# Patient Record
Sex: FEMALE | Race: BLACK OR AFRICAN AMERICAN | NOT HISPANIC OR LATINO | Employment: OTHER | URBAN - METROPOLITAN AREA
[De-identification: names, ages, dates, MRNs, and addresses within clinical notes are randomized per-mention and may not be internally consistent; named-entity substitution may affect disease eponyms.]

---

## 2017-01-17 ENCOUNTER — OFFICE VISIT (OUTPATIENT)
Dept: PSYCHIATRY | Facility: CLINIC | Age: 57
End: 2017-01-17
Payer: COMMERCIAL

## 2017-01-17 VITALS
WEIGHT: 195 LBS | HEIGHT: 69 IN | SYSTOLIC BLOOD PRESSURE: 115 MMHG | BODY MASS INDEX: 28.88 KG/M2 | HEART RATE: 89 BPM | DIASTOLIC BLOOD PRESSURE: 70 MMHG

## 2017-01-17 DIAGNOSIS — F60.89 CLUSTER B PERSONALITY DISORDER: Primary | ICD-10-CM

## 2017-01-17 PROCEDURE — 99213 OFFICE O/P EST LOW 20 MIN: CPT | Mod: S$GLB,,, | Performed by: PSYCHIATRY & NEUROLOGY

## 2017-01-17 PROCEDURE — 99999 PR PBB SHADOW E&M-EST. PATIENT-LVL II: CPT | Mod: PBBFAC,,, | Performed by: PSYCHIATRY & NEUROLOGY

## 2017-01-17 PROCEDURE — 1160F RVW MEDS BY RX/DR IN RCRD: CPT | Mod: S$GLB,,, | Performed by: PSYCHIATRY & NEUROLOGY

## 2017-01-17 RX ORDER — CLONAZEPAM 2 MG/1
2 TABLET ORAL NIGHTLY
Qty: 90 TABLET | Refills: 0 | Status: SHIPPED | OUTPATIENT
Start: 2017-01-17 | End: 2017-04-11 | Stop reason: SDUPTHER

## 2017-01-17 RX ORDER — FLUOXETINE HYDROCHLORIDE 40 MG/1
80 CAPSULE ORAL DAILY
Qty: 180 CAPSULE | Refills: 0 | Status: SHIPPED | OUTPATIENT
Start: 2017-01-17 | End: 2017-04-11 | Stop reason: SDUPTHER
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
## 2017-02-28 NOTE — PROGRESS NOTES
STAFF NOTE:  Patient's case was discussed with Dr. Rojas in supervision.  I agree with her assessment and plan as specified to continue Prozac and Klonopin for management of depression and anxiety Sx.

## 2017-03-01 ENCOUNTER — OFFICE VISIT (OUTPATIENT)
Dept: PSYCHIATRY | Facility: CLINIC | Age: 57
End: 2017-03-01
Payer: COMMERCIAL

## 2017-03-01 DIAGNOSIS — F41.1 GAD (GENERALIZED ANXIETY DISORDER): ICD-10-CM

## 2017-03-01 DIAGNOSIS — F39 EPISODIC MOOD DISORDER: Primary | ICD-10-CM

## 2017-03-01 PROCEDURE — 90791 PSYCH DIAGNOSTIC EVALUATION: CPT | Mod: S$GLB,,, | Performed by: SOCIAL WORKER
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
## 2017-03-31 ENCOUNTER — OFFICE VISIT (OUTPATIENT)
Dept: PSYCHIATRY | Facility: CLINIC | Age: 57
End: 2017-03-31
Payer: COMMERCIAL

## 2017-03-31 DIAGNOSIS — F41.1 GAD (GENERALIZED ANXIETY DISORDER): Primary | ICD-10-CM

## 2017-03-31 PROCEDURE — 90834 PSYTX W PT 45 MINUTES: CPT | Mod: S$GLB,,, | Performed by: SOCIAL WORKER
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
## 2017-04-11 ENCOUNTER — OFFICE VISIT (OUTPATIENT)
Dept: PSYCHIATRY | Facility: CLINIC | Age: 57
End: 2017-04-11
Payer: COMMERCIAL

## 2017-04-11 DIAGNOSIS — F60.89 CLUSTER B PERSONALITY DISORDER: Primary | ICD-10-CM

## 2017-04-11 PROCEDURE — 1160F RVW MEDS BY RX/DR IN RCRD: CPT | Mod: S$GLB,,, | Performed by: PSYCHIATRY & NEUROLOGY

## 2017-04-11 PROCEDURE — 99999 PR PBB SHADOW E&M-EST. PATIENT-LVL III: CPT | Mod: PBBFAC,,, | Performed by: PSYCHIATRY & NEUROLOGY

## 2017-04-11 PROCEDURE — 99213 OFFICE O/P EST LOW 20 MIN: CPT | Mod: S$GLB,,, | Performed by: PSYCHIATRY & NEUROLOGY

## 2017-04-11 RX ORDER — CLONAZEPAM 1 MG/1
1 TABLET ORAL 2 TIMES DAILY
Qty: 180 TABLET | Refills: 0 | Status: SHIPPED | OUTPATIENT
Start: 2017-04-11 | End: 2017-07-18 | Stop reason: SDUPTHER

## 2017-04-11 RX ORDER — FLUOXETINE HYDROCHLORIDE 40 MG/1
80 CAPSULE ORAL DAILY
Qty: 180 CAPSULE | Refills: 0 | Status: SHIPPED | OUTPATIENT
Start: 2017-04-11 | End: 2017-07-18 | Stop reason: SDUPTHER

## 2017-04-11 NOTE — PROGRESS NOTES
"Outpatient Psychiatry Follow-Up Visit (MD/NP)    4/11/2017    Clinical Status of Patient:  Outpatient (Ambulatory)    Chief Complaint:  Jennifer Brady is a 56 y.o. female who presents today for follow-up of depression, mood disorder and anxiety.  Met with patient.      Interval History and Content of Current Session:  Interim Events/Subjective Report/Content of Current Session: Pt states that she has been doing well since last visit and reports no symptoms of depression or anxiety upon interview; states "I'm actually doing great." She denies SI/HI/AVH and endorses good sleep and appetite. She also states that she feels she needs to be on the Klonopin during the day as well so discussed dose change from 2 mg qhs to 1 mg BID.    Psychotherapy:  · Target symptoms: depression, anxiety , mood disorder  · Why chosen therapy is appropriate versus another modality: relevant to diagnosis  · Outcome monitoring methods: self-report  · Therapeutic intervention type: supportive psychotherapy  · Topics discussed/themes: symptom recognition  · The patient's response to the intervention is accepting. The patient's progress toward treatment goals is good.   · Duration of intervention: 5 minutes.    Review of Systems   · PSYCHIATRIC: Pertinant items are noted in the narrative.    Past Medical, Family and Social History: The patient's past medical, family and social history have been reviewed and updated as appropriate within the electronic medical record - see encounter notes.    Compliance: yes    Side effects: None    Risk Parameters:  Patient reports no suicidal ideation  Patient reports no homicidal ideation  Patient reports no self-injurious behavior  Patient reports no violent behavior    Exam (detailed: at least 9 elements; comprehensive: all 15 elements)   Constitutional  Vitals:  Most recent vital signs, dated less than 90 days prior to this appointment, were reviewed.   There were no vitals filed for this visit.   "   General:  unremarkable, age appropriate     Musculoskeletal  Muscle Strength/Tone:  no spasicity, no rigidity   Gait & Station:  non-ataxic     Psychiatric  Speech:  no latency; no press   Mood & Affect:  steady  congruent and appropriate   Thought Process:  normal and logical   Associations:  intact   Thought Content:  normal, no suicidality, no homicidality, delusions, or paranoia   Insight:  intact, has awareness of illness   Judgement: behavior is adequate to circumstances   Orientation:  grossly intact   Memory: intact for content of interview   Language: grossly intact   Attention Span & Concentration:  able to focus   Fund of Knowledge:  intact and appropriate to age and level of education     Assessment and Diagnosis   Status/Progress: Based on the examination today, the patient's problem(s) is/are well controlled.  New problems have not been presented today.   Co-morbidities, Diagnostic uncertainty and Lack of compliance are not complicating management of the primary condition.  There are no active rule-out diagnoses for this patient at this time.     General Impression:   MDD  LAURA  Cluster B personality traits      Intervention/Counseling/Treatment Plan   · Continue Proxac 80 mg qAM and Klonopin 1 mg BID.      Return to Clinic: 3 months

## 2017-05-01 ENCOUNTER — OFFICE VISIT (OUTPATIENT)
Dept: PSYCHIATRY | Facility: CLINIC | Age: 57
End: 2017-05-01
Payer: COMMERCIAL

## 2017-05-01 DIAGNOSIS — F39 MOOD DISORDER: ICD-10-CM

## 2017-05-01 DIAGNOSIS — F41.1 GAD (GENERALIZED ANXIETY DISORDER): Primary | ICD-10-CM

## 2017-05-01 PROCEDURE — 90834 PSYTX W PT 45 MINUTES: CPT | Mod: S$GLB,,, | Performed by: SOCIAL WORKER
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
## 2017-06-01 ENCOUNTER — DOCUMENTATION ONLY (OUTPATIENT)
Dept: PSYCHIATRY | Facility: CLINIC | Age: 57
End: 2017-06-01

## 2017-07-11 PROBLEM — E78.00 PURE HYPERCHOLESTEROLEMIA: Status: ACTIVE | Noted: 2017-07-11

## 2017-07-11 PROBLEM — I10 HTN (HYPERTENSION): Status: ACTIVE | Noted: 2017-07-11

## 2017-07-11 PROBLEM — G47.33 OSA (OBSTRUCTIVE SLEEP APNEA): Status: ACTIVE | Noted: 2017-07-11

## 2017-07-11 PROBLEM — Z86.69 HX OF MIGRAINE HEADACHES: Status: ACTIVE | Noted: 2017-07-11

## 2017-07-11 PROBLEM — F41.9 ANXIETY: Status: ACTIVE | Noted: 2017-07-11

## 2017-07-11 PROBLEM — R07.9 CHEST PAIN: Status: ACTIVE | Noted: 2017-07-11

## 2017-07-18 ENCOUNTER — OFFICE VISIT (OUTPATIENT)
Dept: PSYCHIATRY | Facility: CLINIC | Age: 57
End: 2017-07-18
Payer: COMMERCIAL

## 2017-07-18 VITALS
HEART RATE: 80 BPM | DIASTOLIC BLOOD PRESSURE: 56 MMHG | SYSTOLIC BLOOD PRESSURE: 118 MMHG | HEIGHT: 69 IN | BODY MASS INDEX: 27.85 KG/M2 | WEIGHT: 188 LBS

## 2017-07-18 DIAGNOSIS — F32.A DEPRESSION, UNSPECIFIED DEPRESSION TYPE: Primary | ICD-10-CM

## 2017-07-18 DIAGNOSIS — F41.9 ANXIETY: ICD-10-CM

## 2017-07-18 DIAGNOSIS — F60.89 CLUSTER B PERSONALITY DISORDER: ICD-10-CM

## 2017-07-18 PROCEDURE — 99213 OFFICE O/P EST LOW 20 MIN: CPT | Mod: S$GLB,,, | Performed by: PSYCHIATRY & NEUROLOGY

## 2017-07-18 PROCEDURE — 99999 PR PBB SHADOW E&M-EST. PATIENT-LVL III: CPT | Mod: PBBFAC,,, | Performed by: PSYCHIATRY & NEUROLOGY

## 2017-07-18 PROCEDURE — 3008F BODY MASS INDEX DOCD: CPT | Mod: S$GLB,,, | Performed by: PSYCHIATRY & NEUROLOGY

## 2017-07-18 RX ORDER — FLUOXETINE HYDROCHLORIDE 40 MG/1
80 CAPSULE ORAL DAILY
Qty: 180 CAPSULE | Refills: 0 | Status: SHIPPED | OUTPATIENT
Start: 2017-07-18 | End: 2017-10-10 | Stop reason: SDUPTHER

## 2017-07-18 RX ORDER — CLONAZEPAM 1 MG/1
1 TABLET ORAL 2 TIMES DAILY
Qty: 180 TABLET | Refills: 0 | Status: SHIPPED | OUTPATIENT
Start: 2017-07-18 | End: 2017-10-10 | Stop reason: SDUPTHER
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
## 2017-08-29 ENCOUNTER — OFFICE VISIT (OUTPATIENT)
Dept: PSYCHIATRY | Facility: CLINIC | Age: 57
End: 2017-08-29
Payer: COMMERCIAL

## 2017-08-29 DIAGNOSIS — F41.1 GAD (GENERALIZED ANXIETY DISORDER): ICD-10-CM

## 2017-08-29 DIAGNOSIS — F60.89 CLUSTER B PERSONALITY DISORDER: ICD-10-CM

## 2017-08-29 DIAGNOSIS — F32.A DEPRESSION, UNSPECIFIED DEPRESSION TYPE: Primary | ICD-10-CM

## 2017-08-29 PROCEDURE — 90834 PSYTX W PT 45 MINUTES: CPT | Mod: S$GLB,,, | Performed by: SOCIAL WORKER

## 2017-08-30 NOTE — PROGRESS NOTES
Individual Psychotherapy (PhD/LCSW)    8/29/2017    Site:  [facility]         Therapeutic Intervention: Met with patient.  Outpatient - Supportive psychotherapy 45 min - CPT Code 28758    Chief complaint/reason for encounter: depression and anxiety     Interval history and content of current session: Pt is tearful and dramatic throughout session.  States she is very lonely as it is just her in the house now.  Her 2 grandchildren returned to [place] to her daughter in mid June.  She talks to them daily by Skype.  She helps people she knows, visits her daughter's father in law in a nursing home several times a time but she still feels lonely.  She wants the companionship of a man.  Discussed how this is the first time she has been without a partner and how she has to learn to live with that for now.  She complains a great deal of being in chronic pain.  Her shoulder still hurts a year after rotator cuff surgery and the orthopedist who did it is outside [facility] and she has an appointment with him tomorrow.  She also describes severe back pain.  She has never seen a pain management specialist.  Discussed perhaps referring her to Dr. Logan at [facility] for an evaluation.  Pt is on 80 mg Prozc, feels it isn't helping but she has been on many other meds over the many years she has been a pt here and feels nothing has ever worked in helping her with her depression.    Treatment plan:  · Target symptoms: depression, anxiety   · Why chosen therapy is appropriate versus another modality: relevant to diagnosis  · Outcome monitoring methods: self-report, observation  · Therapeutic intervention type: supportive psychotherapy    Risk parameters:  Patient reports no suicidal ideation  Patient reports no homicidal ideation  Patient reports no self-injurious behavior  Patient reports no violent behavior    Verbal deficits: None    Patient's response to intervention:  The patient's response to intervention is  accepting.    Progress toward goals and other mental status changes:  The patient's progress toward goals is good.    Diagnosis:     ICD-10-CM ICD-9-CM   1. Depression, unspecified depression type F32.9 311   2. LAURA (generalized anxiety disorder) F41.1 300.02   3. Cluster B personality disorder F60.9 301.9       Plan:  individual psychotherapy and medication management by physician    Return to clinic: 1 month    Length of Service (minutes): 45

## 2017-09-05 NOTE — PROGRESS NOTES
STAFF NOTE:  Patient's case was discussed with Dr. Ham in supervision.  I agree with his assessment and plan as specified to continue Prozac and Klonopin for depression and anxiety Sx management.  Pt also can benefit from psychotherapy.

## 2017-10-10 ENCOUNTER — OFFICE VISIT (OUTPATIENT)
Dept: PSYCHIATRY | Facility: CLINIC | Age: 57
End: 2017-10-10
Payer: COMMERCIAL

## 2017-10-10 VITALS
WEIGHT: 191.81 LBS | SYSTOLIC BLOOD PRESSURE: 121 MMHG | HEART RATE: 70 BPM | DIASTOLIC BLOOD PRESSURE: 60 MMHG | BODY MASS INDEX: 28.41 KG/M2 | HEIGHT: 69 IN

## 2017-10-10 DIAGNOSIS — F41.9 ANXIETY: ICD-10-CM

## 2017-10-10 DIAGNOSIS — F32.A DEPRESSION, UNSPECIFIED DEPRESSION TYPE: ICD-10-CM

## 2017-10-10 DIAGNOSIS — F60.89 CLUSTER B PERSONALITY DISORDER: Primary | ICD-10-CM

## 2017-10-10 PROCEDURE — 99213 OFFICE O/P EST LOW 20 MIN: CPT | Mod: S$GLB,,, | Performed by: PSYCHIATRY & NEUROLOGY

## 2017-10-10 PROCEDURE — 99999 PR PBB SHADOW E&M-EST. PATIENT-LVL III: CPT | Mod: PBBFAC,,, | Performed by: PSYCHIATRY & NEUROLOGY

## 2017-10-10 RX ORDER — FLUOXETINE HYDROCHLORIDE 40 MG/1
80 CAPSULE ORAL DAILY
Qty: 180 CAPSULE | Refills: 0 | Status: SHIPPED | OUTPATIENT
Start: 2017-10-10 | End: 2017-11-07 | Stop reason: SDUPTHER

## 2017-10-10 RX ORDER — CLONAZEPAM 1 MG/1
1 TABLET ORAL 2 TIMES DAILY
Qty: 180 TABLET | Refills: 0 | Status: SHIPPED | OUTPATIENT
Start: 2017-10-10 | End: 2017-11-07 | Stop reason: SDUPTHER

## 2017-10-10 RX ORDER — DOXEPIN HYDROCHLORIDE 10 MG/1
10 CAPSULE ORAL NIGHTLY
Qty: 30 CAPSULE | Refills: 2 | Status: SHIPPED | OUTPATIENT
Start: 2017-10-10 | End: 2017-11-07 | Stop reason: SDUPTHER

## 2017-10-12 ENCOUNTER — OFFICE VISIT (OUTPATIENT)
Dept: PSYCHIATRY | Facility: CLINIC | Age: 57
End: 2017-10-12
Payer: COMMERCIAL

## 2017-10-12 DIAGNOSIS — F32.A DEPRESSION, UNSPECIFIED DEPRESSION TYPE: Primary | ICD-10-CM

## 2017-10-12 DIAGNOSIS — F60.89 CLUSTER B PERSONALITY DISORDER: ICD-10-CM

## 2017-10-12 PROCEDURE — 90834 PSYTX W PT 45 MINUTES: CPT | Mod: S$GLB,,, | Performed by: SOCIAL WORKER

## 2017-10-12 NOTE — PROGRESS NOTES
Individual Psychotherapy (PhD/LCSW)    10/12/2017    Site:  [facility]         Therapeutic Intervention: Met with patient.  Outpatient - Supportive psychotherapy 45 min - CPT Code 60773    Chief complaint/reason for encounter: depression and anxiety     Interval history and content of current session: Pt states she is doing much better.  She has stopped doing so much for others and is spending more time on herself.  She has stopped missing her ex- and does not think about him very much.  She plans to visit her grandchildren in November and bring them back here for [place].  She has been taking care of health issues and will be getting an injection for her back pain.  She still has chronic headaches.  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    Treatment plan:  · Target symptoms: depression, anxiety   · Why chosen therapy is appropriate versus another modality: relevant to diagnosis  · Outcome monitoring methods: self-report, observation  · Therapeutic intervention type: supportive psychotherapy    Risk parameters:  Patient reports no suicidal ideation  Patient reports no homicidal ideation  Patient reports no self-injurious behavior  Patient reports no violent behavior    Verbal deficits: None    Patient's response to intervention:  The patient's response to intervention is accepting.    Progress toward goals and other mental status changes:  The patient's progress toward goals is good.    Diagnosis:     ICD-10-CM ICD-9-CM   1. Depression, unspecified depression type F32.9 311   2. Cluster B personality disorder F60.9 301.9       Plan:  individual psychotherapy and medication management by physician    Return to clinic: 1 month    Length of Service (minutes): 45

## 2017-11-07 ENCOUNTER — OFFICE VISIT (OUTPATIENT)
Dept: PSYCHIATRY | Facility: CLINIC | Age: 57
End: 2017-11-07
Payer: COMMERCIAL

## 2017-11-07 VITALS
SYSTOLIC BLOOD PRESSURE: 130 MMHG | WEIGHT: 195.38 LBS | HEART RATE: 83 BPM | HEIGHT: 69 IN | DIASTOLIC BLOOD PRESSURE: 60 MMHG | BODY MASS INDEX: 28.94 KG/M2

## 2017-11-07 DIAGNOSIS — F41.9 ANXIETY: ICD-10-CM

## 2017-11-07 DIAGNOSIS — F32.A DEPRESSION, UNSPECIFIED DEPRESSION TYPE: ICD-10-CM

## 2017-11-07 DIAGNOSIS — F60.89 CLUSTER B PERSONALITY DISORDER: Primary | ICD-10-CM

## 2017-11-07 PROCEDURE — 3008F BODY MASS INDEX DOCD: CPT | Mod: S$GLB,,, | Performed by: PSYCHIATRY & NEUROLOGY

## 2017-11-07 PROCEDURE — 99214 OFFICE O/P EST MOD 30 MIN: CPT | Mod: S$GLB,,, | Performed by: PSYCHIATRY & NEUROLOGY

## 2017-11-07 PROCEDURE — 99999 PR PBB SHADOW E&M-EST. PATIENT-LVL III: CPT | Mod: PBBFAC,,, | Performed by: PSYCHIATRY & NEUROLOGY

## 2017-11-07 RX ORDER — FLUOXETINE HYDROCHLORIDE 40 MG/1
80 CAPSULE ORAL DAILY
Qty: 180 CAPSULE | Refills: 1 | Status: SHIPPED | OUTPATIENT
Start: 2017-11-07 | End: 2018-01-15 | Stop reason: SDUPTHER

## 2017-11-07 RX ORDER — DOXEPIN HYDROCHLORIDE 50 MG/1
50 CAPSULE ORAL NIGHTLY
Qty: 30 CAPSULE | Refills: 3 | Status: SHIPPED | OUTPATIENT
Start: 2017-11-07 | End: 2018-01-15

## 2017-11-07 RX ORDER — CLONAZEPAM 1 MG/1
1 TABLET ORAL 2 TIMES DAILY
Qty: 180 TABLET | Refills: 0 | Status: SHIPPED | OUTPATIENT
Start: 2017-12-05 | End: 2018-01-15 | Stop reason: SDUPTHER

## 2017-11-07 NOTE — PROGRESS NOTES
"Outpatient Psychiatry Follow-Up Visit (MD/NP)    11/7/2017    Clinical Status of Patient:  Outpatient (Ambulatory)    Chief Complaint:  Jennifer Brady is a 57 y.o. female who presents today for follow-up of depression, mood disorder and anxiety.  Met with patient.      Interval History and Content of Current Session:  Interim Events/Subjective Report/Content of Current Session: Patient reports that she has been "okay" since her last visit.  She is still having trouble with sleep and realized once she got home after the last appointment, that she had already been on Doxepin 25mg at night with sub-optimal results.  However, she is willing to try 50mg of doxepin before switching to another medication.  Still with racing thoughts before bed and having trouble clearing her mind.  Discussed meditation for sleep.  She has also been suffering with shoulder pain and was was found to have an infection from a screw that was placed previously.  She is scheduled to have surgery later this month which has her feeling anxious. She is looking forward to traveling to [place] in December and will go to [place] for New Year's.  She will then be going on a few cruises with friends and family.  Patient is brighter, more future oriented than previous encounters.  She has been working on accepting people for who they are rather than expecting a lot from others.  Mood has been stable, no SI/HI/AVH.      Psychotherapy:  · Target symptoms: depression, anxiety , mood disorder  · Why chosen therapy is appropriate versus another modality: relevant to diagnosis  · Outcome monitoring methods: self-report  · Therapeutic intervention type: supportive psychotherapy  · Topics discussed/themes: symptom recognition  · The patient's response to the intervention is accepting. The patient's progress toward treatment goals is limited.   · Duration of intervention: 8 minutes.    Review of Systems   · PSYCHIATRIC: Pertinant items are noted in the " "narrative.  · NEUROLOGIC: Positive for headache.  · CARDIOVASCULAR: No tachycardia or chest pain.  · GASTROINTESTINAL: No nausea, vomiting, pain, constipation or diarrhea.    Past Medical, Family and Social History: The patient's past medical, family and social history have been reviewed and updated as appropriate within the electronic medical record - see encounter notes.    Compliance: yes    Side effects: None    Risk Parameters:  Patient reports no suicidal ideation  Patient reports no homicidal ideation  Patient reports no self-injurious behavior  Patient reports no violent behavior    Exam (detailed: at least 9 elements; comprehensive: all 15 elements)   Constitutional  Vitals:  Most recent vital signs, dated less than 90 days prior to this appointment, were reviewed.   Vitals:    11/07/17 1009   BP: 130/60   Pulse: 83   Weight: 88.6 kg (195 lb 6.4 oz)   Height: 5' 9" (1.753 m)        General:  unremarkable, age appropriate     Musculoskeletal  Muscle Strength/Tone:  no spasicity, no rigidity   Gait & Station:  non-ataxic     Psychiatric  Speech:  no latency; no press   Mood & Affect:  steady, euthymic  congruent and appropriate, tearful at times   Thought Process:  normal and logical   Associations:  intact   Thought Content:  normal, no suicidality, no homicidality, delusions, or paranoia   Insight:  intact, has awareness of illness   Judgement: behavior is adequate to circumstances   Orientation:  grossly intact   Memory: intact for content of interview   Language: grossly intact   Attention Span & Concentration:  able to focus   Fund of Knowledge:  intact and appropriate to age and level of education     Assessment and Diagnosis   Status/Progress: Based on the examination today, the patient's problem(s) is/are adequately but not ideally controlled.  New problems have not been presented today.   Co-morbidities, Diagnostic uncertainty and Lack of compliance are not complicating management of the primary " condition.  There are no active rule-out diagnoses for this patient at this time.     General Impression:   MDD  LAURA  Cluster B personality traits      Intervention/Counseling/Treatment Plan   · Continue Proxac 80 mg qAM and Klonopin 1 mg BID.  Increase Doxepin 50mg qHS for insomnia.  · Continue psychotherapy    Return to Clinic: 3 months

## 2017-11-08 ENCOUNTER — OFFICE VISIT (OUTPATIENT)
Dept: PSYCHIATRY | Facility: CLINIC | Age: 57
End: 2017-11-08
Payer: COMMERCIAL

## 2017-11-08 DIAGNOSIS — F60.89 CLUSTER B PERSONALITY DISORDER: ICD-10-CM

## 2017-11-08 DIAGNOSIS — F32.A DEPRESSION, UNSPECIFIED DEPRESSION TYPE: Primary | ICD-10-CM

## 2017-11-08 PROCEDURE — 90834 PSYTX W PT 45 MINUTES: CPT | Mod: S$GLB,,, | Performed by: SOCIAL WORKER

## 2017-11-08 NOTE — PROGRESS NOTES
"Individual Psychotherapy (PhD/LCSW)    11/8/2017    Site:  [facility]         Therapeutic Intervention: Met with patient.  Outpatient - Supportive psychotherapy 45 min - CPT Code 89709    Chief complaint/reason for encounter: depression and anxiety     Interval history and content of current session: Pt again reports she is doing well.  She has been going to a "class" at [employer] that has helped her look at her behavior more and she is taking responsibility for causing some of the interpersonal problems she has had.  She is realizing that she has been expecting people to do things like she would do them and then getting mad and disappointed in them when they do things their way. She is trying to stop doing this and has told her daughters about this insight.  She is trying to have a better relationship with them without giving in to all of their demands and without worrying excessively about them.  She is having surgery on her shoulder the last week of November and is going on 2 cruises early next year as well as to [facility] in December.    Treatment plan:  · Target symptoms: depression, anxiety   · Why chosen therapy is appropriate versus another modality: relevant to diagnosis  · Outcome monitoring methods: self-report, observation  · Therapeutic intervention type: supportive psychotherapy    Risk parameters:  Patient reports no suicidal ideation  Patient reports no homicidal ideation  Patient reports no self-injurious behavior  Patient reports no violent behavior    Verbal deficits: None    Patient's response to intervention:  The patient's response to intervention is accepting.    Progress toward goals and other mental status changes:  The patient's progress toward goals is good.    Diagnosis:     ICD-10-CM ICD-9-CM   1. Depression, unspecified depression type F32.9 311   2. Cluster B personality disorder F60.9 301.9       Plan:  individual psychotherapy and medication management by physician    Return to clinic: " 1 month    Length of Service (minutes): 45

## 2017-12-14 ENCOUNTER — DOCUMENTATION ONLY (OUTPATIENT)
Dept: PSYCHIATRY | Facility: CLINIC | Age: 57
End: 2017-12-14

## 2018-01-15 ENCOUNTER — OFFICE VISIT (OUTPATIENT)
Dept: PSYCHIATRY | Facility: CLINIC | Age: 58
End: 2018-01-15
Payer: COMMERCIAL

## 2018-01-15 VITALS
HEART RATE: 92 BPM | HEIGHT: 69 IN | SYSTOLIC BLOOD PRESSURE: 149 MMHG | DIASTOLIC BLOOD PRESSURE: 69 MMHG | WEIGHT: 205.19 LBS | BODY MASS INDEX: 30.39 KG/M2

## 2018-01-15 DIAGNOSIS — F39 MOOD DISORDER: Primary | ICD-10-CM

## 2018-01-15 DIAGNOSIS — F41.3 OTHER MIXED ANXIETY DISORDERS: ICD-10-CM

## 2018-01-15 DIAGNOSIS — G47.00 INSOMNIA DISORDER, WITH NON-SLEEP DISORDER MENTAL COMORBIDITY: ICD-10-CM

## 2018-01-15 DIAGNOSIS — F60.89 CLUSTER B PERSONALITY DISORDER: ICD-10-CM

## 2018-01-15 DIAGNOSIS — Z86.69 HX OF MIGRAINE HEADACHES: ICD-10-CM

## 2018-01-15 PROCEDURE — 99999 PR PBB SHADOW E&M-EST. PATIENT-LVL III: CPT | Mod: PBBFAC,,, | Performed by: NURSE PRACTITIONER

## 2018-01-15 PROCEDURE — 99215 OFFICE O/P EST HI 40 MIN: CPT | Mod: S$GLB,,, | Performed by: NURSE PRACTITIONER

## 2018-01-15 RX ORDER — CLONAZEPAM 1 MG/1
1 TABLET ORAL 2 TIMES DAILY
Qty: 60 TABLET | Refills: 3 | Status: SHIPPED | OUTPATIENT
Start: 2018-01-15 | End: 2018-02-27 | Stop reason: SDUPTHER

## 2018-01-15 RX ORDER — TOPIRAMATE 25 MG/1
25 TABLET ORAL 2 TIMES DAILY
Qty: 60 TABLET | Refills: 11 | Status: SHIPPED | OUTPATIENT
Start: 2018-01-15 | End: 2018-02-26 | Stop reason: SDUPTHER

## 2018-01-15 RX ORDER — FLUOXETINE HYDROCHLORIDE 20 MG/1
60 CAPSULE ORAL DAILY
Qty: 90 CAPSULE | Refills: 3 | Status: SHIPPED | OUTPATIENT
Start: 2018-01-15 | End: 2018-02-27 | Stop reason: SDUPTHER

## 2018-01-15 RX ORDER — ZOLPIDEM TARTRATE 12.5 MG/1
12.5 TABLET, FILM COATED, EXTENDED RELEASE ORAL NIGHTLY PRN
Qty: 30 TABLET | Refills: 3 | Status: SHIPPED | OUTPATIENT
Start: 2018-01-15 | End: 2018-02-22
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
## 2018-01-23 NOTE — PROGRESS NOTES
STAFF NOTE:  Patient's case was discussed with Dr. Ham in supervision.  I agree with his assessment and plan as specified to continue Prozac, Klonopin and doxepin for management of depression and anxiety Sx.

## 2018-02-15 ENCOUNTER — OFFICE VISIT (OUTPATIENT)
Dept: PSYCHIATRY | Facility: CLINIC | Age: 58
End: 2018-02-15
Payer: COMMERCIAL

## 2018-02-15 DIAGNOSIS — F39 MOOD DISORDER: Primary | ICD-10-CM

## 2018-02-15 PROCEDURE — 90834 PSYTX W PT 45 MINUTES: CPT | Mod: S$GLB,,, | Performed by: SOCIAL WORKER
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
## 2018-02-18 NOTE — PROGRESS NOTES
STAFF NOTE:  Patient's case was discussed with Dr. Ham in supervision.  I agree with his assessment and plan as specified to continue Prozac and PRN Klonopin for depression and anxiety, as well as try doxepin for sleep.

## 2018-02-22 ENCOUNTER — OFFICE VISIT (OUTPATIENT)
Dept: SLEEP MEDICINE | Facility: CLINIC | Age: 58
End: 2018-02-22
Payer: COMMERCIAL

## 2018-02-22 VITALS
HEART RATE: 104 BPM | WEIGHT: 205.69 LBS | HEIGHT: 69 IN | SYSTOLIC BLOOD PRESSURE: 130 MMHG | BODY MASS INDEX: 30.47 KG/M2 | DIASTOLIC BLOOD PRESSURE: 94 MMHG | OXYGEN SATURATION: 98 %

## 2018-02-22 DIAGNOSIS — E66.9 OBESITY (BMI 30.0-34.9): ICD-10-CM

## 2018-02-22 DIAGNOSIS — G47.33 OSA (OBSTRUCTIVE SLEEP APNEA): Primary | ICD-10-CM

## 2018-02-22 DIAGNOSIS — Z71.89 CPAP USE COUNSELING: ICD-10-CM

## 2018-02-22 PROCEDURE — 99203 OFFICE O/P NEW LOW 30 MIN: CPT | Mod: S$GLB,,, | Performed by: NURSE PRACTITIONER

## 2018-02-22 PROCEDURE — 3008F BODY MASS INDEX DOCD: CPT | Mod: S$GLB,,, | Performed by: NURSE PRACTITIONER

## 2018-02-22 PROCEDURE — 99999 PR PBB SHADOW E&M-EST. PATIENT-LVL IV: CPT | Mod: PBBFAC,,, | Performed by: NURSE PRACTITIONER

## 2018-02-22 RX ORDER — DOXEPIN HYDROCHLORIDE 50 MG/1
CAPSULE ORAL
COMMUNITY
Start: 2018-01-30 | End: 2018-02-27 | Stop reason: SDUPTHER

## 2018-02-22 RX ORDER — ATORVASTATIN CALCIUM 40 MG/1
TABLET, FILM COATED ORAL
Refills: 3 | COMMUNITY
Start: 2017-12-02 | End: 2018-04-18 | Stop reason: SDUPTHER

## 2018-02-22 NOTE — PROGRESS NOTES
Jennifer Brady  was seen as a new patient, self-referred, for the management of obstructive sleep apnea.    CHIEF COMPLAINT:    Chief Complaint   Patient presents with    Snoring     gasp for air    Sleep Apnea    Fatigue       02/22/2018 LO Gill NP: Initial HISTORY OF PRESENT ILLNESS: Jennifer Brady is a 57 y.o. female is here for sleep evaluation.       Patient complaints include: snoring, excessive daytime fatigue, and air gasping without CPAP machine    Diagnosed with REN ~10 years ago and has been on CPAP since  Prior sleep/neuro MD retired  Establish care today and get new machine/supplies  Used to get supplies from sleep MD office    Has not been able to use current PAP machine since nasal mask is broken. Mask last replaced > 6 months ago  Without PAP use has gained 25 lb and worsening anxiety. Regular morning headaches without CPAP.     CPAP Interrogation: Resmed APAP 5 - 15 cm, Days Used: 2/30, Days > 4 hours: 0/30, Average Usage: 2.3 hours 90%tile pressure: 8.4 cm    Denies symptoms of restless legs or kicking during sleep.    Occupation: retired     Alvord Sleepiness Scale score during initial sleep evaluation was 9.    SLEEP ROUTINE:    Bed partner:  no  Time to bed: 9:30 - 10 pm  Sleep onset latency: 30 minutes      Disruptions or awakenings:   2 - 3    Wakeup time:     4:30 0 5 am  Perceived sleep quality:  2/5       Previous Sleep Study: CAROLINA signed to obtain external sleep records    PAST MEDICAL HISTORY:    Active Ambulatory Problems     Diagnosis Date Noted    Adjustment disorder with mixed anxiety and depressed mood 08/02/2012    Depression 11/30/2012    Episodic mood disorder 05/17/2013    Cluster B personality disorder 05/17/2013    HTN (hypertension) 07/11/2017    Pure hypercholesterolemia 07/11/2017    Anxiety 07/11/2017    REN (obstructive sleep apnea) 07/11/2017    Hx of migraine headaches 07/11/2017    Chest pain 07/11/2017     Resolved Ambulatory Problems  "    Diagnosis Date Noted    No Resolved Ambulatory Problems     Past Medical History:   Diagnosis Date    Anxiety     Behavioral problem     Borderline personality disorder     Depression     Fatigue     Hx of psychiatric care     Hyperlipidemia     Hypertension     Psychiatric problem     S/P hysterectomy with oophorectomy     Seizures     Seizures     Therapy                 PAST SURGICAL HISTORY:    History reviewed. No pertinent surgical history.      FAMILY HISTORY:                Family History   Problem Relation Age of Onset    Seizures Mother        SOCIAL HISTORY:          Tobacco:   History   Smoking Status    Current Every Day Smoker    Types: Cigarettes   Smokeless Tobacco    Never Used     Comment: quit actual cigarettes a few mo ago, switched to e cigarette       Alcohol use:    History   Alcohol Use No     Comment: very rarely; "my  drinks"                 ALLERGIES:  Review of patient's allergies indicates:  No Known Allergies    CURRENT MEDICATIONS:    Current Outpatient Prescriptions   Medication Sig Dispense Refill    atorvastatin (LIPITOR) 40 MG tablet TK 1 T PO QHS  3    clonazePAM (KLONOPIN) 1 MG tablet Take 1 tablet (1 mg total) by mouth 2 (two) times daily. (morning and afternoon) 60 tablet 3    doxepin (SINEQUAN) 50 MG capsule       FLUoxetine (PROZAC) 20 MG capsule Take 3 capsules (60 mg total) by mouth once daily. 90 capsule 3    hydrocodone-acetaminophen 10-325mg (NORCO)  mg Tab   0    lisinopril-hydrochlorothiazide (PRINZIDE,ZESTORETIC) 10-12.5 mg per tablet Take 1 tablet by mouth once daily.      oxcarbazepine (OXTELLAR XR) 150 mg Tb24 Take by mouth.      pantoprazole (PROTONIX) 20 MG tablet   3    topiramate (TOPAMAX) 25 MG tablet Take 1 tablet (25 mg total) by mouth 2 (two) times daily. 60 tablet 11     No current facility-administered medications for this visit.                   REVIEW OF SYSTEMS:     Sleep related symptoms as per " "HPI.  CONST:Reports weight gain  - 25 lb wihtin 6 months   HEENT: Reports sinus congestion  PULM: Reports dyspnea  CARD:  Reports palpitations   GI:  Reportsacid reflux  : Denies polyuria  NEURO: Reports morning headaches  PSYCH: Reports mood disturbance  HEME: Denies anemia    Otherwise, a balance of systems reviewed is negative.          PHYSICAL EXAM:  Vitals:    02/22/18 0836   BP: (!) 130/94   Pulse: 104   SpO2: 98%   Weight: 93.3 kg (205 lb 11 oz)   Height: 5' 9" (1.753 m)   PainSc:   7   PainLoc: Head     Body mass index is 30.38 kg/m².     GENERAL: Overweight development, well groomed  NECK: Supple. No thyromegaly. No palpable nodes.    SKIN: On face and neck: No abrasions, no rashes, no lesions.  No subcutaneous nodules are palpable.  RESPIRATORY:  Normal chest expansion and non-labored breathing at rest.  CARDIOVASCULAR: Normal rate  EXTREMITIES: No edema. No clubbing. No cyanosis. Station normal. Gait normal.        NEURO/PSYCH: Oriented to time, place and person. Normal attention span and concentration. Affect is normal. Mood is normal.                                              ASSESSMENT:    REN, previously diagnosed, severity unknown. Establish care and get new machine/supplies.  The patient symptomatically has snoring, excessive daytime fatigue, and air gasping with findings of elevated body mas index. Medical co-morbidities: migraine headaches, anxiety, depression, systemic HTN, and obesity. This warrants treatment.  CPAP machine has reached reasonable useful lifetime and needs to be replaced.     Obesity, BMI > 30, discussed relationship between weight and REN.     PLAN:    Treatment:  -CAROLINA signed to obtain external sleep records  -When records available order new APAP 6 - 20 @ [facility]  -Today, if possible, rx for supplies to get replacement mask. Use current PAP machine in the interim. Immediately go to [facility].   -RTC 31 - 90 days after PAP     Education: During our discussion today, " we talked about the etiology of obstructive sleep apnea as well as the potential ramifications of untreated sleep apnea, which could include daytime sleepiness, hypertension, heart disease and/or stroke. We discussed potential treatment options, which could include weight loss, body positioning, continuous positive airway pressure (CPAP), OA, EPAP, or referral for surgical consideration.     Counseling regarding benefits of healthy eating and physical activity (150 minutes of moderate-intensity aerobic activity per week) for weight reduction which can improve overall health.     Precautions: The patient was advised to abstain from driving should they feel sleepy  or drowsy.     Thank you for allowing me the opportunity to participate in the care of your patient.    ADDENDUM: Received CPAP titration study via fax 03/15/2018 at 1:23 pm  04/15/2016 retitration study CPAP 8 cm  Requested to follow-up regarding obtaining baseline PSG in addition to titration study.

## 2018-02-22 NOTE — PATIENT INSTRUCTIONS
For [facility]: CPAP and CPAP supplies    From Sleep Clinic: Take elevator to 2nd floor, take walkway to [facility], take [place] elevator to 7th floor; You will see sign for [facility]

## 2018-02-26 ENCOUNTER — OFFICE VISIT (OUTPATIENT)
Dept: NEUROLOGY | Facility: CLINIC | Age: 58
End: 2018-02-26
Payer: COMMERCIAL

## 2018-02-26 VITALS
SYSTOLIC BLOOD PRESSURE: 121 MMHG | DIASTOLIC BLOOD PRESSURE: 71 MMHG | BODY MASS INDEX: 30.36 KG/M2 | HEART RATE: 100 BPM | WEIGHT: 205 LBS | HEIGHT: 69 IN

## 2018-02-26 DIAGNOSIS — G40.804 OTHER INTRACTABLE EPILEPSY WITHOUT STATUS EPILEPTICUS: Primary | ICD-10-CM

## 2018-02-26 DIAGNOSIS — Z86.69 HX OF MIGRAINE HEADACHES: ICD-10-CM

## 2018-02-26 DIAGNOSIS — F32.A DEPRESSION, UNSPECIFIED DEPRESSION TYPE: ICD-10-CM

## 2018-02-26 PROBLEM — G40.919 INTRACTABLE EPILEPSY WITHOUT STATUS EPILEPTICUS: Status: ACTIVE | Noted: 2018-02-26

## 2018-02-26 PROCEDURE — 99999 PR PBB SHADOW E&M-EST. PATIENT-LVL III: CPT | Mod: PBBFAC,,, | Performed by: PSYCHIATRY & NEUROLOGY

## 2018-02-26 PROCEDURE — 99205 OFFICE O/P NEW HI 60 MIN: CPT | Mod: S$GLB,,, | Performed by: PSYCHIATRY & NEUROLOGY

## 2018-02-26 RX ORDER — TOPIRAMATE 100 MG/1
100 TABLET, FILM COATED ORAL 2 TIMES DAILY
Qty: 60 TABLET | Refills: 1 | Status: SHIPPED | OUTPATIENT
Start: 2018-02-26 | End: 2018-04-23 | Stop reason: SDUPTHER

## 2018-02-26 NOTE — ASSESSMENT & PLAN NOTE
56yo F with hx of seizures x age 8 years   - recognized as seizures at ~ age 28 years; currently q 3-4 months      Plan:  EMU evaluation: seizure characterization and quantification    - continue OXCBZ 150mg q day; Topiramate 100mg bid - at same doses for now  - will change AED at the time of EMU evaluation    Labs: cbc, cmp    MRI - pt will bring disc of study done last year - if not available will repeat    Seizure precautions/restrictions  Plan of care was discussed in detail with patient and fmly.
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
## 2018-02-27 ENCOUNTER — OFFICE VISIT (OUTPATIENT)
Dept: PSYCHIATRY | Facility: CLINIC | Age: 58
End: 2018-02-27
Payer: COMMERCIAL

## 2018-02-27 VITALS
HEART RATE: 100 BPM | DIASTOLIC BLOOD PRESSURE: 60 MMHG | HEIGHT: 69 IN | SYSTOLIC BLOOD PRESSURE: 122 MMHG | BODY MASS INDEX: 30.97 KG/M2 | WEIGHT: 209.13 LBS

## 2018-02-27 DIAGNOSIS — G47.00 INSOMNIA DISORDER, WITH NON-SLEEP DISORDER MENTAL COMORBIDITY: ICD-10-CM

## 2018-02-27 DIAGNOSIS — F60.89 CLUSTER B PERSONALITY DISORDER: ICD-10-CM

## 2018-02-27 DIAGNOSIS — F41.3 OTHER MIXED ANXIETY DISORDERS: Primary | ICD-10-CM

## 2018-02-27 DIAGNOSIS — F39 MOOD DISORDER: ICD-10-CM

## 2018-02-27 PROCEDURE — 99214 OFFICE O/P EST MOD 30 MIN: CPT | Mod: S$GLB,,, | Performed by: NURSE PRACTITIONER

## 2018-02-27 PROCEDURE — 99999 PR PBB SHADOW E&M-EST. PATIENT-LVL II: CPT | Mod: PBBFAC,,, | Performed by: NURSE PRACTITIONER

## 2018-02-27 PROCEDURE — 90833 PSYTX W PT W E/M 30 MIN: CPT | Mod: S$GLB,,, | Performed by: NURSE PRACTITIONER

## 2018-02-27 RX ORDER — FLUOXETINE HYDROCHLORIDE 40 MG/1
80 CAPSULE ORAL DAILY
Qty: 60 CAPSULE | Refills: 5 | Status: SHIPPED | OUTPATIENT
Start: 2018-02-27 | End: 2018-04-23 | Stop reason: SDUPTHER

## 2018-02-27 RX ORDER — CLONAZEPAM 1 MG/1
1 TABLET ORAL 2 TIMES DAILY
Qty: 60 TABLET | Refills: 3 | Status: SHIPPED | OUTPATIENT
Start: 2018-02-27 | End: 2018-04-23 | Stop reason: SDUPTHER

## 2018-02-27 RX ORDER — DOXEPIN HYDROCHLORIDE 50 MG/1
50 CAPSULE ORAL NIGHTLY
Qty: 30 CAPSULE | Refills: 5 | Status: SHIPPED | OUTPATIENT
Start: 2018-02-27 | End: 2018-04-18
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
## 2018-03-01 ENCOUNTER — TELEPHONE (OUTPATIENT)
Dept: SLEEP MEDICINE | Facility: CLINIC | Age: 58
End: 2018-03-01

## 2018-03-08 ENCOUNTER — TELEPHONE (OUTPATIENT)
Dept: SLEEP MEDICINE | Facility: CLINIC | Age: 58
End: 2018-03-08

## 2018-03-08 NOTE — TELEPHONE ENCOUNTER
Called Sunita Serra at  and rep said that she had faxed documents.  Request that she re fax documents, but she said, due to other responsibilities it will have to wait till tomorrow.      I still re faxed CAROLINA

## 2018-03-08 NOTE — TELEPHONE ENCOUNTER
CAROLINA signed 02/22/2018     Have we received external sleep records? This is necessary in order to order new CPAP machine.

## 2018-03-08 NOTE — TELEPHONE ENCOUNTER
----- Message from Devante Quintana sent at 3/8/2018  3:43 PM CST -----  Contact: An ([facility])  X_ 1st Request  _ 2nd Request  _ 3rd Request    Who: An ([facility])    Why: Would like to speak with staff in regards to medical records requested.    What Number to Call Back: [phone]    When to Expect a call back: (Before the end of the day)  -- if call after 3:00 call back will be tomorrow.

## 2018-03-08 NOTE — TELEPHONE ENCOUNTER
Spoke with An with [facility]. They will not release medical records to us because the patient owe money still for her sleep study she had with them.

## 2018-03-09 NOTE — TELEPHONE ENCOUNTER
Please notify pt that we are unable to receive sleep records due to account balance sleep records won't be released to Sleep Clinic until it is paid.     Without sleep records [facility] will not be able to issue new CPAP machine.     Her prior DME may have copy of old sleep records and can also provide her new machine or she will require new sleep study

## 2018-03-09 NOTE — TELEPHONE ENCOUNTER
"Called pt and spoke with her daughter "notify pt that we are unable to receive sleep records due to account balance sleep records won't be released to Sleep Clinic until it is paid.      Without sleep records [facility] will not be able to issue new CPAP machine.      Her prior DME may have copy of old sleep records and can also provide her new machine or she will require new sleep study "  "

## 2018-03-15 ENCOUNTER — TELEPHONE (OUTPATIENT)
Dept: SLEEP MEDICINE | Facility: CLINIC | Age: 58
End: 2018-03-15

## 2018-03-15 NOTE — TELEPHONE ENCOUNTER
[facility]ed [facility] for baseline study but was transferred to voice message.  Left vm requesting call back or results fax to here.

## 2018-03-15 NOTE — TELEPHONE ENCOUNTER
I saw your previous note that sleep records won't be released to us due to account balance. Does the facility know that we are trying to obtain this from a Sleep Clinic?

## 2018-03-15 NOTE — TELEPHONE ENCOUNTER
Reviewed recently uploaded external sleep study.     Please locate patient's baseline sleep study. The document received today is only the titration study.

## 2018-03-19 ENCOUNTER — OFFICE VISIT (OUTPATIENT)
Dept: PSYCHIATRY | Facility: CLINIC | Age: 58
End: 2018-03-19
Payer: COMMERCIAL

## 2018-03-19 DIAGNOSIS — F39 MOOD DISORDER: Primary | ICD-10-CM

## 2018-03-19 DIAGNOSIS — F41.3 OTHER MIXED ANXIETY DISORDERS: ICD-10-CM

## 2018-03-19 PROCEDURE — 90834 PSYTX W PT 45 MINUTES: CPT | Mod: S$GLB,,, | Performed by: SOCIAL WORKER
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
## 2018-03-21 DIAGNOSIS — R56.9 SEIZURES: Primary | ICD-10-CM

## 2018-04-02 ENCOUNTER — TELEPHONE (OUTPATIENT)
Dept: NEUROLOGY | Facility: CLINIC | Age: 58
End: 2018-04-02

## 2018-04-02 NOTE — TELEPHONE ENCOUNTER
I returned pt's call and discussed the emu admission. I also emailed another letter about the process. She will call again for further questions or concerns

## 2018-04-04 ENCOUNTER — SURGERY (OUTPATIENT)
Age: 58
End: 2018-04-04

## 2018-04-04 ENCOUNTER — HOSPITAL ENCOUNTER (OUTPATIENT)
Facility: OTHER | Age: 58
Discharge: HOME | End: 2018-04-05
Attending: EMERGENCY MEDICINE | Admitting: EMERGENCY MEDICINE
Payer: COMMERCIAL

## 2018-04-04 DIAGNOSIS — I21.4 NSTEMI (NON-ST ELEVATED MYOCARDIAL INFARCTION): ICD-10-CM

## 2018-04-04 DIAGNOSIS — R07.9 CHEST PAIN: ICD-10-CM

## 2018-04-04 DIAGNOSIS — F43.23 ADJUSTMENT DISORDER WITH MIXED ANXIETY AND DEPRESSED MOOD: ICD-10-CM

## 2018-04-04 DIAGNOSIS — I25.110 CORONARY ARTERY DISEASE INVOLVING NATIVE CORONARY ARTERY OF NATIVE HEART WITH UNSTABLE ANGINA PECTORIS: ICD-10-CM

## 2018-04-04 DIAGNOSIS — R79.89 ELEVATED TROPONIN: Primary | ICD-10-CM

## 2018-04-04 LAB
ALBUMIN SERPL BCP-MCNC: 3.3 G/DL
ALP SERPL-CCNC: 146 U/L
ALT SERPL W/O P-5'-P-CCNC: 24 U/L
ANION GAP SERPL CALC-SCNC: 10 MMOL/L
ANION GAP SERPL CALC-SCNC: 8 MMOL/L
AST SERPL-CCNC: 18 U/L
BASOPHILS # BLD AUTO: 0.03 K/UL
BASOPHILS # BLD AUTO: 0.03 K/UL
BASOPHILS NFR BLD: 0.3 %
BASOPHILS NFR BLD: 0.3 %
BILIRUB SERPL-MCNC: 0.2 MG/DL
BNP SERPL-MCNC: 24 PG/ML
BUN SERPL-MCNC: 12 MG/DL
BUN SERPL-MCNC: 12 MG/DL
CALCIUM SERPL-MCNC: 8.5 MG/DL
CALCIUM SERPL-MCNC: 9.6 MG/DL
CHLORIDE SERPL-SCNC: 111 MMOL/L
CHLORIDE SERPL-SCNC: 113 MMOL/L
CHOLEST SERPL-MCNC: 158 MG/DL
CHOLEST/HDLC SERPL: 2.9 {RATIO}
CK MB SERPL-MCNC: 122.3 NG/ML
CK MB SERPL-MCNC: 282.5 NG/ML
CK MB SERPL-RTO: 12.4 %
CK MB SERPL-RTO: 13.1 %
CK SERPL-CCNC: 2283 U/L
CK SERPL-CCNC: 2283 U/L
CK SERPL-CCNC: 932 U/L
CK SERPL-CCNC: 932 U/L
CO2 SERPL-SCNC: 21 MMOL/L
CO2 SERPL-SCNC: 23 MMOL/L
CORONARY STENOSIS: ABNORMAL
CORONARY STENT: YES
CREAT SERPL-MCNC: 0.9 MG/DL
CREAT SERPL-MCNC: 0.9 MG/DL
DIFFERENTIAL METHOD: ABNORMAL
DIFFERENTIAL METHOD: ABNORMAL
EOSINOPHIL # BLD AUTO: 0.1 K/UL
EOSINOPHIL # BLD AUTO: 0.2 K/UL
EOSINOPHIL NFR BLD: 0.6 %
EOSINOPHIL NFR BLD: 1.8 %
ERYTHROCYTE [DISTWIDTH] IN BLOOD BY AUTOMATED COUNT: 12.8 %
ERYTHROCYTE [DISTWIDTH] IN BLOOD BY AUTOMATED COUNT: 12.8 %
EST. GFR  (AFRICAN AMERICAN): >60 ML/MIN/1.73 M^2
EST. GFR  (AFRICAN AMERICAN): >60 ML/MIN/1.73 M^2
EST. GFR  (NON AFRICAN AMERICAN): >60 ML/MIN/1.73 M^2
EST. GFR  (NON AFRICAN AMERICAN): >60 ML/MIN/1.73 M^2
ESTIMATED AVG GLUCOSE: 94 MG/DL
GLUCOSE SERPL-MCNC: 106 MG/DL
GLUCOSE SERPL-MCNC: 109 MG/DL
HBA1C MFR BLD HPLC: 4.9 %
HCT VFR BLD AUTO: 35.2 %
HCT VFR BLD AUTO: 36.4 %
HDLC SERPL-MCNC: 55 MG/DL
HDLC SERPL: 34.8 %
HGB BLD-MCNC: 11.3 G/DL
HGB BLD-MCNC: 11.7 G/DL
LDLC SERPL CALC-MCNC: 90 MG/DL
LYMPHOCYTES # BLD AUTO: 3.8 K/UL
LYMPHOCYTES # BLD AUTO: 3.9 K/UL
LYMPHOCYTES NFR BLD: 33.5 %
LYMPHOCYTES NFR BLD: 40.4 %
MAGNESIUM SERPL-MCNC: 2.1 MG/DL
MCH RBC QN AUTO: 30.5 PG
MCH RBC QN AUTO: 30.9 PG
MCHC RBC AUTO-ENTMCNC: 32.1 G/DL
MCHC RBC AUTO-ENTMCNC: 32.1 G/DL
MCV RBC AUTO: 95 FL
MCV RBC AUTO: 96 FL
MONOCYTES # BLD AUTO: 0.3 K/UL
MONOCYTES # BLD AUTO: 0.4 K/UL
MONOCYTES NFR BLD: 2.9 %
MONOCYTES NFR BLD: 3.8 %
NEUTROPHILS # BLD AUTO: 5.2 K/UL
NEUTROPHILS # BLD AUTO: 7 K/UL
NEUTROPHILS NFR BLD: 53.5 %
NEUTROPHILS NFR BLD: 62.6 %
NONHDLC SERPL-MCNC: 103 MG/DL
PHOSPHATE SERPL-MCNC: 4.3 MG/DL
PLATELET # BLD AUTO: 378 K/UL
PLATELET # BLD AUTO: 383 K/UL
PMV BLD AUTO: 8.5 FL
PMV BLD AUTO: 8.6 FL
POTASSIUM SERPL-SCNC: 3.3 MMOL/L
POTASSIUM SERPL-SCNC: 3.8 MMOL/L
PROT SERPL-MCNC: 6.7 G/DL
RBC # BLD AUTO: 3.71 M/UL
RBC # BLD AUTO: 3.79 M/UL
SODIUM SERPL-SCNC: 142 MMOL/L
SODIUM SERPL-SCNC: 144 MMOL/L
TRIGL SERPL-MCNC: 65 MG/DL
TROPONIN I SERPL DL<=0.01 NG/ML-MCNC: 0.1 NG/ML
TROPONIN I SERPL DL<=0.01 NG/ML-MCNC: 2.38 NG/ML
TROPONIN I SERPL DL<=0.01 NG/ML-MCNC: 2.38 NG/ML
TROPONIN I SERPL DL<=0.01 NG/ML-MCNC: 30.01 NG/ML
TROPONIN I SERPL DL<=0.01 NG/ML-MCNC: 30.01 NG/ML
TROPONIN I SERPL DL<=0.01 NG/ML-MCNC: >50 NG/ML
WBC # BLD AUTO: 11.2 K/UL
WBC # BLD AUTO: 9.67 K/UL

## 2018-04-04 PROCEDURE — 25000003 PHARM REV CODE 250: Performed by: PHYSICIAN ASSISTANT

## 2018-04-04 PROCEDURE — 84484 ASSAY OF TROPONIN QUANT: CPT | Mod: 91

## 2018-04-04 PROCEDURE — 83735 ASSAY OF MAGNESIUM: CPT

## 2018-04-04 PROCEDURE — 93010 ELECTROCARDIOGRAM REPORT: CPT | Mod: ,,, | Performed by: INTERNAL MEDICINE

## 2018-04-04 PROCEDURE — 99900035 HC TECH TIME PER 15 MIN (STAT)

## 2018-04-04 PROCEDURE — 93458 L HRT ARTERY/VENTRICLE ANGIO: CPT | Mod: 59

## 2018-04-04 PROCEDURE — 36415 COLL VENOUS BLD VENIPUNCTURE: CPT

## 2018-04-04 PROCEDURE — 93010 ELECTROCARDIOGRAM REPORT: CPT | Mod: 76,,, | Performed by: INTERNAL MEDICINE

## 2018-04-04 PROCEDURE — 96374 THER/PROPH/DIAG INJ IV PUSH: CPT

## 2018-04-04 PROCEDURE — 84100 ASSAY OF PHOSPHORUS: CPT

## 2018-04-04 PROCEDURE — 63600175 PHARM REV CODE 636 W HCPCS: Performed by: PHYSICIAN ASSISTANT

## 2018-04-04 PROCEDURE — G0378 HOSPITAL OBSERVATION PER HR: HCPCS

## 2018-04-04 PROCEDURE — 80061 LIPID PANEL: CPT

## 2018-04-04 PROCEDURE — 83880 ASSAY OF NATRIURETIC PEPTIDE: CPT

## 2018-04-04 PROCEDURE — 93010 ELECTROCARDIOGRAM REPORT: CPT | Mod: 77,,, | Performed by: INTERNAL MEDICINE

## 2018-04-04 PROCEDURE — C1769 GUIDE WIRE: HCPCS

## 2018-04-04 PROCEDURE — 25000003 PHARM REV CODE 250

## 2018-04-04 PROCEDURE — 85025 COMPLETE CBC W/AUTO DIFF WBC: CPT

## 2018-04-04 PROCEDURE — 82550 ASSAY OF CK (CPK): CPT | Mod: 91

## 2018-04-04 PROCEDURE — 82553 CREATINE MB FRACTION: CPT

## 2018-04-04 PROCEDURE — 63600175 PHARM REV CODE 636 W HCPCS

## 2018-04-04 PROCEDURE — 83036 HEMOGLOBIN GLYCOSYLATED A1C: CPT

## 2018-04-04 PROCEDURE — 25000003 PHARM REV CODE 250: Performed by: EMERGENCY MEDICINE

## 2018-04-04 PROCEDURE — S4991 NICOTINE PATCH NONLEGEND: HCPCS | Performed by: PHYSICIAN ASSISTANT

## 2018-04-04 PROCEDURE — 80048 BASIC METABOLIC PNL TOTAL CA: CPT

## 2018-04-04 PROCEDURE — 27000190 HC CPAP FULL FACE MASK W/VALVE

## 2018-04-04 PROCEDURE — 80053 COMPREHEN METABOLIC PANEL: CPT

## 2018-04-04 PROCEDURE — 25000003 PHARM REV CODE 250: Performed by: INTERNAL MEDICINE

## 2018-04-04 PROCEDURE — 99284 EMERGENCY DEPT VISIT MOD MDM: CPT | Mod: 25

## 2018-04-04 PROCEDURE — 63600175 PHARM REV CODE 636 W HCPCS: Performed by: EMERGENCY MEDICINE

## 2018-04-04 PROCEDURE — 99220 PR INITIAL OBSERVATION CARE,LEVL III: CPT | Mod: ,,, | Performed by: INTERNAL MEDICINE

## 2018-04-04 PROCEDURE — 94761 N-INVAS EAR/PLS OXIMETRY MLT: CPT

## 2018-04-04 PROCEDURE — 93005 ELECTROCARDIOGRAM TRACING: CPT

## 2018-04-04 PROCEDURE — 25500020 PHARM REV CODE 255

## 2018-04-04 PROCEDURE — 94660 CPAP INITIATION&MGMT: CPT

## 2018-04-04 RX ORDER — ADHESIVE BANDAGE
30 BANDAGE TOPICAL DAILY PRN
Status: DISCONTINUED | OUTPATIENT
Start: 2018-04-04 | End: 2018-04-05 | Stop reason: HOSPADM

## 2018-04-04 RX ORDER — TOPIRAMATE 100 MG/1
100 TABLET, FILM COATED ORAL 2 TIMES DAILY
Status: DISCONTINUED | OUTPATIENT
Start: 2018-04-04 | End: 2018-04-05 | Stop reason: HOSPADM

## 2018-04-04 RX ORDER — ASPIRIN 81 MG/1
81 TABLET ORAL DAILY
Status: DISCONTINUED | OUTPATIENT
Start: 2018-04-05 | End: 2018-04-04

## 2018-04-04 RX ORDER — FLUOXETINE HYDROCHLORIDE 20 MG/1
80 CAPSULE ORAL DAILY
Status: DISCONTINUED | OUTPATIENT
Start: 2018-04-04 | End: 2018-04-05 | Stop reason: HOSPADM

## 2018-04-04 RX ORDER — ASPIRIN 325 MG
325 TABLET ORAL ONCE
Status: COMPLETED | OUTPATIENT
Start: 2018-04-04 | End: 2018-04-04

## 2018-04-04 RX ORDER — CLOPIDOGREL 300 MG/1
600 TABLET, FILM COATED ORAL ONCE
Status: DISCONTINUED | OUTPATIENT
Start: 2018-04-04 | End: 2018-04-05 | Stop reason: HOSPADM

## 2018-04-04 RX ORDER — LISINOPRIL 10 MG/1
10 TABLET ORAL DAILY
Status: DISCONTINUED | OUTPATIENT
Start: 2018-04-04 | End: 2018-04-05 | Stop reason: HOSPADM

## 2018-04-04 RX ORDER — CYCLOBENZAPRINE HCL 10 MG
10 TABLET ORAL
Status: COMPLETED | OUTPATIENT
Start: 2018-04-04 | End: 2018-04-04

## 2018-04-04 RX ORDER — ASPIRIN 81 MG/1
81 TABLET ORAL DAILY
Status: DISCONTINUED | OUTPATIENT
Start: 2018-04-05 | End: 2018-04-05 | Stop reason: HOSPADM

## 2018-04-04 RX ORDER — ZOLPIDEM TARTRATE 5 MG/1
5 TABLET ORAL NIGHTLY PRN
Status: DISCONTINUED | OUTPATIENT
Start: 2018-04-04 | End: 2018-04-05 | Stop reason: HOSPADM

## 2018-04-04 RX ORDER — HYDROCODONE BITARTRATE AND ACETAMINOPHEN 10; 325 MG/1; MG/1
1 TABLET ORAL EVERY 4 HOURS PRN
Status: DISCONTINUED | OUTPATIENT
Start: 2018-04-04 | End: 2018-04-05 | Stop reason: HOSPADM

## 2018-04-04 RX ORDER — HYDROCODONE BITARTRATE AND ACETAMINOPHEN 5; 325 MG/1; MG/1
1 TABLET ORAL EVERY 4 HOURS PRN
Status: DISCONTINUED | OUTPATIENT
Start: 2018-04-04 | End: 2018-04-05 | Stop reason: HOSPADM

## 2018-04-04 RX ORDER — ATORVASTATIN CALCIUM 20 MG/1
40 TABLET, FILM COATED ORAL DAILY
Status: DISCONTINUED | OUTPATIENT
Start: 2018-04-04 | End: 2018-04-05 | Stop reason: HOSPADM

## 2018-04-04 RX ORDER — HYDROCHLOROTHIAZIDE 12.5 MG/1
12.5 TABLET ORAL DAILY
Status: DISCONTINUED | OUTPATIENT
Start: 2018-04-04 | End: 2018-04-04

## 2018-04-04 RX ORDER — ASPIRIN 325 MG
325 TABLET ORAL DAILY
Status: DISCONTINUED | OUTPATIENT
Start: 2018-04-04 | End: 2018-04-04

## 2018-04-04 RX ORDER — ACETAMINOPHEN 325 MG/1
650 TABLET ORAL EVERY 4 HOURS PRN
Status: DISCONTINUED | OUTPATIENT
Start: 2018-04-04 | End: 2018-04-05 | Stop reason: HOSPADM

## 2018-04-04 RX ORDER — ONDANSETRON 8 MG/1
8 TABLET, ORALLY DISINTEGRATING ORAL EVERY 8 HOURS PRN
Status: DISCONTINUED | OUTPATIENT
Start: 2018-04-04 | End: 2018-04-05 | Stop reason: HOSPADM

## 2018-04-04 RX ORDER — ENOXAPARIN SODIUM 100 MG/ML
40 INJECTION SUBCUTANEOUS EVERY 24 HOURS
Status: DISCONTINUED | OUTPATIENT
Start: 2018-04-04 | End: 2018-04-04

## 2018-04-04 RX ORDER — CLOPIDOGREL BISULFATE 75 MG/1
75 TABLET ORAL DAILY
Status: DISCONTINUED | OUTPATIENT
Start: 2018-04-05 | End: 2018-04-05 | Stop reason: HOSPADM

## 2018-04-04 RX ORDER — SODIUM CHLORIDE 0.9 % (FLUSH) 0.9 %
5 SYRINGE (ML) INJECTION
Status: DISCONTINUED | OUTPATIENT
Start: 2018-04-04 | End: 2018-04-05 | Stop reason: HOSPADM

## 2018-04-04 RX ORDER — ACETAMINOPHEN 500 MG
1000 TABLET ORAL
Status: COMPLETED | OUTPATIENT
Start: 2018-04-04 | End: 2018-04-04

## 2018-04-04 RX ORDER — ALPRAZOLAM 0.25 MG/1
0.25 TABLET ORAL EVERY 8 HOURS PRN
Status: DISCONTINUED | OUTPATIENT
Start: 2018-04-04 | End: 2018-04-05 | Stop reason: HOSPADM

## 2018-04-04 RX ORDER — METOPROLOL TARTRATE 25 MG/1
25 TABLET, FILM COATED ORAL 2 TIMES DAILY
Status: DISCONTINUED | OUTPATIENT
Start: 2018-04-04 | End: 2018-04-05 | Stop reason: HOSPADM

## 2018-04-04 RX ORDER — ONDANSETRON 2 MG/ML
4 INJECTION INTRAMUSCULAR; INTRAVENOUS EVERY 12 HOURS PRN
Status: DISCONTINUED | OUTPATIENT
Start: 2018-04-04 | End: 2018-04-05 | Stop reason: HOSPADM

## 2018-04-04 RX ORDER — DIPHENHYDRAMINE HYDROCHLORIDE 50 MG/ML
25 INJECTION INTRAMUSCULAR; INTRAVENOUS EVERY 6 HOURS PRN
Status: DISCONTINUED | OUTPATIENT
Start: 2018-04-04 | End: 2018-04-05 | Stop reason: HOSPADM

## 2018-04-04 RX ORDER — CLONAZEPAM 0.5 MG/1
1 TABLET ORAL 2 TIMES DAILY
Status: DISCONTINUED | OUTPATIENT
Start: 2018-04-04 | End: 2018-04-05 | Stop reason: HOSPADM

## 2018-04-04 RX ORDER — NITROGLYCERIN 0.4 MG/1
0.4 TABLET SUBLINGUAL EVERY 5 MIN PRN
Status: DISCONTINUED | OUTPATIENT
Start: 2018-04-04 | End: 2018-04-05 | Stop reason: HOSPADM

## 2018-04-04 RX ORDER — MAG HYDROX/ALUMINUM HYD/SIMETH 200-200-20
15 SUSPENSION, ORAL (FINAL DOSE FORM) ORAL EVERY 6 HOURS PRN
Status: DISCONTINUED | OUTPATIENT
Start: 2018-04-04 | End: 2018-04-05 | Stop reason: HOSPADM

## 2018-04-04 RX ORDER — MORPHINE SULFATE 4 MG/ML
8 INJECTION, SOLUTION INTRAMUSCULAR; INTRAVENOUS
Status: COMPLETED | OUTPATIENT
Start: 2018-04-04 | End: 2018-04-04

## 2018-04-04 RX ORDER — IBUPROFEN 200 MG
1 TABLET ORAL DAILY
Status: DISCONTINUED | OUTPATIENT
Start: 2018-04-04 | End: 2018-04-05 | Stop reason: HOSPADM

## 2018-04-04 RX ORDER — LIDOCAINE HYDROCHLORIDE 10 MG/ML
10 INJECTION, SOLUTION EPIDURAL; INFILTRATION; INTRACAUDAL; PERINEURAL ONCE AS NEEDED
Status: DISPENSED | OUTPATIENT
Start: 2018-04-04 | End: 2018-04-04

## 2018-04-04 RX ORDER — DOXEPIN HYDROCHLORIDE 25 MG/1
50 CAPSULE ORAL NIGHTLY
Status: DISCONTINUED | OUTPATIENT
Start: 2018-04-04 | End: 2018-04-05 | Stop reason: HOSPADM

## 2018-04-04 RX ORDER — ATROPINE SULFATE 0.1 MG/ML
0.5 INJECTION INTRAVENOUS
Status: DISCONTINUED | OUTPATIENT
Start: 2018-04-04 | End: 2018-04-05 | Stop reason: HOSPADM

## 2018-04-04 RX ORDER — SODIUM CHLORIDE 9 MG/ML
INJECTION, SOLUTION INTRAVENOUS CONTINUOUS
Status: ACTIVE | OUTPATIENT
Start: 2018-04-04 | End: 2018-04-05

## 2018-04-04 RX ADMIN — TOPIRAMATE 100 MG: 100 TABLET, FILM COATED ORAL at 08:04

## 2018-04-04 RX ADMIN — NITROGLYCERIN 1 INCH: 20 OINTMENT TOPICAL at 01:04

## 2018-04-04 RX ADMIN — ENOXAPARIN SODIUM 40 MG: 100 INJECTION SUBCUTANEOUS at 08:04

## 2018-04-04 RX ADMIN — HYDROCHLOROTHIAZIDE 12.5 MG: 12.5 TABLET ORAL at 08:04

## 2018-04-04 RX ADMIN — FLUOXETINE 80 MG: 20 CAPSULE ORAL at 08:04

## 2018-04-04 RX ADMIN — CLONAZEPAM 1 MG: 0.5 TABLET ORAL at 09:04

## 2018-04-04 RX ADMIN — ASPIRIN 325 MG ORAL TABLET 325 MG: 325 PILL ORAL at 12:04

## 2018-04-04 RX ADMIN — ACETAMINOPHEN 650 MG: 325 TABLET ORAL at 08:04

## 2018-04-04 RX ADMIN — MORPHINE SULFATE 8 MG: 4 INJECTION INTRAVENOUS at 02:04

## 2018-04-04 RX ADMIN — TOPIRAMATE 100 MG: 100 TABLET, FILM COATED ORAL at 09:04

## 2018-04-04 RX ADMIN — LISINOPRIL 10 MG: 10 TABLET ORAL at 08:04

## 2018-04-04 RX ADMIN — CLONAZEPAM 1 MG: 0.5 TABLET ORAL at 08:04

## 2018-04-04 RX ADMIN — ACETAMINOPHEN 1000 MG: 500 TABLET ORAL at 12:04

## 2018-04-04 RX ADMIN — DOXEPIN HYDROCHLORIDE 50 MG: 25 CAPSULE ORAL at 09:04

## 2018-04-04 RX ADMIN — CYCLOBENZAPRINE HYDROCHLORIDE 10 MG: 10 TABLET, FILM COATED ORAL at 12:04

## 2018-04-04 RX ADMIN — NICOTINE 1 PATCH: 14 PATCH, EXTENDED RELEASE TRANSDERMAL at 08:04

## 2018-04-04 RX ADMIN — ASPIRIN 325 MG ORAL TABLET 325 MG: 325 PILL ORAL at 08:04

## 2018-04-04 RX ADMIN — METOPROLOL TARTRATE 25 MG: 25 TABLET, FILM COATED ORAL at 09:04

## 2018-04-04 RX ADMIN — ATORVASTATIN CALCIUM 40 MG: 20 TABLET, FILM COATED ORAL at 08:04

## 2018-04-04 NOTE — NURSING
Pt received from cath lab on bed with HOB flat. Pt placed on cardiac monitoring device, BP cuff, and pulse oximeter placed. Right groin incision site asessed; clean dry, soft. Pt instructed on post-cath procedure. Stent cards placed in pt binder for discharge.

## 2018-04-04 NOTE — H&P
"[facility] Hospital Medicine  History & Physical    Patient Name: Jennifer Brady  MRN: [id number]  Admission Date: 4/4/2018  Attending Physician: Ward Tan MD   Primary Care Provider: Arvind Juarez MD         Patient information was obtained from patient, past medical records and ER records.     Subjective:     Principal Problem:Chest pain    Chief Complaint:   Chief Complaint   Patient presents with    Chest Pain     Pt came to the ED tonight c.o. chest pain and shortness of breath x couple hours. Pt was given 324 ASA and 1 SL NTG in route with EMS with no relief.        HPI: Ms. Jennifer Brady is a 57 y.o. female, with PMH of HTN. HLD,  and seizures, who presnted to [facility] ED on 4/3/18 2/2 "a few days " of central chest pain which radiates down her left arm and into her back. She describes the pain as a "pulling" sensation. The pain in her arm is described as a "heaviness." She states he has had this pain before, and was evaluated at [facility], and diagnosed with anxiety. She states the pain is worse with lying down, and improves when sitting up or when pushing on the chest. She also notes anxiety regarding an upcoming medical appointment. She states her mother had multiple heart attacks, the first of which was in her 40's. She denies any personal h/o MI, or any personal or FHx of CVA. She is a smoker of 5 cigs/day x 15 years. She takes a statin for HLD. She does not currently take ASA daily. She denies fever, chills, palpitations, leg swelling, abdominal pain, nausea, vomiting, numbness, or weakness, headache, dizziness, vision changes, jaw/neck pain.    The patient was evaluated in the ED, and found to have elevated troponin. She was admitted for further workup and evaluation of her chest pain.     Past Medical History:   Diagnosis Date    Anxiety     Behavioral problem     Borderline personality disorder     Depression     Fatigue     Hx of " "psychiatric care     Hyperlipidemia     Hypertension     Psychiatric problem     S/P hysterectomy with oophorectomy     Seizures     Seizures     Therapy        History reviewed. No pertinent surgical history.    Review of patient's allergies indicates:  No Known Allergies    No current facility-administered medications on file prior to encounter.      Current Outpatient Prescriptions on File Prior to Encounter   Medication Sig    atorvastatin (LIPITOR) 40 MG tablet TK 1 T PO QHS    clonazePAM (KLONOPIN) 1 MG tablet Take 1 tablet (1 mg total) by mouth 2 (two) times daily. (morning and afternoon)    doxepin (SINEQUAN) 50 MG capsule Take 1 capsule (50 mg total) by mouth nightly.    FLUoxetine (PROZAC) 40 MG capsule Take 2 capsules (80 mg total) by mouth once daily.    lisinopril-hydrochlorothiazide (PRINZIDE,ZESTORETIC) 10-12.5 mg per tablet Take 1 tablet by mouth once daily.    topiramate (TOPAMAX) 100 MG tablet Take 1 tablet (100 mg total) by mouth 2 (two) times daily.    [DISCONTINUED] hydrocodone-acetaminophen 10-325mg (NORCO)  mg Tab     [DISCONTINUED] pantoprazole (PROTONIX) 20 MG tablet      Family History     Problem Relation (Age of Onset)    Seizures Mother        Social History Main Topics    Smoking status: Current Every Day Smoker     Types: Cigarettes    Smokeless tobacco: Never Used      Comment: quit actual cigarettes a few mo ago, switched to e cigarette    Alcohol use No      Comment: very rarely; "my  drinks"    Drug use: No    Sexual activity: Yes     Partners: Male     Review of Systems   Constitutional: Negative for chills, diaphoresis and fever.   Eyes: Negative for visual disturbance.   Respiratory: Negative for cough, chest tightness, shortness of breath and wheezing.    Cardiovascular: Positive for chest pain. Negative for palpitations and leg swelling.   Gastrointestinal: Negative for abdominal pain, constipation, diarrhea, nausea and vomiting. "   Musculoskeletal: Positive for myalgias (left arm). Negative for back pain, neck pain and neck stiffness.   Skin: Negative for color change, pallor, rash and wound.   Neurological: Positive for seizures. Negative for dizziness, syncope, weakness, light-headedness, numbness and headaches.   Psychiatric/Behavioral: Negative for confusion and decreased concentration.     Objective:     Vital Signs (Most Recent):  Temp: 97.6 °F (36.4 °C) (04/04/18 0523)  Pulse: 80 (04/04/18 0700)  Resp: 18 (04/04/18 0523)  BP: 132/72 (04/04/18 0523)  SpO2: 100 % (04/04/18 0523) Vital Signs (24h Range):  Temp:  [97.6 °F (36.4 °C)-98.3 °F (36.8 °C)] 97.6 °F (36.4 °C)  Pulse:  [] 80  Resp:  [17-27] 18  SpO2:  [99 %-100 %] 100 %  BP: (132-166)/() 132/72     Weight: 97.6 kg (215 lb 2.7 oz)  Body mass index is 31.77 kg/m².    Physical Exam   Constitutional: She is oriented to person, place, and time. She appears well-developed and well-nourished. No distress.   HENT:   Head: Normocephalic and atraumatic.   Eyes: Conjunctivae and EOM are normal. Pupils are equal, round, and reactive to light. No scleral icterus.   Neck: Normal range of motion. Neck supple. No JVD present. No tracheal deviation present.   Cardiovascular: Normal rate, regular rhythm, normal heart sounds and intact distal pulses.  Exam reveals no gallop and no friction rub.    No murmur heard.  2+ distal radial/DT/PT pulses. No carotid bruits.     Pulmonary/Chest: Effort normal and breath sounds normal. No stridor. No respiratory distress. She has no wheezes. She has no rales.   Abdominal: Soft. Bowel sounds are normal. She exhibits no distension. There is no tenderness. There is no guarding.   Neurological: She is alert and oriented to person, place, and time. No cranial nerve deficit. She exhibits normal muscle tone.   Skin: Skin is warm and dry. She is not diaphoretic.   Psychiatric: She has a normal mood and affect. Her behavior is normal. Judgment and thought  content normal.   Nursing note and vitals reviewed.        CRANIAL NERVES     CN III, IV, VI   Pupils are equal, round, and reactive to light.  Extraocular motions are normal.        Significant Labs:   BMP:   Recent Labs  Lab 04/04/18 0521         K 3.8   *   CO2 23   BUN 12   CREATININE 0.9   CALCIUM 9.6   MG 2.1     CBC:   Recent Labs  Lab 04/04/18 0037 04/04/18 0521   WBC 9.67 11.20   HGB 11.7* 11.3*   HCT 36.4* 35.2*   * 383*     CMP:   Recent Labs  Lab 04/04/18 0037 04/04/18 0521    142   K 3.3* 3.8   * 111*   CO2 21* 23    106   BUN 12 12   CREATININE 0.9 0.9   CALCIUM 8.5* 9.6   PROT 6.7  --    ALBUMIN 3.3*  --    BILITOT 0.2  --    ALKPHOS 146*  --    AST 18  --    ALT 24  --    ANIONGAP 10 8   EGFRNONAA >60 >60     Cardiac Markers:   Recent Labs  Lab 04/04/18 0037   BNP 24     Lipid Panel: No results for input(s): CHOL, HDL, LDLCALC, TRIG, CHOLHDL in the last 48 hours.  Troponin:   Recent Labs  Lab 04/04/18 0037 04/04/18 0521   TROPONINI 0.100* 2.375*  2.375*     All pertinent labs within the past 24 hours have been reviewed.    Significant Imaging: I have reviewed all pertinent imaging results/findings within the past 24 hours.   Imaging Results    None          Assessment/Plan:     * Chest pain    - initial troponin elevated with ST changes in inferior leads   - trend troponin  - assess lipid panel & A1C   - cardiac monitoring   - start ASA, continue statin          Elevated troponin    - see H&P for chest pain        HTN (hypertension)    - BP elevated upon admission  - continue home meds   - monitor           Pure hypercholesterolemia    - continue statin  - lipid panel pending for AM         REN (obstructive sleep apnea)    - CPAP ordered         Depression    - continue antidepressants         Anxiety    - seems to be worse than baseline given upcoming medical procedures/appoitnment   - no SI/HI at this time   - monitor         Intractable  epilepsy without status epilepticus    - continue home meds          VTE Risk Mitigation         Ordered     enoxaparin injection 40 mg  Daily     Route:  Subcutaneous        04/04/18 0222     Place sequential compression device  Until discontinued      04/04/18 0222     Place FLOR hose  Until discontinued      04/04/18 0222     IP VTE HIGH RISK PATIENT  Once      04/04/18 0222     Place sequential compression device  Until discontinued      04/04/18 0222     Place FLOR hose  Until discontinued      04/04/18 0222             Jyoti Petit PA-C  Department of Hospital Medicine   [facility]

## 2018-04-04 NOTE — ED NOTES
Patient presents to the Ed via EMS for a complaint of center chest wall pain. Patient in room crying due to pain, states that is a 10/10 and feels like a sharp pressure, that radiates to her left arm causing her left arm to feel heavy. Patient states that she has been having this pain for the past week or so when she learned that she will have to stay in the hospital for several days to study her seizures. Patient does admit that this has been giving her some anxiety, patient does have a strong Hx of anxiety. Patient denies any heart palpitations, denies SOB, does endorse vomiting prior to arrival in the Ed, pt does endorse a slight headache. Positive sensations to all extremities, pulses palpable in all extremities, patient has full range of motion to all extremities. Cap refill normal, pupils equal and responsive to light. Patient did take sublingual nitro and had an Asprin prior to arrival in the Ed. Patient awake alert and oriented, placed on the cardiac monitor. Will await lab results.

## 2018-04-04 NOTE — SUBJECTIVE & OBJECTIVE
"Past Medical History:   Diagnosis Date    Anxiety     Behavioral problem     Borderline personality disorder     Depression     Fatigue      of psychiatric care     Hyperlipidemia     Hypertension     Psychiatric problem     S/P hysterectomy with oophorectomy     Seizures     Seizures     Therapy        History reviewed. No pertinent surgical history.    Review of patient's allergies indicates:  No Known Allergies    No current facility-administered medications on file prior to encounter.      Current Outpatient Prescriptions on File Prior to Encounter   Medication Sig    atorvastatin (LIPITOR) 40 MG tablet TK 1 T PO QHS    clonazePAM (KLONOPIN) 1 MG tablet Take 1 tablet (1 mg total) by mouth 2 (two) times daily. (morning and afternoon)    doxepin (SINEQUAN) 50 MG capsule Take 1 capsule (50 mg total) by mouth nightly.    FLUoxetine (PROZAC) 40 MG capsule Take 2 capsules (80 mg total) by mouth once daily.    lisinopril-hydrochlorothiazide (PRINZIDE,ZESTORETIC) 10-12.5 mg per tablet Take 1 tablet by mouth once daily.    topiramate (TOPAMAX) 100 MG tablet Take 1 tablet (100 mg total) by mouth 2 (two) times daily.    [DISCONTINUED] hydrocodone-acetaminophen 10-325mg (NORCO)  mg Tab     [DISCONTINUED] pantoprazole (PROTONIX) 20 MG tablet      Family History     Problem Relation (Age of Onset)    Seizures Mother        Social History Main Topics    Smoking status: Current Every Day Smoker     Types: Cigarettes    Smokeless tobacco: Never Used      Comment: quit actual cigarettes a few mo ago, switched to e cigarette    Alcohol use No      Comment: very rarely; "my  drinks"    Drug use: No    Sexual activity: Yes     Partners: Male     Review of Systems   Constitutional: Negative for chills, diaphoresis and fever.   Eyes: Negative for visual disturbance.   Respiratory: Negative for cough, chest tightness, shortness of breath and wheezing.    Cardiovascular: Positive for chest pain. " Negative for palpitations and leg swelling.   Gastrointestinal: Negative for abdominal pain, constipation, diarrhea, nausea and vomiting.   Musculoskeletal: Positive for myalgias (left arm). Negative for back pain, neck pain and neck stiffness.   Skin: Negative for color change, pallor, rash and wound.   Neurological: Positive for seizures. Negative for dizziness, syncope, weakness, light-headedness, numbness and headaches.   Psychiatric/Behavioral: Negative for confusion and decreased concentration.     Objective:     Vital Signs (Most Recent):  Temp: 97.6 °F (36.4 °C) (04/04/18 0523)  Pulse: 80 (04/04/18 0700)  Resp: 18 (04/04/18 0523)  BP: 132/72 (04/04/18 0523)  SpO2: 100 % (04/04/18 0523) Vital Signs (24h Range):  Temp:  [97.6 °F (36.4 °C)-98.3 °F (36.8 °C)] 97.6 °F (36.4 °C)  Pulse:  [] 80  Resp:  [17-27] 18  SpO2:  [99 %-100 %] 100 %  BP: (132-166)/() 132/72     Weight: 97.6 kg (215 lb 2.7 oz)  Body mass index is 31.77 kg/m².    Physical Exam   Constitutional: She is oriented to person, place, and time. She appears well-developed and well-nourished. No distress.   HENT:   Head: Normocephalic and atraumatic.   Eyes: Conjunctivae and EOM are normal. Pupils are equal, round, and reactive to light. No scleral icterus.   Neck: Normal range of motion. Neck supple. No JVD present. No tracheal deviation present.   Cardiovascular: Normal rate, regular rhythm, normal heart sounds and intact distal pulses.  Exam reveals no gallop and no friction rub.    No murmur heard.  2+ distal radial/DT/PT pulses. No carotid bruits.     Pulmonary/Chest: Effort normal and breath sounds normal. No stridor. No respiratory distress. She has no wheezes. She has no rales.   Abdominal: Soft. Bowel sounds are normal. She exhibits no distension. There is no tenderness. There is no guarding.   Neurological: She is alert and oriented to person, place, and time. No cranial nerve deficit. She exhibits normal muscle tone.   Skin: Skin  is warm and dry. She is not diaphoretic.   Psychiatric: She has a normal mood and affect. Her behavior is normal. Judgment and thought content normal.   Nursing note and vitals reviewed.        CRANIAL NERVES     CN III, IV, VI   Pupils are equal, round, and reactive to light.  Extraocular motions are normal.        Significant Labs:   BMP:   Recent Labs  Lab 04/04/18 0521         K 3.8   *   CO2 23   BUN 12   CREATININE 0.9   CALCIUM 9.6   MG 2.1     CBC:   Recent Labs  Lab 04/04/18 0037 04/04/18 0521   WBC 9.67 11.20   HGB 11.7* 11.3*   HCT 36.4* 35.2*   * 383*     CMP:   Recent Labs  Lab 04/04/18 0037 04/04/18 0521    142   K 3.3* 3.8   * 111*   CO2 21* 23    106   BUN 12 12   CREATININE 0.9 0.9   CALCIUM 8.5* 9.6   PROT 6.7  --    ALBUMIN 3.3*  --    BILITOT 0.2  --    ALKPHOS 146*  --    AST 18  --    ALT 24  --    ANIONGAP 10 8   EGFRNONAA >60 >60     Cardiac Markers:   Recent Labs  Lab 04/04/18 0037   BNP 24     Lipid Panel: No results for input(s): CHOL, HDL, LDLCALC, TRIG, CHOLHDL in the last 48 hours.  Troponin:   Recent Labs  Lab 04/04/18 0037 04/04/18 0521   TROPONINI 0.100* 2.375*  2.375*     All pertinent labs within the past 24 hours have been reviewed.    Significant Imaging: I have reviewed all pertinent imaging results/findings within the past 24 hours.   Imaging Results    None

## 2018-04-04 NOTE — PROGRESS NOTES
Angios: Proximal RCA tubular 95% stenosis               LCx Proximal discrete 95% stenosis               LAD mild luminal irregularity.               Good LV.  PTCA + ZELALEM Proximal RCA  PTCA + ZELALEM x 2 in LCx  Good results. Mynx closure: adequate hemostasis.  Plan: ASA, PLavix, Statin  Home in am tomorrow.

## 2018-04-04 NOTE — ASSESSMENT & PLAN NOTE
- initial troponin elevated with ST changes in inferior leads   - trend troponin  - assess lipid panel & A1C   - cardiac monitoring   - start ASA, continue statin

## 2018-04-04 NOTE — PLAN OF CARE
04/04/18 1731   Discharge Assessment   Assessment Type Discharge Planning Assessment   Confirmed/corrected address and phone number on facesheet? No   Assessment information obtained from? Caregiver;Medical Record   Communicated expected length of stay with patient/caregiver no   Patient/Family In Agreement With Plan unable to assess  (in procedure today, will assess tomorrow)

## 2018-04-04 NOTE — ASSESSMENT & PLAN NOTE
- seems to be worse than baseline given upcoming medical procedures/appoitnment   - no SI/HI at this time   - monitor

## 2018-04-04 NOTE — ED PROVIDER NOTES
"Encounter Date: 4/4/2018    SCRIBE #1 NOTE: I, India Agustin, am scribing for, and in the presence of, Dr. Pedroza.       History     Chief Complaint   Patient presents with    Chest Pain     Pt came to the ED tonight c.o. chest pain and shortness of breath x couple hours. Pt was given 324 ASA and 1 SL NTG in route with EMS with no relief.     Time seen by provider: 12:35 AM    This is a 57 y.o. female, with history of HTN and seizures, who presents with complaint of chest pain that began a few days ago. The sharp pain has been constant all day and radiates to the back and left arm. She was seen at [facility] one month ago for same complaint, and was told she had anxiety. She followed up with Dr. Nicholas. She reports SOB that worsens when lying down and is alleviated when sitting up, chest tightness when lying down, and feeling anxious due to being in a hospital. She denies fever, chills, palpitations, abdominal pain, nausea, vomiting, numbness, or weakness. She reports history of blood clots. She notes she is in an epileptic study that begins next week.      The history is provided by the patient.     Review of patient's allergies indicates:  No Known Allergies  Past Medical History:   Diagnosis Date    Anxiety     Behavioral problem     Borderline personality disorder     Depression     Fatigue     Hx of psychiatric care     Hyperlipidemia     Hypertension     Psychiatric problem     S/P hysterectomy with oophorectomy     Seizures     Seizures     Therapy      History reviewed. No pertinent surgical history.  Family History   Problem Relation Age of Onset    Seizures Mother      Social History   Substance Use Topics    Smoking status: Current Every Day Smoker     Types: Cigarettes    Smokeless tobacco: Never Used      Comment: quit actual cigarettes a few mo ago, switched to e cigarette    Alcohol use No      Comment: very rarely; "my  drinks"     Review of Systems   Constitutional: Negative for " chills and fever.   HENT: Negative for congestion and sore throat.    Respiratory: Positive for chest tightness and shortness of breath.    Cardiovascular: Positive for chest pain. Negative for palpitations.   Gastrointestinal: Negative for abdominal pain, nausea and vomiting.   Genitourinary: Negative for dysuria.   Musculoskeletal: Positive for back pain. Negative for neck pain.        Positive for arm pain.   Skin: Negative for rash.   Neurological: Negative for weakness, numbness and headaches.   Hematological: Does not bruise/bleed easily.   Psychiatric/Behavioral: The patient is nervous/anxious.        Physical Exam     Initial Vitals [04/04/18 0015]   BP Pulse Resp Temp SpO2   (!) 147/118 98 (!) 22 98.3 °F (36.8 °C) 100 %      MAP       127.67         Physical Exam    Nursing note and vitals reviewed.  Constitutional: She appears well-developed and well-nourished. She is not diaphoretic. No distress.   Tearful.   HENT:   Head: Normocephalic and atraumatic.   Eyes: Conjunctivae and EOM are normal. No scleral icterus.   Neck: Normal range of motion. Neck supple.   Cardiovascular: Normal rate, regular rhythm and normal heart sounds. Exam reveals no gallop and no friction rub.    No murmur heard.  Pulmonary/Chest: Breath sounds normal. No respiratory distress. She has no wheezes. She has no rhonchi. She has no rales.   Abdominal: Soft. Bowel sounds are normal. She exhibits no distension. There is no tenderness. There is no rebound and no guarding.   Musculoskeletal: Normal range of motion. She exhibits no edema or tenderness.   Lymphadenopathy:     She has no cervical adenopathy.   Neurological: She is alert and oriented to person, place, and time.   Skin: Skin is warm and dry. No rash noted. No pallor.         ED Course   Critical Care  Date/Time: 4/4/2018 1:25 AM  Performed by: ANGIE BRODERICK.  Authorized by: ANGIE BRODERICK   Direct patient critical care time: 8 minutes  Additional history critical care time:  7 minutes  Ordering / reviewing critical care time: 7 minutes  Documentation critical care time: 6 minutes  Consulting other physicians critical care time: 6 minutes  Consult with family critical care time: 6 minutes  Total critical care time (exclusive of procedural time) : 40 minutes  Critical care time was exclusive of teaching time and separately billable procedures and treating other patients.  Critical care was necessary to treat or prevent imminent or life-threatening deterioration of the following conditions: cardiac failure.  Critical care was time spent personally by me on the following activities: evaluation of patient's response to treatment, examination of patient, obtaining history from patient or surrogate, ordering and performing treatments and interventions, ordering and review of laboratory studies, ordering and review of radiographic studies, re-evaluation of patient's condition and review of old charts.        Labs Reviewed   CBC W/ AUTO DIFFERENTIAL - Abnormal; Notable for the following:        Result Value    RBC 3.79 (*)     Hemoglobin 11.7 (*)     Hematocrit 36.4 (*)     Platelets 378 (*)     MPV 8.6 (*)     Mono% 3.8 (*)     All other components within normal limits   COMPREHENSIVE METABOLIC PANEL   TROPONIN I   B-TYPE NATRIURETIC PEPTIDE     EKG Readings: (Independently Interpreted)   Initial Reading: No STEMI.   Normal sinus rhythm at a rate of 94 bpm. Significant artifact. ST segment depressions in II, AVF, V4, and V6.   Other EKG Interpretations: 0058: Normal sinus rhythm. ST depressions in I, II, III, AVF, V5, and V6. No STEMI. ST depressions are new from EKG in 1996.          Medical Decision Making:   Clinical Tests:   Lab Tests: Reviewed and Ordered  Medical Tests: Ordered and Reviewed  ED Management:  Patient presents complaining of chest pain on and off for several days and constant today.  She is tearful and has a history of anxiety disorder.  EKG demonstrates ST depressions in  the inferior lateral leads, I do not know if old or new as her most recent EKG in our system was 1996.  It is obviously changed since then.  Troponin is however positive at 0.1.  Placed on nitroglycerin paste pain also treated with morphine.  Admitted to hospital service.    ROCHELLE Pedroza M.D.  04/04/2018  2:40 AM      1:23 AM - Discussed case with Jyoti Petit PA-C, with hospital medicine, and will admit patient to Dr. Tan.            Scribe Attestation:   Scribe #1: I performed the above scribed service and the documentation accurately describes the services I performed. I attest to the accuracy of the note.    Attending Attestation:           Physician Attestation for Scribe:  Physician Attestation Statement for Scribe #1: I, Dr. Pedroza, reviewed documentation, as scribed by India Agustin in my presence, and it is both accurate and complete.                 ED Course as of Apr 04 0122   Wed Apr 04, 2018   0114 Troponin I: (!) 0.100 [BL]      ED Course User Index  [BL] Woody Pedroza MD     Clinical Impression:     1. Elevated troponin    2. Chest pain    3. Elevated troponin                               Woody Pedroza MD  04/04/18 0241

## 2018-04-04 NOTE — HPI
"Ms. Jennifer Brady is a 57 y.o. female, with PMH of HTN. HLD,  and seizures, who presnted to [facility] ED on 4/3/18 2/2 "a few days " of central chest pain which radiates down her left arm and into her back. She describes the pain as a "pulling" sensation. The pain in her arm is described as a "heaviness." She states he has had this pain before, and was evaluated at [facility], and diagnosed with anxiety. She states the pain is worse with lying down, and improves when sitting up or when pushing on the chest. She also notes anxiety regarding an upcoming medical appointment. She states her mother had multiple heart attacks, the first of which was in her 40's. She denies any personal h/o MI, or any personal or FHx of CVA. She is a smoker of 5 cigs/day x 15 years. She takes a statin for HLD. She does not currently take ASA daily. She denies fever, chills, palpitations, leg swelling, abdominal pain, nausea, vomiting, numbness, or weakness, headache, dizziness, vision changes, jaw/neck pain.    The patient was evaluated in the ED, and found to have elevated troponin. She was admitted for further workup and evaluation of her chest pain.   "

## 2018-04-04 NOTE — INTERVAL H&P NOTE
The patient has been examined and the H&P has been reviewed:    I concur with the findings and no changes have occurred since H&P was written.    Anesthesia/Surgery risks, benefits and alternative options discussed and understood by patient/family.          Active Hospital Problems    Diagnosis  POA    *Chest pain [R07.9]  Yes    Elevated troponin [R74.8]  Yes    NSTEMI (non-ST elevated myocardial infarction) [I21.4]  Unknown    Intractable epilepsy without status epilepticus [G40.919]  Yes    HTN (hypertension) [I10]  Yes    Pure hypercholesterolemia [E78.00]  Yes    Anxiety [F41.9]  Yes    REN (obstructive sleep apnea) [G47.33]  Yes    Depression [F32.9]  Yes      Resolved Hospital Problems    Diagnosis Date Resolved POA   No resolved problems to display.

## 2018-04-04 NOTE — PLAN OF CARE
Discharge Planning:  Patient admitted on 4-4-18  LOS-day 0  Chart reviewed  Care plan discussed  Discussed care plan with treatment team  Discussed care plan with the attending Dr Tan  Current dispo - cath lab today, will eval  tomorrow  Case management  to follow  Consults following are: cardiology, case mgt.

## 2018-04-04 NOTE — PLAN OF CARE
Problem: Patient Care Overview  Goal: Plan of Care Review  Outcome: Ongoing (interventions implemented as appropriate)  Pt received on Ra, EKG done.

## 2018-04-04 NOTE — PLAN OF CARE
Problem: Patient Care Overview  Goal: Plan of Care Review  Outcome: Ongoing (interventions implemented as appropriate)  Plan of care reviewed with patient. VSS on RA. No pain reported during shift. Teds placed and maintained. Purposeful rounding performed. Bed lowered and locked, call light and personal items within reach. No needs at this time.

## 2018-04-05 VITALS
BODY MASS INDEX: 31.87 KG/M2 | DIASTOLIC BLOOD PRESSURE: 56 MMHG | HEART RATE: 82 BPM | OXYGEN SATURATION: 99 % | HEIGHT: 69 IN | RESPIRATION RATE: 18 BRPM | TEMPERATURE: 100 F | WEIGHT: 215.19 LBS | SYSTOLIC BLOOD PRESSURE: 103 MMHG

## 2018-04-05 PROBLEM — I25.110 CORONARY ARTERY DISEASE INVOLVING NATIVE CORONARY ARTERY OF NATIVE HEART WITH UNSTABLE ANGINA PECTORIS: Status: ACTIVE | Noted: 2018-04-05

## 2018-04-05 LAB
ANION GAP SERPL CALC-SCNC: 5 MMOL/L
ANION GAP SERPL CALC-SCNC: 5 MMOL/L
BUN SERPL-MCNC: 13 MG/DL
BUN SERPL-MCNC: 13 MG/DL
CALCIUM SERPL-MCNC: 9.4 MG/DL
CALCIUM SERPL-MCNC: 9.4 MG/DL
CHLORIDE SERPL-SCNC: 108 MMOL/L
CHLORIDE SERPL-SCNC: 108 MMOL/L
CK MB SERPL-MCNC: 142.8 NG/ML
CK MB SERPL-RTO: 7.4 %
CK SERPL-CCNC: 1932 U/L
CO2 SERPL-SCNC: 25 MMOL/L
CO2 SERPL-SCNC: 25 MMOL/L
CREAT SERPL-MCNC: 0.9 MG/DL
CREAT SERPL-MCNC: 0.9 MG/DL
EST. GFR  (AFRICAN AMERICAN): >60 ML/MIN/1.73 M^2
EST. GFR  (AFRICAN AMERICAN): >60 ML/MIN/1.73 M^2
EST. GFR  (NON AFRICAN AMERICAN): >60 ML/MIN/1.73 M^2
EST. GFR  (NON AFRICAN AMERICAN): >60 ML/MIN/1.73 M^2
GLUCOSE SERPL-MCNC: 106 MG/DL
GLUCOSE SERPL-MCNC: 106 MG/DL
HCT VFR BLD AUTO: 34.2 %
HGB BLD-MCNC: 10.9 G/DL
MAGNESIUM SERPL-MCNC: 2 MG/DL
POTASSIUM SERPL-SCNC: 3.8 MMOL/L
POTASSIUM SERPL-SCNC: 3.8 MMOL/L
SODIUM SERPL-SCNC: 138 MMOL/L
SODIUM SERPL-SCNC: 138 MMOL/L
TROPONIN I SERPL DL<=0.01 NG/ML-MCNC: >50 NG/ML

## 2018-04-05 PROCEDURE — 93005 ELECTROCARDIOGRAM TRACING: CPT

## 2018-04-05 PROCEDURE — 85018 HEMOGLOBIN: CPT

## 2018-04-05 PROCEDURE — 85014 HEMATOCRIT: CPT

## 2018-04-05 PROCEDURE — 82550 ASSAY OF CK (CPK): CPT

## 2018-04-05 PROCEDURE — 25000003 PHARM REV CODE 250: Performed by: PHYSICIAN ASSISTANT

## 2018-04-05 PROCEDURE — 80048 BASIC METABOLIC PNL TOTAL CA: CPT

## 2018-04-05 PROCEDURE — 93010 ELECTROCARDIOGRAM REPORT: CPT | Mod: ,,, | Performed by: INTERNAL MEDICINE

## 2018-04-05 PROCEDURE — 83735 ASSAY OF MAGNESIUM: CPT

## 2018-04-05 PROCEDURE — S4991 NICOTINE PATCH NONLEGEND: HCPCS | Performed by: PHYSICIAN ASSISTANT

## 2018-04-05 PROCEDURE — 94761 N-INVAS EAR/PLS OXIMETRY MLT: CPT

## 2018-04-05 PROCEDURE — 99226 PR SUBSEQUENT OBSERVATION CARE,LEVEL III: CPT | Mod: ,,, | Performed by: INTERNAL MEDICINE

## 2018-04-05 PROCEDURE — 82553 CREATINE MB FRACTION: CPT

## 2018-04-05 PROCEDURE — 36415 COLL VENOUS BLD VENIPUNCTURE: CPT

## 2018-04-05 PROCEDURE — 25000003 PHARM REV CODE 250: Performed by: INTERNAL MEDICINE

## 2018-04-05 PROCEDURE — 84484 ASSAY OF TROPONIN QUANT: CPT

## 2018-04-05 RX ORDER — IBUPROFEN 200 MG
1 TABLET ORAL DAILY
Qty: 30 PATCH | Refills: 3 | Status: SHIPPED | OUTPATIENT
Start: 2018-04-06 | End: 2018-06-12 | Stop reason: SDUPTHER

## 2018-04-05 RX ORDER — CLOPIDOGREL BISULFATE 75 MG/1
75 TABLET ORAL DAILY
Qty: 30 TABLET | Refills: 11 | Status: SHIPPED | OUTPATIENT
Start: 2018-04-06 | End: 2019-02-10 | Stop reason: SDUPTHER

## 2018-04-05 RX ORDER — ATORVASTATIN CALCIUM 40 MG/1
40 TABLET, FILM COATED ORAL DAILY
Qty: 90 TABLET | Refills: 3 | Status: SHIPPED | OUTPATIENT
Start: 2018-04-06 | End: 2021-08-25 | Stop reason: SDUPTHER

## 2018-04-05 RX ORDER — ASPIRIN 81 MG/1
81 TABLET ORAL DAILY
Qty: 100 TABLET | Refills: 6 | COMMUNITY
Start: 2018-04-06 | End: 2024-03-12

## 2018-04-05 RX ADMIN — CLONAZEPAM 1 MG: 0.5 TABLET ORAL at 08:04

## 2018-04-05 RX ADMIN — FLUOXETINE 80 MG: 20 CAPSULE ORAL at 08:04

## 2018-04-05 RX ADMIN — ATORVASTATIN CALCIUM 40 MG: 20 TABLET, FILM COATED ORAL at 08:04

## 2018-04-05 RX ADMIN — CLOPIDOGREL 75 MG: 75 TABLET, FILM COATED ORAL at 08:04

## 2018-04-05 RX ADMIN — NICOTINE 1 PATCH: 14 PATCH, EXTENDED RELEASE TRANSDERMAL at 09:04

## 2018-04-05 RX ADMIN — HYDROCODONE BITARTRATE AND ACETAMINOPHEN 1 TABLET: 5; 325 TABLET ORAL at 08:04

## 2018-04-05 RX ADMIN — TOPIRAMATE 100 MG: 100 TABLET, FILM COATED ORAL at 08:04

## 2018-04-05 RX ADMIN — ASPIRIN 81 MG: 81 TABLET, COATED ORAL at 08:04

## 2018-04-05 NOTE — CONSULTS
HISTORY OF PRESENT ILLNESS:  Ms. Brady is a 57-year-old lady who presented to   the Emergency Room with chest tightness, beginning at 8:00 in the evening.  She   says that the pains were more severe than usual and radiated down to the left   arm and lasted for about 3-4 hours before she sought further medical attention   in the Emergency Room here.  She said she finally got relieved when she has had   morphine here.  She says she has been evaluated for these chest pains by Dr. Mann Rob, recent hospital visit to [facility], and Dr. Luis Nicholas.  She   has had noninvasive tests, which have been negative for ischemia.  She was told   by Dr. Rob that she had two blockages; however, she had not consented to   coronary angiography and has never had a diagnostic angiographic procedure   performed.  In the past she has had seizure disorder, she says she is due to be   hospitalized sometimes in a couple of weeks for investigations for her seizures.    She has longstanding hypertension, has had borderline personality disorder and   depression.  She has smoked since she was about 28.    HOME MEDICATIONS:  Her home medications include atorvastatin, clonazepam,   Sinequan, Prozac and Topamax.  She is also on lisinopril and   hydrochlorothiazide.    FAMILY HISTORY:  Her father  at the age of 33 of a stroke, mother  at   the age of 63 of heart failure.  She has 10 siblings.  She does not know details   of the cause of death of the one that had passed.  She does not know if there   is any heart disease among her siblings.    PHYSICAL EXAMINATION:  GENERAL:  Reveals an alert, pleasant, overweight lady, in no apparent acute   distress.  VITAL SIGNS:  Blood pressure of 142/72 and a pulse of 70 beats per minute.  HEENT:  The sclerae are nonicteric.  The conjunctivae are pink.  The ENT exam is   unremarkable.  NECK:  Supple.  There is no jugular venous distention.  The carotid upstroke is   brisk.  There are no  carotid bruits.  CHEST:  Clear.  HEART:  Size is grossly normal.  S1 and S2 are normal.  There is no audible   murmur or gallop.  ABDOMEN:  Soft and nontender.  The bowel sounds are normal.  EXTREMITIES:  There is no clubbing, cyanosis or edema of feet.  NEUROLOGIC:  Grossly, the neurological exam is intact.    DIAGNOSTIC STUDIES:  Review of the electrocardiograms, the first   electrocardiogram done at 12:31 a.m. on 04/04/2018 shows diffuse ST segment   depressions in leads II, III, aVF and V4 through V6.  The electrocardiogram done   at 528 on 04/04/2018 shows regression of the ST segment depression in to leads   II, III, aVF; however, persistent ST depression in leads V5 and V6.  The   troponin that was reported at 5:21 a.m. this morning was 2.375.    IMPRESSION ON ADMISSION:  1.  It sounds like preinfarction angina pectoris or a non-STEMI.  2.  Hypertension.  3.  Cigarette smoker.  4.  Family history of vascular disease.    PLAN:  I have discussed coronary angiography, she would like me to proceed.  She   understands the risks of the procedure and would like me to proceed later this   afternoon.    Thank you for the opportunity of seeing Ms. Brday in consultation.      ESTEBAN/WANDA  dd: 04/04/2018 10:22:09 (CDT)  td: 04/04/2018 19:40:31 (CDT)  Doc ID   #[id number]  Job ID #[id number]    CC:

## 2018-04-05 NOTE — DISCHARGE SUMMARY
HISTORY OF PRESENT ILLNESS:  Ms. Brady is a 57-year-old lady who presented to   the Emergency Room with complaints of chest tightness that began at 8:00 in the   evening.  When the pains persisted for 3-4 hours, and she called 911, and was   brought to the Emergency Room here.  She finally got relief of the chest pain   after being given IV morphine.  She says she has had chest pains on and off in   the past, in the remote past she was evaluated by Dr. Mann oRb and told   that she had two blockages in heart; however, coronary angiography was never   performed.  I believe this was from a nuclear stress test.  Thereafter, Dr. Luis Nicholas and also of the cardiologist at [facility] investigated her and she   has had negative Cardiolite stress test.  She says that she has not consented to   coronary angiography in the past.  In the past, she has had a seizure disorder,   she says she is due to be hospitalized sometime in the next couple of weeks for   investigations for her seizure disorder.  She has had longstanding   hypertension, has been treated for borderline personality disorder and   depression.  She has smoked since she was about 28, she says she smokes less   than half a pack of cigarettes a day.    HOME MEDICATIONS:  Include atorvastatin, clonazepam, Sinequan, Prozac, Topamax,   lisinopril and hydrochlorothiazide.    Her father  of a stroke at the age of 33, mother  at the age of 63 of   congestive heart failure.  She has 10 siblings.  She does not know details of   their medical history.  She had an unremarkable physical examination.  The   Initial electrocardiogram in the Emergency Room showed diffuse ST segment   depression in leads II, III, AVF and leads V3 through V6.  A followup   electrocardiogram few hours later showed regression of all these ST segment   changes, merely a downsloping of ST segments in lead V6.  The initial troponin   level was 0.1, the next one done at 5:00 in the  morning was 2.375, the followup   troponin level was 30.01, the followup one after that was over 50.  She was   placed on aspirin, the atorvastatin was continued, she was taken to the cardiac   catheterization laboratory where angiography showed a 95+ percent stenosis of   the proximal left circumflex coronary artery.  There was a 95+ percent stenosis in a   tubular fashion of the proximal right coronary artery.  IV Angiomax was   instituted, first the right coronary artery was tackled with a 2.5 mm balloon   and followed by a 2.75 x 26 mm long Resolute stent and excellent radiographic   results were obtained.  Next, attention was directed to the left circumflex   coronary artery and direct stenting of the left circumflex coronary artery was   performed using a 2.75 x 12 mm balloon, a second 2.75 x 14 mm long Resolute   stent was then placed partially telescoped into the first stent, and excellent   results were obtained.  The procedure was well tolerated.  A Mynx closure of the   right groin arteriotomy site was achieved with adequate hemostasis.  She had an   uneventful course in the Intensive Care Unit thereafter.  The morning after the   procedure, she complained of soreness in the right groin and was given   hydrocodone tablet with relief.  She was ambulated, and discharged for   outpatient followup.  She was given a new prescription for clopidogrel 75 mg   daily, atorvastatin 40 mg daily, aspirin 81 mg daily.  She will continue her   metoprolol, lisinopril.  She has been counseled regarding cigarette smoking   cessation.  She has been given a prescription for topical nicotine 14 mg.  She   will see me for followup in the office in 2 weeks.  She has been given   instructions on her medications on groin care, on activities and on followup   visit.  She exhibits adequate understanding.  She is discharged in a stable and   satisfactory condition.      ESTEBAN/WANDA  dd: 04/05/2018 09:18:10 (CDT)  td: 04/05/2018 16:00:38  (CDT)  Doc ID   #[id number]  Job ID #[id number]    CC:

## 2018-04-05 NOTE — SUBJECTIVE & OBJECTIVE
Interval History:   No more cp  [facility] showed   Angios: Proximal RCA tubular 95% stenosis               LCx Proximal discrete 95% stenosis               LAD mild luminal irregularity.               Good LV.  PTCA + ZELALEM Proximal RCA  PTCA + ZELALEM x 2 in LCx    Review of Systems   Respiratory: Negative for shortness of breath.    Cardiovascular: Negative for chest pain.     Objective:     Vital Signs (Most Recent):  Temp: 99.5 °F (37.5 °C) (04/05/18 0700)  Pulse: 82 (04/05/18 0900)  Resp: 18 (04/05/18 0900)  BP: (!) 103/56 (04/05/18 0900)  SpO2: 99 % (04/05/18 0900) Vital Signs (24h Range):  Temp:  [97.8 °F (36.6 °C)-99.5 °F (37.5 °C)] 99.5 °F (37.5 °C)  Pulse:  [66-85] 82  Resp:  [13-30] 18  SpO2:  [91 %-100 %] 99 %  BP: ()/(50-76) 103/56     Weight: 97.6 kg (215 lb 2.7 oz)  Body mass index is 31.77 kg/m².  No intake or output data in the 24 hours ending 04/05/18 1051   Physical Exam   Constitutional: She is oriented to person, place, and time.   Cardiovascular: Normal rate and regular rhythm.    Pulmonary/Chest: Effort normal and breath sounds normal.   Abdominal: Soft. Bowel sounds are normal.   Musculoskeletal: She exhibits no edema.   Neurological: She is alert and oriented to person, place, and time.   Skin: Skin is warm and dry.       Significant Labs: All pertinent labs within the past 24 hours have been reviewed.    Significant Imaging: I have reviewed all pertinent imaging results/findings within the past 24 hours.

## 2018-04-05 NOTE — ASSESSMENT & PLAN NOTE
[facility] 4/4/18               Proximal RCA tubular 95% stenosis               LCx Proximal discrete 95% stenosis               LAD mild luminal irregularity.               Good LV.  PTCA + ZELALEM Proximal RCA  PTCA + ZELALEM x 2 in LCx     ASA, Plavix, Statin

## 2018-04-05 NOTE — PROGRESS NOTES
"[facility] Hospital Medicine  Progress Note    Patient Name: Jennifer Brady  MRN: [id number]  Patient Class: OP- Outpatient Recovery   Admission Date: 4/4/2018  Length of Stay: 0 days  Attending Physician: Ward Tan MD  Primary Care Provider: Arvind Juarez MD        Subjective:     Principal Problem:NSTEMI (non-ST elevated myocardial infarction)    HPI:  Ms. Jennifer Brady is a 57 y.o. female, with PMH of HTN. HLD,  and seizures, who presnted to [facility] ED on 4/3/18 2/2 "a few days " of central chest pain which radiates down her left arm and into her back. She describes the pain as a "pulling" sensation. The pain in her arm is described as a "heaviness." She states he has had this pain before, and was evaluated at [facility], and diagnosed with anxiety. She states the pain is worse with lying down, and improves when sitting up or when pushing on the chest. She also notes anxiety regarding an upcoming medical appointment. She states her mother had multiple heart attacks, the first of which was in her 40's. She denies any personal h/o MI, or any personal or FHx of CVA. She is a smoker of 5 cigs/day x 15 years. She takes a statin for HLD. She does not currently take ASA daily. She denies fever, chills, palpitations, leg swelling, abdominal pain, nausea, vomiting, numbness, or weakness, headache, dizziness, vision changes, jaw/neck pain.    The patient was evaluated in the ED, and found to have elevated troponin. She was admitted for further workup and evaluation of her chest pain.     Hospital Course:  No notes on file    Interval History:   No more cp  [facility] showed   Angios: Proximal RCA tubular 95% stenosis               LCx Proximal discrete 95% stenosis               LAD mild luminal irregularity.               Good LV.  PTCA + ZELALEM Proximal RCA  PTCA + ZELALEM x 2 in LCx    Review of Systems   Respiratory: Negative for shortness of breath.    Cardiovascular: Negative " for chest pain.     Objective:     Vital Signs (Most Recent):  Temp: 99.5 °F (37.5 °C) (04/05/18 0700)  Pulse: 82 (04/05/18 0900)  Resp: 18 (04/05/18 0900)  BP: (!) 103/56 (04/05/18 0900)  SpO2: 99 % (04/05/18 0900) Vital Signs (24h Range):  Temp:  [97.8 °F (36.6 °C)-99.5 °F (37.5 °C)] 99.5 °F (37.5 °C)  Pulse:  [66-85] 82  Resp:  [13-30] 18  SpO2:  [91 %-100 %] 99 %  BP: ()/(50-76) 103/56     Weight: 97.6 kg (215 lb 2.7 oz)  Body mass index is 31.77 kg/m².  No intake or output data in the 24 hours ending 04/05/18 1051   Physical Exam   Constitutional: She is oriented to person, place, and time.   Cardiovascular: Normal rate and regular rhythm.    Pulmonary/Chest: Effort normal and breath sounds normal.   Abdominal: Soft. Bowel sounds are normal.   Musculoskeletal: She exhibits no edema.   Neurological: She is alert and oriented to person, place, and time.   Skin: Skin is warm and dry.       Significant Labs: All pertinent labs within the past 24 hours have been reviewed.    Significant Imaging: I have reviewed all pertinent imaging results/findings within the past 24 hours.    Assessment/Plan:      * NSTEMI (non-ST elevated myocardial infarction)    [facility] 4/4/18               Proximal RCA tubular 95% stenosis               LCx Proximal discrete 95% stenosis               LAD mild luminal irregularity.               Good LV.  PTCA + ZELALEM Proximal RCA  PTCA + ZELALEM x 2 in LCx     ASA, Plavix, Statin              Intractable epilepsy without status epilepticus    - continue home meds        REN (obstructive sleep apnea)    - CPAP ordered         Anxiety    - seems to be worse than baseline given upcoming medical procedures/appoitnment   - no SI/HI at this time   - monitor         Pure hypercholesterolemia    - continue statin  - lipid panel pending for AM         HTN (hypertension)    - BP elevated upon admission  - continue home meds   - monitor           Depression    - continue antidepressants           VTE Risk  Mitigation         Ordered     Place FLOR hose  Until discontinued      04/04/18 0222     IP VTE HIGH RISK PATIENT  Once      04/04/18 0222          Home today    Ward Tan MD  Department of Hospital Medicine   [facility]

## 2018-04-06 ENCOUNTER — TELEPHONE (OUTPATIENT)
Dept: NEUROLOGY | Facility: CLINIC | Age: 58
End: 2018-04-06

## 2018-04-06 NOTE — TELEPHONE ENCOUNTER
Per Dr. Boston trejo to be held day of emu admit. Pt is aware of this and will take all other meds like normal

## 2018-04-11 ENCOUNTER — TELEPHONE (OUTPATIENT)
Dept: NEUROLOGY | Facility: CLINIC | Age: 58
End: 2018-04-11

## 2018-04-11 NOTE — TELEPHONE ENCOUNTER
I called into the [facility] pharmacy Oxcarb 150mg daily and topiramate 150mg BID, both w/5 refills per Dr. Mead. She will follow up and return for emu admit in 2 months when Dr. Mead is on the unit, or earlier if needed

## 2018-04-17 ENCOUNTER — OFFICE VISIT (OUTPATIENT)
Dept: PSYCHIATRY | Facility: CLINIC | Age: 58
End: 2018-04-17
Payer: COMMERCIAL

## 2018-04-17 DIAGNOSIS — F39 MOOD DISORDER: Primary | ICD-10-CM

## 2018-04-17 PROCEDURE — 90834 PSYTX W PT 45 MINUTES: CPT | Mod: S$GLB,,, | Performed by: SOCIAL WORKER

## 2018-04-17 NOTE — PROGRESS NOTES
Individual Psychotherapy (PhD/LCSW)    4/17/2018    Site:  [facility]         Therapeutic Intervention: Met with patient.  Outpatient - Supportive psychotherapy 45 min - CPT Code 08572    Chief complaint/reason for encounter: depression and anxiety     Interval history and content of current session: Pt states she had chest pains again that were severe and went to the [facility] ED and was diagnoses with a heart attack.  She had a cardiac catheterization and had stents placed.  She is now doing fine, has a trip planned in a few weeks to [place].  She spent session describing her hospital stay and medical follow-up she needs.  Her adult children have been attentive to her and are helping her out.  She is a little depressed she reports but looking forward to the future.    Treatment plan:  · Target symptoms: depression, anxiety   · Why chosen therapy is appropriate versus another modality: relevant to diagnosis  · Outcome monitoring methods: self-report, observation  · Therapeutic intervention type: supportive psychotherapy    Risk parameters:  Patient reports no suicidal ideation  Patient reports no homicidal ideation  Patient reports no self-injurious behavior  Patient reports no violent behavior    Verbal deficits: None    Patient's response to intervention:  The patient's response to intervention is accepting.    Progress toward goals and other mental status changes:  The patient's progress toward goals is good.    Diagnosis:     ICD-10-CM ICD-9-CM   1. Mood disorder F39 296.90       Plan:  individual psychotherapy and medication management by physician    Return to clinic: 1 month    Length of Service (minutes): 45

## 2018-04-18 ENCOUNTER — TELEPHONE (OUTPATIENT)
Dept: SLEEP MEDICINE | Facility: CLINIC | Age: 58
End: 2018-04-18

## 2018-04-18 ENCOUNTER — TELEPHONE (OUTPATIENT)
Dept: ADMINISTRATIVE | Facility: HOSPITAL | Age: 58
End: 2018-04-18

## 2018-04-18 ENCOUNTER — OFFICE VISIT (OUTPATIENT)
Dept: SLEEP MEDICINE | Facility: CLINIC | Age: 58
End: 2018-04-18
Payer: COMMERCIAL

## 2018-04-18 VITALS
DIASTOLIC BLOOD PRESSURE: 73 MMHG | WEIGHT: 208.56 LBS | SYSTOLIC BLOOD PRESSURE: 111 MMHG | HEART RATE: 93 BPM | BODY MASS INDEX: 30.89 KG/M2 | HEIGHT: 69 IN

## 2018-04-18 DIAGNOSIS — Z12.11 SCREENING FOR COLORECTAL CANCER: Primary | ICD-10-CM

## 2018-04-18 DIAGNOSIS — Z12.12 SCREENING FOR COLORECTAL CANCER: Primary | ICD-10-CM

## 2018-04-18 DIAGNOSIS — G47.33 OSA (OBSTRUCTIVE SLEEP APNEA): Primary | ICD-10-CM

## 2018-04-18 DIAGNOSIS — Z12.39 SCREENING FOR BREAST CANCER: ICD-10-CM

## 2018-04-18 PROCEDURE — 99213 OFFICE O/P EST LOW 20 MIN: CPT | Mod: S$GLB,,, | Performed by: NURSE PRACTITIONER

## 2018-04-18 PROCEDURE — 99999 PR PBB SHADOW E&M-EST. PATIENT-LVL III: CPT | Mod: PBBFAC,,, | Performed by: NURSE PRACTITIONER

## 2018-04-18 NOTE — TELEPHONE ENCOUNTER
Since we only received pt's Titration study, I called [facility] for pt's baseline sleep study report as Marisela requested.  I spoke with Jennifer there she said since she cannot find sleep study under their new system, she going to have to search the old system for pt's baseline study.    Re-faxed pt's [place] to [facility] to

## 2018-04-18 NOTE — PROGRESS NOTES
CPAP set up Date :more than 5 years ago  Overall CPAP use: nightly  Is CPAP helping?  Does not know  Mask Style, Comfort, fit, chin strap:  Nasal mask  Pressure Tolerance:  ok  Humidification: Setting is at  67degrees F    Increased to 70    CPAP Device interrogation last 30 days:   Machine type : resmed  Machine condition:  Old, marysol filter, she will wash this  Pressure setting and range:  Auto 5-15  Ramp 45/5          Total usage: 6144.2 hours    Avg daily usage 30 days:  6.3 hours     Days > 4 Hours:  days  Manometer readin.7 cm H20    Wants a new machine  Needs filters.  No one is servicing this machine, obtained from previous dr who no longer takes care of her.

## 2018-04-18 NOTE — PROGRESS NOTES
"Jennifer Brady  was seen as in follow-up for REN management.    CHIEF COMPLAINT:    Chief Complaint   Patient presents with    Sleep Apnea       INTERVAL HISTORY:    04/18/2018 LO Gill NP: Requested baseline PSG not yet received, only titration study completed 04/15/2016. Pt has resumed regular CPAP use since last clinic visit after getting new nasal mask. Reports oral drying but does not wear chinstrap. Denies pressure intolerance. Reports that snoring and air gasping resolved with CPAP use. Denies pressure intolerance.  Continues to sleep a lot during the day due to depression "I am so depressed I sleep all day 5 hours or more then I have a hard time sleeping at night". Continues to go to therapy with antidepressant meds. Recently had NSTEMI early April 2018.     CPAP Interrogation: Resmed APAP 5 - 15 cm, Days Used: 23/30, Days > 4 hours: 19/30, Average Usage: 6.3  Hours, 90%tile pressure: 11.6 cm, Leak: 25L  / min, Predicted AHI: 1.8       02/22/2018 LO Gill NP: Initial HISTORY OF PRESENT ILLNESS: Jennifer Brady is a 57 y.o. female is here for sleep evaluation.       Patient complaints include: snoring, excessive daytime fatigue, and air gasping without CPAP machine    Diagnosed with REN ~10 years ago and has been on CPAP since  Prior sleep/neuro MD retired  Establish care today and get new machine/supplies  Used to get supplies from sleep MD office    Has not been able to use current PAP machine since nasal mask is broken. Mask last replaced > 6 months ago  Without PAP use has gained 25 lb and worsening anxiety. Regular morning headaches without CPAP.     CPAP Interrogation: Resmed APAP 5 - 15 cm, Days Used: 2/30, Days > 4 hours: 0/30, Average Usage: 2.3 hours 90%tile pressure: 8.4 cm    Denies symptoms of restless legs or kicking during sleep.    Occupation: retired     [place] Sleepiness Scale score during initial sleep evaluation was 9.    SLEEP ROUTINE:    Bed partner:  no  Time to " "bed: 9:30 - 10 pm  Sleep onset latency: 30 minutes      Disruptions or awakenings:   2 - 3    Wakeup time:     4:30 0 5 am  Perceived sleep quality:  2/5       Previous Sleep Study:     04/15/2016 retitration study CPAP 8 cm  Requested to follow-up regarding obtaining baseline PSG in addition to titration study.     PAST MEDICAL HISTORY:    Active Ambulatory Problems     Diagnosis Date Noted    Adjustment disorder with mixed anxiety and depressed mood 08/02/2012    Depression 11/30/2012    Episodic mood disorder 05/17/2013    Cluster B personality disorder 05/17/2013    HTN (hypertension) 07/11/2017    Pure hypercholesterolemia 07/11/2017    Anxiety 07/11/2017    REN (obstructive sleep apnea) 07/11/2017    Hx of migraine headaches 07/11/2017    Intractable epilepsy without status epilepticus 02/26/2018    NSTEMI (non-ST elevated myocardial infarction)     Coronary artery disease involving native coronary artery of native heart with unstable angina pectoris 04/05/2018     Resolved Ambulatory Problems     Diagnosis Date Noted    No Resolved Ambulatory Problems     Past Medical History:   Diagnosis Date    Anxiety     Behavioral problem     Borderline personality disorder     Depression     Fatigue     Hx of psychiatric care     Hyperlipidemia     Hypertension     Psychiatric problem     S/P hysterectomy with oophorectomy     Seizures     Seizures     Therapy                 PAST SURGICAL HISTORY:    No past surgical history on file.      FAMILY HISTORY:                Family History   Problem Relation Age of Onset    Seizures Mother        SOCIAL HISTORY:          Tobacco:   History   Smoking Status    Current Every Day Smoker    Types: Cigarettes   Smokeless Tobacco    Never Used     Comment: quit actual cigarettes a few mo ago, switched to e cigarette       Alcohol use:    History   Alcohol Use No     Comment: very rarely; "my  drinks"                 ALLERGIES:  Review of " "patient's allergies indicates:  No Known Allergies    CURRENT MEDICATIONS:    Current Outpatient Prescriptions   Medication Sig Dispense Refill    aspirin (ECOTRIN) 81 MG EC tablet Take 1 tablet (81 mg total) by mouth once daily. 100 tablet 6    atorvastatin (LIPITOR) 40 MG tablet TK 1 T PO QHS  3    atorvastatin (LIPITOR) 40 MG tablet Take 1 tablet (40 mg total) by mouth once daily. 90 tablet 3    clonazePAM (KLONOPIN) 1 MG tablet Take 1 tablet (1 mg total) by mouth 2 (two) times daily. (morning and afternoon) 60 tablet 3    clopidogrel (PLAVIX) 75 mg tablet Take 1 tablet (75 mg total) by mouth once daily. 30 tablet 11    doxepin (SINEQUAN) 50 MG capsule Take 1 capsule (50 mg total) by mouth nightly. 30 capsule 5    FLUoxetine (PROZAC) 40 MG capsule Take 2 capsules (80 mg total) by mouth once daily. 60 capsule 5    lisinopril-hydrochlorothiazide (PRINZIDE,ZESTORETIC) 10-12.5 mg per tablet Take 1 tablet by mouth once daily.      nicotine (NICODERM CQ) 14 mg/24 hr Place 1 patch onto the skin once daily. 30 patch 3    topiramate (TOPAMAX) 100 MG tablet Take 1 tablet (100 mg total) by mouth 2 (two) times daily. 60 tablet 1     No current facility-administered medications for this visit.                   REVIEW OF SYSTEMS:     Sleep related symptoms as per HPI.  CONST:Reports weight gain  - 25 lb wihtin 6 months   HEENT: Reports sinus congestion  PULM: Reports dyspnea  CARD:  Reports palpitations   GI:  Reportsacid reflux  : Denies polyuria  NEURO: Reports morning headaches  PSYCH: Reports mood disturbance  HEME: Denies anemia    Otherwise, a balance of systems reviewed is negative.          PHYSICAL EXAM:  Vitals:    04/18/18 0939   BP: 111/73   Pulse: 93   Weight: 94.6 kg (208 lb 8.9 oz)   Height: 5' 9" (1.753 m)   PainSc:   5   PainLoc: Head     Body mass index is 30.8 kg/m².     GENERAL: Overweight development, well groomed  NECK: Supple. No thyromegaly. No palpable nodes.    SKIN: On face and neck: No " abrasions, no rashes, no lesions.  No subcutaneous nodules are palpable.  RESPIRATORY:  Normal chest expansion and non-labored breathing at rest.  CARDIOVASCULAR: Normal rate  EXTREMITIES: No edema. No clubbing. No cyanosis. Station normal. Gait normal.        NEURO/PSYCH: Oriented to time, place and person. Normal attention span and concentration. Affect is normal. Mood is normal.                                              ASSESSMENT:    REN, previously diagnosed, severity unknown. The patient symptomatically has snoring, excessive daytime fatigue, and air gasping resolved with CPAP use. The patient has improved adherence on CPAP and experiencing symptomatic benefit. Medical co-morbidities: migraine headaches, anxiety, depression, systemic HTN, and obesity. This warrants treatment.  CPAP machine has reached reasonable useful lifetime and needs to be replaced.     Obesity, BMI > 30, discussed relationship between weight and REN.     PLAN:    Treatment:  -Following up again to obtain external sleep records  -Meanwhile, continue APAP 5 - 15 cm. Increase mask-on time, may wear CPAP during daytime naps  -Immediately go to [facility] today to get chinstrap.   -Discussed that should baseline PSG not be available, it would be ideal to have REN re-evaluated via PSG  -Pt initially wanted new machine, but satisfied to continue with current since it still works and she has gotten new supplies.   -RTC 4 months     Education: During our discussion today, we talked about the etiology of obstructive sleep apnea as well as the potential ramifications of untreated sleep apnea, which could include daytime sleepiness, hypertension, heart disease and/or stroke. We discussed potential treatment options, which could include weight loss, body positioning, continuous positive airway pressure (CPAP), OA, EPAP, or referral for surgical consideration.     Counseling regarding benefits of healthy eating and physical activity (150 minutes of  moderate-intensity aerobic activity per week) for weight reduction which can improve overall health.     Precautions: The patient was advised to abstain from driving should they feel sleepy  or drowsy.

## 2018-04-20 ENCOUNTER — TELEPHONE (OUTPATIENT)
Dept: SLEEP MEDICINE | Facility: CLINIC | Age: 58
End: 2018-04-20

## 2018-04-20 NOTE — TELEPHONE ENCOUNTER
Jennifer just called me and said that Ms Brady didn't complete baseline study at their facility, just the titration study.

## 2018-04-23 ENCOUNTER — OFFICE VISIT (OUTPATIENT)
Dept: PSYCHIATRY | Facility: CLINIC | Age: 58
End: 2018-04-23
Payer: COMMERCIAL

## 2018-04-23 VITALS
HEART RATE: 83 BPM | SYSTOLIC BLOOD PRESSURE: 127 MMHG | HEIGHT: 69 IN | BODY MASS INDEX: 30.57 KG/M2 | WEIGHT: 206.38 LBS | DIASTOLIC BLOOD PRESSURE: 60 MMHG

## 2018-04-23 DIAGNOSIS — F39 MOOD DISORDER: ICD-10-CM

## 2018-04-23 DIAGNOSIS — F41.3 OTHER MIXED ANXIETY DISORDERS: ICD-10-CM

## 2018-04-23 DIAGNOSIS — Z86.69 HX OF MIGRAINE HEADACHES: ICD-10-CM

## 2018-04-23 PROCEDURE — 90833 PSYTX W PT W E/M 30 MIN: CPT | Mod: S$GLB,,, | Performed by: NURSE PRACTITIONER

## 2018-04-23 PROCEDURE — 99213 OFFICE O/P EST LOW 20 MIN: CPT | Mod: S$GLB,,, | Performed by: NURSE PRACTITIONER

## 2018-04-23 PROCEDURE — 99999 PR PBB SHADOW E&M-EST. PATIENT-LVL II: CPT | Mod: PBBFAC,,, | Performed by: NURSE PRACTITIONER

## 2018-04-23 RX ORDER — CLONAZEPAM 1 MG/1
1 TABLET ORAL 2 TIMES DAILY
Qty: 60 TABLET | Refills: 3 | Status: SHIPPED | OUTPATIENT
Start: 2018-04-23 | End: 2018-06-20 | Stop reason: SDUPTHER

## 2018-04-23 RX ORDER — FLUOXETINE HYDROCHLORIDE 40 MG/1
80 CAPSULE ORAL DAILY
Qty: 60 CAPSULE | Refills: 5 | Status: SHIPPED | OUTPATIENT
Start: 2018-04-23 | End: 2018-08-08 | Stop reason: SDUPTHER

## 2018-04-23 RX ORDER — TOPIRAMATE 100 MG/1
100 TABLET, FILM COATED ORAL 2 TIMES DAILY
Qty: 60 TABLET | Refills: 5 | Status: SHIPPED | OUTPATIENT
Start: 2018-04-23 | End: 2018-10-11 | Stop reason: SDUPTHER

## 2018-04-23 NOTE — PROGRESS NOTES
Outpatient Psychiatry Follow-Up Visit (MD/NP)    4/23/2018    Clinical Status of Patient:  Outpatient (Ambulatory)    Chief Complaint:  Jennifer Brady is a 57 y.o. female who presents today for follow-up of depression and anxiety.  Met with patient.      Last Visit: was 2/27/18. Chart reviewed.     Interval History and Content of Current Session:  Current Psychiatric Medications as of last visit.  · Discontinue Ambien CR  · Restart Doxepin 50 mg po q hs  · Increase back to Prozac 80 mg po daily.   · Continue Klonopin 1 mg po BID (take morning and afternoon).  · Continue Topamax per neurology    Pt presents bright affect and euthymic mood.  She reports had stent placement from heart attack; see medical chart records from 4/04/18 at [facility].  She reports improved energy, sleep, and mood.  Pt did not like Doxepin because she believes it caused her weight gain.  Denies SI/HI/AVH.  Compliant with Prozac and Klonopin and denies side-effects.  Pt discussed plans to take a trip to [place] with friends in a few weeks. Pt also deactivated her Facebook to avoid family drama from social media.  Continue individual psychotherapy with Dr. Lester.  Denies panic attacks.     BMI: (30.47) 3 lbs less than last visit.  Encourage pt to monitor diet and increase physical activity as tolerated.       Psychotherapy:  · Target symptoms: depression, anxiety   · Why chosen therapy is appropriate versus another modality: relevant to diagnosis  · Outcome monitoring methods: self-report, observation  · Therapeutic intervention type: supportive psychotherapy  · Topics discussed/themes: relationships difficulties, difficulty managing affect in interpersonal relationships, building skills sets for symptom management, symptom recognition  · The patient's response to the intervention is accepting. The patient's progress toward treatment goals is not progressing.   · Duration of intervention: 20 minutes.    Review of Systems  "  · PSYCHIATRIC: Pertinant items are noted in the narrative.  · CONSTITUTIONAL: No weight gain or loss.   · MUSCULOSKELETAL: No pain or stiffness of the joints.  · NEUROLOGIC: No weakness, sensory changes, seizures, confusion, memory loss, tremor or other abnormal movements.  · ENDOCRINE: No polydipsia or polyuria.  · INTEGUMENTARY: No rashes or lacerations.  · EYES: No exophthalmos, jaundice or blindness.  · ENT: No dizziness, tinnitus or hearing loss.  · RESPIRATORY: No shortness of breath.  · CARDIOVASCULAR: No tachycardia or chest pain.  · GASTROINTESTINAL: No nausea, vomiting, pain, constipation or diarrhea.  · GENITOURINARY: No frequency, dysuria or sexual dysfunction.  · HEMATOLOGIC/LYMPHATIC: No excessive bleeding, prolonged or excessive bleeding after dental extraction/injury.  · ALLERGIC/IMMUNOLOGIC: No allergic response to materials, foods or animals at this time.    Past Medical, Family and Social History: The patient's past medical, family and social history have been reviewed and updated as appropriate within the electronic medical record - see encounter notes.    Compliance: yes    Side effects: None    Risk Parameters:  Patient reports no suicidal ideation  Patient reports no homicidal ideation  Patient reports no self-injurious behavior  Patient reports no violent behavior    Exam (detailed: at least 9 elements; comprehensive: all 15 elements)   Constitutional  Vitals:  Most recent vital signs, dated greater than 90 days prior to this appointment, were reviewed.   Vitals:    04/23/18 0851   BP: 127/60   Pulse: 83   Weight: 93.6 kg (206 lb 5.6 oz)   Height: 5' 9" (1.753 m)        General:  unremarkable, age appropriate     Musculoskeletal  Muscle Strength/Tone:  no tremor, no tic   Gait & Station:  non-ataxic     Psychiatric  Speech:  no latency; no press   Mood & Affect:  anxious, depressed  congruent and appropriate   Thought Process:  normal and logical   Associations:  intact   Thought Content:  " normal, no suicidality, no homicidality, delusions, or paranoia   Insight:  intact   Judgement: behavior is adequate to circumstances   Orientation:  grossly intact   Memory: intact for content of interview   Language: grossly intact   Attention Span & Concentration:  able to focus   Fund of Knowledge:  intact and appropriate to age and level of education     Assessment and Diagnosis   Status/Progress: Based on the examination today, the patient's problem(s) is/are worsening and failing to respond as expected to treatment.  New problems have not been presented today.   Co-morbidities and Lack of compliance are not complicating management of the primary condition.  There are no active rule-out diagnoses for this patient at this time.     General Impression:       ICD-10-CM ICD-9-CM   1. Mood disorder F39 296.90   2. Other mixed anxiety disorders F41.3 300.09   3. Hx of migraine headaches Z86.69 V12.49       Intervention/Counseling/Treatment Plan   · Medication Management: The risks and benefits of medication were discussed with the patient.  · Care Coordination: During the visit, care coordination was conducted with  social work.   · BMI: (30.47) 3 lbs less than last visit.  Encourage pt to monitor diet and increase physical activity as tolerated.   · DC Doxepin  · Continue Prozac 80 mg po daily.   · Continue Klonopin 1 mg po BID (take morning and afternoon).  · Continue Topamax 100 mg po BID (for migraines) per Neurology  · Continue individual psychotherapy with social work    Return to Clinic: 6 months

## 2018-05-02 ENCOUNTER — OFFICE VISIT (OUTPATIENT)
Dept: CARDIOLOGY | Facility: CLINIC | Age: 58
End: 2018-05-02
Attending: INTERNAL MEDICINE
Payer: COMMERCIAL

## 2018-05-02 VITALS
DIASTOLIC BLOOD PRESSURE: 65 MMHG | WEIGHT: 205 LBS | HEART RATE: 70 BPM | HEIGHT: 69 IN | BODY MASS INDEX: 30.36 KG/M2 | SYSTOLIC BLOOD PRESSURE: 102 MMHG

## 2018-05-02 DIAGNOSIS — E78.00 PURE HYPERCHOLESTEROLEMIA: ICD-10-CM

## 2018-05-02 DIAGNOSIS — I25.110 CORONARY ARTERY DISEASE INVOLVING NATIVE CORONARY ARTERY OF NATIVE HEART WITH UNSTABLE ANGINA PECTORIS: Primary | ICD-10-CM

## 2018-05-02 DIAGNOSIS — I21.4 NSTEMI (NON-ST ELEVATED MYOCARDIAL INFARCTION): ICD-10-CM

## 2018-05-02 DIAGNOSIS — Z87.891 EX-SMOKER: ICD-10-CM

## 2018-05-02 DIAGNOSIS — I10 ESSENTIAL HYPERTENSION: ICD-10-CM

## 2018-05-02 PROCEDURE — 99214 OFFICE O/P EST MOD 30 MIN: CPT | Mod: S$GLB,,, | Performed by: INTERNAL MEDICINE

## 2018-05-02 RX ORDER — CITALOPRAM 10 MG/1
10 TABLET ORAL DAILY
COMMUNITY
End: 2018-07-30

## 2018-05-02 NOTE — PROGRESS NOTES
Subjective:    Patient ID:  Jennifer Brady is a 57 y.o. female     HPI  Here for F/U after NSTEMI, ZELALEM RCA and ZELALEM x 2 in LCx on 4/4/2018, HBP, smoker.    I feel good. I am very grateful for what you did to save my life. I am, however depressed, and I don't see my psychiatrist for a while. Klonipin does not help.  I'm not smoking!    Current Outpatient Prescriptions   Medication Sig    citalopram (CELEXA) 10 MG tablet Take 10 mg by mouth once daily.    aspirin (ECOTRIN) 81 MG EC tablet Take 1 tablet (81 mg total) by mouth once daily.    atorvastatin (LIPITOR) 40 MG tablet Take 1 tablet (40 mg total) by mouth once daily.    clonazePAM (KLONOPIN) 1 MG tablet Take 1 tablet (1 mg total) by mouth 2 (two) times daily. (morning and afternoon)    clopidogrel (PLAVIX) 75 mg tablet Take 1 tablet (75 mg total) by mouth once daily.    FLUoxetine (PROZAC) 40 MG capsule Take 2 capsules (80 mg total) by mouth once daily.    lisinopril-hydrochlorothiazide (PRINZIDE,ZESTORETIC) 10-12.5 mg per tablet Take 1 tablet by mouth once daily.    nicotine (NICODERM CQ) 14 mg/24 hr Place 1 patch onto the skin once daily.    topiramate (TOPAMAX) 100 MG tablet Take 1 tablet (100 mg total) by mouth 2 (two) times daily.     No current facility-administered medications for this visit.          Review of Systems   Constitution: Negative for chills, decreased appetite, fever, weight gain and weight loss.   HENT: Negative for congestion, hearing loss and sore throat.    Eyes: Negative for blurred vision, double vision and visual disturbance.   Cardiovascular: Negative for chest pain, claudication, dyspnea on exertion, leg swelling, palpitations and syncope.   Respiratory: Negative for cough, hemoptysis, shortness of breath, sputum production and wheezing.    Endocrine: Negative for cold intolerance and heat intolerance.   Hematologic/Lymphatic: Negative for bleeding problem. Does not bruise/bleed easily.   Skin: Negative for color  "change, dry skin, flushing and itching.   Musculoskeletal: Negative for back pain, joint pain and myalgias.   Gastrointestinal: Negative for abdominal pain, anorexia, constipation, diarrhea, dysphagia, nausea and vomiting.        No bleeding per rectum   Genitourinary: Negative for dysuria, flank pain, frequency, hematuria and nocturia.   Neurological: Negative for dizziness, headaches, light-headedness, loss of balance, seizures and tremors.   Psychiatric/Behavioral: Positive for depression. Negative for altered mental status.     Vitals:    05/02/18 1538   BP: 102/65   Pulse: 70   Weight: 93 kg (205 lb)   Height: 5' 9" (1.753 m)        Objective:    Physical Exam   Constitutional: She is oriented to person, place, and time. She appears well-developed and well-nourished.   HENT:   Head: Normocephalic and atraumatic.   Nose: Nose normal.   Mouth/Throat: Oropharynx is clear and moist.   Eyes: Conjunctivae and EOM are normal. Pupils are equal, round, and reactive to light.   Neck: Neck supple. No tracheal deviation present. No thyromegaly present.   Cardiovascular: Normal rate, regular rhythm and intact distal pulses.  Exam reveals no gallop and no friction rub.    No murmur heard.  Pulmonary/Chest: No respiratory distress. She has no wheezes. She has no rales. She exhibits no tenderness.   Abdominal: Soft. Bowel sounds are normal. She exhibits no distension and no mass. There is no tenderness. There is no rebound and no guarding.   Musculoskeletal: Normal range of motion.   Lymphadenopathy:     She has no cervical adenopathy.   Neurological: She is alert and oriented to person, place, and time.   Skin: Skin is warm and dry.   Psychiatric: Her behavior is normal.         Assessment:       1. Coronary artery disease involving native coronary artery of native heart with unstable angina pectoris    2. NSTEMI (non-ST elevated myocardial infarction)    3. Essential hypertension    4. Pure hypercholesterolemia    5. " Ex-smoker    6.      Depression.           Plan:       Rx Celexa 10 mg po qd  Try to connect with her psychiatrist  Continue present meds.

## 2018-05-02 NOTE — LETTER
May 2, 2018      Arvind Juarez MD  [address]  [facility] [address]           [facility]  [address], [address]  [facility] [address]  Phone: [phone]  Fax: [phone]          Patient: Jennifer Brady   MR Number: [id number]   YOB: 1960   Date of Visit: 5/2/2018       Dear Dr. Arvind Juarez:    Thank you for referring Jennifer Brady to me for evaluation. Attached you will find relevant portions of my assessment and plan of care.    If you have questions, please do not hesitate to call me. I look forward to following Jennifer Brady along with you.    Sincerely,    Edwin Reza MD    Enclosure  CC:  No Recipients    If you would like to receive this communication electronically, please contact [email or web address] or [phone] to request more information on [employer] Link access.    For providers and/or their staff who would like to refer a patient to [facility], please contact us through our one-stop-shop provider referral line, [facility], at [phone].    If you feel you have received this communication in error or would no longer like to receive these types of communications, please e-mail [email or web address]

## 2018-05-28 ENCOUNTER — DOCUMENTATION ONLY (OUTPATIENT)
Dept: PSYCHIATRY | Facility: CLINIC | Age: 58
End: 2018-05-28

## 2018-06-12 ENCOUNTER — OFFICE VISIT (OUTPATIENT)
Dept: CARDIOLOGY | Facility: CLINIC | Age: 58
End: 2018-06-12
Attending: INTERNAL MEDICINE
Payer: COMMERCIAL

## 2018-06-12 VITALS
HEIGHT: 69 IN | WEIGHT: 200 LBS | BODY MASS INDEX: 29.62 KG/M2 | HEART RATE: 78 BPM | SYSTOLIC BLOOD PRESSURE: 97 MMHG | DIASTOLIC BLOOD PRESSURE: 66 MMHG

## 2018-06-12 DIAGNOSIS — F17.200 SMOKER: ICD-10-CM

## 2018-06-12 DIAGNOSIS — I10 ESSENTIAL HYPERTENSION: ICD-10-CM

## 2018-06-12 DIAGNOSIS — G47.33 OSA (OBSTRUCTIVE SLEEP APNEA): ICD-10-CM

## 2018-06-12 DIAGNOSIS — E78.00 PURE HYPERCHOLESTEROLEMIA: ICD-10-CM

## 2018-06-12 DIAGNOSIS — I21.4 NSTEMI (NON-ST ELEVATED MYOCARDIAL INFARCTION): ICD-10-CM

## 2018-06-12 DIAGNOSIS — I25.110 CORONARY ARTERY DISEASE INVOLVING NATIVE CORONARY ARTERY OF NATIVE HEART WITH UNSTABLE ANGINA PECTORIS: Primary | ICD-10-CM

## 2018-06-12 DIAGNOSIS — F17.200 SMOKER: Primary | ICD-10-CM

## 2018-06-12 PROCEDURE — 99214 OFFICE O/P EST MOD 30 MIN: CPT | Mod: S$GLB,,, | Performed by: INTERNAL MEDICINE

## 2018-06-12 RX ORDER — IBUPROFEN 200 MG
1 TABLET ORAL DAILY
Qty: 90 PATCH | Refills: 3 | Status: SHIPPED | OUTPATIENT
Start: 2018-06-12 | End: 2018-06-12 | Stop reason: SDUPTHER

## 2018-06-12 RX ORDER — IBUPROFEN 200 MG
1 TABLET ORAL DAILY
Qty: 90 PATCH | Refills: 3 | Status: ON HOLD | OUTPATIENT
Start: 2018-06-12 | End: 2019-11-20 | Stop reason: CLARIF

## 2018-06-12 NOTE — PROGRESS NOTES
Subjective:    Patient ID:  Jennifer Brady is a 57 y.o. female     HPI here for follow-up of coronary artery disease, recent non STEMI, status post drug-eluting stent placement to the right coronary artery, status post drug-eluting stents x2 placed in the left circumflex coronary artery on 04/04/2018, essential hypertension, hyperlipidemia, obstructive sleep apnea, on cigarette smoker.    I am still smoking about a cigarette of a sore day.  I get some vague discomfort my chest, but no exertional chest pain.    Current Outpatient Prescriptions   Medication Sig    aspirin (ECOTRIN) 81 MG EC tablet Take 1 tablet (81 mg total) by mouth once daily.    atorvastatin (LIPITOR) 40 MG tablet Take 1 tablet (40 mg total) by mouth once daily.    citalopram (CELEXA) 10 MG tablet Take 10 mg by mouth once daily.    clonazePAM (KLONOPIN) 1 MG tablet Take 1 tablet (1 mg total) by mouth 2 (two) times daily. (morning and afternoon)    clopidogrel (PLAVIX) 75 mg tablet Take 1 tablet (75 mg total) by mouth once daily.    FLUoxetine (PROZAC) 40 MG capsule Take 2 capsules (80 mg total) by mouth once daily.    lisinopril-hydrochlorothiazide (PRINZIDE,ZESTORETIC) 10-12.5 mg per tablet Take 1 tablet by mouth once daily.    nicotine (NICODERM CQ) 14 mg/24 hr Place 1 patch onto the skin once daily.    topiramate (TOPAMAX) 100 MG tablet Take 1 tablet (100 mg total) by mouth 2 (two) times daily.     No current facility-administered medications for this visit.            Review of Systems   Constitution: Negative for chills, decreased appetite, fever, weight gain and weight loss.   HENT: Negative for congestion, hearing loss and sore throat.    Eyes: Negative for blurred vision, double vision and visual disturbance.   Cardiovascular: Positive for chest pain. Negative for claudication, dyspnea on exertion, leg swelling, palpitations and syncope.   Respiratory: Negative for cough, hemoptysis, shortness of breath, sputum production  "and wheezing.    Endocrine: Negative for cold intolerance and heat intolerance.   Hematologic/Lymphatic: Negative for bleeding problem. Does not bruise/bleed easily.   Skin: Negative for color change, dry skin, flushing and itching.   Musculoskeletal: Negative for back pain, joint pain and myalgias.   Gastrointestinal: Negative for abdominal pain, anorexia, constipation, diarrhea, dysphagia, nausea and vomiting.        No bleeding per rectum   Genitourinary: Negative for dysuria, flank pain, frequency, hematuria and nocturia.   Neurological: Negative for dizziness, headaches, light-headedness, loss of balance, seizures and tremors.   Psychiatric/Behavioral: Negative for altered mental status and depression.         Vitals:    06/12/18 1253   BP: 97/66   Pulse: 78   Weight: 90.7 kg (200 lb)   Height: 5' 9" (1.753 m)       Objective:    Physical Exam   Constitutional: She is oriented to person, place, and time. She appears well-developed and well-nourished.   HENT:   Head: Normocephalic and atraumatic.   Nose: Nose normal.   Mouth/Throat: Oropharynx is clear and moist.   Eyes: Conjunctivae and EOM are normal. Pupils are equal, round, and reactive to light.   Neck: Neck supple. No tracheal deviation present. No thyromegaly present.   Cardiovascular: Normal rate, regular rhythm and intact distal pulses.  Exam reveals no gallop and no friction rub.    No murmur heard.  Pulmonary/Chest: No respiratory distress. She has no wheezes. She has no rales. She exhibits no tenderness.   Abdominal: Soft. Bowel sounds are normal. She exhibits no distension and no mass. There is no tenderness. There is no rebound and no guarding.   Musculoskeletal: Normal range of motion.   Lymphadenopathy:     She has no cervical adenopathy.   Neurological: She is alert and oriented to person, place, and time.   Skin: Skin is warm and dry.   Psychiatric: Her behavior is normal.         Assessment:       1. Coronary artery disease involving native " coronary artery of native heart with unstable angina pectoris    2. NSTEMI (non-ST elevated myocardial infarction)    3. Essential hypertension    4. Pure hypercholesterolemia    5. REN (obstructive sleep apnea)    6. Smoker         Plan:       Again urged discontinuation of cigarette smoking.  Continue the same medications.

## 2018-06-15 ENCOUNTER — TELEPHONE (OUTPATIENT)
Dept: NEUROLOGY | Facility: CLINIC | Age: 58
End: 2018-06-15

## 2018-06-20 DIAGNOSIS — F41.3 OTHER MIXED ANXIETY DISORDERS: ICD-10-CM

## 2018-06-20 RX ORDER — CLONAZEPAM 1 MG/1
TABLET ORAL
Qty: 60 TABLET | Refills: 1 | Status: SHIPPED | OUTPATIENT
Start: 2018-06-20 | End: 2018-07-30 | Stop reason: SDUPTHER

## 2018-07-27 ENCOUNTER — TELEPHONE (OUTPATIENT)
Dept: NEUROLOGY | Facility: CLINIC | Age: 58
End: 2018-07-27

## 2018-07-27 NOTE — TELEPHONE ENCOUNTER
Returned patients message about cancelling EMU visit on Tues.  ----- Message from Candelaria Ceron MA sent at 7/27/2018 12:48 PM CDT -----  Contact: Pt can be reached at [phone]      ----- Message -----  From: Lulú Jalloh  Sent: 7/27/2018  12:02 PM  To: [facility] Staff    Pt is called to cancel procedure. Please contact pt.        Thank you!

## 2018-07-30 ENCOUNTER — OFFICE VISIT (OUTPATIENT)
Dept: PSYCHIATRY | Facility: CLINIC | Age: 58
End: 2018-07-30
Payer: COMMERCIAL

## 2018-07-30 VITALS
SYSTOLIC BLOOD PRESSURE: 124 MMHG | DIASTOLIC BLOOD PRESSURE: 60 MMHG | WEIGHT: 197 LBS | HEIGHT: 69 IN | HEART RATE: 79 BPM | BODY MASS INDEX: 29.18 KG/M2

## 2018-07-30 DIAGNOSIS — F39 MOOD DISORDER: Primary | ICD-10-CM

## 2018-07-30 DIAGNOSIS — F41.3 OTHER MIXED ANXIETY DISORDERS: ICD-10-CM

## 2018-07-30 DIAGNOSIS — F32.A DEPRESSION, UNSPECIFIED DEPRESSION TYPE: ICD-10-CM

## 2018-07-30 PROCEDURE — 99213 OFFICE O/P EST LOW 20 MIN: CPT | Mod: S$GLB,,, | Performed by: NURSE PRACTITIONER

## 2018-07-30 PROCEDURE — 90833 PSYTX W PT W E/M 30 MIN: CPT | Mod: S$GLB,,, | Performed by: NURSE PRACTITIONER

## 2018-07-30 PROCEDURE — 99999 PR PBB SHADOW E&M-EST. PATIENT-LVL III: CPT | Mod: PBBFAC,,, | Performed by: NURSE PRACTITIONER

## 2018-07-30 RX ORDER — LURASIDONE HYDROCHLORIDE 20 MG/1
20 TABLET, FILM COATED ORAL DAILY
Qty: 30 TABLET | Refills: 3 | Status: SHIPPED | OUTPATIENT
Start: 2018-07-30 | End: 2018-10-11

## 2018-07-30 RX ORDER — CLONAZEPAM 1 MG/1
TABLET ORAL
Qty: 60 TABLET | Refills: 2 | Status: SHIPPED | OUTPATIENT
Start: 2018-07-30 | End: 2018-10-11 | Stop reason: SDUPTHER

## 2018-07-30 NOTE — PROGRESS NOTES
"Outpatient Psychiatry Follow-Up Visit (MD/NP)    2018    Clinical Status of Patient:  Outpatient (Ambulatory)    Chief Complaint:  Jennifer Brady is a 58 y.o. female who presents today for follow-up of depression and anxiety.  Met with patient.      Last Visit: was 18. Chart reviewed.     Interval History and Content of Current Session:  Current Psychiatric Medications as of last visit.  · DC Doxepin  · Continue Prozac 80 mg po daily.   · Continue Klonopin 1 mg po BID (take morning and afternoon).  · Continue Topamax 100 mg po BID (for migraines) per Neurology    Pt presents blunted affect; sad.  States, "Two of my friends  this week".  Pt processed her grief of losing her friend.  Pt crying every day and feels unverwhelmed with emotions. Compliant with medications and denies side effects.  Denies SI/HI/AVH. Lost ten pounds since last visit in April. Encouraged pt to continue individual psychotherapy with Dr. Lester. Will add Latuda.      Psychotherapy:  · Target symptoms: depression, anxiety   · Why chosen therapy is appropriate versus another modality: relevant to diagnosis  · Outcome monitoring methods: self-report, observation  · Therapeutic intervention type: supportive psychotherapy  · Topics discussed/themes: relationships difficulties, difficulty managing affect in interpersonal relationships, building skills sets for symptom management, symptom recognition  · The patient's response to the intervention is accepting. The patient's progress toward treatment goals is fair.   · Duration of intervention: 20 minutes.    Review of Systems   · PSYCHIATRIC: Pertinant items are noted in the narrative.  · CONSTITUTIONAL: No weight gain or loss.   · MUSCULOSKELETAL: No pain or stiffness of the joints.  · NEUROLOGIC: No weakness, sensory changes, seizures, confusion, memory loss, tremor or other abnormal movements.  · ENDOCRINE: No polydipsia or polyuria.  · INTEGUMENTARY: No rashes or " "lacerations.  · EYES: No exophthalmos, jaundice or blindness.  · ENT: No dizziness, tinnitus or hearing loss.  · RESPIRATORY: No shortness of breath.  · CARDIOVASCULAR: No tachycardia or chest pain.  · GASTROINTESTINAL: No nausea, vomiting, pain, constipation or diarrhea.  · GENITOURINARY: No frequency, dysuria or sexual dysfunction.  · HEMATOLOGIC/LYMPHATIC: No excessive bleeding, prolonged or excessive bleeding after dental extraction/injury.  · ALLERGIC/IMMUNOLOGIC: No allergic response to materials, foods or animals at this time.    Past Medical, Family and Social History: The patient's past medical, family and social history have been reviewed and updated as appropriate within the electronic medical record - see encounter notes.    Compliance: yes    Side effects: None    Risk Parameters:  Patient reports no suicidal ideation  Patient reports no homicidal ideation  Patient reports no self-injurious behavior  Patient reports no violent behavior    Exam (detailed: at least 9 elements; comprehensive: all 15 elements)   Constitutional  Vitals:  Most recent vital signs, dated greater than 90 days prior to this appointment, were reviewed.   Vitals:    07/30/18 0841   BP: 124/60   Pulse: 79   Weight: 89.4 kg (196 lb 15.7 oz)   Height: 5' 9" (1.753 m)        General:  unremarkable, age appropriate     Musculoskeletal  Muscle Strength/Tone:  no tremor, no tic   Gait & Station:  non-ataxic     Psychiatric  Speech:  no latency; no press   Mood & Affect:  anxious, depressed  congruent and appropriate   Thought Process:  normal and logical   Associations:  intact   Thought Content:  normal, no suicidality, no homicidality, delusions, or paranoia   Insight:  intact   Judgement: behavior is adequate to circumstances   Orientation:  grossly intact   Memory: intact for content of interview   Language: grossly intact   Attention Span & Concentration:  able to focus   Fund of Knowledge:  intact and appropriate to age and level of " education     Assessment and Diagnosis   Status/Progress: Based on the examination today, the patient's problem(s) is/are worsening and failing to respond as expected to treatment.  New problems have not been presented today.   Co-morbidities and Lack of compliance are not complicating management of the primary condition.  There are no active rule-out diagnoses for this patient at this time.     General Impression:   Diagnoses and all orders for this visit:    Mood disorder    Other mixed anxiety disorders  -     clonazePAM (KLONOPIN) 1 MG tablet; Take 1 tablet by mouth twice daily in the morning and afternoon as needed for severe anxiety    Depression, unspecified depression type    Other orders  -     lurasidone (LATUDA) 20 mg Tab tablet; Take 1 tablet (20 mg total) by mouth once daily.      Intervention/Counseling/Treatment Plan   · Medication Management: The risks and benefits of medication were discussed with the patient.  · Care Coordination: During the visit, care coordination was conducted with  social work.   · Continue Prozac 80 mg po daily.   · Continue Klonopin 1 mg po BID (take morning and afternoon).  · Continue Topamax 100 mg po BID (for migraines) per Neurology  · Start Latuda 20 mg po daily  · Continue individual psychotherapy with social work    Return to Clinic: 2 months

## 2018-08-08 DIAGNOSIS — F41.3 OTHER MIXED ANXIETY DISORDERS: ICD-10-CM

## 2018-08-08 DIAGNOSIS — F39 MOOD DISORDER: ICD-10-CM

## 2018-08-08 RX ORDER — FLUOXETINE HYDROCHLORIDE 40 MG/1
CAPSULE ORAL
Qty: 60 CAPSULE | Refills: 3 | Status: SHIPPED | OUTPATIENT
Start: 2018-08-08 | End: 2018-10-11 | Stop reason: SDUPTHER

## 2018-08-14 ENCOUNTER — OFFICE VISIT (OUTPATIENT)
Dept: CARDIOLOGY | Facility: CLINIC | Age: 58
End: 2018-08-14
Attending: INTERNAL MEDICINE
Payer: COMMERCIAL

## 2018-08-14 VITALS
WEIGHT: 195 LBS | HEART RATE: 77 BPM | DIASTOLIC BLOOD PRESSURE: 67 MMHG | HEIGHT: 69 IN | BODY MASS INDEX: 28.88 KG/M2 | SYSTOLIC BLOOD PRESSURE: 121 MMHG

## 2018-08-14 DIAGNOSIS — I25.110 CORONARY ARTERY DISEASE INVOLVING NATIVE CORONARY ARTERY OF NATIVE HEART WITH UNSTABLE ANGINA PECTORIS: Primary | ICD-10-CM

## 2018-08-14 DIAGNOSIS — I21.4 NSTEMI (NON-ST ELEVATED MYOCARDIAL INFARCTION): ICD-10-CM

## 2018-08-14 DIAGNOSIS — G47.33 OSA (OBSTRUCTIVE SLEEP APNEA): ICD-10-CM

## 2018-08-14 DIAGNOSIS — Z87.891 EX-SMOKER: ICD-10-CM

## 2018-08-14 DIAGNOSIS — E78.00 PURE HYPERCHOLESTEROLEMIA: ICD-10-CM

## 2018-08-14 DIAGNOSIS — I10 ESSENTIAL HYPERTENSION: ICD-10-CM

## 2018-08-14 DIAGNOSIS — E66.9 OBESITY, UNSPECIFIED CLASSIFICATION, UNSPECIFIED OBESITY TYPE, UNSPECIFIED WHETHER SERIOUS COMORBIDITY PRESENT: ICD-10-CM

## 2018-08-14 PROCEDURE — 99213 OFFICE O/P EST LOW 20 MIN: CPT | Mod: S$GLB,,, | Performed by: INTERNAL MEDICINE

## 2018-08-14 NOTE — PROGRESS NOTES
Subjective:    Patient ID:  Jennifer Brady is a 58 y.o. female     HPI   Here for follow-up of coronary artery disease, NSTEMI, history of ZELALEM in proximal right coronary artery, a ZELALEM x2 in proximal left circumflex coronary artery on 04/04/2018, essential hypertension, hyperlipidemia, obstructive sleep apnea, obesity, ex cigarette smoker.    I am doing really well.  I have lost more than 20 lb in weight, have been on a good diet, low in carbohydrates.    Current Outpatient Medications   Medication Sig    aspirin (ECOTRIN) 81 MG EC tablet Take 1 tablet (81 mg total) by mouth once daily.    atorvastatin (LIPITOR) 40 MG tablet Take 1 tablet (40 mg total) by mouth once daily.    clonazePAM (KLONOPIN) 1 MG tablet Take 1 tablet by mouth twice daily in the morning and afternoon as needed for severe anxiety    clopidogrel (PLAVIX) 75 mg tablet Take 1 tablet (75 mg total) by mouth once daily.    FLUoxetine (PROZAC) 40 MG capsule Take 2 capsules by mouth daily.    lisinopril-hydrochlorothiazide (PRINZIDE,ZESTORETIC) 10-12.5 mg per tablet Take 1 tablet by mouth once daily.    lurasidone (LATUDA) 20 mg Tab tablet Take 1 tablet (20 mg total) by mouth once daily.    nicotine (NICODERM CQ) 14 mg/24 hr Place 1 patch onto the skin once daily.    topiramate (TOPAMAX) 100 MG tablet Take 1 tablet (100 mg total) by mouth 2 (two) times daily.     No current facility-administered medications for this visit.          Review of Systems   Constitution: Positive for weight loss. Negative for chills, decreased appetite, fever and weight gain.   HENT: Negative for congestion, hearing loss and sore throat.    Eyes: Negative for blurred vision, double vision and visual disturbance.   Cardiovascular: Negative for chest pain, claudication, dyspnea on exertion, leg swelling, palpitations and syncope.   Respiratory: Negative for cough, hemoptysis, shortness of breath, sputum production and wheezing.    Endocrine: Negative for cold  "intolerance and heat intolerance.   Hematologic/Lymphatic: Negative for bleeding problem. Does not bruise/bleed easily.   Skin: Negative for color change, dry skin, flushing and itching.   Musculoskeletal: Negative for back pain, joint pain and myalgias.   Gastrointestinal: Negative for abdominal pain, anorexia, constipation, diarrhea, dysphagia, nausea and vomiting.        No bleeding per rectum   Genitourinary: Negative for dysuria, flank pain, frequency, hematuria and nocturia.   Neurological: Negative for dizziness, headaches, light-headedness, loss of balance, seizures and tremors.   Psychiatric/Behavioral: Negative for altered mental status and depression.         Vitals:    08/14/18 1407   BP: 121/67   Pulse: 77   Weight: 88.5 kg (195 lb)   Height: 5' 9" (1.753 m)     Objective:    Physical Exam   Constitutional: She is oriented to person, place, and time. She appears well-developed and well-nourished.   HENT:   Head: Normocephalic and atraumatic.   Nose: Nose normal.   Mouth/Throat: Oropharynx is clear and moist.   Eyes: Conjunctivae and EOM are normal. Pupils are equal, round, and reactive to light.   Neck: Neck supple. No tracheal deviation present. No thyromegaly present.   Cardiovascular: Normal rate, regular rhythm and intact distal pulses. Exam reveals no gallop and no friction rub.   No murmur heard.  Pulmonary/Chest: No respiratory distress. She has no wheezes. She has no rales. She exhibits no tenderness.   Abdominal: Soft. Bowel sounds are normal. She exhibits no distension and no mass. There is no tenderness. There is no rebound and no guarding.   Musculoskeletal: Normal range of motion.   Lymphadenopathy:     She has no cervical adenopathy.   Neurological: She is alert and oriented to person, place, and time.   Skin: Skin is warm and dry.   Psychiatric: Her behavior is normal.         Assessment:       1. Coronary artery disease involving native coronary artery of native heart with unstable " angina pectoris    2. NSTEMI (non-ST elevated myocardial infarction)    3. Essential hypertension    4. Pure hypercholesterolemia    5. REN (obstructive sleep apnea)    6. Ex-smoker    7. Obesity, unspecified classification, unspecified obesity type, unspecified whether serious comorbidity present         Plan:       Stable from a cardiovascular standpoint.  Continue the same pharmacological regimen.

## 2018-10-03 ENCOUNTER — TELEPHONE (OUTPATIENT)
Dept: NEUROLOGY | Facility: CLINIC | Age: 58
End: 2018-10-03

## 2018-10-07 DIAGNOSIS — Z86.69 HX OF MIGRAINE HEADACHES: ICD-10-CM

## 2018-10-07 RX ORDER — TOPIRAMATE 100 MG/1
TABLET, FILM COATED ORAL
Refills: 0 | Status: CANCELLED | OUTPATIENT
Start: 2018-10-07

## 2018-10-11 ENCOUNTER — OFFICE VISIT (OUTPATIENT)
Dept: PSYCHIATRY | Facility: CLINIC | Age: 58
End: 2018-10-11
Payer: COMMERCIAL

## 2018-10-11 VITALS
BODY MASS INDEX: 29.05 KG/M2 | DIASTOLIC BLOOD PRESSURE: 56 MMHG | HEIGHT: 69 IN | WEIGHT: 196.13 LBS | HEART RATE: 77 BPM | SYSTOLIC BLOOD PRESSURE: 117 MMHG

## 2018-10-11 DIAGNOSIS — Z86.69 HX OF MIGRAINE HEADACHES: ICD-10-CM

## 2018-10-11 DIAGNOSIS — F32.A DEPRESSION, UNSPECIFIED DEPRESSION TYPE: ICD-10-CM

## 2018-10-11 DIAGNOSIS — F41.3 OTHER MIXED ANXIETY DISORDERS: ICD-10-CM

## 2018-10-11 DIAGNOSIS — F41.1 GAD (GENERALIZED ANXIETY DISORDER): Primary | ICD-10-CM

## 2018-10-11 DIAGNOSIS — F39 MOOD DISORDER: ICD-10-CM

## 2018-10-11 DIAGNOSIS — F60.89 CLUSTER B PERSONALITY DISORDER: ICD-10-CM

## 2018-10-11 PROCEDURE — 99214 OFFICE O/P EST MOD 30 MIN: CPT | Mod: S$GLB,,, | Performed by: NURSE PRACTITIONER

## 2018-10-11 PROCEDURE — 99999 PR PBB SHADOW E&M-EST. PATIENT-LVL III: CPT | Mod: PBBFAC,,, | Performed by: NURSE PRACTITIONER

## 2018-10-11 PROCEDURE — 90833 PSYTX W PT W E/M 30 MIN: CPT | Mod: S$GLB,,, | Performed by: NURSE PRACTITIONER

## 2018-10-11 RX ORDER — CLONAZEPAM 1 MG/1
TABLET ORAL
Qty: 60 TABLET | Refills: 3 | Status: SHIPPED | OUTPATIENT
Start: 2018-10-11 | End: 2019-04-01 | Stop reason: SDUPTHER

## 2018-10-11 RX ORDER — LURASIDONE HYDROCHLORIDE 40 MG/1
40 TABLET, FILM COATED ORAL DAILY
Qty: 30 TABLET | Refills: 5 | Status: SHIPPED | OUTPATIENT
Start: 2018-10-11 | End: 2019-04-01

## 2018-10-11 RX ORDER — FLUOXETINE HYDROCHLORIDE 40 MG/1
80 CAPSULE ORAL DAILY
Qty: 60 CAPSULE | Refills: 5 | Status: SHIPPED | OUTPATIENT
Start: 2018-10-11 | End: 2019-01-08 | Stop reason: SDUPTHER

## 2018-10-11 RX ORDER — TOPIRAMATE 100 MG/1
100 TABLET, FILM COATED ORAL 2 TIMES DAILY
Qty: 60 TABLET | Refills: 5 | Status: SHIPPED | OUTPATIENT
Start: 2018-10-11 | End: 2019-04-01 | Stop reason: SDUPTHER

## 2018-10-11 NOTE — PROGRESS NOTES
"Outpatient Psychiatry Follow-Up Visit (MD/NP)    10/11/2018    Clinical Status of Patient:  Outpatient (Ambulatory)    Chief Complaint:  Jennifer Brady is a 58 y.o. female who presents today for follow-up of depression and anxiety.  Met with patient.      Last Visit: was 18. Chart reviewed.     Interval History and Content of Current Session:  Current Psychiatric Medications as of last visit.  · Continue Prozac 80 mg po daily.   · Continue Klonopin 1 mg po BID (take morning and afternoon).  · Continue Topamax 100 mg po BID (for migraines) per Neurology  · Start Latuda 20 mg po daily    Pt presents blunted affect.  Mood described as , "okay".  Pt talked about trying to sell her house felt like she was being cheated by her realtor so she decided to keep it.  Discussed family dynamic issues.  Still feels emotional . Compliant with medications and denies side effects.  Tolerated Latuda without complaint. Will titrate dose.     Still reports going to multiple funerals and feels overwhelmed.  Still dealing with grief.  "My friend  and she was so close to my age".  Pt states she will be having wrist surgery soon.  Plans to restart psychotherapy with Dr. Lester after her Medicare benefits are activated in the beginning of next year.  Denies SI/HI/AVH.     Psychotherapy:  · Target symptoms: depression, anxiety   · Why chosen therapy is appropriate versus another modality: relevant to diagnosis  · Outcome monitoring methods: self-report, observation  · Therapeutic intervention type: supportive psychotherapy  · Topics discussed/themes: relationships difficulties, difficulty managing affect in interpersonal relationships, building skills sets for symptom management, symptom recognition  · The patient's response to the intervention is accepting. The patient's progress toward treatment goals is fair.   · Duration of intervention: 19 minutes.    Review of Systems   · PSYCHIATRIC: Pertinant items are noted in the " "narrative.  · CONSTITUTIONAL: No weight gain or loss.   · MUSCULOSKELETAL: No pain or stiffness of the joints.  · NEUROLOGIC: No weakness, sensory changes, seizures, confusion, memory loss, tremor or other abnormal movements.  · ENDOCRINE: No polydipsia or polyuria.  · INTEGUMENTARY: No rashes or lacerations.  · EYES: No exophthalmos, jaundice or blindness.  · ENT: No dizziness, tinnitus or hearing loss.  · RESPIRATORY: No shortness of breath.  · CARDIOVASCULAR: No tachycardia or chest pain.  · GASTROINTESTINAL: No nausea, vomiting, pain, constipation or diarrhea.  · GENITOURINARY: No frequency, dysuria or sexual dysfunction.  · HEMATOLOGIC/LYMPHATIC: No excessive bleeding, prolonged or excessive bleeding after dental extraction/injury.  · ALLERGIC/IMMUNOLOGIC: No allergic response to materials, foods or animals at this time.    Past Medical, Family and Social History: The patient's past medical, family and social history have been reviewed and updated as appropriate within the electronic medical record - see encounter notes.    Compliance: yes    Side effects: None    Risk Parameters:  Patient reports no suicidal ideation  Patient reports no homicidal ideation  Patient reports no self-injurious behavior  Patient reports no violent behavior    Exam (detailed: at least 9 elements; comprehensive: all 15 elements)   Constitutional  Vitals:  Most recent vital signs, dated greater than 90 days prior to this appointment, were reviewed.   Vitals:    10/11/18 0853   BP: (!) 117/56   Pulse: 77   Weight: 89 kg (196 lb 1.6 oz)   Height: 5' 9" (1.753 m)        General:  unremarkable, age appropriate     Musculoskeletal  Muscle Strength/Tone:  no tremor, no tic   Gait & Station:  non-ataxic     Psychiatric  Speech:  no latency; no press   Mood & Affect:  anxious, depressed  congruent and appropriate   Thought Process:  normal and logical   Associations:  intact   Thought Content:  normal, no suicidality, no homicidality, " delusions, or paranoia   Insight:  intact   Judgement: behavior is adequate to circumstances   Orientation:  grossly intact   Memory: intact for content of interview   Language: grossly intact   Attention Span & Concentration:  able to focus   Fund of Knowledge:  intact and appropriate to age and level of education     Assessment and Diagnosis   Status/Progress: Based on the examination today, the patient's problem(s) is/are worsening and failing to respond as expected to treatment.  New problems have not been presented today.   Co-morbidities and Lack of compliance are not complicating management of the primary condition.  There are no active rule-out diagnoses for this patient at this time.     General Impression:   Diagnoses and all orders for this visit:    LAURA (generalized anxiety disorder)    Mood disorder  -     FLUoxetine (PROZAC) 40 MG capsule; Take 2 capsules (80 mg total) by mouth once daily.    Other mixed anxiety disorders  -     FLUoxetine (PROZAC) 40 MG capsule; Take 2 capsules (80 mg total) by mouth once daily.  -     clonazePAM (KLONOPIN) 1 MG tablet; Take 1 tablet by mouth twice daily in the morning and afternoon as needed for severe anxiety    Hx of migraine headaches  -     topiramate (TOPAMAX) 100 MG tablet; Take 1 tablet (100 mg total) by mouth 2 (two) times daily.    Depression, unspecified depression type    Cluster B personality disorder    Other orders  -     lurasidone (LATUDA) 40 mg Tab tablet; Take 1 tablet (40 mg total) by mouth once daily.      Intervention/Counseling/Treatment Plan   · Medication Management: The risks and benefits of medication were discussed with the patient.  · Care Coordination: During the visit, care coordination was conducted with  social work.   · Continue Prozac 80 mg po daily.   · Continue Klonopin 1 mg po BID (take morning and afternoon).  · Continue Topamax 100 mg po BID (for migraines) per Neurology  · Increase to Latuda 40 mg po daily  · Continue  individual psychotherapy with social work    Return to Clinic: 6 months

## 2018-11-02 DIAGNOSIS — R56.9 SEIZURES: Primary | ICD-10-CM

## 2018-11-06 RX ORDER — OXCARBAZEPINE 150 MG/1
TABLET ORAL
Refills: 0 | OUTPATIENT
Start: 2018-11-06

## 2018-11-13 ENCOUNTER — OFFICE VISIT (OUTPATIENT)
Dept: CARDIOLOGY | Facility: CLINIC | Age: 58
End: 2018-11-13
Attending: INTERNAL MEDICINE

## 2018-11-13 VITALS
DIASTOLIC BLOOD PRESSURE: 72 MMHG | HEIGHT: 69 IN | SYSTOLIC BLOOD PRESSURE: 125 MMHG | WEIGHT: 194 LBS | HEART RATE: 80 BPM | BODY MASS INDEX: 28.73 KG/M2

## 2018-11-13 DIAGNOSIS — E78.00 PURE HYPERCHOLESTEROLEMIA: ICD-10-CM

## 2018-11-13 DIAGNOSIS — G47.33 OSA (OBSTRUCTIVE SLEEP APNEA): ICD-10-CM

## 2018-11-13 DIAGNOSIS — I10 ESSENTIAL HYPERTENSION: ICD-10-CM

## 2018-11-13 DIAGNOSIS — I25.110 CORONARY ARTERY DISEASE INVOLVING NATIVE CORONARY ARTERY OF NATIVE HEART WITH UNSTABLE ANGINA PECTORIS: Primary | ICD-10-CM

## 2018-11-13 DIAGNOSIS — F17.200 SMOKER: ICD-10-CM

## 2018-11-13 DIAGNOSIS — I21.4 NSTEMI (NON-ST ELEVATED MYOCARDIAL INFARCTION): ICD-10-CM

## 2018-11-13 PROCEDURE — 99214 OFFICE O/P EST MOD 30 MIN: CPT | Mod: S$GLB,,, | Performed by: INTERNAL MEDICINE

## 2018-11-13 NOTE — PROGRESS NOTES
Subjective:    Patient ID:  Jennifer Brady is a 58 y.o. female     HPI   Here for follow-up of coronary artery disease, NSTEMI on 04/04/2018, status post drug-eluting stent in the right coronary artery, drug-eluting stents x2 in the left circumflex coronary artery, essential hypertension, hyperlipidemia, obstructive sleep apnea, cigarette smoker.    I have not been able to quit smoking.  I feel well.  I need to have surgery for my left wrist and they said I do not need to stop Plavix.  Current Outpatient Medications   Medication Sig    aspirin (ECOTRIN) 81 MG EC tablet Take 1 tablet (81 mg total) by mouth once daily.    atorvastatin (LIPITOR) 40 MG tablet Take 1 tablet (40 mg total) by mouth once daily.    clonazePAM (KLONOPIN) 1 MG tablet Take 1 tablet by mouth twice daily in the morning and afternoon as needed for severe anxiety    clopidogrel (PLAVIX) 75 mg tablet Take 1 tablet (75 mg total) by mouth once daily.    FLUoxetine (PROZAC) 40 MG capsule Take 2 capsules (80 mg total) by mouth once daily.    lisinopril-hydrochlorothiazide (PRINZIDE,ZESTORETIC) 10-12.5 mg per tablet Take 1 tablet by mouth once daily.    lurasidone (LATUDA) 40 mg Tab tablet Take 1 tablet (40 mg total) by mouth once daily.    nicotine (NICODERM CQ) 14 mg/24 hr Place 1 patch onto the skin once daily.    topiramate (TOPAMAX) 100 MG tablet Take 1 tablet (100 mg total) by mouth 2 (two) times daily.     No current facility-administered medications for this visit.          Review of Systems   Constitution: Negative for chills, decreased appetite, fever, weight gain and weight loss.   HENT: Negative for congestion, hearing loss and sore throat.    Eyes: Negative for blurred vision, double vision and visual disturbance.   Cardiovascular: Negative for chest pain, claudication, dyspnea on exertion, leg swelling, palpitations and syncope.   Respiratory: Negative for cough, hemoptysis, shortness of breath, sputum production and  "wheezing.    Endocrine: Negative for cold intolerance and heat intolerance.   Hematologic/Lymphatic: Negative for bleeding problem. Does not bruise/bleed easily.   Skin: Negative for color change, dry skin, flushing and itching.   Musculoskeletal: Negative for back pain, joint pain and myalgias.   Gastrointestinal: Negative for abdominal pain, anorexia, constipation, diarrhea, dysphagia, nausea and vomiting.        No bleeding per rectum   Genitourinary: Negative for dysuria, flank pain, frequency, hematuria and nocturia.   Neurological: Negative for dizziness, headaches, light-headedness, loss of balance, seizures and tremors.   Psychiatric/Behavioral: Negative for altered mental status and depression.         Vitals:    11/13/18 1407   BP: 125/72   Pulse: 80   Weight: 88 kg (194 lb)   Height: 5' 9" (1.753 m)     Objective:    Physical Exam   Constitutional: She is oriented to person, place, and time. She appears well-developed and well-nourished.   HENT:   Head: Normocephalic and atraumatic.   Nose: Nose normal.   Mouth/Throat: Oropharynx is clear and moist.   Eyes: Conjunctivae and EOM are normal. Pupils are equal, round, and reactive to light.   Neck: Neck supple. No tracheal deviation present. No thyromegaly present.   Cardiovascular: Normal rate, regular rhythm and intact distal pulses. Exam reveals no gallop and no friction rub.   No murmur heard.  Pulmonary/Chest: No respiratory distress. She has no wheezes. She has no rales. She exhibits no tenderness.   Abdominal: Soft. Bowel sounds are normal. She exhibits no distension and no mass. There is no tenderness. There is no rebound and no guarding.   Musculoskeletal: Normal range of motion.   Lymphadenopathy:     She has no cervical adenopathy.   Neurological: She is alert and oriented to person, place, and time.   Skin: Skin is warm and dry.   Psychiatric: Her behavior is normal.         Assessment:       1. Coronary artery disease involving native coronary " artery of native heart with unstable angina pectoris    2. NSTEMI (non-ST elevated myocardial infarction)    3. Essential hypertension    4. Pure hypercholesterolemia    5. REN (obstructive sleep apnea)    6. Smoker         Plan:       Urged not to stop DAPT till April 2019  Counseled regarding smoking cessation  Continue the same pharmacological regimen.

## 2018-11-26 ENCOUNTER — TELEPHONE (OUTPATIENT)
Dept: NEUROLOGY | Facility: CLINIC | Age: 58
End: 2018-11-26

## 2019-01-03 ENCOUNTER — TELEPHONE (OUTPATIENT)
Dept: NEUROLOGY | Facility: CLINIC | Age: 59
End: 2019-01-03

## 2019-01-03 NOTE — TELEPHONE ENCOUNTER
Spoke with patient to confirm emu admit for 1/14. Patient stated she will be coming. Instructions reviewed. Patient verbalized understanding.

## 2019-01-07 ENCOUNTER — TELEPHONE (OUTPATIENT)
Dept: NEUROLOGY | Facility: CLINIC | Age: 59
End: 2019-01-07

## 2019-01-08 DIAGNOSIS — F41.3 OTHER MIXED ANXIETY DISORDERS: ICD-10-CM

## 2019-01-08 DIAGNOSIS — F39 MOOD DISORDER: ICD-10-CM

## 2019-01-08 RX ORDER — FLUOXETINE HYDROCHLORIDE 40 MG/1
CAPSULE ORAL
Qty: 60 CAPSULE | Refills: 2 | OUTPATIENT
Start: 2019-01-08

## 2019-01-08 RX ORDER — FLUOXETINE HYDROCHLORIDE 40 MG/1
80 CAPSULE ORAL DAILY
Qty: 60 CAPSULE | Refills: 5 | Status: SHIPPED | OUTPATIENT
Start: 2019-01-08 | End: 2019-04-01 | Stop reason: SDUPTHER

## 2019-01-14 ENCOUNTER — HOSPITAL ENCOUNTER (INPATIENT)
Facility: HOSPITAL | Age: 59
LOS: 4 days | Discharge: HOME | DRG: 101 | End: 2019-01-18
Attending: PSYCHIATRY & NEUROLOGY | Admitting: PSYCHIATRY & NEUROLOGY
Payer: COMMERCIAL

## 2019-01-14 DIAGNOSIS — G40.909 EPILEPSY: ICD-10-CM

## 2019-01-14 DIAGNOSIS — R56.9 SEIZURES: ICD-10-CM

## 2019-01-14 LAB
ALBUMIN SERPL BCP-MCNC: 3.5 G/DL
ALP SERPL-CCNC: 167 U/L
ALT SERPL W/O P-5'-P-CCNC: 16 U/L
AMORPH CRY UR QL COMP ASSIST: ABNORMAL
AMPHET+METHAMPHET UR QL: NEGATIVE
ANION GAP SERPL CALC-SCNC: 8 MMOL/L
AST SERPL-CCNC: 17 U/L
BACTERIA #/AREA URNS AUTO: ABNORMAL /HPF
BARBITURATES UR QL SCN>200 NG/ML: NEGATIVE
BASOPHILS # BLD AUTO: 0.06 K/UL
BASOPHILS NFR BLD: 0.7 %
BENZODIAZ UR QL SCN>200 NG/ML: NEGATIVE
BILIRUB SERPL-MCNC: 0.3 MG/DL
BILIRUB UR QL STRIP: NEGATIVE
BUN SERPL-MCNC: 10 MG/DL
BZE UR QL SCN: NEGATIVE
CALCIUM SERPL-MCNC: 9.5 MG/DL
CANNABINOIDS UR QL SCN: NEGATIVE
CHLORIDE SERPL-SCNC: 109 MMOL/L
CLARITY UR REFRACT.AUTO: ABNORMAL
CO2 SERPL-SCNC: 26 MMOL/L
COLOR UR AUTO: YELLOW
CREAT SERPL-MCNC: 1 MG/DL
CREAT UR-MCNC: 179 MG/DL
DIFFERENTIAL METHOD: ABNORMAL
EOSINOPHIL # BLD AUTO: 0.3 K/UL
EOSINOPHIL NFR BLD: 3.3 %
ERYTHROCYTE [DISTWIDTH] IN BLOOD BY AUTOMATED COUNT: 12.3 %
EST. GFR  (AFRICAN AMERICAN): >60 ML/MIN/1.73 M^2
EST. GFR  (NON AFRICAN AMERICAN): >60 ML/MIN/1.73 M^2
GLUCOSE SERPL-MCNC: 101 MG/DL
GLUCOSE UR QL STRIP: NEGATIVE
HCT VFR BLD AUTO: 35.6 %
HGB BLD-MCNC: 11.2 G/DL
HGB UR QL STRIP: NEGATIVE
HYALINE CASTS UR QL AUTO: 0 /LPF
IMM GRANULOCYTES # BLD AUTO: 0.02 K/UL
IMM GRANULOCYTES NFR BLD AUTO: 0.2 %
KETONES UR QL STRIP: NEGATIVE
LEUKOCYTE ESTERASE UR QL STRIP: ABNORMAL
LYMPHOCYTES # BLD AUTO: 3.9 K/UL
LYMPHOCYTES NFR BLD: 43.5 %
MAGNESIUM SERPL-MCNC: 2.1 MG/DL
MCH RBC QN AUTO: 31.1 PG
MCHC RBC AUTO-ENTMCNC: 31.5 G/DL
MCV RBC AUTO: 99 FL
METHADONE UR QL SCN>300 NG/ML: NEGATIVE
MICROSCOPIC COMMENT: ABNORMAL
MONOCYTES # BLD AUTO: 0.3 K/UL
MONOCYTES NFR BLD: 3.8 %
NEUTROPHILS # BLD AUTO: 4.3 K/UL
NEUTROPHILS NFR BLD: 48.5 %
NITRITE UR QL STRIP: NEGATIVE
NRBC BLD-RTO: 0 /100 WBC
OPIATES UR QL SCN: NORMAL
PCP UR QL SCN>25 NG/ML: NEGATIVE
PH UR STRIP: 7 [PH] (ref 5–8)
PHOSPHATE SERPL-MCNC: 3.5 MG/DL
PLATELET # BLD AUTO: 393 K/UL
PMV BLD AUTO: 9.2 FL
POTASSIUM SERPL-SCNC: 3.2 MMOL/L
PROT SERPL-MCNC: 7.2 G/DL
PROT UR QL STRIP: NEGATIVE
RBC # BLD AUTO: 3.6 M/UL
RBC #/AREA URNS AUTO: 3 /HPF (ref 0–4)
SODIUM SERPL-SCNC: 143 MMOL/L
SP GR UR STRIP: 1.02 (ref 1–1.03)
SQUAMOUS #/AREA URNS AUTO: 2 /HPF
TOXICOLOGY INFORMATION: NORMAL
TSH SERPL DL<=0.005 MIU/L-ACNC: 0.5 UIU/ML
URN SPEC COLLECT METH UR: ABNORMAL
WBC # BLD AUTO: 8.86 K/UL
WBC #/AREA URNS AUTO: 28 /HPF (ref 0–5)
YEAST UR QL AUTO: ABNORMAL

## 2019-01-14 PROCEDURE — 81001 URINALYSIS AUTO W/SCOPE: CPT

## 2019-01-14 PROCEDURE — 80183 DRUG SCRN QUANT OXCARBAZEPIN: CPT

## 2019-01-14 PROCEDURE — 99223 1ST HOSP IP/OBS HIGH 75: CPT | Mod: ,,, | Performed by: PSYCHIATRY & NEUROLOGY

## 2019-01-14 PROCEDURE — 84100 ASSAY OF PHOSPHORUS: CPT

## 2019-01-14 PROCEDURE — 80053 COMPREHEN METABOLIC PANEL: CPT

## 2019-01-14 PROCEDURE — 93005 ELECTROCARDIOGRAM TRACING: CPT

## 2019-01-14 PROCEDURE — 25000003 PHARM REV CODE 250: Performed by: STUDENT IN AN ORGANIZED HEALTH CARE EDUCATION/TRAINING PROGRAM

## 2019-01-14 PROCEDURE — 20600001 HC STEP DOWN PRIVATE ROOM

## 2019-01-14 PROCEDURE — 95951 PR EEG MONITORING/VIDEORECORD: ICD-10-PCS | Mod: 26,,, | Performed by: PSYCHIATRY & NEUROLOGY

## 2019-01-14 PROCEDURE — 85025 COMPLETE CBC W/AUTO DIFF WBC: CPT

## 2019-01-14 PROCEDURE — 87086 URINE CULTURE/COLONY COUNT: CPT

## 2019-01-14 PROCEDURE — 95951 HC EEG MONITORING/VIDEO RECORD: CPT

## 2019-01-14 PROCEDURE — 93010 EKG 12-LEAD: ICD-10-PCS | Mod: ,,, | Performed by: INTERNAL MEDICINE

## 2019-01-14 PROCEDURE — 80307 DRUG TEST PRSMV CHEM ANLYZR: CPT

## 2019-01-14 PROCEDURE — 84443 ASSAY THYROID STIM HORMONE: CPT

## 2019-01-14 PROCEDURE — 99223 PR INITIAL HOSPITAL CARE,LEVL III: ICD-10-PCS | Mod: ,,, | Performed by: PSYCHIATRY & NEUROLOGY

## 2019-01-14 PROCEDURE — 93010 ELECTROCARDIOGRAM REPORT: CPT | Mod: ,,, | Performed by: INTERNAL MEDICINE

## 2019-01-14 PROCEDURE — 80201 ASSAY OF TOPIRAMATE: CPT

## 2019-01-14 PROCEDURE — 36415 COLL VENOUS BLD VENIPUNCTURE: CPT

## 2019-01-14 PROCEDURE — 83735 ASSAY OF MAGNESIUM: CPT

## 2019-01-14 PROCEDURE — 95951 PR EEG MONITORING/VIDEORECORD: CPT | Mod: 26,,, | Performed by: PSYCHIATRY & NEUROLOGY

## 2019-01-14 RX ORDER — TOPIRAMATE 25 MG/1
75 TABLET ORAL 2 TIMES DAILY
Status: DISCONTINUED | OUTPATIENT
Start: 2019-01-14 | End: 2019-01-15

## 2019-01-14 RX ORDER — ACETAMINOPHEN 325 MG/1
650 TABLET ORAL EVERY 4 HOURS PRN
Status: DISCONTINUED | OUTPATIENT
Start: 2019-01-14 | End: 2019-01-18 | Stop reason: HOSPADM

## 2019-01-14 RX ORDER — OXCARBAZEPINE 150 MG/1
150 TABLET, FILM COATED ORAL NIGHTLY
Status: ON HOLD | COMMUNITY
End: 2019-01-18 | Stop reason: HOSPADM

## 2019-01-14 RX ORDER — DOCUSATE SODIUM 100 MG/1
100 CAPSULE, LIQUID FILLED ORAL DAILY PRN
Status: DISCONTINUED | OUTPATIENT
Start: 2019-01-14 | End: 2019-01-18 | Stop reason: HOSPADM

## 2019-01-14 RX ORDER — SULFAMETHOXAZOLE AND TRIMETHOPRIM 800; 160 MG/1; MG/1
1 TABLET ORAL 2 TIMES DAILY PRN
Status: ON HOLD | COMMUNITY
End: 2019-01-18 | Stop reason: HOSPADM

## 2019-01-14 RX ORDER — CLOPIDOGREL BISULFATE 75 MG/1
75 TABLET ORAL DAILY
Status: DISCONTINUED | OUTPATIENT
Start: 2019-01-15 | End: 2019-01-18 | Stop reason: HOSPADM

## 2019-01-14 RX ORDER — TOPIRAMATE 50 MG/1
50 TABLET, FILM COATED ORAL DAILY
Status: ON HOLD | COMMUNITY
End: 2019-01-18 | Stop reason: HOSPADM

## 2019-01-14 RX ORDER — LURASIDONE HYDROCHLORIDE 40 MG/1
40 TABLET, FILM COATED ORAL DAILY
Status: DISCONTINUED | OUTPATIENT
Start: 2019-01-14 | End: 2019-01-18 | Stop reason: HOSPADM

## 2019-01-14 RX ORDER — LISINOPRIL AND HYDROCHLOROTHIAZIDE 10; 12.5 MG/1; MG/1
1 TABLET ORAL DAILY
Status: DISCONTINUED | OUTPATIENT
Start: 2019-01-15 | End: 2019-01-18 | Stop reason: HOSPADM

## 2019-01-14 RX ORDER — ASPIRIN 81 MG/1
81 TABLET ORAL DAILY
Status: DISCONTINUED | OUTPATIENT
Start: 2019-01-15 | End: 2019-01-18 | Stop reason: HOSPADM

## 2019-01-14 RX ORDER — ATORVASTATIN CALCIUM 20 MG/1
40 TABLET, FILM COATED ORAL DAILY
Status: DISCONTINUED | OUTPATIENT
Start: 2019-01-14 | End: 2019-01-18 | Stop reason: HOSPADM

## 2019-01-14 RX ORDER — CLONAZEPAM 0.5 MG/1
0.5 TABLET ORAL 2 TIMES DAILY
Status: DISCONTINUED | OUTPATIENT
Start: 2019-01-14 | End: 2019-01-16

## 2019-01-14 RX ORDER — ONDANSETRON 8 MG/1
8 TABLET, ORALLY DISINTEGRATING ORAL EVERY 8 HOURS PRN
Status: DISCONTINUED | OUTPATIENT
Start: 2019-01-14 | End: 2019-01-18 | Stop reason: HOSPADM

## 2019-01-14 RX ORDER — SODIUM CHLORIDE 0.9 % (FLUSH) 0.9 %
3 SYRINGE (ML) INJECTION
Status: DISCONTINUED | OUTPATIENT
Start: 2019-01-14 | End: 2019-01-18 | Stop reason: HOSPADM

## 2019-01-14 RX ORDER — FLUOXETINE HYDROCHLORIDE 20 MG/1
80 CAPSULE ORAL DAILY
Status: DISCONTINUED | OUTPATIENT
Start: 2019-01-15 | End: 2019-01-18 | Stop reason: HOSPADM

## 2019-01-14 RX ADMIN — CLONAZEPAM 0.5 MG: 0.5 TABLET ORAL at 09:01

## 2019-01-14 RX ADMIN — ACETAMINOPHEN 650 MG: 325 TABLET ORAL at 09:01

## 2019-01-14 RX ADMIN — ATORVASTATIN CALCIUM 40 MG: 20 TABLET, FILM COATED ORAL at 05:01

## 2019-01-14 RX ADMIN — TOPIRAMATE 75 MG: 25 TABLET, FILM COATED ORAL at 09:01

## 2019-01-14 RX ADMIN — LURASIDONE HYDROCHLORIDE 40 MG: 40 TABLET, FILM COATED ORAL at 05:01

## 2019-01-14 NOTE — ASSESSMENT & PLAN NOTE
Will continue on DAPT and statin   Most recent A1C and LDL in 4/2018 WNL  Counseling on smoking cessation

## 2019-01-14 NOTE — NURSING
Patient is anxious but willing to cooperate with nursing staff. Patient is very nervous about tests and seizure precautions. RN helped patient understand why these precautions are necessary.
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
## 2019-01-14 NOTE — CONSULTS
Facts (Spiritual Perception of Illness): Patient wants to figure out what is going on healthwise    Feelings (Emotions/Experiences/Coping):    Pt is nervous/anxious. Admittedly tearful, says she cries easily, but is doing okay overall    Family/Friends:   Pt reports having good support in her friends (prayer warriors) from [employer]    Humaira (Belief/Meaning/Philosophy): pt's humaira in God is very important to her. Brings comfort.    Future (Spiritual Care Plan of Action): Pt hopes that finding answers to her health mystery will ease her anxiety. Continued follow-up from Spiritual Care would be good.
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
## 2019-01-14 NOTE — HOSPITAL COURSE
1/14/19: admitted to EMU and started on continuous vEEG monitoring  1/15/19: NAEON. EEG unremarkable  1/16/19: NAEON. Patient c/o occipital headache that responded to tylenol and Norco, no clinical events captured so far  1/17/19: sleep deprived overnight followed by chemical activation this morning. No events captured  1/18/19: patient has been walking with RN and EEG tech, no events were provoked. Patient is feeling less foggy and dysphoric. EEG unremarkable.

## 2019-01-14 NOTE — NURSING
Patient verbalizes understanding that no home meds are allowed to be taken from patient's pill packs. She states she will take all medications from RN and will not use her own. Patient does not have any family to send medications home with.

## 2019-01-14 NOTE — ASSESSMENT & PLAN NOTE
Long term Hx of recurrent seizure events since age of 8, formally diagnosed ~2008  Hx of seizure in family + multiple traumatic psychiatric events in the past. High likelihood of PNEE rather than epileptic events, cardiac syncope must be considered as well.  currently on OXCBZ 150 qd + Topiramate 150 mg q12  Requires EMU stay for diagnostic classification of her events and adjustment of the treatment.    - will admit to EMU  - monitor on continuous video EEG  - orthostatic vital signs  - Continuous cardiac monitoring  - ECG 12-lead   - CBC, CMP, electrolytes, TSH, POCT glucose  - U/A, and UDS  - will obtain Topiramate and OXCBZ levels  - will Hold OXCBZ and decrease Topiramate and clonazepam in half while in EMU  - seizure and fall precautions

## 2019-01-14 NOTE — ASSESSMENT & PLAN NOTE
Symptomatology not typical for migraines, more likely secondary to REN vs tension headache  On Topamax 150 mg q12   Will decrease in half while being monitored in EMU

## 2019-01-14 NOTE — ASSESSMENT & PLAN NOTE
[place] [facility] [place]  On Fluoxetine 80 mg qd + Clonazepam 1 mg q12 + Lurasidone 40 mg qd  Will continue at home dose except Clonazepam, will decrease to 0.5 q12 while in EMU

## 2019-01-14 NOTE — SUBJECTIVE & OBJECTIVE
"Past Medical History:   Diagnosis Date    Anxiety     Behavioral problem     Borderline personality disorder     Depression     Fatigue     Hx of psychiatric care     Hyperlipidemia     Hypertension     Psychiatric problem     S/P hysterectomy with oophorectomy     Seizures     Seizures     Therapy        Past Surgical History:   Procedure Laterality Date    HEART CATH-LEFT - LV JULIO POSS Left 4/4/2018    Performed by Edwin Reza MD at [facility] CATH LAB       Review of patient's allergies indicates:  No Known Allergies    No current facility-administered medications on file prior to encounter.      Current Outpatient Medications on File Prior to Encounter   Medication Sig    aspirin (ECOTRIN) 81 MG EC tablet Take 1 tablet (81 mg total) by mouth once daily.    atorvastatin (LIPITOR) 40 MG tablet Take 1 tablet (40 mg total) by mouth once daily.    clonazePAM (KLONOPIN) 1 MG tablet Take 1 tablet by mouth twice daily in the morning and afternoon as needed for severe anxiety    clopidogrel (PLAVIX) 75 mg tablet Take 1 tablet (75 mg total) by mouth once daily.    lisinopril-hydrochlorothiazide (PRINZIDE,ZESTORETIC) 10-12.5 mg per tablet Take 1 tablet by mouth once daily.    lurasidone (LATUDA) 40 mg Tab tablet Take 1 tablet (40 mg total) by mouth once daily.    nicotine (NICODERM CQ) 14 mg/24 hr Place 1 patch onto the skin once daily.    topiramate (TOPAMAX) 100 MG tablet Take 1 tablet (100 mg total) by mouth 2 (two) times daily.    [DISCONTINUED] citalopram (CELEXA) 10 MG tablet Take 10 mg by mouth once daily.     Continuous Infusions:    Family History     Problem Relation (Age of Onset)    Seizures Mother        Tobacco Use    Smoking status: Current Every Day Smoker     Types: Cigarettes    Smokeless tobacco: Never Used    Tobacco comment: quit actual cigarettes a few mo ago, switched to e cigarette   Substance and Sexual Activity    Alcohol use: No     Comment: very rarely; "my  " "drinks"    Drug use: No    Sexual activity: Yes     Partners: Male     Review of Systems   Constitutional: Negative for chills, fatigue and fever.   HENT: Negative for drooling, hearing loss, trouble swallowing and voice change.    Eyes: Positive for visual disturbance (intermittent). Negative for photophobia.   Respiratory: Negative for choking and shortness of breath.    Cardiovascular: Negative for chest pain and palpitations.   Gastrointestinal: Negative for abdominal pain, nausea and vomiting.   Genitourinary: Negative for decreased urine volume and frequency.   Musculoskeletal: Negative for back pain, gait problem and neck pain.   Skin: Negative for color change and rash.   Allergic/Immunologic: Negative for immunocompromised state.   Neurological: Positive for syncope and light-headedness. Negative for dizziness, speech difficulty, weakness, numbness and headaches.   Psychiatric/Behavioral: Positive for dysphoric mood. Negative for agitation, confusion, decreased concentration, hallucinations and sleep disturbance. The patient is nervous/anxious.      Objective:     Vital Signs (Most Recent):    Vital Signs (24h Range):  BP: ()/()   Arterial Line BP: ()/()         There is no height or weight on file to calculate BMI.    Physical Exam   Constitutional: She is oriented to person, place, and time. She appears well-developed and well-nourished. She is cooperative. She does not appear ill. No distress.   Obese AAF, slightly anxious/dysphoric, in no apparent distress   HENT:   Head: Normocephalic and atraumatic.   Mouth/Throat: Mucous membranes are normal.   Eyes: Conjunctivae and EOM are normal. Pupils are equal, round, and reactive to light.   Neck: Normal range of motion.   Cardiovascular: Normal rate, regular rhythm and normal heart sounds.   No murmur heard.  Pulses:       Radial pulses are 2+ on the right side, and 2+ on the left side.   Pulmonary/Chest: Effort normal. No accessory muscle usage. No " tachypnea. No respiratory distress. She has no wheezes. She has no rales.   Abdominal: Soft. Bowel sounds are normal. She exhibits no distension. There is no tenderness.   Musculoskeletal: She exhibits no edema.        Right shoulder: She exhibits decreased range of motion.        Left wrist: She exhibits decreased range of motion (recent surgery).   Neurological: She is alert and oriented to person, place, and time. She has normal strength. She has a normal Finger-Nose-Finger Test. She displays no seizure activity. Gait normal.   Reflex Scores:       Bicep reflexes are 2+ on the right side and 2+ on the left side.       Brachioradialis reflexes are 2+ on the right side and 2+ on the left side.       Patellar reflexes are 2+ on the right side and 2+ on the left side.       Achilles reflexes are 1+ on the right side and 1+ on the left side.  Skin: Skin is warm. She is not diaphoretic. No cyanosis or erythema.   Psychiatric: Her speech is normal and behavior is normal. Thought content normal. Cognition and memory are normal.   She states that she is generally scared  Patient started crying when talking about previous life traumas   Nursing note and vitals reviewed.      NEUROLOGICAL EXAMINATION:     MENTAL STATUS   Oriented to person, place, and time.   Follows 3 step commands.   Attention: normal. Concentration: normal.   Speech: speech is normal   Level of consciousness: alert  Knowledge: consistent with education.   Able to name object. Praxis: normal     CRANIAL NERVES     CN III, IV, VI   Pupils are equal, round, and reactive to light.  Extraocular motions are normal.   Nystagmus: none   Diplopia: none  Ophthalmoparesis: none    CN V   Facial sensation intact.     CN VII   Facial expression full, symmetric.     CN VIII   Hearing: intact    CN IX, X   Palate: symmetric    CN XI   CN XI normal.     CN XII   CN XII normal.     MOTOR EXAM   Muscle bulk: normal  Overall muscle tone: normal    Strength   Strength 5/5  throughout.        Examination of UE limited due to rotator cuff tear on R and recent wrist surgery on L     REFLEXES     Reflexes   Right brachioradialis: 2+  Left brachioradialis: 2+  Right biceps: 2+  Left biceps: 2+  Right patellar: 2+  Left patellar: 2+  Right achilles: 1+  Left achilles: 1+  Right plantar: normal  Left plantar: normal    SENSORY EXAM   Light touch normal.   Right leg vibration: decreased from toes  Left leg vibration: decreased from toes       Sensation to temperature is preserved     GAIT AND COORDINATION     Gait  Gait: normal     Coordination   Finger to nose coordination: normal    Tremor   Resting tremor: absent  Intention tremor: absent  Action tremor: absent      Significant Labs:     CBC:   Recent Labs   Lab 01/14/19  1346   WBC 8.86   HGB 11.2*   HCT 35.6*   *     CMP:   Recent Labs   Lab 01/14/19  1346         K 3.2*      CO2 26   BUN 10   CREATININE 1.0   CALCIUM 9.5   MG 2.1   PROT 7.2   ALBUMIN 3.5   BILITOT 0.3   ALKPHOS 167*   AST 17   ALT 16   ANIONGAP 8   EGFRNONAA >60.0     POCT Glucose: No results for input(s): POCTGLUCOSE in the last 24 hours.  Urine Studies: No results for input(s): COLORU, APPEARANCEUA, PHUR, SPECGRAV, PROTEINUA, GLUCUA, KETONESU, BILIRUBINUA, OCCULTUA, NITRITE, UROBILINOGEN, LEUKOCYTESUR, RBCUA, WBCUA, BACTERIA, SQUAMEPITHEL, HYALINECASTS in the last 48 hours.    Invalid input(s): WRIGHTSUR  All pertinent lab results from the past 24 hours have been reviewed.    Significant Studies: I have reviewed and interpreted all pertinent imaging results/findings within the past 24 hours.

## 2019-01-15 LAB — BACTERIA UR CULT: NORMAL

## 2019-01-15 PROCEDURE — 95951 PR EEG MONITORING/VIDEORECORD: ICD-10-PCS | Mod: 26,,, | Performed by: PSYCHIATRY & NEUROLOGY

## 2019-01-15 PROCEDURE — 99233 PR SUBSEQUENT HOSPITAL CARE,LEVL III: ICD-10-PCS | Mod: ,,, | Performed by: PSYCHIATRY & NEUROLOGY

## 2019-01-15 PROCEDURE — 95951 HC EEG MONITORING/VIDEO RECORD: CPT

## 2019-01-15 PROCEDURE — 20600001 HC STEP DOWN PRIVATE ROOM

## 2019-01-15 PROCEDURE — 25000003 PHARM REV CODE 250: Performed by: STUDENT IN AN ORGANIZED HEALTH CARE EDUCATION/TRAINING PROGRAM

## 2019-01-15 PROCEDURE — 99233 SBSQ HOSP IP/OBS HIGH 50: CPT | Mod: ,,, | Performed by: PSYCHIATRY & NEUROLOGY

## 2019-01-15 PROCEDURE — 95951 PR EEG MONITORING/VIDEORECORD: CPT | Mod: 26,,, | Performed by: PSYCHIATRY & NEUROLOGY

## 2019-01-15 RX ORDER — HYDROCODONE BITARTRATE AND ACETAMINOPHEN 5; 325 MG/1; MG/1
1 TABLET ORAL EVERY 8 HOURS PRN
Status: DISCONTINUED | OUTPATIENT
Start: 2019-01-15 | End: 2019-01-17

## 2019-01-15 RX ADMIN — FLUOXETINE 80 MG: 20 CAPSULE ORAL at 09:01

## 2019-01-15 RX ADMIN — LURASIDONE HYDROCHLORIDE 40 MG: 40 TABLET, FILM COATED ORAL at 09:01

## 2019-01-15 RX ADMIN — ATORVASTATIN CALCIUM 40 MG: 20 TABLET, FILM COATED ORAL at 09:01

## 2019-01-15 RX ADMIN — CLONAZEPAM 0.5 MG: 0.5 TABLET ORAL at 08:01

## 2019-01-15 RX ADMIN — CLONAZEPAM 0.5 MG: 0.5 TABLET ORAL at 09:01

## 2019-01-15 RX ADMIN — LISINOPRIL AND HYDROCHLOROTHIAZIDE 1 TABLET: 12.5; 1 TABLET ORAL at 09:01

## 2019-01-15 RX ADMIN — HYDROCODONE BITARTRATE AND ACETAMINOPHEN 1 TABLET: 5; 325 TABLET ORAL at 11:01

## 2019-01-15 RX ADMIN — CLOPIDOGREL 75 MG: 75 TABLET, FILM COATED ORAL at 09:01

## 2019-01-15 RX ADMIN — HYDROCODONE BITARTRATE AND ACETAMINOPHEN 1 TABLET: 5; 325 TABLET ORAL at 08:01

## 2019-01-15 RX ADMIN — TOPIRAMATE 75 MG: 25 TABLET, FILM COATED ORAL at 09:01

## 2019-01-15 RX ADMIN — ASPIRIN 81 MG: 81 TABLET, COATED ORAL at 09:01

## 2019-01-15 NOTE — ASSESSMENT & PLAN NOTE
Symptomatology not typical for migraines, more likely secondary to REN vs tension headache  On Topamax 150 mg q12   Decreased and discontinued Topamax while in EMU \  Levels pending

## 2019-01-15 NOTE — ASSESSMENT & PLAN NOTE
Long term Hx of recurrent seizure events since age of 8, formally diagnosed ~2008  Hx of seizure in family + multiple traumatic psychiatric events in the past. 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   currently on OXCBZ 150 qd + Topiramate 150 mg q12. Admitted to EMU for diagnostic classification of her events and adjustment of medication.    - Orthostatic VS was negative, however she states that events ALWAYS happen when she is standing/walking -> PT evaluation  - Continuous cardiac monitoring  - unable to fit in the schedule for tilt-table test, will require outpatient TTT  - no abnormalities on EEG so far -> will continue to monitor on continuous video EEG  - Topiramate and OXCBZ levels pending  - continue to Hold OXCBZ, will discontinue Topiramate   - seizure and fall precautions

## 2019-01-15 NOTE — ASSESSMENT & PLAN NOTE
[place] [facility] [place]  On Fluoxetine 80 mg qd + Clonazepam 1 mg q12 + Lurasidone 40 mg qd  Will continue at home dose except Clonazepam, will decrease to 0.5 q12 while in EMU

## 2019-01-15 NOTE — PLAN OF CARE
PCP: Arvind Juarez MD  Pharmacy: [facility]       01/15/19 1045   Discharge Assessment   Assessment Type Discharge Planning Assessment   Confirmed/corrected address and phone number on facesheet? Yes   Assessment information obtained from? Patient   Expected Length of Stay (days) (TBD)   Communicated expected length of stay with patient/caregiver yes   Prior to hospitilization cognitive status: Alert/Oriented;No Deficits   Prior to hospitalization functional status: Independent   Current cognitive status: Alert/Oriented;No Deficits   Current Functional Status: Independent   Lives With alone   Able to Return to Prior Arrangements yes   Is patient able to care for self after discharge? Yes   Who are your caregiver(s) and their phone number(s)? Chrystal Tabor Sister [phone]    Patient's perception of discharge disposition home or selfcare   Readmission Within the Last 30 Days no previous admission in last 30 days   Patient currently being followed by outpatient case management? No   Patient currently receives any other outside agency services? No   Equipment Currently Used at Home CPAP   Do you have any problems affording any of your prescribed medications? No   Is the patient taking medications as prescribed? yes   Does the patient have transportation home? Yes   Transportation Anticipated family or friend will provide   Does the patient receive services at the [facility]? No   Discharge Plan A Home   Discharge Plan B Home with family   DME Needed Upon Discharge  none   Patient/Family in Agreement with Plan yes

## 2019-01-15 NOTE — SUBJECTIVE & OBJECTIVE
Past Medical History:   Diagnosis Date    Anxiety     Behavioral problem     Borderline personality disorder     Coronary angioplasty status 04/2018    Coronary artery disease     Depression     Depression     Fatigue     Hx of psychiatric care     Hyperlipidemia     Hypertension     Psychiatric problem     S/P hysterectomy with oophorectomy     Seizures     Seizures     Therapy        Past Surgical History:   Procedure Laterality Date    HEART CATH-LEFT - LV JULIO POSS Left 4/4/2018    Performed by Edwin Reza MD at [facility] CATH LAB       Review of patient's allergies indicates:  No Known Allergies    No current facility-administered medications on file prior to encounter.      Current Outpatient Medications on File Prior to Encounter   Medication Sig    aspirin (ECOTRIN) 81 MG EC tablet Take 1 tablet (81 mg total) by mouth once daily.    atorvastatin (LIPITOR) 40 MG tablet Take 1 tablet (40 mg total) by mouth once daily.    clonazePAM (KLONOPIN) 1 MG tablet Take 1 tablet by mouth twice daily in the morning and afternoon as needed for severe anxiety    clopidogrel (PLAVIX) 75 mg tablet Take 1 tablet (75 mg total) by mouth once daily.    lisinopril-hydrochlorothiazide (PRINZIDE,ZESTORETIC) 10-12.5 mg per tablet Take 1 tablet by mouth once daily.    lurasidone (LATUDA) 40 mg Tab tablet Take 1 tablet (40 mg total) by mouth once daily.    OXcarbazepine (TRILEPTAL) 150 MG Tab Take 150 mg by mouth nightly.    sulfamethoxazole-trimethoprim 800-160mg (BACTRIM DS) 800-160 mg Tab Take 1 tablet by mouth 2 (two) times daily as needed (Wrist surgery pain).    topiramate (TOPAMAX) 100 MG tablet Take 1 tablet (100 mg total) by mouth 2 (two) times daily.    topiramate (TOPAMAX) 50 MG tablet Take 50 mg by mouth once daily.    nicotine (NICODERM CQ) 14 mg/24 hr Place 1 patch onto the skin once daily.    [DISCONTINUED] citalopram (CELEXA) 10 MG tablet Take 10 mg by mouth once daily.     Continuous  Infusions:    Family History     Problem Relation (Age of Onset)    Seizures Mother        Tobacco Use    Smoking status: Current Every Day Smoker     Packs/day: 0.25     Types: Cigarettes    Smokeless tobacco: Never Used    Tobacco comment: quit actual cigarettes a few mo ago, switched to e cigarette   Substance and Sexual Activity    Alcohol use: Yes     Comment: Socially    Drug use: No    Sexual activity: Yes     Partners: Male     Review of Systems   Constitutional: Negative for chills, fatigue and fever.   HENT: Negative for drooling, hearing loss, trouble swallowing and voice change.    Eyes: Negative for photophobia and visual disturbance.   Respiratory: Negative for choking and shortness of breath.    Cardiovascular: Negative for chest pain and palpitations.   Gastrointestinal: Negative for abdominal pain, nausea and vomiting.   Genitourinary: Negative for decreased urine volume and frequency.   Musculoskeletal: Negative for back pain, gait problem and neck pain.   Skin: Negative for color change and rash.   Allergic/Immunologic: Negative for immunocompromised state.   Neurological: Negative for dizziness, syncope, speech difficulty, weakness, numbness and headaches.   Psychiatric/Behavioral: Negative for agitation, confusion, decreased concentration, hallucinations and sleep disturbance. The patient is nervous/anxious.      Objective:     Vital Signs (Most Recent):  Temp: 98.4 °F (36.9 °C) (01/15/19 1624)  Pulse: 76 (01/15/19 1624)  Resp: 18 (01/15/19 1624)  BP: 129/65 (01/15/19 1624)  SpO2: 96 % (01/15/19 1624) Vital Signs (24h Range):  Temp:  [97.1 °F (36.2 °C)-98.7 °F (37.1 °C)] 98.4 °F (36.9 °C)  Pulse:  [72-86] 76  Resp:  [16-18] 18  SpO2:  [95 %-99 %] 96 %  BP: (107-129)/(53-66) 129/65     Weight: 88 kg (194 lb)  Body mass index is 28.24 kg/m².    Physical Exam   Constitutional: She is oriented to person, place, and time. She appears well-developed and well-nourished. She is cooperative. She  does not appear ill. No distress.   Obese AAF, slightly anxious/dysphoric, in no apparent distress   HENT:   Head: Normocephalic and atraumatic.   Mouth/Throat: Mucous membranes are normal.   Eyes: Conjunctivae and EOM are normal. Pupils are equal, round, and reactive to light.   Neck: Normal range of motion.   Cardiovascular: Normal rate, regular rhythm and normal heart sounds.   No murmur heard.  Pulses:       Radial pulses are 2+ on the right side, and 2+ on the left side.   Pulmonary/Chest: Effort normal. No accessory muscle usage. No tachypnea. No respiratory distress. She has no wheezes. She has no rales.   Abdominal: Soft. Bowel sounds are normal. She exhibits no distension. There is no tenderness.   Musculoskeletal: She exhibits no edema.        Right shoulder: She exhibits decreased range of motion.        Left wrist: She exhibits decreased range of motion (recent surgery).   Neurological: She is alert and oriented to person, place, and time. She has normal strength. She has a normal Finger-Nose-Finger Test. She displays no seizure activity. Gait normal.   Reflex Scores:       Bicep reflexes are 2+ on the right side and 2+ on the left side.       Brachioradialis reflexes are 2+ on the right side and 2+ on the left side.       Patellar reflexes are 2+ on the right side and 2+ on the left side.       Achilles reflexes are 1+ on the right side and 1+ on the left side.  Skin: Skin is warm. She is not diaphoretic. No cyanosis or erythema.   Psychiatric: Her speech is normal and behavior is normal. Thought content normal. Cognition and memory are normal.   She states that she is generally scared     Nursing note and vitals reviewed.      NEUROLOGICAL EXAMINATION:     MENTAL STATUS   Oriented to person, place, and time.   Follows 3 step commands.   Attention: normal. Concentration: normal.   Speech: speech is normal   Level of consciousness: alert  Knowledge: consistent with education.   Able to name object.  Praxis: normal     CRANIAL NERVES     CN III, IV, VI   Pupils are equal, round, and reactive to light.  Extraocular motions are normal.   Nystagmus: none   Diplopia: none  Ophthalmoparesis: none    CN V   Facial sensation intact.     CN VII   Facial expression full, symmetric.     CN VIII   Hearing: intact    CN IX, X   Palate: symmetric    CN XI   CN XI normal.     CN XII   CN XII normal.     MOTOR EXAM   Muscle bulk: normal  Overall muscle tone: normal    Strength   Strength 5/5 throughout.        Examination of UE limited due to rotator cuff tear on R and recent wrist surgery on L     REFLEXES     Reflexes   Right brachioradialis: 2+  Left brachioradialis: 2+  Right biceps: 2+  Left biceps: 2+  Right patellar: 2+  Left patellar: 2+  Right achilles: 1+  Left achilles: 1+  Right plantar: normal  Left plantar: normal    SENSORY EXAM   Light touch normal.   Right leg vibration: decreased from toes  Left leg vibration: decreased from toes       Sensation to temperature is preserved     GAIT AND COORDINATION     Gait  Gait: normal     Coordination   Finger to nose coordination: normal    Tremor   Resting tremor: absent  Intention tremor: absent  Action tremor: absent      Significant Labs:     CBC:   Recent Labs   Lab 01/14/19  1346   WBC 8.86   HGB 11.2*   HCT 35.6*   *     CMP:   Recent Labs   Lab 01/14/19  1346         K 3.2*      CO2 26   BUN 10   CREATININE 1.0   CALCIUM 9.5   MG 2.1   PROT 7.2   ALBUMIN 3.5   BILITOT 0.3   ALKPHOS 167*   AST 17   ALT 16   ANIONGAP 8   EGFRNONAA >60.0     POCT Glucose: No results for input(s): POCTGLUCOSE in the last 24 hours.  Urine Studies:   Recent Labs   Lab 01/14/19  1535   COLORU Yellow   APPEARANCEUA Cloudy*   PHUR 7.0   SPECGRAV 1.020   PROTEINUA Negative   GLUCUA Negative   KETONESU Negative   BILIRUBINUA Negative   OCCULTUA Negative   NITRITE Negative   LEUKOCYTESUR 3+*   RBCUA 3   WBCUA 28*   BACTERIA Many*   SQUAMEPITHEL 2   HYALINECASTS 0      All pertinent lab results from the past 24 hours have been reviewed.    Significant Studies: I have reviewed and interpreted all pertinent imaging results/findings within the past 24 hours.     Continuous video EEG (1/14/19-1/15/19):  No epileptiform discharges, periodic discharges, lateralized rhythmic delta activity or electrographic seizures were seen.

## 2019-01-15 NOTE — PROGRESS NOTES
[facility]  Neurology-Epilepsy  Progress Note    Patient Name: Jennifer Brady  MRN: [id number]  Admission Date: 1/14/2019  Hospital Length of Stay: 1 days  Code Status: Full Code   Attending Provider: Andrea Martini MD  Primary Care Physician: Arvind Juarez MD   Principal Problem:Epilepsy    Subjective:     Hospital Course:   1/14/19: admitted to EMU and started on continuous vEEG monitoring  1/15/19: NAEON. EEG unremarkable    Past Medical History:   Diagnosis Date    Anxiety     Behavioral problem     Borderline personality disorder     Coronary angioplasty status 04/2018    Coronary artery disease     Depression     Depression     Fatigue     Hx of psychiatric care     Hyperlipidemia     Hypertension     Psychiatric problem     S/P hysterectomy with oophorectomy     Seizures     Seizures     Therapy        Past Surgical History:   Procedure Laterality Date    HEART CATH-LEFT - LV JULIO POSS Left 4/4/2018    Performed by Edwin Reza MD at [facility] CATH LAB       Review of patient's allergies indicates:  No Known Allergies    No current facility-administered medications on file prior to encounter.      Current Outpatient Medications on File Prior to Encounter   Medication Sig    aspirin (ECOTRIN) 81 MG EC tablet Take 1 tablet (81 mg total) by mouth once daily.    atorvastatin (LIPITOR) 40 MG tablet Take 1 tablet (40 mg total) by mouth once daily.    clonazePAM (KLONOPIN) 1 MG tablet Take 1 tablet by mouth twice daily in the morning and afternoon as needed for severe anxiety    clopidogrel (PLAVIX) 75 mg tablet Take 1 tablet (75 mg total) by mouth once daily.    lisinopril-hydrochlorothiazide (PRINZIDE,ZESTORETIC) 10-12.5 mg per tablet Take 1 tablet by mouth once daily.    lurasidone (LATUDA) 40 mg Tab tablet Take 1 tablet (40 mg total) by mouth once daily.    OXcarbazepine (TRILEPTAL) 150 MG Tab Take 150 mg by mouth nightly.     sulfamethoxazole-trimethoprim 800-160mg (BACTRIM DS) 800-160 mg Tab Take 1 tablet by mouth 2 (two) times daily as needed (Wrist surgery pain).    topiramate (TOPAMAX) 100 MG tablet Take 1 tablet (100 mg total) by mouth 2 (two) times daily.    topiramate (TOPAMAX) 50 MG tablet Take 50 mg by mouth once daily.    nicotine (NICODERM CQ) 14 mg/24 hr Place 1 patch onto the skin once daily.    [DISCONTINUED] citalopram (CELEXA) 10 MG tablet Take 10 mg by mouth once daily.     Continuous Infusions:    Family History     Problem Relation (Age of Onset)    Seizures Mother        Tobacco Use    Smoking status: Current Every Day Smoker     Packs/day: 0.25     Types: Cigarettes    Smokeless tobacco: Never Used    Tobacco comment: quit actual cigarettes a few mo ago, switched to e cigarette   Substance and Sexual Activity    Alcohol use: Yes     Comment: Socially    Drug use: No    Sexual activity: Yes     Partners: Male     Review of Systems   Constitutional: Negative for chills, fatigue and fever.   HENT: Negative for drooling, hearing loss, trouble swallowing and voice change.    Eyes: Negative for photophobia and visual disturbance.   Respiratory: Negative for choking and shortness of breath.    Cardiovascular: Negative for chest pain and palpitations.   Gastrointestinal: Negative for abdominal pain, nausea and vomiting.   Genitourinary: Negative for decreased urine volume and frequency.   Musculoskeletal: Negative for back pain, gait problem and neck pain.   Skin: Negative for color change and rash.   Allergic/Immunologic: Negative for immunocompromised state.   Neurological: Negative for dizziness, syncope, speech difficulty, weakness, numbness and headaches.   Psychiatric/Behavioral: Negative for agitation, confusion, decreased concentration, hallucinations and sleep disturbance. The patient is nervous/anxious.      Objective:     Vital Signs (Most Recent):  Temp: 98.4 °F (36.9 °C) (01/15/19 1624)  Pulse: 76  (01/15/19 1624)  Resp: 18 (01/15/19 1624)  BP: 129/65 (01/15/19 1624)  SpO2: 96 % (01/15/19 1624) Vital Signs (24h Range):  Temp:  [97.1 °F (36.2 °C)-98.7 °F (37.1 °C)] 98.4 °F (36.9 °C)  Pulse:  [72-86] 76  Resp:  [16-18] 18  SpO2:  [95 %-99 %] 96 %  BP: (107-129)/(53-66) 129/65     Weight: 88 kg (194 lb)  Body mass index is 28.24 kg/m².    Physical Exam   Constitutional: She is oriented to person, place, and time. She appears well-developed and well-nourished. She is cooperative. She does not appear ill. No distress.   Obese AAF, slightly anxious/dysphoric, in no apparent distress   HENT:   Head: Normocephalic and atraumatic.   Mouth/Throat: Mucous membranes are normal.   Eyes: Conjunctivae and EOM are normal. Pupils are equal, round, and reactive to light.   Neck: Normal range of motion.   Cardiovascular: Normal rate, regular rhythm and normal heart sounds.   No murmur heard.  Pulses:       Radial pulses are 2+ on the right side, and 2+ on the left side.   Pulmonary/Chest: Effort normal. No accessory muscle usage. No tachypnea. No respiratory distress. She has no wheezes. She has no rales.   Abdominal: Soft. Bowel sounds are normal. She exhibits no distension. There is no tenderness.   Musculoskeletal: She exhibits no edema.        Right shoulder: She exhibits decreased range of motion.        Left wrist: She exhibits decreased range of motion (recent surgery).   Neurological: She is alert and oriented to person, place, and time. She has normal strength. She has a normal Finger-Nose-Finger Test. She displays no seizure activity. Gait normal.   Reflex Scores:       Bicep reflexes are 2+ on the right side and 2+ on the left side.       Brachioradialis reflexes are 2+ on the right side and 2+ on the left side.       Patellar reflexes are 2+ on the right side and 2+ on the left side.       Achilles reflexes are 1+ on the right side and 1+ on the left side.  Skin: Skin is warm. She is not diaphoretic. No cyanosis or  erythema.   Psychiatric: Her speech is normal and behavior is normal. Thought content normal. Cognition and memory are normal.   She states that she is generally scared     Nursing note and vitals reviewed.      NEUROLOGICAL EXAMINATION:     MENTAL STATUS   Oriented to person, place, and time.   Follows 3 step commands.   Attention: normal. Concentration: normal.   Speech: speech is normal   Level of consciousness: alert  Knowledge: consistent with education.   Able to name object. Praxis: normal     CRANIAL NERVES     CN III, IV, VI   Pupils are equal, round, and reactive to light.  Extraocular motions are normal.   Nystagmus: none   Diplopia: none  Ophthalmoparesis: none    CN V   Facial sensation intact.     CN VII   Facial expression full, symmetric.     CN VIII   Hearing: intact    CN IX, X   Palate: symmetric    CN XI   CN XI normal.     CN XII   CN XII normal.     MOTOR EXAM   Muscle bulk: normal  Overall muscle tone: normal    Strength   Strength 5/5 throughout.        Examination of UE limited due to rotator cuff tear on R and recent wrist surgery on L     REFLEXES     Reflexes   Right brachioradialis: 2+  Left brachioradialis: 2+  Right biceps: 2+  Left biceps: 2+  Right patellar: 2+  Left patellar: 2+  Right achilles: 1+  Left achilles: 1+  Right plantar: normal  Left plantar: normal    SENSORY EXAM   Light touch normal.   Right leg vibration: decreased from toes  Left leg vibration: decreased from toes       Sensation to temperature is preserved     GAIT AND COORDINATION     Gait  Gait: normal     Coordination   Finger to nose coordination: normal    Tremor   Resting tremor: absent  Intention tremor: absent  Action tremor: absent      Significant Labs:     CBC:   Recent Labs   Lab 01/14/19  1346   WBC 8.86   HGB 11.2*   HCT 35.6*   *     CMP:   Recent Labs   Lab 01/14/19  1346         K 3.2*      CO2 26   BUN 10   CREATININE 1.0   CALCIUM 9.5   MG 2.1   PROT 7.2   ALBUMIN 3.5    BILITOT 0.3   ALKPHOS 167*   AST 17   ALT 16   ANIONGAP 8   EGFRNONAA >60.0     POCT Glucose: No results for input(s): POCTGLUCOSE in the last 24 hours.  Urine Studies:   Recent Labs   Lab 01/14/19  1535   COLORU Yellow   APPEARANCEUA Cloudy*   PHUR 7.0   SPECGRAV 1.020   PROTEINUA Negative   GLUCUA Negative   KETONESU Negative   BILIRUBINUA Negative   OCCULTUA Negative   NITRITE Negative   LEUKOCYTESUR 3+*   RBCUA 3   WBCUA 28*   BACTERIA Many*   SQUAMEPITHEL 2   HYALINECASTS 0     All pertinent lab results from the past 24 hours have been reviewed.    Significant Studies: I have reviewed and interpreted all pertinent imaging results/findings within the past 24 hours.     Continuous video EEG (1/14/19-1/15/19):  No epileptiform discharges, periodic discharges, lateralized rhythmic delta activity or electrographic seizures were seen.      Assessment and Plan:     * Epilepsy    Long term Hx of recurrent seizure events since age of 8, formally diagnosed ~2008  Hx of seizure in family + multiple traumatic psychiatric events in the past. 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   currently on OXCBZ 150 qd + Topiramate 150 mg q12. Admitted to EMU for diagnostic classification of her events and adjustment of medication.    - Orthostatic VS was negative, however she states that events ALWAYS happen when she is standing/walking -> PT evaluation  - Continuous cardiac monitoring  - unable to fit in the schedule for tilt-table test, will require outpatient TTT  - no abnormalities on EEG so far -> will continue to monitor on continuous video EEG  - Topiramate and OXCBZ levels pending  - continue to Hold OXCBZ, will discontinue Topiramate   - seizure and fall precautions       Adjustment disorder with mixed anxiety and depressed mood    Follows up with psychiatry at [facility] for medical management + psychotherapy  On Fluoxetine 80 mg qd + Clonazepam 1 mg q12 + Lurasidone 40 mg qd  Will continue at home dose except  Clonazepam, will decrease to 0.5 q12 while in EMU     Hx of migraine headaches    Symptomatology not typical for migraines, more likely secondary to REN vs tension headache  On Topamax 150 mg q12   Decreased and discontinued Topamax while in EMU \  Levels pending     Obesity    BMI 28.6 -> overweight  Life style modification     Coronary artery disease involving native coronary artery of native heart with unstable angina pectoris    Will continue on DAPT and statin   Most recent A1C and LDL in 4/2018 WNL  Counseling on smoking cessation     REN (obstructive sleep apnea)    Contributing to the headaches  On CPAP at home intermittently     HTN (hypertension)    On lisinopril-HCTZ 10-12.5 mg qd, will continue  Monitor BP q4 hr     Depression    On Fluoxetine 80 mh qd, will continue         VTE Risk Mitigation (From admission, onward)        Ordered     IP VTE LOW RISK PATIENT  Once      01/14/19 1345     Place sequential compression device  Until discontinued      01/14/19 1346          Carlita Horvath MD  Neurology-Epilepsy  [facility]

## 2019-01-16 LAB
OXCARBAZEPINE METABOLITE: 2 MCG/ML
TOPIRAMATE SERPL-MCNC: 10.2 MCG/ML

## 2019-01-16 PROCEDURE — 95951 HC EEG MONITORING/VIDEO RECORD: CPT

## 2019-01-16 PROCEDURE — 95951 PR EEG MONITORING/VIDEORECORD: ICD-10-PCS | Mod: 26,,, | Performed by: PSYCHIATRY & NEUROLOGY

## 2019-01-16 PROCEDURE — 20600001 HC STEP DOWN PRIVATE ROOM

## 2019-01-16 PROCEDURE — 99233 SBSQ HOSP IP/OBS HIGH 50: CPT | Mod: ,,, | Performed by: PSYCHIATRY & NEUROLOGY

## 2019-01-16 PROCEDURE — 99233 PR SUBSEQUENT HOSPITAL CARE,LEVL III: ICD-10-PCS | Mod: ,,, | Performed by: PSYCHIATRY & NEUROLOGY

## 2019-01-16 PROCEDURE — 25000003 PHARM REV CODE 250: Performed by: STUDENT IN AN ORGANIZED HEALTH CARE EDUCATION/TRAINING PROGRAM

## 2019-01-16 PROCEDURE — 95951 PR EEG MONITORING/VIDEORECORD: CPT | Mod: 26,,, | Performed by: PSYCHIATRY & NEUROLOGY

## 2019-01-16 RX ORDER — CLONAZEPAM 0.5 MG/1
0.5 TABLET ORAL 2 TIMES DAILY
Status: DISCONTINUED | OUTPATIENT
Start: 2019-01-17 | End: 2019-01-18 | Stop reason: HOSPADM

## 2019-01-16 RX ORDER — DIPHENHYDRAMINE HCL 50 MG
50 CAPSULE ORAL ONCE
Status: COMPLETED | OUTPATIENT
Start: 2019-01-17 | End: 2019-01-17

## 2019-01-16 RX ORDER — TRAMADOL HYDROCHLORIDE 50 MG/1
100 TABLET ORAL ONCE
Status: COMPLETED | OUTPATIENT
Start: 2019-01-17 | End: 2019-01-17

## 2019-01-16 RX ADMIN — CLOPIDOGREL 75 MG: 75 TABLET, FILM COATED ORAL at 09:01

## 2019-01-16 RX ADMIN — FLUOXETINE 80 MG: 20 CAPSULE ORAL at 09:01

## 2019-01-16 RX ADMIN — LISINOPRIL AND HYDROCHLOROTHIAZIDE 1 TABLET: 12.5; 1 TABLET ORAL at 09:01

## 2019-01-16 RX ADMIN — ATORVASTATIN CALCIUM 40 MG: 20 TABLET, FILM COATED ORAL at 09:01

## 2019-01-16 RX ADMIN — ACETAMINOPHEN 650 MG: 325 TABLET ORAL at 09:01

## 2019-01-16 RX ADMIN — HYDROCODONE BITARTRATE AND ACETAMINOPHEN 1 TABLET: 5; 325 TABLET ORAL at 11:01

## 2019-01-16 RX ADMIN — ASPIRIN 81 MG: 81 TABLET, COATED ORAL at 09:01

## 2019-01-16 RX ADMIN — CLONAZEPAM 0.5 MG: 0.5 TABLET ORAL at 09:01

## 2019-01-16 RX ADMIN — LURASIDONE HYDROCHLORIDE 40 MG: 40 TABLET, FILM COATED ORAL at 09:01

## 2019-01-16 RX ADMIN — HYDROCODONE BITARTRATE AND ACETAMINOPHEN 1 TABLET: 5; 325 TABLET ORAL at 04:01

## 2019-01-16 NOTE — ASSESSMENT & PLAN NOTE
[place]s up [place] [facility]ry [place] [facility] [place] + psychotherapy  On Fluoxetine 80 mg qd + Clonazepam 1 mg q12 + Lurasidone 40 mg qd  Will continue at home dose except Clonazepam -> decreased to 0.5 q12 while in EMU

## 2019-01-16 NOTE — SUBJECTIVE & OBJECTIVE
Interval History:  No clinical events overnight and this morning, c/o occipital HA that responded to tylenol and Norco. neurological exam unchanged. EEG remains WNL.    Current Facility-Administered Medications   Medication Dose Route Frequency Provider Last Rate Last Dose    acetaminophen tablet 650 mg  650 mg Oral Q4H PRN Carlita Horvath MD   650 mg at 01/16/19 0956    aspirin EC tablet 81 mg  81 mg Oral Daily Carlita Horvath MD   81 mg at 01/16/19 0952    atorvastatin tablet 40 mg  40 mg Oral Daily Carlita Horvath MD   40 mg at 01/16/19 0952    clonazePAM tablet 0.5 mg  0.5 mg Oral BID Carlita Horvath MD   0.5 mg at 01/16/19 0951    clopidogrel tablet 75 mg  75 mg Oral Daily Carlita Horvath MD   75 mg at 01/16/19 0953    [START ON 1/17/2019] diphenhydrAMINE capsule 50 mg  50 mg Oral Once Carlita Horvath MD        docusate sodium capsule 100 mg  100 mg Oral Daily PRN Carlita Horvath MD        FLUoxetine capsule 80 mg  80 mg Oral Daily Carlita Horvath MD   80 mg at 01/16/19 0953    HYDROcodone-acetaminophen 5-325 mg per tablet 1 tablet  1 tablet Oral Q8H PRN Carlita Horvath MD   1 tablet at 01/16/19 0432    influenza (FLUZONE QUADRIVALENT) vaccine 0.5 mL  0.5 mL Intramuscular vaccine x 1 dose Trish Mead MD        lisinopril-hydrochlorothiazide 10-12.5 mg per tablet 1 tablet  1 tablet Oral Daily Carlita Horvath MD   1 tablet at 01/16/19 0953    lurasidone tablet 40 mg  40 mg Oral Daily Carlita Horvath MD   40 mg at 01/16/19 0951    ondansetron disintegrating tablet 8 mg  8 mg Oral Q8H PRN Carlita Horvath MD        sodium chloride 0.9% flush 3 mL  3 mL Intravenous PRN Carlita Horvath MD        [START ON 1/17/2019] traMADol tablet 100 mg  100 mg Oral Once Carlita Horvath MD         Review of Systems   Constitutional: Negative for chills, fatigue and fever.   HENT: Negative for drooling, hearing loss, trouble swallowing and voice change.    Eyes: Negative for photophobia and visual  disturbance.   Respiratory: Negative for choking and shortness of breath.    Cardiovascular: Negative for chest pain and palpitations.   Gastrointestinal: Negative for abdominal pain, nausea and vomiting.   Genitourinary: Negative for decreased urine volume and frequency.   Musculoskeletal: Negative for back pain, gait problem and neck pain.   Skin: Negative for color change and rash.   Allergic/Immunologic: Negative for immunocompromised state.   Neurological: Positive for headaches. Negative for dizziness, syncope, speech difficulty, weakness and numbness.   Psychiatric/Behavioral: Negative for agitation, confusion, decreased concentration, hallucinations and sleep disturbance.     Objective:     Vital Signs (Most Recent):  Temp: 97.7 °F (36.5 °C) (01/16/19 1102)  Pulse: 80 (01/16/19 1126)  Resp: 14 (01/16/19 1102)  BP: 138/72 (01/16/19 1102)  SpO2: (!) 93 % (01/16/19 1102) Vital Signs (24h Range):  Temp:  [96.4 °F (35.8 °C)-98.4 °F (36.9 °C)] 97.7 °F (36.5 °C)  Pulse:  [69-83] 80  Resp:  [14-18] 14  SpO2:  [93 %-100 %] 93 %  BP: (123-142)/(61-78) 138/72     Weight: 88 kg (194 lb)  Body mass index is 28.24 kg/m².    Physical Exam   Constitutional: She is oriented to person, place, and time. She appears well-developed and well-nourished. She is cooperative. She does not appear ill. No distress.   Obese AAF, in no apparent distress, mood slightly improved   HENT:   Head: Normocephalic and atraumatic.   Mouth/Throat: Mucous membranes are normal.   Eyes: Conjunctivae and EOM are normal. Pupils are equal, round, and reactive to light.   Neck: Normal range of motion.   Cardiovascular: Normal rate, regular rhythm and normal heart sounds.   No murmur heard.  Pulses:       Radial pulses are 2+ on the right side, and 2+ on the left side.   Pulmonary/Chest: Effort normal. No accessory muscle usage. No tachypnea. No respiratory distress. She has no wheezes. She has no rales.   Abdominal: Soft. Bowel sounds are normal. She  exhibits no distension. There is no tenderness.   Musculoskeletal: She exhibits no edema.        Right shoulder: She exhibits decreased range of motion.        Left wrist: She exhibits decreased range of motion (recent surgery).   Neurological: She is alert and oriented to person, place, and time. She has normal strength. She has a normal Finger-Nose-Finger Test. She displays no seizure activity. Gait normal.   Reflex Scores:       Bicep reflexes are 2+ on the right side and 2+ on the left side.       Brachioradialis reflexes are 2+ on the right side and 2+ on the left side.       Patellar reflexes are 2+ on the right side and 2+ on the left side.       Achilles reflexes are 1+ on the right side and 1+ on the left side.  Skin: Skin is warm. She is not diaphoretic. No cyanosis or erythema.   Psychiatric: She has a normal mood and affect. Her speech is normal and behavior is normal. Thought content normal. Cognition and memory are normal.        Nursing note and vitals reviewed.      NEUROLOGICAL EXAMINATION:     MENTAL STATUS   Oriented to person, place, and time.   Follows 3 step commands.   Attention: normal. Concentration: normal.   Speech: speech is normal   Level of consciousness: alert  Knowledge: consistent with education.   Able to name object. Praxis: normal     CRANIAL NERVES     CN III, IV, VI   Pupils are equal, round, and reactive to light.  Extraocular motions are normal.   Nystagmus: none   Diplopia: none  Ophthalmoparesis: none    CN V   Facial sensation intact.     CN VII   Facial expression full, symmetric.     CN VIII   Hearing: intact    CN IX, X   Palate: symmetric    CN XI   CN XI normal.     CN XII   CN XII normal.     MOTOR EXAM   Muscle bulk: normal  Overall muscle tone: normal    Strength   Strength 5/5 throughout.        Examination of UE limited due to rotator cuff tear on R and recent wrist surgery on L     REFLEXES     Reflexes   Right brachioradialis: 2+  Left brachioradialis: 2+  Right  biceps: 2+  Left biceps: 2+  Right patellar: 2+  Left patellar: 2+  Right achilles: 1+  Left achilles: 1+  Right plantar: normal  Left plantar: normal    SENSORY EXAM   Light touch normal.   Right leg vibration: decreased from toes  Left leg vibration: decreased from toes       Sensation to temperature is preserved     GAIT AND COORDINATION     Gait  Gait: normal     Coordination   Finger to nose coordination: normal    Tremor   Resting tremor: absent  Intention tremor: absent  Action tremor: absent      Significant Labs:     CBC:   Recent Labs   Lab 01/14/19  1346   WBC 8.86   HGB 11.2*   HCT 35.6*   *     CMP:   Recent Labs   Lab 01/14/19  1346         K 3.2*      CO2 26   BUN 10   CREATININE 1.0   CALCIUM 9.5   MG 2.1   PROT 7.2   ALBUMIN 3.5   BILITOT 0.3   ALKPHOS 167*   AST 17   ALT 16   ANIONGAP 8   EGFRNONAA >60.0     POCT Glucose: No results for input(s): POCTGLUCOSE in the last 24 hours.  Urine Studies:   Recent Labs   Lab 01/14/19  1535   COLORU Yellow   APPEARANCEUA Cloudy*   PHUR 7.0   SPECGRAV 1.020   PROTEINUA Negative   GLUCUA Negative   KETONESU Negative   BILIRUBINUA Negative   OCCULTUA Negative   NITRITE Negative   LEUKOCYTESUR 3+*   RBCUA 3   WBCUA 28*   BACTERIA Many*   SQUAMEPITHEL 2   HYALINECASTS 0     Drug screen panel, emergency    Ref Range & Units 2d ago   Benzodiazepines  Negative    Methadone metabolites  Negative    Cocaine (Metab.)  Negative    Opiate Scrn, Ur  Presumptive Positive    Barbiturate Screen, Ur  Negative    Amphetamine Screen, Ur  Negative    THC  Negative    Phencyclidine  Negative    Creatinine, Random Ur 15.0 - 325.0 mg/dL 179.0               Ref Range & Units 2d ago   TSH 0.400 - 4.000 uIU/mL 0.503       Ref Range & Units 2d ago   Oxcarbazepine 3 - 35 mcg/mL 2 Abnormally low        Ref Range & Units 2d ago   Topiramate Lvl mcg/mL 10.2    Comment: -------------------REFERENCE VALUE--------------------------   Reference values depend on  clinical use:   Anticonvulsant: 5.0-20.0 mcg/mL   Psychiatric: 2.0-8.0 mcg/mL        All pertinent lab results from the past 24 hours have been reviewed.    Significant Studies: I have reviewed and interpreted all pertinent imaging results/findings within the past 24 hours.     Continuous video EEG (1/14/19-1/16/19):  No epileptiform discharges, periodic discharges, lateralized rhythmic delta activity or electrographic seizures were seen.

## 2019-01-16 NOTE — ASSESSMENT & PLAN NOTE
Symptomatology not typical for migraines, more likely secondary to REN vs tension headache  On Topamax 150 mg q12   Holding Topamax while in EMU  Management of headache with tylenol and Norco PRN

## 2019-01-16 NOTE — ASSESSMENT & PLAN NOTE
Long term Hx of recurrent seizure events since age of 8, formally diagnosed ~2008  Hx of seizure in family + multiple traumatic psychiatric events in the past. 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   on OXCBZ 150 qd + Topiramate 150 mg q12. Admitted to EMU on 1/14 for diagnostic classification of her events and adjustment of medication.    - no abnormalities on EEG so far -> will continue to monitor on continuous video EEG off of AEDs -> HV and PS today and sleep deprivation for tonight followed by chemical activation in the morning (will hold tonight's dose of klonopin)  - Topiramate level normal, OXCBZ subtherapeutic  - seizure and fall precautions  - Orthostatic VS was negative, however she states that events ALWAYS happen when she is standing/walking -> no events while walking to the bathroom, will try to walk her for longer period of time, preferably with PT  - Continuous cardiac monitoring  - unable to fit in the schedule for tilt-table test, will require outpatient TTT

## 2019-01-16 NOTE — PROGRESS NOTES
[facility]  Neurology-Epilepsy  Progress Note    Patient Name: Jennifer Brady  MRN: [id number]  Admission Date: 1/14/2019  Hospital Length of Stay: 2 days  Code Status: Full Code   Attending Provider: Andrea Martini MD  Primary Care Physician: Arvind Juarez MD   Principal Problem:Epilepsy    Subjective:     Hospital Course:   1/14/19: admitted to EMU and started on continuous vEEG monitoring  1/15/19: NAEON. EEG unremarkable  1/16/19: NAEON. Patient c/o occipital headache that responded to tylenol and Norco, no clinical events captured so far    Interval History:  No clinical events overnight and this morning, c/o occipital HA that responded to tylenol and Norco. neurological exam unchanged. EEG remains WNL.    Current Facility-Administered Medications   Medication Dose Route Frequency Provider Last Rate Last Dose    acetaminophen tablet 650 mg  650 mg Oral Q4H PRN Carlita Horvath MD   650 mg at 01/16/19 0956    aspirin EC tablet 81 mg  81 mg Oral Daily Carlita Horvath MD   81 mg at 01/16/19 0952    atorvastatin tablet 40 mg  40 mg Oral Daily Carlita Horvath MD   40 mg at 01/16/19 0952    clonazePAM tablet 0.5 mg  0.5 mg Oral BID Carlita Horvath MD   0.5 mg at 01/16/19 0951    clopidogrel tablet 75 mg  75 mg Oral Daily Carlita Horvath MD   75 mg at 01/16/19 0953    [START ON 1/17/2019] diphenhydrAMINE capsule 50 mg  50 mg Oral Once Carlita Horvath MD        docusate sodium capsule 100 mg  100 mg Oral Daily PRN Carlita Horvath MD        FLUoxetine capsule 80 mg  80 mg Oral Daily Carlita Horvath MD   80 mg at 01/16/19 0953    HYDROcodone-acetaminophen 5-325 mg per tablet 1 tablet  1 tablet Oral Q8H PRN Carlita Horvath MD   1 tablet at 01/16/19 0432    influenza (FLUZONE QUADRIVALENT) vaccine 0.5 mL  0.5 mL Intramuscular vaccine x 1 dose Trish Mead MD        lisinopril-hydrochlorothiazide 10-12.5 mg per tablet 1 tablet  1 tablet Oral Daily Carlita Horvath MD   1  tablet at 01/16/19 0953    lurasidone tablet 40 mg  40 mg Oral Daily Carlita Horvath MD   40 mg at 01/16/19 0951    ondansetron disintegrating tablet 8 mg  8 mg Oral Q8H PRN Carlita Horvath MD        sodium chloride 0.9% flush 3 mL  3 mL Intravenous PRN Carlita Horvath MD        [START ON 1/17/2019] traMADol tablet 100 mg  100 mg Oral Once Carlita Horvath MD         Review of Systems   Constitutional: Negative for chills, fatigue and fever.   HENT: Negative for drooling, hearing loss, trouble swallowing and voice change.    Eyes: Negative for photophobia and visual disturbance.   Respiratory: Negative for choking and shortness of breath.    Cardiovascular: Negative for chest pain and palpitations.   Gastrointestinal: Negative for abdominal pain, nausea and vomiting.   Genitourinary: Negative for decreased urine volume and frequency.   Musculoskeletal: Negative for back pain, gait problem and neck pain.   Skin: Negative for color change and rash.   Allergic/Immunologic: Negative for immunocompromised state.   Neurological: Positive for headaches. Negative for dizziness, syncope, speech difficulty, weakness and numbness.   Psychiatric/Behavioral: Negative for agitation, confusion, decreased concentration, hallucinations and sleep disturbance.     Objective:     Vital Signs (Most Recent):  Temp: 97.7 °F (36.5 °C) (01/16/19 1102)  Pulse: 80 (01/16/19 1126)  Resp: 14 (01/16/19 1102)  BP: 138/72 (01/16/19 1102)  SpO2: (!) 93 % (01/16/19 1102) Vital Signs (24h Range):  Temp:  [96.4 °F (35.8 °C)-98.4 °F (36.9 °C)] 97.7 °F (36.5 °C)  Pulse:  [69-83] 80  Resp:  [14-18] 14  SpO2:  [93 %-100 %] 93 %  BP: (123-142)/(61-78) 138/72     Weight: 88 kg (194 lb)  Body mass index is 28.24 kg/m².    Physical Exam   Constitutional: She is oriented to person, place, and time. She appears well-developed and well-nourished. She is cooperative. She does not appear ill. No distress.   Obese AAF, in no apparent distress, mood slightly  improved   HENT:   Head: Normocephalic and atraumatic.   Mouth/Throat: Mucous membranes are normal.   Eyes: Conjunctivae and EOM are normal. Pupils are equal, round, and reactive to light.   Neck: Normal range of motion.   Cardiovascular: Normal rate, regular rhythm and normal heart sounds.   No murmur heard.  Pulses:       Radial pulses are 2+ on the right side, and 2+ on the left side.   Pulmonary/Chest: Effort normal. No accessory muscle usage. No tachypnea. No respiratory distress. She has no wheezes. She has no rales.   Abdominal: Soft. Bowel sounds are normal. She exhibits no distension. There is no tenderness.   Musculoskeletal: She exhibits no edema.        Right shoulder: She exhibits decreased range of motion.        Left wrist: She exhibits decreased range of motion (recent surgery).   Neurological: She is alert and oriented to person, place, and time. She has normal strength. She has a normal Finger-Nose-Finger Test. She displays no seizure activity. Gait normal.   Reflex Scores:       Bicep reflexes are 2+ on the right side and 2+ on the left side.       Brachioradialis reflexes are 2+ on the right side and 2+ on the left side.       Patellar reflexes are 2+ on the right side and 2+ on the left side.       Achilles reflexes are 1+ on the right side and 1+ on the left side.  Skin: Skin is warm. She is not diaphoretic. No cyanosis or erythema.   Psychiatric: She has a normal mood and affect. Her speech is normal and behavior is normal. Thought content normal. Cognition and memory are normal.        Nursing note and vitals reviewed.      NEUROLOGICAL EXAMINATION:     MENTAL STATUS   Oriented to person, place, and time.   Follows 3 step commands.   Attention: normal. Concentration: normal.   Speech: speech is normal   Level of consciousness: alert  Knowledge: consistent with education.   Able to name object. Praxis: normal     CRANIAL NERVES     CN III, IV, VI   Pupils are equal, round, and reactive to  light.  Extraocular motions are normal.   Nystagmus: none   Diplopia: none  Ophthalmoparesis: none    CN V   Facial sensation intact.     CN VII   Facial expression full, symmetric.     CN VIII   Hearing: intact    CN IX, X   Palate: symmetric    CN XI   CN XI normal.     CN XII   CN XII normal.     MOTOR EXAM   Muscle bulk: normal  Overall muscle tone: normal    Strength   Strength 5/5 throughout.        Examination of UE limited due to rotator cuff tear on R and recent wrist surgery on L     REFLEXES     Reflexes   Right brachioradialis: 2+  Left brachioradialis: 2+  Right biceps: 2+  Left biceps: 2+  Right patellar: 2+  Left patellar: 2+  Right achilles: 1+  Left achilles: 1+  Right plantar: normal  Left plantar: normal    SENSORY EXAM   Light touch normal.   Right leg vibration: decreased from toes  Left leg vibration: decreased from toes       Sensation to temperature is preserved     GAIT AND COORDINATION     Gait  Gait: normal     Coordination   Finger to nose coordination: normal    Tremor   Resting tremor: absent  Intention tremor: absent  Action tremor: absent      Significant Labs:     CBC:   Recent Labs   Lab 01/14/19  1346   WBC 8.86   HGB 11.2*   HCT 35.6*   *     CMP:   Recent Labs   Lab 01/14/19  1346         K 3.2*      CO2 26   BUN 10   CREATININE 1.0   CALCIUM 9.5   MG 2.1   PROT 7.2   ALBUMIN 3.5   BILITOT 0.3   ALKPHOS 167*   AST 17   ALT 16   ANIONGAP 8   EGFRNONAA >60.0     POCT Glucose: No results for input(s): POCTGLUCOSE in the last 24 hours.  Urine Studies:   Recent Labs   Lab 01/14/19  1535   COLORU Yellow   APPEARANCEUA Cloudy*   PHUR 7.0   SPECGRAV 1.020   PROTEINUA Negative   GLUCUA Negative   KETONESU Negative   BILIRUBINUA Negative   OCCULTUA Negative   NITRITE Negative   LEUKOCYTESUR 3+*   RBCUA 3   WBCUA 28*   BACTERIA Many*   SQUAMEPITHEL 2   HYALINECASTS 0     Drug screen panel, emergency    Ref Range & Units 2d ago   Benzodiazepines  Negative     Methadone metabolites  Negative    Cocaine (Metab.)  Negative    Opiate Scrn, Ur  Presumptive Positive    Barbiturate Screen, Ur  Negative    Amphetamine Screen, Ur  Negative    THC  Negative    Phencyclidine  Negative    Creatinine, Random Ur 15.0 - 325.0 mg/dL 179.0               Ref Range & Units 2d ago   TSH 0.400 - 4.000 uIU/mL 0.503       Ref Range & Units 2d ago   Oxcarbazepine 3 - 35 mcg/mL 2 Abnormally low        Ref Range & Units 2d ago   Topiramate Lvl mcg/mL 10.2    Comment: -------------------REFERENCE VALUE--------------------------   Reference values depend on clinical use:   Anticonvulsant: 5.0-20.0 mcg/mL   Psychiatric: 2.0-8.0 mcg/mL        All pertinent lab results from the past 24 hours have been reviewed.    Significant Studies: I have reviewed and interpreted all pertinent imaging results/findings within the past 24 hours.     Continuous video EEG (1/14/19-1/16/19):  No epileptiform discharges, periodic discharges, lateralized rhythmic delta activity or electrographic seizures were seen.      Assessment and Plan:     * Epilepsy    Long term Hx of recurrent seizure events since age of 8, formally diagnosed ~2008  Hx of seizure in family + multiple traumatic psychiatric events in the past. 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   on OXCBZ 150 qd + Topiramate 150 mg q12. Admitted to EMU on 1/14 for diagnostic classification of her events and adjustment of medication.    - no abnormalities on EEG so far -> will continue to monitor on continuous video EEG off of AEDs -> HV and PS today and sleep deprivation for tonight followed by chemical activation in the morning (will hold tonight's dose of klonopin)  - Topiramate level normal, OXCBZ subtherapeutic  - seizure and fall precautions  - Orthostatic VS was negative, however she states that events ALWAYS happen when she is standing/walking -> no events while walking to the bathroom, will try to walk her for longer period of time,  preferably with PT  - Continuous cardiac monitoring  - unable to fit in the schedule for tilt-table test, will require outpatient TTT     Adjustment disorder with mixed anxiety and depressed mood    [place] [facility] [place]  On Fluoxetine 80 mg qd + Clonazepam 1 mg q12 + Lurasidone 40 mg qd  Will continue at home dose except Clonazepam -> decreased to 0.5 q12 while in EMU     Hx of migraine headaches    Symptomatology not typical for migraines, more likely secondary to REN vs tension headache  On Topamax 150 mg q12   Holding Topamax while in EMU  Management of headache with tylenol and Norco PRN     Obesity    BMI 28.6 -> overweight  Life style modification     Coronary artery disease involving native coronary artery of native heart with unstable angina pectoris    Will continue on DAPT and statin   Most recent A1C and LDL in 4/2018 WNL  Counseling on smoking cessation     REN (obstructive sleep apnea)    Contributing to the headaches  On CPAP at home intermittently     HTN (hypertension)    On lisinopril-HCTZ 10-12.5 mg qd, will continue  Monitor BP q4 hr     Depression    On Fluoxetine 80 mh qd, will continue         VTE Risk Mitigation (From admission, onward)        Ordered     IP VTE LOW RISK PATIENT  Once      01/14/19 1345     Place sequential compression device  Until discontinued      01/14/19 1346          Carlita Horvath MD  Neurology-Epilepsy  [facility]

## 2019-01-16 NOTE — PT/OT/SLP PROGRESS
Physical Therapy  Discharge Orders      Patient Name:  Jennifer Brady   MRN:  [id number]    PT consult received and acknowledged.  The Rn and PCT report the patient is ambulating to and from the bathroom without difficulty.  No change in mobility since admission.  Skilled PT services do not appear necessary at this time.  PT spoke with EMU regarding PT referral and no identified therapy needs.  Will discharge physical therapy orders.   Recommend patient ambulate with nursing assistance during this admission.  If she experiences a significant change in mobility please re-consult therapy.     Candelaria Neumann, PT   1/16/2019

## 2019-01-16 NOTE — PLAN OF CARE
Problem: Adult Inpatient Plan of Care  Goal: Plan of Care Review  Outcome: Ongoing (interventions implemented as appropriate)  Patient is alert and oriented times four. Denies needs. No acute distress noted. Refer to flow sheets for full assessment.  10:55 EEG tech reached nursing stating pt was having an episode. Lasted three to five minutes. Not witnessed by nursing. No post-epliptic activity noted. Comfort measures given. Vital signs taken. Alert and oriented times four. Provider notified of episode.

## 2019-01-17 PROBLEM — R56.9 SEIZURES: Status: ACTIVE | Noted: 2018-02-26

## 2019-01-17 PROBLEM — R40.20 LOC (LOSS OF CONSCIOUSNESS): Status: ACTIVE | Noted: 2018-02-26

## 2019-01-17 LAB
ANION GAP SERPL CALC-SCNC: 8 MMOL/L
BUN SERPL-MCNC: 11 MG/DL
CALCIUM SERPL-MCNC: 10.3 MG/DL
CHLORIDE SERPL-SCNC: 107 MMOL/L
CO2 SERPL-SCNC: 24 MMOL/L
CREAT SERPL-MCNC: 1 MG/DL
EST. GFR  (AFRICAN AMERICAN): >60 ML/MIN/1.73 M^2
EST. GFR  (NON AFRICAN AMERICAN): >60 ML/MIN/1.73 M^2
GLUCOSE SERPL-MCNC: 93 MG/DL
POTASSIUM SERPL-SCNC: 3.6 MMOL/L
SODIUM SERPL-SCNC: 139 MMOL/L

## 2019-01-17 PROCEDURE — 99233 PR SUBSEQUENT HOSPITAL CARE,LEVL III: ICD-10-PCS | Mod: ,,, | Performed by: PSYCHIATRY & NEUROLOGY

## 2019-01-17 PROCEDURE — 20600001 HC STEP DOWN PRIVATE ROOM

## 2019-01-17 PROCEDURE — 95951 PR EEG MONITORING/VIDEORECORD: CPT | Mod: 26,,, | Performed by: PSYCHIATRY & NEUROLOGY

## 2019-01-17 PROCEDURE — 25000003 PHARM REV CODE 250: Performed by: STUDENT IN AN ORGANIZED HEALTH CARE EDUCATION/TRAINING PROGRAM

## 2019-01-17 PROCEDURE — 99233 SBSQ HOSP IP/OBS HIGH 50: CPT | Mod: ,,, | Performed by: PSYCHIATRY & NEUROLOGY

## 2019-01-17 PROCEDURE — 80048 BASIC METABOLIC PNL TOTAL CA: CPT

## 2019-01-17 PROCEDURE — 95951 HC EEG MONITORING/VIDEO RECORD: CPT

## 2019-01-17 PROCEDURE — 36415 COLL VENOUS BLD VENIPUNCTURE: CPT

## 2019-01-17 PROCEDURE — 95951 PR EEG MONITORING/VIDEORECORD: ICD-10-PCS | Mod: 26,,, | Performed by: PSYCHIATRY & NEUROLOGY

## 2019-01-17 RX ORDER — HYDROCODONE BITARTRATE AND ACETAMINOPHEN 5; 325 MG/1; MG/1
1 TABLET ORAL EVERY 6 HOURS PRN
Status: DISCONTINUED | OUTPATIENT
Start: 2019-01-17 | End: 2019-01-18 | Stop reason: HOSPADM

## 2019-01-17 RX ADMIN — ATORVASTATIN CALCIUM 40 MG: 20 TABLET, FILM COATED ORAL at 08:01

## 2019-01-17 RX ADMIN — FLUOXETINE 80 MG: 20 CAPSULE ORAL at 08:01

## 2019-01-17 RX ADMIN — TRAMADOL HYDROCHLORIDE 100 MG: 50 TABLET, COATED ORAL at 03:01

## 2019-01-17 RX ADMIN — LISINOPRIL AND HYDROCHLOROTHIAZIDE 1 TABLET: 12.5; 1 TABLET ORAL at 08:01

## 2019-01-17 RX ADMIN — CLOPIDOGREL 75 MG: 75 TABLET, FILM COATED ORAL at 08:01

## 2019-01-17 RX ADMIN — CLONAZEPAM 0.5 MG: 0.5 TABLET ORAL at 08:01

## 2019-01-17 RX ADMIN — ASPIRIN 81 MG: 81 TABLET, COATED ORAL at 08:01

## 2019-01-17 RX ADMIN — HYDROCODONE BITARTRATE AND ACETAMINOPHEN 1 TABLET: 5; 325 TABLET ORAL at 06:01

## 2019-01-17 RX ADMIN — LURASIDONE HYDROCHLORIDE 40 MG: 40 TABLET, FILM COATED ORAL at 08:01

## 2019-01-17 RX ADMIN — DIPHENHYDRAMINE HYDROCHLORIDE 50 MG: 50 CAPSULE ORAL at 03:01

## 2019-01-17 RX ADMIN — HYDROCODONE BITARTRATE AND ACETAMINOPHEN 1 TABLET: 5; 325 TABLET ORAL at 11:01

## 2019-01-17 NOTE — ASSESSMENT & PLAN NOTE
Symptomatology not typical for migraines, more likely secondary to REN vs tension headache  On Topamax 150 mg q12   Holding Topamax while in EMU, will resume on discharge  Management of headache with tylenol and Norco PRN

## 2019-01-17 NOTE — SUBJECTIVE & OBJECTIVE
Interval History:  One episode of shaking in the upper extremities after HV yesterday noon, no EEG correlation. Sleep deprived overnight, received Benadryl/Tramadol this morning. No clinical or electrographic events captured so far. Neuro exam remains unchanged.  Patient walking with the RN and EEG technician in the afternoon, typical events were not provoked.    Current Facility-Administered Medications   Medication Dose Route Frequency Provider Last Rate Last Dose    acetaminophen tablet 650 mg  650 mg Oral Q4H PRN Carlita Horvath MD   650 mg at 01/16/19 0956    aspirin EC tablet 81 mg  81 mg Oral Daily Carlita Horvath MD   81 mg at 01/17/19 0802    atorvastatin tablet 40 mg  40 mg Oral Daily Carlita Horvath MD   40 mg at 01/17/19 0802    clonazePAM tablet 0.5 mg  0.5 mg Oral BID Carlita Horvath MD   0.5 mg at 01/17/19 0802    clopidogrel tablet 75 mg  75 mg Oral Daily Carlita Horvath MD   75 mg at 01/17/19 0803    docusate sodium capsule 100 mg  100 mg Oral Daily PRN Carlita Horvath MD        FLUoxetine capsule 80 mg  80 mg Oral Daily Carlita Horvath MD   80 mg at 01/17/19 0802    HYDROcodone-acetaminophen 5-325 mg per tablet 1 tablet  1 tablet Oral Q8H PRN Carlita Horvath MD   1 tablet at 01/17/19 0619    influenza (FLUZONE QUADRIVALENT) vaccine 0.5 mL  0.5 mL Intramuscular vaccine x 1 dose Trish Mead MD        lisinopril-hydrochlorothiazide 10-12.5 mg per tablet 1 tablet  1 tablet Oral Daily Carlita Horvath MD   1 tablet at 01/17/19 0802    lurasidone tablet 40 mg  40 mg Oral Daily Carlita Horvath MD   40 mg at 01/17/19 0803    ondansetron disintegrating tablet 8 mg  8 mg Oral Q8H PRN Carlita Horvath MD        sodium chloride 0.9% flush 3 mL  3 mL Intravenous PRN Carlita Horvath MD           Review of Systems   Constitutional: Negative for chills, fatigue and fever.   HENT: Negative for drooling, hearing loss, trouble swallowing and voice change.    Eyes: Negative for photophobia and  visual disturbance.   Respiratory: Negative for choking and shortness of breath.    Cardiovascular: Negative for chest pain and palpitations.   Gastrointestinal: Negative for abdominal pain, nausea and vomiting.   Genitourinary: Negative for decreased urine volume, dysuria, frequency, hematuria and urgency.   Musculoskeletal: Negative for back pain, gait problem and neck pain.   Skin: Negative for color change and rash.   Allergic/Immunologic: Negative for immunocompromised state.   Neurological: Positive for headaches. Negative for dizziness, syncope, speech difficulty, weakness and numbness.   Psychiatric/Behavioral: Negative for agitation, confusion, decreased concentration, hallucinations and sleep disturbance.     Objective:     Vital Signs (Most Recent):  Temp: 97.5 °F (36.4 °C) (01/17/19 0758)  Pulse: 76 (01/17/19 0758)  Resp: 20 (01/17/19 0758)  BP: (!) 161/73 (01/17/19 0758)  SpO2: 99 % (01/17/19 0758) Vital Signs (24h Range):  Temp:  [97.5 °F (36.4 °C)-99.5 °F (37.5 °C)] 97.5 °F (36.4 °C)  Pulse:  [67-82] 76  Resp:  [14-20] 20  SpO2:  [93 %-99 %] 99 %  BP: (138-161)/(64-88) 161/73     Weight: 88 kg (194 lb)  Body mass index is 28.24 kg/m².    Physical Exam   Constitutional: She is oriented to person, place, and time. She appears well-developed and well-nourished. She is cooperative. She does not appear ill. No distress.   Obese AAF, in no apparent distress   HENT:   Head: Normocephalic and atraumatic.   Mouth/Throat: Mucous membranes are normal.   Eyes: Conjunctivae and EOM are normal. Pupils are equal, round, and reactive to light.   Neck: Normal range of motion.   Cardiovascular: Normal rate, regular rhythm and normal heart sounds.   No murmur heard.  Pulses:       Radial pulses are 2+ on the right side, and 2+ on the left side.   Pulmonary/Chest: Effort normal. No accessory muscle usage. No tachypnea. No respiratory distress. She has no wheezes. She has no rales.   Abdominal: Soft. Bowel sounds are  normal. She exhibits no distension. There is no tenderness.   Musculoskeletal: She exhibits no edema.        Right shoulder: She exhibits decreased range of motion.        Left wrist: She exhibits decreased range of motion (recent surgery).   Neurological: She is alert and oriented to person, place, and time. She has normal strength. She has a normal Finger-Nose-Finger Test. She displays no seizure activity. Gait normal.   Reflex Scores:       Bicep reflexes are 2+ on the right side and 2+ on the left side.       Brachioradialis reflexes are 2+ on the right side and 2+ on the left side.       Patellar reflexes are 2+ on the right side and 2+ on the left side.       Achilles reflexes are 1+ on the right side and 1+ on the left side.  Skin: Skin is warm. She is not diaphoretic. No cyanosis or erythema.   Psychiatric: She has a normal mood and affect. Her speech is normal and behavior is normal. Thought content normal. Cognition and memory are normal.        Nursing note and vitals reviewed.      NEUROLOGICAL EXAMINATION:     MENTAL STATUS   Oriented to person, place, and time.   Follows 3 step commands.   Attention: normal. Concentration: normal.   Speech: speech is normal   Level of consciousness: alert  Knowledge: consistent with education.   Able to name object. Praxis: normal     CRANIAL NERVES     CN III, IV, VI   Pupils are equal, round, and reactive to light.  Extraocular motions are normal.   Nystagmus: none   Diplopia: none  Ophthalmoparesis: none    CN V   Facial sensation intact.     CN VII   Facial expression full, symmetric.     CN VIII   Hearing: intact    CN IX, X   Palate: symmetric    CN XI   CN XI normal.     CN XII   CN XII normal.     MOTOR EXAM   Muscle bulk: normal  Overall muscle tone: normal    Strength   Strength 5/5 throughout.        Examination of UE limited due to rotator cuff tear on R and recent wrist surgery on L     REFLEXES     Reflexes   Right brachioradialis: 2+  Left  brachioradialis: 2+  Right biceps: 2+  Left biceps: 2+  Right patellar: 2+  Left patellar: 2+  Right achilles: 1+  Left achilles: 1+  Right plantar: normal  Left plantar: normal    SENSORY EXAM   Light touch normal.   Right leg vibration: decreased from toes  Left leg vibration: decreased from toes       Sensation to temperature is preserved     GAIT AND COORDINATION     Gait  Gait: normal     Coordination   Finger to nose coordination: normal    Tremor   Resting tremor: absent  Intention tremor: absent  Action tremor: absent      Significant Labs:   CBC:       Recent Labs   Lab 01/14/19  1346   WBC 8.86   HGB 11.2*   HCT 35.6*   *      CMP:       Recent Labs   Lab 01/14/19  1346         K 3.2*      CO2 26   BUN 10   CREATININE 1.0   CALCIUM 9.5   MG 2.1   PROT 7.2   ALBUMIN 3.5   BILITOT 0.3   ALKPHOS 167*   AST 17   ALT 16   ANIONGAP 8   EGFRNONAA >60.0        Urine Studies:       Recent Labs   Lab 01/14/19  1535   COLORU Yellow   APPEARANCEUA Cloudy*   PHUR 7.0   SPECGRAV 1.020   PROTEINUA Negative   GLUCUA Negative   KETONESU Negative   BILIRUBINUA Negative   OCCULTUA Negative   NITRITE Negative   LEUKOCYTESUR 3+*   RBCUA 3   WBCUA 28*   BACTERIA Many*   SQUAMEPITHEL 2   HYALINECASTS        Drug screen panel, emergency    Ref Range & Units 2d ago   Benzodiazepines  Negative    Methadone metabolites  Negative    Cocaine (Metab.)  Negative    Opiate Scrn, Ur  Presumptive Positive    Barbiturate Screen, Ur  Negative    Amphetamine Screen, Ur  Negative    THC  Negative    Phencyclidine  Negative    Creatinine, Random Ur 15.0 - 325.0 mg/dL 179.0               Ref Range & Units 2d ago   TSH 0.400 - 4.000 uIU/mL 0.503       Ref Range & Units 2d ago   Oxcarbazepine 3 - 35 mcg/mL 2 Abnormally low        Ref Range & Units 2d ago   Topiramate Lvl mcg/mL 10.2    Comment: -------------------REFERENCE VALUE--------------------------   Reference values depend on clinical use:   Anticonvulsant:  5.0-20.0 mcg/mL   Psychiatric: 2.0-8.0 mcg/mL        All pertinent lab results from the past 24 hours have been reviewed.    Significant Studies: I have reviewed and interpreted all pertinent imaging results/findings within the past 24 hours.     Continuous video EEG (1/14/19-1/17/19):  No epileptiform discharges, periodic discharges, lateralized rhythmic delta activity or electrographic seizures were seen.    ECG (1/14/19):  Normal sinus rhythm and rate  Normal axis  No TWI or MARY  QTc 475

## 2019-01-17 NOTE — PLAN OF CARE
Problem: Adult Inpatient Plan of Care  Goal: Plan of Care Review  Outcome: Ongoing (interventions implemented as appropriate)  POC reviewed with patient. All questions and concerns reviewed. VSS throughout shift. Fall/safety/seizure precautions implemented and maintained. Patient sleep deprived this shift. No events noted. In good spirits. Bed locked in lowest position. Call bell within reach. Will continue to monitor.

## 2019-01-17 NOTE — PLAN OF CARE
Problem: Adult Inpatient Plan of Care  Goal: Plan of Care Review  Outcome: Ongoing (interventions implemented as appropriate)  Plan of care reviewed with pt. Pt. Remains free from falls/trauma/injury. Pain control PRN. No events noted thus far this shift. Pt. Ambulated in hallway with stand by assist from RN and EEG tech. Pt. Tolerating plan of care well. Will continue to monitor.

## 2019-01-17 NOTE — ASSESSMENT & PLAN NOTE
[place] [facility] [place]  On Fluoxetine 80 mg qd + Clonazepam 1 mg q12 + Lurasidone 40 mg qd  Will continue at home dose except Clonazepam -> decreased to 0.5 q12 while in EMU

## 2019-01-17 NOTE — PROGRESS NOTES
[facility]  Neurology-Epilepsy  Progress Note    Patient Name: Jennifer Brady  MRN: [id number]  Admission Date: 1/14/2019  Hospital Length of Stay: 3 days  Code Status: Full Code   Attending Provider: Andrea Martini MD  Primary Care Physician: Arvind Juarez MD   Principal Problem:Seizures    Subjective:     Hospital Course:   1/14/19: admitted to EMU and started on continuous vEEG monitoring  1/15/19: NAEON. EEG unremarkable  1/16/19: NAEON. Patient c/o occipital headache that responded to tylenol and Norco, no clinical events captured so far  1/17/19: sleep deprived overnight followed by chemical activation this morning. No events captured    Interval History:  One episode of shaking in the upper extremities after HV yesterday noon, no EEG correlation. Sleep deprived overnight, received Benadryl/Tramadol this morning. No clinical or electrographic events captured so far. Neuro exam remains unchanged.  Patient walking with the RN and EEG technician in the afternoon, typical events were not provoked.    Current Facility-Administered Medications   Medication Dose Route Frequency Provider Last Rate Last Dose    acetaminophen tablet 650 mg  650 mg Oral Q4H PRN Carlita Horvath MD   650 mg at 01/16/19 0956    aspirin EC tablet 81 mg  81 mg Oral Daily Carlita Horvath MD   81 mg at 01/17/19 0802    atorvastatin tablet 40 mg  40 mg Oral Daily Carlita Horvath MD   40 mg at 01/17/19 0802    clonazePAM tablet 0.5 mg  0.5 mg Oral BID Carlita Horvath MD   0.5 mg at 01/17/19 0802    clopidogrel tablet 75 mg  75 mg Oral Daily Carlita Horvath MD   75 mg at 01/17/19 0803    docusate sodium capsule 100 mg  100 mg Oral Daily PRN Carlita Horvath MD        FLUoxetine capsule 80 mg  80 mg Oral Daily Carlita Horvath MD   80 mg at 01/17/19 0802    HYDROcodone-acetaminophen 5-325 mg per tablet 1 tablet  1 tablet Oral Q8H PRN Carlita Horvath MD   1 tablet at 01/17/19 0619    influenza (FLUZONE  QUADRIVALENT) vaccine 0.5 mL  0.5 mL Intramuscular vaccine x 1 dose Trish Mead MD        lisinopril-hydrochlorothiazide 10-12.5 mg per tablet 1 tablet  1 tablet Oral Daily Carlita Horvath MD   1 tablet at 01/17/19 0802    lurasidone tablet 40 mg  40 mg Oral Daily Carlita Horvath MD   40 mg at 01/17/19 0803    ondansetron disintegrating tablet 8 mg  8 mg Oral Q8H PRN Carlita Horvath MD        sodium chloride 0.9% flush 3 mL  3 mL Intravenous PRN Carlita Horvath MD           Review of Systems   Constitutional: Negative for chills, fatigue and fever.   HENT: Negative for drooling, hearing loss, trouble swallowing and voice change.    Eyes: Negative for photophobia and visual disturbance.   Respiratory: Negative for choking and shortness of breath.    Cardiovascular: Negative for chest pain and palpitations.   Gastrointestinal: Negative for abdominal pain, nausea and vomiting.   Genitourinary: Negative for decreased urine volume, dysuria, frequency, hematuria and urgency.   Musculoskeletal: Negative for back pain, gait problem and neck pain.   Skin: Negative for color change and rash.   Allergic/Immunologic: Negative for immunocompromised state.   Neurological: Positive for headaches. Negative for dizziness, syncope, speech difficulty, weakness and numbness.   Psychiatric/Behavioral: Negative for agitation, confusion, decreased concentration, hallucinations and sleep disturbance.     Objective:     Vital Signs (Most Recent):  Temp: 97.5 °F (36.4 °C) (01/17/19 0758)  Pulse: 76 (01/17/19 0758)  Resp: 20 (01/17/19 0758)  BP: (!) 161/73 (01/17/19 0758)  SpO2: 99 % (01/17/19 0758) Vital Signs (24h Range):  Temp:  [97.5 °F (36.4 °C)-99.5 °F (37.5 °C)] 97.5 °F (36.4 °C)  Pulse:  [67-82] 76  Resp:  [14-20] 20  SpO2:  [93 %-99 %] 99 %  BP: (138-161)/(64-88) 161/73     Weight: 88 kg (194 lb)  Body mass index is 28.24 kg/m².    Physical Exam   Constitutional: She is oriented to person, place, and time. She appears  well-developed and well-nourished. She is cooperative. She does not appear ill. No distress.   Obese AAF, in no apparent distress   HENT:   Head: Normocephalic and atraumatic.   Mouth/Throat: Mucous membranes are normal.   Eyes: Conjunctivae and EOM are normal. Pupils are equal, round, and reactive to light.   Neck: Normal range of motion.   Cardiovascular: Normal rate, regular rhythm and normal heart sounds.   No murmur heard.  Pulses:       Radial pulses are 2+ on the right side, and 2+ on the left side.   Pulmonary/Chest: Effort normal. No accessory muscle usage. No tachypnea. No respiratory distress. She has no wheezes. She has no rales.   Abdominal: Soft. Bowel sounds are normal. She exhibits no distension. There is no tenderness.   Musculoskeletal: She exhibits no edema.        Right shoulder: She exhibits decreased range of motion.        Left wrist: She exhibits decreased range of motion (recent surgery).   Neurological: She is alert and oriented to person, place, and time. She has normal strength. She has a normal Finger-Nose-Finger Test. She displays no seizure activity. Gait normal.   Reflex Scores:       Bicep reflexes are 2+ on the right side and 2+ on the left side.       Brachioradialis reflexes are 2+ on the right side and 2+ on the left side.       Patellar reflexes are 2+ on the right side and 2+ on the left side.       Achilles reflexes are 1+ on the right side and 1+ on the left side.  Skin: Skin is warm. She is not diaphoretic. No cyanosis or erythema.   Psychiatric: She has a normal mood and affect. Her speech is normal and behavior is normal. Thought content normal. Cognition and memory are normal.        Nursing note and vitals reviewed.      NEUROLOGICAL EXAMINATION:     MENTAL STATUS   Oriented to person, place, and time.   Follows 3 step commands.   Attention: normal. Concentration: normal.   Speech: speech is normal   Level of consciousness: alert  Knowledge: consistent with education.    Able to name object. Praxis: normal     CRANIAL NERVES     CN III, IV, VI   Pupils are equal, round, and reactive to light.  Extraocular motions are normal.   Nystagmus: none   Diplopia: none  Ophthalmoparesis: none    CN V   Facial sensation intact.     CN VII   Facial expression full, symmetric.     CN VIII   Hearing: intact    CN IX, X   Palate: symmetric    CN XI   CN XI normal.     CN XII   CN XII normal.     MOTOR EXAM   Muscle bulk: normal  Overall muscle tone: normal    Strength   Strength 5/5 throughout.        Examination of UE limited due to rotator cuff tear on R and recent wrist surgery on L     REFLEXES     Reflexes   Right brachioradialis: 2+  Left brachioradialis: 2+  Right biceps: 2+  Left biceps: 2+  Right patellar: 2+  Left patellar: 2+  Right achilles: 1+  Left achilles: 1+  Right plantar: normal  Left plantar: normal    SENSORY EXAM   Light touch normal.   Right leg vibration: decreased from toes  Left leg vibration: decreased from toes       Sensation to temperature is preserved     GAIT AND COORDINATION     Gait  Gait: normal     Coordination   Finger to nose coordination: normal    Tremor   Resting tremor: absent  Intention tremor: absent  Action tremor: absent      Significant Labs:   CBC:       Recent Labs   Lab 01/14/19  1346   WBC 8.86   HGB 11.2*   HCT 35.6*   *      CMP:       Recent Labs   Lab 01/14/19  1346         K 3.2*      CO2 26   BUN 10   CREATININE 1.0   CALCIUM 9.5   MG 2.1   PROT 7.2   ALBUMIN 3.5   BILITOT 0.3   ALKPHOS 167*   AST 17   ALT 16   ANIONGAP 8   EGFRNONAA >60.0        Urine Studies:       Recent Labs   Lab 01/14/19  1535   COLORU Yellow   APPEARANCEUA Cloudy*   PHUR 7.0   SPECGRAV 1.020   PROTEINUA Negative   GLUCUA Negative   KETONESU Negative   BILIRUBINUA Negative   OCCULTUA Negative   NITRITE Negative   LEUKOCYTESUR 3+*   RBCUA 3   WBCUA 28*   BACTERIA Many*   SQUAMEPITHEL 2   HYALINECASTS        Drug screen panel, emergency    Ref  Range & Units 2d ago   Benzodiazepines  Negative    Methadone metabolites  Negative    Cocaine (Metab.)  Negative    Opiate Scrn, Ur  Presumptive Positive    Barbiturate Screen, Ur  Negative    Amphetamine Screen, Ur  Negative    THC  Negative    Phencyclidine  Negative    Creatinine, Random Ur 15.0 - 325.0 mg/dL 179.0               Ref Range & Units 2d ago   TSH 0.400 - 4.000 uIU/mL 0.503       Ref Range & Units 2d ago   Oxcarbazepine 3 - 35 mcg/mL 2 Abnormally low        Ref Range & Units 2d ago   Topiramate Lvl mcg/mL 10.2    Comment: -------------------REFERENCE VALUE--------------------------   Reference values depend on clinical use:   Anticonvulsant: 5.0-20.0 mcg/mL   Psychiatric: 2.0-8.0 mcg/mL        All pertinent lab results from the past 24 hours have been reviewed.    Significant Studies: I have reviewed and interpreted all pertinent imaging results/findings within the past 24 hours.     Continuous video EEG (1/14/19-1/17/19):  No epileptiform discharges, periodic discharges, lateralized rhythmic delta activity or electrographic seizures were seen.    ECG (1/14/19):  Normal sinus rhythm and rate  Normal axis  No TWI or MARY  QTc 475      Assessment and Plan:     * Seizures    Long term Hx of recurrent seizure events since age of 8, formally diagnosed ~2008  Hx of seizure in family + multiple traumatic psychiatric events in the past. 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 on OXCBZ 150 qd + Topiramate 150 mg q12. Admitted to EMU on 1/14 for diagnostic classification of her events and adjustment of medication. Cardiac work up including ECG, orthostatic VS and Telemetry unremarkable so far.    - continue to monitor on continuous video EEG off of AEDs -> no abnormalities on EEG despite HV, PS, sleep deprivation (1/16-1/17), chemical activation and walking  - Topiramate level normal, OXCBZ subtherapeutic  - seizure and fall precautions  - Continuous cardiac monitoring  - will schedule for  outpatient TTT  - possible discharge tomorrow if no major events overnight         Adjustment disorder with mixed anxiety and depressed mood    [place] [facility] [place]  On Fluoxetine 80 mg qd + Clonazepam 1 mg q12 + Lurasidone 40 mg qd  Will continue at home dose except Clonazepam -> decreased to 0.5 q12 while in EMU     Hx of migraine headaches    Symptomatology not typical for migraines, more likely secondary to REN vs tension headache  On Topamax 150 mg q12   Holding Topamax while in EMU, will resume on discharge  Management of headache with tylenol and Norco PRN       Obesity    BMI 28.6 -> overweight  Life style modification     Coronary artery disease involving native coronary artery of native heart with unstable angina pectoris    Will continue on DAPT and statin   Most recent A1C and LDL in 4/2018 WNL  Counseling on smoking cessation     REN (obstructive sleep apnea)    Contributing to the headaches  On CPAP at home intermittently     HTN (hypertension)    On lisinopril-HCTZ 10-12.5 mg qd, will continue  Monitor BP q4 hr     Depression    On Fluoxetine 80 mh qd, will continue         VTE Risk Mitigation (From admission, onward)        Ordered     IP VTE LOW RISK PATIENT  Once      01/14/19 1345     Place sequential compression device  Until discontinued      01/14/19 1346          Carlita Horvath MD  Neurology-Epilepsy  [facility]

## 2019-01-17 NOTE — ASSESSMENT & PLAN NOTE
Long term Hx of recurrent seizure events since age of 8, formally diagnosed ~2008  Hx of seizure in family + multiple traumatic psychiatric events in the past. 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 on OXCBZ 150 qd + Topiramate 150 mg q12. Admitted to EMU on 1/14 for diagnostic classification of her events and adjustment of medication. Cardiac work up including ECG, orthostatic VS and Telemetry unremarkable so far.    - continue to monitor on continuous video EEG off of AEDs -> no abnormalities on EEG despite HV, PS, sleep deprivation (1/16-1/17), chemical activation and walking  - Topiramate level normal, OXCBZ subtherapeutic  - seizure and fall precautions  - Continuous cardiac monitoring  - will schedule for outpatient TTT  - possible discharge tomorrow if no major events overnight

## 2019-01-18 VITALS
TEMPERATURE: 98 F | OXYGEN SATURATION: 98 % | RESPIRATION RATE: 17 BRPM | HEIGHT: 70 IN | DIASTOLIC BLOOD PRESSURE: 62 MMHG | WEIGHT: 194 LBS | SYSTOLIC BLOOD PRESSURE: 112 MMHG | HEART RATE: 80 BPM | BODY MASS INDEX: 27.77 KG/M2

## 2019-01-18 PROCEDURE — G0008 ADMIN INFLUENZA VIRUS VAC: HCPCS | Performed by: PSYCHIATRY & NEUROLOGY

## 2019-01-18 PROCEDURE — 90471 IMMUNIZATION ADMIN: CPT | Performed by: PSYCHIATRY & NEUROLOGY

## 2019-01-18 PROCEDURE — 25000003 PHARM REV CODE 250: Performed by: STUDENT IN AN ORGANIZED HEALTH CARE EDUCATION/TRAINING PROGRAM

## 2019-01-18 PROCEDURE — 90686 IIV4 VACC NO PRSV 0.5 ML IM: CPT | Performed by: PSYCHIATRY & NEUROLOGY

## 2019-01-18 PROCEDURE — 99233 PR SUBSEQUENT HOSPITAL CARE,LEVL III: ICD-10-PCS | Mod: ,,, | Performed by: PSYCHIATRY & NEUROLOGY

## 2019-01-18 PROCEDURE — 99233 SBSQ HOSP IP/OBS HIGH 50: CPT | Mod: ,,, | Performed by: PSYCHIATRY & NEUROLOGY

## 2019-01-18 PROCEDURE — 63600175 PHARM REV CODE 636 W HCPCS: Performed by: PSYCHIATRY & NEUROLOGY

## 2019-01-18 RX ADMIN — ATORVASTATIN CALCIUM 40 MG: 20 TABLET, FILM COATED ORAL at 09:01

## 2019-01-18 RX ADMIN — CLONAZEPAM 0.5 MG: 0.5 TABLET ORAL at 09:01

## 2019-01-18 RX ADMIN — LURASIDONE HYDROCHLORIDE 40 MG: 40 TABLET, FILM COATED ORAL at 09:01

## 2019-01-18 RX ADMIN — HYDROCODONE BITARTRATE AND ACETAMINOPHEN 1 TABLET: 5; 325 TABLET ORAL at 09:01

## 2019-01-18 RX ADMIN — LISINOPRIL AND HYDROCHLOROTHIAZIDE 1 TABLET: 12.5; 1 TABLET ORAL at 09:01

## 2019-01-18 RX ADMIN — FLUOXETINE 80 MG: 20 CAPSULE ORAL at 09:01

## 2019-01-18 RX ADMIN — CLOPIDOGREL 75 MG: 75 TABLET, FILM COATED ORAL at 09:01

## 2019-01-18 RX ADMIN — ASPIRIN 81 MG: 81 TABLET, COATED ORAL at 09:01

## 2019-01-18 RX ADMIN — INFLUENZA A VIRUS A/MICHIGAN/45/2015 X-275 (H1N1) ANTIGEN (FORMALDEHYDE INACTIVATED), INFLUENZA A VIRUS A/SINGAPORE/INFIMH-16-0019/2016 IVR-186 (H3N2) ANTIGEN (FORMALDEHYDE INACTIVATED), INFLUENZA B VIRUS B/PHUKET/3073/2013 ANTIGEN (FORMALDEHYDE INACTIVATED), AND INFLUENZA B VIRUS B/MARYLAND/15/2016 BX-69A ANTIGEN (FORMALDEHYDE INACTIVATED) 0.5 ML: 15; 15; 15; 15 INJECTION, SUSPENSION INTRAMUSCULAR at 12:01

## 2019-01-18 NOTE — ASSESSMENT & PLAN NOTE
[place] [facility] [place]  On Fluoxetine 80 mg qd + Clonazepam 1 mg q12 + Lurasidone 40 mg qd  continue at home dose upon discharge

## 2019-01-18 NOTE — ASSESSMENT & PLAN NOTE
Will continue on DAPT and statin   Most recent A1C and LDL in 4/2018 WNL  Counseled on smoking cessation, on nicotine patch

## 2019-01-18 NOTE — ASSESSMENT & PLAN NOTE
Symptomatology not typical for migraines, more likely secondary to REN vs tension headache  On Topamax 150 mg q12, held Topamax while in EMU  will resume at 100 mg BID (since she has been feeling better off of medications)

## 2019-01-18 NOTE — DISCHARGE INSTRUCTIONS
* Please stop taking Oxtellar (Oxcarbazepine), and decreased Topamax from 150 to 100 twice daily  * tilt-table test is scheduled for March 1st, you will be contacted for further details.  * you will have a follow up with neurology with the results of tilt-table test, also for establishment of care with headache specialist  * Patient must refrain from driving for 6 months after having a seizure, and must be cleared by a neurologist to legally resume driving.    * we advise to avoid bathing or swimming alone, climbing ladders or standing near precipice where one could fall, operating heavy machinery and to not supervise children or engage in other activities where losing consciousness or motor control would risk harm to self or others.

## 2019-01-18 NOTE — PLAN OF CARE
Problem: Adult Inpatient Plan of Care  Goal: Plan of Care Review  Outcome: Ongoing (interventions implemented as appropriate)  POC reviewed with patient. All questions and concerns reviewed. VSS throughout shift. Fall/safety/seizure precautions implemented and maintained. No events noted this shift. Bed locked in lowest position. Call bell within reach. Will continue to monitor.

## 2019-01-18 NOTE — PLAN OF CARE
Problem: Adult Inpatient Plan of Care  Goal: Plan of Care Review  Plan of care reviewed with pt. Pt voiced understanding. Pt AAOx4. Pt c/o right shoulder pain during the shift, norco given.  No apparent distress noted. Fall precautions maintained. Bed in lowest position, and locked. Call light within reach and advised to call for assistance. Side rails x 2 and slip resistant socks on at this time. Pt to be discharged home, IV, emu monitoring to be D/C prior to discharged home.
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
## 2019-01-18 NOTE — SUBJECTIVE & OBJECTIVE
Interval History:  One episode of shaking in the upper extremities after HV yesterday noon, no EEG correlation. Sleep deprived overnight, received Benadryl/Tramadol this morning. No clinical or electrographic events captured so far. Neuro exam remains unchanged.  Patient walking with the RN and EEG technician in the afternoon, typical events were not provoked.    Current Facility-Administered Medications   Medication Dose Route Frequency Provider Last Rate Last Dose    acetaminophen tablet 650 mg  650 mg Oral Q4H PRN Carlita Horvath MD   650 mg at 01/16/19 0956    aspirin EC tablet 81 mg  81 mg Oral Daily Carlita Horvath MD   81 mg at 01/17/19 0802    atorvastatin tablet 40 mg  40 mg Oral Daily Carlita Horvath MD   40 mg at 01/17/19 0802    clonazePAM tablet 0.5 mg  0.5 mg Oral BID Carlita Horvath MD   0.5 mg at 01/17/19 0802    clopidogrel tablet 75 mg  75 mg Oral Daily Carlita Horvath MD   75 mg at 01/17/19 0803    docusate sodium capsule 100 mg  100 mg Oral Daily PRN Carlita Horvath MD        FLUoxetine capsule 80 mg  80 mg Oral Daily Carlita Horvath MD   80 mg at 01/17/19 0802    HYDROcodone-acetaminophen 5-325 mg per tablet 1 tablet  1 tablet Oral Q8H PRN Carlita Horvath MD   1 tablet at 01/17/19 0619    influenza (FLUZONE QUADRIVALENT) vaccine 0.5 mL  0.5 mL Intramuscular vaccine x 1 dose Trish Mead MD        lisinopril-hydrochlorothiazide 10-12.5 mg per tablet 1 tablet  1 tablet Oral Daily Carlita Horvath MD   1 tablet at 01/17/19 0802    lurasidone tablet 40 mg  40 mg Oral Daily Carlita Horvath MD   40 mg at 01/17/19 0803    ondansetron disintegrating tablet 8 mg  8 mg Oral Q8H PRN Carlita Horvath MD        sodium chloride 0.9% flush 3 mL  3 mL Intravenous PRN Carlita Horvath MD           Review of Systems   Constitutional: Negative for chills, fatigue and fever.   HENT: Negative for drooling, hearing loss, trouble swallowing and voice change.    Eyes: Negative for photophobia and  visual disturbance.   Respiratory: Negative for choking and shortness of breath.    Cardiovascular: Negative for chest pain and palpitations.   Gastrointestinal: Negative for abdominal pain, nausea and vomiting.   Genitourinary: Negative for decreased urine volume, dysuria, frequency, hematuria and urgency.   Musculoskeletal: Negative for back pain, gait problem and neck pain.   Skin: Negative for color change and rash.   Allergic/Immunologic: Negative for immunocompromised state.   Neurological: Negative for dizziness, syncope, speech difficulty, weakness, numbness and headaches.   Psychiatric/Behavioral: Negative for agitation, confusion, decreased concentration, hallucinations and sleep disturbance.     Objective:     Vital Signs (Most Recent):  Temp: 97.5 °F (36.4 °C) (01/18/19 0830)  Pulse: 80 (01/18/19 1129)  Resp: 17 (01/18/19 0830)  BP: 112/62 (01/18/19 0830)  SpO2: 98 % (01/18/19 0830) Vital Signs (24h Range):  Temp:  [96.2 °F (35.7 °C)-98.2 °F (36.8 °C)] 97.5 °F (36.4 °C)  Pulse:  [65-89] 80  Resp:  [17-18] 17  SpO2:  [93 %-99 %] 98 %  BP: (112-154)/(59-72) 112/62     Weight: 88 kg (194 lb)  Body mass index is 28.24 kg/m².    Physical Exam   Constitutional: She is oriented to person, place, and time. She appears well-developed and well-nourished. She is cooperative. She does not appear ill. No distress.   HENT:   Head: Normocephalic and atraumatic.   Mouth/Throat: Mucous membranes are normal.   Eyes: Conjunctivae and EOM are normal. Pupils are equal, round, and reactive to light.   Neck: Normal range of motion.   Cardiovascular: Normal rate, regular rhythm and normal heart sounds.   No murmur heard.  Pulses:       Radial pulses are 2+ on the right side, and 2+ on the left side.   Pulmonary/Chest: Effort normal. No accessory muscle usage. No tachypnea. No respiratory distress. She has no wheezes. She has no rales.   Abdominal: Soft. Bowel sounds are normal. She exhibits no distension. There is no  tenderness.   Musculoskeletal: She exhibits no edema.        Right shoulder: She exhibits decreased range of motion.        Left wrist: She exhibits decreased range of motion (recent surgery).   Neurological: She is alert and oriented to person, place, and time. She has normal strength. She has a normal Finger-Nose-Finger Test. She displays no seizure activity. Gait normal.   Reflex Scores:       Bicep reflexes are 2+ on the right side and 2+ on the left side.       Brachioradialis reflexes are 2+ on the right side and 2+ on the left side.       Patellar reflexes are 2+ on the right side and 2+ on the left side.       Achilles reflexes are 1+ on the right side and 1+ on the left side.  Skin: Skin is warm. She is not diaphoretic. No cyanosis or erythema.   Psychiatric: She has a normal mood and affect. Her speech is normal and behavior is normal. Thought content normal. Cognition and memory are normal.        Nursing note and vitals reviewed.      NEUROLOGICAL EXAMINATION:     MENTAL STATUS   Oriented to person, place, and time.   Follows 3 step commands.   Attention: normal. Concentration: normal.   Speech: speech is normal   Level of consciousness: alert  Knowledge: consistent with education.   Able to name object. Praxis: normal     CRANIAL NERVES     CN III, IV, VI   Pupils are equal, round, and reactive to light.  Extraocular motions are normal.   Nystagmus: none   Diplopia: none  Ophthalmoparesis: none    CN V   Facial sensation intact.     CN VII   Facial expression full, symmetric.     CN VIII   Hearing: intact    CN IX, X   Palate: symmetric    CN XI   CN XI normal.     CN XII   CN XII normal.     MOTOR EXAM   Muscle bulk: normal  Overall muscle tone: normal    Strength   Strength 5/5 throughout.        Examination of UE limited due to rotator cuff tear on R and recent wrist surgery on L     REFLEXES     Reflexes   Right brachioradialis: 2+  Left brachioradialis: 2+  Right biceps: 2+  Left biceps: 2+  Right  patellar: 2+  Left patellar: 2+  Right achilles: 1+  Left achilles: 1+  Right plantar: normal  Left plantar: normal    SENSORY EXAM   Light touch normal.   Right leg vibration: decreased from toes  Left leg vibration: decreased from toes       Sensation to temperature is preserved     GAIT AND COORDINATION     Gait  Gait: normal     Coordination   Finger to nose coordination: normal    Tremor   Resting tremor: absent  Intention tremor: absent  Action tremor: absent      Significant Labs:   CBC:       Recent Labs   Lab 01/14/19  1346   WBC 8.86   HGB 11.2*   HCT 35.6*   *      CMP:       Recent Labs   Lab 01/14/19  1346         K 3.2*      CO2 26   BUN 10   CREATININE 1.0   CALCIUM 9.5   MG 2.1   PROT 7.2   ALBUMIN 3.5   BILITOT 0.3   ALKPHOS 167*   AST 17   ALT 16   ANIONGAP 8   EGFRNONAA >60.0        Urine Studies:       Recent Labs   Lab 01/14/19  1535   COLORU Yellow   APPEARANCEUA Cloudy*   PHUR 7.0   SPECGRAV 1.020   PROTEINUA Negative   GLUCUA Negative   KETONESU Negative   BILIRUBINUA Negative   OCCULTUA Negative   NITRITE Negative   LEUKOCYTESUR 3+*   RBCUA 3   WBCUA 28*   BACTERIA Many*   SQUAMEPITHEL 2   HYALINECASTS        Drug screen panel, emergency    Ref Range & Units 2d ago   Benzodiazepines  Negative    Methadone metabolites  Negative    Cocaine (Metab.)  Negative    Opiate Scrn, Ur  Presumptive Positive    Barbiturate Screen, Ur  Negative    Amphetamine Screen, Ur  Negative    THC  Negative    Phencyclidine  Negative    Creatinine, Random Ur 15.0 - 325.0 mg/dL 179.0               Ref Range & Units 2d ago   TSH 0.400 - 4.000 uIU/mL 0.503       Ref Range & Units 2d ago   Oxcarbazepine 3 - 35 mcg/mL 2 Abnormally low        Ref Range & Units 2d ago   Topiramate Lvl mcg/mL 10.2    Comment: -------------------REFERENCE VALUE--------------------------   Reference values depend on clinical use:   Anticonvulsant: 5.0-20.0 mcg/mL   Psychiatric: 2.0-8.0 mcg/mL        All pertinent  lab results from the past 24 hours have been reviewed.    Significant Studies: I have reviewed and interpreted all pertinent imaging results/findings within the past 24 hours.     Continuous video EEG (1/14/19-1/18/19):  No epileptiform discharges, periodic discharges, lateralized rhythmic delta activity or electrographic seizures were seen.    ECG (1/14/19):  Normal sinus rhythm and rate  Normal axis  No TWI or MARY  QTc 475

## 2019-01-18 NOTE — ASSESSMENT & PLAN NOTE
Admitted to EMU on 1/14 for diagnostic classification of her events and adjustment of medication. Cardiac work up including ECG, orthostatic VS and Telemetry unremarkable. no abnormalities on EEG despite HV, PS, sleep deprivation (1/16-1/17), chemical activation and walking/activity.    - will discharge home today, no discharge needs  - will resume Topamax at lower dose 100 mg BID (to avoid fogginess) and Klonopin at home dose 1 mg BID - Discontinued OXCBZ  - Tilt-table test scheduled for March 1st  - will have her follow up in headache clinic with one of our epileptologists to address both, patient prefers to f/u at [facility]

## 2019-01-18 NOTE — PLAN OF CARE
01/18/19 1347   Final Note   Assessment Type Final Discharge Note   Anticipated Discharge Disposition Home   Right Care Referral Info   Post Acute Recommendation No Care

## 2019-02-10 DIAGNOSIS — I25.110 CORONARY ARTERY DISEASE INVOLVING NATIVE CORONARY ARTERY OF NATIVE HEART WITH UNSTABLE ANGINA PECTORIS: Primary | ICD-10-CM

## 2019-02-11 RX ORDER — CLOPIDOGREL BISULFATE 75 MG/1
TABLET ORAL
Qty: 30 TABLET | Refills: 0 | Status: SHIPPED | OUTPATIENT
Start: 2019-02-11 | End: 2019-03-12 | Stop reason: SDUPTHER

## 2019-02-14 ENCOUNTER — DOCUMENTATION ONLY (OUTPATIENT)
Dept: ELECTROPHYSIOLOGY | Facility: CLINIC | Age: 59
End: 2019-02-14

## 2019-02-14 NOTE — PROGRESS NOTES
TILT TABLE TEST EDUCATION CHECKLIST    3/8/19 @ 7:30 am  REPORT TO CARDIOLOGY WAITING ROOM ON 3RD FLOOR OF HOSPITAL  (DO NOT REPORT TO CLINIC)  If you park in the Parking Garage:  Take elevators to the 2nd floor  Walk up ramp and turn right by Gold Elevators  Take elevator to the 3rd floor  Upon exiting the elevator, turn away from the clinic areas  Walk long denny around to front of hospital to area with windows overlooking [facility]  Check in at Reception Desk  OR  If family is dropping you off:  Have them drop you off at the front of the Hospital  (Near the ER, where all the flags are hung).  Take the E elevators to the 3rd floor.  Check in at the Reception Desk in the waiting room.      DO NOT EAT OR DRINK ANYTHING AFTER MIDNIGHT ON THE NIGHT BEFORE YOUR TEST    YOU SHOULD TAKE YOUR USUAL MORNING MEDICATIONS WITH A SIP OF WATER    YOU WILL NEED SOMEONE TO DRIVE YOU HOME AFTER YOUR TEST    THE ABOVE INSTRUCTIONS WERE GIVEN TO THE PATIENT VERBALLY AND THEY VERBALIZED UNDERSTANDING. THEY DO NOT REQUIRE ANY SPECIAL NEEDS AND DO NOT HAVE ANY LEARNING BARRIERS.     Any need to reschedule or cancel procedures, or any questions regarding your procedures should be addressed directly with the Arrhythmia Department Nurses at the following phone number: [phone]

## 2019-02-21 NOTE — PROCEDURES
DATE OF SERVICE:  01/14/2019 through 01/18/2019    EEG #:  EMU -1, EMU -2, EMU -3, EMU -4 and EMU -5.    EPILEPSY MONITORING UNIT  EEG/VIDEO TELEMETRY REPORT    METHODOLOGY:  Electroencephalographic (EEG) is recorded with electrodes placed   according to the International 10-20 placement system.  Thirty Two (32) channels   of digital signal, including T1 and T2 electrodes, are simultaneously recorded   from the scalp and may also include EKG, EMG and/or eye movement monitors.    Recording band pass was 0.1 to 512 Hz.  Digital video recording of the patient   is simultaneously recorded with the EEG.  The patient is instructed to report   clinical symptoms which may occur during the recording session.  EEG and video   recording are stored and archived in digital format. Activation procedures,   which include photic stimulation, hyperventilation and instructing patients to   perform simple tasks, are done in selected patients.     The EEG is displayed on a monitor screen and can be reformatted into different   montages for evaluation.  The entire recoding is submitted for computer-assisted   analysis to detect spike and electrographic seizure activity.  The entire   recording is visually reviewed, and the times identified by computer analysis as   being spikes or seizures are reviewed again.  Compressed spectral analysis   (CSA) is also performed on the activity recorded from each individual channel.    This is displayed as a power display of frequencies from 0 to 30 Hz over time.     The CSA analysis is done and displayed continuously.  This is reviewed for   asymmetries in power between homologous areas of the scalp and for presence of   changes in power which can be seen when seizures occur.  Sections of suspected   abnormalities on the CSA are then compared with the original EEG recording.       [employer] 28 software was also utilized in the review of this study.  This software   suite  analyzes the EEG recording in multiple domains.  Coherence and rhythmicity   are computed to identify EEG sections which may contain organized seizures.    Each channel undergoes analysis to detect presence of spike and sharp waves   which have special and morphological characteristics of epileptic activity.  The   routine EEG recording is converted from special into frequency domain.  This is   then displayed comparing homologous areas to identify areas of significant   asymmetry.  Algorithm to identify non-cortically generated artifact is used to   separate artifact from the EEG.      RECORDING TIMES:  Start on 01/14/2019 at 16:20:47.  Stop on 01/15/2019 at 06:59:59.  Start on 01/15/2019 at 07:00:23.  Stop on 01/16/2019 at 06:59:58.  Start on 01/16/2019 at 07:00:22.  Stop on 01/17/2019 at 06:59:58.  Start on 01/17/2019 at 07:00:33.  Stop on 01/18/2019 at 07:00:01.  Start on 01/18/2019 at 07:00:28.  Stop on 01/18/2019 at 11:46:52.    A total of 94 hours 14 minutes and 28 seconds of video/EEG telemetry was   recorded.    EEG FINDINGS:  The patient's background consists of a posteriorly dominant alpha   rhythm with frequency of approximately 10.5 Hz, which is well formed, well   modulated and abolishes with eye opening.  Anteriorly, the patient's background   consists mainly of beta range frequencies.  There is no focal slowing.  There   are no focal, lateralized or epileptiform transients.  No electrographic   seizures are seen.  At various times during the study, features of stage N2   sleep architecture are observed.  Provocative maneuvers including   hyperventilation and photic stimulation do not induce pathologic changes in the   patient's EEG.  The patient's EKG demonstrates sinus rhythm.    INTERPRETATION:  This is a normal long-term video EEG monitoring study in an   awake, drowsy and asleep adult.  This study provides no evidence of epilepsy;   however, none of the patient's typical clinical events of  interest were   captured.      GALILEA/WANDA  dd: 02/20/2019 13:36:53 (CST)  td: 02/20/2019 20:33:59 (CST)  Doc ID   #[id number]  Job ID #[id number]    CC:

## 2019-03-12 DIAGNOSIS — I25.110 CORONARY ARTERY DISEASE INVOLVING NATIVE CORONARY ARTERY OF NATIVE HEART WITH UNSTABLE ANGINA PECTORIS: ICD-10-CM

## 2019-03-12 RX ORDER — CLOPIDOGREL BISULFATE 75 MG/1
TABLET ORAL
Qty: 30 TABLET | Refills: 0 | Status: ON HOLD | OUTPATIENT
Start: 2019-03-12 | End: 2019-11-20 | Stop reason: CLARIF

## 2019-03-14 ENCOUNTER — OFFICE VISIT (OUTPATIENT)
Dept: NEUROLOGY | Facility: CLINIC | Age: 59
End: 2019-03-14
Payer: COMMERCIAL

## 2019-03-14 VITALS
HEIGHT: 70 IN | BODY MASS INDEX: 27.39 KG/M2 | WEIGHT: 191.31 LBS | HEART RATE: 84 BPM | SYSTOLIC BLOOD PRESSURE: 115 MMHG | DIASTOLIC BLOOD PRESSURE: 75 MMHG

## 2019-03-14 DIAGNOSIS — R51.9 INTRACTABLE HEADACHE, UNSPECIFIED CHRONICITY PATTERN, UNSPECIFIED HEADACHE TYPE: ICD-10-CM

## 2019-03-14 DIAGNOSIS — E53.8 B12 DEFICIENCY: ICD-10-CM

## 2019-03-14 DIAGNOSIS — R55 SYNCOPE, UNSPECIFIED SYNCOPE TYPE: Primary | ICD-10-CM

## 2019-03-14 DIAGNOSIS — E55.9 VITAMIN D DEFICIENCY: ICD-10-CM

## 2019-03-14 PROCEDURE — 99999 PR PBB SHADOW E&M-EST. PATIENT-LVL III: CPT | Mod: PBBFAC,,, | Performed by: PSYCHIATRY & NEUROLOGY

## 2019-03-14 PROCEDURE — 99999 PR PBB SHADOW E&M-EST. PATIENT-LVL III: ICD-10-PCS | Mod: PBBFAC,,, | Performed by: PSYCHIATRY & NEUROLOGY

## 2019-03-14 PROCEDURE — 99214 OFFICE O/P EST MOD 30 MIN: CPT | Mod: S$GLB,,, | Performed by: PSYCHIATRY & NEUROLOGY

## 2019-03-14 PROCEDURE — 99214 PR OFFICE/OUTPT VISIT, EST, LEVL IV, 30-39 MIN: ICD-10-PCS | Mod: S$GLB,,, | Performed by: PSYCHIATRY & NEUROLOGY

## 2019-03-14 RX ORDER — LANOLIN ALCOHOL/MO/W.PET/CERES
400 CREAM (GRAM) TOPICAL DAILY
Qty: 90 TABLET | Refills: 3 | Status: SHIPPED | OUTPATIENT
Start: 2019-03-14 | End: 2019-03-14 | Stop reason: SDUPTHER

## 2019-03-14 RX ORDER — CYCLOBENZAPRINE HCL 5 MG
5 TABLET ORAL 3 TIMES DAILY PRN
Qty: 30 TABLET | Refills: 2 | Status: SHIPPED | OUTPATIENT
Start: 2019-03-14 | End: 2019-03-14 | Stop reason: SDUPTHER

## 2019-03-14 RX ORDER — METOCLOPRAMIDE 5 MG/1
5 TABLET ORAL 3 TIMES DAILY PRN
Qty: 10 TABLET | Refills: 5 | Status: ON HOLD | OUTPATIENT
Start: 2019-03-14 | End: 2019-11-20 | Stop reason: CLARIF

## 2019-03-14 RX ORDER — METOCLOPRAMIDE 5 MG/1
5 TABLET ORAL 3 TIMES DAILY PRN
Qty: 10 TABLET | Refills: 5 | Status: SHIPPED | OUTPATIENT
Start: 2019-03-14 | End: 2019-03-14 | Stop reason: SDUPTHER

## 2019-03-14 RX ORDER — CYCLOBENZAPRINE HCL 5 MG
5 TABLET ORAL 3 TIMES DAILY PRN
Qty: 30 TABLET | Refills: 2 | Status: SHIPPED | OUTPATIENT
Start: 2019-03-14 | End: 2019-03-24

## 2019-03-14 RX ORDER — LANOLIN ALCOHOL/MO/W.PET/CERES
400 CREAM (GRAM) TOPICAL DAILY
Qty: 90 TABLET | Refills: 3 | Status: ON HOLD | OUTPATIENT
Start: 2019-03-14 | End: 2019-11-20 | Stop reason: CLARIF

## 2019-03-14 NOTE — PATIENT INSTRUCTIONS
CONTINUE TOPAMAX AND START MAGNESIUM 400MG DAILY FOR HEADACHES    TAKE FLEXERIL AND REGLAN AS NEEDED FOR SEVERE HEADACHE    USE TOPICAL PAIN CREAM ON THE BACK OF YOUR NECK TO HELP WITH HEADACHES

## 2019-03-14 NOTE — PROGRESS NOTES
[facility] - NEUROLOGY  [facility] [place]    Date: March 14, 2019   Patient Name: Jennifer Brady   MRN: [id number]   PCP: Arvind Juarez  Referring Provider: Maximiliano Kuhn MD    Assessment:      This is Jennifer Brady, 58 y.o. female with CAD s/p PCI 4/2018, extensive history of psychiatric trauma, episodes of syncope with non-diagnostic EMU admit 2/2018, and headaches with some migrainous features although atypical due to extreme length.  I would prefer to see if headache improves with PT for her shoulder before starting botox.     Plan:      -  Tilt table pending  -  Continue TPM 100mg bid, Mg supplement  -  Reglan and flexeril prn    Follow up 3 months       I discussed side effects of the medications. I asked the patient to stop the medication if she notices serious adverse effects as we discussed and to seek immediate medical attention at an ER.     Maximiliano Kuhn MD  [facility]   Department of Neurology    Subjective:      HPI:   Ms. Jennifer Brady is a 58 y.o. female who presents with a chief complaint of headaches and syncope    Per chart review, patient developed black out episodes while in crowds at age 8 and began having syncopal episodes which always occurred in the bathroom at age 28.  She reports she may go as much as a month but never as much as six months without syncopal episodes.  She was seen by neurologist Dr. Dorsey in 2008 and started on TPM and OXC without change in syncopal events.  OXC was stopped following non-diagnostic EMU admit 2/2019.    She also has frequent migraines which began at age [age], she describes these as lasing 3-4 weeks at a time with photophobia but no prominent nausea, previously prescribed narcotics by outside physician for this issue.  She was tried on elavil by psychiatry in 2014 but does not recall this medication.    She became tearful at the end of her visit endorsing severe ongoing  depression and sleep disturbance.  Notes that she frequently bumps into things and has easy bruising due to anti-platelets.    PAST MEDICAL HISTORY:  Past Medical History:   Diagnosis Date    Anxiety     Behavioral problem     Borderline personality disorder     Coronary angioplasty status 04/2018    Coronary artery disease     Depression     Depression     Fatigue     Hx of psychiatric care     Hyperlipidemia     Hypertension     Psychiatric problem     S/P hysterectomy with oophorectomy     Seizures     Seizures     Therapy        PAST SURGICAL HISTORY:  Past Surgical History:   Procedure Laterality Date    HEART CATH-LEFT - LV JULIO POSS Left 4/4/2018    Performed by Edwin Reza MD at [facility] CATH LAB       CURRENT MEDS:  Current Outpatient Medications   Medication Sig Dispense Refill    aspirin (ECOTRIN) 81 MG EC tablet Take 1 tablet (81 mg total) by mouth once daily. 100 tablet 6    atorvastatin (LIPITOR) 40 MG tablet Take 1 tablet (40 mg total) by mouth once daily. 90 tablet 3    clonazePAM (KLONOPIN) 1 MG tablet Take 1 tablet by mouth twice daily in the morning and afternoon as needed for severe anxiety 60 tablet 3    clopidogrel (PLAVIX) 75 mg tablet TAKE 1 TABLET BY MOUTH EVERY DAY 30 tablet 0    FLUoxetine 40 MG capsule Take 2 capsules (80 mg total) by mouth once daily. 60 capsule 5    lisinopril-hydrochlorothiazide (PRINZIDE,ZESTORETIC) 10-12.5 mg per tablet Take 1 tablet by mouth once daily.      lurasidone (LATUDA) 40 mg Tab tablet Take 1 tablet (40 mg total) by mouth once daily. 30 tablet 5    nicotine (NICODERM CQ) 14 mg/24 hr Place 1 patch onto the skin once daily. 90 patch 3    topiramate (TOPAMAX) 100 MG tablet Take 1 tablet (100 mg total) by mouth 2 (two) times daily. 60 tablet 5    cyclobenzaprine (FLEXERIL) 5 MG tablet Take 1 tablet (5 mg total) by mouth 3 (three) times daily as needed (Headache). 30 tablet 2    magnesium oxide (MAG-OX) 400 mg (241.3 mg magnesium)  "tablet Take 1 tablet (400 mg total) by mouth once daily. 90 tablet 3    metoclopramide HCl (REGLAN) 5 MG tablet Take 1 tablet (5 mg total) by mouth 3 (three) times daily as needed (Severe headache). 10 tablet 5     No current facility-administered medications for this visit.        ALLERGIES:  Review of patient's allergies indicates:  No Known Allergies    FAMILY HISTORY:  Family History   Problem Relation Age of Onset    Seizures Mother        SOCIAL HISTORY:  Social History     Tobacco Use    Smoking status: Current Every Day Smoker     Packs/day: 0.25     Types: Cigarettes    Smokeless tobacco: Never Used    Tobacco comment: quit actual cigarettes a few mo ago, switched to e cigarette   Substance Use Topics    Alcohol use: Yes     Comment: Socially    Drug use: No       Review of Systems:  12 review of systems is negative except for the symptoms mentioned in HPI.        Objective:     Vitals:    03/14/19 1304   BP: 115/75   Pulse: 84   Weight: 86.8 kg (191 lb 4.8 oz)   Height: 5' 9.5" (1.765 m)       General: NAD, well nourished   Eyes: no tearing, discharge, no erythema   ENT: moist mucous membranes of the oral cavity, nares patent    Neck: tension noted in trapezius and cervical paraspinous  Cardiovascular: Warm and well perfused, pulses equal and symmetrical  Lungs: Normal work of breathing, normal chest wall excursions  Skin: No rash, lesions, or breakdown on exposed skin  Psychiatry: tearful   Abdomen: soft, non tender, non distended  Extremeties: No cyanosis, clubbing or edema.    Neurological   MENTAL STATUS: Alert and oriented to person, place, and time. Speech without dysarthria, able to name and repeat without difficulty.   Bilateral eyelid myokymia and mild tremulousness noted throughout visit   CRANIAL NERVES: Visual fields intact. PERRL. EOMI. Facial sensation intact. Face symmetrical. Hearing grossly intact. Full shoulder shrug bilaterally. Tongue protrudes midline   SENSORY: Sensation is " intact to light touch throughout.  Negative Romberg.   MOTOR: Normal bulk and tone. No pronator drift.  5/5 deltoid, biceps, triceps, interosseous, hand  bilaterally. 5/5 iliopsoas, knee extension/flexion, foot dorsi/plantarflexion bilaterally.    REFLEXES: Symmetric and 2+ throughout. Toes down going bilaterally.   CEREBELLAR/COORDINATION/GAIT: Gait steady with normal arm swing and stride length.  Heel to shin intact. Finger to nose intact. Normal rapid alternating movements.

## 2019-03-16 DIAGNOSIS — I25.110 CORONARY ARTERY DISEASE INVOLVING NATIVE CORONARY ARTERY OF NATIVE HEART WITH UNSTABLE ANGINA PECTORIS: ICD-10-CM

## 2019-03-17 DIAGNOSIS — Z86.69 HX OF MIGRAINE HEADACHES: Primary | ICD-10-CM

## 2019-03-17 DIAGNOSIS — R56.9 SEIZURES: ICD-10-CM

## 2019-03-18 RX ORDER — CLOPIDOGREL BISULFATE 75 MG/1
TABLET ORAL
Qty: 30 TABLET | Refills: 0 | Status: SHIPPED | OUTPATIENT
Start: 2019-03-18 | End: 2019-04-22 | Stop reason: SDUPTHER

## 2019-03-18 NOTE — PROGRESS NOTES
TILT TABLE TEST EDUCATION CHECKLIST    3/22/19 @ 12 NOON  REPORT TO CARDIOLOGY WAITING ROOM ON 3RD FLOOR OF HOSPITAL  (DO NOT REPORT TO CLINIC)  If you park in the Parking Garage:  Take elevators to the 2nd floor  Walk up ramp and turn right by Gold Elevators  Take elevator to the 3rd floor  Upon exiting the elevator, turn away from the clinic areas  Walk long denny around to front of hospital to area with windows overlooking [facility]  Check in at Reception Desk  OR  If family is dropping you off:  Have them drop you off at the front of the Hospital  (Near the ER, where all the flags are hung).  Take the E elevators to the 3rd floor.  Check in at the Reception Desk in the waiting room.    DO NOT EAT OR DRINK ANYTHING AFTER MIDNIGHT ON THE NIGHT BEFORE YOUR TEST    YOU SHOULD TAKE YOUR USUAL MORNING MEDICATIONS WITH A SIP OF WATER    YOU WILL NEED SOMEONE TO DRIVE YOU HOME AFTER YOUR TEST    THE ABOVE INSTRUCTIONS WERE GIVEN TO THE PATIENT VERBALLY AND THEY VERBALIZED UNDERSTANDING. THEY DO NOT REQUIRE ANY SPECIAL NEEDS AND DO NOT HAVE ANY LEARNING BARRIERS.     Any need to reschedule or cancel procedures, or any questions regarding your procedures should be addressed directly with the Arrhythmia Department Nurses at the following phone number: [phone]

## 2019-03-21 ENCOUNTER — TELEPHONE (OUTPATIENT)
Dept: CARDIOLOGY | Facility: CLINIC | Age: 59
End: 2019-03-21

## 2019-03-21 NOTE — TELEPHONE ENCOUNTER
CONFIRMED procedure arrival time of patient.   Instructed to arrive at 1000 am to sscu.  Reiterated instructions including:  -Directions to check in desk  -NPO after midnight night prior to procedure  -Instructed to have a designated  to drive patient home after the procedure.      Pt verbalizes understanding of above and appreciates call.

## 2019-03-22 ENCOUNTER — HOSPITAL ENCOUNTER (OUTPATIENT)
Facility: HOSPITAL | Age: 59
Discharge: HOME | End: 2019-03-22
Attending: INTERNAL MEDICINE | Admitting: INTERNAL MEDICINE
Payer: COMMERCIAL

## 2019-03-22 ENCOUNTER — HOSPITAL ENCOUNTER (OUTPATIENT)
Dept: NEUROLOGY | Facility: CLINIC | Age: 59
Discharge: HOME | End: 2019-03-22
Payer: COMMERCIAL

## 2019-03-22 DIAGNOSIS — R56.9 SEIZURE: ICD-10-CM

## 2019-03-22 DIAGNOSIS — Z86.69 HX OF MIGRAINE HEADACHES: ICD-10-CM

## 2019-03-22 DIAGNOSIS — R56.9 SEIZURES: ICD-10-CM

## 2019-03-22 PROCEDURE — 95816 PR EEG,W/AWAKE & DROWSY RECORD: ICD-10-PCS | Mod: S$GLB,,, | Performed by: PSYCHIATRY & NEUROLOGY

## 2019-03-22 PROCEDURE — 95816 EEG AWAKE AND DROWSY: CPT | Mod: S$GLB,,, | Performed by: PSYCHIATRY & NEUROLOGY

## 2019-03-22 PROCEDURE — 93660 TILT TABLE EVALUATION: CPT | Mod: 26,,, | Performed by: INTERNAL MEDICINE

## 2019-03-22 PROCEDURE — 93660 PR TILT TABLE EVALUATION: ICD-10-PCS | Mod: 26,,, | Performed by: INTERNAL MEDICINE

## 2019-03-22 PROCEDURE — 93660 TILT TABLE EVALUATION: CPT | Performed by: INTERNAL MEDICINE

## 2019-03-22 RX ORDER — SODIUM CHLORIDE 9 MG/ML
INJECTION, SOLUTION INTRAVENOUS CONTINUOUS
Status: DISCONTINUED | OUTPATIENT
Start: 2019-03-22 | End: 2019-03-22

## 2019-03-22 NOTE — PROCEDURES
DATE OF STUDY:  03/22/2019    EEG NUMBER:  OC-.    REQUESTING PHYSICIAN:  Maximiliano Kuhn M.D.    ELECTROENCEPHALOGRAM REPORT    METHODOLOGY:  Electroencephalographic (EEG) recording is recorded with   electrodes placed according to the International 10-20 placement system.  Thirty   two (32) channels of digital signal (sampling rate of 512/sec), including T1   and T2, were simultaneously recorded from the scalp and may include EKG, EMG,   and/or eye monitors.  Recording band pass was 0.1 to 512 Hz.  Digital video   recording of the patient is simultaneously recorded with the EEG.  The patient   is instructed to report clinical symptoms which may occur during the recording   session.  EEG and video recording are stored and archived in digital format.    Activation procedures, which include photic stimulation, hyperventilation and   instructing patients to perform simple tasks, are done in selected patients.    The EEG is displayed on a monitor screen and can be reviewed using different   montages.  Computer assisted-analysis is employed to detect spike and   electrographic seizure activity.  The entire record is submitted for computer   analysis.  The entire recording is visually reviewed, and the times identified   by computer analysis as being spikes or seizures are reviewed again.    Compressed spectral analysis (CSA) is also performed on the activity recorded   from each individual channel.  This is displayed as a power display of   frequencies from 0 to 30 Hz over time.  The CSA is reviewed looking for   asymmetries in power between homologous areas of the scalp, then compared with   the original EEG recording.    [employer] software was also utilized in the review of this study.  This software   suite analyzes the EEG recording in multiple domains.  Coherence and rhythmicity   are computed to identify EEG sections which may contain organized seizures.    Each channel undergoes analysis to detect the  presence of spike and sharp waves   which have special and morphological characteristics of epileptic activity.  The   routine EEG recording is converted from special into frequency domain.  This is   then displayed comparing homologous areas to identify areas of significant   asymmetry.  Algorithm to identify non-cortically generated artifact is used to   separate artifact from the EEG.    EEG FINDINGS:  Recording was obtained in the Head Up Tilt Procedure Room while   the patient was undergoing a procedure.  Background initially showed a very   rhythmic 11 Hz to 12 Hz posterior dominant rhythm seen in the occipital,   parietal and posterior temporal regions with a mixture of mid and upper range   beta noted diffusely.  The patient underwent head up tilt in 2 steps.  At 30 and   then at full head-up tilt position, no change in electrocortical activity was   noted.  The patient did not report experiencing any clinical symptoms.  Sleep   was not recorded.  Throughout the recording, the background was symmetrical over   the 2 hemispheres and there was no evidence for spike or sharp wave activity.    Activation procedures other than the head-up tilt were not performed.    IMPRESSION:  Normal EEG during head-up tilt procedure.      RR/IN  dd: 03/22/2019 12:59:37 (CDT)  td: 03/22/2019 13:09:57 (CDT)  Doc ID   #[id number]  Job ID #[id number]    CC:

## 2019-04-01 ENCOUNTER — OFFICE VISIT (OUTPATIENT)
Dept: PSYCHIATRY | Facility: CLINIC | Age: 59
End: 2019-04-01
Payer: MEDICARE

## 2019-04-01 VITALS
DIASTOLIC BLOOD PRESSURE: 60 MMHG | SYSTOLIC BLOOD PRESSURE: 127 MMHG | WEIGHT: 193.88 LBS | HEART RATE: 89 BPM | HEIGHT: 69 IN | BODY MASS INDEX: 28.72 KG/M2

## 2019-04-01 DIAGNOSIS — F41.3 OTHER MIXED ANXIETY DISORDERS: ICD-10-CM

## 2019-04-01 DIAGNOSIS — F39 MOOD DISORDER: Primary | ICD-10-CM

## 2019-04-01 DIAGNOSIS — Z86.69 HX OF MIGRAINE HEADACHES: ICD-10-CM

## 2019-04-01 DIAGNOSIS — G47.00 INSOMNIA DISORDER, WITH NON-SLEEP DISORDER MENTAL COMORBIDITY: ICD-10-CM

## 2019-04-01 PROCEDURE — 3008F PR BODY MASS INDEX (BMI) DOCUMENTED: ICD-10-PCS | Mod: CPTII,S$GLB,, | Performed by: NURSE PRACTITIONER

## 2019-04-01 PROCEDURE — 3074F SYST BP LT 130 MM HG: CPT | Mod: CPTII,S$GLB,, | Performed by: NURSE PRACTITIONER

## 2019-04-01 PROCEDURE — 99213 PR OFFICE/OUTPT VISIT, EST, LEVL III, 20-29 MIN: ICD-10-PCS | Mod: S$GLB,,, | Performed by: NURSE PRACTITIONER

## 2019-04-01 PROCEDURE — 90833 PR PSYCHOTHERAPY W/PATIENT W/E&M, 30 MIN (ADD ON): ICD-10-PCS | Mod: S$GLB,,, | Performed by: NURSE PRACTITIONER

## 2019-04-01 PROCEDURE — 99999 PR PBB SHADOW E&M-EST. PATIENT-LVL III: ICD-10-PCS | Mod: PBBFAC,,, | Performed by: NURSE PRACTITIONER

## 2019-04-01 PROCEDURE — 3078F PR MOST RECENT DIASTOLIC BLOOD PRESSURE < 80 MM HG: ICD-10-PCS | Mod: CPTII,S$GLB,, | Performed by: NURSE PRACTITIONER

## 2019-04-01 PROCEDURE — 3008F BODY MASS INDEX DOCD: CPT | Mod: CPTII,S$GLB,, | Performed by: NURSE PRACTITIONER

## 2019-04-01 PROCEDURE — 90833 PSYTX W PT W E/M 30 MIN: CPT | Mod: S$GLB,,, | Performed by: NURSE PRACTITIONER

## 2019-04-01 PROCEDURE — 99999 PR PBB SHADOW E&M-EST. PATIENT-LVL III: CPT | Mod: PBBFAC,,, | Performed by: NURSE PRACTITIONER

## 2019-04-01 PROCEDURE — 99213 OFFICE O/P EST LOW 20 MIN: CPT | Mod: S$GLB,,, | Performed by: NURSE PRACTITIONER

## 2019-04-01 PROCEDURE — 3078F DIAST BP <80 MM HG: CPT | Mod: CPTII,S$GLB,, | Performed by: NURSE PRACTITIONER

## 2019-04-01 PROCEDURE — 3074F PR MOST RECENT SYSTOLIC BLOOD PRESSURE < 130 MM HG: ICD-10-PCS | Mod: CPTII,S$GLB,, | Performed by: NURSE PRACTITIONER

## 2019-04-01 RX ORDER — TRAZODONE HYDROCHLORIDE 100 MG/1
TABLET ORAL
Qty: 45 TABLET | Refills: 3 | Status: SHIPPED | OUTPATIENT
Start: 2019-04-01 | End: 2019-07-18 | Stop reason: SDUPTHER

## 2019-04-01 RX ORDER — FLUOXETINE HYDROCHLORIDE 40 MG/1
80 CAPSULE ORAL DAILY
Qty: 60 CAPSULE | Refills: 5 | Status: SHIPPED | OUTPATIENT
Start: 2019-04-01 | End: 2019-05-28 | Stop reason: DRUGHIGH

## 2019-04-01 RX ORDER — CLONAZEPAM 1 MG/1
TABLET ORAL
Qty: 60 TABLET | Refills: 3 | Status: SHIPPED | OUTPATIENT
Start: 2019-04-01 | End: 2019-05-28 | Stop reason: SDUPTHER

## 2019-04-01 RX ORDER — TOPIRAMATE 100 MG/1
100 TABLET, FILM COATED ORAL 2 TIMES DAILY
Qty: 60 TABLET | Refills: 5 | Status: SHIPPED | OUTPATIENT
Start: 2019-04-01 | End: 2019-07-18 | Stop reason: SDUPTHER

## 2019-04-01 NOTE — PROGRESS NOTES
"Outpatient Psychiatry Follow-Up Visit (MD/NP)    4/1/2019    Clinical Status of Patient:  Outpatient (Ambulatory)    Chief Complaint:  Jennifer Brady is a 58 y.o. female who presents today for follow-up of depression and anxiety.  Met with patient.      Last Visit: was 10/11/18. Chart reviewed.     Interval History and Content of Current Session:  Current Psychiatric Medications as of last visit.  · Continue Prozac 80 mg po daily.   · Continue Klonopin 1 mg po BID (take morning and afternoon).  · Continue Topamax 100 mg po BID (for migraines) per Neurology  · Increase to Latuda 40 mg po daily    Discussed situational stressors.  Recently her nephew was murdered. "I was able to go 's office with my brother.  I discovered that I'm stronger than I thought I was".  Stopped taking Latuda because "it was too expensive".  Stopped in January.  Not sleeping well at night.  Will try Trazodone.  Denies SI/HI/AVH.      Psychotherapy:  · Target symptoms: depression, anxiety   · Why chosen therapy is appropriate versus another modality: relevant to diagnosis  · Outcome monitoring methods: self-report, observation  · Therapeutic intervention type: supportive psychotherapy  · Topics discussed/themes: relationships difficulties, difficulty managing affect in interpersonal relationships, building skills sets for symptom management, symptom recognition  · The patient's response to the intervention is accepting. The patient's progress toward treatment goals is fair.   · Duration of intervention: 19 minutes.    Review of Systems   · PSYCHIATRIC: Pertinant items are noted in the narrative.  · CONSTITUTIONAL: No weight gain or loss.   · MUSCULOSKELETAL: No pain or stiffness of the joints.  · NEUROLOGIC: No weakness, sensory changes, seizures, confusion, memory loss, tremor or other abnormal movements.  · ENDOCRINE: No polydipsia or polyuria.  · INTEGUMENTARY: No rashes or lacerations.  · EYES: No exophthalmos, jaundice or " "blindness.  · ENT: No dizziness, tinnitus or hearing loss.  · RESPIRATORY: No shortness of breath.  · CARDIOVASCULAR: No tachycardia or chest pain.  · GASTROINTESTINAL: No nausea, vomiting, pain, constipation or diarrhea.  · GENITOURINARY: No frequency, dysuria or sexual dysfunction.  · HEMATOLOGIC/LYMPHATIC: No excessive bleeding, prolonged or excessive bleeding after dental extraction/injury.  · ALLERGIC/IMMUNOLOGIC: No allergic response to materials, foods or animals at this time.    Past Medical, Family and Social History: The patient's past medical, family and social history have been reviewed and updated as appropriate within the electronic medical record - see encounter notes.    Compliance: yes    Side effects: None    Risk Parameters:  Patient reports no suicidal ideation  Patient reports no homicidal ideation  Patient reports no self-injurious behavior  Patient reports no violent behavior    Exam (detailed: at least 9 elements; comprehensive: all 15 elements)   Constitutional  Vitals:  Most recent vital signs, dated greater than 90 days prior to this appointment, were reviewed.   Vitals:    04/01/19 1448   BP: 127/60   Pulse: 89   Weight: 87.9 kg (193 lb 14.3 oz)   Height: 5' 9" (1.753 m)        General:  unremarkable, age appropriate     Musculoskeletal  Muscle Strength/Tone:  no tremor, no tic   Gait & Station:  non-ataxic     Psychiatric  Speech:  no latency; no press   Mood & Affect:  anxious, depressed  congruent and appropriate   Thought Process:  normal and logical   Associations:  intact   Thought Content:  normal, no suicidality, no homicidality, delusions, or paranoia   Insight:  intact   Judgement: behavior is adequate to circumstances   Orientation:  grossly intact   Memory: intact for content of interview   Language: grossly intact   Attention Span & Concentration:  able to focus   Fund of Knowledge:  intact and appropriate to age and level of education     Assessment and Diagnosis "   Status/Progress: Based on the examination today, the patient's problem(s) is/are improved and adequately but not ideally controlled.  New problems have not been presented today.   Co-morbidities and Lack of compliance are not complicating management of the primary condition.  There are no active rule-out diagnoses for this patient at this time.     General Impression:   Diagnoses and all orders for this visit:    Mood disorder  -     FLUoxetine 40 MG capsule; Take 2 capsules (80 mg total) by mouth once daily.    Other mixed anxiety disorders  -     clonazePAM (KLONOPIN) 1 MG tablet; Take 1 tablet by mouth twice daily in the morning and afternoon as needed for severe anxiety  -     FLUoxetine 40 MG capsule; Take 2 capsules (80 mg total) by mouth once daily.    Hx of migraine headaches  -     topiramate (TOPAMAX) 100 MG tablet; Take 1 tablet (100 mg total) by mouth 2 (two) times daily.    Insomnia disorder, with non-sleep disorder mental comorbidity  -     traZODone (DESYREL) 100 MG tablet; Take 1 or 1.5 tablet by mouth before bed as needed for sleep      Intervention/Counseling/Treatment Plan   · Medication Management: The risks and benefits of medication were discussed with the patient.  · Care Coordination: During the visit, care coordination was conducted with  social work.   · Continue Prozac 80 mg po daily.   · Continue Klonopin 1 mg po BID (take morning and afternoon).  · Continue Topamax 100 mg po BID (for migraines) per Neurology  · DC Latuda 40 mg po daily  · Start Trazodone 100 mg po qhs PRN  · Continue individual psychotherapy with social work    Return to Clinic: 6 months

## 2019-04-22 DIAGNOSIS — I25.110 CORONARY ARTERY DISEASE INVOLVING NATIVE CORONARY ARTERY OF NATIVE HEART WITH UNSTABLE ANGINA PECTORIS: ICD-10-CM

## 2019-04-22 RX ORDER — CLOPIDOGREL BISULFATE 75 MG/1
TABLET ORAL
Qty: 30 TABLET | Refills: 0 | Status: SHIPPED | OUTPATIENT
Start: 2019-04-22 | End: 2019-05-09

## 2019-04-23 DIAGNOSIS — I25.110 CORONARY ARTERY DISEASE INVOLVING NATIVE CORONARY ARTERY OF NATIVE HEART WITH UNSTABLE ANGINA PECTORIS: ICD-10-CM

## 2019-04-24 RX ORDER — CLOPIDOGREL BISULFATE 75 MG/1
TABLET ORAL
Qty: 30 TABLET | Refills: 0 | Status: SHIPPED | OUTPATIENT
Start: 2019-04-24 | End: 2019-05-09

## 2019-04-25 DIAGNOSIS — I25.110 CORONARY ARTERY DISEASE INVOLVING NATIVE CORONARY ARTERY OF NATIVE HEART WITH UNSTABLE ANGINA PECTORIS: ICD-10-CM

## 2019-04-25 RX ORDER — CLOPIDOGREL BISULFATE 75 MG/1
TABLET ORAL
Qty: 30 TABLET | Refills: 0 | Status: SHIPPED | OUTPATIENT
Start: 2019-04-25 | End: 2019-05-09

## 2019-05-09 ENCOUNTER — OFFICE VISIT (OUTPATIENT)
Dept: SLEEP MEDICINE | Facility: CLINIC | Age: 59
End: 2019-05-09
Payer: COMMERCIAL

## 2019-05-09 ENCOUNTER — TELEPHONE (OUTPATIENT)
Dept: SLEEP MEDICINE | Facility: CLINIC | Age: 59
End: 2019-05-09

## 2019-05-09 VITALS
DIASTOLIC BLOOD PRESSURE: 67 MMHG | WEIGHT: 196.44 LBS | HEART RATE: 74 BPM | SYSTOLIC BLOOD PRESSURE: 110 MMHG | HEIGHT: 69 IN | BODY MASS INDEX: 29.09 KG/M2

## 2019-05-09 DIAGNOSIS — G47.33 OSA (OBSTRUCTIVE SLEEP APNEA): Primary | ICD-10-CM

## 2019-05-09 PROCEDURE — 99999 PR PBB SHADOW E&M-EST. PATIENT-LVL III: ICD-10-PCS | Mod: PBBFAC,,, | Performed by: NURSE PRACTITIONER

## 2019-05-09 PROCEDURE — 99214 OFFICE O/P EST MOD 30 MIN: CPT | Mod: S$GLB,,, | Performed by: NURSE PRACTITIONER

## 2019-05-09 PROCEDURE — 99999 PR PBB SHADOW E&M-EST. PATIENT-LVL III: CPT | Mod: PBBFAC,,, | Performed by: NURSE PRACTITIONER

## 2019-05-09 PROCEDURE — 99214 PR OFFICE/OUTPT VISIT, EST, LEVL IV, 30-39 MIN: ICD-10-PCS | Mod: S$GLB,,, | Performed by: NURSE PRACTITIONER

## 2019-05-09 NOTE — PROGRESS NOTES
"Jennifer Brady  was seen as in follow-up for REN management.    CHIEF COMPLAINT:    Chief Complaint   Patient presents with    Sleep Apnea       INTERVAL HISTORY:    05/09/2019 LO Gill NP: Pt has not been able to use machine due to old supplies that "smell like mold". Afraid to use supplies that smell like mold concerned about infection. Machine is now making noises during use and needs to be replaced. Without PAP machine she has snoring, interrupted sleep, air gasping, and daytime fatigue. Pt understands that requal sleep study necessary for new PAP machine since baseline PSG n/a.     Resmed 5 - 15 cm, 1/30, > 4 hours: 1/30, Used hours: 9.7 hours    04/18/2018 LO Gill NP: Requested baseline PSG not yet received, only titration study completed 04/15/2016. Pt has resumed regular CPAP use since last clinic visit after getting new nasal mask. Reports oral drying but does not wear chinstrap. Denies pressure intolerance. Reports that snoring and air gasping resolved with CPAP use. Denies pressure intolerance.  Continues to sleep a lot during the day due to depression "I am so depressed I sleep all day 5 hours or more then I have a hard time sleeping at night". Continues to go to therapy with antidepressant meds. Recently had NSTEMI early April 2018.     CPAP Interrogation: Resmed APAP 5 - 15 cm, Days Used: 23/30, Days > 4 hours: 19/30, Average Usage: 6.3  Hours, 90%tile pressure: 11.6 cm, Leak: 25L  / min, Predicted AHI: 1.8       02/22/2018 LO Gill NP: Initial HISTORY OF PRESENT ILLNESS: Jennifer Brady is a 58 y.o. female is here for sleep evaluation.       Patient complaints include: snoring, excessive daytime fatigue, and air gasping without CPAP machine    Diagnosed with REN ~10 years ago and has been on CPAP since  Prior sleep/neuro MD retired  Establish care today and get new machine/supplies  Used to get supplies from sleep MD office    Has not been able to use current PAP machine since " nasal mask is broken. Mask last replaced > 6 months ago  Without PAP use has gained 25 lb and worsening anxiety. Regular morning headaches without CPAP.     CPAP Interrogation: Resmed APAP 5 - 15 cm, Days Used: 2/30, Days > 4 hours: 0/30, Average Usage: 2.3 hours 90%tile pressure: 8.4 cm    Denies symptoms of restless legs or kicking during sleep.    Occupation: retired     T[place]ill Sleepiness Scale score during initial sleep evaluation was 9.    SLEEP ROUTINE:    Bed partner:  no  Time to bed: 9:30 - 10 pm  Sleep onset latency: 30 minutes      Disruptions or awakenings:   2 - 3    Wakeup time:     4:30 0 5 am  Perceived sleep quality:  2/5       Previous Sleep Study:     04/15/2016 retitration study CPAP 8 cm    Baseline PSG not available.     PAST MEDICAL HISTORY:    Active Ambulatory Problems     Diagnosis Date Noted    Adjustment disorder with mixed anxiety and depressed mood 08/02/2012    Depression 11/30/2012    Episodic mood disorder 05/17/2013    Cluster B personality disorder 05/17/2013    HTN (hypertension) 07/11/2017    Pure hypercholesterolemia 07/11/2017    Anxiety 07/11/2017    REN (obstructive sleep apnea) 07/11/2017    Hx of migraine headaches 07/11/2017    Seizures 02/26/2018    NSTEMI (non-ST elevated myocardial infarction)     Coronary artery disease involving native coronary artery of native heart with unstable angina pectoris 04/05/2018    Ex-smoker 05/02/2018    Smoker 06/12/2018    Obesity 08/14/2018    Seizure 03/22/2019     Resolved Ambulatory Problems     Diagnosis Date Noted    No Resolved Ambulatory Problems     Past Medical History:   Diagnosis Date    Anxiety     Behavioral problem     Borderline personality disorder     Coronary angioplasty status 04/2018    Coronary artery disease     Depression     Depression     Fatigue     Hx of psychiatric care     Hyperlipidemia     Hypertension     Psychiatric problem     S/P hysterectomy with oophorectomy      Seizures     Seizures     Therapy                 PAST SURGICAL HISTORY:    Past Surgical History:   Procedure Laterality Date    HEART CATH-LEFT - LV JULIO POSS Left 4/4/2018    [facility] at [facility]    TILT TABLE TEST N/A 3/22/2019    Performed by [facility] at [facility] EP LAB         FAMILY HISTORY:                Family History   Problem Relation Age of Onset    Seizures Mother        SOCIAL HISTORY:          Tobacco:   Social History     Tobacco Use   Smoking Status Current Every Day Smoker    Packs/day: 0.25    Types: Cigarettes   Smokeless Tobacco Never Used   Tobacco Comment    quit actual cigarettes a few mo ago, switched to e cigarette       Alcohol use:    Social History     Substance and Sexual Activity   Alcohol Use Yes    Comment: Socially                 ALLERGIES:  Review of patient's allergies indicates:  No Known Allergies    CURRENT MEDICATIONS:    Current Outpatient Medications   Medication Sig Dispense Refill    clonazePAM (KLONOPIN) 1 MG tablet Take 1 tablet by mouth twice daily in the morning and afternoon as needed for severe anxiety 60 tablet 3    FLUoxetine 40 MG capsule Take 2 capsules (80 mg total) by mouth once daily. 60 capsule 5    lisinopril-hydrochlorothiazide (PRINZIDE,ZESTORETIC) 10-12.5 mg per tablet Take 1 tablet by mouth once daily.      magnesium oxide (MAG-OX) 400 mg (241.3 mg magnesium) tablet Take 1 tablet (400 mg total) by mouth once daily. 90 tablet 3    metoclopramide HCl (REGLAN) 5 MG tablet Take 1 tablet (5 mg total) by mouth 3 (three) times daily as needed (Severe headache). 10 tablet 5    topiramate (TOPAMAX) 100 MG tablet Take 1 tablet (100 mg total) by mouth 2 (two) times daily. 60 tablet 5    traZODone (DESYREL) 100 MG tablet Take 1 or 1.5 tablet by mouth before bed as needed for sleep 45 tablet 3    aspirin (ECOTRIN) 81 MG EC tablet Take 1 tablet (81 mg total) by mouth once daily. 100 tablet 6    atorvastatin  "(LIPITOR) 40 MG tablet Take 1 tablet (40 mg total) by mouth once daily. 90 tablet 3    clopidogrel (PLAVIX) 75 mg tablet TAKE 1 TABLET BY MOUTH EVERY DAY 30 tablet 0    nicotine (NICODERM CQ) 14 mg/24 hr Place 1 patch onto the skin once daily. 90 patch 3     No current facility-administered medications for this visit.                   REVIEW OF SYSTEMS:     Sleep related symptoms as per HPI.  CONST:Reports weight gain  - 25 lb wihtin 6 months   HEENT: Reports sinus congestion  PULM: Reports dyspnea  CARD:  Reports palpitations   GI:  Reportsacid reflux  : Denies polyuria  NEURO: Reports morning headaches  PSYCH: Reports mood disturbance  HEME: Denies anemia    Otherwise, a balance of systems reviewed is negative.          PHYSICAL EXAM:  Vitals:    05/09/19 1056   BP: 110/67   Pulse: 74   Weight: 89.1 kg (196 lb 6.9 oz)   Height: 5' 9" (1.753 m)   PainSc: 0-No pain     Body mass index is 29.01 kg/m².     GENERAL: Overweight development, well groomed    ASSESSMENT:    REN, previously diagnosed, severity unknown. The patient symptomatically has snoring, excessive daytime fatigue, and air gasping resolves with CPAP use. The patient is experiencing symptomatic benefit. Medical co-morbidities: migraine headaches, anxiety, depression, systemic HTN, and obesity. This warrants treatment.  CPAP machine has reached reasonable useful lifetime and needs to be replaced.     Obesity, BMI > 30, discussed relationship between weight and REN.     PLAN:    Treatment:  -Requal via HST. When resulted order new APAP at [facility], call pt to notify. RTC 31 - 90 days after PAP   -Meanwhile, updated Rx supplies for pt to get new mask and tubing (will wait to get filters and water chamber with new unit in near future); per [facility], [employer] auth may take up to 5 business days and pt will be contacted via phone regarding mask and tubing pending insurance auth; also provided pt [employer] contact info     Education: During our " discussion today, we talked about the etiology of obstructive sleep apnea as well as the potential ramifications of untreated sleep apnea, which could include daytime sleepiness, hypertension, heart disease and/or stroke. We discussed potential treatment options, which could include weight loss, body positioning, continuous positive airway pressure (CPAP), OA, EPAP, or referral for surgical consideration.     Counseling regarding benefits of healthy eating and physical activity (150 minutes of moderate-intensity aerobic activity per week) for weight reduction which can improve overall health.     Precautions: The patient was advised to abstain from driving should they feel sleepy  or drowsy.

## 2019-05-26 ENCOUNTER — TELEPHONE (OUTPATIENT)
Dept: SLEEP MEDICINE | Facility: CLINIC | Age: 59
End: 2019-05-26

## 2019-05-26 NOTE — TELEPHONE ENCOUNTER
Please notify pt that prescription for supplies was sent to [facility] on 05/09/2019. Please advise her to contact [facility] to inquire on status at [phone].

## 2019-05-26 NOTE — TELEPHONE ENCOUNTER
----- Message from Haydee Rasmussen sent at 5/24/2019  2:20 PM CDT -----  Contact: DANIELLA SOARES   Name of Who is Calling: DANIELLA SOARES       What is the request in detail: Patient is requesting a call back she start a prescription was suppose to sent over for the mask ans hose because her had mold in it and per the durable medical equipment they have not received a prescription       Can the clinic reply by [facility]: no      What Number to Call Back if not in [facility]: [phone]

## 2019-05-27 NOTE — PROGRESS NOTES
Outpatient Psychiatry Follow-Up Visit (MD/NP)    5/28/2019    Clinical Status of Patient:  Outpatient (Ambulatory)    Chief Complaint:  Jennifer Brady is a 58 y.o. female who presents today for follow-up of depression and anxiety.  Met with patient.      Last Visit: was 4/01/19. Chart reviewed.     Interval History and Content of Current Session:  Current Psychiatric Medications as of last visit.  · Continue Prozac 80 mg po daily.   · Continue Klonopin 1 mg po BID (take morning and afternoon).  · Continue Topamax 100 mg po BID (for migraines) per Neurology  · DC Latuda 40 mg po daily  · Start Trazodone 150 mg po qhs PRN    Affect appears bright and congruent with mood.  Reports some mild hand tremors which worsens when anxious. Trazodone ineffective for sleep.  Failed on Ambien. Sleep improved by taking Klonopin PRN. Denies symptoms of depression.  Reports anxiety is manageable.  Thought processes are clear and organized.  Denies SI/HI/AVH.      Psychotherapy:  · Target symptoms: depression, anxiety   · Why chosen therapy is appropriate versus another modality: relevant to diagnosis  · Outcome monitoring methods: self-report, observation  · Therapeutic intervention type: supportive psychotherapy  · Topics discussed/themes: relationships difficulties, difficulty managing affect in interpersonal relationships, building skills sets for symptom management, symptom recognition  · The patient's response to the intervention is accepting. The patient's progress toward treatment goals is fair.   · Duration of intervention: 10 minutes.    Review of Systems   · PSYCHIATRIC: Pertinant items are noted in the narrative.  · CONSTITUTIONAL: No weight gain or loss.   · MUSCULOSKELETAL: No pain or stiffness of the joints.  · NEUROLOGIC: No weakness, sensory changes, seizures, confusion, memory loss, tremor or other abnormal movements.  · ENDOCRINE: No polydipsia or polyuria.  · INTEGUMENTARY: No rashes or  lacerations.  · EYES: No exophthalmos, jaundice or blindness.  · ENT: No dizziness, tinnitus or hearing loss.  · RESPIRATORY: No shortness of breath.  · CARDIOVASCULAR: No tachycardia or chest pain.  · GASTROINTESTINAL: No nausea, vomiting, pain, constipation or diarrhea.  · GENITOURINARY: No frequency, dysuria or sexual dysfunction.  · HEMATOLOGIC/LYMPHATIC: No excessive bleeding, prolonged or excessive bleeding after dental extraction/injury.  · ALLERGIC/IMMUNOLOGIC: No allergic response to materials, foods or animals at this time.    Past Medical, Family and Social History: The patient's past medical, family and social history have been reviewed and updated as appropriate within the electronic medical record - see encounter notes.    Compliance: yes    Side effects: None    Risk Parameters:  Patient reports no suicidal ideation  Patient reports no homicidal ideation  Patient reports no self-injurious behavior  Patient reports no violent behavior    Exam (detailed: at least 9 elements; comprehensive: all 15 elements)   Constitutional  Vitals:  Most recent vital signs, dated greater than 90 days prior to this appointment, were reviewed.   Vitals:    05/28/19 0808   BP: (!) 111/59   Pulse: 75   Weight: 90.9 kg (200 lb 4.6 oz)        General:  unremarkable, age appropriate     Musculoskeletal  Muscle Strength/Tone:  no tremor, no tic   Gait & Station:  non-ataxic     Psychiatric  Speech:  no latency; no press   Mood & Affect:  anxious, depressed  congruent and appropriate   Thought Process:  normal and logical   Associations:  intact   Thought Content:  normal, no suicidality, no homicidality, delusions, or paranoia   Insight:  intact   Judgement: behavior is adequate to circumstances   Orientation:  grossly intact   Memory: intact for content of interview   Language: grossly intact   Attention Span & Concentration:  able to focus   Fund of Knowledge:  intact and appropriate to age and level of education      Assessment and Diagnosis   Status/Progress: Based on the examination today, the patient's problem(s) is/are improved and adequately but not ideally controlled.  New problems have not been presented today.   Co-morbidities and Lack of compliance are not complicating management of the primary condition.  There are no active rule-out diagnoses for this patient at this time.     General Impression:   Diagnoses and all orders for this visit:    Other mixed anxiety disorders  -     clonazePAM (KLONOPIN) 1 MG tablet; Take 1 tablet by mouth twice daily in the morning and afternoon as needed for severe anxiety    Mood disorder    Insomnia disorder, with non-sleep disorder mental comorbidity    Other orders  -     FLUoxetine 20 MG capsule; Take 3 capsules (60 mg total) by mouth once daily.      Intervention/Counseling/Treatment Plan   · Medication Management: The risks and benefits of medication were discussed with the patient.  · Care Coordination: During the visit, care coordination was conducted with  social work.   · Decrease to Prozac 60 mg po daily.   · Continue Klonopin 1 mg po BID (take morning and afternoon).  · Continue Topamax 100 mg po BID (for migraines) per Neurology  · Continue Trazodone 150 mg po qhs PRN insomnia  · Continue individual psychotherapy with social work    Return to Clinic: 6 months     Risks, benefits, side effects and alternative treatments discussed with patient. Patient agrees with the current plan as documented.  Encouraged Patient to keep future appointments.  Take medications as prescribed and abstain from substance abuse.  Pt to present to ED for thoughts to harm herself or others

## 2019-05-28 ENCOUNTER — OFFICE VISIT (OUTPATIENT)
Dept: PSYCHIATRY | Facility: CLINIC | Age: 59
End: 2019-05-28
Payer: COMMERCIAL

## 2019-05-28 VITALS
HEART RATE: 75 BPM | DIASTOLIC BLOOD PRESSURE: 59 MMHG | BODY MASS INDEX: 29.58 KG/M2 | WEIGHT: 200.31 LBS | SYSTOLIC BLOOD PRESSURE: 111 MMHG

## 2019-05-28 DIAGNOSIS — F39 MOOD DISORDER: ICD-10-CM

## 2019-05-28 DIAGNOSIS — F41.3 OTHER MIXED ANXIETY DISORDERS: Primary | ICD-10-CM

## 2019-05-28 DIAGNOSIS — G47.00 INSOMNIA DISORDER, WITH NON-SLEEP DISORDER MENTAL COMORBIDITY: ICD-10-CM

## 2019-05-28 PROCEDURE — 3008F BODY MASS INDEX DOCD: CPT | Mod: CPTII,S$GLB,, | Performed by: NURSE PRACTITIONER

## 2019-05-28 PROCEDURE — 3008F PR BODY MASS INDEX (BMI) DOCUMENTED: ICD-10-PCS | Mod: CPTII,S$GLB,, | Performed by: NURSE PRACTITIONER

## 2019-05-28 PROCEDURE — 99213 PR OFFICE/OUTPT VISIT, EST, LEVL III, 20-29 MIN: ICD-10-PCS | Mod: S$GLB,,, | Performed by: NURSE PRACTITIONER

## 2019-05-28 PROCEDURE — 3074F PR MOST RECENT SYSTOLIC BLOOD PRESSURE < 130 MM HG: ICD-10-PCS | Mod: CPTII,S$GLB,, | Performed by: NURSE PRACTITIONER

## 2019-05-28 PROCEDURE — 99999 PR PBB SHADOW E&M-EST. PATIENT-LVL III: ICD-10-PCS | Mod: PBBFAC,,, | Performed by: NURSE PRACTITIONER

## 2019-05-28 PROCEDURE — 99999 PR PBB SHADOW E&M-EST. PATIENT-LVL III: CPT | Mod: PBBFAC,,, | Performed by: NURSE PRACTITIONER

## 2019-05-28 PROCEDURE — 3078F DIAST BP <80 MM HG: CPT | Mod: CPTII,S$GLB,, | Performed by: NURSE PRACTITIONER

## 2019-05-28 PROCEDURE — 3078F PR MOST RECENT DIASTOLIC BLOOD PRESSURE < 80 MM HG: ICD-10-PCS | Mod: CPTII,S$GLB,, | Performed by: NURSE PRACTITIONER

## 2019-05-28 PROCEDURE — 99213 OFFICE O/P EST LOW 20 MIN: CPT | Mod: S$GLB,,, | Performed by: NURSE PRACTITIONER

## 2019-05-28 PROCEDURE — 3074F SYST BP LT 130 MM HG: CPT | Mod: CPTII,S$GLB,, | Performed by: NURSE PRACTITIONER

## 2019-05-28 RX ORDER — CLONAZEPAM 1 MG/1
TABLET ORAL
Qty: 60 TABLET | Refills: 5 | Status: SHIPPED | OUTPATIENT
Start: 2019-05-28 | End: 2019-07-18

## 2019-05-28 RX ORDER — FLUOXETINE HYDROCHLORIDE 20 MG/1
60 CAPSULE ORAL DAILY
Qty: 90 CAPSULE | Refills: 5 | Status: SHIPPED | OUTPATIENT
Start: 2019-05-28 | End: 2019-07-18

## 2019-06-14 ENCOUNTER — TELEPHONE (OUTPATIENT)
Dept: SLEEP MEDICINE | Facility: OTHER | Age: 59
End: 2019-06-14

## 2019-06-25 ENCOUNTER — OFFICE VISIT (OUTPATIENT)
Dept: CARDIOLOGY | Facility: CLINIC | Age: 59
End: 2019-06-25
Attending: INTERNAL MEDICINE
Payer: COMMERCIAL

## 2019-06-25 VITALS
HEART RATE: 76 BPM | SYSTOLIC BLOOD PRESSURE: 117 MMHG | HEIGHT: 69 IN | DIASTOLIC BLOOD PRESSURE: 65 MMHG | BODY MASS INDEX: 28.88 KG/M2 | WEIGHT: 195 LBS

## 2019-06-25 DIAGNOSIS — G47.33 OSA (OBSTRUCTIVE SLEEP APNEA): ICD-10-CM

## 2019-06-25 DIAGNOSIS — I25.10 CORONARY ARTERY DISEASE INVOLVING NATIVE CORONARY ARTERY OF NATIVE HEART WITHOUT ANGINA PECTORIS: Primary | ICD-10-CM

## 2019-06-25 DIAGNOSIS — I21.4 NSTEMI (NON-ST ELEVATED MYOCARDIAL INFARCTION): ICD-10-CM

## 2019-06-25 DIAGNOSIS — Z87.891 EX-SMOKER: ICD-10-CM

## 2019-06-25 DIAGNOSIS — E66.9 OBESITY, UNSPECIFIED CLASSIFICATION, UNSPECIFIED OBESITY TYPE, UNSPECIFIED WHETHER SERIOUS COMORBIDITY PRESENT: ICD-10-CM

## 2019-06-25 DIAGNOSIS — I10 ESSENTIAL HYPERTENSION: ICD-10-CM

## 2019-06-25 PROCEDURE — 99214 PR OFFICE/OUTPT VISIT, EST, LEVL IV, 30-39 MIN: ICD-10-PCS | Mod: S$GLB,,, | Performed by: INTERNAL MEDICINE

## 2019-06-25 PROCEDURE — 99214 OFFICE O/P EST MOD 30 MIN: CPT | Mod: S$GLB,,, | Performed by: INTERNAL MEDICINE

## 2019-06-25 NOTE — PROGRESS NOTES
Subjective:    Patient ID:  Jennifer Brady is a 58 y.o. female     HPI   Here for follow-up of coronary artery disease, NSTEMI on 04/04/2018, status post drug-eluting stent in the right coronary artery, drug-eluting stents x2 in the left circumflex coronary artery, essential hypertension, hyperlipidemia, obstructive sleep apnea, cigarette smoker.    I feel good, I have no complaints.  I have not smoked in the last 4 months, I have lost a few lb in weight.  I stay active.    Current Outpatient Medications   Medication Sig    aspirin (ECOTRIN) 81 MG EC tablet Take 1 tablet (81 mg total) by mouth once daily.    atorvastatin (LIPITOR) 40 MG tablet Take 1 tablet (40 mg total) by mouth once daily.    clonazePAM (KLONOPIN) 1 MG tablet Take 1 tablet by mouth twice daily in the morning and afternoon as needed for severe anxiety    clopidogrel (PLAVIX) 75 mg tablet TAKE 1 TABLET BY MOUTH EVERY DAY    FLUoxetine 20 MG capsule Take 3 capsules (60 mg total) by mouth once daily.    lisinopril-hydrochlorothiazide (PRINZIDE,ZESTORETIC) 10-12.5 mg per tablet Take 1 tablet by mouth once daily.    magnesium oxide (MAG-OX) 400 mg (241.3 mg magnesium) tablet Take 1 tablet (400 mg total) by mouth once daily.    metoclopramide HCl (REGLAN) 5 MG tablet Take 1 tablet (5 mg total) by mouth 3 (three) times daily as needed (Severe headache).    nicotine (NICODERM CQ) 14 mg/24 hr Place 1 patch onto the skin once daily.    topiramate (TOPAMAX) 100 MG tablet Take 1 tablet (100 mg total) by mouth 2 (two) times daily.    traZODone (DESYREL) 100 MG tablet Take 1 or 1.5 tablet by mouth before bed as needed for sleep     No current facility-administered medications for this visit.             Review of Systems   Constitution: Negative for chills, decreased appetite, fever, weight gain and weight loss.   HENT: Negative for congestion, hearing loss and sore throat.    Eyes: Negative for blurred vision, double vision and visual  "disturbance.   Cardiovascular: Negative for chest pain, claudication, dyspnea on exertion, leg swelling, palpitations and syncope.   Respiratory: Negative for cough, hemoptysis, shortness of breath, sputum production and wheezing.    Endocrine: Negative for cold intolerance and heat intolerance.   Hematologic/Lymphatic: Negative for bleeding problem. Does not bruise/bleed easily.   Skin: Negative for color change, dry skin, flushing and itching.   Musculoskeletal: Negative for back pain, joint pain and myalgias.   Gastrointestinal: Negative for abdominal pain, anorexia, constipation, diarrhea, dysphagia, nausea and vomiting.        No bleeding per rectum   Genitourinary: Negative for dysuria, flank pain, frequency, hematuria and nocturia.   Neurological: Negative for dizziness, headaches, light-headedness, loss of balance, seizures and tremors.   Psychiatric/Behavioral: Negative for altered mental status and depression.         Vitals:    06/25/19 1415   BP: 117/65   Pulse: 76   Weight: 88.5 kg (195 lb)   Height: 5' 9" (1.753 m)     Objective:    Physical Exam   Constitutional: She is oriented to person, place, and time. She appears well-developed and well-nourished.   HENT:   Head: Normocephalic and atraumatic.   Nose: Nose normal.   Mouth/Throat: Oropharynx is clear and moist.   Eyes: Pupils are equal, round, and reactive to light. Conjunctivae and EOM are normal.   Neck: Neck supple. No tracheal deviation present. No thyromegaly present.   Cardiovascular: Normal rate, regular rhythm and intact distal pulses. Exam reveals no gallop and no friction rub.   No murmur heard.  Pulmonary/Chest: No respiratory distress. She has no wheezes. She has no rales. She exhibits no tenderness.   Abdominal: Soft. Bowel sounds are normal. She exhibits no distension and no mass. There is no tenderness. There is no rebound and no guarding.   Musculoskeletal: Normal range of motion.   Lymphadenopathy:     She has no cervical " adenopathy.   Neurological: She is alert and oriented to person, place, and time.   Skin: Skin is warm and dry.   Psychiatric: Her behavior is normal.         Assessment:       1. Coronary artery disease involving native coronary artery of native heart without angina pectoris    2. NSTEMI (non-ST elevated myocardial infarction)    3. Obesity, unspecified classification, unspecified obesity type, unspecified whether serious comorbidity present    4. Ex-smoker    5. REN (obstructive sleep apnea)    6. Essential hypertension         Plan:       Stable.  Continue the present pharmacological regimen.

## 2019-07-02 DIAGNOSIS — I25.110 CORONARY ARTERY DISEASE INVOLVING NATIVE CORONARY ARTERY OF NATIVE HEART WITH UNSTABLE ANGINA PECTORIS: ICD-10-CM

## 2019-07-02 RX ORDER — CLOPIDOGREL BISULFATE 75 MG/1
TABLET ORAL
Qty: 30 TABLET | Refills: 0 | Status: SHIPPED | OUTPATIENT
Start: 2019-07-02 | End: 2019-08-31 | Stop reason: SDUPTHER

## 2019-07-09 ENCOUNTER — TELEPHONE (OUTPATIENT)
Dept: SLEEP MEDICINE | Facility: OTHER | Age: 59
End: 2019-07-09

## 2019-07-10 ENCOUNTER — TELEPHONE (OUTPATIENT)
Dept: SLEEP MEDICINE | Facility: OTHER | Age: 59
End: 2019-07-10

## 2019-07-18 ENCOUNTER — OFFICE VISIT (OUTPATIENT)
Dept: PSYCHIATRY | Facility: CLINIC | Age: 59
End: 2019-07-18
Payer: COMMERCIAL

## 2019-07-18 VITALS
HEART RATE: 81 BPM | SYSTOLIC BLOOD PRESSURE: 145 MMHG | BODY MASS INDEX: 29.08 KG/M2 | WEIGHT: 203.13 LBS | DIASTOLIC BLOOD PRESSURE: 60 MMHG | HEIGHT: 70 IN

## 2019-07-18 DIAGNOSIS — G47.00 INSOMNIA DISORDER, WITH NON-SLEEP DISORDER MENTAL COMORBIDITY: ICD-10-CM

## 2019-07-18 DIAGNOSIS — F41.1 GAD (GENERALIZED ANXIETY DISORDER): Primary | ICD-10-CM

## 2019-07-18 DIAGNOSIS — Z86.69 HX OF MIGRAINE HEADACHES: ICD-10-CM

## 2019-07-18 PROCEDURE — 99213 PR OFFICE/OUTPT VISIT, EST, LEVL III, 20-29 MIN: ICD-10-PCS | Mod: S$GLB,,, | Performed by: NURSE PRACTITIONER

## 2019-07-18 PROCEDURE — 99999 PR PBB SHADOW E&M-EST. PATIENT-LVL III: CPT | Mod: PBBFAC,,, | Performed by: NURSE PRACTITIONER

## 2019-07-18 PROCEDURE — 3008F PR BODY MASS INDEX (BMI) DOCUMENTED: ICD-10-PCS | Mod: CPTII,S$GLB,, | Performed by: NURSE PRACTITIONER

## 2019-07-18 PROCEDURE — 3077F PR MOST RECENT SYSTOLIC BLOOD PRESSURE >= 140 MM HG: ICD-10-PCS | Mod: CPTII,S$GLB,, | Performed by: NURSE PRACTITIONER

## 2019-07-18 PROCEDURE — 3077F SYST BP >= 140 MM HG: CPT | Mod: CPTII,S$GLB,, | Performed by: NURSE PRACTITIONER

## 2019-07-18 PROCEDURE — 99999 PR PBB SHADOW E&M-EST. PATIENT-LVL III: ICD-10-PCS | Mod: PBBFAC,,, | Performed by: NURSE PRACTITIONER

## 2019-07-18 PROCEDURE — 3078F PR MOST RECENT DIASTOLIC BLOOD PRESSURE < 80 MM HG: ICD-10-PCS | Mod: CPTII,S$GLB,, | Performed by: NURSE PRACTITIONER

## 2019-07-18 PROCEDURE — 3008F BODY MASS INDEX DOCD: CPT | Mod: CPTII,S$GLB,, | Performed by: NURSE PRACTITIONER

## 2019-07-18 PROCEDURE — 99213 OFFICE O/P EST LOW 20 MIN: CPT | Mod: S$GLB,,, | Performed by: NURSE PRACTITIONER

## 2019-07-18 PROCEDURE — 3078F DIAST BP <80 MM HG: CPT | Mod: CPTII,S$GLB,, | Performed by: NURSE PRACTITIONER

## 2019-07-18 RX ORDER — TOPIRAMATE 100 MG/1
100 TABLET, FILM COATED ORAL 2 TIMES DAILY
Qty: 60 TABLET | Refills: 5 | Status: SHIPPED | OUTPATIENT
Start: 2019-07-18 | End: 2020-03-17 | Stop reason: SDUPTHER

## 2019-07-18 RX ORDER — TRAZODONE HYDROCHLORIDE 100 MG/1
TABLET ORAL
Qty: 45 TABLET | Refills: 3 | Status: SHIPPED | OUTPATIENT
Start: 2019-07-18 | End: 2019-08-26 | Stop reason: SDUPTHER

## 2019-07-18 NOTE — PROGRESS NOTES
Outpatient Psychiatry Follow-Up Visit (MD/NP)    7/18/2019    Clinical Status of Patient:  Outpatient (Ambulatory)    Chief Complaint:  Jennifer Brady is a 58 y.o. female who presents today for follow-up of depression and anxiety.  Met with patient.      Last Visit: was 5/28/19. Chart reviewed.     Interval History and Content of Current Session:  Current Psychiatric Medications as of last visit.  · Decrease to Prozac 60 mg po daily.   · Continue Klonopin 1 mg po BID (take morning and afternoon).  · Continue Topamax 100 mg po BID (for migraines) per Neurology  · Continue Trazodone 150 mg po qhs PRN insomnia    Pt states that she's been going through a lot of stress.  Went to court on Tuesday and saw nichelle who killed my nephew.  Stress of trying to pay bills.  Also having conflict with neighbors who's kids have been trhowing rocks at her car.  Pt not wanting medication.  Refusing to start therapy.  Pt had taken herself off medication because she felt like she was on too much medicine.  Reports that pharmacy is not filling Klonopin because of her taking pain medication.  Will continue trazodine and topamax. Denies SI/HI/AVH.     Psychotherapy:  · Target symptoms: depression, anxiety   · Why chosen therapy is appropriate versus another modality: relevant to diagnosis  · Outcome monitoring methods: self-report, observation  · Therapeutic intervention type: supportive psychotherapy  · Topics discussed/themes: relationships difficulties, difficulty managing affect in interpersonal relationships, building skills sets for symptom management, symptom recognition  · The patient's response to the intervention is accepting. The patient's progress toward treatment goals is fair.   · Duration of intervention: 10 minutes.    Review of Systems   · PSYCHIATRIC: Pertinant items are noted in the narrative.  · CONSTITUTIONAL: No weight gain or loss.   · MUSCULOSKELETAL: No pain or stiffness of the joints.  · NEUROLOGIC: No  "weakness, sensory changes, seizures, confusion, memory loss, tremor or other abnormal movements.  · ENDOCRINE: No polydipsia or polyuria.  · INTEGUMENTARY: No rashes or lacerations.  · EYES: No exophthalmos, jaundice or blindness.  · ENT: No dizziness, tinnitus or hearing loss.  · RESPIRATORY: No shortness of breath.  · CARDIOVASCULAR: No tachycardia or chest pain.  · GASTROINTESTINAL: No nausea, vomiting, pain, constipation or diarrhea.  · GENITOURINARY: No frequency, dysuria or sexual dysfunction.  · HEMATOLOGIC/LYMPHATIC: No excessive bleeding, prolonged or excessive bleeding after dental extraction/injury.  · ALLERGIC/IMMUNOLOGIC: No allergic response to materials, foods or animals at this time.    Past Medical, Family and Social History: The patient's past medical, family and social history have been reviewed and updated as appropriate within the electronic medical record - see encounter notes.    Compliance: yes    Side effects: None    Risk Parameters:  Patient reports no suicidal ideation  Patient reports no homicidal ideation  Patient reports no self-injurious behavior  Patient reports no violent behavior    Exam (detailed: at least 9 elements; comprehensive: all 15 elements)   Constitutional  Vitals:  Most recent vital signs, dated greater than 90 days prior to this appointment, were reviewed.   Vitals:    07/18/19 1028   BP: (!) 145/60   Pulse: 81   Weight: 92.1 kg (203 lb 2.5 oz)   Height: 5' 9.5" (1.765 m)        General:  unremarkable, age appropriate     Musculoskeletal  Muscle Strength/Tone:  no tremor, no tic   Gait & Station:  non-ataxic     Psychiatric  Speech:  no latency; no press   Mood & Affect:  anxious, depressed  congruent and appropriate   Thought Process:  normal and logical   Associations:  intact   Thought Content:  normal, no suicidality, no homicidality, delusions, or paranoia   Insight:  intact   Judgement: behavior is adequate to circumstances   Orientation:  grossly intact "   Memory: intact for content of interview   Language: grossly intact   Attention Span & Concentration:  able to focus   Fund of Knowledge:  intact and appropriate to age and level of education     Assessment and Diagnosis   Status/Progress: Based on the examination today, the patient's problem(s) is/are improved and adequately but not ideally controlled.  New problems have not been presented today.   Co-morbidities and Lack of compliance are not complicating management of the primary condition.  There are no active rule-out diagnoses for this patient at this time.     General Impression:   Diagnoses and all orders for this visit:    LAURA (generalized anxiety disorder)    Hx of migraine headaches  -     topiramate (TOPAMAX) 100 MG tablet; Take 1 tablet (100 mg total) by mouth 2 (two) times daily.    Insomnia disorder, with non-sleep disorder mental comorbidity  -     traZODone (DESYREL) 100 MG tablet; Take 1 or 1.5 tablet by mouth before bed as needed for sleep      Intervention/Counseling/Treatment Plan   · Medication Management: The risks and benefits of medication were discussed with the patient.  · Care Coordination: During the visit, care coordination was conducted with  social work.   · DC Prozac 60 mg po daily.   · DC Klonopin 1 mg po BID (take morning and afternoon).  · Continue Topamax 100 mg po BID (for migraines) per Neurology  · Continue Trazodone 150 mg po qhs PRN insomnia  · Continue individual psychotherapy with social work    Return to Clinic: 1 year     Risks, benefits, side effects and alternative treatments discussed with patient. Patient agrees with the current plan as documented.  Encouraged Patient to keep future appointments.  Take medications as prescribed and abstain from substance abuse.  Pt to present to ED for thoughts to harm herself or others

## 2019-07-23 ENCOUNTER — DOCUMENTATION ONLY (OUTPATIENT)
Dept: PSYCHIATRY | Facility: CLINIC | Age: 59
End: 2019-07-23

## 2019-07-31 ENCOUNTER — TELEPHONE (OUTPATIENT)
Dept: SLEEP MEDICINE | Facility: OTHER | Age: 59
End: 2019-07-31

## 2019-08-02 ENCOUNTER — TELEPHONE (OUTPATIENT)
Dept: SLEEP MEDICINE | Facility: OTHER | Age: 59
End: 2019-08-02

## 2019-08-02 ENCOUNTER — TELEPHONE (OUTPATIENT)
Dept: SLEEP MEDICINE | Facility: CLINIC | Age: 59
End: 2019-08-02

## 2019-08-02 DIAGNOSIS — G47.30 SLEEP APNEA, UNSPECIFIED TYPE: Primary | ICD-10-CM

## 2019-08-02 NOTE — TELEPHONE ENCOUNTER
----- Message from Venu Weaver sent at 8/2/2019 10:27 AM CDT -----  Regarding: home sleep study   Marisela,            Can you put in a new home sleep study order.  The device did not work for her.  I have to send it out to get fixed.      Thank You,  Venu

## 2019-08-07 ENCOUNTER — TELEPHONE (OUTPATIENT)
Dept: SLEEP MEDICINE | Facility: OTHER | Age: 59
End: 2019-08-07

## 2019-08-11 ENCOUNTER — HOSPITAL ENCOUNTER (EMERGENCY)
Facility: OTHER | Age: 59
Discharge: HOME | End: 2019-08-11
Attending: EMERGENCY MEDICINE
Payer: MEDICARE

## 2019-08-11 VITALS
DIASTOLIC BLOOD PRESSURE: 81 MMHG | HEART RATE: 64 BPM | SYSTOLIC BLOOD PRESSURE: 130 MMHG | RESPIRATION RATE: 19 BRPM | BODY MASS INDEX: 29.06 KG/M2 | OXYGEN SATURATION: 100 % | WEIGHT: 203 LBS | TEMPERATURE: 98 F | HEIGHT: 70 IN

## 2019-08-11 DIAGNOSIS — R07.9 CHEST PAIN: ICD-10-CM

## 2019-08-11 LAB
ALBUMIN SERPL BCP-MCNC: 3.9 G/DL (ref 3.5–5.2)
ALP SERPL-CCNC: 136 U/L (ref 55–135)
ALT SERPL W/O P-5'-P-CCNC: 15 U/L (ref 10–44)
ANION GAP SERPL CALC-SCNC: 10 MMOL/L (ref 8–16)
AST SERPL-CCNC: 22 U/L (ref 10–40)
BASOPHILS # BLD AUTO: 0.05 K/UL (ref 0–0.2)
BASOPHILS NFR BLD: 0.6 % (ref 0–1.9)
BILIRUB SERPL-MCNC: 0.4 MG/DL (ref 0.1–1)
BUN SERPL-MCNC: 15 MG/DL (ref 8–23)
CALCIUM SERPL-MCNC: 10 MG/DL (ref 8.7–10.5)
CHLORIDE SERPL-SCNC: 108 MMOL/L (ref 95–110)
CO2 SERPL-SCNC: 23 MMOL/L (ref 23–29)
CREAT SERPL-MCNC: 1.1 MG/DL (ref 0.5–1.4)
DIFFERENTIAL METHOD: ABNORMAL
EOSINOPHIL # BLD AUTO: 0.2 K/UL (ref 0–0.5)
EOSINOPHIL NFR BLD: 2.7 % (ref 0–8)
ERYTHROCYTE [DISTWIDTH] IN BLOOD BY AUTOMATED COUNT: 11.9 % (ref 11.5–14.5)
EST. GFR  (AFRICAN AMERICAN): >60 ML/MIN/1.73 M^2
EST. GFR  (NON AFRICAN AMERICAN): 54 ML/MIN/1.73 M^2
GLUCOSE SERPL-MCNC: 92 MG/DL (ref 70–110)
HCT VFR BLD AUTO: 39 % (ref 37–48.5)
HGB BLD-MCNC: 12.4 G/DL (ref 12–16)
IMM GRANULOCYTES # BLD AUTO: 0.02 K/UL (ref 0–0.04)
IMM GRANULOCYTES NFR BLD AUTO: 0.3 % (ref 0–0.5)
LYMPHOCYTES # BLD AUTO: 3.8 K/UL (ref 1–4.8)
LYMPHOCYTES NFR BLD: 48.1 % (ref 18–48)
MCH RBC QN AUTO: 30.2 PG (ref 27–31)
MCHC RBC AUTO-ENTMCNC: 31.8 G/DL (ref 32–36)
MCV RBC AUTO: 95 FL (ref 82–98)
MONOCYTES # BLD AUTO: 0.5 K/UL (ref 0.3–1)
MONOCYTES NFR BLD: 5.7 % (ref 4–15)
NEUTROPHILS # BLD AUTO: 3.3 K/UL (ref 1.8–7.7)
NEUTROPHILS NFR BLD: 42.6 % (ref 38–73)
NRBC BLD-RTO: 0 /100 WBC
PLATELET # BLD AUTO: 404 K/UL (ref 150–350)
PMV BLD AUTO: 8.6 FL (ref 9.2–12.9)
POTASSIUM SERPL-SCNC: 4 MMOL/L (ref 3.5–5.1)
PROT SERPL-MCNC: 8 G/DL (ref 6–8.4)
RBC # BLD AUTO: 4.11 M/UL (ref 4–5.4)
SODIUM SERPL-SCNC: 141 MMOL/L (ref 136–145)
TROPONIN I SERPL DL<=0.01 NG/ML-MCNC: 0.01 NG/ML (ref 0–0.03)
TROPONIN I SERPL DL<=0.01 NG/ML-MCNC: 0.03 NG/ML (ref 0–0.03)
WBC # BLD AUTO: 7.83 K/UL (ref 3.9–12.7)

## 2019-08-11 PROCEDURE — 93010 ELECTROCARDIOGRAM REPORT: CPT | Mod: 76,,, | Performed by: INTERNAL MEDICINE

## 2019-08-11 PROCEDURE — 96374 THER/PROPH/DIAG INJ IV PUSH: CPT

## 2019-08-11 PROCEDURE — 93010 EKG 12-LEAD: ICD-10-PCS | Mod: 76,,, | Performed by: INTERNAL MEDICINE

## 2019-08-11 PROCEDURE — 85025 COMPLETE CBC W/AUTO DIFF WBC: CPT

## 2019-08-11 PROCEDURE — 80053 COMPREHEN METABOLIC PANEL: CPT

## 2019-08-11 PROCEDURE — 99285 EMERGENCY DEPT VISIT HI MDM: CPT | Mod: 25

## 2019-08-11 PROCEDURE — 25000003 PHARM REV CODE 250: Performed by: EMERGENCY MEDICINE

## 2019-08-11 PROCEDURE — 63600175 PHARM REV CODE 636 W HCPCS: Performed by: EMERGENCY MEDICINE

## 2019-08-11 PROCEDURE — 84484 ASSAY OF TROPONIN QUANT: CPT

## 2019-08-11 PROCEDURE — 93005 ELECTROCARDIOGRAM TRACING: CPT

## 2019-08-11 RX ORDER — KETOROLAC TROMETHAMINE 30 MG/ML
15 INJECTION, SOLUTION INTRAMUSCULAR; INTRAVENOUS
Status: COMPLETED | OUTPATIENT
Start: 2019-08-11 | End: 2019-08-11

## 2019-08-11 RX ORDER — ALBUTEROL SULFATE 90 UG/1
1-2 AEROSOL, METERED RESPIRATORY (INHALATION) EVERY 6 HOURS PRN
Qty: 1 INHALER | Refills: 0 | Status: SHIPPED | OUTPATIENT
Start: 2019-08-11 | End: 2020-12-21

## 2019-08-11 RX ORDER — NITROGLYCERIN 0.4 MG/1
0.4 TABLET SUBLINGUAL EVERY 5 MIN PRN
Qty: 20 TABLET | Refills: 0 | Status: SHIPPED | OUTPATIENT
Start: 2019-08-11 | End: 2021-12-16

## 2019-08-11 RX ORDER — ASPIRIN 325 MG
325 TABLET ORAL
Status: COMPLETED | OUTPATIENT
Start: 2019-08-11 | End: 2019-08-11

## 2019-08-11 RX ORDER — METHOCARBAMOL 750 MG/1
1500 TABLET, FILM COATED ORAL
Status: COMPLETED | OUTPATIENT
Start: 2019-08-11 | End: 2019-08-11

## 2019-08-11 RX ORDER — METHOCARBAMOL 750 MG/1
1500 TABLET, FILM COATED ORAL 3 TIMES DAILY PRN
Qty: 30 TABLET | Refills: 0 | Status: SHIPPED | OUTPATIENT
Start: 2019-08-11 | End: 2019-08-16

## 2019-08-11 RX ORDER — FLUOXETINE HYDROCHLORIDE 20 MG/1
60 CAPSULE ORAL DAILY
COMMUNITY
End: 2019-08-26 | Stop reason: SDUPTHER

## 2019-08-11 RX ADMIN — LIDOCAINE HYDROCHLORIDE 50 ML: 20 SOLUTION ORAL; TOPICAL at 01:08

## 2019-08-11 RX ADMIN — ASPIRIN 325 MG ORAL TABLET 325 MG: 325 PILL ORAL at 10:08

## 2019-08-11 RX ADMIN — KETOROLAC TROMETHAMINE 15 MG: 30 INJECTION, SOLUTION INTRAMUSCULAR at 11:08

## 2019-08-11 RX ADMIN — METHOCARBAMOL 1500 MG: 750 TABLET, FILM COATED ORAL at 02:08

## 2019-08-11 NOTE — ED NOTES
Rounding on pt performed. Pt updated on plan of care. Bed in low, locked position with side rails up x2. Call light within reach. Denies any worsening CP or SOB. Reports generalized body aches. Monitoring continues.

## 2019-08-11 NOTE — ED NOTES
Rounding on pt performed. Pt updated on plan of care. Bed in low, locked position with side rails up x2. Call light within reach. Denies any worsening CP or SOB. Reports improvement in generalized body aches. Monitoring continues.

## 2019-08-11 NOTE — ED PROVIDER NOTES
"Encounter Date: 8/11/2019    SCRIBE #1 NOTE: I, India Agustin, am scribing for, and in the presence of, Dr. Allen.       History     Chief Complaint   Patient presents with    Chest Pain     Pt c/o intermittent mid sternal & left sided CP radiating to her back which started during the night. Pt states "This feels like when I had my heart attack last year." Pt also c/o generalized aches X 1 months.      Time seen by provider: 10:13 AM    This is a 59 y.o. female with h/o CAD and HTN who presents with complaint of intermittent mid sternal chest pain that began yesterday. She has had generalized body aches for one month and cough for two weeks, but no wheezing. The pressure-like pain worsens when lying flat and with inspiration. Pain is unchanged with exertion. She reports no alleviating factors. She is unable to identify what is causing the pain, as episode feels similar to previous episodes of GERD, asthma, and also MI which she had in 4/2018 with subsequent stents placed. She reports some improvement with NTG last night, which she has not had to take since MI. She has an appointment with Dr. Reza (cardiology) this month. She has not used an asthma pump in a while, but states that she thinks she has mold in her house causing cough and recently started smoking again. No fever.    The history is provided by the patient and medical records.     Review of patient's allergies indicates:  No Known Allergies  Past Medical History:   Diagnosis Date    Anxiety     Behavioral problem     Borderline personality disorder     Coronary angioplasty status 04/2018    Coronary artery disease     Depression     Depression     Fatigue     Hx of psychiatric care     Hyperlipidemia     Hypertension     Psychiatric problem     S/P hysterectomy with oophorectomy     Seizures     Seizures     Therapy      Past Surgical History:   Procedure Laterality Date    HEART CATH-LEFT - LV JULIO POSS Left 4/4/2018    " Performed by Edwin [facility]za [facility] at [facility] CATH LAB    TILT TABLE TEST N/A 3/22/2019    Performed by Roman Lala MD at [facility] EP LAB     Family History   Problem Relation Age of Onset    Seizures Mother      Social History     Tobacco Use    Smoking status: Current Every Day Smoker     Packs/day: 0.25     Types: Cigarettes    Smokeless tobacco: Never Used    Tobacco comment: quit actual cigarettes a few mo ago, switched to e cigarette   Substance Use Topics    Alcohol use: Yes     Comment: Socially    Drug use: No     Review of Systems   Constitutional: Negative for fever.   HENT: Negative for sore throat.    Respiratory: Positive for cough and shortness of breath.    Cardiovascular: Positive for chest pain.   Gastrointestinal: Negative for nausea.   Genitourinary: Negative for dysuria.   Musculoskeletal: Positive for myalgias. Negative for back pain.   Skin: Negative for rash.   Neurological: Negative for weakness.   Hematological: Does not bruise/bleed easily.       Physical Exam     Initial Vitals [08/11/19 0932]   BP Pulse Resp Temp SpO2   (!) 143/75 78 18 97.5 °F (36.4 °C) 98 %      MAP       --         Physical Exam    Nursing note and vitals reviewed.  Constitutional: She appears well-developed and well-nourished. She is not diaphoretic. No distress.   Tearful.   HENT:   Head: Normocephalic and atraumatic.   Eyes: Conjunctivae and EOM are normal. Pupils are equal, round, and reactive to light. No scleral icterus.   Neck: Normal range of motion. Neck supple.   Cardiovascular: Normal rate, regular rhythm and normal heart sounds. Exam reveals no gallop and no friction rub.    No murmur heard.  Pulmonary/Chest: Breath sounds normal. No respiratory distress. She has no wheezes. She has no rhonchi. She has no rales. She exhibits tenderness (sternal chest wall).   Abdominal: Soft. Bowel sounds are normal. She exhibits no distension. There is no tenderness. There is no rebound and no guarding.    Musculoskeletal: Normal range of motion. She exhibits no edema or tenderness.   Neurological: She is alert and oriented to person, place, and time.   Skin: Skin is warm and dry.   Psychiatric: She has a normal mood and affect. Her behavior is normal. Judgment and thought content normal.         ED Course   Procedures  Labs Reviewed   CBC W/ AUTO DIFFERENTIAL - Abnormal; Notable for the following components:       Result Value    Mean Corpuscular Hemoglobin Conc 31.8 (*)     Platelets 404 (*)     MPV 8.6 (*)     Lymph% 48.1 (*)     All other components within normal limits   COMPREHENSIVE METABOLIC PANEL - Abnormal; Notable for the following components:    Alkaline Phosphatase 136 (*)     eGFR if non  54 (*)     All other components within normal limits   TROPONIN I - Abnormal; Notable for the following components:    Troponin I 0.028 (*)     All other components within normal limits   TROPONIN I     EKG Readings: (Independently Interpreted)   Normal sinus rhythm at rate of 74. No STEMI. No significant change from previous.     ECG Results          EKG 12-lead (In process)  Result time 08/12/19 08:36:55    In process by Interface, Lab In [facility] (08/12/19 08:36:55)                 Narrative:    Test Reason : R07.9,    Vent. Rate : 074 BPM     Atrial Rate : 074 BPM     P-R Int : 176 ms          QRS Dur : 096 ms      QT Int : 404 ms       P-R-T Axes : 061 032 -01 degrees     QTc Int : 448 ms    Normal sinus rhythm  Normal ECG      Referred By: [email or web address]   SELF           Confirmed By:                              EKG 12-LEAD (Final result)  Result time 08/12/19 14:59:05    Final result by Unknown User (08/12/19 14:59:05)                                 EKG 12-LEAD (Final result)  Result time 08/12/19 14:59:05    Final result by Unknown User (08/12/19 14:59:05)                                Imaging Results          X-Ray Chest PA And Lateral (Final result)  Result time 08/11/19 11:03:41    Final result  by Deniz Mejias MD (08/11/19 11:03:41)                 Impression:      No acute cardiopulmonary process.      Electronically signed by: Deniz Meijas MD  Date:    08/11/2019  Time:    11:03             Narrative:    EXAMINATION:  XR CHEST PA AND LATERAL    CLINICAL HISTORY:  Chest Pain;    TECHNIQUE:  PA and lateral views of the chest were performed.    COMPARISON:  None    FINDINGS:  Cardiac silhouette and mediastinal contours are normal.  Lungs are clear.  Osseous structures are intact.                                 Medical Decision Making:   Initial Assessment:       59-year-old female history of HTN/CAD presents with intermittent chest pain for the past 2 days.  Pain was made worse by lying flat and taking deep breaths, but no worsening with exertion or activity, and no associated diaphoresis or SOB.  She took a nitro last night which is somewhat improved pain but has been recurrent since; she has not had to take a nitro since last MI.  She states pain reminds her of symptoms when she had MI last year, but also is similar to her GERD or asthma as well. She admits to starting smoking again and the past few weeks with chronic cough since, and is concerned she may have mold in her house causing asthma flare, but she no longer has albuterol.   Initial EKG with no significant change from previous.  Exam with reproducible chest wall tenderness, but no other concerning findings or active wheezing.   Similarity of this episode to previous MI is concerning, but reproducible tenderness suggests musculoskeletal etiology, and could be also related to GERD or reactive airway disease due to mold exposure or smoking.      Initial ACS workup with troponin 0.028, minimally elevated but much less than when she had NSTEMI last year and stent placed.  No other concerning lab findings, and chest x-ray with no acute process.  Patient had chest pain episodes while in ED, but seem to be unrelated to exertion.  She was  tearful intermittently during ED stay, and and also complained generalized body pain for the past month.  She takes Norco for chronic low back pain but states this has been ineffective for this pain. She was given ASA initially and then Toradol with no improvement, but then had some improvement with GI cocktail.  She also describes diffuse body spasms for which Flexeril is not working, so was given Robaxin in ED.  She was observed for 3 hr with no EKG changes on repeat, and 2nd troponin normal. ACS considered given borderline troponin on 1st set now improved, but patient clinical picture not consistent with this, CP has been intermittent and exertional throughout the past 2 days.  Alternate diagnoses such as musculoskeletal etiology or GERD or reactive airway disease much more likely.  I discussed this with Dr. Swift, who is covering her cardiologist Dr. Reza, who agrees that ACS unlikely.  I discussed admission for observation with patient, versus discharge with close follow-up, and she prefers discharge. She is advised to follow up with pain management given worsening body pains unrelieved by Norco, and will Rx Robaxin to try instead of Flexeril for back spasms.  Will also Rx albuterol and refill her nitro, she was also educated on smoking cessation.  Patient feels comfortable with discharge plan and will return to the ED for any worsening chest pain or other concerns.      Independently Interpreted Test(s):   I have ordered and independently interpreted EKG Reading(s) - see prior notes  Clinical Tests:   Lab Tests: Ordered and Reviewed  Radiological Study: Ordered and Reviewed  Medical Tests: Ordered and Reviewed            Scribe Attestation:   Scribe #1: I performed the above scribed service and the documentation accurately describes the services I performed. I attest to the accuracy of the note.    Attending Attestation:           Physician Attestation for Scribe:  Physician Attestation Statement for  Scribe #1: I, Dr. Lynn, reviewed documentation, as scribed by India Agustin in my presence, and it is both accurate and complete.                    Clinical Impression:     1. Chest pain                                 Tesfaye Lynn MD  08/12/19 [id number]

## 2019-08-11 NOTE — ED NOTES
Rounding on pt performed. Pt updated on plan of care. Bed in low, locked position with side rails up x2. Call light within reach. Denies any worsening CP or SOB. Reports continued generalized body aches. Monitoring continues.

## 2019-08-22 ENCOUNTER — DOCUMENTATION ONLY (OUTPATIENT)
Dept: PSYCHIATRY | Facility: CLINIC | Age: 59
End: 2019-08-22

## 2019-08-22 ENCOUNTER — TELEPHONE (OUTPATIENT)
Dept: PSYCHIATRY | Facility: CLINIC | Age: 59
End: 2019-08-22

## 2019-08-22 NOTE — TELEPHONE ENCOUNTER
----- Message from Parisa Chaparro MA sent at 8/22/2019 10:08 AM CDT -----  Contact: pt  Pt called to cx and r/s 1pm appt today time conflict with another scheduled appt

## 2019-08-26 ENCOUNTER — OFFICE VISIT (OUTPATIENT)
Dept: PSYCHIATRY | Facility: CLINIC | Age: 59
End: 2019-08-26
Payer: MEDICARE

## 2019-08-26 VITALS
WEIGHT: 197.88 LBS | DIASTOLIC BLOOD PRESSURE: 74 MMHG | HEART RATE: 98 BPM | BODY MASS INDEX: 29.31 KG/M2 | HEIGHT: 69 IN | SYSTOLIC BLOOD PRESSURE: 132 MMHG

## 2019-08-26 DIAGNOSIS — G47.00 INSOMNIA DISORDER, WITH NON-SLEEP DISORDER MENTAL COMORBIDITY: ICD-10-CM

## 2019-08-26 DIAGNOSIS — F41.1 GAD (GENERALIZED ANXIETY DISORDER): Primary | ICD-10-CM

## 2019-08-26 PROCEDURE — 3078F PR MOST RECENT DIASTOLIC BLOOD PRESSURE < 80 MM HG: ICD-10-PCS | Mod: CPTII,S$GLB,, | Performed by: NURSE PRACTITIONER

## 2019-08-26 PROCEDURE — 3008F BODY MASS INDEX DOCD: CPT | Mod: CPTII,S$GLB,, | Performed by: NURSE PRACTITIONER

## 2019-08-26 PROCEDURE — 3008F PR BODY MASS INDEX (BMI) DOCUMENTED: ICD-10-PCS | Mod: CPTII,S$GLB,, | Performed by: NURSE PRACTITIONER

## 2019-08-26 PROCEDURE — 3075F PR MOST RECENT SYSTOLIC BLOOD PRESS GE 130-139MM HG: ICD-10-PCS | Mod: CPTII,S$GLB,, | Performed by: NURSE PRACTITIONER

## 2019-08-26 PROCEDURE — 99213 OFFICE O/P EST LOW 20 MIN: CPT | Mod: S$GLB,,, | Performed by: NURSE PRACTITIONER

## 2019-08-26 PROCEDURE — 99999 PR PBB SHADOW E&M-EST. PATIENT-LVL III: ICD-10-PCS | Mod: PBBFAC,,, | Performed by: NURSE PRACTITIONER

## 2019-08-26 PROCEDURE — 99999 PR PBB SHADOW E&M-EST. PATIENT-LVL III: CPT | Mod: PBBFAC,,, | Performed by: NURSE PRACTITIONER

## 2019-08-26 PROCEDURE — 3075F SYST BP GE 130 - 139MM HG: CPT | Mod: CPTII,S$GLB,, | Performed by: NURSE PRACTITIONER

## 2019-08-26 PROCEDURE — 99213 PR OFFICE/OUTPT VISIT, EST, LEVL III, 20-29 MIN: ICD-10-PCS | Mod: S$GLB,,, | Performed by: NURSE PRACTITIONER

## 2019-08-26 PROCEDURE — 3078F DIAST BP <80 MM HG: CPT | Mod: CPTII,S$GLB,, | Performed by: NURSE PRACTITIONER

## 2019-08-26 RX ORDER — DIAZEPAM 5 MG/1
5 TABLET ORAL DAILY PRN
Qty: 30 TABLET | Refills: 2 | Status: ON HOLD | OUTPATIENT
Start: 2019-08-26 | End: 2019-11-20 | Stop reason: CLARIF

## 2019-08-26 RX ORDER — FLUOXETINE HYDROCHLORIDE 20 MG/1
20 CAPSULE ORAL DAILY
Qty: 90 CAPSULE | Refills: 1 | Status: SHIPPED | OUTPATIENT
Start: 2019-08-26 | End: 2019-12-17 | Stop reason: SDUPTHER

## 2019-08-26 RX ORDER — TRAZODONE HYDROCHLORIDE 100 MG/1
TABLET ORAL
Qty: 45 TABLET | Refills: 3 | Status: SHIPPED | OUTPATIENT
Start: 2019-08-26 | End: 2019-11-21 | Stop reason: SDUPTHER

## 2019-08-26 NOTE — PROGRESS NOTES
Outpatient Psychiatry Follow-Up Visit (MD/NP)    8/26/2019    Clinical Status of Patient:  Outpatient (Ambulatory)    Chief Complaint:  Jennifer Brady is a 59 y.o. female who presents today for follow-up of depression and anxiety.  Met with patient.      Last Visit: was 7/18/19. Chart and PMPreviewed.     Interval History and Content of Current Session:  Current Psychiatric Medications as of last visit.  · DC Prozac 60 mg po daily.   · DC Klonopin 1 mg po BID (take morning and afternoon).  · Continue Topamax 100 mg po BID (for migraines) per Neurology  · Continue Trazodone 150 mg po qhs PRN insomnia    Pt reports that she is taking one 20 mg of Prozac daily.  Wants to try Valium for anxiety.  Informed to not take with pain medication.  Doing better with situational stressors. Has appt with  for therapy. States that she is dating a nichelle now and happier. Denies SI/HI/AVH.      Psychotherapy:  · Target symptoms: depression, anxiety   · Why chosen therapy is appropriate versus another modality: relevant to diagnosis  · Outcome monitoring methods: self-report, observation  · Therapeutic intervention type: supportive psychotherapy  · Topics discussed/themes: relationships difficulties, difficulty managing affect in interpersonal relationships, building skills sets for symptom management, symptom recognition  · The patient's response to the intervention is accepting. The patient's progress toward treatment goals is fair.   · Duration of intervention: 10 minutes.    Review of Systems   · PSYCHIATRIC: Pertinant items are noted in the narrative.  · CONSTITUTIONAL: No weight gain or loss.   · MUSCULOSKELETAL: No pain or stiffness of the joints.  · NEUROLOGIC: No weakness, sensory changes, seizures, confusion, memory loss, tremor or other abnormal movements.  · ENDOCRINE: No polydipsia or polyuria.  · INTEGUMENTARY: No rashes or lacerations.  · EYES: No exophthalmos, jaundice or blindness.  · ENT: No  "dizziness, tinnitus or hearing loss.  · RESPIRATORY: No shortness of breath.  · CARDIOVASCULAR: No tachycardia or chest pain.  · GASTROINTESTINAL: No nausea, vomiting, pain, constipation or diarrhea.  · GENITOURINARY: No frequency, dysuria or sexual dysfunction.  · HEMATOLOGIC/LYMPHATIC: No excessive bleeding, prolonged or excessive bleeding after dental extraction/injury.  · ALLERGIC/IMMUNOLOGIC: No allergic response to materials, foods or animals at this time.    Past Medical, Family and Social History: The patient's past medical, family and social history have been reviewed and updated as appropriate within the electronic medical record - see encounter notes.    Compliance: yes    Side effects: None    Risk Parameters:  Patient reports no suicidal ideation  Patient reports no homicidal ideation  Patient reports no self-injurious behavior  Patient reports no violent behavior    Exam (detailed: at least 9 elements; comprehensive: all 15 elements)   Constitutional  Vitals:  Most recent vital signs, dated greater than 90 days prior to this appointment, were reviewed.   Vitals:    08/26/19 1523   BP: 132/74   Pulse: 98   Weight: 89.8 kg (197 lb 13.8 oz)   Height: 5' 9" (1.753 m)        General:  unremarkable, age appropriate     Musculoskeletal  Muscle Strength/Tone:  no tremor, no tic   Gait & Station:  non-ataxic     Psychiatric  Speech:  no latency; no press   Mood & Affect:  anxious, depressed  congruent and appropriate   Thought Process:  normal and logical   Associations:  intact   Thought Content:  normal, no suicidality, no homicidality, delusions, or paranoia   Insight:  intact   Judgement: behavior is adequate to circumstances   Orientation:  grossly intact   Memory: intact for content of interview   Language: grossly intact   Attention Span & Concentration:  able to focus   Fund of Knowledge:  intact and appropriate to age and level of education     Assessment and Diagnosis   Status/Progress: Based on the " examination today, the patient's problem(s) is/are improved and adequately but not ideally controlled.  New problems have not been presented today.   Co-morbidities and Lack of compliance are not complicating management of the primary condition.  There are no active rule-out diagnoses for this patient at this time.     General Impression:   Diagnoses and all orders for this visit:    LAURA (generalized anxiety disorder)  -     diazePAM (VALIUM) 5 MG tablet; Take 1 tablet (5 mg total) by mouth daily as needed for Anxiety.    Insomnia disorder, with non-sleep disorder mental comorbidity  -     traZODone (DESYREL) 100 MG tablet; Take 1 or 1.5 tablet by mouth before bed as needed for sleep    Other orders  -     FLUoxetine 20 MG capsule; Take 1 capsule (20 mg total) by mouth once daily.      Intervention/Counseling/Treatment Plan   · Medication Management: The risks and benefits of medication were discussed with the patient.  · Care Coordination: During the visit, care coordination was conducted with  social work.   · Decrease to Prozac 20 mg po daily.   · Trial Valium 5 mg po daily PRN anxiety. Do not take with pain medication. Educated pt on proper use and risks.   · Continue Topamax 100 mg po BID (for migraines) per Neurology  · Decrease to Trazodone 100 mg po qhs PRN insomnia  · Continue individual psychotherapy with social work    Return to Clinic: 6 months     Risks, benefits, side effects and alternative treatments discussed with patient. Patient agrees with the current plan as documented.  Encouraged Patient to keep future appointments.  Take medications as prescribed and abstain from substance abuse.  Pt to present to ED for thoughts to harm herself or others

## 2019-08-31 DIAGNOSIS — I25.110 CORONARY ARTERY DISEASE INVOLVING NATIVE CORONARY ARTERY OF NATIVE HEART WITH UNSTABLE ANGINA PECTORIS: ICD-10-CM

## 2019-09-03 RX ORDER — CLOPIDOGREL BISULFATE 75 MG/1
TABLET ORAL
Qty: 30 TABLET | Refills: 0 | Status: SHIPPED | OUTPATIENT
Start: 2019-09-03 | End: 2019-09-27 | Stop reason: SDUPTHER

## 2019-09-04 DIAGNOSIS — I25.110 CORONARY ARTERY DISEASE INVOLVING NATIVE CORONARY ARTERY OF NATIVE HEART WITH UNSTABLE ANGINA PECTORIS: ICD-10-CM

## 2019-09-04 RX ORDER — CLOPIDOGREL BISULFATE 75 MG/1
TABLET ORAL
Qty: 30 TABLET | Refills: 0 | Status: ON HOLD | OUTPATIENT
Start: 2019-09-04 | End: 2019-11-20 | Stop reason: CLARIF

## 2019-09-05 ENCOUNTER — TELEPHONE (OUTPATIENT)
Dept: SLEEP MEDICINE | Facility: OTHER | Age: 59
End: 2019-09-05

## 2019-09-16 ENCOUNTER — TELEPHONE (OUTPATIENT)
Dept: SLEEP MEDICINE | Facility: OTHER | Age: 59
End: 2019-09-16

## 2019-09-20 ENCOUNTER — TELEPHONE (OUTPATIENT)
Dept: SLEEP MEDICINE | Facility: OTHER | Age: 59
End: 2019-09-20

## 2019-09-20 NOTE — TELEPHONE ENCOUNTER
We had to reschedule her home sleep study due to device being sent out to get fixed.  Rescheduled on Oct 9th.

## 2019-09-27 DIAGNOSIS — I25.110 CORONARY ARTERY DISEASE INVOLVING NATIVE CORONARY ARTERY OF NATIVE HEART WITH UNSTABLE ANGINA PECTORIS: ICD-10-CM

## 2019-09-27 RX ORDER — CLOPIDOGREL BISULFATE 75 MG/1
TABLET ORAL
Qty: 30 TABLET | Refills: 0 | Status: SHIPPED | OUTPATIENT
Start: 2019-09-27 | End: 2019-10-21 | Stop reason: SDUPTHER

## 2019-10-01 ENCOUNTER — OFFICE VISIT (OUTPATIENT)
Dept: PSYCHIATRY | Facility: CLINIC | Age: 59
End: 2019-10-01
Payer: MEDICARE

## 2019-10-01 DIAGNOSIS — F41.1 GAD (GENERALIZED ANXIETY DISORDER): Primary | ICD-10-CM

## 2019-10-01 PROCEDURE — 90834 PSYTX W PT 45 MINUTES: CPT | Mod: S$GLB,,, | Performed by: SOCIAL WORKER

## 2019-10-01 PROCEDURE — 90834 PR PSYCHOTHERAPY W/PATIENT, 45 MIN: ICD-10-PCS | Mod: S$GLB,,, | Performed by: SOCIAL WORKER

## 2019-10-08 ENCOUNTER — TELEPHONE (OUTPATIENT)
Dept: SLEEP MEDICINE | Facility: OTHER | Age: 59
End: 2019-10-08

## 2019-10-09 ENCOUNTER — HOSPITAL ENCOUNTER (OUTPATIENT)
Dept: SLEEP MEDICINE | Facility: OTHER | Age: 59
Discharge: HOME | End: 2019-10-09
Attending: NURSE PRACTITIONER
Payer: COMMERCIAL

## 2019-10-09 DIAGNOSIS — G47.30 SLEEP APNEA, UNSPECIFIED TYPE: ICD-10-CM

## 2019-10-09 DIAGNOSIS — G47.33 OSA (OBSTRUCTIVE SLEEP APNEA): ICD-10-CM

## 2019-10-09 PROCEDURE — 95800 SLP STDY UNATTENDED: CPT

## 2019-10-11 PROCEDURE — 95800 SLP STDY UNATTENDED: CPT | Mod: 26,,, | Performed by: PSYCHIATRY & NEUROLOGY

## 2019-10-11 PROCEDURE — 95800 PR SLEEP STUDY, UNATTENDED, RECORD HEART RATE/O2 SAT/RESP ANAL/SLEEP TIME: ICD-10-PCS | Mod: 26,,, | Performed by: PSYCHIATRY & NEUROLOGY

## 2019-10-11 NOTE — PROCEDURES
"Dear Referring Provider,    The sleep study that you ordered is complete. You have ordered sleep LAB services to perform the sleep study for Jennifer Brady.     Please find Sleep Study result in "Chart Review" under the "Media tab."     As the ordering provider, you are responsible for reviewing the results and implementing a treatment plan with your patient. If you need a Sleep Medicine provider to explain the sleep study findings and arrange treatment for the patient, please refer patient for consultation to our Sleep Clinic via [facility] with Ambulatory Consult Sleep.    To do that please place an order for an "Ambulatory Consult Sleep" - it will go to our clinic work queue for our Medical Assistant to contact the patient for an appointment.     For any questions, please contact our clinic staff at [phone] to talk to clinical staff.      "

## 2019-10-13 ENCOUNTER — TELEPHONE (OUTPATIENT)
Dept: SLEEP MEDICINE | Facility: CLINIC | Age: 59
End: 2019-10-13

## 2019-10-13 DIAGNOSIS — G47.33 OSA (OBSTRUCTIVE SLEEP APNEA): Primary | ICD-10-CM

## 2019-10-13 NOTE — TELEPHONE ENCOUNTER
10/09/2019  lb. The overall AHI was 10  and overall RDI was 16. The oxygen mark was 83.2 % and % time < 90% SpO2 was 1.8 %.    Please notify pt that new CPAP machine has been ordered. [employer] will contact pt directly pending insurance approval. Pt to make f/u appt between 31 - 90 days after PAP  - advise to schedule this only after actually obtaining machine.

## 2019-10-21 ENCOUNTER — OFFICE VISIT (OUTPATIENT)
Dept: CARDIOLOGY | Facility: CLINIC | Age: 59
End: 2019-10-21
Attending: INTERNAL MEDICINE
Payer: MEDICARE

## 2019-10-21 VITALS
WEIGHT: 193 LBS | SYSTOLIC BLOOD PRESSURE: 132 MMHG | BODY MASS INDEX: 28.58 KG/M2 | HEIGHT: 69 IN | HEART RATE: 72 BPM | DIASTOLIC BLOOD PRESSURE: 70 MMHG

## 2019-10-21 DIAGNOSIS — I25.10 CORONARY ARTERY DISEASE INVOLVING NATIVE CORONARY ARTERY OF NATIVE HEART WITHOUT ANGINA PECTORIS: Primary | ICD-10-CM

## 2019-10-21 DIAGNOSIS — E78.00 PURE HYPERCHOLESTEROLEMIA: ICD-10-CM

## 2019-10-21 DIAGNOSIS — I21.4 NSTEMI (NON-ST ELEVATED MYOCARDIAL INFARCTION): ICD-10-CM

## 2019-10-21 DIAGNOSIS — F17.200 SMOKER: ICD-10-CM

## 2019-10-21 DIAGNOSIS — I10 ESSENTIAL HYPERTENSION: ICD-10-CM

## 2019-10-21 DIAGNOSIS — G47.33 OSA (OBSTRUCTIVE SLEEP APNEA): ICD-10-CM

## 2019-10-21 DIAGNOSIS — I25.110 CORONARY ARTERY DISEASE INVOLVING NATIVE CORONARY ARTERY OF NATIVE HEART WITH UNSTABLE ANGINA PECTORIS: ICD-10-CM

## 2019-10-21 DIAGNOSIS — E66.9 OBESITY, UNSPECIFIED CLASSIFICATION, UNSPECIFIED OBESITY TYPE, UNSPECIFIED WHETHER SERIOUS COMORBIDITY PRESENT: ICD-10-CM

## 2019-10-21 PROCEDURE — 93000 PR ELECTROCARDIOGRAM, COMPLETE: ICD-10-PCS | Mod: S$GLB,,, | Performed by: INTERNAL MEDICINE

## 2019-10-21 PROCEDURE — 99214 PR OFFICE/OUTPT VISIT, EST, LEVL IV, 30-39 MIN: ICD-10-PCS | Mod: 25,S$GLB,, | Performed by: INTERNAL MEDICINE

## 2019-10-21 PROCEDURE — 93000 ELECTROCARDIOGRAM COMPLETE: CPT | Mod: S$GLB,,, | Performed by: INTERNAL MEDICINE

## 2019-10-21 PROCEDURE — 99214 OFFICE O/P EST MOD 30 MIN: CPT | Mod: 25,S$GLB,, | Performed by: INTERNAL MEDICINE

## 2019-10-21 RX ORDER — IBUPROFEN 200 MG
1 TABLET ORAL DAILY
Qty: 30 PATCH | Refills: 5 | Status: ON HOLD | COMMUNITY
Start: 2019-10-21 | End: 2019-11-20 | Stop reason: CLARIF

## 2019-10-21 RX ORDER — CLOPIDOGREL BISULFATE 75 MG/1
75 TABLET ORAL DAILY
Qty: 30 TABLET | Refills: 6 | Status: ON HOLD | OUTPATIENT
Start: 2019-10-21 | End: 2019-11-20 | Stop reason: CLARIF

## 2019-10-21 NOTE — PROGRESS NOTES
Subjective:    Patient ID:  Jennifer Brady is a 59 y.o. female     HPI  Here for follow-up of coronary artery disease, NSTEMI on 04/04/2018, status post drug-eluting stent in the right coronary artery, drug-eluting stents x2 in the left circumflex coronary artery, essential hypertension, hyperlipidemia, obstructive sleep apnea, cigarette smoker.     I am due to get a CPAP machine any day now.  I have been smoking again, and I would like some nicotine patches.    Current Outpatient Medications   Medication Sig    albuterol (PROVENTIL/VENTOLIN HFA) 90 mcg/actuation inhaler Inhale 1-2 puffs into the lungs every 6 (six) hours as needed. Rescue    aspirin (ECOTRIN) 81 MG EC tablet Take 1 tablet (81 mg total) by mouth once daily.    atorvastatin (LIPITOR) 40 MG tablet Take 1 tablet (40 mg total) by mouth once daily.    clopidogrel (PLAVIX) 75 mg tablet TAKE 1 TABLET BY MOUTH EVERY DAY    clopidogrel (PLAVIX) 75 mg tablet TAKE 1 TABLET BY MOUTH EVERY DAY    clopidogrel (PLAVIX) 75 mg tablet Take 1 tablet (75 mg total) by mouth once daily.    diazePAM (VALIUM) 5 MG tablet Take 1 tablet (5 mg total) by mouth daily as needed for Anxiety.    FLUoxetine 20 MG capsule Take 1 capsule (20 mg total) by mouth once daily.    lisinopril-hydrochlorothiazide (PRINZIDE,ZESTORETIC) 10-12.5 mg per tablet Take 1 tablet by mouth once daily.    magnesium oxide (MAG-OX) 400 mg (241.3 mg magnesium) tablet Take 1 tablet (400 mg total) by mouth once daily.    metoclopramide HCl (REGLAN) 5 MG tablet Take 1 tablet (5 mg total) by mouth 3 (three) times daily as needed (Severe headache).    nicotine (NICODERM CQ) 14 mg/24 hr Place 1 patch onto the skin once daily.    nicotine (NICODERM CQ) 21 mg/24 hr Place 1 patch onto the skin once daily.    nitroGLYCERIN (NITROSTAT) 0.4 MG SL tablet Place 1 tablet (0.4 mg total) under the tongue every 5 (five) minutes as needed for Chest pain.    topiramate (TOPAMAX) 100 MG tablet Take 1  "tablet (100 mg total) by mouth 2 (two) times daily.    traZODone (DESYREL) 100 MG tablet Take 1 or 1.5 tablet by mouth before bed as needed for sleep     No current facility-administered medications for this visit.        Review of Systems   Constitution: Negative for chills, decreased appetite, fever, weight gain and weight loss.   HENT: Negative for congestion, hearing loss and sore throat.    Eyes: Negative for blurred vision, double vision and visual disturbance.   Cardiovascular: Negative for chest pain, claudication, dyspnea on exertion, leg swelling, palpitations and syncope.   Respiratory: Negative for cough, hemoptysis, shortness of breath, sputum production and wheezing.    Endocrine: Negative for cold intolerance and heat intolerance.   Hematologic/Lymphatic: Negative for bleeding problem. Does not bruise/bleed easily.   Skin: Negative for color change, dry skin, flushing and itching.   Musculoskeletal: Negative for back pain, joint pain and myalgias.   Gastrointestinal: Negative for abdominal pain, anorexia, constipation, diarrhea, dysphagia, nausea and vomiting.        No bleeding per rectum   Genitourinary: Negative for dysuria, flank pain, frequency, hematuria and nocturia.   Neurological: Negative for dizziness, headaches, light-headedness, loss of balance, seizures and tremors.   Psychiatric/Behavioral: Negative for altered mental status and depression.     Vitals:    10/21/19 1152   BP: 132/70   Pulse: 72   Weight: 87.5 kg (193 lb)   Height: 5' 9" (1.753 m)        Objective:    Physical Exam   Constitutional: She is oriented to person, place, and time. She appears well-developed and well-nourished.   HENT:   Head: Normocephalic and atraumatic.   Nose: Nose normal.   Mouth/Throat: Oropharynx is clear and moist.   Eyes: Pupils are equal, round, and reactive to light. Conjunctivae and EOM are normal.   Neck: Neck supple. No tracheal deviation present. No thyromegaly present.   Cardiovascular: Normal " rate and regular rhythm. Exam reveals no gallop and no friction rub.   No murmur heard.  Pulmonary/Chest: No respiratory distress. She has no wheezes. She has no rales. She exhibits no tenderness.   Abdominal: Soft. Bowel sounds are normal. She exhibits no distension and no mass. There is no tenderness. There is no rebound and no guarding.   Musculoskeletal: Normal range of motion.   Lymphadenopathy:     She has no cervical adenopathy.   Neurological: She is alert and oriented to person, place, and time.   Skin: Skin is warm and dry.   Psychiatric: Her behavior is normal.         Assessment:       1. Coronary artery disease involving native coronary artery of native heart without angina pectoris    2. NSTEMI (non-ST elevated myocardial infarction)    3. Essential hypertension    4. Pure hypercholesterolemia    5. Obesity, unspecified classification, unspecified obesity type, unspecified whether serious comorbidity present    6. REN (obstructive sleep apnea)    7. Smoker         Plan:       Counseled regarding smoking cessation  Prescribed nicotine patches 21 mg  Continue present pharmacological regimen.

## 2019-10-31 ENCOUNTER — LAB VISIT (OUTPATIENT)
Dept: LAB | Facility: OTHER | Age: 59
End: 2019-10-31
Attending: INTERNAL MEDICINE
Payer: COMMERCIAL

## 2019-10-31 DIAGNOSIS — R10.32 ABDOMINAL PAIN, LEFT LOWER QUADRANT: ICD-10-CM

## 2019-10-31 DIAGNOSIS — K59.00 CN (CONSTIPATION): ICD-10-CM

## 2019-10-31 DIAGNOSIS — R10.13 ABDOMINAL PAIN, EPIGASTRIC: Primary | ICD-10-CM

## 2019-10-31 DIAGNOSIS — R94.5 ABNORMAL RESULTS OF LIVER FUNCTION STUDIES: ICD-10-CM

## 2019-10-31 DIAGNOSIS — R10.31 ABDOMINAL PAIN, RIGHT LOWER QUADRANT: ICD-10-CM

## 2019-10-31 LAB — GGT SERPL-CCNC: 40 U/L (ref 8–55)

## 2019-10-31 PROCEDURE — 86704 HEP B CORE ANTIBODY TOTAL: CPT

## 2019-10-31 PROCEDURE — 82977 ASSAY OF GGT: CPT

## 2019-10-31 PROCEDURE — 87340 HEPATITIS B SURFACE AG IA: CPT

## 2019-10-31 PROCEDURE — 86803 HEPATITIS C AB TEST: CPT

## 2019-10-31 PROCEDURE — 86706 HEP B SURFACE ANTIBODY: CPT

## 2019-11-01 LAB
HBV CORE AB SERPL QL IA: POSITIVE
HBV SURFACE AG SERPL QL IA: NEGATIVE
HCV AB SERPL QL IA: NEGATIVE

## 2019-11-02 LAB
HBV SURFACE AB SER QL IA: POSITIVE
HBV SURFACE AB SERPL IA-ACNC: 615 MIU/ML

## 2019-11-14 DIAGNOSIS — M47.816 SPONDYLOSIS WITHOUT MYELOPATHY OR RADICULOPATHY, LUMBAR REGION: Primary | ICD-10-CM

## 2019-11-20 ENCOUNTER — HOSPITAL ENCOUNTER (OUTPATIENT)
Facility: OTHER | Age: 59
Discharge: HOME | End: 2019-11-20
Attending: ANESTHESIOLOGY | Admitting: ANESTHESIOLOGY
Payer: MEDICARE

## 2019-11-20 VITALS
TEMPERATURE: 98 F | HEIGHT: 69 IN | WEIGHT: 185 LBS | SYSTOLIC BLOOD PRESSURE: 126 MMHG | BODY MASS INDEX: 27.4 KG/M2 | DIASTOLIC BLOOD PRESSURE: 67 MMHG | OXYGEN SATURATION: 96 % | HEART RATE: 74 BPM | RESPIRATION RATE: 16 BRPM

## 2019-11-20 DIAGNOSIS — M47.816 LUMBAR FACET ARTHROPATHY: Primary | ICD-10-CM

## 2019-11-20 DIAGNOSIS — M47.816 LUMBAR SPONDYLOSIS: ICD-10-CM

## 2019-11-20 PROCEDURE — 64494 INJ PARAVERT F JNT L/S 2 LEV: CPT | Performed by: ANESTHESIOLOGY

## 2019-11-20 PROCEDURE — 64493 INJ PARAVERT F JNT L/S 1 LEV: CPT | Performed by: ANESTHESIOLOGY

## 2019-11-20 PROCEDURE — 25000003 PHARM REV CODE 250: Performed by: ANESTHESIOLOGY

## 2019-11-20 PROCEDURE — S0020 INJECTION, BUPIVICAINE HYDRO: HCPCS | Performed by: ANESTHESIOLOGY

## 2019-11-20 PROCEDURE — 64495 INJ PARAVERT F JNT L/S 3 LEV: CPT | Performed by: ANESTHESIOLOGY

## 2019-11-20 PROCEDURE — 63600175 PHARM REV CODE 636 W HCPCS: Performed by: ANESTHESIOLOGY

## 2019-11-20 RX ORDER — BUPIVACAINE HYDROCHLORIDE 5 MG/ML
10 INJECTION, SOLUTION PERINEURAL ONCE
Status: DISCONTINUED | OUTPATIENT
Start: 2019-11-20 | End: 2019-11-20 | Stop reason: HOSPADM

## 2019-11-20 RX ORDER — BUPIVACAINE HYDROCHLORIDE 2.5 MG/ML
INJECTION, SOLUTION EPIDURAL; INFILTRATION; INTRACAUDAL
Status: DISCONTINUED | OUTPATIENT
Start: 2019-11-20 | End: 2019-11-20 | Stop reason: HOSPADM

## 2019-11-20 RX ORDER — TRIAMCINOLONE ACETONIDE 40 MG/ML
INJECTION, SUSPENSION INTRA-ARTICULAR; INTRAMUSCULAR
Status: DISCONTINUED | OUTPATIENT
Start: 2019-11-20 | End: 2019-11-20 | Stop reason: HOSPADM

## 2019-11-20 RX ORDER — ALPRAZOLAM 0.5 MG/1
1 TABLET ORAL
Status: COMPLETED | OUTPATIENT
Start: 2019-11-20 | End: 2019-11-20

## 2019-11-20 RX ORDER — TRIAMCINOLONE ACETONIDE 40 MG/ML
40 INJECTION, SUSPENSION INTRA-ARTICULAR; INTRAMUSCULAR ONCE
Status: CANCELLED | OUTPATIENT
Start: 2019-11-20 | End: 2019-11-20

## 2019-11-20 RX ORDER — LIDOCAINE HYDROCHLORIDE 10 MG/ML
10 INJECTION INFILTRATION; PERINEURAL ONCE
Status: COMPLETED | OUTPATIENT
Start: 2019-11-20 | End: 2019-11-20

## 2019-11-20 RX ORDER — BUPIVACAINE HYDROCHLORIDE 5 MG/ML
INJECTION, SOLUTION EPIDURAL; INTRACAUDAL
Status: DISCONTINUED | OUTPATIENT
Start: 2019-11-20 | End: 2019-11-20 | Stop reason: HOSPADM

## 2019-11-20 RX ADMIN — ALPRAZOLAM 1 MG: 0.5 TABLET ORAL at 08:11

## 2019-11-20 NOTE — DISCHARGE SUMMARY
Discharge Note  Short Stay      SUMMARY     Admit Date: 11/20/2019    Attending Physician: Petros Reich      Discharge Physician: Petros Reich      Discharge Date: 11/20/2019 9:28 AM    Final Diagnosis:   1. Lumbar facet arthropathy    2. Lumbar spondylosis        Disposition: Home or self care    Patient Instructions:   Current Discharge Medication List      CONTINUE these medications which have NOT CHANGED    Details   albuterol (PROVENTIL/VENTOLIN HFA) 90 mcg/actuation inhaler Inhale 1-2 puffs into the lungs every 6 (six) hours as needed. Rescue  Qty: 1 Inhaler, Refills: 0      aspirin (ECOTRIN) 81 MG EC tablet Take 1 tablet (81 mg total) by mouth once daily.  Qty: 100 tablet, Refills: 6      atorvastatin (LIPITOR) 40 MG tablet Take 1 tablet (40 mg total) by mouth once daily.  Qty: 90 tablet, Refills: 3      FLUoxetine 20 MG capsule Take 1 capsule (20 mg total) by mouth once daily.  Qty: 90 capsule, Refills: 1      lisinopril-hydrochlorothiazide (PRINZIDE,ZESTORETIC) 10-12.5 mg per tablet Take 1 tablet by mouth once daily.      nitroGLYCERIN (NITROSTAT) 0.4 MG SL tablet Place 1 tablet (0.4 mg total) under the tongue every 5 (five) minutes as needed for Chest pain.  Qty: 20 tablet, Refills: 0      topiramate (TOPAMAX) 100 MG tablet Take 1 tablet (100 mg total) by mouth 2 (two) times daily.  Qty: 60 tablet, Refills: 5    Associated Diagnoses: Hx of migraine headaches      traZODone (DESYREL) 100 MG tablet Take 1 or 1.5 tablet by mouth before bed as needed for sleep  Qty: 45 tablet, Refills: 3    Associated Diagnoses: Insomnia disorder, with non-sleep disorder mental comorbidity                 Discharge Diagnosis: Same as Pre and Post Procedure  Condition on Discharge: Stable.  Diet on Discharge: Same as before.  Activity: as per instruction sheet.  Discharge to: Home with a responsible adult.  Follow up: as per Discharge instructions.

## 2019-11-20 NOTE — OP NOTE
"Patient Name: Jennifer Brady  MRN: [id number]    INFORMED CONSENT: The procedure, risks, benefits and options were discussed with patient. There are no contraindications to the procedure. The patient expressed understanding and agreed to proceed. The personnel performing the procedure was discussed. I verify that I personally obtained Jennifer Sutherland's consent prior to the start of the procedure and the signed consent can be found on the patient's chart.    Procedure Date: 11/20/2019    Anesthesia: Topical    Pre Procedure diagnosis:   1. Lumbar facet arthropathy    2. Lumbar spondylosis      Post-Procedure diagnosis: same          PROCEDURE:  RT.  LUMBAR FACET MEDIAL BRANCH NERVE BLOCK      DESCRIPTION OF PROCEDURE:The patient was brought to the procedure room. After performing time out. IV access was obtained prior to the procedure. The patient was positioned prone on the fluoroscopy table. Continuous hemodynamic monitoring was initiated including blood pressure, EKG, and pulse oximetry. The area of the lumbar spine was prepped povidone-iodine three times and draped into a sterile field. Fluoroscopy was used to identify the location of the right side L2, L3, L4, and L5 medial branch nerves at the junctions of the superior articular process and the transverse processes of L3, L4, L5, and the sacral ala respectively. Skin anesthesia was achieved using 5 cc of Lidocaine 1% over the injection sites. A 22 gauge, 3 1/2" spinal needle was slowly inserted at each level using AP, lateral and oblique fluoroscopic imaging. Negative aspiration for blood or CSF was confirmed. A combination of 5ml bupivacaine 0.25% and 40 mg of Kenalog was injected at all sites. The needles were removed and bleeding was nil. A sterile dressing was applied. No specimens collected. Jennifer Sutherland was taken back to the recovery room for further observation.     Blood Loss: Nill  Specimen: None    "

## 2019-11-20 NOTE — DISCHARGE INSTRUCTIONS

## 2019-11-21 DIAGNOSIS — G47.00 INSOMNIA DISORDER, WITH NON-SLEEP DISORDER MENTAL COMORBIDITY: ICD-10-CM

## 2019-11-21 RX ORDER — TRAZODONE HYDROCHLORIDE 100 MG/1
TABLET ORAL
Qty: 135 TABLET | Refills: 1 | Status: SHIPPED | OUTPATIENT
Start: 2019-11-21 | End: 2019-12-17 | Stop reason: SDUPTHER

## 2019-12-17 ENCOUNTER — OFFICE VISIT (OUTPATIENT)
Dept: PSYCHIATRY | Facility: CLINIC | Age: 59
End: 2019-12-17
Payer: COMMERCIAL

## 2019-12-17 VITALS
DIASTOLIC BLOOD PRESSURE: 67 MMHG | WEIGHT: 187.63 LBS | BODY MASS INDEX: 27.71 KG/M2 | SYSTOLIC BLOOD PRESSURE: 149 MMHG | HEART RATE: 65 BPM

## 2019-12-17 DIAGNOSIS — G47.00 INSOMNIA DISORDER, WITH NON-SLEEP DISORDER MENTAL COMORBIDITY: ICD-10-CM

## 2019-12-17 DIAGNOSIS — F32.A DEPRESSION, UNSPECIFIED DEPRESSION TYPE: Primary | ICD-10-CM

## 2019-12-17 DIAGNOSIS — F43.21 GRIEF: ICD-10-CM

## 2019-12-17 DIAGNOSIS — F41.1 GAD (GENERALIZED ANXIETY DISORDER): ICD-10-CM

## 2019-12-17 PROCEDURE — 99999 PR PBB SHADOW E&M-EST. PATIENT-LVL III: CPT | Mod: PBBFAC,,, | Performed by: NURSE PRACTITIONER

## 2019-12-17 PROCEDURE — 3077F SYST BP >= 140 MM HG: CPT | Mod: CPTII,S$GLB,, | Performed by: NURSE PRACTITIONER

## 2019-12-17 PROCEDURE — 99999 PR PBB SHADOW E&M-EST. PATIENT-LVL III: ICD-10-PCS | Mod: PBBFAC,,, | Performed by: NURSE PRACTITIONER

## 2019-12-17 PROCEDURE — 3008F BODY MASS INDEX DOCD: CPT | Mod: CPTII,S$GLB,, | Performed by: NURSE PRACTITIONER

## 2019-12-17 PROCEDURE — 3078F PR MOST RECENT DIASTOLIC BLOOD PRESSURE < 80 MM HG: ICD-10-PCS | Mod: CPTII,S$GLB,, | Performed by: NURSE PRACTITIONER

## 2019-12-17 PROCEDURE — 90833 PSYTX W PT W E/M 30 MIN: CPT | Mod: S$GLB,,, | Performed by: NURSE PRACTITIONER

## 2019-12-17 PROCEDURE — 99213 PR OFFICE/OUTPT VISIT, EST, LEVL III, 20-29 MIN: ICD-10-PCS | Mod: S$GLB,,, | Performed by: NURSE PRACTITIONER

## 2019-12-17 PROCEDURE — 3008F PR BODY MASS INDEX (BMI) DOCUMENTED: ICD-10-PCS | Mod: CPTII,S$GLB,, | Performed by: NURSE PRACTITIONER

## 2019-12-17 PROCEDURE — 3078F DIAST BP <80 MM HG: CPT | Mod: CPTII,S$GLB,, | Performed by: NURSE PRACTITIONER

## 2019-12-17 PROCEDURE — 99213 OFFICE O/P EST LOW 20 MIN: CPT | Mod: S$GLB,,, | Performed by: NURSE PRACTITIONER

## 2019-12-17 PROCEDURE — 3077F PR MOST RECENT SYSTOLIC BLOOD PRESSURE >= 140 MM HG: ICD-10-PCS | Mod: CPTII,S$GLB,, | Performed by: NURSE PRACTITIONER

## 2019-12-17 PROCEDURE — 90833 PR PSYCHOTHERAPY W/PATIENT W/E&M, 30 MIN (ADD ON): ICD-10-PCS | Mod: S$GLB,,, | Performed by: NURSE PRACTITIONER

## 2019-12-17 RX ORDER — TRAZODONE HYDROCHLORIDE 100 MG/1
TABLET ORAL
Qty: 135 TABLET | Refills: 1 | Status: SHIPPED | OUTPATIENT
Start: 2019-12-17 | End: 2020-03-17

## 2019-12-17 RX ORDER — FLUOXETINE HYDROCHLORIDE 20 MG/1
20 CAPSULE ORAL DAILY
Qty: 90 CAPSULE | Refills: 3 | Status: SHIPPED | OUTPATIENT
Start: 2019-12-17 | End: 2020-03-17 | Stop reason: SDUPTHER
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
## 2019-12-30 RX ORDER — IBUPROFEN 200 MG
1 TABLET ORAL
COMMUNITY
End: 2019-12-30 | Stop reason: SDUPTHER

## 2019-12-30 RX ORDER — IBUPROFEN 200 MG
1 TABLET ORAL DAILY
Qty: 30 PATCH | Refills: 6 | Status: SHIPPED | OUTPATIENT
Start: 2019-12-30 | End: 2020-07-07

## 2020-01-20 ENCOUNTER — OFFICE VISIT (OUTPATIENT)
Dept: CARDIOLOGY | Facility: CLINIC | Age: 60
End: 2020-01-20
Attending: INTERNAL MEDICINE
Payer: MEDICARE

## 2020-01-20 VITALS
SYSTOLIC BLOOD PRESSURE: 126 MMHG | WEIGHT: 188 LBS | HEIGHT: 69 IN | DIASTOLIC BLOOD PRESSURE: 67 MMHG | BODY MASS INDEX: 27.85 KG/M2 | HEART RATE: 85 BPM

## 2020-01-20 DIAGNOSIS — G47.33 OSA (OBSTRUCTIVE SLEEP APNEA): ICD-10-CM

## 2020-01-20 DIAGNOSIS — I73.9 RIGHT LEG CLAUDICATION: Primary | ICD-10-CM

## 2020-01-20 DIAGNOSIS — F17.200 SMOKER: ICD-10-CM

## 2020-01-20 DIAGNOSIS — M47.816 LUMBAR SPONDYLOSIS: ICD-10-CM

## 2020-01-20 DIAGNOSIS — I25.10 CORONARY ARTERY DISEASE INVOLVING NATIVE CORONARY ARTERY OF NATIVE HEART WITHOUT ANGINA PECTORIS: ICD-10-CM

## 2020-01-20 DIAGNOSIS — I10 ESSENTIAL HYPERTENSION: ICD-10-CM

## 2020-01-20 DIAGNOSIS — I21.4 NSTEMI (NON-ST ELEVATED MYOCARDIAL INFARCTION): ICD-10-CM

## 2020-01-20 PROCEDURE — 99214 OFFICE O/P EST MOD 30 MIN: CPT | Mod: S$GLB,,, | Performed by: INTERNAL MEDICINE

## 2020-01-20 PROCEDURE — 99214 PR OFFICE/OUTPT VISIT, EST, LEVL IV, 30-39 MIN: ICD-10-PCS | Mod: S$GLB,,, | Performed by: INTERNAL MEDICINE

## 2020-01-20 NOTE — PROGRESS NOTES
Subjective:    Patient ID:  Jennifer Brady is a 59 y.o. female     HPI  Here for follow-up of coronary artery disease, NSTEMI on 04/04/2018, status post drug-eluting stent in the right coronary artery, drug-eluting stents x2 in the left circumflex coronary artery, essential hypertension, hyperlipidemia, obstructive sleep apnea, cigarette smoker.     I am still smoking 5 a 6 cigarettes daily.  I am having pain in my back, pain in my right leg.  I also hurt in the right calf when I walk.    Current Outpatient Medications   Medication Sig    albuterol (PROVENTIL/VENTOLIN HFA) 90 mcg/actuation inhaler Inhale 1-2 puffs into the lungs every 6 (six) hours as needed. Rescue    aspirin (ECOTRIN) 81 MG EC tablet Take 1 tablet (81 mg total) by mouth once daily.    atorvastatin (LIPITOR) 40 MG tablet Take 1 tablet (40 mg total) by mouth once daily.    FLUoxetine 20 MG capsule Take 1 capsule (20 mg total) by mouth once daily.    lisinopril-hydrochlorothiazide (PRINZIDE,ZESTORETIC) 10-12.5 mg per tablet Take 1 tablet by mouth once daily.    nicotine (NICODERM CQ) 21 mg/24 hr Place 1 patch onto the skin once daily.    nitroGLYCERIN (NITROSTAT) 0.4 MG SL tablet Place 1 tablet (0.4 mg total) under the tongue every 5 (five) minutes as needed for Chest pain.    topiramate (TOPAMAX) 100 MG tablet Take 1 tablet (100 mg total) by mouth 2 (two) times daily.    traZODone (DESYREL) 100 MG tablet TAKE 1 OR 1.5 TABLETS BY MOUTH BEFORE BED AS NEEDED FOR SLEEP     No current facility-administered medications for this visit.        Review of Systems   Constitution: Negative for chills, decreased appetite, fever, weight gain and weight loss.   HENT: Negative for congestion, hearing loss and sore throat.    Eyes: Negative for blurred vision, double vision and visual disturbance.   Cardiovascular: Positive for claudication. Negative for chest pain, dyspnea on exertion, leg swelling, palpitations and syncope.   Respiratory:  "Negative for cough, hemoptysis, shortness of breath, sputum production and wheezing.    Endocrine: Negative for cold intolerance and heat intolerance.   Hematologic/Lymphatic: Negative for bleeding problem. Does not bruise/bleed easily.   Skin: Negative for color change, dry skin, flushing and itching.   Musculoskeletal: Positive for back pain. Negative for joint pain and myalgias.   Gastrointestinal: Negative for abdominal pain, anorexia, constipation, diarrhea, dysphagia, nausea and vomiting.        No bleeding per rectum   Genitourinary: Negative for dysuria, flank pain, frequency, hematuria and nocturia.   Neurological: Negative for dizziness, headaches, light-headedness, loss of balance, seizures and tremors.   Psychiatric/Behavioral: Negative for altered mental status and depression.         Vitals:    01/20/20 1153   BP: 126/67   Pulse: 85   Weight: 85.3 kg (188 lb)   Height: 5' 9" (1.753 m)     Objective:    Physical Exam   Constitutional: She is oriented to person, place, and time. She appears well-developed and well-nourished.   HENT:   Head: Normocephalic and atraumatic.   Nose: Nose normal.   Mouth/Throat: Oropharynx is clear and moist.   Eyes: Pupils are equal, round, and reactive to light. Conjunctivae and EOM are normal.   Neck: Neck supple. No tracheal deviation present. No thyromegaly present.   Cardiovascular: Normal rate and regular rhythm. Exam reveals no gallop and no friction rub.   No murmur heard.  Pulmonary/Chest: No respiratory distress. She has no wheezes. She has no rales. She exhibits no tenderness.   Abdominal: Soft. Bowel sounds are normal. She exhibits no distension and no mass. There is no tenderness. There is no rebound and no guarding.   Musculoskeletal: Normal range of motion.   Lymphadenopathy:     She has no cervical adenopathy.   Neurological: She is alert and oriented to person, place, and time.   Skin: Skin is warm and dry.   Psychiatric: Her behavior is normal.       "   Assessment:       1. Right leg claudication    2. Coronary artery disease involving native coronary artery of native heart without angina pectoris    3. NSTEMI (non-ST elevated myocardial infarction)    4. Essential hypertension    5. REN (obstructive sleep apnea)    6. Smoker    7. Lumbar spondylosis         Plan:       Smoking cessation Clinic  Arterial Doppler studies of the legs  Continue present pharmacological regimen.

## 2020-02-17 ENCOUNTER — OFFICE VISIT (OUTPATIENT)
Dept: PSYCHIATRY | Facility: CLINIC | Age: 60
End: 2020-02-17
Payer: MEDICARE

## 2020-02-17 DIAGNOSIS — F32.A DEPRESSION, UNSPECIFIED DEPRESSION TYPE: Primary | ICD-10-CM

## 2020-02-17 DIAGNOSIS — F41.1 GAD (GENERALIZED ANXIETY DISORDER): ICD-10-CM

## 2020-02-17 DIAGNOSIS — F43.21 GRIEF: ICD-10-CM

## 2020-02-17 PROCEDURE — 90834 PSYTX W PT 45 MINUTES: CPT | Mod: S$GLB,,, | Performed by: SOCIAL WORKER

## 2020-02-17 PROCEDURE — 90834 PR PSYCHOTHERAPY W/PATIENT, 45 MIN: ICD-10-PCS | Mod: S$GLB,,, | Performed by: SOCIAL WORKER
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
## 2020-03-11 ENCOUNTER — PATIENT OUTREACH (OUTPATIENT)
Dept: ADMINISTRATIVE | Facility: HOSPITAL | Age: 60
End: 2020-03-11

## 2020-03-17 ENCOUNTER — OFFICE VISIT (OUTPATIENT)
Dept: PSYCHIATRY | Facility: CLINIC | Age: 60
End: 2020-03-17
Payer: COMMERCIAL

## 2020-03-17 VITALS
HEART RATE: 108 BPM | SYSTOLIC BLOOD PRESSURE: 144 MMHG | DIASTOLIC BLOOD PRESSURE: 70 MMHG | BODY MASS INDEX: 28.35 KG/M2 | HEIGHT: 69 IN | WEIGHT: 191.38 LBS

## 2020-03-17 DIAGNOSIS — G47.00 INSOMNIA DISORDER, WITH NON-SLEEP DISORDER MENTAL COMORBIDITY: ICD-10-CM

## 2020-03-17 DIAGNOSIS — F32.A DEPRESSION, UNSPECIFIED DEPRESSION TYPE: Primary | ICD-10-CM

## 2020-03-17 DIAGNOSIS — Z86.69 HX OF MIGRAINE HEADACHES: ICD-10-CM

## 2020-03-17 DIAGNOSIS — F41.1 GAD (GENERALIZED ANXIETY DISORDER): ICD-10-CM

## 2020-03-17 PROCEDURE — 3078F PR MOST RECENT DIASTOLIC BLOOD PRESSURE < 80 MM HG: ICD-10-PCS | Mod: CPTII,S$GLB,, | Performed by: NURSE PRACTITIONER

## 2020-03-17 PROCEDURE — 99999 PR PBB SHADOW E&M-EST. PATIENT-LVL III: ICD-10-PCS | Mod: PBBFAC,,, | Performed by: NURSE PRACTITIONER

## 2020-03-17 PROCEDURE — 3008F PR BODY MASS INDEX (BMI) DOCUMENTED: ICD-10-PCS | Mod: CPTII,S$GLB,, | Performed by: NURSE PRACTITIONER

## 2020-03-17 PROCEDURE — 3077F SYST BP >= 140 MM HG: CPT | Mod: CPTII,S$GLB,, | Performed by: NURSE PRACTITIONER

## 2020-03-17 PROCEDURE — 99213 PR OFFICE/OUTPT VISIT, EST, LEVL III, 20-29 MIN: ICD-10-PCS | Mod: S$GLB,,, | Performed by: NURSE PRACTITIONER

## 2020-03-17 PROCEDURE — 99999 PR PBB SHADOW E&M-EST. PATIENT-LVL III: CPT | Mod: PBBFAC,,, | Performed by: NURSE PRACTITIONER

## 2020-03-17 PROCEDURE — 3008F BODY MASS INDEX DOCD: CPT | Mod: CPTII,S$GLB,, | Performed by: NURSE PRACTITIONER

## 2020-03-17 PROCEDURE — 3077F PR MOST RECENT SYSTOLIC BLOOD PRESSURE >= 140 MM HG: ICD-10-PCS | Mod: CPTII,S$GLB,, | Performed by: NURSE PRACTITIONER

## 2020-03-17 PROCEDURE — 99213 OFFICE O/P EST LOW 20 MIN: CPT | Mod: S$GLB,,, | Performed by: NURSE PRACTITIONER

## 2020-03-17 PROCEDURE — 3078F DIAST BP <80 MM HG: CPT | Mod: CPTII,S$GLB,, | Performed by: NURSE PRACTITIONER

## 2020-03-17 RX ORDER — TRAZODONE HYDROCHLORIDE 150 MG/1
TABLET ORAL
Qty: 60 TABLET | Refills: 5 | Status: SHIPPED | OUTPATIENT
Start: 2020-03-17 | End: 2020-06-30 | Stop reason: SDUPTHER

## 2020-03-17 RX ORDER — TOPIRAMATE 100 MG/1
100 TABLET, FILM COATED ORAL 2 TIMES DAILY
Qty: 60 TABLET | Refills: 5 | Status: SHIPPED | OUTPATIENT
Start: 2020-03-17 | End: 2020-06-30 | Stop reason: SDUPTHER

## 2020-03-17 RX ORDER — HYDROXYZINE PAMOATE 25 MG/1
25 CAPSULE ORAL 3 TIMES DAILY PRN
Qty: 90 CAPSULE | Refills: 5 | Status: SHIPPED | OUTPATIENT
Start: 2020-03-17 | End: 2020-06-30 | Stop reason: SDUPTHER

## 2020-03-17 RX ORDER — FLUOXETINE HYDROCHLORIDE 40 MG/1
40 CAPSULE ORAL DAILY
Qty: 90 CAPSULE | Refills: 3 | Status: SHIPPED | OUTPATIENT
Start: 2020-03-17 | End: 2020-06-30 | Stop reason: SDUPTHER
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
## 2020-03-19 ENCOUNTER — NURSE TRIAGE (OUTPATIENT)
Dept: ADMINISTRATIVE | Facility: CLINIC | Age: 60
End: 2020-03-19

## 2020-03-19 NOTE — TELEPHONE ENCOUNTER
"59 y.o female w/ + exposure to Covid 19 reports HA, w/ cough and some SOB. She was advised per protocol to see a  Physician w/in 24 hours. [facility]. I gave her 911/ED precautions when she mentioned pain in her chest. She verbalized understanding but reported she believed the pain was due to coughing.       Reason for Disposition   [1] Cough occurs AND [2] within 14 days of COVID-19 EXPOSURE    Additional Information   Negative: Difficult to awaken or acting confused (e.g., disoriented, slurred speech)   Negative: [1] Weakness of the face, arm or leg on one side of the body AND [2] new onset   Negative: [1] Numbness of the face, arm or leg on one side of the body AND [2] new onset   Negative: [1] Loss of speech or garbled speech AND [2] new onset   Negative: Passed out (i.e., lost consciousness, collapsed and was not responding)   Negative: Sounds like a life-threatening emergency to the triager   Negative: Unable to walk, or can only walk with assistance (e.g., requires support)   Negative: Stiff neck (can't touch chin to chest)   Negative: Severe pain in one eye   Negative: [1] Other family members (or roommates) with headaches AND [2] possibility of carbon monoxide exposure   Negative: [1] SEVERE headache (e.g., excruciating) AND [2] "worst headache" of life   Negative: [1] SEVERE headache AND [2] sudden-onset (i.e., reaching maximum intensity within seconds)   Negative: [1] SEVERE headache AND [2] fever   Negative: Loss of vision or double vision (Exception: same as prior migraines)   Negative: [1] Fever > 100.0 F (37.8 C) AND [2] diabetes mellitus or weak immune system (e.g., HIV positive, cancer chemo, splenectomy, chronic steroids)   Negative: Patient sounds very sick or weak to the triager   Negative: [1] SEVERE headache (e.g., excruciating) AND [2] not improved after 2 hours of pain medicine   Negative: [1] Vomiting AND [2] 2 or more times " (Exception: similar to previous migraines)   Negative: Fever > 104 F (40 C)   Negative: [1] Difficulty breathing occurs AND [2] within 14 days of COVID-19 EXPOSURE (Close Contact)   Negative: Patient sounds very sick or weak to the triager   Negative: Severe difficulty breathing (e.g., struggling for each breath, speak in single words, bluish lips)   Negative: Sounds like a life-threatening emergency to the triager    Protocols used: CORONAVIRUS (COVID-19) EXPOSURE-A-AH, HEADACHE-A-AH

## 2020-03-23 ENCOUNTER — TELEPHONE (OUTPATIENT)
Dept: INTERNAL MEDICINE | Facility: CLINIC | Age: 60
End: 2020-03-23

## 2020-04-22 ENCOUNTER — OFFICE VISIT (OUTPATIENT)
Dept: CARDIOLOGY | Facility: CLINIC | Age: 60
End: 2020-04-22
Attending: INTERNAL MEDICINE
Payer: MEDICARE

## 2020-04-22 VITALS
BODY MASS INDEX: 28.58 KG/M2 | HEIGHT: 69 IN | HEART RATE: 77 BPM | WEIGHT: 193 LBS | DIASTOLIC BLOOD PRESSURE: 78 MMHG | SYSTOLIC BLOOD PRESSURE: 140 MMHG

## 2020-04-22 DIAGNOSIS — F17.200 SMOKER: ICD-10-CM

## 2020-04-22 DIAGNOSIS — I73.9 RIGHT LEG CLAUDICATION: ICD-10-CM

## 2020-04-22 DIAGNOSIS — I21.4 NSTEMI (NON-ST ELEVATED MYOCARDIAL INFARCTION): ICD-10-CM

## 2020-04-22 DIAGNOSIS — I10 ESSENTIAL HYPERTENSION: ICD-10-CM

## 2020-04-22 DIAGNOSIS — I73.9 PAD (PERIPHERAL ARTERY DISEASE): ICD-10-CM

## 2020-04-22 DIAGNOSIS — I25.10 CORONARY ARTERY DISEASE INVOLVING NATIVE CORONARY ARTERY OF NATIVE HEART WITHOUT ANGINA PECTORIS: Primary | ICD-10-CM

## 2020-04-22 DIAGNOSIS — E66.9 OBESITY, UNSPECIFIED CLASSIFICATION, UNSPECIFIED OBESITY TYPE, UNSPECIFIED WHETHER SERIOUS COMORBIDITY PRESENT: ICD-10-CM

## 2020-04-22 DIAGNOSIS — G47.33 OSA (OBSTRUCTIVE SLEEP APNEA): ICD-10-CM

## 2020-04-22 PROCEDURE — 3077F PR MOST RECENT SYSTOLIC BLOOD PRESSURE >= 140 MM HG: ICD-10-PCS | Mod: CPTII,S$GLB,, | Performed by: INTERNAL MEDICINE

## 2020-04-22 PROCEDURE — 3078F DIAST BP <80 MM HG: CPT | Mod: CPTII,S$GLB,, | Performed by: INTERNAL MEDICINE

## 2020-04-22 PROCEDURE — 3008F BODY MASS INDEX DOCD: CPT | Mod: CPTII,S$GLB,, | Performed by: INTERNAL MEDICINE

## 2020-04-22 PROCEDURE — 93925 LOWER EXTREMITY STUDY: CPT | Mod: S$GLB,,, | Performed by: INTERNAL MEDICINE

## 2020-04-22 PROCEDURE — 93925 CV US DOPPLER ARTERIAL LEGS BILATERAL (CUPID ONLY): ICD-10-PCS | Mod: S$GLB,,, | Performed by: INTERNAL MEDICINE

## 2020-04-22 PROCEDURE — 3077F SYST BP >= 140 MM HG: CPT | Mod: CPTII,S$GLB,, | Performed by: INTERNAL MEDICINE

## 2020-04-22 PROCEDURE — 3078F PR MOST RECENT DIASTOLIC BLOOD PRESSURE < 80 MM HG: ICD-10-PCS | Mod: CPTII,S$GLB,, | Performed by: INTERNAL MEDICINE

## 2020-04-22 PROCEDURE — 3008F PR BODY MASS INDEX (BMI) DOCUMENTED: ICD-10-PCS | Mod: CPTII,S$GLB,, | Performed by: INTERNAL MEDICINE

## 2020-04-22 PROCEDURE — 99214 PR OFFICE/OUTPT VISIT, EST, LEVL IV, 30-39 MIN: ICD-10-PCS | Mod: S$GLB,,, | Performed by: INTERNAL MEDICINE

## 2020-04-22 PROCEDURE — 99214 OFFICE O/P EST MOD 30 MIN: CPT | Mod: S$GLB,,, | Performed by: INTERNAL MEDICINE

## 2020-04-22 NOTE — PROGRESS NOTES
Subjective:    Patient ID:  Jennifer Brady is a 59 y.o. female     HPI    Here for follow-up of coronary artery disease, NSTEMI on 04/04/2018, status post drug-eluting stent in the right coronary artery, drug-eluting stents x2 in the left circumflex coronary artery, essential hypertension, hyperlipidemia, obstructive sleep apnea, cigarette smoker.     I am very depressed with this Coronavirus thing.  My best friend is dying.  I am not getting out at all.  I am smoking 5 or 6 cigarettes a day.  I still get pain in my back, and pain in my right calf when I walk.    Current Outpatient Medications   Medication Sig    albuterol (PROVENTIL/VENTOLIN HFA) 90 mcg/actuation inhaler Inhale 1-2 puffs into the lungs every 6 (six) hours as needed. Rescue    aspirin (ECOTRIN) 81 MG EC tablet Take 1 tablet (81 mg total) by mouth once daily.    atorvastatin (LIPITOR) 40 MG tablet Take 1 tablet (40 mg total) by mouth once daily.    FLUoxetine 40 MG capsule Take 1 capsule (40 mg total) by mouth once daily.    hydrOXYzine pamoate (VISTARIL) 25 MG Cap Take 1 capsule (25 mg total) by mouth 3 (three) times daily as needed.    lisinopril-hydrochlorothiazide (PRINZIDE,ZESTORETIC) 10-12.5 mg per tablet Take 1 tablet by mouth once daily.    nicotine (NICODERM CQ) 21 mg/24 hr Place 1 patch onto the skin once daily.    nitroGLYCERIN (NITROSTAT) 0.4 MG SL tablet Place 1 tablet (0.4 mg total) under the tongue every 5 (five) minutes as needed for Chest pain.    topiramate (TOPAMAX) 100 MG tablet Take 1 tablet (100 mg total) by mouth 2 (two) times daily.    traZODone (DESYREL) 150 MG tablet TAKE 1 OR 2 TABLETS BY MOUTH BEFORE BED AS NEEDED FOR SLEEP     No current facility-administered medications for this visit.        Review of Systems   Constitution: Negative for chills, decreased appetite, fever, weight gain and weight loss.   HENT: Negative for congestion, hearing loss and sore throat.    Eyes: Negative for blurred vision,  "double vision and visual disturbance.   Cardiovascular: Positive for claudication. Negative for chest pain, dyspnea on exertion, leg swelling, palpitations and syncope.   Respiratory: Negative for cough, hemoptysis, shortness of breath, sputum production and wheezing.    Endocrine: Negative for cold intolerance and heat intolerance.   Hematologic/Lymphatic: Negative for bleeding problem. Does not bruise/bleed easily.   Skin: Negative for color change, dry skin, flushing and itching.   Musculoskeletal: Positive for back pain. Negative for joint pain and myalgias.   Gastrointestinal: Negative for abdominal pain, anorexia, constipation, diarrhea, dysphagia, nausea and vomiting.        No bleeding per rectum   Genitourinary: Negative for dysuria, flank pain, frequency, hematuria and nocturia.   Neurological: Negative for dizziness, headaches, light-headedness, loss of balance, seizures and tremors.   Psychiatric/Behavioral: Negative for altered mental status and depression.         Vitals:    04/22/20 1113   BP: (!) 140/78   Pulse: 77   Weight: 87.5 kg (193 lb)   Height: 5' 9" (1.753 m)     Objective:    Physical Exam   Constitutional: She is oriented to person, place, and time. She appears well-developed and well-nourished.   HENT:   Head: Normocephalic and atraumatic.   Nose: Nose normal.   Mouth/Throat: Oropharynx is clear and moist.   Eyes: Pupils are equal, round, and reactive to light. Conjunctivae and EOM are normal.   Neck: Neck supple. No tracheal deviation present. No thyromegaly present.   Cardiovascular: Normal rate and regular rhythm. Exam reveals no gallop and no friction rub.   No murmur heard.  Pulmonary/Chest: No respiratory distress. She has no wheezes. She has no rales. She exhibits no tenderness.   Abdominal: Soft. Bowel sounds are normal. She exhibits no distension and no mass. There is no tenderness. There is no rebound and no guarding.   Musculoskeletal: Normal range of motion.   Lymphadenopathy: "     She has no cervical adenopathy.   Neurological: She is alert and oriented to person, place, and time.   Skin: Skin is warm and dry.   Psychiatric: Her behavior is normal.       arterial Doppler studies of the legs reviewed, and discussed with the patient.    Assessment:       1. Coronary artery disease involving native coronary artery of native heart without angina pectoris    2. NSTEMI (non-ST elevated myocardial infarction)    3. Essential hypertension    4. PAD (peripheral artery disease)    5. Obesity, unspecified classification, unspecified obesity type, unspecified whether serious comorbidity present    6. REN (obstructive sleep apnea)    7. Smoker         Plan:       Habitrol 14 mg topically daily.  Again counseled regarding smoking cessation.  Will consider aortofemoral angiography at next visit if she tells me she has been able to quit smoking.

## 2020-04-28 LAB
LEFT CFA PSV: 147 CM/S
LEFT PERONEAL SYS PSV: 46 CM/S
LEFT POPLITEAL PSV: 58 CM/S
LEFT POST TIBIAL SYS PSV: 37 CM/S
LEFT PROFUNDA SYS PSV: 108 CM/S
LEFT SUPER FEMORAL DIST SYS PSV: 98 CM/S
LEFT SUPER FEMORAL MID SYS PSV: 98 CM/S
LEFT SUPER FEMORAL PROX SYS PSV: 123 CM/S
LEFT TIB/PER TRUNK SYS PSV: 31 CM/S
OHS CV LEFT LOWER EXTREMITY ABI (NO CALC): 1.21
OHS CV RIGHT ABI LOWER EXTREMITY (NO CALC): 0.99
RIGHT CFA PSV: 111 CM/S
RIGHT PERONEAL SYS PSV: 44 CM/S
RIGHT POPLITEAL PSV: 83 CM/S
RIGHT POST TIBIAL SYS PSV: 54 CM/S
RIGHT PROFUNDA SYS PSV: 93 CM/S
RIGHT SUPER FEMORAL DIST SYS PSV: 79 CM/S
RIGHT SUPER FEMORAL MID SYS PSV: 93 CM/S
RIGHT SUPER FEMORAL PROX SYS PSV: 150 CM/S
RIGHT TIB/PER TRUNK SYS PSV: 43 CM/S

## 2020-05-14 ENCOUNTER — OFFICE VISIT (OUTPATIENT)
Dept: CARDIOLOGY | Facility: CLINIC | Age: 60
End: 2020-05-14
Attending: INTERNAL MEDICINE
Payer: MEDICARE

## 2020-05-14 VITALS
BODY MASS INDEX: 28.73 KG/M2 | SYSTOLIC BLOOD PRESSURE: 112 MMHG | DIASTOLIC BLOOD PRESSURE: 62 MMHG | HEART RATE: 77 BPM | WEIGHT: 194 LBS | HEIGHT: 69 IN

## 2020-05-14 DIAGNOSIS — I73.9 PAD (PERIPHERAL ARTERY DISEASE): ICD-10-CM

## 2020-05-14 DIAGNOSIS — I10 ESSENTIAL HYPERTENSION: ICD-10-CM

## 2020-05-14 DIAGNOSIS — Z87.891 EX-SMOKER: ICD-10-CM

## 2020-05-14 DIAGNOSIS — I21.4 NSTEMI (NON-ST ELEVATED MYOCARDIAL INFARCTION): ICD-10-CM

## 2020-05-14 DIAGNOSIS — G47.33 OSA (OBSTRUCTIVE SLEEP APNEA): ICD-10-CM

## 2020-05-14 DIAGNOSIS — E78.00 PURE HYPERCHOLESTEROLEMIA: ICD-10-CM

## 2020-05-14 DIAGNOSIS — I25.10 CORONARY ARTERY DISEASE INVOLVING NATIVE CORONARY ARTERY OF NATIVE HEART WITHOUT ANGINA PECTORIS: Primary | ICD-10-CM

## 2020-05-14 PROCEDURE — 3078F PR MOST RECENT DIASTOLIC BLOOD PRESSURE < 80 MM HG: ICD-10-PCS | Mod: CPTII,S$GLB,, | Performed by: INTERNAL MEDICINE

## 2020-05-14 PROCEDURE — 99214 PR OFFICE/OUTPT VISIT, EST, LEVL IV, 30-39 MIN: ICD-10-PCS | Mod: S$GLB,,, | Performed by: INTERNAL MEDICINE

## 2020-05-14 PROCEDURE — 3078F DIAST BP <80 MM HG: CPT | Mod: CPTII,S$GLB,, | Performed by: INTERNAL MEDICINE

## 2020-05-14 PROCEDURE — 3074F SYST BP LT 130 MM HG: CPT | Mod: CPTII,S$GLB,, | Performed by: INTERNAL MEDICINE

## 2020-05-14 PROCEDURE — 99214 OFFICE O/P EST MOD 30 MIN: CPT | Mod: S$GLB,,, | Performed by: INTERNAL MEDICINE

## 2020-05-14 PROCEDURE — 3008F BODY MASS INDEX DOCD: CPT | Mod: CPTII,S$GLB,, | Performed by: INTERNAL MEDICINE

## 2020-05-14 PROCEDURE — 3008F PR BODY MASS INDEX (BMI) DOCUMENTED: ICD-10-PCS | Mod: CPTII,S$GLB,, | Performed by: INTERNAL MEDICINE

## 2020-05-14 PROCEDURE — 3074F PR MOST RECENT SYSTOLIC BLOOD PRESSURE < 130 MM HG: ICD-10-PCS | Mod: CPTII,S$GLB,, | Performed by: INTERNAL MEDICINE

## 2020-05-14 NOTE — PROGRESS NOTES
Subjective:    Patient ID:  Jennifer Brady is a 59 y.o. female     HPI Here for follow-up of coronary artery disease, NSTEMI on 04/04/2018, status post drug-eluting stent in the right coronary artery, drug-eluting stents x2 in the left circumflex coronary artery, essential hypertension, hyperlipidemia, obstructive sleep apnea, cigarette smoker.     I have not smoked in the last 3 weeks.  I am walking a mi a day without any trouble.  I still get some pain in my right thigh a my right knee and my right calf.  This is not always upon exertion.    Current Outpatient Medications   Medication Sig    albuterol (PROVENTIL/VENTOLIN HFA) 90 mcg/actuation inhaler Inhale 1-2 puffs into the lungs every 6 (six) hours as needed. Rescue    aspirin (ECOTRIN) 81 MG EC tablet Take 1 tablet (81 mg total) by mouth once daily.    atorvastatin (LIPITOR) 40 MG tablet Take 1 tablet (40 mg total) by mouth once daily.    FLUoxetine 40 MG capsule Take 1 capsule (40 mg total) by mouth once daily.    hydrOXYzine pamoate (VISTARIL) 25 MG Cap Take 1 capsule (25 mg total) by mouth 3 (three) times daily as needed.    lisinopril-hydrochlorothiazide (PRINZIDE,ZESTORETIC) 10-12.5 mg per tablet Take 1 tablet by mouth once daily.    nicotine (NICODERM CQ) 21 mg/24 hr Place 1 patch onto the skin once daily.    nitroGLYCERIN (NITROSTAT) 0.4 MG SL tablet Place 1 tablet (0.4 mg total) under the tongue every 5 (five) minutes as needed for Chest pain.    topiramate (TOPAMAX) 100 MG tablet Take 1 tablet (100 mg total) by mouth 2 (two) times daily.    traZODone (DESYREL) 150 MG tablet TAKE 1 OR 2 TABLETS BY MOUTH BEFORE BED AS NEEDED FOR SLEEP     No current facility-administered medications for this visit.          Review of Systems   Constitution: Negative for chills, decreased appetite, fever, weight gain and weight loss.   HENT: Negative for congestion, hearing loss and sore throat.    Eyes: Negative for blurred vision, double vision and  "visual disturbance.   Cardiovascular: Negative for chest pain, claudication, dyspnea on exertion, leg swelling, palpitations and syncope.   Respiratory: Negative for cough, hemoptysis, shortness of breath, sputum production and wheezing.    Endocrine: Negative for cold intolerance and heat intolerance.   Hematologic/Lymphatic: Negative for bleeding problem. Does not bruise/bleed easily.   Skin: Negative for color change, dry skin, flushing and itching.   Musculoskeletal: Negative for back pain, joint pain and myalgias.   Gastrointestinal: Negative for abdominal pain, anorexia, constipation, diarrhea, dysphagia, nausea and vomiting.        No bleeding per rectum   Genitourinary: Negative for dysuria, flank pain, frequency, hematuria and nocturia.   Neurological: Negative for dizziness, headaches, light-headedness, loss of balance, seizures and tremors.   Psychiatric/Behavioral: Negative for altered mental status and depression.         Vitals:    05/14/20 1022   BP: 112/62   Pulse: 77   Weight: 88 kg (194 lb)   Height: 5' 9" (1.753 m)     Objective:    Physical Exam   Constitutional: She is oriented to person, place, and time. She appears well-developed and well-nourished.   HENT:   Head: Normocephalic and atraumatic.   Nose: Nose normal.   Mouth/Throat: Oropharynx is clear and moist.   Eyes: Pupils are equal, round, and reactive to light. Conjunctivae and EOM are normal.   Neck: Neck supple. No tracheal deviation present. No thyromegaly present.   Cardiovascular: Normal rate and regular rhythm. Exam reveals no gallop and no friction rub.   No murmur heard.  Pulmonary/Chest: No respiratory distress. She has no wheezes. She has no rales. She exhibits no tenderness.   Abdominal: Soft. Bowel sounds are normal. She exhibits no distension and no mass. There is no tenderness. There is no rebound and no guarding.   Musculoskeletal: Normal range of motion.   Lymphadenopathy:     She has no cervical adenopathy. "   Neurological: She is alert and oriented to person, place, and time.   Skin: Skin is warm and dry.   Psychiatric: Her behavior is normal.       discuss the arterial Doppler studies with the patient.    Assessment:       1. Coronary artery disease involving native coronary artery of native heart without angina pectoris    2. NSTEMI (non-ST elevated myocardial infarction)    3. Essential hypertension    4. PAD (peripheral artery disease)    5. Pure hypercholesterolemia    6. REN (obstructive sleep apnea)    7. Ex-smoker         Plan:       Stable.  Continue present pharmacological regimen.  Will defer angiography of the legs for the time being.  She has just quit smoking, and can walk a mile without claudication.

## 2020-05-19 ENCOUNTER — LAB VISIT (OUTPATIENT)
Dept: PRIMARY CARE CLINIC | Facility: CLINIC | Age: 60
End: 2020-05-19
Payer: MEDICARE

## 2020-05-19 DIAGNOSIS — R05.9 COUGH: Primary | ICD-10-CM

## 2020-05-19 PROCEDURE — U0003 INFECTIOUS AGENT DETECTION BY NUCLEIC ACID (DNA OR RNA); SEVERE ACUTE RESPIRATORY SYNDROME CORONAVIRUS 2 (SARS-COV-2) (CORONAVIRUS DISEASE [COVID-19]), AMPLIFIED PROBE TECHNIQUE, MAKING USE OF HIGH THROUGHPUT TECHNOLOGIES AS DESCRIBED BY CMS-2020-01-R: HCPCS

## 2020-05-20 LAB — SARS-COV-2 RNA RESP QL NAA+PROBE: NOT DETECTED

## 2020-06-03 ENCOUNTER — TELEPHONE (OUTPATIENT)
Dept: PAIN MEDICINE | Facility: CLINIC | Age: 60
End: 2020-06-03

## 2020-06-03 DIAGNOSIS — Z01.818 PREOP TESTING: Primary | ICD-10-CM

## 2020-06-03 DIAGNOSIS — M47.817 SPONDYLOSIS WITHOUT MYELOPATHY OR RADICULOPATHY, LUMBOSACRAL REGION: Primary | ICD-10-CM

## 2020-06-03 NOTE — TELEPHONE ENCOUNTER
Contacted patient to confirm scheduling of procedure. Scheduled Preop COVID Testing as well. Patient verbalized understanding.

## 2020-06-08 ENCOUNTER — HOSPITAL ENCOUNTER (OUTPATIENT)
Dept: PREADMISSION TESTING | Facility: OTHER | Age: 60
Discharge: HOME | End: 2020-06-08
Attending: ANESTHESIOLOGY
Payer: MEDICARE

## 2020-06-08 DIAGNOSIS — Z01.818 PREOP TESTING: ICD-10-CM

## 2020-06-08 PROCEDURE — U0003 INFECTIOUS AGENT DETECTION BY NUCLEIC ACID (DNA OR RNA); SEVERE ACUTE RESPIRATORY SYNDROME CORONAVIRUS 2 (SARS-COV-2) (CORONAVIRUS DISEASE [COVID-19]), AMPLIFIED PROBE TECHNIQUE, MAKING USE OF HIGH THROUGHPUT TECHNOLOGIES AS DESCRIBED BY CMS-2020-01-R: HCPCS

## 2020-06-09 LAB — SARS-COV-2 RNA RESP QL NAA+PROBE: NOT DETECTED

## 2020-06-10 ENCOUNTER — HOSPITAL ENCOUNTER (OUTPATIENT)
Facility: OTHER | Age: 60
Discharge: HOME | End: 2020-06-10
Attending: ANESTHESIOLOGY | Admitting: ANESTHESIOLOGY
Payer: MEDICARE

## 2020-06-10 VITALS
HEIGHT: 69 IN | OXYGEN SATURATION: 99 % | BODY MASS INDEX: 29.62 KG/M2 | TEMPERATURE: 99 F | DIASTOLIC BLOOD PRESSURE: 52 MMHG | HEART RATE: 75 BPM | RESPIRATION RATE: 16 BRPM | WEIGHT: 200 LBS | SYSTOLIC BLOOD PRESSURE: 107 MMHG

## 2020-06-10 DIAGNOSIS — M47.817 SPONDYLOSIS WITHOUT MYELOPATHY OR RADICULOPATHY, LUMBOSACRAL REGION: Primary | ICD-10-CM

## 2020-06-10 PROCEDURE — 25000003 PHARM REV CODE 250: Performed by: ANESTHESIOLOGY

## 2020-06-10 RX ORDER — ALPRAZOLAM 0.5 MG/1
1 TABLET ORAL
Status: DISCONTINUED | OUTPATIENT
Start: 2020-06-10 | End: 2020-06-10 | Stop reason: HOSPADM

## 2020-06-10 RX ADMIN — ALPRAZOLAM 1 MG: 0.5 TABLET ORAL at 07:06
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
## 2020-06-12 DIAGNOSIS — Z01.818 PRE-OP TESTING: Primary | ICD-10-CM

## 2020-06-15 ENCOUNTER — HOSPITAL ENCOUNTER (OUTPATIENT)
Dept: PREADMISSION TESTING | Facility: OTHER | Age: 60
Discharge: HOME | End: 2020-06-15
Attending: ANESTHESIOLOGY
Payer: MEDICARE

## 2020-06-15 DIAGNOSIS — Z01.818 PRE-OP TESTING: ICD-10-CM

## 2020-06-15 PROCEDURE — U0003 INFECTIOUS AGENT DETECTION BY NUCLEIC ACID (DNA OR RNA); SEVERE ACUTE RESPIRATORY SYNDROME CORONAVIRUS 2 (SARS-COV-2) (CORONAVIRUS DISEASE [COVID-19]), AMPLIFIED PROBE TECHNIQUE, MAKING USE OF HIGH THROUGHPUT TECHNOLOGIES AS DESCRIBED BY CMS-2020-01-R: HCPCS

## 2020-06-16 LAB — SARS-COV-2 RNA RESP QL NAA+PROBE: NOT DETECTED

## 2020-06-17 ENCOUNTER — HOSPITAL ENCOUNTER (OUTPATIENT)
Facility: OTHER | Age: 60
Discharge: HOME | End: 2020-06-17
Attending: ANESTHESIOLOGY | Admitting: ANESTHESIOLOGY
Payer: MEDICARE

## 2020-06-17 VITALS
HEART RATE: 68 BPM | RESPIRATION RATE: 16 BRPM | OXYGEN SATURATION: 100 % | DIASTOLIC BLOOD PRESSURE: 83 MMHG | WEIGHT: 200 LBS | TEMPERATURE: 99 F | SYSTOLIC BLOOD PRESSURE: 123 MMHG | HEIGHT: 69 IN | BODY MASS INDEX: 29.62 KG/M2

## 2020-06-17 DIAGNOSIS — M47.816 LUMBAR SPONDYLOSIS: Primary | ICD-10-CM

## 2020-06-17 PROCEDURE — 64493 INJ PARAVERT F JNT L/S 1 LEV: CPT

## 2020-06-17 PROCEDURE — 63600175 PHARM REV CODE 636 W HCPCS: Performed by: ANESTHESIOLOGY

## 2020-06-17 PROCEDURE — S0020 INJECTION, BUPIVICAINE HYDRO: HCPCS | Performed by: ANESTHESIOLOGY

## 2020-06-17 PROCEDURE — 64494 INJ PARAVERT F JNT L/S 2 LEV: CPT

## 2020-06-17 PROCEDURE — 25000003 PHARM REV CODE 250: Performed by: ANESTHESIOLOGY

## 2020-06-17 RX ORDER — TRIAMCINOLONE ACETONIDE 40 MG/ML
INJECTION, SUSPENSION INTRA-ARTICULAR; INTRAMUSCULAR
Status: DISCONTINUED | OUTPATIENT
Start: 2020-06-17 | End: 2020-06-17 | Stop reason: HOSPADM

## 2020-06-17 RX ORDER — BETAMETHASONE SODIUM PHOSPHATE AND BETAMETHASONE ACETATE 3; 3 MG/ML; MG/ML
6 INJECTION, SUSPENSION INTRA-ARTICULAR; INTRALESIONAL; INTRAMUSCULAR; SOFT TISSUE
Status: DISCONTINUED | OUTPATIENT
Start: 2020-06-17 | End: 2020-06-17 | Stop reason: HOSPADM

## 2020-06-17 RX ORDER — ALPRAZOLAM 0.5 MG/1
1 TABLET, ORALLY DISINTEGRATING ORAL ONCE AS NEEDED
Status: DISCONTINUED | OUTPATIENT
Start: 2020-06-17 | End: 2020-06-17

## 2020-06-17 RX ORDER — BUPIVACAINE HYDROCHLORIDE 5 MG/ML
10 INJECTION, SOLUTION EPIDURAL; INTRACAUDAL ONCE
Status: COMPLETED | OUTPATIENT
Start: 2020-06-17 | End: 2020-06-17

## 2020-06-17 RX ORDER — ALPRAZOLAM 0.5 MG/1
1 TABLET ORAL ONCE
Status: COMPLETED | OUTPATIENT
Start: 2020-06-17 | End: 2020-06-17

## 2020-06-17 RX ORDER — LIDOCAINE HYDROCHLORIDE 10 MG/ML
10 INJECTION INFILTRATION; PERINEURAL ONCE
Status: COMPLETED | OUTPATIENT
Start: 2020-06-17 | End: 2020-06-17

## 2020-06-17 RX ADMIN — ALPRAZOLAM 1 MG: 0.5 TABLET ORAL at 07:06

## 2020-06-17 NOTE — OP NOTE
"Patient Name: Jennifer Brady  MRN: [id number]    INFORMED CONSENT: The procedure, risks, benefits and options were discussed with patient. There are no contraindications to the procedure. The patient expressed understanding and agreed to proceed. The personnel performing the procedure was discussed. I verify that I personally obtained Jennifer Sutherland's consent prior to the start of the procedure and the signed consent can be found on the patient's chart.    Procedure Date: 06/17/2020    Anesthesia: Topical    Pre Procedure diagnosis:   1. Lumbar spondylosis      Post-Procedure diagnosis: same      Sedation:none    PROCEDURE: Right L 3,4,5 LUMBAR FACET MEDIAL BRANCH NERVE BLOCK      DESCRIPTION OF PROCEDURE:The patient was brought to the procedure room. After performing time out. IV access was obtained prior to the procedure. The patient was positioned prone on the fluoroscopy table. Continuous hemodynamic monitoring was initiated including blood pressure, EKG, and pulse oximetry. The area of the lumbar spine was prepped povidone-iodine three times and draped into a sterile field. Fluoroscopy was used to identify the location of the right side L2, L3, L4, and L5 medial branch nerves at the junctions of the superior articular process and the transverse processes of L3, L4, L5, and the sacral ala respectively. Skin anesthesia was achieved using 5 cc of Lidocaine 1% over the injection sites. A 22 gauge, 3 1/2" spinal needle was slowly inserted at each level using AP, lateral and oblique fluoroscopic imaging. Negative aspiration for blood or CSF was confirmed. A combination of 5ml bupivacaine 0.25% and 40 mg of Kenalog was injected at all sites. The needles were removed and bleeding was nil. A sterile dressing was applied. No specimens collected. Jennifer Sutherland was taken back to the recovery room for further observation.     Blood Loss: Nill  Specimen: None      "

## 2020-06-17 NOTE — BRIEF OP NOTE
Discharge Note  Short Stay      SUMMARY     Admit Date: 6/17/2020    Attending Physician: Petros Reich      Discharge Physician: Petros Reich      Discharge Date: 6/17/2020 8:15 AM    Final Diagnosis:   1. Lumbar spondylosis        Disposition: Home or self care    Patient Instructions:   Current Discharge Medication List      CONTINUE these medications which have NOT CHANGED    Details   albuterol (PROVENTIL/VENTOLIN HFA) 90 mcg/actuation inhaler Inhale 1-2 puffs into the lungs every 6 (six) hours as needed. Rescue  Qty: 1 Inhaler, Refills: 0      aspirin (ECOTRIN) 81 MG EC tablet Take 1 tablet (81 mg total) by mouth once daily.  Qty: 100 tablet, Refills: 6      atorvastatin (LIPITOR) 40 MG tablet Take 1 tablet (40 mg total) by mouth once daily.  Qty: 90 tablet, Refills: 3      FLUoxetine 40 MG capsule Take 1 capsule (40 mg total) by mouth once daily.  Qty: 90 capsule, Refills: 3    Associated Diagnoses: Depression, unspecified depression type; LAURA (generalized anxiety disorder)      hydrOXYzine pamoate (VISTARIL) 25 MG Cap Take 1 capsule (25 mg total) by mouth 3 (three) times daily as needed.  Qty: 90 capsule, Refills: 5    Associated Diagnoses: LAURA (generalized anxiety disorder)      lisinopril-hydrochlorothiazide (PRINZIDE,ZESTORETIC) 10-12.5 mg per tablet Take 1 tablet by mouth once daily.      nicotine (NICODERM CQ) 21 mg/24 hr Place 1 patch onto the skin once daily.  Qty: 30 patch, Refills: 6      nitroGLYCERIN (NITROSTAT) 0.4 MG SL tablet Place 1 tablet (0.4 mg total) under the tongue every 5 (five) minutes as needed for Chest pain.  Qty: 20 tablet, Refills: 0      topiramate (TOPAMAX) 100 MG tablet Take 1 tablet (100 mg total) by mouth 2 (two) times daily.  Qty: 60 tablet, Refills: 5    Associated Diagnoses: Hx of migraine headaches      traZODone (DESYREL) 150 MG tablet TAKE 1 OR 2 TABLETS BY MOUTH BEFORE BED AS NEEDED FOR SLEEP  Qty: 60 tablet, Refills: 5    Associated Diagnoses: Insomnia  disorder, with non-sleep disorder mental comorbidity                 Discharge Diagnosis: Same as Pre and Post Procedure  Condition on Discharge: Stable.  Diet on Discharge: Same as before.  Activity: as per instruction sheet.  Discharge to: Home with a responsible adult.  Follow up: as per Discharge instructions.

## 2020-06-17 NOTE — DISCHARGE SUMMARY
Discharge Note  Short Stay      SUMMARY     Admit Date: 6/17/2020    Attending Physician: Petros Reich      Discharge Physician: Petros Reich      Discharge Date: 6/17/2020 8:20 AM    Final Diagnosis:   1. Lumbar spondylosis        Disposition: Home or self care    Patient Instructions:   Current Discharge Medication List      CONTINUE these medications which have NOT CHANGED    Details   albuterol (PROVENTIL/VENTOLIN HFA) 90 mcg/actuation inhaler Inhale 1-2 puffs into the lungs every 6 (six) hours as needed. Rescue  Qty: 1 Inhaler, Refills: 0      aspirin (ECOTRIN) 81 MG EC tablet Take 1 tablet (81 mg total) by mouth once daily.  Qty: 100 tablet, Refills: 6      atorvastatin (LIPITOR) 40 MG tablet Take 1 tablet (40 mg total) by mouth once daily.  Qty: 90 tablet, Refills: 3      FLUoxetine 40 MG capsule Take 1 capsule (40 mg total) by mouth once daily.  Qty: 90 capsule, Refills: 3    Associated Diagnoses: Depression, unspecified depression type; LAURA (generalized anxiety disorder)      hydrOXYzine pamoate (VISTARIL) 25 MG Cap Take 1 capsule (25 mg total) by mouth 3 (three) times daily as needed.  Qty: 90 capsule, Refills: 5    Associated Diagnoses: LAURA (generalized anxiety disorder)      lisinopril-hydrochlorothiazide (PRINZIDE,ZESTORETIC) 10-12.5 mg per tablet Take 1 tablet by mouth once daily.      nicotine (NICODERM CQ) 21 mg/24 hr Place 1 patch onto the skin once daily.  Qty: 30 patch, Refills: 6      nitroGLYCERIN (NITROSTAT) 0.4 MG SL tablet Place 1 tablet (0.4 mg total) under the tongue every 5 (five) minutes as needed for Chest pain.  Qty: 20 tablet, Refills: 0      topiramate (TOPAMAX) 100 MG tablet Take 1 tablet (100 mg total) by mouth 2 (two) times daily.  Qty: 60 tablet, Refills: 5    Associated Diagnoses: Hx of migraine headaches      traZODone (DESYREL) 150 MG tablet TAKE 1 OR 2 TABLETS BY MOUTH BEFORE BED AS NEEDED FOR SLEEP  Qty: 60 tablet, Refills: 5    Associated Diagnoses: Insomnia  disorder, with non-sleep disorder mental comorbidity                 Discharge Diagnosis: Same as Pre and Post Procedure  Condition on Discharge: Stable.  Diet on Discharge: Same as before.  Activity: as per instruction sheet.  Discharge to: Home with a responsible adult.  Follow up: as per Discharge instructions.

## 2020-06-17 NOTE — DISCHARGE INSTRUCTIONS

## 2020-06-22 ENCOUNTER — NURSE TRIAGE (OUTPATIENT)
Dept: ADMINISTRATIVE | Facility: CLINIC | Age: 60
End: 2020-06-22

## 2020-06-22 NOTE — TELEPHONE ENCOUNTER
Pt contacted in regard to text response to [facility] COVID-19 Home Symptom Monitoring Program-Post Procedural Monitoring.  Pt reports chest discomfort when trying to relax, states she now feels it may be related to a prescribed anti-psychotic that she has recently run out of and is on the way to the pharmacy to  now.  Discussed with pt the possibility of side effects from suddenly stopping anti-psychotic type drugs and possibility of anxiety causing chest pain as well as the possibility of chest pain being more serious.  Instructed pt to take medication as soon as possible and as directed and to monitor for any recurrence/changes or worsening of chest pain.  Directed patient to call back if chest pain symptoms change or worsen and to all 911 immediately if chest pain is severe/constant, accompanied by SOB or worsens with activity.  Pt verbalized understanding.    Reason for Disposition   Intermittent mild chest pain lasting a few seconds each time   Health Information question, no triage required and triager able to answer question    Additional Information   Negative: Severe difficulty breathing (e.g., struggling for each breath, speaks in single words)   Negative: Passed out (i.e., fainted, collapsed and was not responding)   Negative: Chest pain lasting longer than 5 minutes and ANY of the following:* Over 50 years old* Over 30 years old and at least one cardiac risk factor (i.e., high blood pressure, diabetes, high cholesterol, obesity, smoker or strong family history of heart disease)* Pain is crushing, pressure-like, or heavy * Took nitroglycerin and chest pain was not relieved* History of heart disease (i.e., angina, heart attack, bypass surgery, angioplasty, CHF)   Negative: Visible sweat on face or sweat dripping down face   Negative: Sounds like a life-threatening emergency to the triager   Negative: Followed an injury to chest   Negative: SEVERE chest pain   Negative: Pain also present in  shoulder(s) or arm(s) or jaw   Negative: Difficulty breathing   Negative: Cocaine use within last 3 days   Negative: History of prior 'blood clot' in leg or lungs (i.e., deep vein thrombosis, pulmonary embolism)   Negative: Recent illness requiring prolonged bed rest (i.e., immobilization)   Negative: Hip or leg fracture in past 2 months (e.g, or had cast on leg or ankle)   Negative: Major surgery in the past month   Negative: Recent long-distance travel with prolonged time in car, bus, plane, or train (i.e., within past 2 weeks; 6 or more hours duration)   Negative: Heart beating irregularly or very rapidly   Negative: Chest pain lasting longer than 5 minutes   Negative: Intermittent chest pain and pain has been increasing in severity or frequency   Negative: Dizziness or lightheadedness   Negative: Coughing up blood   Negative: Patient sounds very sick or weak to the triager   Negative: Fever > 100.5 F (38.1 C)   Negative: Intermittent chest pains persist > 3 days   Negative: All other patients with chest pain   Negative: Patient wants to be seen   Negative: [1] Caller is not with the adult (patient) AND [2] reporting urgent symptoms   Negative: Lab result questions   Negative: Medication questions   Negative: Caller can't be reached by phone   Negative: Caller has already spoken to PCP or another triager   Negative: RN needs further essential information from caller in order to complete triage   Negative: Requesting regular office appointment   Negative: [1] Caller requesting NON-URGENT health information AND [2] PCP's office is the best resource    Protocols used: CHEST PAIN-A-OH, INFORMATION ONLY CALL-A-AH

## 2020-06-30 ENCOUNTER — NURSE TRIAGE (OUTPATIENT)
Dept: ADMINISTRATIVE | Facility: CLINIC | Age: 60
End: 2020-06-30

## 2020-06-30 ENCOUNTER — OFFICE VISIT (OUTPATIENT)
Dept: PSYCHIATRY | Facility: CLINIC | Age: 60
End: 2020-06-30
Payer: COMMERCIAL

## 2020-06-30 VITALS
DIASTOLIC BLOOD PRESSURE: 45 MMHG | SYSTOLIC BLOOD PRESSURE: 125 MMHG | HEART RATE: 75 BPM | BODY MASS INDEX: 29.4 KG/M2 | WEIGHT: 199.06 LBS

## 2020-06-30 DIAGNOSIS — Z86.69 HX OF MIGRAINE HEADACHES: ICD-10-CM

## 2020-06-30 DIAGNOSIS — F41.1 GAD (GENERALIZED ANXIETY DISORDER): ICD-10-CM

## 2020-06-30 DIAGNOSIS — F32.A DEPRESSION, UNSPECIFIED DEPRESSION TYPE: ICD-10-CM

## 2020-06-30 DIAGNOSIS — G47.00 INSOMNIA DISORDER, WITH NON-SLEEP DISORDER MENTAL COMORBIDITY: ICD-10-CM

## 2020-06-30 PROCEDURE — 3008F BODY MASS INDEX DOCD: CPT | Mod: CPTII,S$GLB,, | Performed by: NURSE PRACTITIONER

## 2020-06-30 PROCEDURE — 3078F DIAST BP <80 MM HG: CPT | Mod: CPTII,S$GLB,, | Performed by: NURSE PRACTITIONER

## 2020-06-30 PROCEDURE — 99213 PR OFFICE/OUTPT VISIT, EST, LEVL III, 20-29 MIN: ICD-10-PCS | Mod: S$GLB,,, | Performed by: NURSE PRACTITIONER

## 2020-06-30 PROCEDURE — 3074F SYST BP LT 130 MM HG: CPT | Mod: CPTII,S$GLB,, | Performed by: NURSE PRACTITIONER

## 2020-06-30 PROCEDURE — 99999 PR PBB SHADOW E&M-EST. PATIENT-LVL III: ICD-10-PCS | Mod: PBBFAC,,, | Performed by: NURSE PRACTITIONER

## 2020-06-30 PROCEDURE — 3008F PR BODY MASS INDEX (BMI) DOCUMENTED: ICD-10-PCS | Mod: CPTII,S$GLB,, | Performed by: NURSE PRACTITIONER

## 2020-06-30 PROCEDURE — 3074F PR MOST RECENT SYSTOLIC BLOOD PRESSURE < 130 MM HG: ICD-10-PCS | Mod: CPTII,S$GLB,, | Performed by: NURSE PRACTITIONER

## 2020-06-30 PROCEDURE — 99213 OFFICE O/P EST LOW 20 MIN: CPT | Mod: S$GLB,,, | Performed by: NURSE PRACTITIONER

## 2020-06-30 PROCEDURE — 3078F PR MOST RECENT DIASTOLIC BLOOD PRESSURE < 80 MM HG: ICD-10-PCS | Mod: CPTII,S$GLB,, | Performed by: NURSE PRACTITIONER

## 2020-06-30 PROCEDURE — 99999 PR PBB SHADOW E&M-EST. PATIENT-LVL III: CPT | Mod: PBBFAC,,, | Performed by: NURSE PRACTITIONER

## 2020-06-30 RX ORDER — TRAZODONE HYDROCHLORIDE 150 MG/1
TABLET ORAL
Qty: 60 TABLET | Refills: 5 | Status: SHIPPED | OUTPATIENT
Start: 2020-06-30 | End: 2020-11-30 | Stop reason: SDUPTHER

## 2020-06-30 RX ORDER — HYDROXYZINE PAMOATE 25 MG/1
25 CAPSULE ORAL 3 TIMES DAILY PRN
Qty: 90 CAPSULE | Refills: 5 | Status: SHIPPED | OUTPATIENT
Start: 2020-06-30 | End: 2020-07-07

## 2020-06-30 RX ORDER — FLUOXETINE HYDROCHLORIDE 40 MG/1
40 CAPSULE ORAL DAILY
Qty: 30 CAPSULE | Refills: 11 | Status: SHIPPED | OUTPATIENT
Start: 2020-06-30 | End: 2020-07-07 | Stop reason: ALTCHOICE

## 2020-06-30 RX ORDER — TOPIRAMATE 100 MG/1
100 TABLET, FILM COATED ORAL 2 TIMES DAILY
Qty: 60 TABLET | Refills: 5 | Status: SHIPPED | OUTPATIENT
Start: 2020-06-30 | End: 2020-08-19 | Stop reason: SDUPTHER

## 2020-06-30 NOTE — PROGRESS NOTES
"Outpatient Psychiatry Follow-Up Visit (MD/NP)    6/30/2020    Clinical Status of Patient:  Outpatient (Ambulatory)    Chief Complaint:  Jennifer Brady is a 59 y.o. female who presents today for follow-up of depression and anxiety.  Met with patient.      Last Visit: was  3/27/2020. Chart and PMPreviewed.     Interval History and Content of Current Session:  Current Psychiatric Medications as of last visit.  · Increase to Prozac 40 mg po daily.    · Continue Topamax 100 mg po BID (for migraines) per Neurology  · Increase to Trazodone 200 - 300 mg po qhs PRN insomnia  · Start Vistaril 25 mg po TID PRN anxiety    Pt reports that she has been stressed out about coronavirus. "one of my friend's had the virus and was hospitalized and doing better".  "I miss going to [employer] and being around people". Pt has been trying to sell her house.  Compliant with medications and denies side effects. Reports that pharmacy would only fill Prozac 20 mg and states that 40 mg was not covered by insurance. Will reorder. Denies SI/HI/AVH.    Psychotherapy:  · Target symptoms: depression, anxiety   · Why chosen therapy is appropriate versus another modality: relevant to diagnosis  · Outcome monitoring methods: self-report, observation  · Therapeutic intervention type: supportive psychotherapy  · Topics discussed/themes: relationships difficulties, difficulty managing affect in interpersonal relationships, building skills sets for symptom management, symptom recognition  · The patient's response to the intervention is accepting. The patient's progress toward treatment goals is fair.   · Duration of intervention: 13 minutes.    Review of Systems   · PSYCHIATRIC: Pertinant items are noted in the narrative.  · CONSTITUTIONAL: No weight gain or loss.   · MUSCULOSKELETAL: No pain or stiffness of the joints.  · NEUROLOGIC: No weakness, sensory changes, seizures, confusion, memory loss, tremor or other abnormal movements.  · ENDOCRINE: No " polydipsia or polyuria.  · INTEGUMENTARY: No rashes or lacerations.  · EYES: No exophthalmos, jaundice or blindness.  · ENT: No dizziness, tinnitus or hearing loss.  · RESPIRATORY: No shortness of breath.  · CARDIOVASCULAR: No tachycardia or chest pain.  · GASTROINTESTINAL: No nausea, vomiting, pain, constipation or diarrhea.  · GENITOURINARY: No frequency, dysuria or sexual dysfunction.  · HEMATOLOGIC/LYMPHATIC: No excessive bleeding, prolonged or excessive bleeding after dental extraction/injury.  · ALLERGIC/IMMUNOLOGIC: No allergic response to materials, foods or animals at this time.    Past Medical, Family and Social History: The patient's past medical, family and social history have been reviewed and updated as appropriate within the electronic medical record - see encounter notes.    Compliance: yes    Side effects: None    Risk Parameters:  Patient reports no suicidal ideation  Patient reports no homicidal ideation  Patient reports no self-injurious behavior  Patient reports no violent behavior    Exam (detailed: at least 9 elements; comprehensive: all 15 elements)   Constitutional  Vitals:  Most recent vital signs, dated greater than 90 days prior to this appointment, were reviewed.   Vitals:    06/30/20 1446   BP: (!) 125/45   Pulse: 75   Weight: 90.3 kg (199 lb 1.2 oz)        General:  unremarkable, age appropriate     Musculoskeletal  Muscle Strength/Tone:  no tremor, no tic   Gait & Station:  non-ataxic     Psychiatric  Speech:  no latency; no press   Mood & Affect:  anxious, depressed  congruent and appropriate   Thought Process:  normal and logical   Associations:  intact   Thought Content:  normal, no suicidality, no homicidality, delusions, or paranoia   Insight:  intact   Judgement: behavior is adequate to circumstances   Orientation:  grossly intact   Memory: intact for content of interview   Language: grossly intact   Attention Span & Concentration:  able to focus   Fund of Knowledge:  intact and  appropriate to age and level of education     Assessment and Diagnosis   Status/Progress: Based on the examination today, the patient's problem(s) is/are worsening.  New problems have not been presented today.   Co-morbidities and Lack of compliance are not complicating management of the primary condition.  There are no active rule-out diagnoses for this patient at this time.     General Impression:   Diagnoses and all orders for this visit:    Depression, unspecified depression type  -     FLUoxetine 40 MG capsule; Take 1 capsule (40 mg total) by mouth once daily.    LAURA (generalized anxiety disorder)  -     FLUoxetine 40 MG capsule; Take 1 capsule (40 mg total) by mouth once daily.  -     hydrOXYzine pamoate (VISTARIL) 25 MG Cap; Take 1 capsule (25 mg total) by mouth 3 (three) times daily as needed.    Insomnia disorder, with non-sleep disorder mental comorbidity  -     traZODone (DESYREL) 150 MG tablet; TAKE 1 OR 2 TABLETS BY MOUTH BEFORE BED AS NEEDED FOR SLEEP    Hx of migraine headaches  -     topiramate (TOPAMAX) 100 MG tablet; Take 1 tablet (100 mg total) by mouth 2 (two) times daily.      Intervention/Counseling/Treatment Plan   · Medication Management: The risks and benefits of medication were discussed with the patient.  · Care Coordination: During the visit, care coordination was conducted with  social work.   · Increase to Prozac 40 mg po daily.    · Continue Topamax 100 mg po BID (for migraines) per Neurology  · Continue Trazodone 200 - 300 mg po qhs PRN insomnia  · Continue Vistaril 25 mg po TID PRN anxiety  · Continue individual psychotherapy with social work    Return to Clinic: 6 months     Risks, benefits, side effects and alternative treatments discussed with patient. Patient agrees with the current plan as documented.  Encouraged Patient to keep future appointments.  Take medications as prescribed and abstain from substance abuse.  Pt to present to ED for thoughts to harm herself or others

## 2020-07-01 ENCOUNTER — HOSPITAL ENCOUNTER (OUTPATIENT)
Dept: RADIOLOGY | Facility: OTHER | Age: 60
Discharge: HOME | End: 2020-07-01
Attending: INTERNAL MEDICINE
Payer: MEDICARE

## 2020-07-01 PROCEDURE — 93925 LOWER EXTREMITY STUDY: CPT | Mod: 26,,, | Performed by: RADIOLOGY

## 2020-07-01 PROCEDURE — 93925 LOWER EXTREMITY STUDY: CPT | Mod: TC

## 2020-07-01 PROCEDURE — 93925 US LOWER EXTREMITY ARTERIES BILATERAL: ICD-10-PCS | Mod: 26,,, | Performed by: RADIOLOGY

## 2020-07-07 ENCOUNTER — OFFICE VISIT (OUTPATIENT)
Dept: OBSTETRICS AND GYNECOLOGY | Facility: CLINIC | Age: 60
End: 2020-07-07
Attending: OBSTETRICS & GYNECOLOGY
Payer: MEDICARE

## 2020-07-07 VITALS
BODY MASS INDEX: 29.19 KG/M2 | DIASTOLIC BLOOD PRESSURE: 64 MMHG | HEIGHT: 69 IN | SYSTOLIC BLOOD PRESSURE: 108 MMHG | WEIGHT: 197.06 LBS

## 2020-07-07 DIAGNOSIS — Z01.419 ENCOUNTER FOR GYNECOLOGICAL EXAMINATION WITHOUT ABNORMAL FINDING: Primary | ICD-10-CM

## 2020-07-07 DIAGNOSIS — N95.1 SYMPTOMATIC MENOPAUSAL OR FEMALE CLIMACTERIC STATES: ICD-10-CM

## 2020-07-07 DIAGNOSIS — Z12.31 SCREENING MAMMOGRAM, ENCOUNTER FOR: ICD-10-CM

## 2020-07-07 DIAGNOSIS — N89.8 VAGINAL IRRITATION: ICD-10-CM

## 2020-07-07 DIAGNOSIS — N95.2 POSTMENOPAUSAL ATROPHIC VAGINITIS: ICD-10-CM

## 2020-07-07 PROCEDURE — 87801 DETECT AGNT MULT DNA AMPLI: CPT

## 2020-07-07 PROCEDURE — G0101 CA SCREEN;PELVIC/BREAST EXAM: HCPCS | Mod: S$GLB,,, | Performed by: OBSTETRICS & GYNECOLOGY

## 2020-07-07 PROCEDURE — 99999 PR PBB SHADOW E&M-EST. PATIENT-LVL III: ICD-10-PCS | Mod: PBBFAC,,, | Performed by: OBSTETRICS & GYNECOLOGY

## 2020-07-07 PROCEDURE — 87661 TRICHOMONAS VAGINALIS AMPLIF: CPT | Mod: 59

## 2020-07-07 PROCEDURE — 99999 PR PBB SHADOW E&M-EST. PATIENT-LVL III: CPT | Mod: PBBFAC,,, | Performed by: OBSTETRICS & GYNECOLOGY

## 2020-07-07 PROCEDURE — G0101 PR CA SCREEN;PELVIC/BREAST EXAM: ICD-10-PCS | Mod: S$GLB,,, | Performed by: OBSTETRICS & GYNECOLOGY

## 2020-07-07 PROCEDURE — 87481 CANDIDA DNA AMP PROBE: CPT | Mod: 59

## 2020-07-07 RX ORDER — HYDROCODONE BITARTRATE AND ACETAMINOPHEN 10; 325 MG/1; MG/1
TABLET ORAL
COMMUNITY
End: 2021-01-27

## 2020-07-07 RX ORDER — PANTOPRAZOLE SODIUM 40 MG/1
TABLET, DELAYED RELEASE ORAL
COMMUNITY

## 2020-07-07 RX ORDER — ESTRADIOL 0.1 MG/G
0.5 CREAM VAGINAL
Qty: 42 G | Refills: 4 | Status: SHIPPED | OUTPATIENT
Start: 2020-07-09 | End: 2021-07-22 | Stop reason: SDUPTHER

## 2020-07-07 RX ORDER — VENLAFAXINE HYDROCHLORIDE 37.5 MG/1
37.5 CAPSULE, EXTENDED RELEASE ORAL DAILY
Qty: 30 CAPSULE | Refills: 5 | Status: SHIPPED | OUTPATIENT
Start: 2020-07-07 | End: 2020-08-19

## 2020-07-07 RX ORDER — MELOXICAM 15 MG/1
15 TABLET ORAL
COMMUNITY
Start: 2020-06-26 | End: 2020-10-12

## 2020-07-07 RX ORDER — CETIRIZINE HYDROCHLORIDE 10 MG/1
10 TABLET ORAL
COMMUNITY
Start: 2020-06-26 | End: 2022-02-23

## 2020-07-07 NOTE — PROGRESS NOTES
Subjective:       Patient ID: Jennifer Brady is a 59 y.o. female.    Chief Complaint:  Well Woman and Vaginal Itching      History of Present Illness  HPI  Jennifer Brady is a 59 y.o. female  NEW TO ME here for her annual GYN exam.   She also reports vaginal irritation. She states that she was diagnosed with TRichomonas about a year ago, and required treatment twice , in spite of having stopped being sexually active with her partner at that time. She is not currently sexually active.   reports vaginal itching or irritation.  Reports vaginal discharge.  She is not currently sexually active.    History of abnormal pap: No  Last Pap: was normal  Last MMG: normal--routine follow-up in 12 months  Last Colonoscopy:  NA  denies domestic violence. She does feel safe at home.     Past Medical History:   Diagnosis Date    Anxiety     Asthma     Behavioral problem     Borderline personality disorder     Coronary angioplasty status 2018    Coronary artery disease     Depression     Depression     Fatigue     Hx of psychiatric care     Hyperlipidemia     Hypertension     Myocardial infarction 2018    Psychiatric problem     S/P hysterectomy with oophorectomy     Therapy      Past Surgical History:   Procedure Laterality Date    HYSTERECTOMY      INJECTION OF ANESTHETIC AGENT AROUND NERVE Right 2019    Procedure: MEDIAL BRANCH BLOCK RIGHT L3, L4, L5;  Surgeon: Petros Reich MD;  Location: [facility] PAIN MGT;  Service: Pain Management;  Laterality: Right;  NEEDS CONSENT, PT NO LONGER TAKES PLAVIX    INJECTION OF ANESTHETIC AGENT AROUND NERVE Right 2020    Procedure: BLOCK, NERVE RIGHT L3, 4, 5 MEDIAL BRANCH;  Surgeon: Petros Reihc MD;  Location: [facility] PAIN MGT;  Service: Pain Management;  Laterality: Right;  NEEDS CONSENT?    TILT TABLE TEST N/A 3/22/2019    Procedure: TILT TABLE TEST;  Surgeon: Roman Lala MD;  Location: [facility] EP LAB;  Service:  Cardiology;  Laterality: N/A;  seizure/migraine, HUT referred by Dr Perea Neuro, EEG confirmed     Social History     Socioeconomic History    Marital status:      Spouse name: Not on file    Number of children: Not on file    Years of education: Not on file    Highest education level: Not on file   Occupational History     Employer: [employer]     Comment: [employer]   Social Needs    Financial resource strain: Not on file    Food insecurity     Worry: Not on file     Inability: Not on file    Transportation needs     Medical: Not on file     Non-medical: Not on file   Tobacco Use    Smoking status: Former Smoker     Packs/day: 0.25     Types: Cigarettes     Quit date: 2020     Years since quittin.1    Smokeless tobacco: Never Used    Tobacco comment: quit actual cigarettes a few mo ago, switched to e cigarette   Substance and Sexual Activity    Alcohol use: Yes     Comment: Socially    Drug use: No    Sexual activity: Not Currently     Partners: Male   Lifestyle    Physical activity     Days per week: Not on file     Minutes per session: Not on file    Stress: Not on file   Relationships    Social connections     Talks on phone: Not on file     Gets together: Not on file     Attends Gnosticism service: Not on file     Active member of club or organization: Not on file     Attends meetings of clubs or organizations: Not on file     Relationship status: Not on file   Other Topics Concern    Caffeine Use Not Asked    Financial Status: Disabled Not Asked    Legal: Involved in criminal litigation Not Asked    Caffeine Use: Frequent Not Asked    Financial Status: Employed Yes    Legal: Other Not Asked    Caffeine Use: Moderate Yes    Financial Status: Unemployed Not Asked    Leisure: Exercise Not Asked    Caffeine Use: Substantial Not Asked    Financial Status: Other Not Asked    Leisure: Fishing Not Asked    Childhood History: Adopted Not  Asked    Firearms: Does patient have access to a firearm? Not Asked    Leisure: Hunting Not Asked    Childhood History: Early trauma Not Asked    Home situation: homeless Not Asked    Leisure: Movie Watching Not Asked    Childhood History: Raised by parents Not Asked    Home situation: lives alone Not Asked    Leisure: Shopping Not Asked    Childhood History: Uneventful Not Asked    Home situation: lives in group home Not Asked    Leisure: Sports Not Asked    Childhood History: Other Not Asked    Home situation: lives in nursing home Not Asked    Leisure: Time with family Not Asked    Education: Unfinished High School Not Asked    Home situation: lives in shelter Not Asked     Service Not Asked    Education: High School Graduate Not Asked    Home situation: lives with family Not Asked    Spirituality: Active Participation Not Asked    Education: Unfinished college Not Asked    Home situation: lives with friends Not Asked    Spirituality: Organized Hinduism Yes    Education: Trade School Not Asked    Home situation: lives with significant other Not Asked    Spirituality: Private Participation Not Asked    Education: Associate's Degree Not Asked    Home situation: lives with spouse Not Asked    Patient feels they ought to cut down on drinking/drug use No    Education: Bachelor's Degree Not Asked    Legal consequences of chemical use Not Asked    Patient annoyed by others criticizing their drinking/drug use No    Education: More than one Bachelor's or Professional Not Asked    Legal: Arrest history Not Asked    Patient has felt bad or guilty about drinking/drug use No    Education: Master's, PhD Not Asked    Legal: Involved in civil litigation Not Asked    Patient has had a drink/used drugs as an eye opener in the AM No   Social History Narrative    Patient discusses recent decisions she has made that she is pleased about; has clarified for self and others her values and  "standards.  Though she is aware of difficulty of  struggle, feels new confidence about  results.  She expresses sense of wholeness and independence at this point. Her previously explored painful symptoms are improved (i.e, .diminished - 2or3/10), thought processes are logical and outlook is optimistic, as well as realistic.    Discusses trip she's planned.  intent to reschedule after return in mid-August. She expresses feelings of appreciation     Family History   Problem Relation Age of Onset    Seizures Mother     Heart attack Mother     Hypertension Mother     Stroke Mother     Hypertension Paternal Grandfather     Hypertension Paternal Grandmother     Heart attacks under age 50 Maternal Grandmother     Hypertension Maternal Grandmother     Hypertension Maternal Grandfather     Heart attacks under age 50 Father 33    Hypertension Father     Breast cancer Neg Hx     Colon cancer Neg Hx     Diabetes Neg Hx     Ovarian cancer Neg Hx      OB History        4    Para   3    Term   3            AB   1    Living   3       SAB   1    TAB        Ectopic        Multiple        Live Births   3                 /64   Ht 5' 9" (1.753 m)   Wt 89.4 kg (197 lb 1.5 oz)   BMI 29.11 kg/m²         GYN & OB History    Date of Last Pap: No result found    OB History    Para Term  AB Living   4 3 3   1 3   SAB TAB Ectopic Multiple Live Births   1       3      # Outcome Date GA Lbr Lon/2nd Weight Sex Delivery Anes PTL Lv   4 SAB            3 Term      Vag-Spont   BOO   2 Term      Vag-Spont   BOO   1 Term      Vag-Spont   BOO       Review of Systems  Review of Systems   Constitutional: Negative for activity change, appetite change, fatigue and unexpected weight change.   HENT: Negative.    Eyes: Negative for visual disturbance.   Respiratory: Negative for shortness of breath and wheezing.    Cardiovascular: Negative for chest pain, palpitations and leg swelling.   Gastrointestinal: " Negative for abdominal pain, bloating and blood in stool.   Endocrine: Positive for hot flashes. Negative for diabetes and hair loss.   Genitourinary: Positive for hot flashes, vaginal discharge and vaginal dryness. Negative for decreased libido, dyspareunia, dysuria and frequency.   Musculoskeletal: Negative for back pain and joint swelling.   Integumentary:  Negative for acne, hair changes and nipple discharge.   Neurological: Negative for headaches.   Hematological: Does not bruise/bleed easily.   Psychiatric/Behavioral: Positive for depression and sleep disturbance. The patient is not nervous/anxious.    Breast: Negative for mastodynia and nipple discharge          Objective:      Physical Exam:   Constitutional: She is oriented to person, place, and time. She appears well-developed and well-nourished.    HENT:   Head: Normocephalic and atraumatic.    Eyes: Pupils are equal, round, and reactive to light. EOM are normal.    Neck: Normal range of motion. Neck supple.    Cardiovascular: Normal rate and regular rhythm.     Pulmonary/Chest: Effort normal and breath sounds normal.   BREASTS:  no mass, no tenderness, no deformity and no retraction. Right breast exhibits no inverted nipple, no mass, no nipple discharge, no skin change, no tenderness, no bleeding and no swelling. Left breast exhibits no inverted nipple, no mass, no nipple discharge, no skin change, no tenderness, no bleeding and no swelling. Breasts are symmetrical.              Abdominal: Soft. Bowel sounds are normal.     Genitourinary:    Pelvic exam was performed with patient supine.      Genitourinary Comments: PELVIC: Normal external female genitalia without lesions. Normal hair distribution. Adequate perineal body, normal urethral meatus. Vagina atrophic without lesions + Creamy discharge. No significant cystocele or rectocele. Bimanual exam shows uterus and cervix to be surgically absent. Adnexa without masses or tenderness.  RECTAL: Deferred                Musculoskeletal: Normal range of motion and moves all extremeties.       Neurological: She is alert and oriented to person, place, and time.    Skin: Skin is warm and dry.    Psychiatric: She has a normal mood and affect.              Assessment:        1. Encounter for gynecological examination without abnormal finding    2. Vaginal irritation    3. Screening mammogram, encounter for    4. Symptomatic menopausal or female climacteric states    5. Postmenopausal atrophic vaginitis                Plan:      1. Encounter for gynecological examination without abnormal finding  COUNSELING:  The patient was counseled today on regular weight bearing exercise. Patient was counseled today on the new ACS guidelines for cervical cytology screening as well as the current recommendations for breast cancer screening. Counseling session lasted approximately 10 minutes, and all her questions were answered. She was advised to see her primary care physician for all other health maintenance.   FOLLOW-UP with me for next routine visit.         2. Vaginal irritation      - Vaginosis Screen by DNA Probe    3. Screening mammogram, encounter for      - Mammo Digital Screening Bilat w/ [facility]; Future    4. Symptomatic menopausal or female climacteric states    Will stop Prozac ( has actually been taking only 20 mg , states her insurance did not cover the 40 mg dose) and change to   - venlafaxine (EFFEXOR XR) 37.5 MG 24 hr capsule; Take 1 capsule (37.5 mg total) by mouth once daily.  Dispense: 30 capsule; Refill: 5    5. Postmenopausal atrophic vaginitis      - estradioL (ESTRACE) 0.01 % (0.1 mg/gram) vaginal cream; Place 0.5 g vaginally twice a week. Insert 0.5grams intravaginally twice weekly  Dispense: 42 g; Refill: 4       Follow up in about 1 year (around 7/7/2021), or if symptoms worsen or fail to improve.

## 2020-07-20 ENCOUNTER — PATIENT MESSAGE (OUTPATIENT)
Dept: OBSTETRICS AND GYNECOLOGY | Facility: CLINIC | Age: 60
End: 2020-07-20

## 2020-07-20 DIAGNOSIS — A59.9 TRICHOMONAS INFECTION: Primary | ICD-10-CM

## 2020-07-20 LAB
BACTERIAL VAGINOSIS DNA: NEGATIVE
CANDIDA GLABRATA DNA: NEGATIVE
CANDIDA KRUSEI DNA: NEGATIVE
CANDIDA RRNA VAG QL PROBE: NEGATIVE
T VAGINALIS RRNA GENITAL QL PROBE: POSITIVE

## 2020-07-20 RX ORDER — METRONIDAZOLE 500 MG/1
500 TABLET ORAL 2 TIMES DAILY WITH MEALS
Qty: 14 TABLET | Refills: 0 | Status: SHIPPED | OUTPATIENT
Start: 2020-07-20 | End: 2020-07-27

## 2020-07-27 ENCOUNTER — OFFICE VISIT (OUTPATIENT)
Dept: PSYCHIATRY | Facility: CLINIC | Age: 60
End: 2020-07-27
Payer: MEDICARE

## 2020-07-27 DIAGNOSIS — F41.1 GAD (GENERALIZED ANXIETY DISORDER): ICD-10-CM

## 2020-07-27 DIAGNOSIS — F32.A DEPRESSION, UNSPECIFIED DEPRESSION TYPE: Primary | ICD-10-CM

## 2020-07-27 PROCEDURE — 90834 PR PSYCHOTHERAPY W/PATIENT, 45 MIN: ICD-10-PCS | Mod: S$GLB,,, | Performed by: SOCIAL WORKER

## 2020-07-27 PROCEDURE — 99999 PR PBB SHADOW E&M-EST. PATIENT-LVL I: ICD-10-PCS | Mod: PBBFAC,,, | Performed by: SOCIAL WORKER

## 2020-07-27 PROCEDURE — 99999 PR PBB SHADOW E&M-EST. PATIENT-LVL I: CPT | Mod: PBBFAC,,, | Performed by: SOCIAL WORKER

## 2020-07-27 PROCEDURE — 90834 PSYTX W PT 45 MINUTES: CPT | Mod: S$GLB,,, | Performed by: SOCIAL WORKER

## 2020-07-27 NOTE — PROGRESS NOTES
Individual Psychotherapy (PhD/LCSW)    7/27/2020    Site:  [facility]         Therapeutic Intervention: Met with patient.  Outpatient - Supportive psychotherapy 45 min - CPT Code 38593    Chief complaint/reason for encounter: depression and anxiety     Interval history and content of current session: Pt was last seen in February.  She states that she has been depressed and anxious.  She is not interested in doing anything much and stays in bed most of the day.  She is having a lot of back pain despite getting a shot in her back in June.  Her gyn changed her medication from Prozac to Effexor due to so many hot flashes and she does feel better on the Effexor.  She talks to her daughters and grandchildren by phone but is mostly alone.  She has put off selling her home due to covid and not wanting people to come through her house.  She is questioning if she really wants to move to [place] where she does not have a support system like here.    Treatment plan:  · Target symptoms: depression, anxiety   · Why chosen therapy is appropriate versus another modality: relevant to diagnosis  · Outcome monitoring methods: self-report, observation  · Therapeutic intervention type: supportive psychotherapy    Risk parameters:  Patient reports no suicidal ideation  Patient reports no homicidal ideation  Patient reports no self-injurious behavior  Patient reports no violent behavior    Verbal deficits: None    Patient's response to intervention:  The patient's response to intervention is accepting.    Progress toward goals and other mental status changes:  The patient's progress toward goals is good.    Diagnosis:     ICD-10-CM ICD-9-CM   1. Depression, unspecified depression type  F32.9 311   2. LAURA (generalized anxiety disorder)  F41.1 300.02       Plan:  individual psychotherapy and medication management by physician    Return to clinic: 1 month    Length of Service (minutes): 45

## 2020-08-19 ENCOUNTER — OFFICE VISIT (OUTPATIENT)
Dept: NEUROLOGY | Facility: CLINIC | Age: 60
End: 2020-08-19
Payer: MEDICARE

## 2020-08-19 VITALS — WEIGHT: 204.13 LBS | HEIGHT: 69 IN | BODY MASS INDEX: 30.23 KG/M2

## 2020-08-19 DIAGNOSIS — Z86.69 HX OF MIGRAINE HEADACHES: ICD-10-CM

## 2020-08-19 DIAGNOSIS — E55.9 VITAMIN D DEFICIENCY: ICD-10-CM

## 2020-08-19 DIAGNOSIS — R20.2 PARESTHESIAS: Primary | ICD-10-CM

## 2020-08-19 DIAGNOSIS — E11.40 TYPE 2 DIABETES MELLITUS WITH DIABETIC NEUROPATHY, WITHOUT LONG-TERM CURRENT USE OF INSULIN: ICD-10-CM

## 2020-08-19 PROBLEM — R56.9 SEIZURE: Status: RESOLVED | Noted: 2019-03-22 | Resolved: 2020-08-19

## 2020-08-19 PROBLEM — R56.9 SEIZURES: Status: RESOLVED | Noted: 2018-02-26 | Resolved: 2020-08-19

## 2020-08-19 PROCEDURE — 3044F HG A1C LEVEL LT 7.0%: CPT | Mod: CPTII,S$GLB,, | Performed by: PSYCHIATRY & NEUROLOGY

## 2020-08-19 PROCEDURE — 3044F PR MOST RECENT HEMOGLOBIN A1C LEVEL <7.0%: ICD-10-PCS | Mod: CPTII,S$GLB,, | Performed by: PSYCHIATRY & NEUROLOGY

## 2020-08-19 PROCEDURE — 99214 OFFICE O/P EST MOD 30 MIN: CPT | Mod: S$GLB,,, | Performed by: PSYCHIATRY & NEUROLOGY

## 2020-08-19 PROCEDURE — 3008F BODY MASS INDEX DOCD: CPT | Mod: CPTII,S$GLB,, | Performed by: PSYCHIATRY & NEUROLOGY

## 2020-08-19 PROCEDURE — 99999 PR PBB SHADOW E&M-EST. PATIENT-LVL IV: ICD-10-PCS | Mod: PBBFAC,,, | Performed by: PSYCHIATRY & NEUROLOGY

## 2020-08-19 PROCEDURE — 99214 PR OFFICE/OUTPT VISIT, EST, LEVL IV, 30-39 MIN: ICD-10-PCS | Mod: S$GLB,,, | Performed by: PSYCHIATRY & NEUROLOGY

## 2020-08-19 PROCEDURE — 99999 PR PBB SHADOW E&M-EST. PATIENT-LVL IV: CPT | Mod: PBBFAC,,, | Performed by: PSYCHIATRY & NEUROLOGY

## 2020-08-19 PROCEDURE — 3008F PR BODY MASS INDEX (BMI) DOCUMENTED: ICD-10-PCS | Mod: CPTII,S$GLB,, | Performed by: PSYCHIATRY & NEUROLOGY

## 2020-08-19 RX ORDER — TOPIRAMATE 100 MG/1
100 TABLET, FILM COATED ORAL NIGHTLY
Qty: 90 TABLET | Refills: 3 | Status: SHIPPED | OUTPATIENT
Start: 2020-08-19 | End: 2021-06-01 | Stop reason: SDUPTHER

## 2020-08-19 RX ORDER — VENLAFAXINE HYDROCHLORIDE 75 MG/1
75 CAPSULE, EXTENDED RELEASE ORAL DAILY
Qty: 90 CAPSULE | Refills: 3 | Status: SHIPPED | OUTPATIENT
Start: 2020-08-19 | End: 2020-11-30 | Stop reason: SDUPTHER

## 2020-08-19 NOTE — PROGRESS NOTES
[facility] - NEUROLOGY  [facility] [place]    Date: August 19, 2020   Patient Name: Jennifer Brady   MRN: [id number]   PCP: Arvind Juarez  Referring Provider: No ref. provider found    Assessment:      This is Jennifer Brady, 60 y.o. female with CAD s/p PCI 4/2018, extensive history of psychiatric trauma, episodes of syncope with non-diagnostic EMU admit 2/2018 and normal tilt 3/2019, and headaches with some migrainous features although atypical due to extreme length.       Plan:      -  Neuropathy labs  -  Increase Effexor to 75mg daily  -  Continue TPM 100mg daily - she has been on this medication since 2008, do not suspect as etiology of paresthesias  -  Mg supplement  -  Reglan and flexeril prn    Follow up 3 months       I discussed side effects of the medications. I asked the patient to stop the medication if she notices serious adverse effects as we discussed and to seek immediate medical attention at an ER.     Maixmiliano Kuhn MD  [facility]   Department of Neurology    Subjective:      -  Presents back due to development of paresthesias in hands and feet which developed early 2020 which she variably describes as ascending the arm and leg, follows with pain for lumbar and cervical JANICE  -  Continues to have 1-2 HA per month lasting a week, taking  rather than 200mg daily for unclear reasons, recent change from fluoxetine to effexor for hot flashes recently without any +- change in any of her symptoms  -  Reports single episode of LOC in 2019 since last visit      HPI 3/2019:   Ms. Jennifer Brady is a 60 y.o. female who presents with a chief complaint of headaches and syncope    Per chart review, patient developed black out episodes while in crowds at age 8 and began having syncopal episodes which always occurred in the bathroom at age 28.  She reports she may go as much as a month but never as much as six months without syncopal  episodes.  She was seen by neurologist Dr. Dorsey in 2008 and started on TPM and OXC without change in syncopal events.  OXC was stopped following non-diagnostic EMU admit 2/2019.    She also has frequent migraines which began at age [age], she describes these as lasing 3-4 weeks at a time with photophobia but no prominent nausea, previously prescribed narcotics by outside physician for this issue.  She was tried on elavil by psychiatry in 2014 but does not recall this medication.    She became tearful at the end of her visit endorsing severe ongoing depression and sleep disturbance.  Notes that she frequently bumps into things and has easy bruising due to anti-platelets.    PAST MEDICAL HISTORY:  Past Medical History:   Diagnosis Date    Anxiety     Asthma     Behavioral problem     Borderline personality disorder     Coronary angioplasty status 04/2018    Coronary artery disease     Depression     Depression     Fatigue     Hx of psychiatric care     Hyperlipidemia     Hypertension     Myocardial infarction 05/2018    Psychiatric problem     S/P hysterectomy with oophorectomy     Therapy        PAST SURGICAL HISTORY:  Past Surgical History:   Procedure Laterality Date    HYSTERECTOMY  1999    INJECTION OF ANESTHETIC AGENT AROUND NERVE Right 11/20/2019    Procedure: MEDIAL BRANCH BLOCK RIGHT L3, L4, L5;  Surgeon: Petros Reich MD;  Location: [facility] PAIN MGT;  Service: Pain Management;  Laterality: Right;  NEEDS CONSENT, PT NO LONGER TAKES PLAVIX    INJECTION OF ANESTHETIC AGENT AROUND NERVE Right 6/17/2020    Procedure: BLOCK, NERVE RIGHT L3, 4, 5 MEDIAL BRANCH;  Surgeon: Petros Reich MD;  Location: [facility] PAIN MGT;  Service: Pain Management;  Laterality: Right;  NEEDS CONSENT?    TILT TABLE TEST N/A 3/22/2019    Procedure: TILT TABLE TEST;  Surgeon: Roman Lala MD;  Location: [facility] EP LAB;  Service: Cardiology;  Laterality: N/A;  seizure/migraine, HUT referred by Dr Brit Merlos,  EEG confirmed       CURRENT MEDS:  Current Outpatient Medications   Medication Sig Dispense Refill    cetirizine (ZYRTEC) 10 MG tablet Take 10 mg by mouth.      estradioL (ESTRACE) 0.01 % (0.1 mg/gram) vaginal cream Place 0.5 g vaginally twice a week. Insert 0.5grams intravaginally twice weekly 42 g 4    HYDROcodone-acetaminophen (NORCO)  mg per tablet hydrocodone 10 mg-acetaminophen 325 mg tablet   Take 1 tablet every 4 hours by oral route.      lisinopril-hydrochlorothiazide (PRINZIDE,ZESTORETIC) 10-12.5 mg per tablet Take 1 tablet by mouth once daily.      meloxicam (MOBIC) 15 MG tablet Take 15 mg by mouth.      pantoprazole (PROTONIX) 40 MG tablet pantoprazole 40 mg tablet,delayed release      topiramate (TOPAMAX) 100 MG tablet Take 1 tablet (100 mg total) by mouth 2 (two) times daily. 60 tablet 5    traZODone (DESYREL) 150 MG tablet TAKE 1 OR 2 TABLETS BY MOUTH BEFORE BED AS NEEDED FOR SLEEP 60 tablet 5    venlafaxine (EFFEXOR XR) 37.5 MG 24 hr capsule Take 1 capsule (37.5 mg total) by mouth once daily. 30 capsule 5    albuterol (PROVENTIL/VENTOLIN HFA) 90 mcg/actuation inhaler Inhale 1-2 puffs into the lungs every 6 (six) hours as needed. Rescue 1 Inhaler 0    aspirin (ECOTRIN) 81 MG EC tablet Take 1 tablet (81 mg total) by mouth once daily. 100 tablet 6    atorvastatin (LIPITOR) 40 MG tablet Take 1 tablet (40 mg total) by mouth once daily. 90 tablet 3    nitroGLYCERIN (NITROSTAT) 0.4 MG SL tablet Place 1 tablet (0.4 mg total) under the tongue every 5 (five) minutes as needed for Chest pain. 20 tablet 0     No current facility-administered medications for this visit.        ALLERGIES:  Review of patient's allergies indicates:  No Known Allergies    FAMILY HISTORY:  Family History   Problem Relation Age of Onset    Seizures Mother     Heart attack Mother     Hypertension Mother     Stroke Mother     Hypertension Paternal Grandfather     Hypertension Paternal Grandmother     Heart  "attacks under age 50 Maternal Grandmother     Hypertension Maternal Grandmother     Hypertension Maternal Grandfather     Heart attacks under age 50 Father 33    Hypertension Father     Breast cancer Neg Hx     Colon cancer Neg Hx     Diabetes Neg Hx     Ovarian cancer Neg Hx        SOCIAL HISTORY:  Social History     Tobacco Use    Smoking status: Former Smoker     Packs/day: 0.25     Types: Cigarettes     Quit date: 2020     Years since quittin.2    Smokeless tobacco: Never Used    Tobacco comment: quit actual cigarettes a few mo ago, switched to e cigarette   Substance Use Topics    Alcohol use: Yes     Comment: Socially    Drug use: No       Review of Systems:  12 review of systems is negative except for the symptoms mentioned in HPI.        Objective:     Vitals:    20 0957   Weight: 92.6 kg (204 lb 2.3 oz)   Height: 5' 9" (1.753 m)       General: NAD, well nourished   Eyes: no tearing, discharge, no erythema   ENT: moist mucous membranes of the oral cavity, nares patent    Neck: tension noted in trapezius and cervical paraspinous  Cardiovascular: Warm and well perfused, pulses equal and symmetrical  Lungs: Normal work of breathing, normal chest wall excursions  Skin: No rash, lesions, or breakdown on exposed skin  Psychiatry: normal affect  Abdomen: soft, non tender, non distended  Extremeties: No cyanosis, clubbing or edema.    Neurological   MENTAL STATUS: Alert and oriented to person, place, and time. Speech without dysarthria, able to name and repeat without difficulty.   Bilateral eyelid myokymia and mild tremulousness noted throughout visit   CRANIAL NERVES: Visual fields intact. PERRL. EOMI. Facial sensation intact. Face symmetrical. Hearing grossly intact. Full shoulder shrug bilaterally. Tongue protrudes midline   SENSORY: Sensation is intact to light touch throughout.  Negative Romberg.   MOTOR: Normal bulk and tone. No pronator drift.  5/5 deltoid, biceps, triceps, " interosseous, hand  bilaterally. 5/5 iliopsoas, knee extension/flexion, foot dorsi/plantarflexion bilaterally.    REFLEXES: Symmetric and 2+ throughout.   CEREBELLAR/COORDINATION/GAIT: Gait steady with normal arm swing and stride length.  Finger to nose intact. Normal rapid alternating movements.

## 2020-08-20 ENCOUNTER — HOSPITAL ENCOUNTER (OUTPATIENT)
Dept: RADIOLOGY | Facility: OTHER | Age: 60
Discharge: HOME | End: 2020-08-20
Attending: OBSTETRICS & GYNECOLOGY
Payer: MEDICARE

## 2020-08-20 DIAGNOSIS — Z12.31 SCREENING MAMMOGRAM, ENCOUNTER FOR: ICD-10-CM

## 2020-08-20 PROCEDURE — 77067 SCR MAMMO BI INCL CAD: CPT | Mod: 26,,, | Performed by: RADIOLOGY

## 2020-08-20 PROCEDURE — 77067 SCR MAMMO BI INCL CAD: CPT | Mod: TC

## 2020-08-20 PROCEDURE — 77063 MAMMO DIGITAL SCREENING BILAT WITH TOMOSYNTHESIS_CAD: ICD-10-PCS | Mod: 26,,, | Performed by: RADIOLOGY

## 2020-08-20 PROCEDURE — 77067 MAMMO DIGITAL SCREENING BILAT WITH TOMOSYNTHESIS_CAD: ICD-10-PCS | Mod: 26,,, | Performed by: RADIOLOGY

## 2020-08-20 PROCEDURE — 77063 BREAST TOMOSYNTHESIS BI: CPT | Mod: 26,,, | Performed by: RADIOLOGY

## 2020-09-01 ENCOUNTER — TELEPHONE (OUTPATIENT)
Dept: ORTHOPEDICS | Facility: CLINIC | Age: 60
End: 2020-09-01

## 2020-09-01 DIAGNOSIS — M25.521 RIGHT ELBOW PAIN: Primary | ICD-10-CM

## 2020-09-01 DIAGNOSIS — M25.511 RIGHT SHOULDER PAIN, UNSPECIFIED CHRONICITY: ICD-10-CM

## 2020-09-01 NOTE — TELEPHONE ENCOUNTER
Spoke with pt. Pt aware that she will need xrays prior to appt to see Dr Yuen. Pt verbalized understanding.

## 2020-09-14 ENCOUNTER — OFFICE VISIT (OUTPATIENT)
Dept: CARDIOLOGY | Facility: CLINIC | Age: 60
End: 2020-09-14
Payer: MEDICARE

## 2020-09-14 VITALS
HEIGHT: 69 IN | SYSTOLIC BLOOD PRESSURE: 116 MMHG | WEIGHT: 206.69 LBS | DIASTOLIC BLOOD PRESSURE: 79 MMHG | HEART RATE: 87 BPM | OXYGEN SATURATION: 96 % | BODY MASS INDEX: 30.61 KG/M2

## 2020-09-14 DIAGNOSIS — I25.2 OLD MI (MYOCARDIAL INFARCTION): ICD-10-CM

## 2020-09-14 DIAGNOSIS — I10 ESSENTIAL HYPERTENSION: ICD-10-CM

## 2020-09-14 DIAGNOSIS — I25.10 CORONARY ARTERY DISEASE INVOLVING NATIVE CORONARY ARTERY OF NATIVE HEART WITHOUT ANGINA PECTORIS: Primary | ICD-10-CM

## 2020-09-14 DIAGNOSIS — Z87.891 EX-SMOKER: ICD-10-CM

## 2020-09-14 DIAGNOSIS — E78.00 PURE HYPERCHOLESTEROLEMIA: ICD-10-CM

## 2020-09-14 DIAGNOSIS — I73.9 PAD (PERIPHERAL ARTERY DISEASE): ICD-10-CM

## 2020-09-14 DIAGNOSIS — Z98.61 S/P PTCA (PERCUTANEOUS TRANSLUMINAL CORONARY ANGIOPLASTY): ICD-10-CM

## 2020-09-14 DIAGNOSIS — J45.20 MILD INTERMITTENT ASTHMA WITHOUT COMPLICATION: ICD-10-CM

## 2020-09-14 DIAGNOSIS — I25.10 CAD IN NATIVE ARTERY: ICD-10-CM

## 2020-09-14 PROCEDURE — 3078F DIAST BP <80 MM HG: CPT | Mod: CPTII,S$GLB,, | Performed by: INTERNAL MEDICINE

## 2020-09-14 PROCEDURE — 93000 ELECTROCARDIOGRAM COMPLETE: CPT | Mod: S$GLB,,, | Performed by: INTERNAL MEDICINE

## 2020-09-14 PROCEDURE — 99205 PR OFFICE/OUTPT VISIT, NEW, LEVL V, 60-74 MIN: ICD-10-PCS | Mod: 25,S$GLB,, | Performed by: INTERNAL MEDICINE

## 2020-09-14 PROCEDURE — 99999 PR PBB SHADOW E&M-EST. PATIENT-LVL III: CPT | Mod: PBBFAC,,, | Performed by: INTERNAL MEDICINE

## 2020-09-14 PROCEDURE — 3008F PR BODY MASS INDEX (BMI) DOCUMENTED: ICD-10-PCS | Mod: CPTII,S$GLB,, | Performed by: INTERNAL MEDICINE

## 2020-09-14 PROCEDURE — 99205 OFFICE O/P NEW HI 60 MIN: CPT | Mod: 25,S$GLB,, | Performed by: INTERNAL MEDICINE

## 2020-09-14 PROCEDURE — 93000 EKG 12-LEAD: ICD-10-PCS | Mod: S$GLB,,, | Performed by: INTERNAL MEDICINE

## 2020-09-14 PROCEDURE — 3078F PR MOST RECENT DIASTOLIC BLOOD PRESSURE < 80 MM HG: ICD-10-PCS | Mod: CPTII,S$GLB,, | Performed by: INTERNAL MEDICINE

## 2020-09-14 PROCEDURE — 3074F PR MOST RECENT SYSTOLIC BLOOD PRESSURE < 130 MM HG: ICD-10-PCS | Mod: CPTII,S$GLB,, | Performed by: INTERNAL MEDICINE

## 2020-09-14 PROCEDURE — 3074F SYST BP LT 130 MM HG: CPT | Mod: CPTII,S$GLB,, | Performed by: INTERNAL MEDICINE

## 2020-09-14 PROCEDURE — 3008F BODY MASS INDEX DOCD: CPT | Mod: CPTII,S$GLB,, | Performed by: INTERNAL MEDICINE

## 2020-09-14 PROCEDURE — 99999 PR PBB SHADOW E&M-EST. PATIENT-LVL III: ICD-10-PCS | Mod: PBBFAC,,, | Performed by: INTERNAL MEDICINE

## 2020-09-14 NOTE — PROGRESS NOTES
Subjective:      Patient ID: Jennifer Brady is a 60 y.o. female.    Chief Complaint: Coronary Artery Disease (Use to see Dr Reza)    HPI:  Pt used to see Dr Reza but pt lives in [address] and wants to see someone closer.    Pt had an MI in 2018 and underwent emergency PTCA.    Not very active.    Pt rarely goes to the gym during Covid19    Pt used to go to the gym 3 days a week.    Pt went to Urgent Care yesterday for low back pain radiating down right leg.    Pt also c/o right shoulder blade pain.    Pt has had two rotator cuff operations by Dr Grimaldo at [facility]    Pt is now seeing [facility].    Review of Systems   Cardiovascular: Positive for dyspnea on exertion, irregular heartbeat, palpitations and syncope. Negative for chest pain, claudication, leg swelling, near-syncope and orthopnea.      At time of MI pt had right arm and low back pain.    Pt has had several episodes of fainting, last episode 7/19.    Pt states she was told she has blocked arteries in her legs.      Past Medical History:   Diagnosis Date    Acute coronary syndrome     Anxiety     Asthma     Behavioral problem     Borderline personality disorder     Coronary angioplasty status 04/2018    Coronary artery disease     Depression     Depression     Fatigue     Hx of psychiatric care     Hyperlipidemia     Hypertension     Myocardial infarction 05/2018    Psychiatric problem     S/P hysterectomy with oophorectomy     Sleep apnea     pt uses CPAP    Syncope and collapse     Therapy     Thyroid disease         Past Surgical History:   Procedure Laterality Date    CORONARY ANGIOPLASTY      HYSTERECTOMY  1999    INJECTION OF ANESTHETIC AGENT AROUND NERVE Right 11/20/2019    Procedure: MEDIAL BRANCH BLOCK RIGHT L3, L4, L5;  Surgeon: Petros Reich MD;  Location: [facility];  Service: Pain Management;  Laterality: Right;  NEEDS CONSENT, PT NO LONGER TAKES PLAVIX    INJECTION OF ANESTHETIC  AGENT AROUND NERVE Right 2020    Procedure: BLOCK, NERVE RIGHT L3, 4, 5 MEDIAL BRANCH;  Surgeon: Petros Reich MD;  Location: [facility]IN [facility]GT;  Service: Pain Management;  Laterality: Right;  NEEDS CONSENT?    TILT TABLE TEST N/A 3/22/2019    Procedure: TILT TABLE TEST;  Surgeon: Roman Lala MD;  Location: [facility] EP LAB;  Service: Cardiology;  Laterality: N/A;  seizure/migraine, HUT referred by Dr Perea Neuro, EEG confirmed       Family History   Problem Relation Age of Onset    Seizures Mother     Heart attack Mother     Hypertension Mother     Stroke Mother     Hypertension Paternal Grandfather     Hypertension Paternal Grandmother     Heart attacks under age 50 Maternal Grandmother     Hypertension Maternal Grandmother     Hypertension Maternal Grandfather     Heart attacks under age 50 Father 33    Hypertension Father     Hypertension Sister     Stroke Sister     Seizures Sister     Breast cancer Neg Hx     Colon cancer Neg Hx     Diabetes Neg Hx     Ovarian cancer Neg Hx        Social History     Socioeconomic History    Marital status:      Spouse name: Not on file    Number of children: Not on file    Years of education: Not on file    Highest education level: Not on file   Occupational History     Employer: [employer]     Comment: [employer]   Social Needs    Financial resource strain: Not on file    Food insecurity     Worry: Not on file     Inability: Not on file    Transportation needs     Medical: Not on file     Non-medical: Not on file   Tobacco Use    Smoking status: Former Smoker     Packs/day: 0.25     Types: Cigarettes     Start date: 1986     Quit date: 3/7/2020     Years since quittin.5    Smokeless tobacco: Never Used    Tobacco comment: quit actual cigarettes a few mo ago, switched to e cigarette   Substance and Sexual Activity    Alcohol use: Yes     Comment: Socially    Drug use: No    Sexual  activity: Not Currently     Partners: Male   Lifestyle    Physical activity     Days per week: Not on file     Minutes per session: Not on file    Stress: Not on file   Relationships    Social connections     Talks on phone: Not on file     Gets together: Not on file     Attends Pentecostal service: Not on file     Active member of club or organization: Not on file     Attends meetings of clubs or organizations: Not on file     Relationship status: Not on file   Other Topics Concern    Caffeine Use Not Asked    Financial Status: Disabled Not Asked    Legal: Involved in criminal litigation Not Asked    Caffeine Use: Frequent Not Asked    Financial Status: Employed Yes    Legal: Other Not Asked    Caffeine Use: Moderate Yes    Financial Status: Unemployed Not Asked    Leisure: Exercise Not Asked    Caffeine Use: Substantial Not Asked    Financial Status: Other Not Asked    Leisure: Fishing Not Asked    Childhood History: Adopted Not Asked    Firearms: Does patient have access to a firearm? Not Asked    Leisure: Hunting Not Asked    Childhood History: Early trauma Not Asked    Home situation: homeless Not Asked    Leisure: Movie Watching Not Asked    Childhood History: Raised by parents Not Asked    Home situation: lives alone Not Asked    Leisure: Shopping Not Asked    Childhood History: Uneventful Not Asked    Home situation: lives in group home Not Asked    Leisure: Sports Not Asked    Childhood History: Other Not Asked    Home situation: lives in nursing home Not Asked    Leisure: Time with family Not Asked    Education: Unfinished High School Not Asked    Home situation: lives in shelter Not Asked     Service Not Asked    Education: High School Graduate Not Asked    Home situation: lives with family Not Asked    Spirituality: Active Participation Not Asked    Education: Unfinished college Not Asked    Home situation: lives with friends Not Asked    Spirituality:  Organized Congregation Yes    Education: Trade School Not Asked    Home situation: lives with significant other Not Asked    Spirituality: Private Participation Not Asked    Education: Associate's Degree Not Asked    Home situation: lives with spouse Not Asked    Patient feels they ought to cut down on drinking/drug use No    Education: Bachelor's Degree Not Asked    Legal consequences of chemical use Not Asked    Patient annoyed by others criticizing their drinking/drug use No    Education: More than one Bachelor's or Professional Not Asked    Legal: Arrest history Not Asked    Patient has felt bad or guilty about drinking/drug use No    Education: Master's, PhD Not Asked    Legal: Involved in civil litigation Not Asked    Patient has had a drink/used drugs as an eye opener in the AM No   Social History Narrative    Patient discusses recent decisions she has made that she is pleased about; has clarified for self and others her values and standards.  Though she is aware of difficulty of  struggle, feels new confidence about  results.  She expresses sense of wholeness and independence at this point. Her previously explored painful symptoms are improved (i.e, .diminished - 2or3/10), thought processes are logical and outlook is optimistic, as well as realistic.    Discusses trip she's planned.  intent to reschedule after return in mid-August. She expresses feelings of appreciation       Current Outpatient Medications on File Prior to Visit   Medication Sig Dispense Refill    albuterol (PROVENTIL/VENTOLIN HFA) 90 mcg/actuation inhaler Inhale 1-2 puffs into the lungs every 6 (six) hours as needed. Rescue 1 Inhaler 0    aspirin (ECOTRIN) 81 MG EC tablet Take 1 tablet (81 mg total) by mouth once daily. 100 tablet 6    atorvastatin (LIPITOR) 40 MG tablet Take 1 tablet (40 mg total) by mouth once daily. 90 tablet 3    cetirizine (ZYRTEC) 10 MG tablet Take 10 mg by mouth.      estradioL (ESTRACE) 0.01 % (0.1  "mg/gram) vaginal cream Place 0.5 g vaginally twice a week. Insert 0.5grams intravaginally twice weekly 42 g 4    HYDROcodone-acetaminophen (NORCO)  mg per tablet hydrocodone 10 mg-acetaminophen 325 mg tablet   Take 1 tablet every 4 hours by oral route.      lisinopril-hydrochlorothiazide (PRINZIDE,ZESTORETIC) 10-12.5 mg per tablet Take 1 tablet by mouth once daily.      meloxicam (MOBIC) 15 MG tablet Take 15 mg by mouth.      nitroGLYCERIN (NITROSTAT) 0.4 MG SL tablet Place 1 tablet (0.4 mg total) under the tongue every 5 (five) minutes as needed for Chest pain. 20 tablet 0    pantoprazole (PROTONIX) 40 MG tablet pantoprazole 40 mg tablet,delayed release      topiramate (TOPAMAX) 100 MG tablet Take 1 tablet (100 mg total) by mouth every evening. 90 tablet 3    traZODone (DESYREL) 150 MG tablet TAKE 1 OR 2 TABLETS BY MOUTH BEFORE BED AS NEEDED FOR SLEEP 60 tablet 5    venlafaxine (EFFEXOR-XR) 75 MG 24 hr capsule Take 1 capsule (75 mg total) by mouth once daily. 90 capsule 3    [DISCONTINUED] citalopram (CELEXA) 10 MG tablet Take 10 mg by mouth once daily.      [DISCONTINUED] lurasidone (LATUDA) 40 mg Tab tablet Take 1 tablet (40 mg total) by mouth once daily. 30 tablet 5     No current facility-administered medications on file prior to visit.        Review of patient's allergies indicates:  No Known Allergies  Objective:     Vitals:    09/14/20 0907   BP: 116/79   BP Location: Right arm   Patient Position: Sitting   BP Method: Large (Automatic)   Pulse: 87   SpO2: 96%   Weight: 93.8 kg (206 lb 10.9 oz)   Height: 5' 9" (1.753 m)        Physical Exam   Constitutional: She is oriented to person, place, and time. She appears well-developed and well-nourished.   Eyes: No scleral icterus.   Neck: No JVD present. Carotid bruit is not present.   Cardiovascular: Normal rate and regular rhythm. Exam reveals no gallop.   No murmur heard.  Pulses:       Dorsalis pedis pulses are 2+ on the right side and 2+ on the " left side.   Pulmonary/Chest: Breath sounds normal.   Abdominal: Soft. She exhibits no pulsatile midline mass and no mass. There is no hepatosplenomegaly. There is no abdominal tenderness.   Musculoskeletal:         General: No edema.   Neurological: She is alert and oriented to person, place, and time.   Skin: Skin is warm and dry.   Psychiatric: She has a normal mood and affect. Her behavior is normal. Judgment and thought content normal.   Vitals reviewed.   Skin of feet intact      Old chart reviewed:  Pt had PTCA and stent of RCA and circ in April of 2018    ECG:  NSR, low T waves    Lab Visit on 08/19/2020   Component Date Value Ref Range Status    TSH 08/19/2020 0.725  0.400 - 4.000 uIU/mL Final    Vitamin B-12 08/19/2020 309  210 - 950 pg/mL Final    Thiamine 08/19/2020 78  38 - 122 ug/L Final    Vitamin B6 08/19/2020 10  5 - 50 ug/L Final    Vit D, 25-Hydroxy 08/19/2020 24* 30 - 96 ng/mL Final    Hemoglobin A1C 08/19/2020 5.0  4.0 - 5.6 % Final    Estimated Avg Glucose 08/19/2020 97  68 - 131 mg/dL Final    Sodium 08/19/2020 142  136 - 145 mmol/L Final    Potassium 08/19/2020 3.7  3.5 - 5.1 mmol/L Final    Chloride 08/19/2020 108  95 - 110 mmol/L Final    CO2 08/19/2020 29  23 - 29 mmol/L Final    Glucose 08/19/2020 83  70 - 110 mg/dL Final    BUN, Bld 08/19/2020 13  8 - 23 mg/dL Final    Creatinine 08/19/2020 1.0  0.5 - 1.4 mg/dL Final    Calcium 08/19/2020 10.1  8.7 - 10.5 mg/dL Final    Total Protein 08/19/2020 7.4  6.0 - 8.4 g/dL Final    Albumin 08/19/2020 4.0  3.5 - 5.2 g/dL Final    Total Bilirubin 08/19/2020 0.5  0.1 - 1.0 mg/dL Final    Alkaline Phosphatase 08/19/2020 133  55 - 135 U/L Final    AST 08/19/2020 19  10 - 40 U/L Final    ALT 08/19/2020 21  10 - 44 U/L Final    Anion Gap 08/19/2020 5* 8 - 16 mmol/L Final    eGFR if African American 08/19/2020 >60.0  >60 mL/min/1.73 m^2 Final    eGFR if non African American 08/19/2020 >60.0  >60 mL/min/1.73 m^2 Final    WBC  08/19/2020 6.56  3.90 - 12.70 K/uL Final    RBC 08/19/2020 4.08  4.00 - 5.40 M/uL Final    Hemoglobin 08/19/2020 12.3  12.0 - 16.0 g/dL Final    Hematocrit 08/19/2020 39.6  37.0 - 48.5 % Final    Mean Corpuscular Volume 08/19/2020 97  82 - 98 fL Final    Mean Corpuscular Hemoglobin 08/19/2020 30.1  27.0 - 31.0 pg Final    Mean Corpuscular Hemoglobin Conc 08/19/2020 31.1* 32.0 - 36.0 g/dL Final    RDW 08/19/2020 11.9  11.5 - 14.5 % Final    Platelets 08/19/2020 345  150 - 350 K/uL Final    MPV 08/19/2020 8.6* 9.2 - 12.9 fL Final    Immature Granulocytes 08/19/2020 0.5  0.0 - 0.5 % Final    Gran # (ANC) 08/19/2020 2.9  1.8 - 7.7 K/uL Final    Immature Grans (Abs) 08/19/2020 0.03  0.00 - 0.04 K/uL Final    Lymph # 08/19/2020 3.0  1.0 - 4.8 K/uL Final    Mono # 08/19/2020 0.4  0.3 - 1.0 K/uL Final    Eos # 08/19/2020 0.2  0.0 - 0.5 K/uL Final    Baso # 08/19/2020 0.04  0.00 - 0.20 K/uL Final    nRBC 08/19/2020 0  0 /100 WBC Final    Gran% 08/19/2020 44.6  38.0 - 73.0 % Final    Lymph% 08/19/2020 46.2  18.0 - 48.0 % Final    Mono% 08/19/2020 5.5  4.0 - 15.0 % Final    Eosinophil% 08/19/2020 2.6  0.0 - 8.0 % Final    Basophil% 08/19/2020 0.6  0.0 - 1.9 % Final    Differential Method 08/19/2020 Automated   Final   Office Visit on 07/07/2020   Component Date Value Ref Range Status    Trichomonas vaginalis 07/07/2020 Positive* Negative Final    Candida sp 07/07/2020 Negative  Negative Final    Candida glabrata DNA 07/07/2020 Negative  Negative Final    Marilu krusei DNA 07/07/2020 Negative  Negative Final    Bacterial vaginosis DNA 07/07/2020 Negative  Negative Final   Hospital Outpatient Visit on 06/15/2020   Component Date Value Ref Range Status    SARS-CoV2 (COVID-19) Qualitative P* 06/15/2020 Not Detected  Not Detected Final   Hospital Outpatient Visit on 06/08/2020   Component Date Value Ref Range Status    SARS-CoV2 (COVID-19) Qualitative P* 06/08/2020 Not Detected  Not Detected Final    Lab Visit on 05/19/2020   Component Date Value Ref Range Status    SARS-CoV2 (COVID-19) Qualitative P* 05/19/2020 Not Detected  Not Detected Final   Clinical Support on 04/22/2020   Component Date Value Ref Range Status    Left profunda sys PSV 04/22/2020 108  cm/s Final    Left super femoral prox sys PSV 04/22/2020 123  cm/s Final    Left super femoral mid sys PSV 04/22/2020 98  cm/s Final    Left super femoral dist sys PSV 04/22/2020 98  cm/s Final    Left tib/per trunk sys PSV 04/22/2020 31  cm/s Final    Left post tibial sys PSV 04/22/2020 37  cm/s Final    Left peroneal sys PSV 04/22/2020 46  cm/s Final    Left popliteal PSV 04/22/2020 58  cm/s Final    Right popliteal PSV 04/22/2020 83  cm/s Final    Left CFA PSV 04/22/2020 147  cm/s Final    Right CFA PSV 04/22/2020 111  cm/s Final    Right profunda sys PSV 04/22/2020 93  cm/s Final    Right super femoral prox sys PSV 04/22/2020 150  cm/s Final    Right super femoral mid sys PSV 04/22/2020 93  cm/s Final    Right super femoral dist sys PSV 04/22/2020 79  cm/s Final    Right tib/per trunk sys PSV 04/22/2020 43  cm/s Final    Right post tibial sys PSV 04/22/2020 54  cm/s Final    Right peroneal sys PSV 04/22/2020 44  cm/s Final    Left WOODROW 04/22/2020 1.21   Final    Right WOODROW 04/22/2020 0.99   Final   (    Lipid panel 4/18:    Chol 158  HDL 55  LDL 90  TG 65      Wt up 19 lbs since 1/20      Assessment:     1. Coronary artery disease involving native coronary artery of native heart without angina pectoris    2. Old MI (myocardial infarction)    3. S/P PTCA (percutaneous transluminal coronary angioplasty)    4. PAD (peripheral artery disease)    5. Pure hypercholesterolemia    6. Essential hypertension    7. Ex-smoker    8. Mild intermittent asthma without complication    9. CAD in native artery      Plan:   Jennifer Sutherland was seen today for coronary artery disease.    Diagnoses and all orders for this visit:    Coronary artery disease  involving native coronary artery of native heart without angina pectoris  -     IN OFFICE EKG 12-LEAD (to Muse)  -     Stress Echo Which stress agent will be used? Treadmill Exercise; Color Flow Doppler? No; Future    Old MI (myocardial infarction)  -     Stress Echo Which stress agent will be used? Treadmill Exercise; Color Flow Doppler? No; Future    S/P PTCA (percutaneous transluminal coronary angioplasty)  -     IN OFFICE EKG 12-LEAD (to Muse)  -     Stress Echo Which stress agent will be used? Treadmill Exercise; Color Flow Doppler? No; Future    PAD (peripheral artery disease)    Pure hypercholesterolemia  -     Lipid Panel; Future  -     TSH; Future    Essential hypertension    Ex-smoker    Mild intermittent asthma without complication    CAD in native artery  -     IN OFFICE EKG 12-LEAD (to Muse)       Treadmill stress echo    Low carb diet discussed in detail    Same meds    Walk daily    Follow up in about 6 months (around 3/14/2021).

## 2020-09-21 ENCOUNTER — TELEPHONE (OUTPATIENT)
Dept: CARDIOLOGY | Facility: CLINIC | Age: 60
End: 2020-09-21

## 2020-10-12 ENCOUNTER — OFFICE VISIT (OUTPATIENT)
Dept: ORTHOPEDICS | Facility: CLINIC | Age: 60
End: 2020-10-12
Payer: MEDICARE

## 2020-10-12 VITALS
SYSTOLIC BLOOD PRESSURE: 102 MMHG | HEIGHT: 69 IN | DIASTOLIC BLOOD PRESSURE: 63 MMHG | WEIGHT: 209.88 LBS | BODY MASS INDEX: 31.09 KG/M2 | HEART RATE: 66 BPM

## 2020-10-12 DIAGNOSIS — M25.511 CHRONIC RIGHT SHOULDER PAIN: Primary | ICD-10-CM

## 2020-10-12 DIAGNOSIS — G89.29 CHRONIC RIGHT SHOULDER PAIN: Primary | ICD-10-CM

## 2020-10-12 PROCEDURE — 99203 OFFICE O/P NEW LOW 30 MIN: CPT | Mod: S$GLB,,, | Performed by: ORTHOPAEDIC SURGERY

## 2020-10-12 PROCEDURE — 3074F PR MOST RECENT SYSTOLIC BLOOD PRESSURE < 130 MM HG: ICD-10-PCS | Mod: CPTII,S$GLB,, | Performed by: ORTHOPAEDIC SURGERY

## 2020-10-12 PROCEDURE — 99999 PR PBB SHADOW E&M-EST. PATIENT-LVL III: CPT | Mod: PBBFAC,,, | Performed by: ORTHOPAEDIC SURGERY

## 2020-10-12 PROCEDURE — 3074F SYST BP LT 130 MM HG: CPT | Mod: CPTII,S$GLB,, | Performed by: ORTHOPAEDIC SURGERY

## 2020-10-12 PROCEDURE — 3078F PR MOST RECENT DIASTOLIC BLOOD PRESSURE < 80 MM HG: ICD-10-PCS | Mod: CPTII,S$GLB,, | Performed by: ORTHOPAEDIC SURGERY

## 2020-10-12 PROCEDURE — 3008F BODY MASS INDEX DOCD: CPT | Mod: CPTII,S$GLB,, | Performed by: ORTHOPAEDIC SURGERY

## 2020-10-12 PROCEDURE — 99203 PR OFFICE/OUTPT VISIT, NEW, LEVL III, 30-44 MIN: ICD-10-PCS | Mod: S$GLB,,, | Performed by: ORTHOPAEDIC SURGERY

## 2020-10-12 PROCEDURE — 99999 PR PBB SHADOW E&M-EST. PATIENT-LVL III: ICD-10-PCS | Mod: PBBFAC,,, | Performed by: ORTHOPAEDIC SURGERY

## 2020-10-12 PROCEDURE — 3078F DIAST BP <80 MM HG: CPT | Mod: CPTII,S$GLB,, | Performed by: ORTHOPAEDIC SURGERY

## 2020-10-12 PROCEDURE — 3008F PR BODY MASS INDEX (BMI) DOCUMENTED: ICD-10-PCS | Mod: CPTII,S$GLB,, | Performed by: ORTHOPAEDIC SURGERY

## 2020-10-12 RX ORDER — DICLOFENAC SODIUM 75 MG/1
75 TABLET, DELAYED RELEASE ORAL 2 TIMES DAILY
Qty: 60 TABLET | Refills: 1 | Status: SHIPPED | OUTPATIENT
Start: 2020-10-12 | End: 2020-12-11

## 2020-10-13 DIAGNOSIS — M47.816 LUMBAR SPONDYLOSIS: Primary | ICD-10-CM

## 2020-10-14 ENCOUNTER — HOSPITAL ENCOUNTER (OUTPATIENT)
Facility: OTHER | Age: 60
Discharge: HOME | End: 2020-10-14
Attending: ANESTHESIOLOGY | Admitting: ANESTHESIOLOGY
Payer: MEDICARE

## 2020-10-14 VITALS
WEIGHT: 206 LBS | BODY MASS INDEX: 29.49 KG/M2 | RESPIRATION RATE: 18 BRPM | OXYGEN SATURATION: 98 % | SYSTOLIC BLOOD PRESSURE: 105 MMHG | HEIGHT: 70 IN | DIASTOLIC BLOOD PRESSURE: 59 MMHG | HEART RATE: 64 BPM

## 2020-10-14 DIAGNOSIS — M47.816 LUMBAR SPONDYLOSIS: ICD-10-CM

## 2020-10-14 DIAGNOSIS — M51.36 LUMBAR DEGENERATIVE DISC DISEASE: Primary | ICD-10-CM

## 2020-10-14 PROBLEM — M51.369 LUMBAR DEGENERATIVE DISC DISEASE: Status: ACTIVE | Noted: 2020-10-14

## 2020-10-14 PROCEDURE — 63600175 PHARM REV CODE 636 W HCPCS: Performed by: ANESTHESIOLOGY

## 2020-10-14 PROCEDURE — 64484 NJX AA&/STRD TFRM EPI L/S EA: CPT | Mod: RT | Performed by: ANESTHESIOLOGY

## 2020-10-14 PROCEDURE — 25500020 PHARM REV CODE 255: Performed by: ANESTHESIOLOGY

## 2020-10-14 PROCEDURE — 64483 NJX AA&/STRD TFRM EPI L/S 1: CPT | Mod: RT | Performed by: ANESTHESIOLOGY

## 2020-10-14 PROCEDURE — 25000003 PHARM REV CODE 250: Performed by: ANESTHESIOLOGY

## 2020-10-14 RX ORDER — LIDOCAINE HYDROCHLORIDE 10 MG/ML
10 INJECTION INFILTRATION; PERINEURAL ONCE
Status: COMPLETED | OUTPATIENT
Start: 2020-10-14 | End: 2020-10-14

## 2020-10-14 RX ORDER — ALPRAZOLAM 0.5 MG/1
1 TABLET ORAL ONCE
Status: COMPLETED | OUTPATIENT
Start: 2020-10-14 | End: 2020-10-14

## 2020-10-14 RX ORDER — BUPIVACAINE HYDROCHLORIDE 2.5 MG/ML
INJECTION, SOLUTION EPIDURAL; INFILTRATION; INTRACAUDAL
Status: DISCONTINUED | OUTPATIENT
Start: 2020-10-14 | End: 2020-10-14 | Stop reason: HOSPADM

## 2020-10-14 RX ORDER — TRIAMCINOLONE ACETONIDE 40 MG/ML
INJECTION, SUSPENSION INTRA-ARTICULAR; INTRAMUSCULAR
Status: DISCONTINUED | OUTPATIENT
Start: 2020-10-14 | End: 2020-10-14 | Stop reason: HOSPADM

## 2020-10-14 RX ORDER — ALPRAZOLAM 0.5 MG/1
1 TABLET ORAL ONCE
Status: DISCONTINUED | OUTPATIENT
Start: 2020-10-14 | End: 2020-10-14 | Stop reason: HOSPADM

## 2020-10-14 RX ORDER — ALPRAZOLAM 0.5 MG/1
1 TABLET, ORALLY DISINTEGRATING ORAL ONCE AS NEEDED
Status: DISCONTINUED | OUTPATIENT
Start: 2020-10-14 | End: 2020-10-14

## 2020-10-14 RX ORDER — BETAMETHASONE SODIUM PHOSPHATE AND BETAMETHASONE ACETATE 3; 3 MG/ML; MG/ML
6 INJECTION, SUSPENSION INTRA-ARTICULAR; INTRALESIONAL; INTRAMUSCULAR; SOFT TISSUE
Status: DISCONTINUED | OUTPATIENT
Start: 2020-10-14 | End: 2020-10-14

## 2020-10-14 RX ADMIN — ALPRAZOLAM 1 MG: 0.5 TABLET ORAL at 07:10

## 2020-10-14 NOTE — OP NOTE
Patient Name: Jennifer Brady  MRN: [id number]    INFORMED CONSENT: The procedure, risks, benefits and options were discussed with patient. There are no contraindications to the procedure. The patient expressed understanding and agreed to proceed. The personnel performing the procedure was discussed. I verify that I personally obtained Jennifer Sutherland's consent prior to the start of the procedure and the signed consent can be found on the patient's chart.    Procedure Date: 10/14/2020    Anesthesia: Topical    Pre Procedure diagnosis:   1. Lumbar degenerative disc disease    2. Lumbar spondylosis        Post-Procedure diagnosis: same          PROCEDURE:}Right L 4 and L 5 TRANSFORAMINAL EPIDURAL STEROID INJECTION        DESCRIPTION OF PROCEDURE: The patient was brought to the procedure room. After performing time out IV access was obtained prior to the procedure. The patient was positioned prone on the fluoroscopy table. Continuous hemodynamic monitoring was initiated including blood pressure, EKG, and pulse oximetry. . The skin was prepped with chlorhexidine three times and draped in a sterile fashion. Skin anesthesia was achieved using 3 mL of lidocaine 1% over the respective injection site.     An oblique fluoroscopic view was obtained, with the superior articular process of the inferior vertebral body aligned with the pedicle. The tip of a 22-gauge 3.5-inch Quincke-type spinal needle was advanced toward the 6 oclock position of the pedicle under intermittent fluoroscopic guidance. Confirmation of proper needle position was made with AP, oblique, and lateral fluoroscopic views. Negative aspiration for blood or CSF was confirmed. 2 mL of Omnipaque 300 was injected. Live fluoroscopic imaging revealed a clear outline of the spinal nerve with proximal spread of agent through the neural foramen into the anterior epidural space. A total combination of 3 mL of Lidocaine 0.5% and 20 celestone was injected at each  level. Contrast spread was noted from L 4  to L 5 level, followed by 5 cc of NS both.  There was no pain on injection. The needle was removed and bleeding was nil.  A sterile dressing was applied. Jennifer Sutherland was taken back to the recovery room for further observation.     Blood Loss: Nill  Specimen: None

## 2020-10-14 NOTE — H&P
"HPI  Complains of low back pain plan rt. L 4 andL5 TF JANICE        Past Medical History:   Diagnosis Date    Acute coronary syndrome     Anxiety     Asthma     Behavioral problem     Borderline personality disorder     Coronary angioplasty status 04/2018    Coronary artery disease     Depression     Depression     Fatigue     Hx of psychiatric care     Hyperlipidemia     Hypertension     Myocardial infarction 05/2018    Psychiatric problem     S/P hysterectomy with oophorectomy     Sleep apnea     pt uses CPAP    Syncope and collapse     Therapy     Thyroid disease      Past Surgical History:   Procedure Laterality Date    CORONARY ANGIOPLASTY      HYSTERECTOMY  1999    INJECTION OF ANESTHETIC AGENT AROUND NERVE Right 11/20/2019    Procedure: MEDIAL BRANCH BLOCK RIGHT L3, L4, L5;  Surgeon: Petros Reich MD;  Location: [facility];  Service: Pain Management;  Laterality: Right;  NEEDS CONSENT, PT NO LONGER TAKES PLAVIX    INJECTION OF ANESTHETIC AGENT AROUND NERVE Right 6/17/2020    Procedure: BLOCK, NERVE RIGHT L3, 4, 5 MEDIAL BRANCH;  Surgeon: Petros Reich MD;  Location: [facility];  Service: Pain Management;  Laterality: Right;  NEEDS CONSENT?    TILT TABLE TEST N/A 3/22/2019    Procedure: TILT TABLE TEST;  Surgeon: Roman Lala MD;  Location: [facility] EP LAB;  Service: Cardiology;  Laterality: N/A;  seizure/migraine, HUT referred by Dr Perea Neuro, EEG confirmed     Review of patient's allergies indicates:  No Known Allergies     PMHx, PSHx, Allergies, Medications reviewed in epic      ROS negative except pain complaints in HPI    OBJECTIVE:    /62 (BP Location: Right arm, Patient Position: Lying)   Pulse 80   Resp 18   Ht 5' 9.5" (1.765 m)   Wt 93.4 kg (206 lb)   SpO2 98%   BMI 29.98 kg/m²     PHYSICAL EXAMINATION:    GENERAL: Well appearing, in no acute distress, alert and oriented x3.  PSYCH:  Mood and affect appropriate.  SKIN: Skin color, texture, turgor " normal, no rashes or lesions.  CV: RRR with palpation of the radial artery.  PULM: No evidence of respiratory difficulty, symmetric chest rise. Clear to auscultation.  NEURO: Cranial nerves grossly intact.    Plan:    Proceed with procedure as planned    Petros Reich  10/14/2020

## 2020-10-14 NOTE — DISCHARGE INSTRUCTIONS

## 2020-10-14 NOTE — BRIEF OP NOTE
Discharge Note  Short Stay      SUMMARY     Admit Date: 10/14/2020    Attending Physician: Petros Reich      Discharge Physician: Petros Reich      Discharge Date: 10/14/2020 8:37 AM    Final Diagnosis:   1. Lumbar degenerative disc disease    2. Lumbar spondylosis        Disposition: Home or self care    Patient Instructions:   Current Discharge Medication List      CONTINUE these medications which have NOT CHANGED    Details   albuterol (PROVENTIL/VENTOLIN HFA) 90 mcg/actuation inhaler Inhale 1-2 puffs into the lungs every 6 (six) hours as needed. Rescue  Qty: 1 Inhaler, Refills: 0      aspirin (ECOTRIN) 81 MG EC tablet Take 1 tablet (81 mg total) by mouth once daily.  Qty: 100 tablet, Refills: 6      atorvastatin (LIPITOR) 40 MG tablet Take 1 tablet (40 mg total) by mouth once daily.  Qty: 90 tablet, Refills: 3      cetirizine (ZYRTEC) 10 MG tablet Take 10 mg by mouth.      diclofenac (VOLTAREN) 75 MG EC tablet Take 1 tablet (75 mg total) by mouth 2 (two) times daily.  Qty: 60 tablet, Refills: 1    Associated Diagnoses: Chronic right shoulder pain      estradioL (ESTRACE) 0.01 % (0.1 mg/gram) vaginal cream Place 0.5 g vaginally twice a week. Insert 0.5grams intravaginally twice weekly  Qty: 42 g, Refills: 4    Associated Diagnoses: Postmenopausal atrophic vaginitis      HYDROcodone-acetaminophen (NORCO)  mg per tablet hydrocodone 10 mg-acetaminophen 325 mg tablet   Take 1 tablet every 4 hours by oral route.    Comments: Quantity prescribed more than 7 day supply? Press F2 and select one:[id number]        lisinopril-hydrochlorothiazide (PRINZIDE,ZESTORETIC) 10-12.5 mg per tablet Take 1 tablet by mouth once daily.      nitroGLYCERIN (NITROSTAT) 0.4 MG SL tablet Place 1 tablet (0.4 mg total) under the tongue every 5 (five) minutes as needed for Chest pain.  Qty: 20 tablet, Refills: 0      pantoprazole (PROTONIX) 40 MG tablet pantoprazole 40 mg tablet,delayed release      topiramate (TOPAMAX) 100 MG tablet  Take 1 tablet (100 mg total) by mouth every evening.  Qty: 90 tablet, Refills: 3    Associated Diagnoses: Hx of migraine headaches      traZODone (DESYREL) 150 MG tablet TAKE 1 OR 2 TABLETS BY MOUTH BEFORE BED AS NEEDED FOR SLEEP  Qty: 60 tablet, Refills: 5    Associated Diagnoses: Insomnia disorder, with non-sleep disorder mental comorbidity      venlafaxine (EFFEXOR-XR) 75 MG 24 hr capsule Take 1 capsule (75 mg total) by mouth once daily.  Qty: 90 capsule, Refills: 3    Associated Diagnoses: Paresthesias                 Discharge Diagnosis: Same as Pre and Post Procedure  Condition on Discharge: Stable.  Diet on Discharge: Same as before.  Activity: as per instruction sheet.  Discharge to: Home with a responsible adult.  Follow up: as per Discharge instructions.

## 2020-10-14 NOTE — PLAN OF CARE
PATIENT TOLERATED PROCEDURE WELL. PT COMPLAINS OF  8/10 PAIN. ASSISTED PATIENT UP FOR FIRST TIME. STEADY ON FEET AND DISCHARGE INSTRUCTIONS GIVEN.

## 2020-11-19 ENCOUNTER — OFFICE VISIT (OUTPATIENT)
Dept: NEUROLOGY | Facility: CLINIC | Age: 60
End: 2020-11-19
Payer: MEDICARE

## 2020-11-19 VITALS — BODY MASS INDEX: 30.76 KG/M2 | HEIGHT: 69 IN | WEIGHT: 207.69 LBS

## 2020-11-19 DIAGNOSIS — Z86.69 HX OF MIGRAINE HEADACHES: Primary | ICD-10-CM

## 2020-11-19 PROCEDURE — 99999 PR PBB SHADOW E&M-EST. PATIENT-LVL II: CPT | Mod: PBBFAC,,, | Performed by: PSYCHIATRY & NEUROLOGY

## 2020-11-19 PROCEDURE — 99214 PR OFFICE/OUTPT VISIT, EST, LEVL IV, 30-39 MIN: ICD-10-PCS | Mod: S$GLB,,, | Performed by: PSYCHIATRY & NEUROLOGY

## 2020-11-19 PROCEDURE — 1125F AMNT PAIN NOTED PAIN PRSNT: CPT | Mod: S$GLB,,, | Performed by: PSYCHIATRY & NEUROLOGY

## 2020-11-19 PROCEDURE — 3008F PR BODY MASS INDEX (BMI) DOCUMENTED: ICD-10-PCS | Mod: CPTII,S$GLB,, | Performed by: PSYCHIATRY & NEUROLOGY

## 2020-11-19 PROCEDURE — 3008F BODY MASS INDEX DOCD: CPT | Mod: CPTII,S$GLB,, | Performed by: PSYCHIATRY & NEUROLOGY

## 2020-11-19 PROCEDURE — 99214 OFFICE O/P EST MOD 30 MIN: CPT | Mod: S$GLB,,, | Performed by: PSYCHIATRY & NEUROLOGY

## 2020-11-19 PROCEDURE — 99999 PR PBB SHADOW E&M-EST. PATIENT-LVL II: ICD-10-PCS | Mod: PBBFAC,,, | Performed by: PSYCHIATRY & NEUROLOGY

## 2020-11-19 PROCEDURE — 1125F PR PAIN SEVERITY QUANTIFIED, PAIN PRESENT: ICD-10-PCS | Mod: S$GLB,,, | Performed by: PSYCHIATRY & NEUROLOGY

## 2020-11-19 NOTE — PROGRESS NOTES
[facility]  [facility] - NEUROLOGY  [facility] [place]    Date: November 19, 2020   Patient Name: Jennifer Brady   MRN: [id number]   PCP: Arvind Juarez  Referring Provider: No ref. provider found    Assessment:      This is Jennifer Brady, 60 y.o. female with CAD s/p PCI 4/2018, extensive history of psychiatric trauma, episodes of syncope with non-diagnostic EMU admit 2/2018 and normal tilt 3/2019, and headaches with some migrainous features although atypical due to extreme length.       Plan:      -  Ubrelvy prn  -  Continue Effexor to 75mg daily  -  Continue TPM 100mg daily - she has been on this medication since 2008, do not suspect as etiology of paresthesias  -  Mg supplement  -  Encouraged follow up with mental health  -  Encouraged consistent use of wrist splints at night    Follow up 6 months       I discussed side effects of the medications. I asked the patient to stop the medication if she notices serious adverse effects as we discussed and to seek immediate medical attention at an ER.     Maximiliano Kuhn MD  [facility]   Department of Neurology    Subjective:      -  Difficulty with memory such as recalling where she placed objects, occasionally forgets to pay a bill or renew insurance, lives alone and generally remains very independent  -  Ongoing intermittent headaches lasting several days, no effect of reglan or flexeril  -  Paresthesias in hands, prior carpal tunnel surgery, has fitted wrist splints but not currently using them  -  Ongoing difficulty with low back pain with concern for weight gain related to JANICE  -  Tearful when discussing ongoing difficulty with depression and anxiety regarding drug activity in her neighborhood and concern for someone breaking into her house, difficulty with initiating sleep refractory to high dose trazodone, does engage in positive coping mechanisms such as 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 Jennifer Brady is a 60 y.o. female who presents with a chief complaint of headaches and syncope    Per chart review, patient developed black out episodes while in crowds at age 8 and began having syncopal episodes which always occurred in the bathroom at age 28.  She reports she may go as much as a month but never as much as six months without syncopal episodes.  She was seen by neurologist Dr. Dorsey in 2008 and started on TPM and OXC without change in syncopal events.  OXC was stopped following non-diagnostic EMU admit 2/2019.    She also has frequent migraines which began at age [age], she describes these as lasing 3-4 weeks at a time with photophobia but no prominent nausea, previously prescribed narcotics by outside physician for this issue.  She was tried on elavil by psychiatry in 2014 but does not recall this medication.    She became tearful at the end of her visit endorsing severe ongoing depression and sleep disturbance.  Notes that she frequently bumps into things and has easy bruising due to anti-platelets.    PAST MEDICAL HISTORY:  Past Medical History:   Diagnosis Date    Acute coronary syndrome     Anxiety     Asthma     Behavioral problem     Borderline personality disorder     Coronary angioplasty status 04/2018    Coronary artery disease     Depression     Depression     Fatigue     Hx of psychiatric care     Hyperlipidemia     Hypertension     Myocardial infarction 05/2018     Psychiatric problem     S/P hysterectomy with oophorectomy     Sleep apnea     pt uses CPAP    Syncope and collapse     Therapy     Thyroid disease        PAST SURGICAL HISTORY:  Past Surgical History:   Procedure Laterality Date    CORONARY ANGIOPLASTY      HYSTERECTOMY  1999    INJECTION OF ANESTHETIC AGENT AROUND NERVE Right 11/20/2019    Procedure: MEDIAL BRANCH BLOCK RIGHT L3, L4, L5;  Surgeon: Petros Reich MD;  Location: [facility] PAIN MGT;  Service: Pain Management;  Laterality: Right;  NEEDS CONSENT, PT NO LONGER TAKES PLAVIX    INJECTION OF ANESTHETIC AGENT AROUND NERVE Right 6/17/2020    Procedure: BLOCK, NERVE RIGHT L3, 4, 5 MEDIAL BRANCH;  Surgeon: Petros Reich MD;  Location: [facility] PAIN MGT;  Service: Pain Management;  Laterality: Right;  NEEDS CONSENT?    TILT TABLE TEST N/A 3/22/2019    Procedure: TILT TABLE TEST;  Surgeon: Roman Lala MD;  Location: [facility] EP LAB;  Service: Cardiology;  Laterality: N/A;  seizure/migraine, HUT referred by Dr Brit Merlos, EEG confirmed    TRANSFORAMINAL EPIDURAL INJECTION OF STEROID Right 10/14/2020    Procedure: LUMBAR TRANSFORAMINAL RIGHT L4/L5;  Surgeon: Petros Reich MD;  Location: [facility] PAIN [facility]GT;  Service: Pain Management;  Laterality: Right;  NEEDS CONSENT       CURRENT MEDS:  Current Outpatient Medications   Medication Sig Dispense Refill    albuterol (PROVENTIL/VENTOLIN HFA) 90 mcg/actuation inhaler Inhale 1-2 puffs into the lungs every 6 (six) hours as needed. Rescue 1 Inhaler 0    aspirin (ECOTRIN) 81 MG EC tablet Take 1 tablet (81 mg total) by mouth once daily. 100 tablet 6    atorvastatin (LIPITOR) 40 MG tablet Take 1 tablet (40 mg total) by mouth once daily. 90 tablet 3    cetirizine (ZYRTEC) 10 MG tablet Take 10 mg by mouth.      diclofenac (VOLTAREN) 75 MG EC tablet Take 1 tablet (75 mg total) by mouth 2 (two) times daily. 60 tablet 1    estradioL (ESTRACE) 0.01 % (0.1 mg/gram) vaginal cream Place 0.5 g vaginally twice  a week. Insert 0.5grams intravaginally twice weekly 42 g 4    HYDROcodone-acetaminophen (NORCO)  mg per tablet hydrocodone 10 mg-acetaminophen 325 mg tablet   Take 1 tablet every 4 hours by oral route.      lisinopril-hydrochlorothiazide (PRINZIDE,ZESTORETIC) 10-12.5 mg per tablet Take 1 tablet by mouth once daily.      nitroGLYCERIN (NITROSTAT) 0.4 MG SL tablet Place 1 tablet (0.4 mg total) under the tongue every 5 (five) minutes as needed for Chest pain. 20 tablet 0    pantoprazole (PROTONIX) 40 MG tablet pantoprazole 40 mg tablet,delayed release      topiramate (TOPAMAX) 100 MG tablet Take 1 tablet (100 mg total) by mouth every evening. 90 tablet 3    traZODone (DESYREL) 150 MG tablet TAKE 1 OR 2 TABLETS BY MOUTH BEFORE BED AS NEEDED FOR SLEEP 60 tablet 5    ubrogepant (UBRELVY)tablet 100 mg Take 1 tablet (100 mg total) by mouth once as needed. 10 tablet 11    venlafaxine (EFFEXOR-XR) 75 MG 24 hr capsule Take 1 capsule (75 mg total) by mouth once daily. 90 capsule 3     No current facility-administered medications for this visit.        ALLERGIES:  Review of patient's allergies indicates:  No Known Allergies    FAMILY HISTORY:  Family History   Problem Relation Age of Onset    Seizures Mother     Heart attack Mother     Hypertension Mother     Stroke Mother     Hypertension Paternal Grandfather     Hypertension Paternal Grandmother     Heart attacks under age 50 Maternal Grandmother     Hypertension Maternal Grandmother     Hypertension Maternal Grandfather     Heart attacks under age 50 Father 33    Hypertension Father     Hypertension Sister     Stroke Sister     Seizures Sister     Breast cancer Neg Hx     Colon cancer Neg Hx     Diabetes Neg Hx     Ovarian cancer Neg Hx        SOCIAL HISTORY:  Social History     Tobacco Use    Smoking status: Former Smoker     Packs/day: 0.25     Types: Cigarettes     Start date: 1986     Quit date: 3/7/2020     Years since quittin.7  "   Smokeless tobacco: Never Used    Tobacco comment: quit actual cigarettes a few mo ago, switched to e cigarette   Substance Use Topics    Alcohol use: Yes     Comment: Socially    Drug use: No       Review of Systems:  12 review of systems is negative except for the symptoms mentioned in HPI.        Objective:     Vitals:    11/19/20 1021   Weight: 94.2 kg (207 lb 10.8 oz)   Height: 5' 9" (1.753 m)       General: NAD, well nourished   Eyes: no tearing, discharge, no erythema   ENT: moist mucous membranes of the oral cavity, nares patent    Neck: tension noted in trapezius and cervical paraspinous  Cardiovascular: Warm and well perfused, pulses equal and symmetrical  Lungs: Normal work of breathing, normal chest wall excursions  Skin: No rash, lesions, or breakdown on exposed skin  Psychiatry: normal affect  Abdomen: soft, non tender, non distended  Extremeties: No cyanosis, clubbing or edema.    Neurological   MENTAL STATUS: Alert and oriented to person, place, and time. Speech without dysarthria, able to name and repeat without difficulty.   Bilateral eyelid myokymia and mild tremulousness noted throughout visit   CRANIAL NERVES: Visual fields intact. PERRL. EOMI. Facial sensation intact. Face symmetrical. Hearing grossly intact. Full shoulder shrug bilaterally. Tongue protrudes midline   SENSORY: Sensation is intact to light touch throughout.  Negative Tinel bilaterally  MOTOR: Normal bulk and tone. No pronator drift.  5/5 deltoid, biceps, triceps, interosseous, hand  bilaterally.    REFLEXES: 2+ UE, LE mute  CEREBELLAR/COORDINATION/GAIT: Gait steady with normal arm swing and stride length.  Finger to nose intact. Normal rapid alternating movements.       "

## 2020-11-30 ENCOUNTER — OFFICE VISIT (OUTPATIENT)
Dept: PSYCHIATRY | Facility: CLINIC | Age: 60
End: 2020-11-30
Payer: MEDICARE

## 2020-11-30 VITALS
BODY MASS INDEX: 31.04 KG/M2 | DIASTOLIC BLOOD PRESSURE: 87 MMHG | HEART RATE: 111 BPM | WEIGHT: 210.19 LBS | SYSTOLIC BLOOD PRESSURE: 159 MMHG

## 2020-11-30 DIAGNOSIS — F39 MOOD DISORDER: ICD-10-CM

## 2020-11-30 DIAGNOSIS — F32.A DEPRESSION, UNSPECIFIED DEPRESSION TYPE: Primary | ICD-10-CM

## 2020-11-30 DIAGNOSIS — G47.00 INSOMNIA DISORDER, WITH NON-SLEEP DISORDER MENTAL COMORBIDITY: ICD-10-CM

## 2020-11-30 DIAGNOSIS — F43.21 GRIEF: ICD-10-CM

## 2020-11-30 DIAGNOSIS — R20.2 PARESTHESIAS: ICD-10-CM

## 2020-11-30 DIAGNOSIS — F41.1 GAD (GENERALIZED ANXIETY DISORDER): ICD-10-CM

## 2020-11-30 PROCEDURE — 90833 PR PSYCHOTHERAPY W/PATIENT W/E&M, 30 MIN (ADD ON): ICD-10-PCS | Mod: S$GLB,,, | Performed by: NURSE PRACTITIONER

## 2020-11-30 PROCEDURE — 90833 PSYTX W PT W E/M 30 MIN: CPT | Mod: S$GLB,,, | Performed by: NURSE PRACTITIONER

## 2020-11-30 PROCEDURE — 3077F PR MOST RECENT SYSTOLIC BLOOD PRESSURE >= 140 MM HG: ICD-10-PCS | Mod: CPTII,S$GLB,, | Performed by: NURSE PRACTITIONER

## 2020-11-30 PROCEDURE — 99214 PR OFFICE/OUTPT VISIT, EST, LEVL IV, 30-39 MIN: ICD-10-PCS | Mod: S$GLB,,, | Performed by: NURSE PRACTITIONER

## 2020-11-30 PROCEDURE — 3008F PR BODY MASS INDEX (BMI) DOCUMENTED: ICD-10-PCS | Mod: CPTII,S$GLB,, | Performed by: NURSE PRACTITIONER

## 2020-11-30 PROCEDURE — 99999 PR PBB SHADOW E&M-EST. PATIENT-LVL II: CPT | Mod: PBBFAC,,, | Performed by: NURSE PRACTITIONER

## 2020-11-30 PROCEDURE — 3079F DIAST BP 80-89 MM HG: CPT | Mod: CPTII,S$GLB,, | Performed by: NURSE PRACTITIONER

## 2020-11-30 PROCEDURE — 99214 OFFICE O/P EST MOD 30 MIN: CPT | Mod: S$GLB,,, | Performed by: NURSE PRACTITIONER

## 2020-11-30 PROCEDURE — 3077F SYST BP >= 140 MM HG: CPT | Mod: CPTII,S$GLB,, | Performed by: NURSE PRACTITIONER

## 2020-11-30 PROCEDURE — 3079F PR MOST RECENT DIASTOLIC BLOOD PRESSURE 80-89 MM HG: ICD-10-PCS | Mod: CPTII,S$GLB,, | Performed by: NURSE PRACTITIONER

## 2020-11-30 PROCEDURE — 3008F BODY MASS INDEX DOCD: CPT | Mod: CPTII,S$GLB,, | Performed by: NURSE PRACTITIONER

## 2020-11-30 PROCEDURE — 99999 PR PBB SHADOW E&M-EST. PATIENT-LVL II: ICD-10-PCS | Mod: PBBFAC,,, | Performed by: NURSE PRACTITIONER

## 2020-11-30 RX ORDER — HYDROXYZINE PAMOATE 25 MG/1
25 CAPSULE ORAL 3 TIMES DAILY PRN
Qty: 90 CAPSULE | Refills: 3 | Status: SHIPPED | OUTPATIENT
Start: 2020-11-30 | End: 2021-06-01 | Stop reason: SDUPTHER

## 2020-11-30 RX ORDER — VENLAFAXINE HYDROCHLORIDE 150 MG/1
150 CAPSULE, EXTENDED RELEASE ORAL DAILY
Qty: 90 CAPSULE | Refills: 3 | Status: SHIPPED | OUTPATIENT
Start: 2020-11-30 | End: 2021-06-01 | Stop reason: SDUPTHER

## 2020-11-30 RX ORDER — TRAZODONE HYDROCHLORIDE 150 MG/1
TABLET ORAL
Qty: 60 TABLET | Refills: 5 | Status: SHIPPED | OUTPATIENT
Start: 2020-11-30 | End: 2021-06-01 | Stop reason: SDUPTHER

## 2020-11-30 NOTE — PROGRESS NOTES
Outpatient Psychiatry Follow-Up Visit (MD/NP)    11/30/2020    Clinical Status of Patient:  Outpatient (Ambulatory)    Chief Complaint:  Jennifer Brady is a 60 y.o. female who presents today for follow-up of depression and anxiety.  Met with patient.      Last Visit: was  6/30/2020. Chart and PMPreviewed.     Interval History and Content of Current Session:  Current Psychiatric Medications as of last visit.  · Increase to Prozac 40 mg po daily.    · Continue Topamax 100 mg po BID (for migraines) per Neurology  · Continue Trazodone 200 - 300 mg po qhs PRN insomnia  · Continue Vistaril 25 mg po TID PRN anxiety    Having anxiety and grief symptoms due to her friend/neighbor death.  Stated that she has been having frequent crying spells.  Her OBGYN changed her Prozac to Effexor due to hot flashes and reports improved response until recent loss of friend.  Denies side effects. Will increase to Effexor 150 mg XR.  Denies SI/HI/AVH.    Psychotherapy:  · Target symptoms: depression, anxiety , grief  · Why chosen therapy is appropriate versus another modality: relevant to diagnosis  · Outcome monitoring methods: self-report, observation  · Therapeutic intervention type: supportive psychotherapy  · Topics discussed/themes: relationships difficulties, difficulty managing affect in interpersonal relationships, building skills sets for symptom management, symptom recognition  · The patient's response to the intervention is accepting. The patient's progress toward treatment goals is fair.   · Duration of intervention: 19 minutes.    Review of Systems   · PSYCHIATRIC: Pertinant items are noted in the narrative.  · CONSTITUTIONAL: No weight gain or loss.   · MUSCULOSKELETAL: No pain or stiffness of the joints.  · NEUROLOGIC: No weakness, sensory changes, seizures, confusion, memory loss, tremor or other abnormal movements.  · ENDOCRINE: No polydipsia or polyuria.  · INTEGUMENTARY: No rashes or lacerations.  · EYES: No  exophthalmos, jaundice or blindness.  · ENT: No dizziness, tinnitus or hearing loss.  · RESPIRATORY: No shortness of breath.  · CARDIOVASCULAR: No tachycardia or chest pain.  · GASTROINTESTINAL: No nausea, vomiting, pain, constipation or diarrhea.  · GENITOURINARY: No frequency, dysuria or sexual dysfunction.  · HEMATOLOGIC/LYMPHATIC: No excessive bleeding, prolonged or excessive bleeding after dental extraction/injury.  · ALLERGIC/IMMUNOLOGIC: No allergic response to materials, foods or animals at this time.    Past Medical, Family and Social History: The patient's past medical, family and social history have been reviewed and updated as appropriate within the electronic medical record - see encounter notes.    Compliance: yes    Side effects: None    Risk Parameters:  Patient reports no suicidal ideation  Patient reports no homicidal ideation  Patient reports no self-injurious behavior  Patient reports no violent behavior    Exam (detailed: at least 9 elements; comprehensive: all 15 elements)   Constitutional  Vitals:  Most recent vital signs, dated greater than 90 days prior to this appointment, were reviewed.   Vitals:    11/30/20 0925   BP: (!) 159/87   Pulse: (!) 111   Weight: 95.4 kg (210 lb 3.3 oz)        General:  unremarkable, age appropriate     Musculoskeletal  Muscle Strength/Tone:  no tremor, no tic   Gait & Station:  non-ataxic     Psychiatric  Speech:  no latency; no press   Mood & Affect:  anxious, depressed  congruent and appropriate   Thought Process:  normal and logical   Associations:  intact   Thought Content:  normal, no suicidality, no homicidality, delusions, or paranoia   Insight:  intact   Judgement: behavior is adequate to circumstances   Orientation:  grossly intact   Memory: intact for content of interview   Language: grossly intact   Attention Span & Concentration:  able to focus   Fund of Knowledge:  intact and appropriate to age and level of education     Assessment and Diagnosis    Status/Progress: Based on the examination today, the patient's problem(s) is/are worsening.  New problems have been presented today (grief0.   Co-morbidities and Lack of compliance are not complicating management of the primary condition.  There are no active rule-out diagnoses for this patient at this time.     General Impression:   Diagnoses and all orders for this visit:    Depression, unspecified depression type    Grief    Insomnia disorder, with non-sleep disorder mental comorbidity  -     traZODone (DESYREL) 150 MG tablet; TAKE 1 OR 2 TABLETS BY MOUTH BEFORE BED AS NEEDED FOR SLEEP    Paresthesias  -     venlafaxine (EFFEXOR-XR) 150 MG Cp24; Take 1 capsule (150 mg total) by mouth once daily.    LAURA (generalized anxiety disorder)  -     hydrOXYzine pamoate (VISTARIL) 25 MG Cap; Take 1 capsule (25 mg total) by mouth 3 (three) times daily as needed.    Mood disorder      Intervention/Counseling/Treatment Plan   · Medication Management: The risks and benefits of medication were discussed with the patient.  · Care Coordination: During the visit, care coordination was conducted with  social work.   · DC Prozac   · Increase to Effexor  mg po daily for depression, anxiety, and mood  · Continue Topamax 100 mg po BID (for migraines) per Neurology  · Continue Trazodone 200 - 300 mg po qhs PRN insomnia  · Continue Vistaril 25 mg po TID PRN anxiety  · Continue individual psychotherapy with social work    Return to Clinic: 3 months     Risks, benefits, side effects and alternative treatments discussed with patient. Patient agrees with the current plan as documented.  Encouraged Patient to keep future appointments.  Take medications as prescribed and abstain from substance abuse.  Pt to present to ED for thoughts to harm herself or others

## 2020-12-21 ENCOUNTER — OFFICE VISIT (OUTPATIENT)
Dept: CARDIOLOGY | Facility: CLINIC | Age: 60
End: 2020-12-21
Payer: MEDICARE

## 2020-12-21 VITALS
OXYGEN SATURATION: 97 % | BODY MASS INDEX: 30.19 KG/M2 | HEIGHT: 70 IN | SYSTOLIC BLOOD PRESSURE: 108 MMHG | DIASTOLIC BLOOD PRESSURE: 62 MMHG | WEIGHT: 210.88 LBS | HEART RATE: 92 BPM

## 2020-12-21 DIAGNOSIS — M47.816 LUMBAR SPONDYLOSIS: ICD-10-CM

## 2020-12-21 DIAGNOSIS — F17.218 CIGARETTE NICOTINE DEPENDENCE WITH OTHER NICOTINE-INDUCED DISORDER: ICD-10-CM

## 2020-12-21 DIAGNOSIS — F17.200 SMOKER: ICD-10-CM

## 2020-12-21 DIAGNOSIS — I10 ESSENTIAL HYPERTENSION: ICD-10-CM

## 2020-12-21 DIAGNOSIS — I25.10 CORONARY ARTERY DISEASE INVOLVING NATIVE CORONARY ARTERY OF NATIVE HEART WITHOUT ANGINA PECTORIS: ICD-10-CM

## 2020-12-21 DIAGNOSIS — R55 SYNCOPE AND COLLAPSE: Primary | ICD-10-CM

## 2020-12-21 DIAGNOSIS — I25.2 OLD MI (MYOCARDIAL INFARCTION): ICD-10-CM

## 2020-12-21 DIAGNOSIS — Z98.61 S/P PTCA (PERCUTANEOUS TRANSLUMINAL CORONARY ANGIOPLASTY): ICD-10-CM

## 2020-12-21 DIAGNOSIS — I73.9 PAD (PERIPHERAL ARTERY DISEASE): ICD-10-CM

## 2020-12-21 DIAGNOSIS — E78.00 PURE HYPERCHOLESTEROLEMIA: ICD-10-CM

## 2020-12-21 PROCEDURE — 99214 PR OFFICE/OUTPT VISIT, EST, LEVL IV, 30-39 MIN: ICD-10-PCS | Mod: S$GLB,,, | Performed by: INTERNAL MEDICINE

## 2020-12-21 PROCEDURE — 3074F PR MOST RECENT SYSTOLIC BLOOD PRESSURE < 130 MM HG: ICD-10-PCS | Mod: CPTII,S$GLB,, | Performed by: INTERNAL MEDICINE

## 2020-12-21 PROCEDURE — 3008F BODY MASS INDEX DOCD: CPT | Mod: CPTII,S$GLB,, | Performed by: INTERNAL MEDICINE

## 2020-12-21 PROCEDURE — 99999 PR PBB SHADOW E&M-EST. PATIENT-LVL IV: ICD-10-PCS | Mod: PBBFAC,,, | Performed by: INTERNAL MEDICINE

## 2020-12-21 PROCEDURE — 1125F PR PAIN SEVERITY QUANTIFIED, PAIN PRESENT: ICD-10-PCS | Mod: S$GLB,,, | Performed by: INTERNAL MEDICINE

## 2020-12-21 PROCEDURE — 99214 OFFICE O/P EST MOD 30 MIN: CPT | Mod: S$GLB,,, | Performed by: INTERNAL MEDICINE

## 2020-12-21 PROCEDURE — 3078F PR MOST RECENT DIASTOLIC BLOOD PRESSURE < 80 MM HG: ICD-10-PCS | Mod: CPTII,S$GLB,, | Performed by: INTERNAL MEDICINE

## 2020-12-21 PROCEDURE — 3078F DIAST BP <80 MM HG: CPT | Mod: CPTII,S$GLB,, | Performed by: INTERNAL MEDICINE

## 2020-12-21 PROCEDURE — 3008F PR BODY MASS INDEX (BMI) DOCUMENTED: ICD-10-PCS | Mod: CPTII,S$GLB,, | Performed by: INTERNAL MEDICINE

## 2020-12-21 PROCEDURE — 3074F SYST BP LT 130 MM HG: CPT | Mod: CPTII,S$GLB,, | Performed by: INTERNAL MEDICINE

## 2020-12-21 PROCEDURE — 1125F AMNT PAIN NOTED PAIN PRSNT: CPT | Mod: S$GLB,,, | Performed by: INTERNAL MEDICINE

## 2020-12-21 PROCEDURE — 99999 PR PBB SHADOW E&M-EST. PATIENT-LVL IV: CPT | Mod: PBBFAC,,, | Performed by: INTERNAL MEDICINE

## 2020-12-21 RX ORDER — MELOXICAM 15 MG/1
TABLET ORAL
COMMUNITY
End: 2021-01-27

## 2020-12-21 RX ORDER — DICLOFENAC SODIUM 10 MG/G
GEL TOPICAL 2 TIMES DAILY
COMMUNITY
Start: 2020-12-16 | End: 2021-12-16

## 2020-12-21 RX ORDER — HYDROCHLOROTHIAZIDE 12.5 MG/1
12.5 CAPSULE ORAL DAILY
COMMUNITY
Start: 2020-12-10 | End: 2020-12-21

## 2020-12-21 RX ORDER — LISINOPRIL 10 MG/1
10 TABLET ORAL DAILY
COMMUNITY
Start: 2020-12-10 | End: 2021-01-27

## 2020-12-21 RX ORDER — METHOCARBAMOL 750 MG/1
TABLET, FILM COATED ORAL
COMMUNITY
Start: 2020-12-16 | End: 2021-01-27

## 2020-12-21 NOTE — PROGRESS NOTES
Subjective:      Patient ID: Jennifer Brady is a 60 y.o. female.    Chief Complaint: Loss of Consciousness    HPI:  Pt blacked out 12/11/20 while in the kitchen cooking while talking to sister.  Pt came to and got up and fainted again.  Pt went to [facility] ER and was released.    Pt was hospitalized at [facility] main campus years ago for the same problem and had a tilt table test but no diagnosis was made.    Pt c/o pain inbetween shoulder blades for 3 weeks.    Pt went to urgent care and was diagnosed with muscle spasm.    The pain is constant for 3 weeks.    The pain had begun a week before she fainted.    The pain is relieved in certain positions.      Review of Systems   Cardiovascular: Positive for syncope. Negative for chest pain, claudication, dyspnea on exertion, irregular heartbeat, leg swelling, near-syncope, orthopnea and palpitations.      Left thigh hurts.  Pt saw an orthopedic surgeon at [facility].      Past Medical History:   Diagnosis Date    Acute coronary syndrome     Anxiety     Asthma     Behavioral problem     Borderline personality disorder     Coronary angioplasty status 04/2018    Coronary artery disease     Depression     Depression     Fatigue     Hx of psychiatric care     Hyperlipidemia     Hypertension     Myocardial infarction 05/2018    Psychiatric problem     S/P hysterectomy with oophorectomy     Sleep apnea     pt uses CPAP    Syncope and collapse     Therapy     Thyroid disease         Past Surgical History:   Procedure Laterality Date    CORONARY ANGIOPLASTY      HYSTERECTOMY  1999    INJECTION OF ANESTHETIC AGENT AROUND NERVE Right 11/20/2019    Procedure: MEDIAL BRANCH BLOCK RIGHT L3, L4, L5;  Surgeon: Petros Reich MD;  Location: [facility] PAIN MGT;  Service: Pain Management;  Laterality: Right;  NEEDS CONSENT, PT NO LONGER TAKES PLAVIX    INJECTION OF ANESTHETIC AGENT AROUND NERVE Right 6/17/2020    Procedure: BLOCK, NERVE RIGHT L3, 4, 5 MEDIAL BRANCH;   Surgeon: Petros Reich MD;  Location: [facility] PAIN MGT;  Service: Pain Management;  Laterality: Right;  NEEDS CONSENT?    TILT TABLE TEST N/A 3/22/2019    Procedure: TILT TABLE TEST;  Surgeon: Roman Lala MD;  Location: [facility] EP LAB;  Service: Cardiology;  Laterality: N/A;  seizure/migraine, HUT referred by Dr Brit Merlos, EEG confirmed    TRANSFORAMINAL EPIDURAL INJECTION OF STEROID Right 10/14/2020    Procedure: LUMBAR TRANSFORAMINAL RIGHT L4/L5;  Surgeon: Petros Reich MD;  Location: [facility] PAIN MGT;  Service: Pain Management;  Laterality: Right;  NEEDS CONSENT       Family History   Problem Relation Age of Onset    Seizures Mother     Heart attack Mother     Hypertension Mother     Stroke Mother     Hypertension Paternal Grandfather     Hypertension Paternal Grandmother     Heart attacks under age 50 Maternal Grandmother     Hypertension Maternal Grandmother     Hypertension Maternal Grandfather     Heart attacks under age 50 Father 33    Hypertension Father     Hypertension Sister     Stroke Sister     Seizures Sister     Breast cancer Neg Hx     Colon cancer Neg Hx     Diabetes Neg Hx     Ovarian cancer Neg Hx        Social History     Socioeconomic History    Marital status:      Spouse name: Not on file    Number of children: Not on file    Years of education: Not on file    Highest education level: Not on file   Occupational History     Employer: [employer]     Comment: [employer]   Social Needs    Financial resource strain: Not on file    Food insecurity     Worry: Not on file     Inability: Not on file    Transportation needs     Medical: Not on file     Non-medical: Not on file   Tobacco Use    Smoking status: Former Smoker     Packs/day: 0.25     Types: Cigarettes     Start date: 1986     Quit date: 3/7/2020     Years since quittin.7    Smokeless tobacco: Never Used    Tobacco comment: quit actual cigarettes a  few mo ago, switched to e cigarette   Substance and Sexual Activity    Alcohol use: Yes     Comment: Socially    Drug use: No    Sexual activity: Not Currently     Partners: Male   Lifestyle    Physical activity     Days per week: Not on file     Minutes per session: Not on file    Stress: Not on file   Relationships    Social connections     Talks on phone: Not on file     Gets together: Not on file     Attends Congregational service: Not on file     Active member of club or organization: Not on file     Attends meetings of clubs or organizations: Not on file     Relationship status: Not on file   Other Topics Concern    Caffeine Use Not Asked    Financial Status: Disabled Not Asked    Legal: Involved in criminal litigation Not Asked    Caffeine Use: Frequent Not Asked    Financial Status: Employed Yes    Legal: Other Not Asked    Caffeine Use: Moderate Yes    Financial Status: Unemployed Not Asked    Leisure: Exercise Not Asked    Caffeine Use: Substantial Not Asked    Financial Status: Other Not Asked    Leisure: Fishing Not Asked    Childhood History: Adopted Not Asked    Firearms: Does patient have access to a firearm? Not Asked    Leisure: Hunting Not Asked    Childhood History: Early trauma Not Asked    Home situation: homeless Not Asked    Leisure: Movie Watching Not Asked    Childhood History: Raised by parents Not Asked    Home situation: lives alone Not Asked    Leisure: Shopping Not Asked    Childhood History: Uneventful Not Asked    Home situation: lives in group home Not Asked    Leisure: Sports Not Asked    Childhood History: Other Not Asked    Home situation: lives in nursing home Not Asked    Leisure: Time with family Not Asked    Education: Unfinished High School Not Asked    Home situation: lives in shelter Not Asked     Service Not Asked    Education: High School Graduate Not Asked    Home situation: lives with family Not Asked    Spirituality: Active  Participation Not Asked    Education: Unfinished college Not Asked    Home situation: lives with friends Not Asked    Spirituality: Organized Voodoo Yes    Education: Trade School Not Asked    Home situation: lives with significant other Not Asked    Spirituality: Private Participation Not Asked    Education: Associate's Degree Not Asked    Home situation: lives with spouse Not Asked    Patient feels they ought to cut down on drinking/drug use No    Education: Bachelor's Degree Not Asked    Legal consequences of chemical use Not Asked    Patient annoyed by others criticizing their drinking/drug use No    Education: More than one Bachelor's or Professional Not Asked    Legal: Arrest history Not Asked    Patient has felt bad or guilty about drinking/drug use No    Education: Master's, PhD Not Asked    Legal: Involved in civil litigation Not Asked    Patient has had a drink/used drugs as an eye opener in the AM No   Social History Narrative    Patient discusses recent decisions she has made that she is pleased about; has clarified for self and others her values and standards.  Though she is aware of difficulty of  struggle, feels new confidence about  results.  She expresses sense of wholeness and independence at this point. Her previously explored painful symptoms are improved (i.e, .diminished - 2or3/10), thought processes are logical and outlook is optimistic, as well as realistic.    Discusses trip she's planned.  intent to reschedule after return in mid-August. She expresses feelings of appreciation       Current Outpatient Medications on File Prior to Visit   Medication Sig Dispense Refill    aspirin (ECOTRIN) 81 MG EC tablet Take 1 tablet (81 mg total) by mouth once daily. 100 tablet 6    atorvastatin (LIPITOR) 40 MG tablet Take 1 tablet (40 mg total) by mouth once daily. 90 tablet 3    cetirizine (ZYRTEC) 10 MG tablet Take 10 mg by mouth.      diclofenac sodium (VOLTAREN) 1 % Gel 2 (two)  times daily.      estradioL (ESTRACE) 0.01 % (0.1 mg/gram) vaginal cream Place 0.5 g vaginally twice a week. Insert 0.5grams intravaginally twice weekly 42 g 4    HYDROcodone-acetaminophen (NORCO)  mg per tablet hydrocodone 10 mg-acetaminophen 325 mg tablet   Take 1 tablet every 4 hours by oral route.      hydrOXYzine pamoate (VISTARIL) 25 MG Cap Take 1 capsule (25 mg total) by mouth 3 (three) times daily as needed. 90 capsule 3    lisinopriL 10 MG tablet Take 10 mg by mouth once daily.      meloxicam (MOBIC) 15 MG tablet meloxicam 15 mg tablet      methocarbamoL (ROBAXIN) 750 MG Tab TAKE 1 TABLET BY MOUTH TWICE DAILY FOR ACUTE OPIOID THERAPY DAYS AS NEEDED FOR MUSCLE SPASM      nitroGLYCERIN (NITROSTAT) 0.4 MG SL tablet Place 1 tablet (0.4 mg total) under the tongue every 5 (five) minutes as needed for Chest pain. 20 tablet 0    pantoprazole (PROTONIX) 40 MG tablet pantoprazole 40 mg tablet,delayed release      topiramate (TOPAMAX) 100 MG tablet Take 1 tablet (100 mg total) by mouth every evening. 90 tablet 3    traZODone (DESYREL) 150 MG tablet TAKE 1 OR 2 TABLETS BY MOUTH BEFORE BED AS NEEDED FOR SLEEP 60 tablet 5    venlafaxine (EFFEXOR-XR) 150 MG Cp24 Take 1 capsule (150 mg total) by mouth once daily. 90 capsule 3    [DISCONTINUED] hydroCHLOROthiazide (MICROZIDE) 12.5 mg capsule Take 12.5 mg by mouth once daily.      [DISCONTINUED] albuterol (PROVENTIL/VENTOLIN HFA) 90 mcg/actuation inhaler Inhale 1-2 puffs into the lungs every 6 (six) hours as needed. Rescue 1 Inhaler 0    [DISCONTINUED] citalopram (CELEXA) 10 MG tablet Take 10 mg by mouth once daily.      [DISCONTINUED] lisinopril-hydrochlorothiazide (PRINZIDE,ZESTORETIC) 10-12.5 mg per tablet Take 1 tablet by mouth once daily.      [DISCONTINUED] lurasidone (LATUDA) 40 mg Tab tablet Take 1 tablet (40 mg total) by mouth once daily. 30 tablet 5     No current facility-administered medications on file prior to visit.        Review of  "patient's allergies indicates:  No Known Allergies  Objective:     Vitals:    12/21/20 1458   BP: 108/62   BP Location: Left arm   Patient Position: Sitting   BP Method: Large (Automatic)   Pulse: 92   SpO2: 97%   Weight: 95.7 kg (210 lb 13.9 oz)   Height: 5' 9.5" (1.765 m)        Physical Exam   Constitutional: She is oriented to person, place, and time. She appears well-developed and well-nourished.   Eyes: No scleral icterus.   Neck: No JVD present. Carotid bruit is not present.   Cardiovascular: Normal rate and regular rhythm. Exam reveals no gallop.   No murmur heard.  Pulmonary/Chest: Breath sounds normal.   Musculoskeletal:         General: No edema.   Neurological: She is alert and oriented to person, place, and time.   Skin: Skin is warm and dry.   Psychiatric: She has a normal mood and affect. Her behavior is normal. Judgment and thought content normal.   Vitals reviewed.         Note stress ECG and stress echo 9/20 WN    Admission on 12/08/2020, Discharged on 12/08/2020   Component Date Value Ref Range Status    WBC 12/08/2020 7.90  3.90 - 12.70 K/uL Final    RBC 12/08/2020 4.01  4.00 - 5.40 M/uL Final    Hemoglobin 12/08/2020 12.3  12.0 - 16.0 g/dL Final    Hematocrit 12/08/2020 37.1  37.0 - 48.5 % Final    MCV 12/08/2020 93  82 - 98 fL Final    MCH 12/08/2020 30.7  27.0 - 31.0 pg Final    MCHC 12/08/2020 33.1  32.0 - 36.0 g/dL Final    RDW 12/08/2020 12.7  11.5 - 14.5 % Final    Platelets 12/08/2020 378* 150 - 350 K/uL Final    MPV 12/08/2020 6.6* 9.2 - 12.9 fL Final    Sodium 12/08/2020 138  136 - 145 mmol/L Final    Potassium 12/08/2020 3.8  3.5 - 5.1 mmol/L Final    Chloride 12/08/2020 102  101 - 111 mmol/L Final    CO2 12/08/2020 26  23 - 29 mmol/L Final    Glucose 12/08/2020 84  74 - 118 mg/dL Final    BUN 12/08/2020 11  8 - 23 mg/dL Final    Creatinine 12/08/2020 1.1  0.5 - 1.4 mg/dL Final    Calcium 12/08/2020 9.6  8.6 - 10.0 mg/dL Final    Anion Gap 12/08/2020 10  8 - 16 " mmol/L Final    eGFR if African American 12/08/2020 >60.0  >60 mL/min/1.73 m^2 Final    eGFR if non African American 12/08/2020 53.9* >60 mL/min/1.73 m^2 Final    SARS-CoV2 (COVID-19) Qualitative P* 12/08/2020 Not Detected  Not Detected Final    Specimen UA 12/08/2020 Urine, Clean Catch   Final    Color, UA 12/08/2020 Yellow  Yellow, Straw, Adrienne Final    Appearance, UA 12/08/2020 Clear  Clear Final    pH, UA 12/08/2020 7.0  5.0 - 8.0 Final    Specific [place], UA 12/08/2020 1.010  1.005 - 1.030 Final    Protein, UA 12/08/2020 Negative  Negative Final    Glucose, UA 12/08/2020 Negative  Negative Final    Ketones, UA 12/08/2020 Negative  Negative Final    Bilirubin (UA) 12/08/2020 Negative  Negative Final    Occult Blood UA 12/08/2020 Trace* Negative Final    Nitrite, UA 12/08/2020 Negative  Negative Final    Urobilinogen, UA 12/08/2020 Negative  Negative EU/dL Final    Leukocytes, UA 12/08/2020 Negative  Negative Final   Hospital Outpatient Visit on 09/21/2020   Component Date Value Ref Range Status    Systolic blood pressure 09/21/2020 131  mmHg Final    Diastolic blood pressure 09/21/2020 74  mmHg Final    HR at rest 09/21/2020 88  bpm Final    RPP 09/21/2020 11,528   Final    Peak HR 09/21/2020 164  bpm Final    Peak Systolic BP 09/21/2020 240  mmHg Final    Peak Diatolic BP 09/21/2020 83  mmHg Final    Peak RPP 09/21/2020 39,360   Final    Max Predicted HR 09/21/2020 151   Final    85% Max Predicted HR 09/21/2020 129   Final    % Max HR Achieved 09/21/2020 108   Final    1 Minute Recovery HR 09/21/2020 141  bpm Final    OHS CV CPX PATIENT IS MALE 09/21/2020 0   Final    OHS CV CPX PATIENT IS FEMALE 09/21/2020 1   Final    Exercise duration (sec) 09/21/2020 0  seconds Final    Exercise duration (min) 09/21/2020 6  minutes Final    Estimated METs 09/21/2020 7   Final   Lab Visit on 09/14/2020   Component Date Value Ref Range Status    Cholesterol 09/14/2020 172  80 - 200 mg/dL  Final    Triglycerides 09/14/2020 102  30 - 150 mg/dL Final    HDL 09/14/2020 67  40 - 75 mg/dL Final    LDL Cholesterol 09/14/2020 85  <100 mg/dL Final    HDL/Cholesterol Ratio 09/14/2020 39.0  20.0 - 50.0 % Final    Total Cholesterol/HDL Ratio 09/14/2020 2.6  2.0 - 5.0 Final    Non-HDL Cholesterol 09/14/2020 105  mg/dL Final    TSH 09/14/2020 0.21* 0.45 - 5.33 uIU/mL Final    Free T4 09/14/2020 0.72  0.61 - 1.12 ng/dL Final   Lab Visit on 08/19/2020   Component Date Value Ref Range Status    TSH 08/19/2020 0.725  0.400 - 4.000 uIU/mL Final    Vitamin B-12 08/19/2020 309  210 - 950 pg/mL Final    Thiamine 08/19/2020 78  38 - 122 ug/L Final    Vitamin B6 08/19/2020 10  5 - 50 ug/L Final    Vit D, 25-Hydroxy 08/19/2020 24* 30 - 96 ng/mL Final    Hemoglobin A1C 08/19/2020 5.0  4.0 - 5.6 % Final    Estimated Avg Glucose 08/19/2020 97  68 - 131 mg/dL Final    Sodium 08/19/2020 142  136 - 145 mmol/L Final    Potassium 08/19/2020 3.7  3.5 - 5.1 mmol/L Final    Chloride 08/19/2020 108  95 - 110 mmol/L Final    CO2 08/19/2020 29  23 - 29 mmol/L Final    Glucose 08/19/2020 83  70 - 110 mg/dL Final    BUN 08/19/2020 13  8 - 23 mg/dL Final    Creatinine 08/19/2020 1.0  0.5 - 1.4 mg/dL Final    Calcium 08/19/2020 10.1  8.7 - 10.5 mg/dL Final    Total Protein 08/19/2020 7.4  6.0 - 8.4 g/dL Final    Albumin 08/19/2020 4.0  3.5 - 5.2 g/dL Final    Total Bilirubin 08/19/2020 0.5  0.1 - 1.0 mg/dL Final    Alkaline Phosphatase 08/19/2020 133  55 - 135 U/L Final    AST 08/19/2020 19  10 - 40 U/L Final    ALT 08/19/2020 21  10 - 44 U/L Final    Anion Gap 08/19/2020 5* 8 - 16 mmol/L Final    eGFR if African American 08/19/2020 >60.0  >60 mL/min/1.73 m^2 Final    eGFR if non African American 08/19/2020 >60.0  >60 mL/min/1.73 m^2 Final    WBC 08/19/2020 6.56  3.90 - 12.70 K/uL Final    RBC 08/19/2020 4.08  4.00 - 5.40 M/uL Final    Hemoglobin 08/19/2020 12.3  12.0 - 16.0 g/dL Final    Hematocrit  08/19/2020 39.6  37.0 - 48.5 % Final    MCV 08/19/2020 97  82 - 98 fL Final    MCH 08/19/2020 30.1  27.0 - 31.0 pg Final    MCHC 08/19/2020 31.1* 32.0 - 36.0 g/dL Final    RDW 08/19/2020 11.9  11.5 - 14.5 % Final    Platelets 08/19/2020 345  150 - 350 K/uL Final    MPV 08/19/2020 8.6* 9.2 - 12.9 fL Final    Immature Granulocytes 08/19/2020 0.5  0.0 - 0.5 % Final    Gran # (ANC) 08/19/2020 2.9  1.8 - 7.7 K/uL Final    Immature Grans (Abs) 08/19/2020 0.03  0.00 - 0.04 K/uL Final    Lymph # 08/19/2020 3.0  1.0 - 4.8 K/uL Final    Mono # 08/19/2020 0.4  0.3 - 1.0 K/uL Final    Eos # 08/19/2020 0.2  0.0 - 0.5 K/uL Final    Baso # 08/19/2020 0.04  0.00 - 0.20 K/uL Final    nRBC 08/19/2020 0  0 /100 WBC Final    Gran % 08/19/2020 44.6  38.0 - 73.0 % Final    Lymph % 08/19/2020 46.2  18.0 - 48.0 % Final    Mono % 08/19/2020 5.5  4.0 - 15.0 % Final    Eosinophil % 08/19/2020 2.6  0.0 - 8.0 % Final    Basophil % 08/19/2020 0.6  0.0 - 1.9 % Final    Differential Method 08/19/2020 Automated   Final   Office Visit on 07/07/2020   Component Date Value Ref Range Status    Trichomonas vaginalis 07/07/2020 Positive* Negative Final    Candida sp 07/07/2020 Negative  Negative Final    Candida glabrata DNA 07/07/2020 Negative  Negative Final    Marilu krusei DNA 07/07/2020 Negative  Negative Final    Bacterial vaginosis DNA 07/07/2020 Negative  Negative Final   (      Assessment:     1. Syncope and collapse    2. Essential hypertension    3. Coronary artery disease involving native coronary artery of native heart without angina pectoris    4. Old MI (myocardial infarction)    5. PAD (peripheral artery disease)    6. Pure hypercholesterolemia    7. S/P PTCA (percutaneous transluminal coronary angioplasty)    8. Smoker    9. Lumbar spondylosis    10. Cigarette nicotine dependence with other nicotine-induced disorder      Plan:   Jennifer Sutherland was seen today for loss of consciousness.    Diagnoses and all orders  for this visit:    Syncope and collapse  -     Holter monitor - 48 hour; Future    Essential hypertension    Coronary artery disease involving native coronary artery of native heart without angina pectoris    Old MI (myocardial infarction)    PAD (peripheral artery disease)    Pure hypercholesterolemia    S/P PTCA (percutaneous transluminal coronary angioplasty)    Smoker    Lumbar spondylosis    Cigarette nicotine dependence with other nicotine-induced disorder  -     Ambulatory referral/consult to Smoking Cessation Program; Future     Syncope was likely orthostatic hypotension.  Therefore will discontinue the HCTZ    Rest of meds the same    48 hour holter    RTC one month    F/u with pain management    Due to recent relapse smoking cessation counseled      Follow up in about 4 weeks (around 1/18/2021).

## 2021-01-04 ENCOUNTER — TELEPHONE (OUTPATIENT)
Dept: CARDIOLOGY | Facility: CLINIC | Age: 61
End: 2021-01-04

## 2021-01-06 ENCOUNTER — OFFICE VISIT (OUTPATIENT)
Dept: PSYCHIATRY | Facility: CLINIC | Age: 61
End: 2021-01-06
Payer: MEDICARE

## 2021-01-06 DIAGNOSIS — F32.A DEPRESSION, UNSPECIFIED DEPRESSION TYPE: Primary | ICD-10-CM

## 2021-01-06 DIAGNOSIS — F43.21 GRIEF: ICD-10-CM

## 2021-01-06 PROCEDURE — 90832 PSYTX W PT 30 MINUTES: CPT | Mod: 95,,, | Performed by: SOCIAL WORKER

## 2021-01-06 PROCEDURE — 90832 PR PSYCHOTHERAPY W/PATIENT, 30 MIN: ICD-10-PCS | Mod: 95,,, | Performed by: SOCIAL WORKER

## 2021-01-07 ENCOUNTER — TELEPHONE (OUTPATIENT)
Dept: SMOKING CESSATION | Facility: CLINIC | Age: 61
End: 2021-01-07

## 2021-01-27 ENCOUNTER — OFFICE VISIT (OUTPATIENT)
Dept: CARDIOLOGY | Facility: CLINIC | Age: 61
End: 2021-01-27
Payer: MEDICARE

## 2021-01-27 VITALS
DIASTOLIC BLOOD PRESSURE: 66 MMHG | BODY MASS INDEX: 30.96 KG/M2 | WEIGHT: 216.25 LBS | SYSTOLIC BLOOD PRESSURE: 133 MMHG | HEART RATE: 66 BPM | HEIGHT: 70 IN | OXYGEN SATURATION: 99 %

## 2021-01-27 DIAGNOSIS — I25.10 CORONARY ARTERY DISEASE INVOLVING NATIVE CORONARY ARTERY OF NATIVE HEART WITHOUT ANGINA PECTORIS: Primary | ICD-10-CM

## 2021-01-27 DIAGNOSIS — E78.00 PURE HYPERCHOLESTEROLEMIA: ICD-10-CM

## 2021-01-27 DIAGNOSIS — I73.9 PAD (PERIPHERAL ARTERY DISEASE): ICD-10-CM

## 2021-01-27 DIAGNOSIS — I10 ESSENTIAL HYPERTENSION: ICD-10-CM

## 2021-01-27 DIAGNOSIS — Z98.61 S/P PTCA (PERCUTANEOUS TRANSLUMINAL CORONARY ANGIOPLASTY): ICD-10-CM

## 2021-01-27 PROCEDURE — 99999 PR PBB SHADOW E&M-EST. PATIENT-LVL IV: CPT | Mod: PBBFAC,,, | Performed by: INTERNAL MEDICINE

## 2021-01-27 PROCEDURE — 3078F PR MOST RECENT DIASTOLIC BLOOD PRESSURE < 80 MM HG: ICD-10-PCS | Mod: CPTII,S$GLB,, | Performed by: INTERNAL MEDICINE

## 2021-01-27 PROCEDURE — 99214 OFFICE O/P EST MOD 30 MIN: CPT | Mod: S$GLB,,, | Performed by: INTERNAL MEDICINE

## 2021-01-27 PROCEDURE — 3078F DIAST BP <80 MM HG: CPT | Mod: CPTII,S$GLB,, | Performed by: INTERNAL MEDICINE

## 2021-01-27 PROCEDURE — 3008F PR BODY MASS INDEX (BMI) DOCUMENTED: ICD-10-PCS | Mod: CPTII,S$GLB,, | Performed by: INTERNAL MEDICINE

## 2021-01-27 PROCEDURE — 3075F SYST BP GE 130 - 139MM HG: CPT | Mod: CPTII,S$GLB,, | Performed by: INTERNAL MEDICINE

## 2021-01-27 PROCEDURE — 3008F BODY MASS INDEX DOCD: CPT | Mod: CPTII,S$GLB,, | Performed by: INTERNAL MEDICINE

## 2021-01-27 PROCEDURE — 1125F AMNT PAIN NOTED PAIN PRSNT: CPT | Mod: S$GLB,,, | Performed by: INTERNAL MEDICINE

## 2021-01-27 PROCEDURE — 99214 PR OFFICE/OUTPT VISIT, EST, LEVL IV, 30-39 MIN: ICD-10-PCS | Mod: S$GLB,,, | Performed by: INTERNAL MEDICINE

## 2021-01-27 PROCEDURE — 3075F PR MOST RECENT SYSTOLIC BLOOD PRESS GE 130-139MM HG: ICD-10-PCS | Mod: CPTII,S$GLB,, | Performed by: INTERNAL MEDICINE

## 2021-01-27 PROCEDURE — 99999 PR PBB SHADOW E&M-EST. PATIENT-LVL IV: ICD-10-PCS | Mod: PBBFAC,,, | Performed by: INTERNAL MEDICINE

## 2021-01-27 PROCEDURE — 1125F PR PAIN SEVERITY QUANTIFIED, PAIN PRESENT: ICD-10-PCS | Mod: S$GLB,,, | Performed by: INTERNAL MEDICINE

## 2021-01-27 RX ORDER — DOCUSATE SODIUM 50 MG/5ML
50 LIQUID ORAL
COMMUNITY
Start: 2021-01-25 | End: 2021-03-01

## 2021-01-27 RX ORDER — LOSARTAN POTASSIUM 25 MG/1
25 TABLET ORAL DAILY
Qty: 90 TABLET | Refills: 3 | Status: SHIPPED | OUTPATIENT
Start: 2021-01-27 | End: 2021-08-25 | Stop reason: SDUPTHER

## 2021-01-27 RX ORDER — CELECOXIB 200 MG/1
200 CAPSULE ORAL DAILY
COMMUNITY
Start: 2020-12-28 | End: 2022-02-23

## 2021-01-27 RX ORDER — IBUPROFEN 200 MG
TABLET ORAL
COMMUNITY
Start: 2020-12-27 | End: 2021-07-22

## 2021-01-27 RX ORDER — HYDROCODONE BITARTRATE AND ACETAMINOPHEN 10; 325 MG/1; MG/1
1 TABLET ORAL EVERY 6 HOURS
COMMUNITY
Start: 2020-12-28 | End: 2021-07-22

## 2021-03-01 ENCOUNTER — OFFICE VISIT (OUTPATIENT)
Dept: CARDIOLOGY | Facility: CLINIC | Age: 61
End: 2021-03-01
Payer: MEDICARE

## 2021-03-01 VITALS
BODY MASS INDEX: 30.81 KG/M2 | HEART RATE: 78 BPM | SYSTOLIC BLOOD PRESSURE: 118 MMHG | DIASTOLIC BLOOD PRESSURE: 58 MMHG | WEIGHT: 215.19 LBS | OXYGEN SATURATION: 97 % | HEIGHT: 70 IN

## 2021-03-01 DIAGNOSIS — E78.00 PURE HYPERCHOLESTEROLEMIA: ICD-10-CM

## 2021-03-01 DIAGNOSIS — I10 ESSENTIAL HYPERTENSION: ICD-10-CM

## 2021-03-01 DIAGNOSIS — I25.2 OLD MI (MYOCARDIAL INFARCTION): ICD-10-CM

## 2021-03-01 DIAGNOSIS — J45.20 MILD INTERMITTENT ASTHMA WITHOUT COMPLICATION: ICD-10-CM

## 2021-03-01 DIAGNOSIS — I25.10 CORONARY ARTERY DISEASE INVOLVING NATIVE CORONARY ARTERY OF NATIVE HEART WITHOUT ANGINA PECTORIS: ICD-10-CM

## 2021-03-01 DIAGNOSIS — Z98.61 S/P PTCA (PERCUTANEOUS TRANSLUMINAL CORONARY ANGIOPLASTY): Primary | ICD-10-CM

## 2021-03-01 DIAGNOSIS — I73.9 PAD (PERIPHERAL ARTERY DISEASE): ICD-10-CM

## 2021-03-01 PROCEDURE — 3074F PR MOST RECENT SYSTOLIC BLOOD PRESSURE < 130 MM HG: ICD-10-PCS | Mod: CPTII,S$GLB,, | Performed by: INTERNAL MEDICINE

## 2021-03-01 PROCEDURE — 99999 PR PBB SHADOW E&M-EST. PATIENT-LVL III: CPT | Mod: PBBFAC,,, | Performed by: INTERNAL MEDICINE

## 2021-03-01 PROCEDURE — 1125F AMNT PAIN NOTED PAIN PRSNT: CPT | Mod: S$GLB,,, | Performed by: INTERNAL MEDICINE

## 2021-03-01 PROCEDURE — 99213 OFFICE O/P EST LOW 20 MIN: CPT | Mod: S$GLB,,, | Performed by: INTERNAL MEDICINE

## 2021-03-01 PROCEDURE — 99213 PR OFFICE/OUTPT VISIT, EST, LEVL III, 20-29 MIN: ICD-10-PCS | Mod: S$GLB,,, | Performed by: INTERNAL MEDICINE

## 2021-03-01 PROCEDURE — 3008F PR BODY MASS INDEX (BMI) DOCUMENTED: ICD-10-PCS | Mod: CPTII,S$GLB,, | Performed by: INTERNAL MEDICINE

## 2021-03-01 PROCEDURE — 1125F PR PAIN SEVERITY QUANTIFIED, PAIN PRESENT: ICD-10-PCS | Mod: S$GLB,,, | Performed by: INTERNAL MEDICINE

## 2021-03-01 PROCEDURE — 99999 PR PBB SHADOW E&M-EST. PATIENT-LVL III: ICD-10-PCS | Mod: PBBFAC,,, | Performed by: INTERNAL MEDICINE

## 2021-03-01 PROCEDURE — 3074F SYST BP LT 130 MM HG: CPT | Mod: CPTII,S$GLB,, | Performed by: INTERNAL MEDICINE

## 2021-03-01 PROCEDURE — 3078F PR MOST RECENT DIASTOLIC BLOOD PRESSURE < 80 MM HG: ICD-10-PCS | Mod: CPTII,S$GLB,, | Performed by: INTERNAL MEDICINE

## 2021-03-01 PROCEDURE — 3008F BODY MASS INDEX DOCD: CPT | Mod: CPTII,S$GLB,, | Performed by: INTERNAL MEDICINE

## 2021-03-01 PROCEDURE — 3078F DIAST BP <80 MM HG: CPT | Mod: CPTII,S$GLB,, | Performed by: INTERNAL MEDICINE

## 2021-03-01 RX ORDER — LACTULOSE 10 G/15ML
15 SOLUTION ORAL; RECTAL 2 TIMES DAILY
COMMUNITY
Start: 2021-02-12 | End: 2024-01-23

## 2021-03-17 ENCOUNTER — OFFICE VISIT (OUTPATIENT)
Dept: NEUROLOGY | Facility: CLINIC | Age: 61
End: 2021-03-17
Payer: MEDICARE

## 2021-03-17 VITALS
SYSTOLIC BLOOD PRESSURE: 128 MMHG | WEIGHT: 212.31 LBS | HEIGHT: 70 IN | HEART RATE: 99 BPM | DIASTOLIC BLOOD PRESSURE: 81 MMHG | BODY MASS INDEX: 30.39 KG/M2

## 2021-03-17 PROCEDURE — 3008F PR BODY MASS INDEX (BMI) DOCUMENTED: ICD-10-PCS | Mod: CPTII,S$GLB,, | Performed by: PSYCHIATRY & NEUROLOGY

## 2021-03-17 PROCEDURE — 3074F SYST BP LT 130 MM HG: CPT | Mod: CPTII,S$GLB,, | Performed by: PSYCHIATRY & NEUROLOGY

## 2021-03-17 PROCEDURE — 99999 PR PBB SHADOW E&M-EST. PATIENT-LVL IV: ICD-10-PCS | Mod: PBBFAC,,, | Performed by: PSYCHIATRY & NEUROLOGY

## 2021-03-17 PROCEDURE — 3079F PR MOST RECENT DIASTOLIC BLOOD PRESSURE 80-89 MM HG: ICD-10-PCS | Mod: CPTII,S$GLB,, | Performed by: PSYCHIATRY & NEUROLOGY

## 2021-03-17 PROCEDURE — 1126F AMNT PAIN NOTED NONE PRSNT: CPT | Mod: S$GLB,,, | Performed by: PSYCHIATRY & NEUROLOGY

## 2021-03-17 PROCEDURE — 3079F DIAST BP 80-89 MM HG: CPT | Mod: CPTII,S$GLB,, | Performed by: PSYCHIATRY & NEUROLOGY

## 2021-03-17 PROCEDURE — 99999 PR PBB SHADOW E&M-EST. PATIENT-LVL IV: CPT | Mod: PBBFAC,,, | Performed by: PSYCHIATRY & NEUROLOGY

## 2021-03-17 PROCEDURE — 1126F PR PAIN SEVERITY QUANTIFIED, NO PAIN PRESENT: ICD-10-PCS | Mod: S$GLB,,, | Performed by: PSYCHIATRY & NEUROLOGY

## 2021-03-17 PROCEDURE — 99214 PR OFFICE/OUTPT VISIT, EST, LEVL IV, 30-39 MIN: ICD-10-PCS | Mod: S$GLB,,, | Performed by: PSYCHIATRY & NEUROLOGY

## 2021-03-17 PROCEDURE — 3008F BODY MASS INDEX DOCD: CPT | Mod: CPTII,S$GLB,, | Performed by: PSYCHIATRY & NEUROLOGY

## 2021-03-17 PROCEDURE — 3074F PR MOST RECENT SYSTOLIC BLOOD PRESSURE < 130 MM HG: ICD-10-PCS | Mod: CPTII,S$GLB,, | Performed by: PSYCHIATRY & NEUROLOGY

## 2021-03-17 PROCEDURE — 99214 OFFICE O/P EST MOD 30 MIN: CPT | Mod: S$GLB,,, | Performed by: PSYCHIATRY & NEUROLOGY

## 2021-03-22 ENCOUNTER — TELEPHONE (OUTPATIENT)
Dept: PHARMACY | Facility: CLINIC | Age: 61
End: 2021-03-22

## 2021-05-07 DIAGNOSIS — M47.816 LUMBAR SPONDYLOSIS: Primary | ICD-10-CM

## 2021-05-07 DIAGNOSIS — M47.817 LUMBOSACRAL SPONDYLOSIS WITHOUT MYELOPATHY: ICD-10-CM

## 2021-05-11 ENCOUNTER — PATIENT MESSAGE (OUTPATIENT)
Dept: PAIN MEDICINE | Facility: OTHER | Age: 61
End: 2021-05-11

## 2021-05-12 ENCOUNTER — HOSPITAL ENCOUNTER (OUTPATIENT)
Facility: OTHER | Age: 61
Discharge: HOME | End: 2021-05-12
Attending: ANESTHESIOLOGY | Admitting: ANESTHESIOLOGY
Payer: MEDICARE

## 2021-05-12 VITALS
RESPIRATION RATE: 14 BRPM | SYSTOLIC BLOOD PRESSURE: 143 MMHG | WEIGHT: 212 LBS | OXYGEN SATURATION: 98 % | HEART RATE: 70 BPM | DIASTOLIC BLOOD PRESSURE: 68 MMHG | BODY MASS INDEX: 30.86 KG/M2 | TEMPERATURE: 99 F

## 2021-05-12 DIAGNOSIS — M47.816 LUMBAR SPONDYLOSIS: Primary | ICD-10-CM

## 2021-05-12 DIAGNOSIS — M51.36 LUMBAR DEGENERATIVE DISC DISEASE: ICD-10-CM

## 2021-05-12 PROCEDURE — 25000003 PHARM REV CODE 250: Performed by: ANESTHESIOLOGY

## 2021-05-12 PROCEDURE — 64635 DESTROY LUMB/SAC FACET JNT: CPT | Mod: RT | Performed by: ANESTHESIOLOGY

## 2021-05-12 PROCEDURE — 63600175 PHARM REV CODE 636 W HCPCS: Performed by: ANESTHESIOLOGY

## 2021-05-12 PROCEDURE — 64636 DESTROY L/S FACET JNT ADDL: CPT | Mod: RT | Performed by: ANESTHESIOLOGY

## 2021-05-12 RX ORDER — BUPIVACAINE HYDROCHLORIDE 5 MG/ML
INJECTION, SOLUTION EPIDURAL; INTRACAUDAL
Status: DISCONTINUED | OUTPATIENT
Start: 2021-05-12 | End: 2021-05-12 | Stop reason: HOSPADM

## 2021-05-12 RX ORDER — MIDAZOLAM HYDROCHLORIDE 1 MG/ML
INJECTION INTRAMUSCULAR; INTRAVENOUS
Status: DISCONTINUED | OUTPATIENT
Start: 2021-05-12 | End: 2021-05-12 | Stop reason: HOSPADM

## 2021-05-12 RX ORDER — ALPRAZOLAM 0.5 MG/1
1 TABLET, ORALLY DISINTEGRATING ORAL ONCE AS NEEDED
Status: DISCONTINUED | OUTPATIENT
Start: 2021-05-12 | End: 2021-05-12 | Stop reason: HOSPADM

## 2021-05-12 RX ORDER — FENTANYL CITRATE 50 UG/ML
INJECTION, SOLUTION INTRAMUSCULAR; INTRAVENOUS
Status: DISCONTINUED | OUTPATIENT
Start: 2021-05-12 | End: 2021-05-12 | Stop reason: HOSPADM

## 2021-05-12 RX ORDER — TRIAMCINOLONE ACETONIDE 40 MG/ML
40 INJECTION, SUSPENSION INTRA-ARTICULAR; INTRAMUSCULAR ONCE
Status: COMPLETED | OUTPATIENT
Start: 2021-05-12 | End: 2021-05-12

## 2021-05-12 RX ORDER — MIDAZOLAM HYDROCHLORIDE 1 MG/ML
2 INJECTION INTRAMUSCULAR; INTRAVENOUS ONCE
Status: COMPLETED | OUTPATIENT
Start: 2021-05-12 | End: 2021-05-12

## 2021-05-12 RX ORDER — BUPIVACAINE HYDROCHLORIDE 5 MG/ML
10 INJECTION, SOLUTION PERINEURAL ONCE
Status: DISCONTINUED | OUTPATIENT
Start: 2021-05-12 | End: 2021-05-12 | Stop reason: HOSPADM

## 2021-05-12 RX ORDER — SODIUM CHLORIDE 9 MG/ML
INJECTION, SOLUTION INTRAVENOUS
Status: COMPLETED | OUTPATIENT
Start: 2021-05-12 | End: 2021-05-12

## 2021-05-12 RX ORDER — LIDOCAINE HYDROCHLORIDE 10 MG/ML
10 INJECTION INFILTRATION; PERINEURAL ONCE
Status: DISCONTINUED | OUTPATIENT
Start: 2021-05-12 | End: 2021-05-12 | Stop reason: HOSPADM

## 2021-05-12 RX ORDER — FENTANYL CITRATE 50 UG/ML
100 INJECTION, SOLUTION INTRAMUSCULAR; INTRAVENOUS ONCE
Status: COMPLETED | OUTPATIENT
Start: 2021-05-12 | End: 2021-05-12

## 2021-05-12 RX ORDER — LIDOCAINE HYDROCHLORIDE 10 MG/ML
INJECTION INFILTRATION; PERINEURAL
Status: DISCONTINUED | OUTPATIENT
Start: 2021-05-12 | End: 2021-05-12 | Stop reason: HOSPADM

## 2021-06-01 ENCOUNTER — OFFICE VISIT (OUTPATIENT)
Dept: PSYCHIATRY | Facility: CLINIC | Age: 61
End: 2021-06-01
Payer: COMMERCIAL

## 2021-06-01 DIAGNOSIS — Z86.69 HX OF MIGRAINE HEADACHES: ICD-10-CM

## 2021-06-01 DIAGNOSIS — R20.2 PARESTHESIAS: ICD-10-CM

## 2021-06-01 DIAGNOSIS — F41.1 GAD (GENERALIZED ANXIETY DISORDER): Primary | ICD-10-CM

## 2021-06-01 DIAGNOSIS — G47.00 INSOMNIA DISORDER, WITH NON-SLEEP DISORDER MENTAL COMORBIDITY: ICD-10-CM

## 2021-06-01 DIAGNOSIS — F32.A DEPRESSION, UNSPECIFIED DEPRESSION TYPE: ICD-10-CM

## 2021-06-01 PROCEDURE — 99213 OFFICE O/P EST LOW 20 MIN: CPT | Mod: 95,,, | Performed by: NURSE PRACTITIONER

## 2021-06-01 PROCEDURE — 90833 PR PSYCHOTHERAPY W/PATIENT W/E&M, 30 MIN (ADD ON): ICD-10-PCS | Mod: 95,,, | Performed by: NURSE PRACTITIONER

## 2021-06-01 PROCEDURE — 99213 PR OFFICE/OUTPT VISIT, EST, LEVL III, 20-29 MIN: ICD-10-PCS | Mod: 95,,, | Performed by: NURSE PRACTITIONER

## 2021-06-01 PROCEDURE — 90833 PSYTX W PT W E/M 30 MIN: CPT | Mod: 95,,, | Performed by: NURSE PRACTITIONER

## 2021-06-01 RX ORDER — TRAZODONE HYDROCHLORIDE 150 MG/1
TABLET ORAL
Qty: 180 TABLET | Refills: 3 | Status: SHIPPED | OUTPATIENT
Start: 2021-06-01 | End: 2022-01-20 | Stop reason: SDUPTHER

## 2021-06-01 RX ORDER — TOPIRAMATE 100 MG/1
100 TABLET, FILM COATED ORAL NIGHTLY
Qty: 90 TABLET | Refills: 3 | Status: SHIPPED | OUTPATIENT
Start: 2021-06-01 | End: 2023-02-14

## 2021-06-01 RX ORDER — HYDROXYZINE PAMOATE 25 MG/1
25 CAPSULE ORAL 3 TIMES DAILY PRN
Qty: 180 CAPSULE | Refills: 3 | Status: SHIPPED | OUTPATIENT
Start: 2021-06-01 | End: 2022-01-20 | Stop reason: SDUPTHER

## 2021-06-01 RX ORDER — VENLAFAXINE HYDROCHLORIDE 150 MG/1
150 CAPSULE, EXTENDED RELEASE ORAL DAILY
Qty: 90 CAPSULE | Refills: 3 | Status: SHIPPED | OUTPATIENT
Start: 2021-06-01 | End: 2022-01-11 | Stop reason: SDUPTHER

## 2021-06-14 ENCOUNTER — OFFICE VISIT (OUTPATIENT)
Dept: NEUROLOGY | Facility: CLINIC | Age: 61
End: 2021-06-14
Payer: MEDICARE

## 2021-06-14 VITALS
HEIGHT: 70 IN | DIASTOLIC BLOOD PRESSURE: 79 MMHG | BODY MASS INDEX: 29.98 KG/M2 | HEART RATE: 76 BPM | WEIGHT: 209.44 LBS | SYSTOLIC BLOOD PRESSURE: 122 MMHG

## 2021-06-14 DIAGNOSIS — Z51.81 MEDICATION MONITORING ENCOUNTER: ICD-10-CM

## 2021-06-14 PROCEDURE — 99999 PR PBB SHADOW E&M-EST. PATIENT-LVL IV: ICD-10-PCS | Mod: PBBFAC,,, | Performed by: PSYCHIATRY & NEUROLOGY

## 2021-06-14 PROCEDURE — 99499 NO LOS: ICD-10-PCS | Mod: S$GLB,,, | Performed by: PSYCHIATRY & NEUROLOGY

## 2021-06-14 PROCEDURE — 1125F AMNT PAIN NOTED PAIN PRSNT: CPT | Mod: S$GLB,,, | Performed by: PSYCHIATRY & NEUROLOGY

## 2021-06-14 PROCEDURE — 99999 PR PBB SHADOW E&M-EST. PATIENT-LVL IV: CPT | Mod: PBBFAC,,, | Performed by: PSYCHIATRY & NEUROLOGY

## 2021-06-14 PROCEDURE — 3008F BODY MASS INDEX DOCD: CPT | Mod: CPTII,S$GLB,, | Performed by: PSYCHIATRY & NEUROLOGY

## 2021-06-14 PROCEDURE — 3008F PR BODY MASS INDEX (BMI) DOCUMENTED: ICD-10-PCS | Mod: CPTII,S$GLB,, | Performed by: PSYCHIATRY & NEUROLOGY

## 2021-06-14 PROCEDURE — 1125F PR PAIN SEVERITY QUANTIFIED, PAIN PRESENT: ICD-10-PCS | Mod: S$GLB,,, | Performed by: PSYCHIATRY & NEUROLOGY

## 2021-06-14 PROCEDURE — 99499 UNLISTED E&M SERVICE: CPT | Mod: S$GLB,,, | Performed by: PSYCHIATRY & NEUROLOGY

## 2021-06-14 PROCEDURE — 64405 NJX AA&/STRD GR OCPL NRV: CPT | Mod: 50,S$GLB,, | Performed by: PSYCHIATRY & NEUROLOGY

## 2021-06-14 PROCEDURE — 64405 PR NERVE BLOCK INJ, ANES/STEROID, OCCIPITAL: ICD-10-PCS | Mod: 50,S$GLB,, | Performed by: PSYCHIATRY & NEUROLOGY

## 2021-06-14 RX ORDER — TIZANIDINE 4 MG/1
4 TABLET ORAL 2 TIMES DAILY PRN
Qty: 30 TABLET | Refills: 0 | Status: SHIPPED | OUTPATIENT
Start: 2021-06-14 | End: 2021-06-24

## 2021-06-18 ENCOUNTER — TELEPHONE (OUTPATIENT)
Dept: NEUROLOGY | Facility: CLINIC | Age: 61
End: 2021-06-18

## 2021-06-22 ENCOUNTER — TELEPHONE (OUTPATIENT)
Dept: NEUROLOGY | Facility: CLINIC | Age: 61
End: 2021-06-22

## 2021-06-23 ENCOUNTER — PROCEDURE VISIT (OUTPATIENT)
Dept: NEUROLOGY | Facility: CLINIC | Age: 61
End: 2021-06-23
Payer: MEDICARE

## 2021-06-23 ENCOUNTER — TELEPHONE (OUTPATIENT)
Dept: NEUROLOGY | Facility: CLINIC | Age: 61
End: 2021-06-23

## 2021-06-23 DIAGNOSIS — Z51.81 MEDICATION MONITORING ENCOUNTER: ICD-10-CM

## 2021-06-23 PROCEDURE — 64615 CHEMODENERV MUSC MIGRAINE: CPT | Mod: S$GLB,,, | Performed by: PSYCHIATRY & NEUROLOGY

## 2021-06-23 PROCEDURE — 99499 UNLISTED E&M SERVICE: CPT | Mod: S$GLB,,, | Performed by: PSYCHIATRY & NEUROLOGY

## 2021-06-23 PROCEDURE — 64615 PR CHEMODENERVATION OF MUSCLE FOR CHRONIC MIGRAINE: ICD-10-PCS | Mod: S$GLB,,, | Performed by: PSYCHIATRY & NEUROLOGY

## 2021-06-23 PROCEDURE — 99499 NO LOS: ICD-10-PCS | Mod: S$GLB,,, | Performed by: PSYCHIATRY & NEUROLOGY

## 2021-07-19 ENCOUNTER — DOCUMENTATION ONLY (OUTPATIENT)
Dept: PSYCHIATRY | Facility: CLINIC | Age: 61
End: 2021-07-19

## 2021-07-22 ENCOUNTER — OFFICE VISIT (OUTPATIENT)
Dept: OBSTETRICS AND GYNECOLOGY | Facility: CLINIC | Age: 61
End: 2021-07-22
Attending: OBSTETRICS & GYNECOLOGY
Payer: MEDICARE

## 2021-07-22 VITALS
DIASTOLIC BLOOD PRESSURE: 72 MMHG | WEIGHT: 210.63 LBS | HEIGHT: 70 IN | SYSTOLIC BLOOD PRESSURE: 128 MMHG | BODY MASS INDEX: 30.15 KG/M2

## 2021-07-22 DIAGNOSIS — Z12.31 SCREENING MAMMOGRAM, ENCOUNTER FOR: ICD-10-CM

## 2021-07-22 DIAGNOSIS — N95.2 POSTMENOPAUSAL ATROPHIC VAGINITIS: ICD-10-CM

## 2021-07-22 DIAGNOSIS — Z01.419 ENCOUNTER FOR GYNECOLOGICAL EXAMINATION WITHOUT ABNORMAL FINDING: Primary | ICD-10-CM

## 2021-07-22 PROCEDURE — 1126F PR PAIN SEVERITY QUANTIFIED, NO PAIN PRESENT: ICD-10-PCS | Mod: CPTII,S$GLB,, | Performed by: OBSTETRICS & GYNECOLOGY

## 2021-07-22 PROCEDURE — 3078F PR MOST RECENT DIASTOLIC BLOOD PRESSURE < 80 MM HG: ICD-10-PCS | Mod: CPTII,S$GLB,, | Performed by: OBSTETRICS & GYNECOLOGY

## 2021-07-22 PROCEDURE — G0101 PR CA SCREEN;PELVIC/BREAST EXAM: ICD-10-PCS | Mod: S$GLB,,, | Performed by: OBSTETRICS & GYNECOLOGY

## 2021-07-22 PROCEDURE — 3074F SYST BP LT 130 MM HG: CPT | Mod: CPTII,S$GLB,, | Performed by: OBSTETRICS & GYNECOLOGY

## 2021-07-22 PROCEDURE — G0101 CA SCREEN;PELVIC/BREAST EXAM: HCPCS | Mod: S$GLB,,, | Performed by: OBSTETRICS & GYNECOLOGY

## 2021-07-22 PROCEDURE — 1126F AMNT PAIN NOTED NONE PRSNT: CPT | Mod: CPTII,S$GLB,, | Performed by: OBSTETRICS & GYNECOLOGY

## 2021-07-22 PROCEDURE — 3008F BODY MASS INDEX DOCD: CPT | Mod: CPTII,S$GLB,, | Performed by: OBSTETRICS & GYNECOLOGY

## 2021-07-22 PROCEDURE — 3074F PR MOST RECENT SYSTOLIC BLOOD PRESSURE < 130 MM HG: ICD-10-PCS | Mod: CPTII,S$GLB,, | Performed by: OBSTETRICS & GYNECOLOGY

## 2021-07-22 PROCEDURE — 3078F DIAST BP <80 MM HG: CPT | Mod: CPTII,S$GLB,, | Performed by: OBSTETRICS & GYNECOLOGY

## 2021-07-22 PROCEDURE — 3008F PR BODY MASS INDEX (BMI) DOCUMENTED: ICD-10-PCS | Mod: CPTII,S$GLB,, | Performed by: OBSTETRICS & GYNECOLOGY

## 2021-07-22 RX ORDER — OXYCODONE AND ACETAMINOPHEN 7.5; 325 MG/1; MG/1
TABLET ORAL
COMMUNITY
End: 2021-12-16

## 2021-07-22 RX ORDER — ESTRADIOL 0.1 MG/G
0.5 CREAM VAGINAL
Qty: 42 G | Refills: 4 | Status: SHIPPED | OUTPATIENT
Start: 2021-07-22 | End: 2023-02-14 | Stop reason: SDUPTHER

## 2021-07-22 RX ORDER — OXYCODONE AND ACETAMINOPHEN 10; 325 MG/1; MG/1
1 TABLET ORAL EVERY 6 HOURS PRN
COMMUNITY
Start: 2021-03-29 | End: 2021-12-16

## 2021-07-22 RX ORDER — TIZANIDINE 4 MG/1
TABLET ORAL
COMMUNITY
End: 2021-12-16

## 2021-07-22 RX ORDER — SUCRALFATE 1 G/10ML
1 SUSPENSION ORAL 2 TIMES DAILY PRN
COMMUNITY
Start: 2021-07-19 | End: 2023-06-19 | Stop reason: CLARIF

## 2021-07-27 ENCOUNTER — OFFICE VISIT (OUTPATIENT)
Dept: SLEEP MEDICINE | Facility: CLINIC | Age: 61
End: 2021-07-27
Payer: MEDICARE

## 2021-07-27 ENCOUNTER — TELEPHONE (OUTPATIENT)
Dept: SLEEP MEDICINE | Facility: CLINIC | Age: 61
End: 2021-07-27

## 2021-07-27 VITALS
HEART RATE: 89 BPM | BODY MASS INDEX: 30.76 KG/M2 | HEIGHT: 69 IN | WEIGHT: 207.69 LBS | SYSTOLIC BLOOD PRESSURE: 117 MMHG | DIASTOLIC BLOOD PRESSURE: 74 MMHG

## 2021-07-27 DIAGNOSIS — G47.33 OBSTRUCTIVE SLEEP APNEA: Primary | ICD-10-CM

## 2021-07-27 PROCEDURE — 3074F SYST BP LT 130 MM HG: CPT | Mod: CPTII,S$GLB,, | Performed by: NURSE PRACTITIONER

## 2021-07-27 PROCEDURE — 1125F AMNT PAIN NOTED PAIN PRSNT: CPT | Mod: CPTII,S$GLB,, | Performed by: NURSE PRACTITIONER

## 2021-07-27 PROCEDURE — 3078F DIAST BP <80 MM HG: CPT | Mod: CPTII,S$GLB,, | Performed by: NURSE PRACTITIONER

## 2021-07-27 PROCEDURE — 1125F PR PAIN SEVERITY QUANTIFIED, PAIN PRESENT: ICD-10-PCS | Mod: CPTII,S$GLB,, | Performed by: NURSE PRACTITIONER

## 2021-07-27 PROCEDURE — 3078F PR MOST RECENT DIASTOLIC BLOOD PRESSURE < 80 MM HG: ICD-10-PCS | Mod: CPTII,S$GLB,, | Performed by: NURSE PRACTITIONER

## 2021-07-27 PROCEDURE — 3008F BODY MASS INDEX DOCD: CPT | Mod: CPTII,S$GLB,, | Performed by: NURSE PRACTITIONER

## 2021-07-27 PROCEDURE — 3074F PR MOST RECENT SYSTOLIC BLOOD PRESSURE < 130 MM HG: ICD-10-PCS | Mod: CPTII,S$GLB,, | Performed by: NURSE PRACTITIONER

## 2021-07-27 PROCEDURE — 1159F PR MEDICATION LIST DOCUMENTED IN MEDICAL RECORD: ICD-10-PCS | Mod: CPTII,S$GLB,, | Performed by: NURSE PRACTITIONER

## 2021-07-27 PROCEDURE — 99999 PR PBB SHADOW E&M-EST. PATIENT-LVL IV: CPT | Mod: PBBFAC,,, | Performed by: NURSE PRACTITIONER

## 2021-07-27 PROCEDURE — 3008F PR BODY MASS INDEX (BMI) DOCUMENTED: ICD-10-PCS | Mod: CPTII,S$GLB,, | Performed by: NURSE PRACTITIONER

## 2021-07-27 PROCEDURE — 99213 OFFICE O/P EST LOW 20 MIN: CPT | Mod: S$GLB,,, | Performed by: NURSE PRACTITIONER

## 2021-07-27 PROCEDURE — 1159F MED LIST DOCD IN RCRD: CPT | Mod: CPTII,S$GLB,, | Performed by: NURSE PRACTITIONER

## 2021-07-27 PROCEDURE — 99213 PR OFFICE/OUTPT VISIT, EST, LEVL III, 20-29 MIN: ICD-10-PCS | Mod: S$GLB,,, | Performed by: NURSE PRACTITIONER

## 2021-07-27 PROCEDURE — 99999 PR PBB SHADOW E&M-EST. PATIENT-LVL IV: ICD-10-PCS | Mod: PBBFAC,,, | Performed by: NURSE PRACTITIONER

## 2021-07-28 ENCOUNTER — TELEPHONE (OUTPATIENT)
Dept: NEUROLOGY | Facility: CLINIC | Age: 61
End: 2021-07-28

## 2021-08-25 ENCOUNTER — OFFICE VISIT (OUTPATIENT)
Dept: CARDIOLOGY | Facility: CLINIC | Age: 61
End: 2021-08-25
Payer: MEDICARE

## 2021-08-25 VITALS
RESPIRATION RATE: 16 BRPM | DIASTOLIC BLOOD PRESSURE: 80 MMHG | BODY MASS INDEX: 30.68 KG/M2 | SYSTOLIC BLOOD PRESSURE: 128 MMHG | HEART RATE: 104 BPM | WEIGHT: 207.81 LBS

## 2021-08-25 DIAGNOSIS — Z98.61 S/P PTCA (PERCUTANEOUS TRANSLUMINAL CORONARY ANGIOPLASTY): ICD-10-CM

## 2021-08-25 DIAGNOSIS — F17.200 SMOKER: ICD-10-CM

## 2021-08-25 DIAGNOSIS — I73.9 PAD (PERIPHERAL ARTERY DISEASE): ICD-10-CM

## 2021-08-25 DIAGNOSIS — E78.00 PURE HYPERCHOLESTEROLEMIA: ICD-10-CM

## 2021-08-25 DIAGNOSIS — M51.36 LUMBAR DEGENERATIVE DISC DISEASE: ICD-10-CM

## 2021-08-25 DIAGNOSIS — I10 ESSENTIAL HYPERTENSION: ICD-10-CM

## 2021-08-25 DIAGNOSIS — I25.10 CORONARY ARTERY DISEASE INVOLVING NATIVE CORONARY ARTERY OF NATIVE HEART WITHOUT ANGINA PECTORIS: ICD-10-CM

## 2021-08-25 DIAGNOSIS — I25.2 OLD MI (MYOCARDIAL INFARCTION): Primary | ICD-10-CM

## 2021-08-25 DIAGNOSIS — G47.33 OSA (OBSTRUCTIVE SLEEP APNEA): ICD-10-CM

## 2021-08-25 PROCEDURE — 1159F MED LIST DOCD IN RCRD: CPT | Mod: CPTII,S$GLB,, | Performed by: INTERNAL MEDICINE

## 2021-08-25 PROCEDURE — 1160F RVW MEDS BY RX/DR IN RCRD: CPT | Mod: CPTII,S$GLB,, | Performed by: INTERNAL MEDICINE

## 2021-08-25 PROCEDURE — 3008F PR BODY MASS INDEX (BMI) DOCUMENTED: ICD-10-PCS | Mod: CPTII,S$GLB,, | Performed by: INTERNAL MEDICINE

## 2021-08-25 PROCEDURE — 99999 PR PBB SHADOW E&M-EST. PATIENT-LVL IV: CPT | Mod: PBBFAC,,, | Performed by: INTERNAL MEDICINE

## 2021-08-25 PROCEDURE — 1159F PR MEDICATION LIST DOCUMENTED IN MEDICAL RECORD: ICD-10-PCS | Mod: CPTII,S$GLB,, | Performed by: INTERNAL MEDICINE

## 2021-08-25 PROCEDURE — 3074F PR MOST RECENT SYSTOLIC BLOOD PRESSURE < 130 MM HG: ICD-10-PCS | Mod: CPTII,S$GLB,, | Performed by: INTERNAL MEDICINE

## 2021-08-25 PROCEDURE — 99214 PR OFFICE/OUTPT VISIT, EST, LEVL IV, 30-39 MIN: ICD-10-PCS | Mod: S$GLB,,, | Performed by: INTERNAL MEDICINE

## 2021-08-25 PROCEDURE — 99999 PR PBB SHADOW E&M-EST. PATIENT-LVL IV: ICD-10-PCS | Mod: PBBFAC,,, | Performed by: INTERNAL MEDICINE

## 2021-08-25 PROCEDURE — 1160F PR REVIEW ALL MEDS BY PRESCRIBER/CLIN PHARMACIST DOCUMENTED: ICD-10-PCS | Mod: CPTII,S$GLB,, | Performed by: INTERNAL MEDICINE

## 2021-08-25 PROCEDURE — 99214 OFFICE O/P EST MOD 30 MIN: CPT | Mod: S$GLB,,, | Performed by: INTERNAL MEDICINE

## 2021-08-25 PROCEDURE — 3079F PR MOST RECENT DIASTOLIC BLOOD PRESSURE 80-89 MM HG: ICD-10-PCS | Mod: CPTII,S$GLB,, | Performed by: INTERNAL MEDICINE

## 2021-08-25 PROCEDURE — 3008F BODY MASS INDEX DOCD: CPT | Mod: CPTII,S$GLB,, | Performed by: INTERNAL MEDICINE

## 2021-08-25 PROCEDURE — 3074F SYST BP LT 130 MM HG: CPT | Mod: CPTII,S$GLB,, | Performed by: INTERNAL MEDICINE

## 2021-08-25 PROCEDURE — 3079F DIAST BP 80-89 MM HG: CPT | Mod: CPTII,S$GLB,, | Performed by: INTERNAL MEDICINE

## 2021-08-25 RX ORDER — LOSARTAN POTASSIUM 25 MG/1
25 TABLET ORAL DAILY
Qty: 90 TABLET | Refills: 3 | Status: SHIPPED | OUTPATIENT
Start: 2021-08-25 | End: 2024-03-12

## 2021-08-25 RX ORDER — ATORVASTATIN CALCIUM 40 MG/1
40 TABLET, FILM COATED ORAL DAILY
Qty: 90 TABLET | Refills: 3 | Status: SHIPPED | OUTPATIENT
Start: 2021-08-25 | End: 2021-08-25 | Stop reason: SDUPTHER

## 2021-08-25 RX ORDER — LOSARTAN POTASSIUM 25 MG/1
25 TABLET ORAL DAILY
Qty: 90 TABLET | Refills: 3 | Status: SHIPPED | OUTPATIENT
Start: 2021-08-25 | End: 2021-08-25 | Stop reason: SDUPTHER

## 2021-08-25 RX ORDER — ATORVASTATIN CALCIUM 40 MG/1
40 TABLET, FILM COATED ORAL DAILY
Qty: 90 TABLET | Refills: 3 | Status: SHIPPED | OUTPATIENT
Start: 2021-08-25 | End: 2022-09-06

## 2021-09-04 ENCOUNTER — PATIENT MESSAGE (OUTPATIENT)
Dept: NEUROLOGY | Facility: CLINIC | Age: 61
End: 2021-09-04

## 2021-09-07 ENCOUNTER — OFFICE VISIT (OUTPATIENT)
Dept: NEUROLOGY | Facility: CLINIC | Age: 61
End: 2021-09-07
Payer: MEDICARE

## 2021-09-07 DIAGNOSIS — M25.559 HIP PAIN: Primary | ICD-10-CM

## 2021-09-07 DIAGNOSIS — M54.31 RIGHT SIDED SCIATICA: ICD-10-CM

## 2021-09-07 DIAGNOSIS — G56.03 BILATERAL CARPAL TUNNEL SYNDROME: ICD-10-CM

## 2021-09-07 PROCEDURE — 99214 OFFICE O/P EST MOD 30 MIN: CPT | Mod: 95,,, | Performed by: PSYCHIATRY & NEUROLOGY

## 2021-09-07 PROCEDURE — 1159F MED LIST DOCD IN RCRD: CPT | Mod: CPTII,95,, | Performed by: PSYCHIATRY & NEUROLOGY

## 2021-09-07 PROCEDURE — 4010F ACE/ARB THERAPY RXD/TAKEN: CPT | Mod: CPTII,95,, | Performed by: PSYCHIATRY & NEUROLOGY

## 2021-09-07 PROCEDURE — 1159F PR MEDICATION LIST DOCUMENTED IN MEDICAL RECORD: ICD-10-PCS | Mod: CPTII,95,, | Performed by: PSYCHIATRY & NEUROLOGY

## 2021-09-07 PROCEDURE — 4010F PR ACE/ARB THEARPY RXD/TAKEN: ICD-10-PCS | Mod: CPTII,95,, | Performed by: PSYCHIATRY & NEUROLOGY

## 2021-09-07 PROCEDURE — 1160F RVW MEDS BY RX/DR IN RCRD: CPT | Mod: CPTII,95,, | Performed by: PSYCHIATRY & NEUROLOGY

## 2021-09-07 PROCEDURE — 1160F PR REVIEW ALL MEDS BY PRESCRIBER/CLIN PHARMACIST DOCUMENTED: ICD-10-PCS | Mod: CPTII,95,, | Performed by: PSYCHIATRY & NEUROLOGY

## 2021-09-07 PROCEDURE — 99214 PR OFFICE/OUTPT VISIT, EST, LEVL IV, 30-39 MIN: ICD-10-PCS | Mod: 95,,, | Performed by: PSYCHIATRY & NEUROLOGY

## 2021-09-07 RX ORDER — GABAPENTIN 300 MG/1
300 CAPSULE ORAL 3 TIMES DAILY
Qty: 270 CAPSULE | Refills: 3 | Status: SHIPPED | OUTPATIENT
Start: 2021-09-07 | End: 2021-09-10 | Stop reason: SDUPTHER

## 2021-09-09 DIAGNOSIS — M25.551 RIGHT HIP PAIN: Primary | ICD-10-CM

## 2021-09-10 ENCOUNTER — HOSPITAL ENCOUNTER (OUTPATIENT)
Dept: RADIOLOGY | Facility: HOSPITAL | Age: 61
Discharge: HOME | End: 2021-09-10
Attending: PHYSICIAN ASSISTANT
Payer: MEDICARE

## 2021-09-10 ENCOUNTER — OFFICE VISIT (OUTPATIENT)
Dept: SPORTS MEDICINE | Facility: CLINIC | Age: 61
End: 2021-09-10
Payer: MEDICARE

## 2021-09-10 VITALS
HEIGHT: 69 IN | WEIGHT: 209 LBS | SYSTOLIC BLOOD PRESSURE: 137 MMHG | HEART RATE: 75 BPM | BODY MASS INDEX: 30.96 KG/M2 | DIASTOLIC BLOOD PRESSURE: 81 MMHG

## 2021-09-10 DIAGNOSIS — G89.29 CHRONIC RIGHT HIP PAIN: ICD-10-CM

## 2021-09-10 DIAGNOSIS — M54.50 LUMBAR BACK PAIN: Primary | ICD-10-CM

## 2021-09-10 DIAGNOSIS — M25.551 RIGHT HIP PAIN: ICD-10-CM

## 2021-09-10 DIAGNOSIS — M25.551 CHRONIC RIGHT HIP PAIN: ICD-10-CM

## 2021-09-10 DIAGNOSIS — M54.31 RIGHT SIDED SCIATICA: ICD-10-CM

## 2021-09-10 DIAGNOSIS — M54.9 DORSALGIA, UNSPECIFIED: ICD-10-CM

## 2021-09-10 PROCEDURE — 99999 PR PBB SHADOW E&M-EST. PATIENT-LVL V: ICD-10-PCS | Mod: PBBFAC,,, | Performed by: PHYSICIAN ASSISTANT

## 2021-09-10 PROCEDURE — 1160F RVW MEDS BY RX/DR IN RCRD: CPT | Mod: CPTII,S$GLB,, | Performed by: PHYSICIAN ASSISTANT

## 2021-09-10 PROCEDURE — 99999 PR PBB SHADOW E&M-EST. PATIENT-LVL V: CPT | Mod: PBBFAC,,, | Performed by: PHYSICIAN ASSISTANT

## 2021-09-10 PROCEDURE — 73521 XR HIPS BILATERAL 2 VIEW INCL AP PELVIS: ICD-10-PCS | Mod: 26,,, | Performed by: RADIOLOGY

## 2021-09-10 PROCEDURE — 73521 X-RAY EXAM HIPS BI 2 VIEWS: CPT | Mod: TC,PN

## 2021-09-10 PROCEDURE — 1159F PR MEDICATION LIST DOCUMENTED IN MEDICAL RECORD: ICD-10-PCS | Mod: CPTII,S$GLB,, | Performed by: PHYSICIAN ASSISTANT

## 2021-09-10 PROCEDURE — 1160F PR REVIEW ALL MEDS BY PRESCRIBER/CLIN PHARMACIST DOCUMENTED: ICD-10-PCS | Mod: CPTII,S$GLB,, | Performed by: PHYSICIAN ASSISTANT

## 2021-09-10 PROCEDURE — 4010F PR ACE/ARB THEARPY RXD/TAKEN: ICD-10-PCS | Mod: CPTII,S$GLB,, | Performed by: PHYSICIAN ASSISTANT

## 2021-09-10 PROCEDURE — 99203 PR OFFICE/OUTPT VISIT, NEW, LEVL III, 30-44 MIN: ICD-10-PCS | Mod: S$GLB,,, | Performed by: PHYSICIAN ASSISTANT

## 2021-09-10 PROCEDURE — 3079F PR MOST RECENT DIASTOLIC BLOOD PRESSURE 80-89 MM HG: ICD-10-PCS | Mod: CPTII,S$GLB,, | Performed by: PHYSICIAN ASSISTANT

## 2021-09-10 PROCEDURE — 3075F PR MOST RECENT SYSTOLIC BLOOD PRESS GE 130-139MM HG: ICD-10-PCS | Mod: CPTII,S$GLB,, | Performed by: PHYSICIAN ASSISTANT

## 2021-09-10 PROCEDURE — 3008F BODY MASS INDEX DOCD: CPT | Mod: CPTII,S$GLB,, | Performed by: PHYSICIAN ASSISTANT

## 2021-09-10 PROCEDURE — 1159F MED LIST DOCD IN RCRD: CPT | Mod: CPTII,S$GLB,, | Performed by: PHYSICIAN ASSISTANT

## 2021-09-10 PROCEDURE — 4010F ACE/ARB THERAPY RXD/TAKEN: CPT | Mod: CPTII,S$GLB,, | Performed by: PHYSICIAN ASSISTANT

## 2021-09-10 PROCEDURE — 99203 OFFICE O/P NEW LOW 30 MIN: CPT | Mod: S$GLB,,, | Performed by: PHYSICIAN ASSISTANT

## 2021-09-10 PROCEDURE — 3075F SYST BP GE 130 - 139MM HG: CPT | Mod: CPTII,S$GLB,, | Performed by: PHYSICIAN ASSISTANT

## 2021-09-10 PROCEDURE — 73521 X-RAY EXAM HIPS BI 2 VIEWS: CPT | Mod: 26,,, | Performed by: RADIOLOGY

## 2021-09-10 PROCEDURE — 3079F DIAST BP 80-89 MM HG: CPT | Mod: CPTII,S$GLB,, | Performed by: PHYSICIAN ASSISTANT

## 2021-09-10 PROCEDURE — 3008F PR BODY MASS INDEX (BMI) DOCUMENTED: ICD-10-PCS | Mod: CPTII,S$GLB,, | Performed by: PHYSICIAN ASSISTANT

## 2021-09-10 RX ORDER — METHYLPREDNISOLONE 4 MG/1
TABLET ORAL
Qty: 21 TABLET | Refills: 0 | Status: SHIPPED | OUTPATIENT
Start: 2021-09-10 | End: 2021-12-16

## 2021-09-10 RX ORDER — GABAPENTIN 300 MG/1
300 CAPSULE ORAL 3 TIMES DAILY
Qty: 90 CAPSULE | Refills: 0 | Status: SHIPPED | OUTPATIENT
Start: 2021-09-10 | End: 2021-12-16

## 2021-09-13 ENCOUNTER — TELEPHONE (OUTPATIENT)
Dept: ORTHOPEDICS | Facility: CLINIC | Age: 61
End: 2021-09-13

## 2021-09-14 ENCOUNTER — HOSPITAL ENCOUNTER (OUTPATIENT)
Dept: RADIOLOGY | Facility: OTHER | Age: 61
Discharge: HOME | End: 2021-09-14
Attending: OBSTETRICS & GYNECOLOGY
Payer: MEDICARE

## 2021-09-14 DIAGNOSIS — Z12.31 SCREENING MAMMOGRAM, ENCOUNTER FOR: ICD-10-CM

## 2021-09-14 PROCEDURE — 77067 SCR MAMMO BI INCL CAD: CPT | Mod: 26,,, | Performed by: RADIOLOGY

## 2021-09-14 PROCEDURE — 77067 SCR MAMMO BI INCL CAD: CPT | Mod: TC

## 2021-09-14 PROCEDURE — 77067 MAMMO DIGITAL SCREENING BILAT WITH TOMO: ICD-10-PCS | Mod: 26,,, | Performed by: RADIOLOGY

## 2021-09-14 PROCEDURE — 77063 MAMMO DIGITAL SCREENING BILAT WITH TOMO: ICD-10-PCS | Mod: 26,,, | Performed by: RADIOLOGY

## 2021-09-14 PROCEDURE — 77063 BREAST TOMOSYNTHESIS BI: CPT | Mod: 26,,, | Performed by: RADIOLOGY

## 2021-09-16 ENCOUNTER — HOSPITAL ENCOUNTER (OUTPATIENT)
Dept: RADIOLOGY | Facility: OTHER | Age: 61
Discharge: HOME | End: 2021-09-16
Attending: PHYSICIAN ASSISTANT
Payer: MEDICARE

## 2021-09-16 DIAGNOSIS — M54.9 DORSALGIA, UNSPECIFIED: ICD-10-CM

## 2021-09-16 DIAGNOSIS — M54.31 RIGHT SIDED SCIATICA: ICD-10-CM

## 2021-09-16 PROCEDURE — 72148 MRI LUMBAR SPINE WITHOUT CONTRAST: ICD-10-PCS | Mod: 26,,, | Performed by: RADIOLOGY

## 2021-09-16 PROCEDURE — 72148 MRI LUMBAR SPINE W/O DYE: CPT | Mod: 26,,, | Performed by: RADIOLOGY

## 2021-09-16 PROCEDURE — 72148 MRI LUMBAR SPINE W/O DYE: CPT | Mod: TC

## 2021-09-19 ENCOUNTER — TELEPHONE (OUTPATIENT)
Dept: SPORTS MEDICINE | Facility: CLINIC | Age: 61
End: 2021-09-19

## 2021-09-20 ENCOUNTER — TELEPHONE (OUTPATIENT)
Dept: SPORTS MEDICINE | Facility: CLINIC | Age: 61
End: 2021-09-20

## 2021-09-21 ENCOUNTER — TELEPHONE (OUTPATIENT)
Dept: SPORTS MEDICINE | Facility: CLINIC | Age: 61
End: 2021-09-21

## 2021-09-21 ENCOUNTER — OFFICE VISIT (OUTPATIENT)
Dept: PSYCHIATRY | Facility: CLINIC | Age: 61
End: 2021-09-21
Payer: MEDICARE

## 2021-09-21 DIAGNOSIS — F32.A DEPRESSION, UNSPECIFIED DEPRESSION TYPE: Primary | ICD-10-CM

## 2021-09-21 PROCEDURE — 4010F PR ACE/ARB THEARPY RXD/TAKEN: ICD-10-PCS | Mod: CPTII,S$GLB,, | Performed by: SOCIAL WORKER

## 2021-09-21 PROCEDURE — 90834 PR PSYCHOTHERAPY W/PATIENT, 45 MIN: ICD-10-PCS | Mod: S$GLB,,, | Performed by: SOCIAL WORKER

## 2021-09-21 PROCEDURE — 4010F ACE/ARB THERAPY RXD/TAKEN: CPT | Mod: CPTII,S$GLB,, | Performed by: SOCIAL WORKER

## 2021-09-21 PROCEDURE — 90834 PSYTX W PT 45 MINUTES: CPT | Mod: S$GLB,,, | Performed by: SOCIAL WORKER

## 2021-09-23 ENCOUNTER — TELEPHONE (OUTPATIENT)
Dept: PAIN MEDICINE | Facility: CLINIC | Age: 61
End: 2021-09-23

## 2021-09-23 ENCOUNTER — OFFICE VISIT (OUTPATIENT)
Dept: SPORTS MEDICINE | Facility: CLINIC | Age: 61
End: 2021-09-23
Payer: MEDICARE

## 2021-09-23 VITALS
DIASTOLIC BLOOD PRESSURE: 90 MMHG | WEIGHT: 208 LBS | SYSTOLIC BLOOD PRESSURE: 130 MMHG | HEIGHT: 69 IN | BODY MASS INDEX: 30.81 KG/M2

## 2021-09-23 DIAGNOSIS — G89.29 CHRONIC RIGHT HIP PAIN: Primary | ICD-10-CM

## 2021-09-23 DIAGNOSIS — M25.551 CHRONIC RIGHT HIP PAIN: Primary | ICD-10-CM

## 2021-09-23 PROCEDURE — 20611 PR DRAIN/ASP/INJECT MAJOR JOINT/BURSA W/US GUIDANCE: ICD-10-PCS | Mod: RT,S$GLB,, | Performed by: ORTHOPAEDIC SURGERY

## 2021-09-23 PROCEDURE — 1159F MED LIST DOCD IN RCRD: CPT | Mod: CPTII,S$GLB,, | Performed by: ORTHOPAEDIC SURGERY

## 2021-09-23 PROCEDURE — 1159F PR MEDICATION LIST DOCUMENTED IN MEDICAL RECORD: ICD-10-PCS | Mod: CPTII,S$GLB,, | Performed by: ORTHOPAEDIC SURGERY

## 2021-09-23 PROCEDURE — 99999 PR PBB SHADOW E&M-EST. PATIENT-LVL III: CPT | Mod: PBBFAC,,, | Performed by: ORTHOPAEDIC SURGERY

## 2021-09-23 PROCEDURE — 3075F PR MOST RECENT SYSTOLIC BLOOD PRESS GE 130-139MM HG: ICD-10-PCS | Mod: CPTII,S$GLB,, | Performed by: ORTHOPAEDIC SURGERY

## 2021-09-23 PROCEDURE — 99499 UNLISTED E&M SERVICE: CPT | Mod: S$GLB,,, | Performed by: ORTHOPAEDIC SURGERY

## 2021-09-23 PROCEDURE — 3008F PR BODY MASS INDEX (BMI) DOCUMENTED: ICD-10-PCS | Mod: CPTII,S$GLB,, | Performed by: ORTHOPAEDIC SURGERY

## 2021-09-23 PROCEDURE — 4010F PR ACE/ARB THEARPY RXD/TAKEN: ICD-10-PCS | Mod: CPTII,S$GLB,, | Performed by: ORTHOPAEDIC SURGERY

## 2021-09-23 PROCEDURE — 1160F PR REVIEW ALL MEDS BY PRESCRIBER/CLIN PHARMACIST DOCUMENTED: ICD-10-PCS | Mod: CPTII,S$GLB,, | Performed by: ORTHOPAEDIC SURGERY

## 2021-09-23 PROCEDURE — 1160F RVW MEDS BY RX/DR IN RCRD: CPT | Mod: CPTII,S$GLB,, | Performed by: ORTHOPAEDIC SURGERY

## 2021-09-23 PROCEDURE — 4010F ACE/ARB THERAPY RXD/TAKEN: CPT | Mod: CPTII,S$GLB,, | Performed by: ORTHOPAEDIC SURGERY

## 2021-09-23 PROCEDURE — 3080F PR MOST RECENT DIASTOLIC BLOOD PRESSURE >= 90 MM HG: ICD-10-PCS | Mod: CPTII,S$GLB,, | Performed by: ORTHOPAEDIC SURGERY

## 2021-09-23 PROCEDURE — 3080F DIAST BP >= 90 MM HG: CPT | Mod: CPTII,S$GLB,, | Performed by: ORTHOPAEDIC SURGERY

## 2021-09-23 PROCEDURE — 99999 PR PBB SHADOW E&M-EST. PATIENT-LVL III: ICD-10-PCS | Mod: PBBFAC,,, | Performed by: ORTHOPAEDIC SURGERY

## 2021-09-23 PROCEDURE — 99499 NO LOS: ICD-10-PCS | Mod: S$GLB,,, | Performed by: ORTHOPAEDIC SURGERY

## 2021-09-23 PROCEDURE — 3008F BODY MASS INDEX DOCD: CPT | Mod: CPTII,S$GLB,, | Performed by: ORTHOPAEDIC SURGERY

## 2021-09-23 PROCEDURE — 20611 DRAIN/INJ JOINT/BURSA W/US: CPT | Mod: RT,S$GLB,, | Performed by: ORTHOPAEDIC SURGERY

## 2021-09-23 PROCEDURE — 3075F SYST BP GE 130 - 139MM HG: CPT | Mod: CPTII,S$GLB,, | Performed by: ORTHOPAEDIC SURGERY

## 2021-09-23 RX ORDER — TRIAMCINOLONE ACETONIDE 40 MG/ML
40 INJECTION, SUSPENSION INTRA-ARTICULAR; INTRAMUSCULAR
Status: SHIPPED | OUTPATIENT
Start: 2021-09-23

## 2021-10-01 ENCOUNTER — OFFICE VISIT (OUTPATIENT)
Dept: SPORTS MEDICINE | Facility: CLINIC | Age: 61
End: 2021-10-01
Payer: MEDICARE

## 2021-10-01 VITALS
SYSTOLIC BLOOD PRESSURE: 123 MMHG | HEIGHT: 69 IN | HEART RATE: 84 BPM | TEMPERATURE: 98 F | DIASTOLIC BLOOD PRESSURE: 76 MMHG | WEIGHT: 208.69 LBS | BODY MASS INDEX: 30.91 KG/M2

## 2021-10-01 DIAGNOSIS — M54.50 LUMBAR BACK PAIN: Primary | ICD-10-CM

## 2021-10-01 DIAGNOSIS — M25.551 CHRONIC RIGHT HIP PAIN: ICD-10-CM

## 2021-10-01 DIAGNOSIS — G89.29 CHRONIC RIGHT HIP PAIN: ICD-10-CM

## 2021-10-01 PROCEDURE — 3078F DIAST BP <80 MM HG: CPT | Mod: CPTII,S$GLB,, | Performed by: PHYSICIAN ASSISTANT

## 2021-10-01 PROCEDURE — 3078F PR MOST RECENT DIASTOLIC BLOOD PRESSURE < 80 MM HG: ICD-10-PCS | Mod: CPTII,S$GLB,, | Performed by: PHYSICIAN ASSISTANT

## 2021-10-01 PROCEDURE — 3008F PR BODY MASS INDEX (BMI) DOCUMENTED: ICD-10-PCS | Mod: CPTII,S$GLB,, | Performed by: PHYSICIAN ASSISTANT

## 2021-10-01 PROCEDURE — 4010F ACE/ARB THERAPY RXD/TAKEN: CPT | Mod: CPTII,S$GLB,, | Performed by: PHYSICIAN ASSISTANT

## 2021-10-01 PROCEDURE — 99999 PR PBB SHADOW E&M-EST. PATIENT-LVL V: CPT | Mod: PBBFAC,,, | Performed by: PHYSICIAN ASSISTANT

## 2021-10-01 PROCEDURE — 1159F PR MEDICATION LIST DOCUMENTED IN MEDICAL RECORD: ICD-10-PCS | Mod: CPTII,S$GLB,, | Performed by: PHYSICIAN ASSISTANT

## 2021-10-01 PROCEDURE — 3074F PR MOST RECENT SYSTOLIC BLOOD PRESSURE < 130 MM HG: ICD-10-PCS | Mod: CPTII,S$GLB,, | Performed by: PHYSICIAN ASSISTANT

## 2021-10-01 PROCEDURE — 99999 PR PBB SHADOW E&M-EST. PATIENT-LVL V: ICD-10-PCS | Mod: PBBFAC,,, | Performed by: PHYSICIAN ASSISTANT

## 2021-10-01 PROCEDURE — 3008F BODY MASS INDEX DOCD: CPT | Mod: CPTII,S$GLB,, | Performed by: PHYSICIAN ASSISTANT

## 2021-10-01 PROCEDURE — 99214 PR OFFICE/OUTPT VISIT, EST, LEVL IV, 30-39 MIN: ICD-10-PCS | Mod: S$GLB,,, | Performed by: PHYSICIAN ASSISTANT

## 2021-10-01 PROCEDURE — 1160F PR REVIEW ALL MEDS BY PRESCRIBER/CLIN PHARMACIST DOCUMENTED: ICD-10-PCS | Mod: CPTII,S$GLB,, | Performed by: PHYSICIAN ASSISTANT

## 2021-10-01 PROCEDURE — 1159F MED LIST DOCD IN RCRD: CPT | Mod: CPTII,S$GLB,, | Performed by: PHYSICIAN ASSISTANT

## 2021-10-01 PROCEDURE — 99214 OFFICE O/P EST MOD 30 MIN: CPT | Mod: S$GLB,,, | Performed by: PHYSICIAN ASSISTANT

## 2021-10-01 PROCEDURE — 3074F SYST BP LT 130 MM HG: CPT | Mod: CPTII,S$GLB,, | Performed by: PHYSICIAN ASSISTANT

## 2021-10-01 PROCEDURE — 1160F RVW MEDS BY RX/DR IN RCRD: CPT | Mod: CPTII,S$GLB,, | Performed by: PHYSICIAN ASSISTANT

## 2021-10-01 PROCEDURE — 4010F PR ACE/ARB THEARPY RXD/TAKEN: ICD-10-PCS | Mod: CPTII,S$GLB,, | Performed by: PHYSICIAN ASSISTANT

## 2021-10-04 ENCOUNTER — PROCEDURE VISIT (OUTPATIENT)
Dept: NEUROLOGY | Facility: CLINIC | Age: 61
End: 2021-10-04
Payer: MEDICARE

## 2021-10-04 DIAGNOSIS — G56.03 BILATERAL CARPAL TUNNEL SYNDROME: ICD-10-CM

## 2021-10-04 PROCEDURE — 95913 NRV CNDJ TEST 13/> STUDIES: CPT | Mod: S$GLB,,, | Performed by: PSYCHIATRY & NEUROLOGY

## 2021-10-04 PROCEDURE — 95913 PR NERVE CONDUCTION STUDY; 13 OR MORE STUDIES: ICD-10-PCS | Mod: S$GLB,,, | Performed by: PSYCHIATRY & NEUROLOGY

## 2021-11-18 ENCOUNTER — HOSPITAL ENCOUNTER (OUTPATIENT)
Dept: RADIOLOGY | Facility: HOSPITAL | Age: 61
Discharge: HOME | End: 2021-11-18
Attending: ORTHOPAEDIC SURGERY
Payer: MEDICARE

## 2021-11-18 ENCOUNTER — OFFICE VISIT (OUTPATIENT)
Dept: ORTHOPEDICS | Facility: CLINIC | Age: 61
End: 2021-11-18
Payer: MEDICARE

## 2021-11-18 VITALS — HEIGHT: 69 IN | BODY MASS INDEX: 31.36 KG/M2 | WEIGHT: 211.75 LBS

## 2021-11-18 DIAGNOSIS — M51.36 DDD (DEGENERATIVE DISC DISEASE), LUMBAR: ICD-10-CM

## 2021-11-18 DIAGNOSIS — M54.50 LUMBAR BACK PAIN: ICD-10-CM

## 2021-11-18 PROCEDURE — 72110 X-RAY EXAM L-2 SPINE 4/>VWS: CPT | Mod: TC

## 2021-11-18 PROCEDURE — 99214 OFFICE O/P EST MOD 30 MIN: CPT | Mod: S$GLB,,, | Performed by: ORTHOPAEDIC SURGERY

## 2021-11-18 PROCEDURE — 4010F PR ACE/ARB THEARPY RXD/TAKEN: ICD-10-PCS | Mod: CPTII,S$GLB,, | Performed by: ORTHOPAEDIC SURGERY

## 2021-11-18 PROCEDURE — 1160F RVW MEDS BY RX/DR IN RCRD: CPT | Mod: CPTII,S$GLB,, | Performed by: ORTHOPAEDIC SURGERY

## 2021-11-18 PROCEDURE — 1159F PR MEDICATION LIST DOCUMENTED IN MEDICAL RECORD: ICD-10-PCS | Mod: CPTII,S$GLB,, | Performed by: ORTHOPAEDIC SURGERY

## 2021-11-18 PROCEDURE — 3008F BODY MASS INDEX DOCD: CPT | Mod: CPTII,S$GLB,, | Performed by: ORTHOPAEDIC SURGERY

## 2021-11-18 PROCEDURE — 72110 XR LUMBAR SPINE AP AND LAT WITH FLEX/EXT: ICD-10-PCS | Mod: 26,,, | Performed by: RADIOLOGY

## 2021-11-18 PROCEDURE — 99999 PR PBB SHADOW E&M-EST. PATIENT-LVL IV: CPT | Mod: PBBFAC,,, | Performed by: ORTHOPAEDIC SURGERY

## 2021-11-18 PROCEDURE — 4010F ACE/ARB THERAPY RXD/TAKEN: CPT | Mod: CPTII,S$GLB,, | Performed by: ORTHOPAEDIC SURGERY

## 2021-11-18 PROCEDURE — 1159F MED LIST DOCD IN RCRD: CPT | Mod: CPTII,S$GLB,, | Performed by: ORTHOPAEDIC SURGERY

## 2021-11-18 PROCEDURE — 72110 X-RAY EXAM L-2 SPINE 4/>VWS: CPT | Mod: 26,,, | Performed by: RADIOLOGY

## 2021-11-18 PROCEDURE — 1160F PR REVIEW ALL MEDS BY PRESCRIBER/CLIN PHARMACIST DOCUMENTED: ICD-10-PCS | Mod: CPTII,S$GLB,, | Performed by: ORTHOPAEDIC SURGERY

## 2021-11-18 PROCEDURE — 99999 PR PBB SHADOW E&M-EST. PATIENT-LVL IV: ICD-10-PCS | Mod: PBBFAC,,, | Performed by: ORTHOPAEDIC SURGERY

## 2021-11-18 PROCEDURE — 3008F PR BODY MASS INDEX (BMI) DOCUMENTED: ICD-10-PCS | Mod: CPTII,S$GLB,, | Performed by: ORTHOPAEDIC SURGERY

## 2021-11-18 PROCEDURE — 99214 PR OFFICE/OUTPT VISIT, EST, LEVL IV, 30-39 MIN: ICD-10-PCS | Mod: S$GLB,,, | Performed by: ORTHOPAEDIC SURGERY

## 2021-12-07 ENCOUNTER — TELEPHONE (OUTPATIENT)
Dept: CARDIOLOGY | Facility: CLINIC | Age: 61
End: 2021-12-07
Payer: MEDICARE

## 2021-12-13 ENCOUNTER — OFFICE VISIT (OUTPATIENT)
Dept: SPORTS MEDICINE | Facility: CLINIC | Age: 61
End: 2021-12-13
Payer: MEDICARE

## 2021-12-13 VITALS — BODY MASS INDEX: 31.25 KG/M2 | WEIGHT: 211 LBS | HEIGHT: 69 IN

## 2021-12-13 DIAGNOSIS — M54.31 RIGHT SIDED SCIATICA: ICD-10-CM

## 2021-12-13 DIAGNOSIS — M25.551 CHRONIC RIGHT HIP PAIN: ICD-10-CM

## 2021-12-13 DIAGNOSIS — G89.29 CHRONIC RIGHT HIP PAIN: ICD-10-CM

## 2021-12-13 DIAGNOSIS — M54.50 LUMBAR BACK PAIN: Primary | ICD-10-CM

## 2021-12-13 PROCEDURE — 99214 PR OFFICE/OUTPT VISIT, EST, LEVL IV, 30-39 MIN: ICD-10-PCS | Mod: S$GLB,,, | Performed by: PHYSICIAN ASSISTANT

## 2021-12-13 PROCEDURE — 99999 PR PBB SHADOW E&M-EST. PATIENT-LVL III: ICD-10-PCS | Mod: PBBFAC,,, | Performed by: PHYSICIAN ASSISTANT

## 2021-12-13 PROCEDURE — 99999 PR PBB SHADOW E&M-EST. PATIENT-LVL III: CPT | Mod: PBBFAC,,, | Performed by: PHYSICIAN ASSISTANT

## 2021-12-13 PROCEDURE — 99214 OFFICE O/P EST MOD 30 MIN: CPT | Mod: S$GLB,,, | Performed by: PHYSICIAN ASSISTANT

## 2021-12-13 PROCEDURE — 4010F ACE/ARB THERAPY RXD/TAKEN: CPT | Mod: CPTII,S$GLB,, | Performed by: PHYSICIAN ASSISTANT

## 2021-12-13 PROCEDURE — 4010F PR ACE/ARB THEARPY RXD/TAKEN: ICD-10-PCS | Mod: CPTII,S$GLB,, | Performed by: PHYSICIAN ASSISTANT

## 2021-12-16 ENCOUNTER — OFFICE VISIT (OUTPATIENT)
Dept: CARDIOLOGY | Facility: CLINIC | Age: 61
End: 2021-12-16
Payer: MEDICARE

## 2021-12-16 VITALS
SYSTOLIC BLOOD PRESSURE: 126 MMHG | WEIGHT: 213.88 LBS | OXYGEN SATURATION: 100 % | BODY MASS INDEX: 31.68 KG/M2 | HEART RATE: 77 BPM | DIASTOLIC BLOOD PRESSURE: 61 MMHG | HEIGHT: 69 IN

## 2021-12-16 DIAGNOSIS — E78.00 PURE HYPERCHOLESTEROLEMIA: ICD-10-CM

## 2021-12-16 DIAGNOSIS — I49.3 PVCS (PREMATURE VENTRICULAR CONTRACTIONS): ICD-10-CM

## 2021-12-16 DIAGNOSIS — I25.10 CORONARY ARTERY DISEASE INVOLVING NATIVE CORONARY ARTERY OF NATIVE HEART WITHOUT ANGINA PECTORIS: Primary | ICD-10-CM

## 2021-12-16 DIAGNOSIS — I10 PRIMARY HYPERTENSION: ICD-10-CM

## 2021-12-16 DIAGNOSIS — Z87.891 EX-SMOKER: ICD-10-CM

## 2021-12-16 DIAGNOSIS — I25.2 OLD MI (MYOCARDIAL INFARCTION): ICD-10-CM

## 2021-12-16 DIAGNOSIS — J45.20 MILD INTERMITTENT ASTHMA WITHOUT COMPLICATION: ICD-10-CM

## 2021-12-16 DIAGNOSIS — M51.36 LUMBAR DEGENERATIVE DISC DISEASE: ICD-10-CM

## 2021-12-16 DIAGNOSIS — Z98.61 S/P PTCA (PERCUTANEOUS TRANSLUMINAL CORONARY ANGIOPLASTY): ICD-10-CM

## 2021-12-16 PROCEDURE — 99214 OFFICE O/P EST MOD 30 MIN: CPT | Mod: 25,S$GLB,, | Performed by: INTERNAL MEDICINE

## 2021-12-16 PROCEDURE — 99999 PR PBB SHADOW E&M-EST. PATIENT-LVL IV: ICD-10-PCS | Mod: PBBFAC,,, | Performed by: INTERNAL MEDICINE

## 2021-12-16 PROCEDURE — 99999 PR PBB SHADOW E&M-EST. PATIENT-LVL IV: CPT | Mod: PBBFAC,,, | Performed by: INTERNAL MEDICINE

## 2021-12-16 PROCEDURE — 93000 EKG 12-LEAD: ICD-10-PCS | Mod: S$GLB,,, | Performed by: INTERNAL MEDICINE

## 2021-12-16 PROCEDURE — 93000 ELECTROCARDIOGRAM COMPLETE: CPT | Mod: S$GLB,,, | Performed by: INTERNAL MEDICINE

## 2021-12-16 PROCEDURE — 4010F ACE/ARB THERAPY RXD/TAKEN: CPT | Mod: CPTII,S$GLB,, | Performed by: INTERNAL MEDICINE

## 2021-12-16 PROCEDURE — 99214 PR OFFICE/OUTPT VISIT, EST, LEVL IV, 30-39 MIN: ICD-10-PCS | Mod: 25,S$GLB,, | Performed by: INTERNAL MEDICINE

## 2021-12-16 PROCEDURE — 4010F PR ACE/ARB THEARPY RXD/TAKEN: ICD-10-PCS | Mod: CPTII,S$GLB,, | Performed by: INTERNAL MEDICINE

## 2021-12-16 RX ORDER — GABAPENTIN 600 MG/1
600 TABLET ORAL 3 TIMES DAILY
COMMUNITY
Start: 2021-11-22 | End: 2022-05-16

## 2021-12-16 RX ORDER — EZETIMIBE 10 MG/1
10 TABLET ORAL DAILY
Qty: 90 TABLET | Refills: 3 | Status: SHIPPED | OUTPATIENT
Start: 2021-12-16 | End: 2022-05-16

## 2021-12-16 RX ORDER — HYDROCODONE BITARTRATE AND ACETAMINOPHEN 10; 325 MG/1; MG/1
TABLET ORAL DAILY PRN
COMMUNITY
Start: 2021-12-15

## 2021-12-27 ENCOUNTER — TELEPHONE (OUTPATIENT)
Dept: NEUROLOGY | Facility: CLINIC | Age: 61
End: 2021-12-27
Payer: MEDICARE

## 2022-01-20 ENCOUNTER — OFFICE VISIT (OUTPATIENT)
Dept: PSYCHIATRY | Facility: CLINIC | Age: 62
End: 2022-01-20
Payer: MEDICARE

## 2022-01-20 VITALS
DIASTOLIC BLOOD PRESSURE: 66 MMHG | HEART RATE: 103 BPM | WEIGHT: 217.38 LBS | SYSTOLIC BLOOD PRESSURE: 144 MMHG | HEIGHT: 70 IN | BODY MASS INDEX: 31.12 KG/M2

## 2022-01-20 DIAGNOSIS — G47.00 INSOMNIA DISORDER, WITH NON-SLEEP DISORDER MENTAL COMORBIDITY: ICD-10-CM

## 2022-01-20 DIAGNOSIS — R20.2 PARESTHESIAS: ICD-10-CM

## 2022-01-20 DIAGNOSIS — F32.A DEPRESSION, UNSPECIFIED DEPRESSION TYPE: ICD-10-CM

## 2022-01-20 DIAGNOSIS — F41.1 GAD (GENERALIZED ANXIETY DISORDER): Primary | ICD-10-CM

## 2022-01-20 PROCEDURE — 3078F PR MOST RECENT DIASTOLIC BLOOD PRESSURE < 80 MM HG: ICD-10-PCS | Mod: CPTII,S$GLB,, | Performed by: NURSE PRACTITIONER

## 2022-01-20 PROCEDURE — 99213 PR OFFICE/OUTPT VISIT, EST, LEVL III, 20-29 MIN: ICD-10-PCS | Mod: S$GLB,,, | Performed by: NURSE PRACTITIONER

## 2022-01-20 PROCEDURE — 99213 OFFICE O/P EST LOW 20 MIN: CPT | Mod: S$GLB,,, | Performed by: NURSE PRACTITIONER

## 2022-01-20 PROCEDURE — 4010F PR ACE/ARB THEARPY RXD/TAKEN: ICD-10-PCS | Mod: CPTII,S$GLB,, | Performed by: NURSE PRACTITIONER

## 2022-01-20 PROCEDURE — 90833 PR PSYCHOTHERAPY W/PATIENT W/E&M, 30 MIN (ADD ON): ICD-10-PCS | Mod: S$GLB,,, | Performed by: NURSE PRACTITIONER

## 2022-01-20 PROCEDURE — 3008F BODY MASS INDEX DOCD: CPT | Mod: CPTII,S$GLB,, | Performed by: NURSE PRACTITIONER

## 2022-01-20 PROCEDURE — 3008F PR BODY MASS INDEX (BMI) DOCUMENTED: ICD-10-PCS | Mod: CPTII,S$GLB,, | Performed by: NURSE PRACTITIONER

## 2022-01-20 PROCEDURE — 90833 PSYTX W PT W E/M 30 MIN: CPT | Mod: S$GLB,,, | Performed by: NURSE PRACTITIONER

## 2022-01-20 PROCEDURE — 3077F SYST BP >= 140 MM HG: CPT | Mod: CPTII,S$GLB,, | Performed by: NURSE PRACTITIONER

## 2022-01-20 PROCEDURE — 4010F ACE/ARB THERAPY RXD/TAKEN: CPT | Mod: CPTII,S$GLB,, | Performed by: NURSE PRACTITIONER

## 2022-01-20 PROCEDURE — 99999 PR PBB SHADOW E&M-EST. PATIENT-LVL II: ICD-10-PCS | Mod: PBBFAC,,, | Performed by: NURSE PRACTITIONER

## 2022-01-20 PROCEDURE — 3078F DIAST BP <80 MM HG: CPT | Mod: CPTII,S$GLB,, | Performed by: NURSE PRACTITIONER

## 2022-01-20 PROCEDURE — 99999 PR PBB SHADOW E&M-EST. PATIENT-LVL II: CPT | Mod: PBBFAC,,, | Performed by: NURSE PRACTITIONER

## 2022-01-20 PROCEDURE — 3077F PR MOST RECENT SYSTOLIC BLOOD PRESSURE >= 140 MM HG: ICD-10-PCS | Mod: CPTII,S$GLB,, | Performed by: NURSE PRACTITIONER

## 2022-01-20 RX ORDER — HYDROXYZINE PAMOATE 25 MG/1
25 CAPSULE ORAL 3 TIMES DAILY PRN
Qty: 180 CAPSULE | Refills: 3 | Status: SHIPPED | OUTPATIENT
Start: 2022-01-20 | End: 2023-02-03 | Stop reason: SDUPTHER

## 2022-01-20 RX ORDER — VENLAFAXINE HYDROCHLORIDE 150 MG/1
150 CAPSULE, EXTENDED RELEASE ORAL DAILY
Qty: 90 CAPSULE | Refills: 3 | Status: SHIPPED | OUTPATIENT
Start: 2022-01-20 | End: 2023-01-23 | Stop reason: SDUPTHER

## 2022-01-20 RX ORDER — TRAZODONE HYDROCHLORIDE 150 MG/1
TABLET ORAL
Qty: 180 TABLET | Refills: 3 | Status: SHIPPED | OUTPATIENT
Start: 2022-01-20 | End: 2023-02-03

## 2022-01-20 NOTE — PROGRESS NOTES
Outpatient Psychiatry Follow-Up Visit (MD/NP)    1/20/2022    Clinical Status of Patient:  Outpatient (Ambulatory)    Chief Complaint:  Jennifer Brady is a 61 y.o. female who presents today for follow-up of depression and anxiety.  Met with patient.      Last Visit: was  6/012021. Chart and PMPreviewed.     Interval History and Content of Current Session:  Current Psychiatric Medications as of last visit.  · Continue Effexor  mg po daily for depression, anxiety, and mood  · Continue Topamax 100 mg po BID (for migraines) per Neurology  · Continue Trazodone 200 - 300 mg po qhs PRN insomnia  · Continue Vistaril 25 mg po TID PRN anxiety    Pt presents bright affect and euthymic mood. Reports improved mood and anxiety with Effexor. Denies side effects. Reports that she continues to have trouble falling asleep even with Trazodone. Recommended using Vistaril 2 caps before bed.  Denies SI/HI/AVH.    Psychotherapy:  · Target symptoms: depression, anxiety , grief  · Why chosen therapy is appropriate versus another modality: relevant to diagnosis  · Outcome monitoring methods: self-report, observation  · Therapeutic intervention type: supportive psychotherapy  · Topics discussed/themes: relationships difficulties, difficulty managing affect in interpersonal relationships, building skills sets for symptom management, symptom recognition  · The patient's response to the intervention is accepting. The patient's progress toward treatment goals is fair.   · Duration of intervention: 19 minutes.    Review of Systems   · PSYCHIATRIC: Pertinant items are noted in the narrative.  · CONSTITUTIONAL: No weight gain or loss.   · MUSCULOSKELETAL: No pain or stiffness of the joints.  · NEUROLOGIC: No weakness, sensory changes, seizures, confusion, memory loss, tremor or other abnormal movements.  · ENDOCRINE: No polydipsia or polyuria.  · INTEGUMENTARY: No rashes or lacerations.  · EYES: No exophthalmos, jaundice or  "blindness.  · ENT: No dizziness, tinnitus or hearing loss.  · RESPIRATORY: No shortness of breath.  · CARDIOVASCULAR: No tachycardia or chest pain.  · GASTROINTESTINAL: No nausea, vomiting, pain, constipation or diarrhea.  · GENITOURINARY: No frequency, dysuria or sexual dysfunction.  · HEMATOLOGIC/LYMPHATIC: No excessive bleeding, prolonged or excessive bleeding after dental extraction/injury.  · ALLERGIC/IMMUNOLOGIC: No allergic response to materials, foods or animals at this time.    Past Medical, Family and Social History: The patient's past medical, family and social history have been reviewed and updated as appropriate within the electronic medical record - see encounter notes.    Compliance: yes    Side effects: None    Risk Parameters:  Patient reports no suicidal ideation  Patient reports no homicidal ideation  Patient reports no self-injurious behavior  Patient reports no violent behavior    Exam (detailed: at least 9 elements; comprehensive: all 15 elements)   Constitutional  Vitals:  Most recent vital signs, dated greater than 90 days prior to this appointment, were reviewed.   Vitals:    01/20/22 1317   BP: (!) 144/66   Pulse: 103   Weight: 98.6 kg (217 lb 6 oz)   Height: 5' 9.5" (1.765 m)        General:  unremarkable, age appropriate     Musculoskeletal  Muscle Strength/Tone:  no tremor, no tic   Gait & Station:  non-ataxic     Psychiatric  Speech:  no latency; no press   Mood & Affect:  steady  congruent and appropriate   Thought Process:  normal and logical   Associations:  intact   Thought Content:  normal, no suicidality, no homicidality, delusions, or paranoia   Insight:  intact   Judgement: behavior is adequate to circumstances   Orientation:  grossly intact   Memory: intact for content of interview   Language: grossly intact   Attention Span & Concentration:  able to focus   Fund of Knowledge:  intact and appropriate to age and level of education     Assessment and Diagnosis   Status/Progress: " Based on the examination today, the patient's problem(s) is/are adequately but not ideally controlled.  New problems have not been presented today   Co-morbidities and Lack of compliance are not complicating management of the primary condition.  There are no active rule-out diagnoses for this patient at this time.     General Impression:   Diagnoses and all orders for this visit:    LAURA (generalized anxiety disorder)  -     hydrOXYzine pamoate (VISTARIL) 25 MG Cap; Take 1 capsule (25 mg total) by mouth 3 (three) times daily as needed (anxiety).    Insomnia disorder, with non-sleep disorder mental comorbidity  -     traZODone (DESYREL) 150 MG tablet; TAKE 1 OR 2 TABLETS BY MOUTH BEFORE BED AS NEEDED FOR SLEEP    Depression, unspecified depression type    Paresthesias  -     venlafaxine (EFFEXOR-XR) 150 MG Cp24; Take 1 capsule (150 mg total) by mouth once daily.      Intervention/Counseling/Treatment Plan   · Medication Management: The risks and benefits of medication were discussed with the patient.  · Care Coordination: During the visit, care coordination was conducted with  social work.   · Continue Effexor  mg po daily for depression, anxiety, and mood  · Continue Topamax 100 mg po BID (for migraines) per Neurology  · Continue Trazodone 200 - 300 mg po qhs PRN insomnia  · Continue Vistaril 25 mg po TID PRN anxiety  · Continue individual psychotherapy with social work    Return to Clinic: 6 months     Risks, benefits, side effects and alternative treatments discussed with patient. Patient agrees with the current plan as documented.  Encouraged Patient to keep future appointments.  Take medications as prescribed and abstain from substance abuse.  Pt to present to ED for thoughts to harm herself or others

## 2022-01-24 ENCOUNTER — TELEPHONE (OUTPATIENT)
Dept: SPORTS MEDICINE | Facility: CLINIC | Age: 62
End: 2022-01-24
Payer: MEDICARE

## 2022-01-24 DIAGNOSIS — M54.50 LUMBAR BACK PAIN: Primary | ICD-10-CM

## 2022-01-24 DIAGNOSIS — M25.551 CHRONIC RIGHT HIP PAIN: ICD-10-CM

## 2022-01-24 DIAGNOSIS — G89.29 CHRONIC RIGHT HIP PAIN: ICD-10-CM

## 2022-01-24 NOTE — TELEPHONE ENCOUNTER
Debra called patient this morning and informed her that we do not have anything more treatment wise to offer her in this clinic. Her pain does not appear to be coming from her hip but originating from her back. She did request a PT appointment while we were on the phone with her which I will comply will and place for her back and hip.

## 2022-01-31 ENCOUNTER — OFFICE VISIT (OUTPATIENT)
Dept: SPINE | Facility: CLINIC | Age: 62
End: 2022-01-31
Payer: MEDICARE

## 2022-01-31 VITALS
SYSTOLIC BLOOD PRESSURE: 128 MMHG | HEART RATE: 93 BPM | HEIGHT: 70 IN | WEIGHT: 217.38 LBS | BODY MASS INDEX: 31.12 KG/M2 | DIASTOLIC BLOOD PRESSURE: 81 MMHG

## 2022-01-31 DIAGNOSIS — M47.817 FACET ARTHROPATHY, LUMBOSACRAL: ICD-10-CM

## 2022-01-31 DIAGNOSIS — G89.29 CHRONIC RIGHT-SIDED LOW BACK PAIN WITHOUT SCIATICA: Primary | ICD-10-CM

## 2022-01-31 DIAGNOSIS — M54.50 CHRONIC RIGHT-SIDED LOW BACK PAIN WITHOUT SCIATICA: Primary | ICD-10-CM

## 2022-01-31 DIAGNOSIS — M47.26 OSTEOARTHRITIS OF SPINE WITH RADICULOPATHY, LUMBAR REGION: ICD-10-CM

## 2022-01-31 PROCEDURE — 99999 PR PBB SHADOW E&M-EST. PATIENT-LVL IV: ICD-10-PCS | Mod: PBBFAC,,, | Performed by: NURSE PRACTITIONER

## 2022-01-31 PROCEDURE — 3008F BODY MASS INDEX DOCD: CPT | Mod: CPTII,S$GLB,, | Performed by: NURSE PRACTITIONER

## 2022-01-31 PROCEDURE — 3074F SYST BP LT 130 MM HG: CPT | Mod: CPTII,S$GLB,, | Performed by: NURSE PRACTITIONER

## 2022-01-31 PROCEDURE — 99999 PR PBB SHADOW E&M-EST. PATIENT-LVL IV: CPT | Mod: PBBFAC,,, | Performed by: NURSE PRACTITIONER

## 2022-01-31 PROCEDURE — 1160F PR REVIEW ALL MEDS BY PRESCRIBER/CLIN PHARMACIST DOCUMENTED: ICD-10-PCS | Mod: CPTII,S$GLB,, | Performed by: NURSE PRACTITIONER

## 2022-01-31 PROCEDURE — 3074F PR MOST RECENT SYSTOLIC BLOOD PRESSURE < 130 MM HG: ICD-10-PCS | Mod: CPTII,S$GLB,, | Performed by: NURSE PRACTITIONER

## 2022-01-31 PROCEDURE — 3079F DIAST BP 80-89 MM HG: CPT | Mod: CPTII,S$GLB,, | Performed by: NURSE PRACTITIONER

## 2022-01-31 PROCEDURE — 99204 PR OFFICE/OUTPT VISIT, NEW, LEVL IV, 45-59 MIN: ICD-10-PCS | Mod: S$GLB,,, | Performed by: NURSE PRACTITIONER

## 2022-01-31 PROCEDURE — 3008F PR BODY MASS INDEX (BMI) DOCUMENTED: ICD-10-PCS | Mod: CPTII,S$GLB,, | Performed by: NURSE PRACTITIONER

## 2022-01-31 PROCEDURE — 1160F RVW MEDS BY RX/DR IN RCRD: CPT | Mod: CPTII,S$GLB,, | Performed by: NURSE PRACTITIONER

## 2022-01-31 PROCEDURE — 4010F PR ACE/ARB THEARPY RXD/TAKEN: ICD-10-PCS | Mod: CPTII,S$GLB,, | Performed by: NURSE PRACTITIONER

## 2022-01-31 PROCEDURE — 1159F PR MEDICATION LIST DOCUMENTED IN MEDICAL RECORD: ICD-10-PCS | Mod: CPTII,S$GLB,, | Performed by: NURSE PRACTITIONER

## 2022-01-31 PROCEDURE — 3079F PR MOST RECENT DIASTOLIC BLOOD PRESSURE 80-89 MM HG: ICD-10-PCS | Mod: CPTII,S$GLB,, | Performed by: NURSE PRACTITIONER

## 2022-01-31 PROCEDURE — 99204 OFFICE O/P NEW MOD 45 MIN: CPT | Mod: S$GLB,,, | Performed by: NURSE PRACTITIONER

## 2022-01-31 PROCEDURE — 1159F MED LIST DOCD IN RCRD: CPT | Mod: CPTII,S$GLB,, | Performed by: NURSE PRACTITIONER

## 2022-01-31 PROCEDURE — 4010F ACE/ARB THERAPY RXD/TAKEN: CPT | Mod: CPTII,S$GLB,, | Performed by: NURSE PRACTITIONER

## 2022-01-31 NOTE — PROGRESS NOTES
Subjective:      Patient ID: Jennifer Brady is a 61 y.o. female.    Chief Complaint: Low-back Pain    HPI Ms. Brady is a 61 year old female here for evaluation of low back pain. She was referred by Dr. Juventino Langford for consultation. She c/o low back pain that started in 2017. The pain is bilateral, R>L, and radiates into the right groin and radiates down the RLE with numbness and tingling in the right foot. The pain worsens prolonged sitting, standing and improves with nothing. She is currently under the care of Dr. Reich (PM). She is prescribed Norco and gabapentin with some relief. She did PT in the past with some benefit. She was referred for PT again and is scheduled in February. She did have injections previously with Dr. Reich (lumbar TF JANICE and RFA) with some benefit. She denies loss of bladder/bowel function or saddle anesthesia. Pain now 6/10.     Cervical xray 2022    FINDINGS:  Lung apices are clear.  Mild anterolisthesis of C3 on C4 and mild retrolisthesis C5 on C6.  No prevertebral soft tissue swelling.  Vertebral body heights are maintained.  Multilevel intervertebral disc space height loss without asymmetric disc space widening.  Multilevel degenerative endplate changes worse at C4-C5 and C5-C6.  No acute, displaced fracture or aggressive osseous abnormality.  The imaged dens and lateral masses are intact and aligned.     Impression:     Multilevel cervical spondylosis.  Mild anterolisthesis of C3 on C4 and mild retrolisthesis of C5 on C6 favored to be due to degenerative changes.    Lumbar xray 11/2021    FINDINGS:  Approximately 8-9 mm of anterolisthesis of L5 in relation to S1 is appreciated, secondary to prominent L5-S1 facet arthropathy.  Alignment at all other lumbar and visualized lower thoracic levels appears unremarkable.  There is no evidence of significant translational instability at L5-S1 or any other visualized level on the dynamic weight-bearing flexion/extension  images.  Vertebral body heights are adequately maintained, without significant compression deformity at any level.  No significant disc narrowing.  Vertebral endplates are well defined.  No osseous destructive process.  SI joints appear unremarkable.  Aortoiliac calcification is observed, without definable aneurysm.     Impression:     Prominent L5-S1 facet arthropathy, with associated anterolisthesis at this level.  Examination is otherwise unremarkable.  No evidence of compression fracture or significant translational instability is appreciated.    Bilateral Hip xray 2021    FINDINGS:  Bilateral hips two views.     Right: No fracture dislocation bone destruction seen.     Left: No fracture dislocation bone destruction seen.    Lumbar MRI 9/2021    FINDINGS:  Alignment: Normal.     Vertebrae: 5 lumbar-type vertebral bodies. No aggressive marrow replacement process or fracture.     Discs: Satisfactory height and signal.  Early desiccation L2-L3 through L5-S1.     Cord: Normal. Conus terminates at .     Degenerative findings:     T12-L1: There is no focal disc herniation.No significant central canal narrowing . No significant neural foraminal narrowing.     L1-L2: There is no focal disc herniation.No significant central canal narrowing . No significant neural foraminal narrowing.     L2-L3: There is no focal disc herniation.No significant central canal narrowing . No significant neural foraminal narrowing.     L3-L4: There is no focal disc herniation.No significant central canal narrowing . No significant neural foraminal narrowing.     L4-L5: There is no focal disc herniation.No significant central canal narrowing . No significant neural foraminal narrowing.     L5-S1: Note made of reactive bone marrow edema about the bilateral facet joints and facet effusion consistent with active inflammatory change, presumably degenerative in nature.There is no focal disc herniation.No significant central canal narrowing . No  significant neural foraminal narrowing.     Paraspinal muscles & soft tissues: Unremarkable.     Impression:     No evidence for central canal narrowing or foraminal stenosis.  Degenerative changes of facets predominantly L5-S1.    Past Medical History:   Diagnosis Date    Acute coronary syndrome     Anxiety     Asthma     Behavioral problem     Borderline personality disorder     Coronary angioplasty status 04/2018    Coronary artery disease     Depression     Depression     Fatigue     Hx of psychiatric care     Hyperlipidemia     Hypertension     Myocardial infarction 05/2018    Psychiatric problem     S/P hysterectomy with oophorectomy     Sleep apnea     pt uses CPAP    Syncope and collapse     Therapy     Thyroid disease        Past Surgical History:   Procedure Laterality Date    CORONARY ANGIOPLASTY      HYSTERECTOMY  1999    INJECTION OF ANESTHETIC AGENT AROUND NERVE Right 11/20/2019    Procedure: MEDIAL BRANCH BLOCK RIGHT L3, L4, L5;  Surgeon: Petros Reich MD;  Location: [facility];  Service: Pain Management;  Laterality: Right;  NEEDS CONSENT, PT NO LONGER TAKES PLAVIX    INJECTION OF ANESTHETIC AGENT AROUND NERVE Right 6/17/2020    Procedure: BLOCK, NERVE RIGHT L3, 4, 5 MEDIAL BRANCH;  Surgeon: Petros Reich MD;  Location: [facility];  Service: Pain Management;  Laterality: Right;  NEEDS CONSENT?    RADIOFREQUENCY ABLATION Right 5/12/2021    Procedure: RADIOFREQUENCY ABLATION RIGHT L3, 4, 5;  Surgeon: Petros Reich MD;  Location: [facility];  Service: Pain Management;  Laterality: Right;  NEEDS CONSENT    TILT TABLE TEST N/A 3/22/2019    Procedure: TILT TABLE TEST;  Surgeon: Roman Lala MD;  Location: [facility] EP LAB;  Service: Cardiology;  Laterality: N/A;  seizure/migraine, HUT referred by Dr Brit Merlos, EEG confirmed    TRANSFORAMINAL EPIDURAL INJECTION OF STEROID Right 10/14/2020    Procedure: LUMBAR TRANSFORAMINAL RIGHT L4/L5;  Surgeon:  Petros Reich MD;  Location: [facility];  Service: Pain Management;  Laterality: Right;  NEEDS CONSENT       Family History   Problem Relation Age of Onset    Seizures Mother     Heart attack Mother     Hypertension Mother     Stroke Mother     Hypertension Paternal Grandfather     Hypertension Paternal Grandmother     Heart attacks under age 50 Maternal Grandmother     Hypertension Maternal Grandmother     Hypertension Maternal Grandfather     Heart attacks under age 50 Father 33    Hypertension Father     Hypertension Sister     Stroke Sister     Seizures Sister     Breast cancer Neg Hx     Colon cancer Neg Hx     Diabetes Neg Hx     Ovarian cancer Neg Hx        Social History     Socioeconomic History    Marital status:    Occupational History     Employer: [employer]     Comment: [employer]   Tobacco Use    Smoking status: Current Every Day Smoker     Packs/day: 0.25     Types: Cigarettes     Start date: 1986     Last attempt to quit: 3/7/2020     Years since quittin.9    Smokeless tobacco: Never Used    Tobacco comment: quit actual cigarettes a few mo ago, switched to e cigarette   Substance and Sexual Activity    Alcohol use: Yes     Comment: Socially    Drug use: No    Sexual activity: Yes     Partners: Male   Other Topics Concern    Financial Status: Employed Yes    Caffeine Use: Moderate Yes    Spirituality: Organized Zoroastrian Yes    Patient feels they ought to cut down on drinking/drug use No    Patient annoyed by others criticizing their drinking/drug use No    Patient has felt bad or guilty about drinking/drug use No    Patient has had a drink/used drugs as an eye opener in the AM No   Social History Narrative    Patient discusses recent decisions she has made that she is pleased about; has clarified for self and others her values and standards.  Though she is aware of difficulty of  struggle, feels new  confidence about  results.  She expresses sense of wholeness and independence at this point. Her previously explored painful symptoms are improved (i.e, .diminished - 2or3/10), thought processes are logical and outlook is optimistic, as well as realistic.    Discusses trip she's planned.  intent to reschedule after return in mid-August. She expresses feelings of appreciation       Current Outpatient Medications   Medication Sig Dispense Refill    atorvastatin (LIPITOR) 40 MG tablet Take 1 tablet (40 mg total) by mouth once daily. 90 tablet 3    CARAFATE 100 mg/mL suspension Take 1 g by mouth 2 (two) times daily as needed.      celecoxib (CELEBREX) 200 MG capsule Take 200 mg by mouth once daily.       estradioL (ESTRACE) 0.01 % (0.1 mg/gram) vaginal cream Place 0.5 g vaginally twice a week. Insert 0.5grams intravaginally twice weekly 42 g 4    ezetimibe (ZETIA) 10 mg tablet Take 1 tablet (10 mg total) by mouth once daily. 90 tablet 3    gabapentin (NEURONTIN) 600 MG tablet Take 600 mg by mouth 3 (three) times daily.      HYDROcodone-acetaminophen (NORCO)  mg per tablet Take by mouth.      hydrOXYzine pamoate (VISTARIL) 25 MG Cap Take 1 capsule (25 mg total) by mouth 3 (three) times daily as needed (anxiety). 180 capsule 3    lactulose (CHRONULAC) 10 gram/15 mL solution Take 15 mLs by mouth 2 (two) times daily.      losartan (COZAAR) 25 MG tablet Take 1 tablet (25 mg total) by mouth once daily. 90 tablet 3    pantoprazole (PROTONIX) 40 MG tablet pantoprazole 40 mg tablet,delayed release      topiramate (TOPAMAX) 100 MG tablet Take 1 tablet (100 mg total) by mouth every evening. 90 tablet 3    traZODone (DESYREL) 150 MG tablet TAKE 1 OR 2 TABLETS BY MOUTH BEFORE BED AS NEEDED FOR SLEEP 180 tablet 3    venlafaxine (EFFEXOR-XR) 150 MG Cp24 Take 1 capsule (150 mg total) by mouth once daily. 90 capsule 3    aspirin (ECOTRIN) 81 MG EC tablet Take 1 tablet (81 mg total) by mouth once daily. 100 tablet 6     cetirizine (ZYRTEC) 10 MG tablet Take 10 mg by mouth.       Current Facility-Administered Medications   Medication Dose Route Frequency Provider Last Rate Last Admin    onabotulinumtoxina injection 200 Units  200 Units Intramuscular q12 weeks Maximiliano Kuhn MD   200 Units at 06/23/21 1504    triamcinolone acetonide injection 40 mg  40 mg Intra-articular 1 time in Clinic/HOD Gabriel Wong DO           Review of patient's allergies indicates:   Allergen Reactions    Iodine and iodide containing products          Review of Systems   Constitutional: Positive for weight gain. Negative for fever.   Cardiovascular: Negative for chest pain.   Respiratory: Positive for shortness of breath (  intermittent).    Musculoskeletal: Positive for back pain (  R>L). Negative for falls.   Gastrointestinal: Negative for abdominal pain, bowel incontinence, nausea and vomiting.   Genitourinary: Negative for bladder incontinence.   Neurological: Positive for numbness (  right foot) and paresthesias (  right foot). Negative for focal weakness, sensory change and weakness.         Objective:        General: Jennifer Sutherland is well-developed, well-nourished, appears stated age, in no acute distress, alert and oriented to time, place and person.     General    Vitals reviewed.  Constitutional: She is oriented to person, place, and time. She appears well-developed and well-nourished.   HENT:   Head: Atraumatic.   Nose: Nose normal.   Eyes: Conjunctivae are normal.   Cardiovascular: Normal rate.    Pulmonary/Chest: Effort normal.   Abdominal: She exhibits no distension.   Neurological: She is alert and oriented to person, place, and time.   Psychiatric: She has a normal mood and affect. Her behavior is normal. Judgment and thought content normal.     General Musculoskeletal Exam   Gait: normal     Right Ankle/Foot Exam     Tests   Heel Walk: able to perform  Tiptoe Walk: able to perform    Left Ankle/Foot Exam     Tests   Heel  Walk: able to perform  Tiptoe Walk: able to perform      Right Hip Exam     Tenderness   The patient tender to palpation of the SI joint.    Tests   Pain w/ forced internal rotation (DANIS): present (  right groin pain)  Left Hip Exam     Tenderness   The patient tender to palpation of the SI joint.    Tests   Pain w/ forced internal rotation (DANIS): absent      Back (L-Spine & T-Spine) / Neck (C-Spine) Exam     Tenderness Right paramedian tenderness of the Lower L-Spine and Sacrum.     Back (L-Spine & T-Spine) Range of Motion   Extension: 10 (with pain)   Flexion: 70 (with pain)   Lateral bend right: 20   Lateral bend left: 20     Spinal Sensation   Right Side Sensation  L-Spine Level: normal  S-Spine Level: normal  Left Side Sensation  L-Spine Level: normal  S-Spine Level: normal    Other She has no scoliosis .  Spinal Kyphosis:  Absent    Comments:  (+) pain with facet loading R>L  (-) SLR B/L      Muscle Strength   Right Upper Extremity   Biceps: 5/5   Deltoid:  5/5  Triceps:  5/5  Left Upper Extremity  Biceps: 5/5   Deltoid:  5/5  Triceps:  5/5  Right Lower Extremity   Hip Flexion: 5/5   Quadriceps:  5/5   Ankle Dorsiflexion:  5/5   Anterior tibial:  5/5   EHL:  5/5  Left Lower Extremity   Hip Flexion: 5/5   Quadriceps:  5/5   Ankle Dorsiflexion:  5/5   Anterior tibial:  5/5   EHL:  5/5    Reflexes     Left Side  Biceps:  2+  Brachioradialis:  2+  Achilles:  2+  Ankle Clonus:  absent  Quadriceps:  2+    Right Side   Biceps:  2+  Brachioradialis:  2+  Achilles:  2+  Ankle Clonus:  absent  Quadriceps:  2+    Vascular Exam     Right Pulses        Carotid:                  2+    Left Pulses        Carotid:                  2+              Assessment:       1. Chronic right-sided low back pain without sciatica    2. Osteoarthritis of spine with radiculopathy, lumbar region    3. Facet arthropathy, lumbosacral           Plan:          1. Prior imaging and records were reviewed today.   2. We discussed back and leg  pain and the nature of back and leg pain.  We discussed that it is not one thing that causes the pain but an accumulation of multiple things that we do.  Much of her pain appears to be generated from facet arthropathy causing possible nerve root irritation.   3. She is currently under the care of Dr. Reich for pain medications and interventional procedures (last RFA 5/2021) which did help. She states she cannot go anywhere else for procedures, otherwise, she will not be able to get her pain medication.   4. She was referred for PT and scheduled to start in February. She is also going to the gym.   5. We discussed posture sitting and the importance of trying to sit better.    6. We discussed the benefits of therapy and exercise and continuing to move.  7. I encouraged her to followup with Dr. Reich to discuss further interventional procedures as he is also a spine specialist.   8. RTC for followup as needed.     More than 50% of the total time  of 45 minutes was spent face to face in counseling on diagnosis and treatment options. I also counseled patient  on common and most usual side effect of prescribed medications.  I reviewed Primary care , and other specialty's notes to better coordinate patient's care. All questions were answered, and patient voiced understanding.     Follow-up: Follow up if symptoms worsen or fail to improve. If there are any questions prior to this, the patient was instructed to contact the office.

## 2022-02-09 ENCOUNTER — OFFICE VISIT (OUTPATIENT)
Dept: PSYCHIATRY | Facility: CLINIC | Age: 62
End: 2022-02-09
Payer: MEDICARE

## 2022-02-09 DIAGNOSIS — F41.1 GAD (GENERALIZED ANXIETY DISORDER): Primary | ICD-10-CM

## 2022-02-09 PROCEDURE — 90834 PSYTX W PT 45 MINUTES: CPT | Mod: S$GLB,,, | Performed by: SOCIAL WORKER

## 2022-02-09 PROCEDURE — 90834 PR PSYCHOTHERAPY W/PATIENT, 45 MIN: ICD-10-PCS | Mod: S$GLB,,, | Performed by: SOCIAL WORKER

## 2022-02-09 PROCEDURE — 4010F PR ACE/ARB THEARPY RXD/TAKEN: ICD-10-PCS | Mod: CPTII,S$GLB,, | Performed by: SOCIAL WORKER

## 2022-02-09 PROCEDURE — 4010F ACE/ARB THERAPY RXD/TAKEN: CPT | Mod: CPTII,S$GLB,, | Performed by: SOCIAL WORKER
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
## 2022-02-15 ENCOUNTER — CLINICAL SUPPORT (OUTPATIENT)
Dept: REHABILITATION | Facility: HOSPITAL | Age: 62
End: 2022-02-15
Payer: MEDICARE

## 2022-02-15 DIAGNOSIS — R29.898 WEAKNESS OF BOTH LOWER EXTREMITIES: ICD-10-CM

## 2022-02-15 DIAGNOSIS — M25.551 HIP PAIN, CHRONIC, RIGHT: ICD-10-CM

## 2022-02-15 DIAGNOSIS — M54.50 LUMBAR BACK PAIN: ICD-10-CM

## 2022-02-15 DIAGNOSIS — M25.651 IMPAIRED RANGE OF MOTION OF RIGHT HIP: ICD-10-CM

## 2022-02-15 DIAGNOSIS — G89.29 HIP PAIN, CHRONIC, RIGHT: ICD-10-CM

## 2022-02-15 PROCEDURE — 97161 PT EVAL LOW COMPLEX 20 MIN: CPT

## 2022-02-15 NOTE — PLAN OF CARE
[facility]   Physical Therapy Initial Evaluation      Date: 2/15/2022   Name: Jennifer Brady  Clinic Number: [id number]    Therapy Diagnosis:   Encounter Diagnoses   Name Primary?    Lumbar back pain     Hip pain, chronic, right     Weakness of both lower extremities     Impaired range of motion of right hip      Physician: Juventino Langford III, *    Physician Orders: PT Eval and Treat   Medical Diagnosis from Referral:   M54.50 (ICD-10-CM) - Lumbar back pain   M25.551,G89.29 (ICD-10-CM) - Chronic right hip pain   Evaluation Date: 2/15/2022  Authorization Period Expiration: TBD  Plan of Care Expiration: 5/14/2022  Progress Note Due: 3/15/2022  Visit # / Visits authorized: 1/ TBD   FOTO: 1/5    Precautions: Standard     Time In: 138 (arrived late)  Time Out: 230  Total Appointment Time (timed & untimed codes): 52 minutes      SUBJECTIVE     Date of onset: July of 2021    History of current condition - Jennifer reports: she has been having R hip pain that wraps into her groin. Patient reports her pain is worst in the morning and after sitting for awhile / standing back up. Patient reports she has been using a single point cane since December 2021. Patient reports she has had an injection in her R hip which felt great for about a week, but came back afterwards. Patient reports she can walk for about 10 minutes until needing a break. Patient her R hip bothers her when trying to lay on the R side when sleeping.       Imaging, See imaging section:     Prior Therapy: Yes, in Jan of 2021  Social History:  lives alone, 4 steps to enter and railings on the side   Occupation: Retired   Prior Level of Function: Mopping, lifting case of water, walking long distances   Current Level of Function: Sedentary     Pain:  Current 8/10, worst 9/10, best 6/10   Location: right hip  .   Description: Sharp and Shooting  Aggravating Factors: Walking and getting out of a chair after sitting for a while    Easing Factors: lay down, heat    Patients goals: to be able to be more functional on a daily basis. To reduce pain to be able to do more in a day      Medical History:   Past Medical History:   Diagnosis Date    Acute coronary syndrome     Anxiety     Asthma     Behavioral problem     Borderline personality disorder     Coronary angioplasty status 04/2018    Coronary artery disease     Depression     Depression     Fatigue     Hx of psychiatric care     Hyperlipidemia     Hypertension     Myocardial infarction 05/2018    Psychiatric problem     S/P hysterectomy with oophorectomy     Sleep apnea     pt uses CPAP    Syncope and collapse     Therapy     Thyroid disease        Surgical History:   Jennifer Brady  has a past surgical history that includes Tilt table test (N/A, 3/22/2019); Injection of anesthetic agent around nerve (Right, 11/20/2019); Injection of anesthetic agent around nerve (Right, 6/17/2020); Hysterectomy (1999); Coronary angioplasty; Transforaminal epidural injection of steroid (Right, 10/14/2020); and Radiofrequency ablation (Right, 5/12/2021).    Medications:   Jennifer Sutherland has a current medication list which includes the following prescription(s): aspirin, atorvastatin, carafate, celecoxib, cetirizine, estradiol, ezetimibe, gabapentin, hydrocodone-acetaminophen, hydroxyzine pamoate, lactulose, losartan, pantoprazole, topiramate, trazodone, venlafaxine, [DISCONTINUED] citalopram, and [DISCONTINUED] lurasidone, and the following Facility-Administered Medications: onabotulinumtoxina and triamcinolone acetonide.    Allergies:   Review of patient's allergies indicates:   Allergen Reactions    Iodine and iodide containing products           OBJECTIVE     Hip Right Right Right Left Left Left Pain/Dysfunction with Movement    AROM PROM MMT AROM PROM MMT    Flexion 110 ! 115 ! 4+/5 120 130 4+/5 Pain on R hip    Abduction 60 60 4-/5 60 60 4-/5    Internal rotation 35 ! 35  ! 4/5 45 45 4/5 Pain on R hip   External rotation 45 45 4/5 50 50 4/5       Knee Right Right Left Left Pain/Dysfunction with Movement    AROM MMT AROM MMT    Flexion WNL 5/5 WNL 5/5    Extension WNL 5/5 WNL 5/5        DANIS: Pain  FADIR: No Pain    Trochanteric Bursa palpation: No pain on R     Ambulation: ambulated with single point cane and antalgic gait    Limitation/Restriction for FOTO Upper leg Survey    Therapist reviewed FOTO scores for Jennifer Brady on 2/15/2022.   FOTO documents entered into [employer] - see Media section.    Limitation Score: 82%         TREATMENT       Potential exercises for next session: bike or NuStep, hip stretching (ITB, piriformis, posterior capsule), long axis distraction, muscle roller, LE strengthening           PATIENT EDUCATION AND HOME EXERCISES     Education provided:   - Purpose of PT         ASSESSMENT     Jennifer Sutherland is a 61 y.o. female referred to outpatient Physical Therapy with a medical diagnosis of Chronic R hip pain. Patient presents to physical therapy with a history of chronic R hip pain. Patient also present with decreased hip range of motion on the R and decreased strength. Patient was highly irritable when moving R hip (primarly hip flexion and internal rotation).     Patient prognosis is Fair.   Patientt will benefit from skilled outpatient Physical Therapy to address the deficits stated above and in the chart below, provide patient /family education, and to maximize patientt's level of independence.     Plan of care discussed with patient: Yes  Patient's spiritual, cultural and educational needs considered and patient is agreeable to the plan of care and goals as stated below:     Anticipated Barriers for therapy: chronic symptoms     Medical Necessity is demonstrated by the following  History  Co-morbidities and personal factors that may impact the plan of care Co-morbidities:   anxiety, depression and high BMI    Personal Factors:   no deficits      high   Examination  Body Structures and Functions, activity limitations and participation restrictions that may impact the plan of care Body Regions:   lower extremities    Body Systems:    ROM  strength  gait    Participation Restrictions:   None    Activity limitations:   Learning and applying knowledge  no deficits    General Tasks and Commands  no deficits    Communication  no deficits    Mobility  no deficits    Self care  no deficits    Domestic Life  no deficits    Interactions/Relationships  no deficits    Life Areas  no deficits    Community and Social Life  recreation and leisure         high   Clinical Presentation stable and uncomplicated low   Decision Making/ Complexity Score: low     Goals:  Short Term Goals (2 Weeks):   1. Pt will be compliant with HEP to supplement PT in restoring pain free function.    Long Term Goals (6 Weeks):  1. Pt will improve FOTO score to </= 60% limited to decrease perceived limitation with mobility  2. Pt will improve impaired LE MMTs by 1/2 grade to improve strength for functional tasks.  3. Pt improve impaired hip ROM to = L in all planes to improve mobility for normal movement patterns.   4. Pt will improve hip pain to </= 3/10 at its worst to better tolerate daily activities       PLAN   Plan of care Certification: 2/15/2022 to 5/14/2022.    Outpatient Physical Therapy 2 times weekly for 6 weeks to include the following interventions: Gait Training, Manual Therapy, Moist Heat/ Ice, Neuromuscular Re-ed, Patient Education, Self Care, Therapeutic Activities, Therapeutic Exercise and FDN.     Nik Bruno, PT      I CERTIFY THE NEED FOR THESE SERVICES FURNISHED UNDER THIS PLAN OF TREATMENT AND WHILE UNDER MY CARE   Physician's comments:     Physician's Signature: ___________________________________________________

## 2022-02-21 ENCOUNTER — CLINICAL SUPPORT (OUTPATIENT)
Dept: REHABILITATION | Facility: HOSPITAL | Age: 62
End: 2022-02-21
Payer: MEDICARE

## 2022-02-21 DIAGNOSIS — G89.29 HIP PAIN, CHRONIC, RIGHT: Primary | ICD-10-CM

## 2022-02-21 DIAGNOSIS — M25.551 HIP PAIN, CHRONIC, RIGHT: Primary | ICD-10-CM

## 2022-02-21 DIAGNOSIS — R29.898 WEAKNESS OF LOWER EXTREMITY, UNSPECIFIED LATERALITY: ICD-10-CM

## 2022-02-21 DIAGNOSIS — M25.651 IMPAIRED RANGE OF MOTION OF RIGHT HIP: ICD-10-CM

## 2022-02-21 PROCEDURE — 97110 THERAPEUTIC EXERCISES: CPT

## 2022-02-21 PROCEDURE — 97140 MANUAL THERAPY 1/> REGIONS: CPT

## 2022-02-21 NOTE — PROGRESS NOTES
[facility]   Physical Therapy Treatment Note     Name: Jennifer Sutherland [place]  Clinic Number: [id number]    Therapy Diagnosis:   Encounter Diagnoses   Name Primary?    Hip pain, chronic, right Yes    Weakness of lower extremity, unspecified laterality     Impaired range of motion of right hip      Physician: Juventino Langford III, *    Visit Date: 2/21/2022    Physician Orders: PT Eval and Treat   Medical Diagnosis from Referral:   M54.50 (ICD-10-CM) - Lumbar back pain   M25.551,G89.29 (ICD-10-CM) - Chronic right hip pain   Evaluation Date: 2/15/2022  Authorization Period Expiration: TBD  Plan of Care Expiration: 5/14/2022  Progress Note Due: 3/15/2022  Visit # / Visits authorized: 1/ TBD   FOTO: 1/5     Precautions: Standard       Time In: 9:58 am   Time Out: 10:40 am   Total Billable Time: 42 minutes      SUBJECTIVE     Pt reports: she is going okay today. A little sore from doing water aerobics. Saw MD Friday and she is going to have a lumbar injection in march.   She was compliant with home exercise program.  Response to previous treatment: no adverse reactions  Functional change: ongoing    Pain: 6/10  Location: right hip      OBJECTIVE     Objective Measures updated at progress report unless specified.       TREATMENT         Jennifer received therapeutic exercises (bold exercises performed) to develop strength, ROM and flexibility for 32 minutes including:  Nustep x 6 min level 1   Glute set 2x10  Supine isometric abdominal crunch c/ physioball 3x10 forward, diagonal press to R and L 10 ea   LTR 2x10   Bridge 2x10   PPT 3x10  Sidelying clams 2x10 ea  Seated hamstring stretch 2x30 sec ea  Seated figure 4 stretch 3x10 sec ea   Paloff press 2x10 ea GTB         Jennifer received the following manual therapy techniques: were applied to the: R LE for 10 minutes, including:  R LE long axis distraction  Muscle roller to R quad      PATIENT EDUCATION AND HOME EXERCISES     Home Exercises  Provided and Patient Education Provided     Education provided:   - HEP issued    Written Home Exercises Provided: yes. Exercises were reviewed and Jennifer was able to demonstrate them prior to the end of the session.  Jennifer demonstrated good  understanding of the education provided. See EMR under Patient Instructions for exercises provided during therapy sessions    ASSESSMENT     The patient presents for first follow and continues to complain of R lateral hip and groin pain. Good response to manual therapy techniques today. Hip tightness and discomfort reported with figure 4 stretching in sitting on R side so duration of stretches shorted. Pt was issued HEP and all questions answered. Benefits from continued skilled PT to address ongoing difficulties with pain, ROM, and strength to allow for return to PLOF.       Jennifer Is progressing well towards her goals.   Pt prognosis is Good.     Pt will continue to benefit from skilled outpatient physical therapy to address the deficits listed in the problem list box on initial evaluation, provide pt/family education and to maximize pt's level of independence in the home and community environment.     Pt's spiritual, cultural and educational needs considered and pt agreeable to plan of care and goals.     Anticipated Barriers for therapy: chronic symptoms     Goals:  Short Term Goals (2 Weeks):   1. Pt will be compliant with HEP to supplement PT in restoring pain free function.     Long Term Goals (6 Weeks):  1. Pt will improve FOTO score to </= 60% limited to decrease perceived limitation with mobility  2. Pt will improve impaired LE MMTs by 1/2 grade to improve strength for functional tasks.  3. Pt improve impaired hip ROM to = L in all planes to improve mobility for normal movement patterns.   4. Pt will improve hip pain to </= 3/10 at its worst to better tolerate daily activities          PLAN     Continue with skilled PT towards POC.       BASHIR CAPONE, PT

## 2022-02-23 ENCOUNTER — OFFICE VISIT (OUTPATIENT)
Dept: CARDIOLOGY | Facility: CLINIC | Age: 62
End: 2022-02-23
Payer: MEDICARE

## 2022-02-23 VITALS
WEIGHT: 216.63 LBS | SYSTOLIC BLOOD PRESSURE: 128 MMHG | HEIGHT: 70 IN | DIASTOLIC BLOOD PRESSURE: 68 MMHG | OXYGEN SATURATION: 96 % | HEART RATE: 76 BPM | BODY MASS INDEX: 31.01 KG/M2

## 2022-02-23 DIAGNOSIS — I25.10 CORONARY ARTERY DISEASE INVOLVING NATIVE CORONARY ARTERY OF NATIVE HEART WITHOUT ANGINA PECTORIS: ICD-10-CM

## 2022-02-23 DIAGNOSIS — M47.816 LUMBAR SPONDYLOSIS: ICD-10-CM

## 2022-02-23 DIAGNOSIS — Z98.61 S/P PTCA (PERCUTANEOUS TRANSLUMINAL CORONARY ANGIOPLASTY): Primary | ICD-10-CM

## 2022-02-23 DIAGNOSIS — I73.9 PAD (PERIPHERAL ARTERY DISEASE): ICD-10-CM

## 2022-02-23 DIAGNOSIS — I49.3 PVCS (PREMATURE VENTRICULAR CONTRACTIONS): ICD-10-CM

## 2022-02-23 DIAGNOSIS — F17.200 SMOKER: ICD-10-CM

## 2022-02-23 DIAGNOSIS — G47.33 OSA (OBSTRUCTIVE SLEEP APNEA): ICD-10-CM

## 2022-02-23 DIAGNOSIS — J45.20 MILD INTERMITTENT ASTHMA WITHOUT COMPLICATION: ICD-10-CM

## 2022-02-23 DIAGNOSIS — I10 PRIMARY HYPERTENSION: ICD-10-CM

## 2022-02-23 PROCEDURE — 3078F PR MOST RECENT DIASTOLIC BLOOD PRESSURE < 80 MM HG: ICD-10-PCS | Mod: CPTII,S$GLB,, | Performed by: INTERNAL MEDICINE

## 2022-02-23 PROCEDURE — 93000 ELECTROCARDIOGRAM COMPLETE: CPT | Mod: S$GLB,,, | Performed by: INTERNAL MEDICINE

## 2022-02-23 PROCEDURE — 1160F PR REVIEW ALL MEDS BY PRESCRIBER/CLIN PHARMACIST DOCUMENTED: ICD-10-PCS | Mod: CPTII,S$GLB,, | Performed by: INTERNAL MEDICINE

## 2022-02-23 PROCEDURE — 93000 EKG 12-LEAD: ICD-10-PCS | Mod: S$GLB,,, | Performed by: INTERNAL MEDICINE

## 2022-02-23 PROCEDURE — 4010F PR ACE/ARB THEARPY RXD/TAKEN: ICD-10-PCS | Mod: CPTII,S$GLB,, | Performed by: INTERNAL MEDICINE

## 2022-02-23 PROCEDURE — 1159F PR MEDICATION LIST DOCUMENTED IN MEDICAL RECORD: ICD-10-PCS | Mod: CPTII,S$GLB,, | Performed by: INTERNAL MEDICINE

## 2022-02-23 PROCEDURE — 3078F DIAST BP <80 MM HG: CPT | Mod: CPTII,S$GLB,, | Performed by: INTERNAL MEDICINE

## 2022-02-23 PROCEDURE — 99999 PR PBB SHADOW E&M-EST. PATIENT-LVL IV: CPT | Mod: PBBFAC,,, | Performed by: INTERNAL MEDICINE

## 2022-02-23 PROCEDURE — 99999 PR PBB SHADOW E&M-EST. PATIENT-LVL IV: ICD-10-PCS | Mod: PBBFAC,,, | Performed by: INTERNAL MEDICINE

## 2022-02-23 PROCEDURE — 99214 PR OFFICE/OUTPT VISIT, EST, LEVL IV, 30-39 MIN: ICD-10-PCS | Mod: 25,S$GLB,, | Performed by: INTERNAL MEDICINE

## 2022-02-23 PROCEDURE — 4010F ACE/ARB THERAPY RXD/TAKEN: CPT | Mod: CPTII,S$GLB,, | Performed by: INTERNAL MEDICINE

## 2022-02-23 PROCEDURE — 1159F MED LIST DOCD IN RCRD: CPT | Mod: CPTII,S$GLB,, | Performed by: INTERNAL MEDICINE

## 2022-02-23 PROCEDURE — 3074F PR MOST RECENT SYSTOLIC BLOOD PRESSURE < 130 MM HG: ICD-10-PCS | Mod: CPTII,S$GLB,, | Performed by: INTERNAL MEDICINE

## 2022-02-23 PROCEDURE — 3074F SYST BP LT 130 MM HG: CPT | Mod: CPTII,S$GLB,, | Performed by: INTERNAL MEDICINE

## 2022-02-23 PROCEDURE — 99214 OFFICE O/P EST MOD 30 MIN: CPT | Mod: 25,S$GLB,, | Performed by: INTERNAL MEDICINE

## 2022-02-23 PROCEDURE — 3008F BODY MASS INDEX DOCD: CPT | Mod: CPTII,S$GLB,, | Performed by: INTERNAL MEDICINE

## 2022-02-23 PROCEDURE — 1160F RVW MEDS BY RX/DR IN RCRD: CPT | Mod: CPTII,S$GLB,, | Performed by: INTERNAL MEDICINE

## 2022-02-23 PROCEDURE — 3008F PR BODY MASS INDEX (BMI) DOCUMENTED: ICD-10-PCS | Mod: CPTII,S$GLB,, | Performed by: INTERNAL MEDICINE

## 2022-02-23 RX ORDER — AZITHROMYCIN 250 MG/1
250 TABLET, FILM COATED ORAL
COMMUNITY
Start: 2022-02-22 | End: 2022-05-16

## 2022-02-23 RX ORDER — LORATADINE 10 MG/1
10 TABLET ORAL DAILY
COMMUNITY
Start: 2022-02-22

## 2022-02-23 RX ORDER — IBUPROFEN 200 MG
1 TABLET ORAL
COMMUNITY
End: 2023-02-14

## 2022-02-23 RX ORDER — CYCLOBENZAPRINE HCL 10 MG
10 TABLET ORAL NIGHTLY
COMMUNITY
Start: 2022-01-18 | End: 2022-05-16

## 2022-02-23 RX ORDER — GUAIFENESIN 100 MG/5ML
SOLUTION ORAL
COMMUNITY
Start: 2022-02-22 | End: 2022-05-16

## 2022-02-23 NOTE — PROGRESS NOTES
Subjective:      Patient ID: Jennifer Brady is a 61 y.o. female.    Chief Complaint: Follow-up    HPI:  Pt c/o right sciatic pain.    Dr Juarez prescribed Flexeril.    Pt is going to PT again.    Aleve upset pt's stomach.    Pt had been walking with a cane but is now going to the gym and doing water aerobics.    Pt went to Urgent Care yesterday for a head cold and was prescribed an antibiotic and a cough syrup.  Covid test was negative.    Trying to quit smoking.      Review of Systems   Cardiovascular: Negative for chest pain, claudication, dyspnea on exertion, irregular heartbeat, leg swelling, near-syncope, orthopnea, palpitations and syncope.      No side effects form Zetia      Past Medical History:   Diagnosis Date    Acute coronary syndrome     Anxiety     Asthma     Behavioral problem     Borderline personality disorder     Coronary angioplasty status 04/2018    Coronary artery disease     Depression     Depression     Fatigue     Hx of psychiatric care     Hyperlipidemia     Hypertension     Myocardial infarction 05/2018    Psychiatric problem     S/P hysterectomy with oophorectomy     Sleep apnea     pt uses CPAP    Syncope and collapse     Therapy     Thyroid disease         Past Surgical History:   Procedure Laterality Date    CORONARY ANGIOPLASTY      HYSTERECTOMY  1999    INJECTION OF ANESTHETIC AGENT AROUND NERVE Right 11/20/2019    Procedure: MEDIAL BRANCH BLOCK RIGHT L3, L4, L5;  Surgeon: Petros Reich MD;  Location: [facility] PAIN MGT;  Service: Pain Management;  Laterality: Right;  NEEDS CONSENT, PT NO LONGER TAKES PLAVIX    INJECTION OF ANESTHETIC AGENT AROUND NERVE Right 6/17/2020    Procedure: BLOCK, NERVE RIGHT L3, 4, 5 MEDIAL BRANCH;  Surgeon: Petros Reich MD;  Location: [facility] PAIN [facility]GT;  Service: Pain Management;  Laterality: Right;  NEEDS CONSENT?    RADIOFREQUENCY ABLATION Right 5/12/2021    Procedure: RADIOFREQUENCY ABLATION RIGHT L3, 4, 5;   Surgeon: Petros Reich MD;  Location: [facility] [place] PAIN MGT;  Service: Pain Management;  Laterality: Right;  NEEDS CONSENT    TILT TABLE TEST N/A 3/22/2019    Procedure: TILT TABLE TEST;  Surgeon: Roman Lala MD;  Location: [facility] EP LAB;  Service: Cardiology;  Laterality: N/A;  seizure/migraine, HUT referred by Dr Brit Merlos, EEG confirmed    TRANSFORAMINAL EPIDURAL INJECTION OF STEROID Right 10/14/2020    Procedure: LUMBAR TRANSFORAMINAL RIGHT L4/L5;  Surgeon: Petros Reich MD;  Location: [facility] [place] PAIN MGT;  Service: Pain Management;  Laterality: Right;  NEEDS CONSENT       Family History   Problem Relation Age of Onset    Seizures Mother     Heart attack Mother     Hypertension Mother     Stroke Mother     Hypertension Paternal Grandfather     Hypertension Paternal Grandmother     Heart attacks under age 50 Maternal Grandmother     Hypertension Maternal Grandmother     Hypertension Maternal Grandfather     Heart attacks under age 50 Father 33    Hypertension Father     Hypertension Sister     Stroke Sister     Seizures Sister     Breast cancer Neg Hx     Colon cancer Neg Hx     Diabetes Neg Hx     Ovarian cancer Neg Hx        Social History     Socioeconomic History    Marital status:    Occupational History     Employer: [employer]     Comment: [employer]   Tobacco Use    Smoking status: Current Every Day Smoker     Packs/day: 0.25     Types: Cigarettes     Start date: 1986     Last attempt to quit: 3/7/2020     Years since quittin.9    Smokeless tobacco: Never Used    Tobacco comment: quit actual cigarettes a few mo ago, switched to e cigarette   Substance and Sexual Activity    Alcohol use: Yes     Comment: Socially    Drug use: No    Sexual activity: Yes     Partners: Male   Other Topics Concern    Financial Status: Employed Yes    Caffeine Use: Moderate Yes    Spirituality: Organized Spiritism Yes    Patient feels they  ought to cut down on drinking/drug use No    Patient annoyed by others criticizing their drinking/drug use No    Patient has felt bad or guilty about drinking/drug use No    Patient has had a drink/used drugs as an eye opener in the AM No   Social History Narrative    Patient discusses recent decisions she has made that she is pleased about; has clarified for self and others her values and standards.  Though she is aware of difficulty of  struggle, feels new confidence about  results.  She expresses sense of wholeness and independence at this point. Her previously explored painful symptoms are improved (i.e, .diminished - 2or3/10), thought processes are logical and outlook is optimistic, as well as realistic.    Discusses trip she's planned.  intent to reschedule after return in mid-August. She expresses feelings of appreciation       Current Outpatient Medications on File Prior to Visit   Medication Sig Dispense Refill    aspirin (ECOTRIN) 81 MG EC tablet Take 1 tablet (81 mg total) by mouth once daily. 100 tablet 6    atorvastatin (LIPITOR) 40 MG tablet Take 1 tablet (40 mg total) by mouth once daily. 90 tablet 3    azithromycin (Z-VALERIE) 250 MG tablet Take 250 mg by mouth.      CARAFATE 100 mg/mL suspension Take 1 g by mouth 2 (two) times daily as needed.      cyclobenzaprine (FLEXERIL) 10 MG tablet Take 10 mg by mouth nightly.      estradioL (ESTRACE) 0.01 % (0.1 mg/gram) vaginal cream Place 0.5 g vaginally twice a week. Insert 0.5grams intravaginally twice weekly 42 g 4    ezetimibe (ZETIA) 10 mg tablet Take 1 tablet (10 mg total) by mouth once daily. 90 tablet 3    gabapentin (NEURONTIN) 600 MG tablet Take 600 mg by mouth 3 (three) times daily.      guaiFENesin 100 mg/5 ml (ROBITUSSIN) 100 mg/5 mL syrup SMARTSIG:Milliliter(s) By Mouth      HYDROcodone-acetaminophen (NORCO)  mg per tablet Take by mouth.      hydrOXYzine pamoate (VISTARIL) 25 MG Cap Take 1 capsule (25 mg total) by mouth 3  "(three) times daily as needed (anxiety). 180 capsule 3    lactulose (CHRONULAC) 10 gram/15 mL solution Take 15 mLs by mouth 2 (two) times daily.      loratadine (CLARITIN) 10 mg tablet Take 10 mg by mouth once daily.      losartan (COZAAR) 25 MG tablet Take 1 tablet (25 mg total) by mouth once daily. 90 tablet 3    nicotine (NICODERM CQ) 21 mg/24 hr Place 1 patch onto the skin every 24 hours.      pantoprazole (PROTONIX) 40 MG tablet pantoprazole 40 mg tablet,delayed release      topiramate (TOPAMAX) 100 MG tablet Take 1 tablet (100 mg total) by mouth every evening. 90 tablet 3    traZODone (DESYREL) 150 MG tablet TAKE 1 OR 2 TABLETS BY MOUTH BEFORE BED AS NEEDED FOR SLEEP 180 tablet 3    venlafaxine (EFFEXOR-XR) 150 MG Cp24 Take 1 capsule (150 mg total) by mouth once daily. 90 capsule 3    [DISCONTINUED] celecoxib (CELEBREX) 200 MG capsule Take 200 mg by mouth once daily.       [DISCONTINUED] cetirizine (ZYRTEC) 10 MG tablet Take 10 mg by mouth.      [DISCONTINUED] citalopram (CELEXA) 10 MG tablet Take 10 mg by mouth once daily.      [DISCONTINUED] lurasidone (LATUDA) 40 mg Tab tablet Take 1 tablet (40 mg total) by mouth once daily. 30 tablet 5     Current Facility-Administered Medications on File Prior to Visit   Medication Dose Route Frequency Provider Last Rate Last Admin    onabotulinumtoxina injection 200 Units  200 Units Intramuscular q12 weeks Maximiliano Kuhn MD   200 Units at 06/23/21 1504    triamcinolone acetonide injection 40 mg  40 mg Intra-articular 1 time in Clinic/HOD Gabriel Wong DO           Review of patient's allergies indicates:   Allergen Reactions    Iodine and iodide containing products      Objective:     Vitals:    02/23/22 0949   BP: 128/68   BP Location: Right arm   Patient Position: Sitting   BP Method: Large (Automatic)   Pulse: 76   SpO2: 96%   Weight: 98.2 kg (216 lb 9.6 oz)   Height: 5' 9.5" (1.765 m)        Physical Exam  Vitals reviewed.   Constitutional:      "  Appearance: She is well-developed.   Eyes:      General: No scleral icterus.  Neck:      Vascular: No carotid bruit or JVD.   Cardiovascular:      Rate and Rhythm: Normal rate and regular rhythm.      Heart sounds: No murmur heard.    No gallop.   Pulmonary:      Breath sounds: Normal breath sounds.   Musculoskeletal:      Right lower leg: No edema.      Left lower leg: No edema.   Skin:     General: Skin is warm and dry.   Neurological:      Mental Status: She is alert and oriented to person, place, and time.   Psychiatric:         Behavior: Behavior normal.         Thought Content: Thought content normal.         Judgment: Judgment normal.        ECG today reviewed by me: NSR, low T waves      Lab Visit on 12/16/2021   Component Date Value Ref Range Status    WBC 12/16/2021 8.01  3.90 - 12.70 K/uL Final    RBC 12/16/2021 3.90 (A) 4.00 - 5.40 M/uL Final    Hemoglobin 12/16/2021 11.9 (A) 12.0 - 16.0 g/dL Final    Hematocrit 12/16/2021 38.8  37.0 - 48.5 % Final    MCV 12/16/2021 100 (A) 82 - 98 fL Final    MCH 12/16/2021 30.5  27.0 - 31.0 pg Final    MCHC 12/16/2021 30.7 (A) 32.0 - 36.0 g/dL Final    RDW 12/16/2021 12.3  11.5 - 14.5 % Final    Platelets 12/16/2021 365  150 - 450 K/uL Final    MPV 12/16/2021 9.1 (A) 9.2 - 12.9 fL Final    Immature Granulocytes 12/16/2021 0.4  0.0 - 0.5 % Final    Gran # (ANC) 12/16/2021 3.8  1.8 - 7.7 K/uL Final    Immature Grans (Abs) 12/16/2021 0.03  0.00 - 0.04 K/uL Final    Lymph # 12/16/2021 3.3  1.0 - 4.8 K/uL Final    Mono # 12/16/2021 0.7  0.3 - 1.0 K/uL Final    Eos # 12/16/2021 0.2  0.0 - 0.5 K/uL Final    Baso # 12/16/2021 0.05  0.00 - 0.20 K/uL Final    nRBC 12/16/2021 0  0 /100 WBC Final    Gran % 12/16/2021 47.6  38.0 - 73.0 % Final    Lymph % 12/16/2021 40.6  18.0 - 48.0 % Final    Mono % 12/16/2021 8.6  4.0 - 15.0 % Final    Eosinophil % 12/16/2021 2.2  0.0 - 8.0 % Final    Basophil % 12/16/2021 0.6  0.0 - 1.9 % Final    Differential Method  12/16/2021 Automated   Final    Sodium 12/16/2021 142  136 - 145 mmol/L Final    Potassium 12/16/2021 4.0  3.5 - 5.1 mmol/L Final    Chloride 12/16/2021 107  95 - 110 mmol/L Final    CO2 12/16/2021 24  23 - 29 mmol/L Final    Glucose 12/16/2021 100  70 - 110 mg/dL Final    BUN 12/16/2021 18  8 - 23 mg/dL Final    Creatinine 12/16/2021 0.9  0.5 - 1.4 mg/dL Final    Calcium 12/16/2021 9.7  8.7 - 10.5 mg/dL Final    Total Protein 12/16/2021 6.8  6.0 - 8.4 g/dL Final    Albumin 12/16/2021 3.6  3.5 - 5.2 g/dL Final    Total Bilirubin 12/16/2021 0.5  0.1 - 1.0 mg/dL Final    Alkaline Phosphatase 12/16/2021 141 (A) 55 - 135 U/L Final    AST 12/16/2021 17  10 - 40 U/L Final    ALT 12/16/2021 20  10 - 44 U/L Final    Anion Gap 12/16/2021 11  8 - 16 mmol/L Final    eGFR if African American 12/16/2021 >60.0  >60 mL/min/1.73 m^2 Final    eGFR if non African American 12/16/2021 >60.0  >60 mL/min/1.73 m^2 Final    TSH 12/16/2021 1.270  0.400 - 4.000 uIU/mL Final    Cholesterol 12/16/2021 182  120 - 199 mg/dL Final    Triglycerides 12/16/2021 82  30 - 150 mg/dL Final    HDL 12/16/2021 50  40 - 75 mg/dL Final    LDL Cholesterol 12/16/2021 115.6  63.0 - 159.0 mg/dL Final    HDL/Cholesterol Ratio 12/16/2021 27.5  20.0 - 50.0 % Final    Total Cholesterol/HDL Ratio 12/16/2021 3.6  2.0 - 5.0 Final    Non-HDL Cholesterol 12/16/2021 132  mg/dL Final   (          Accession #: [id number]    Echocardiogram exercise stress test  Order# [id number]  Reading physician: Alvaro Sanchez MD Ordering physician: Alvaro Sanchez MD Study date: 9/21/20       Reason for Exam  Priority: Routine  Dx: Coronary artery disease involving native coronary artery of native heart without angina pectoris [I25.10 (ICD-10-CM)]; Old MI (myocardial infarction) [I25.2 (ICD-10-CM)]; S/P PTCA (percutaneous transluminal coronary angioplasty) [Z98.61 (ICD-10-CM)]     Result Image Hyperlink     Show images for Stress Echo Which stress agent  will be used? Treadmill Exercise; Color Flow Doppler? No    Summary    · The patient reached the end of the protocol.  · Normal left ventricular systolic function. The estimated ejection fraction is 60%.  · The stress echo portion of this study is negative for myocardial ischemia.  · The patient's exercise capacity was mildly impaired.  · There were no arrhythmias during stress.  · The ECG portion of this study is negative for myocardial ischemia.         Accession #: [id number]    Jennifer Brady  Holter monitor - 48 hour  Order# [id number]  Reading physician: Alvaro Sanchez MD Ordering physician: Alvaro Sanchez MD Study date: 12/28/20       Reason for Exam  Priority: Routine  Dx: Syncope and collapse [R55 (ICD-10-CM)]         Conclusion    · Normal sinus rhythm with a heart rate variable between 54 and 141 bpm, averaging 84 bpm  · There are occasional PVC's, some of which are interpolated. There are 2 PVC couplets. The total PVC burden is 0.3%  · There are rare isolated PAC's.         Performing Clinician       Order: [id number]  · Status: Final result   · Visible to patient: Yes (not seen)    · Next appt: None   · Dx: Pure hypercholesterolemia; REN (obstr...   · 0 Result Notes    · 1  Topic    Component Ref Range & Units 07:42 1 yr ago 3 yr ago   Cholesterol 120 - 199 mg/dL 182  172 R, CM  158 CM    Comment: The National Cholesterol Education Program (NCEP) has set the   following guidelines (reference ranges) for Cholesterol:   Optimal.....................<200 mg/dL   Borderline High.............200-239 mg/dL   High........................> or = 240 mg/dL    Triglycerides 30 - 150 mg/dL 82  102 CM  65 CM    Comment: The National Cholesterol Education Program (NCEP) has set the   following guidelines (reference values) for triglycerides:   Normal......................<150 mg/dL   Borderline High.............150-199 mg/dL   High........................200-499 mg/dL    HDL 40 - 75 mg/dL 50  67 CM   55 CM    Comment: The National Cholesterol Education Program (NCEP) has set the   following guidelines (reference values) for HDL Cholesterol:   Low...............<40 mg/dL   Optimal...........>60 mg/dL    LDL Cholesterol 63.0 - 159.0 mg/dL 115.6  85 R, CM  90.0 CM    Comment: The National Cholesterol Education Program (NCEP) has set the   following guidelines (reference values) for LDL Cholesterol:   Optimal.......................<130 mg/dL   Borderline High...............130-159 mg/dL   High..........................160-189 mg/dL   Very High.....................>190 mg/dL    HDL/Cholesterol Ratio 20.0 - 50.0 % 27.5  39.0  34.8    Total Cholesterol/HDL Ratio 2.0 - 5.0 3.6  2.6  2.9    Non-HDL Cholesterol mg/dL 132  105 CM  103 CM    Comment: Risk category and Non-HDL cholesterol goals:   Coronary heart disease (CHD)or equivalent (10-year risk of CHD >20%):   Non-HDL cholesterol goal     <130 mg/dL   Two or more CHD risk factors and 10-year risk of CHD <= 20%:   Non-HDL cholesterol goal     <160 mg/dL   0 to 1 CHD risk factor:         D. Hemodynamic Results      LVEDP (Pre): 10 mmHg   LVEDP (Post): 10 mmHg   Ejection Fraction: 66%     E. Angiographic Results      Diagnostic:          Patient has a right dominant coronary artery.        - Left Main Coronary Artery:              The LM is normal. There is LASHANDA 3 flow.      - Left Anterior Descending Artery:              The mid LAD has luminal irregularities. There is LASHANDA 3 flow.      - Left Circumflex Artery:              The proximal LCX has a 95% stenosis. There is LASHANDA 3 flow.                      Lesion Details:  Mild to moderate calcification.      - Right Coronary Artery:              The proximal RCA has a 95% stenosis. There is LASHANDA 3 flow.                      Lesion Details:   The length is 10mm. The lesion is ulcerated.      - Common Femoral Artery:              The right CFA is normal.       Intervention           Proximal LCX:               The lesion  was successfully intervened. Post-stenosis of 0% and post-LASHANDA 3 flow. The vessel was accessed natively.  The following items were used: Stent Resolute  2.75x12 (ZELALEM) and Stent Resolute  2.75x14 (ZELALEM).        Proximal RCA:               The lesion was successfully intervened. Post-stenosis of 0% and post-LASHANDA 3 flow. The vessel was accessed natively.  The following items were used: Blln Mini Trek Otw 2.5x20 and Stent Resolute  2.75x26 (ZELALEM).   Alvaro Sanchez MD Ordering physician: Alvaro Sanchez MD Study date: 9/21/20       Reason for Exam  Priority: Routine  Dx: Coronary artery disease involving native coronary artery of native heart without angina pectoris [I25.10 (ICD-10-CM)]; Old MI (myocardial infarction) [I25.2 (ICD-10-CM)]; S/P PTCA (percutaneous transluminal coronary angioplasty) [Z98.61 (ICD-10-CM)]     Result Image Hyperlink     Show images for Stress Echo Which stress agent will be used? Treadmill Exercise; Color Flow Doppler? No    Summary    · The patient reached the end of the protocol.  · Normal left ventricular systolic function. The estimated ejection fraction is 60%.  · The stress echo portion of this study is negative for myocardial ischemia.  · The patient's exercise capacity was mildly impaired.  · There were no arrhythmias during stress.  · The ECG portion of this study is negative for myocardial ischemia.         Accession #: [id number]    Jennifer Grandaming Jose Antonio  Holter monitor - 48 hour  Order# [id number]  Reading physician: Alvaro Sanchez MD Ordering physician: Alvaro Sanchez MD Study date: 12/28/20       Reason for Exam  Priority: Routine  Dx: Syncope and collapse [R55 (ICD-10-CM)]     Conclusion    · Normal sinus rhythm with a heart rate variable between 54 and 141 bpm, averaging 84 bpm  · There are occasional PVC's, some of which are interpolated. There are 2 PVC couplets. The total PVC burden is 0.3%  · There are rare isolated PAC's.         Performing  Clinician    Yomaira Veliz      Reason for Exam  Priority: Routine  Dx: Syncope and collapse [R55 (ICD-10-CM)]        Vitals          Assessment:     1. S/P PTCA (percutaneous transluminal coronary angioplasty)    2. PVCs (premature ventricular contractions)    3. PAD (peripheral artery disease)    4. Primary hypertension    5. Coronary artery disease involving native coronary artery of native heart without angina pectoris    6. Mild intermittent asthma without complication    7. Lumbar spondylosis    8. REN (obstructive sleep apnea)    9. Smoker      Plan:   Jennifer Sutherland was seen today for follow-up.    Diagnoses and all orders for this visit:    S/P PTCA (percutaneous transluminal coronary angioplasty)  -     IN OFFICE EKG 12-LEAD (to Muse)    PVCs (premature ventricular contractions)  -     IN OFFICE EKG 12-LEAD (to Muse)    PAD (peripheral artery disease)  -     IN OFFICE EKG 12-LEAD (to Muse)    Primary hypertension  -     IN OFFICE EKG 12-LEAD (to [place])    Coronary artery disease involving native coronary artery of native heart without angina pectoris  -     IN OFFICE EKG 12-LEAD (to Muse)    Mild intermittent asthma without complication  -     IN OFFICE EKG 12-LEAD (to Muse)    Lumbar spondylosis  -     IN OFFICE EKG 12-LEAD (to Muse)    REN (obstructive sleep apnea)  -     IN OFFICE EKG 12-LEAD (to Muse)    Smoker  -     IN OFFICE EKG 12-LEAD (to Muse)     Pt to repeat lipid profile today to assess response to adding Zetia to regimen.  Labs already ordered 12/16/21    Same meds    RTC 6 months    F/u with Dr Juarez    Low carb diet discussed in detail    Follow up in about 6 months (around 8/23/2022).

## 2022-02-25 ENCOUNTER — CLINICAL SUPPORT (OUTPATIENT)
Dept: REHABILITATION | Facility: HOSPITAL | Age: 62
End: 2022-02-25
Payer: MEDICARE

## 2022-02-25 DIAGNOSIS — M25.651 IMPAIRED RANGE OF MOTION OF RIGHT HIP: ICD-10-CM

## 2022-02-25 DIAGNOSIS — M25.551 HIP PAIN, CHRONIC, RIGHT: Primary | ICD-10-CM

## 2022-02-25 DIAGNOSIS — G89.29 HIP PAIN, CHRONIC, RIGHT: Primary | ICD-10-CM

## 2022-02-25 DIAGNOSIS — R29.898 WEAKNESS OF LOWER EXTREMITY, UNSPECIFIED LATERALITY: ICD-10-CM

## 2022-02-25 PROCEDURE — 97110 THERAPEUTIC EXERCISES: CPT

## 2022-02-25 PROCEDURE — 97140 MANUAL THERAPY 1/> REGIONS: CPT

## 2022-02-25 NOTE — PROGRESS NOTES
[facility]   Physical Therapy Treatment Note     Name: Jennifer Sutherland Jose Antonio  Clinic Number: [id number]    Therapy Diagnosis:   Encounter Diagnoses   Name Primary?    Hip pain, chronic, right Yes    Weakness of lower extremity, unspecified laterality     Impaired range of motion of right hip      Physician: Juventino Langford III, *    Visit Date: 2/25/2022    Physician Orders: PT Eval and Treat   Medical Diagnosis from Referral:   M54.50 (ICD-10-CM) - Lumbar back pain   M25.551,G89.29 (ICD-10-CM) - Chronic right hip pain   Evaluation Date: 2/15/2022  Authorization Period Expiration: TBD  Plan of Care Expiration: 5/14/2022  Progress Note Due: 3/15/2022  Visit # / Visits authorized: 1/ TBD   FOTO: 1/5     Precautions: Standard       Time In: 9:55 am   Time Out: 10:40 am  Total Billable Time: 40 minutes      SUBJECTIVE     Pt reports: continues to have R hip anterior hip/groin pain.   She was compliant with home exercise program.  Response to previous treatment: no adverse reactions  Functional change: ongoing    Pain: 6/10  Location: right hip      OBJECTIVE     Objective Measures updated at progress report unless specified.       TREATMENT         Jennifer received therapeutic exercises (bold exercises performed) to develop strength, ROM and flexibility for 30 minutes including:  Nustep x 6 min level 1   Glute set 2x10  Supine isometric abdominal crunch c/ physioball 3x10   LTR 2x10   Bridge 2x10   PPT 2x10  Sidelying clams 2x10 ea  Seated hamstring stretch 2x20 sec ea  Seated physioball roll outs 12x   Seated figure 4 stretch 3x10 sec ea   Paloff press 2x10 ea GTB         Jennifer received the following manual therapy techniques: were applied to the: R LE for 10 minutes, including:  R LE long axis distraction- held today   Muscle roller to R quad    Jennifer received MH to R anterior hip x 5 min after Nustep.     PATIENT EDUCATION AND HOME EXERCISES     Home Exercises Provided and Patient  Education Provided     Education provided:   - HEP issued    Written Home Exercises Provided: yes. Exercises were reviewed and Jennifer was able to demonstrate them prior to the end of the session.  Jennifer demonstrated good  understanding of the education provided. See EMR under Patient Instructions for exercises provided during therapy sessions    ASSESSMENT     Fair tolerance to today's visit.  MH applied R anterior hip after Nustep as pt unable to tolerance extension of R LE while laying supine on mat. Increased irritability R anterior hip with some relief during STM. Poor tolerance to long axis distraction today. Continues to display antalgic gait on R.     Jennifer Is progressing well towards her goals.   Pt prognosis is Good.     Pt will continue to benefit from skilled outpatient physical therapy to address the deficits listed in the problem list box on initial evaluation, provide pt/family education and to maximize pt's level of independence in the home and community environment.     Pt's spiritual, cultural and educational needs considered and pt agreeable to plan of care and goals.     Anticipated Barriers for therapy: chronic symptoms     Goals:  Short Term Goals (2 Weeks):   1. Pt will be compliant with HEP to supplement PT in restoring pain free function.     Long Term Goals (6 Weeks):  1. Pt will improve FOTO score to </= 60% limited to decrease perceived limitation with mobility  2. Pt will improve impaired LE MMTs by 1/2 grade to improve strength for functional tasks.  3. Pt improve impaired hip ROM to = L in all planes to improve mobility for normal movement patterns.   4. Pt will improve hip pain to </= 3/10 at its worst to better tolerate daily activities          PLAN     Continue with skilled PT towards POC.       BASHIR CAPONE, PT

## 2022-03-03 ENCOUNTER — CLINICAL SUPPORT (OUTPATIENT)
Dept: REHABILITATION | Facility: HOSPITAL | Age: 62
End: 2022-03-03
Payer: MEDICARE

## 2022-03-03 ENCOUNTER — TELEPHONE (OUTPATIENT)
Dept: CARDIOLOGY | Facility: CLINIC | Age: 62
End: 2022-03-03
Payer: MEDICARE

## 2022-03-03 DIAGNOSIS — R29.898 WEAKNESS OF LOWER EXTREMITY, UNSPECIFIED LATERALITY: ICD-10-CM

## 2022-03-03 DIAGNOSIS — M25.551 HIP PAIN, CHRONIC, RIGHT: Primary | ICD-10-CM

## 2022-03-03 DIAGNOSIS — M25.651 IMPAIRED RANGE OF MOTION OF RIGHT HIP: ICD-10-CM

## 2022-03-03 DIAGNOSIS — G89.29 HIP PAIN, CHRONIC, RIGHT: Primary | ICD-10-CM

## 2022-03-03 PROCEDURE — 97140 MANUAL THERAPY 1/> REGIONS: CPT | Mod: CQ

## 2022-03-03 PROCEDURE — 97110 THERAPEUTIC EXERCISES: CPT | Mod: CQ

## 2022-03-03 NOTE — TELEPHONE ENCOUNTER
I spoke with pt:  Lab OK  Pt feels she is having side effects from Zetia.   I suggested stopping the Zetia for a month to see if her symptoms improve and then maybe trying it again.      Lab Visit on 03/02/2022   Component Date Value Ref Range Status    WBC 03/02/2022 9.53  3.90 - 12.70 K/uL Final    RBC 03/02/2022 3.97 (A) 4.00 - 5.40 M/uL Final    Hemoglobin 03/02/2022 12.0  12.0 - 16.0 g/dL Final    Hematocrit 03/02/2022 38.7  37.0 - 48.5 % Final    MCV 03/02/2022 98  82 - 98 fL Final    MCH 03/02/2022 30.2  27.0 - 31.0 pg Final    MCHC 03/02/2022 31.0 (A) 32.0 - 36.0 g/dL Final    RDW 03/02/2022 12.4  11.5 - 14.5 % Final    Platelets 03/02/2022 397  150 - 450 K/uL Final    MPV 03/02/2022 8.6 (A) 9.2 - 12.9 fL Final    Immature Granulocytes 03/02/2022 0.2  0.0 - 0.5 % Final    Gran # (ANC) 03/02/2022 4.4  1.8 - 7.7 K/uL Final    Immature Grans (Abs) 03/02/2022 0.02  0.00 - 0.04 K/uL Final    Lymph # 03/02/2022 4.2  1.0 - 4.8 K/uL Final    Mono # 03/02/2022 0.6  0.3 - 1.0 K/uL Final    Eos # 03/02/2022 0.3  0.0 - 0.5 K/uL Final    Baso # 03/02/2022 0.04  0.00 - 0.20 K/uL Final    nRBC 03/02/2022 0  0 /100 WBC Final    Gran % 03/02/2022 46.5  38.0 - 73.0 % Final    Lymph % 03/02/2022 44.4  18.0 - 48.0 % Final    Mono % 03/02/2022 5.9  4.0 - 15.0 % Final    Eosinophil % 03/02/2022 2.6  0.0 - 8.0 % Final    Basophil % 03/02/2022 0.4  0.0 - 1.9 % Final    Differential Method 03/02/2022 Automated   Final    Sodium 03/02/2022 141  136 - 145 mmol/L Final    Potassium 03/02/2022 3.9  3.5 - 5.1 mmol/L Final    Chloride 03/02/2022 108  95 - 110 mmol/L Final    CO2 03/02/2022 24  23 - 29 mmol/L Final    Glucose 03/02/2022 92  70 - 110 mg/dL Final    BUN 03/02/2022 15  8 - 23 mg/dL Final    Creatinine 03/02/2022 0.9  0.5 - 1.4 mg/dL Final    Calcium 03/02/2022 9.7  8.7 - 10.5 mg/dL Final    Total Protein 03/02/2022 7.3  6.0 - 8.4 g/dL Final    Albumin 03/02/2022 3.7  3.5 - 5.2 g/dL Final     Total Bilirubin 03/02/2022 0.6  0.1 - 1.0 mg/dL Final    Alkaline Phosphatase 03/02/2022 133  55 - 135 U/L Final    AST 03/02/2022 21  10 - 40 U/L Final    ALT 03/02/2022 22  10 - 44 U/L Final    Anion Gap 03/02/2022 9  8 - 16 mmol/L Final    eGFR if African American 03/02/2022 >60.0  >60 mL/min/1.73 m^2 Final    eGFR if non African American 03/02/2022 >60.0  >60 mL/min/1.73 m^2 Final    CPK 03/02/2022 239 (A) 20 - 180 U/L Final    Cholesterol 03/02/2022 157  120 - 199 mg/dL Final    Triglycerides 03/02/2022 114  30 - 150 mg/dL Final    HDL 03/02/2022 48  40 - 75 mg/dL Final    LDL Cholesterol 03/02/2022 86.2  63.0 - 159.0 mg/dL Final    HDL/Cholesterol Ratio 03/02/2022 30.6  20.0 - 50.0 % Final    Total Cholesterol/HDL Ratio 03/02/2022 3.3  2.0 - 5.0 Final    Non-HDL Cholesterol 03/02/2022 109  mg/dL Final    TSH 03/02/2022 0.494  0.400 - 4.000 uIU/mL Final   Lab Visit on 12/16/2021   Component Date Value Ref Range Status    WBC 12/16/2021 8.01  3.90 - 12.70 K/uL Final    RBC 12/16/2021 3.90 (A) 4.00 - 5.40 M/uL Final    Hemoglobin 12/16/2021 11.9 (A) 12.0 - 16.0 g/dL Final    Hematocrit 12/16/2021 38.8  37.0 - 48.5 % Final    MCV 12/16/2021 100 (A) 82 - 98 fL Final    MCH 12/16/2021 30.5  27.0 - 31.0 pg Final    MCHC 12/16/2021 30.7 (A) 32.0 - 36.0 g/dL Final    RDW 12/16/2021 12.3  11.5 - 14.5 % Final    Platelets 12/16/2021 365  150 - 450 K/uL Final    MPV 12/16/2021 9.1 (A) 9.2 - 12.9 fL Final    Immature Granulocytes 12/16/2021 0.4  0.0 - 0.5 % Final    Gran # (ANC) 12/16/2021 3.8  1.8 - 7.7 K/uL Final    Immature Grans (Abs) 12/16/2021 0.03  0.00 - 0.04 K/uL Final    Lymph # 12/16/2021 3.3  1.0 - 4.8 K/uL Final    Mono # 12/16/2021 0.7  0.3 - 1.0 K/uL Final    Eos # 12/16/2021 0.2  0.0 - 0.5 K/uL Final    Baso # 12/16/2021 0.05  0.00 - 0.20 K/uL Final    nRBC 12/16/2021 0  0 /100 WBC Final    Gran % 12/16/2021 47.6  38.0 - 73.0 % Final    Lymph % 12/16/2021 40.6  18.0 - 48.0  % Final    Mono % 12/16/2021 8.6  4.0 - 15.0 % Final    Eosinophil % 12/16/2021 2.2  0.0 - 8.0 % Final    Basophil % 12/16/2021 0.6  0.0 - 1.9 % Final    Differential Method 12/16/2021 Automated   Final    Sodium 12/16/2021 142  136 - 145 mmol/L Final    Potassium 12/16/2021 4.0  3.5 - 5.1 mmol/L Final    Chloride 12/16/2021 107  95 - 110 mmol/L Final    CO2 12/16/2021 24  23 - 29 mmol/L Final    Glucose 12/16/2021 100  70 - 110 mg/dL Final    BUN 12/16/2021 18  8 - 23 mg/dL Final    Creatinine 12/16/2021 0.9  0.5 - 1.4 mg/dL Final    Calcium 12/16/2021 9.7  8.7 - 10.5 mg/dL Final    Total Protein 12/16/2021 6.8  6.0 - 8.4 g/dL Final    Albumin 12/16/2021 3.6  3.5 - 5.2 g/dL Final    Total Bilirubin 12/16/2021 0.5  0.1 - 1.0 mg/dL Final    Alkaline Phosphatase 12/16/2021 141 (A) 55 - 135 U/L Final    AST 12/16/2021 17  10 - 40 U/L Final    ALT 12/16/2021 20  10 - 44 U/L Final    Anion Gap 12/16/2021 11  8 - 16 mmol/L Final    eGFR if African American 12/16/2021 >60.0  >60 mL/min/1.73 m^2 Final    eGFR if non African American 12/16/2021 >60.0  >60 mL/min/1.73 m^2 Final    TSH 12/16/2021 1.270  0.400 - 4.000 uIU/mL Final    Cholesterol 12/16/2021 182  120 - 199 mg/dL Final    Triglycerides 12/16/2021 82  30 - 150 mg/dL Final    HDL 12/16/2021 50  40 - 75 mg/dL Final    LDL Cholesterol 12/16/2021 115.6  63.0 - 159.0 mg/dL Final    HDL/Cholesterol Ratio 12/16/2021 27.5  20.0 - 50.0 % Final    Total Cholesterol/HDL Ratio 12/16/2021 3.6  2.0 - 5.0 Final    Non-HDL Cholesterol 12/16/2021 132  mg/dL Final   (

## 2022-03-03 NOTE — PROGRESS NOTES
[facility]   Physical Therapy Treatment Note     Name: Jennifer Sutherland [employer]  Clinic Number: [id number]    Therapy Diagnosis:   Encounter Diagnoses   Name Primary?    Hip pain, chronic, right Yes    Weakness of lower extremity, unspecified laterality     Impaired range of motion of right hip      Physician: Juventino Langford III, *    Visit Date: 3/3/2022    Physician Orders: PT Eval and Treat   Medical Diagnosis from Referral:   M54.50 (ICD-10-CM) - Lumbar back pain   M25.551,G89.29 (ICD-10-CM) - Chronic right hip pain   Evaluation Date: 2/15/2022  Authorization Period Expiration: TBD  Plan of Care Expiration: 5/14/2022  Progress Note Due: 3/15/2022  Visit # / Visits authorized: 1/ TBD   FOTO: 1/5     Precautions: Standard       Time In: 11:32 am   Time Out: 12:17 am  Total Billable Time: 45 minutes      SUBJECTIVE     Pt reports: she [place] and is paying the price yesterday and today. States she is receiving an injection to the hip next Monday.  She was compliant with home exercise program.  Response to previous treatment: no adverse reactions  Functional change: Walking more    Pain: 8/10  Location: right hip      OBJECTIVE     Objective Measures updated at progress report unless specified.       TREATMENT         Jennifer received therapeutic exercises (bold exercises performed) to develop strength, ROM and flexibility for 37 minutes including:  Nustep x 6 min level 1   Glute set 2x10  Supine isometric abdominal crunch c/ physioball 3x10   LTR 2x10   Bridge 2x10   PPT 2x10  Sidelying clams 2x10 ea  Seated hamstring stretch 2x20 sec ea  Seated physioball roll outs 12x   Seated figure 4 stretch 3x10 sec ea - only able to complete 1 on RLE (pain)  Paloff press 2x10 ea GTB         Jennifer received the following manual therapy techniques: were applied to the: R LE for 8 minutes, including:  R LE long axis distraction- held today   Muscle roller, percussion gun to  R quad an hip    Jennifer received MH to R posterior hip x 10 min while performing therex in supine     PATIENT EDUCATION AND HOME EXERCISES     Home Exercises Provided and Patient Education Provided     Education provided:   - HEP issued    Written Home Exercises Provided: Patient instructed to cont prior HEP. Exercises were reviewed and Jennifer was able to demonstrate them prior to the end of the session.  Jennifer demonstrated good  understanding of the education provided. See EMR under Patient Instructions for exercises provided during therapy sessions    ASSESSMENT     Fair tolerance to today's visit. MH applied R posterior hip in supine while performing therex. Pt was in a great deal of pain today. She was unable to perform Kate stretch to the RLE due to severe anterior hip pain. Pt with good response to roller and percussion gun. Significant reduction in pain post treatment. Continues to display antalgic gait on R.     Jennifer Is progressing well towards her goals.   Pt prognosis is Good.     Pt will continue to benefit from skilled outpatient physical therapy to address the deficits listed in the problem list box on initial evaluation, provide pt/family education and to maximize pt's level of independence in the home and community environment.     Pt's spiritual, cultural and educational needs considered and pt agreeable to plan of care and goals.     Anticipated Barriers for therapy: chronic symptoms     Goals:  Short Term Goals (2 Weeks):   1. Pt will be compliant with HEP to supplement PT in restoring pain free function.     Long Term Goals (6 Weeks):  1. Pt will improve FOTO score to </= 60% limited to decrease perceived limitation with mobility  2. Pt will improve impaired LE MMTs by 1/2 grade to improve strength for functional tasks.  3. Pt improve impaired hip ROM to = L in all planes to improve mobility for normal movement patterns.   4. Pt will improve hip pain to </= 3/10 at its worst to better tolerate  daily activities          PLAN     Continue with skilled PT towards POC.       Petros Mathew, PTA

## 2022-03-10 ENCOUNTER — CLINICAL SUPPORT (OUTPATIENT)
Dept: REHABILITATION | Facility: HOSPITAL | Age: 62
End: 2022-03-10
Payer: MEDICARE

## 2022-03-10 DIAGNOSIS — M25.651 IMPAIRED RANGE OF MOTION OF RIGHT HIP: ICD-10-CM

## 2022-03-10 DIAGNOSIS — G89.29 HIP PAIN, CHRONIC, RIGHT: Primary | ICD-10-CM

## 2022-03-10 DIAGNOSIS — R29.898 WEAKNESS OF RIGHT LOWER EXTREMITY: ICD-10-CM

## 2022-03-10 DIAGNOSIS — M25.551 HIP PAIN, CHRONIC, RIGHT: Primary | ICD-10-CM

## 2022-03-10 PROCEDURE — 97110 THERAPEUTIC EXERCISES: CPT

## 2022-03-10 PROCEDURE — 97140 MANUAL THERAPY 1/> REGIONS: CPT

## 2022-03-10 NOTE — PROGRESS NOTES
[facility]   Physical Therapy Treatment Note     Name: Jennifer Sutherland [place]  Clinic Number: [id number]    Therapy Diagnosis:   Encounter Diagnoses   Name Primary?    Hip pain, chronic, right Yes    Weakness of right lower extremity     Impaired range of motion of right hip      Physician: Juventino Langford III, *    Visit Date: 3/10/2022    Physician Orders: PT Eval and Treat   Medical Diagnosis from Referral:   M54.50 (ICD-10-CM) - Lumbar back pain   M25.551,G89.29 (ICD-10-CM) - Chronic right hip pain   Evaluation Date: 2/15/2022  Authorization Period Expiration: 4/2/2022  Plan of Care Expiration: 5/14/2022  Progress Note Due: 3/15/2022  Visit # / Visits authorized: 4/17   FOTO: 4/5     Precautions: Standard       Time In: 1039 (arrived late)   Time Out: 1115  Total Billable Time: 34 minutes      SUBJECTIVE     Pt reports: she had a  she [place] a lot [place] and is paying the price yesterday and today. States she is receiving an injection to the hip next Monday.  She was compliant with home exercise program.  Response to previous treatment: no adverse reactions  Functional change: Walking more    Pain: 8/10  Location: right hip      OBJECTIVE     Objective Measures updated at progress report unless specified.       TREATMENT         Jennifer received therapeutic exercises to develop strength, ROM and flexibility for 10 minutes including:    Nustep x 6 min level 1   Seated hamstring stretch 2x20 sec ea  Supine piriformis stretch 2x20          NOT PERFORMED   Glute set 2x10  Supine isometric abdominal crunch c/ physioball 3x10   LTR 2x10   Bridge 2x10   PPT 2x10  Sidelying clams 2x10 ea  Seated physioball roll outs 12x   Seated figure 4 stretch 3x10 sec ea - only able to complete 1 on RLE (pain)  Paloff press 2x10 ea GTB         Jennifer received the following manual therapy techniques: were applied to the: R LE for 24 minutes, including:    R LE long axis distraction  Jennifer  received MH to R posterior hip x 10 min while performing therex in supine       Muscle roller, percussion gun to R quad an hip NOT PERFORMED         PATIENT EDUCATION AND HOME EXERCISES     Home Exercises Provided and Patient Education Provided     Education provided:   - HEP issued    Written Home Exercises Provided: Patient instructed to cont prior HEP. Exercises were reviewed and Jennifer was able to demonstrate them prior to the end of the session.  Jennifer demonstrated good  understanding of the education provided. See EMR under Patient Instructions for exercises provided during therapy sessions    ASSESSMENT     Majority of session was spent trying to get patient into to a comfortable position for therapy. Patient had pain with all therex. Patient was then placed on heat to attempt to reduce hip pain and guarding. Patient then tolerated R hip distraction and oscillations to address hip pain. However, patient did not have reduction of hip pain at the end of session      Jennifer Is progressing well towards her goals.   Pt prognosis is Good.     Pt will continue to benefit from skilled outpatient physical therapy to address the deficits listed in the problem list box on initial evaluation, provide pt/family education and to maximize pt's level of independence in the home and community environment.     Pt's spiritual, cultural and educational needs considered and pt agreeable to plan of care and goals.     Anticipated Barriers for therapy: chronic symptoms     Goals:  Short Term Goals (2 Weeks):   1. Pt will be compliant with HEP to supplement PT in restoring pain free function.     Long Term Goals (6 Weeks):  1. Pt will improve FOTO score to </= 60% limited to decrease perceived limitation with mobility  2. Pt will improve impaired LE MMTs by 1/2 grade to improve strength for functional tasks.  3. Pt improve impaired hip ROM to = L in all planes to improve mobility for normal movement patterns.   4. Pt will improve  hip pain to </= 3/10 at its worst to better tolerate daily activities          PLAN     Continue with skilled PT towards POC.       Nik Bruno, PT

## 2022-03-17 ENCOUNTER — CLINICAL SUPPORT (OUTPATIENT)
Dept: REHABILITATION | Facility: HOSPITAL | Age: 62
End: 2022-03-17
Payer: MEDICARE

## 2022-03-17 DIAGNOSIS — R29.898 WEAKNESS OF RIGHT LOWER EXTREMITY: ICD-10-CM

## 2022-03-17 DIAGNOSIS — M25.651 IMPAIRED RANGE OF MOTION OF RIGHT HIP: ICD-10-CM

## 2022-03-17 DIAGNOSIS — G89.29 HIP PAIN, CHRONIC, RIGHT: Primary | ICD-10-CM

## 2022-03-17 DIAGNOSIS — M25.551 HIP PAIN, CHRONIC, RIGHT: Primary | ICD-10-CM

## 2022-03-17 PROCEDURE — 97110 THERAPEUTIC EXERCISES: CPT

## 2022-03-17 NOTE — PROGRESS NOTES
[facility]   Physical Therapy Treatment Note     Name: Jennifer Sutherland [place]  Clinic Number: [id number]    Therapy Diagnosis:   Encounter Diagnoses   Name Primary?    Hip pain, chronic, right Yes    Weakness of right lower extremity     Impaired range of motion of right hip      Physician: Juventino Langford III, *    Visit Date: 3/17/2022    Physician Orders: PT Eval and Treat   Medical Diagnosis from Referral:   M54.50 (ICD-10-CM) - Lumbar back pain   M25.551,G89.29 (ICD-10-CM) - Chronic right hip pain   Evaluation Date: 2/15/2022  Authorization Period Expiration: 4/2/2022  Plan of Care Expiration: 5/14/2022  Progress Note Due: 3/15/2022  Visit # / Visits authorized: 5/17   FOTO: 5/5     Precautions: Standard       Time In: 902   Time Out: 947  Total Billable Time: 45 minutes      SUBJECTIVE     Pt reports: Patient presented to PT with improvements in symptoms compared to last session she had a  she [place] and is paying the price yesterday and today. States she is receiving an injection to the hip next Monday.   She was compliant with home exercise program.  Response to previous treatment: no adverse reactions  Functional change: Walking more    Pain: 5/10  Location: right hip      OBJECTIVE     Objective Measures updated at progress report unless specified.       TREATMENT         Jennifer received therapeutic exercises to develop strength, ROM and flexibility for 45 minutes including:    Nustep x 6 min level 1   Seated hamstring stretch 3x20 sec ea  Supine piriformis stretch 3x20  LTR 2x10   Bridge 2x10 Green TB  Hip abduction Supine Green TB 20x  PPT 2x10  Sidelying clams 2x10 ea   SLR 2x10 w/ Strap assist   Seated physioball roll outs 12x   Seated figure 4 stretch 3x10 sec ea - only able to complete 1 on RLE (pain)            NOT PERFORMED   Glute set 2x10  Supine isometric abdominal crunch c/ physioball 3x10   Paloff press 2x10 ea GTB         Jennifer  received the following manual therapy techniques: were applied to the: R LE for 0 minutes, including:    R LE long axis distraction  Jennifer received MH to R posterior hip x 10 min while performing therex in supine       Muscle roller, percussion gun to R quad an hip NOT PERFORMED         PATIENT EDUCATION AND HOME EXERCISES     Home Exercises Provided and Patient Education Provided     Education provided:   - HEP issued    Written Home Exercises Provided: Patient instructed to cont prior HEP. Exercises were reviewed and Jennifer was able to demonstrate them prior to the end of the session.  Jennifer demonstrated good  understanding of the education provided. See EMR under Patient Instructions for exercises provided during therapy sessions    ASSESSMENT     Patient tolerated therapy much better today with less pain. Patient was progressed with lower extremity strength today and had no adverse effects. Cont to work on strengthening as tolerated         Jennifer Is progressing well towards her goals.   Pt prognosis is Good.     Pt will continue to benefit from skilled outpatient physical therapy to address the deficits listed in the problem list box on initial evaluation, provide pt/family education and to maximize pt's level of independence in the home and community environment.     Pt's spiritual, cultural and educational needs considered and pt agreeable to plan of care and goals.     Anticipated Barriers for therapy: chronic symptoms     Goals:  Short Term Goals (2 Weeks):   1. Pt will be compliant with HEP to supplement PT in restoring pain free function.     Long Term Goals (6 Weeks):  1. Pt will improve FOTO score to </= 60% limited to decrease perceived limitation with mobility  2. Pt will improve impaired LE MMTs by 1/2 grade to improve strength for functional tasks.  3. Pt improve impaired hip ROM to = L in all planes to improve mobility for normal movement patterns.   4. Pt will improve hip pain to </= 3/10 at its  worst to better tolerate daily activities          PLAN     Continue with skilled PT towards POC.       Nik Bruno, PT

## 2022-03-23 ENCOUNTER — CLINICAL SUPPORT (OUTPATIENT)
Dept: REHABILITATION | Facility: HOSPITAL | Age: 62
End: 2022-03-23
Payer: MEDICARE

## 2022-03-23 DIAGNOSIS — G89.29 HIP PAIN, CHRONIC, RIGHT: Primary | ICD-10-CM

## 2022-03-23 DIAGNOSIS — M25.551 HIP PAIN, CHRONIC, RIGHT: Primary | ICD-10-CM

## 2022-03-23 DIAGNOSIS — M25.651 IMPAIRED RANGE OF MOTION OF RIGHT HIP: ICD-10-CM

## 2022-03-23 DIAGNOSIS — R29.898 WEAKNESS OF RIGHT LOWER EXTREMITY: ICD-10-CM

## 2022-03-23 PROCEDURE — 97110 THERAPEUTIC EXERCISES: CPT

## 2022-03-30 ENCOUNTER — CLINICAL SUPPORT (OUTPATIENT)
Dept: REHABILITATION | Facility: HOSPITAL | Age: 62
End: 2022-03-30
Payer: MEDICARE

## 2022-03-30 DIAGNOSIS — G89.29 HIP PAIN, CHRONIC, RIGHT: Primary | ICD-10-CM

## 2022-03-30 DIAGNOSIS — M25.551 HIP PAIN, CHRONIC, RIGHT: Primary | ICD-10-CM

## 2022-03-30 DIAGNOSIS — M25.651 IMPAIRED RANGE OF MOTION OF RIGHT HIP: ICD-10-CM

## 2022-03-30 DIAGNOSIS — R29.898 WEAKNESS OF RIGHT LOWER EXTREMITY: ICD-10-CM

## 2022-03-30 PROCEDURE — 97110 THERAPEUTIC EXERCISES: CPT

## 2022-03-30 NOTE — PROGRESS NOTES
[facility]   Physical Therapy Treatment Note     Name: Jennifer Sutherland [place]  Clinic Number: [id number]    Therapy Diagnosis:   Encounter Diagnoses   Name Primary?    Hip pain, chronic, right Yes    Weakness of right lower extremity     Impaired range of motion of right hip      Physician: Juventino Langford III, *    Visit Date: 3/30/2022    Physician Orders: PT Eval and Treat   Medical Diagnosis from Referral:   M54.50 (ICD-10-CM) - Lumbar back pain   M25.551,G89.29 (ICD-10-CM) - Chronic right hip pain   Evaluation Date: 2/15/2022  Authorization Period Expiration: 4/2/2022  Plan of Care Expiration: 5/14/2022  Progress Note Due: 3/15/2022  Visit # / Visits authorized: 7/17   Total Visits: 7     Precautions: Standard       Time In: 145  Time Out: 225  Total Billable Time: 40 minutes      SUBJECTIVE     Pt reports: she [place] and walked a lot. Patient reports since then she has been having R hip pain, but has improved since then. Patient reports she would like to keep it light today if possible.      She was compliant with home exercise program.  Response to previous treatment: Good with reduction of symptoms  Functional change: Walking more    Pain: 6/10  Location: right hip      OBJECTIVE     Objective Measures updated at progress report unless specified.       TREATMENT         Jennifer received therapeutic exercises to develop strength, ROM and flexibility for 40 minutes including:    Heat on R hip for pain modulation 10 min  Seated hamstring stretch 3x20 sec ea  Supine piriformis stretch 3x20  LTR 2x10   IT Band Stretch 30 sec x 3  Sidelying clams 3x10 ea Green TB   Bridge 3x10 Green TB  SLR 2x10 w/ Strap assist               NOT PERFORMED   Nustep x 10 min for hip range of motion  PPT 2x10  Seated physioball roll outs 12x   Glute set 2x10  Supine isometric abdominal crunch c/ physioball 3x10   Paloff press 2x10 ea GTB         Jennifer received the  following manual therapy techniques: were applied to the: R LE for 0 minutes, including:    R LE long axis distraction  Jennifer received MH to R posterior hip x 10 min while performing therex in supine       Muscle roller, percussion gun to R quad an hip NOT PERFORMED         PATIENT EDUCATION AND HOME EXERCISES     Home Exercises Provided and Patient Education Provided     Education provided:   - HEP issued    Written Home Exercises Provided: Patient instructed to cont prior HEP. Exercises were reviewed and Jennifer was able to demonstrate them prior to the end of the session.  Jennifer demonstrated good  understanding of the education provided. See EMR under Patient Instructions for exercises provided during therapy sessions    ASSESSMENT     Patient presents to PT with increased hip pain compared to previous session. Patients session was lighter today compared to usual. Patient did not have any adverse effects, but continues to have R hip pain that is abrupt and sharp.       Jennifer Is progressing well towards her goals.   Pt prognosis is Good.     Pt will continue to benefit from skilled outpatient physical therapy to address the deficits listed in the problem list box on initial evaluation, provide pt/family education and to maximize pt's level of independence in the home and community environment.     Pt's spiritual, cultural and educational needs considered and pt agreeable to plan of care and goals.     Anticipated Barriers for therapy: chronic symptoms     Goals:  Short Term Goals (2 Weeks):   1. Pt will be compliant with HEP to supplement PT in restoring pain free function.     Long Term Goals (6 Weeks):  1. Pt will improve FOTO score to </= 60% limited to decrease perceived limitation with mobility  2. Pt will improve impaired LE MMTs by 1/2 grade to improve strength for functional tasks.  3. Pt improve impaired hip ROM to = L in all planes to improve mobility for normal movement patterns.   4. Pt will improve  hip pain to </= 3/10 at its worst to better tolerate daily activities          PLAN     Continue with skilled PT towards POC.       Nik Bruno, PT

## 2022-04-01 ENCOUNTER — CLINICAL SUPPORT (OUTPATIENT)
Dept: REHABILITATION | Facility: HOSPITAL | Age: 62
End: 2022-04-01
Payer: MEDICARE

## 2022-04-01 DIAGNOSIS — M25.551 HIP PAIN, CHRONIC, RIGHT: Primary | ICD-10-CM

## 2022-04-01 DIAGNOSIS — G89.29 HIP PAIN, CHRONIC, RIGHT: Primary | ICD-10-CM

## 2022-04-01 DIAGNOSIS — M25.651 IMPAIRED RANGE OF MOTION OF RIGHT HIP: ICD-10-CM

## 2022-04-01 DIAGNOSIS — R29.898 WEAKNESS OF RIGHT LOWER EXTREMITY: ICD-10-CM

## 2022-04-01 PROCEDURE — 97110 THERAPEUTIC EXERCISES: CPT

## 2022-04-01 NOTE — PROGRESS NOTES
[facility]   Physical Therapy Treatment Note     Name: Jennifer Brady  Clinic Number: [id number]    Therapy Diagnosis:   Encounter Diagnoses   Name Primary?    Hip pain, chronic, right Yes    Weakness of right lower extremity     Impaired range of motion of right hip      Physician: Juventino Langford III, *    Visit Date: 4/1/2022    Physician Orders: PT Eval and Treat   Medical Diagnosis from Referral:   M54.50 (ICD-10-CM) - Lumbar back pain   M25.551,G89.29 (ICD-10-CM) - Chronic right hip pain   Evaluation Date: 2/15/2022  Authorization Period Expiration: 4/2/2022  Plan of Care Expiration: 5/14/2022  Progress Note Due: 3/15/2022  Visit # / Visits authorized: 8/17   Total Visits: 8     Precautions: Standard       Time In: 1216   Time Out: 100   Total Billable Time: 44 minutes      SUBJECTIVE     Pt reports: she feels much better compared to last session. Patient reports this is the most progress she has made with any physical therapy clinic. Patient wishes to continue 2x a week to continue improving her symptoms.        She was compliant with home exercise program.  Response to previous treatment: Good with reduction of symptoms  Functional change: Walking more    Pain: 6/10  Location: right hip      OBJECTIVE     Objective Measures updated at progress report unless specified.       TREATMENT         Jennifer received therapeutic exercises to develop strength, ROM and flexibility for 44 minutes including:    Heat on R hip for pain modulation 10 min  Seated hamstring stretch 3x20 sec ea  Supine piriformis stretch 3x20  LTR 2x10   IT Band Stretch 30 sec x 3  Shuttle double leg press  - 2 cord 3x10  - 2.5 cord 3x10   Shuttle single leg press   - 1.5 cord 3x10                       NOT PERFORMED   Sidelying clams 3x10 ea Green TB   Bridge 3x10 Green TB  Nustep x 10 min for hip range of motion  PPT 2x10  Seated physioball roll outs 12x   Glute set 2x10  Supine isometric abdominal  crunch c/ physioball 3x10   Paloff press 2x10 ea GTB         Jennifer received the following manual therapy techniques: were applied to the: R LE for 0 minutes, including:    R LE long axis distraction  Jennifer received MH to R posterior hip x 10 min while performing therex in supine       Muscle roller, percussion gun to R quad an hip NOT PERFORMED         PATIENT EDUCATION AND HOME EXERCISES     Home Exercises Provided and Patient Education Provided     Education provided:   - HEP issued    Written Home Exercises Provided: Patient instructed to cont prior HEP. Exercises were reviewed and Jennifer was able to demonstrate them prior to the end of the session.  Jennifer demonstrated good  understanding of the education provided. See EMR under Patient Instructions for exercises provided during therapy sessions    ASSESSMENT     Patient continues to show improvements with tolerance to exercises. Patient is having less pain with utilizing quad muscle doing a single leg raise. Patient was progressed with shuttle leg press for quad strengthening. Patient continues to require therapy to continue to improve R hip pain and strength.         Jennifer Is progressing well towards her goals.   Pt prognosis is Good.     Pt will continue to benefit from skilled outpatient physical therapy to address the deficits listed in the problem list box on initial evaluation, provide pt/family education and to maximize pt's level of independence in the home and community environment.     Pt's spiritual, cultural and educational needs considered and pt agreeable to plan of care and goals.     Anticipated Barriers for therapy: chronic symptoms     Goals:  Short Term Goals (2 Weeks):   1. Pt will be compliant with HEP to supplement PT in restoring pain free function.     Long Term Goals (6 Weeks):  1. Pt will improve FOTO score to </= 60% limited to decrease perceived limitation with mobility  2. Pt will improve impaired LE MMTs by 1/2 grade to improve  strength for functional tasks.  3. Pt improve impaired hip ROM to = L in all planes to improve mobility for normal movement patterns.   4. Pt will improve hip pain to </= 3/10 at its worst to better tolerate daily activities          PLAN     Continue with skilled PT towards POC.       Nik Bruno, PT

## 2022-04-12 ENCOUNTER — CLINICAL SUPPORT (OUTPATIENT)
Dept: REHABILITATION | Facility: HOSPITAL | Age: 62
End: 2022-04-12
Payer: MEDICARE

## 2022-04-12 DIAGNOSIS — M25.551 HIP PAIN, CHRONIC, RIGHT: Primary | ICD-10-CM

## 2022-04-12 DIAGNOSIS — G89.29 HIP PAIN, CHRONIC, RIGHT: Primary | ICD-10-CM

## 2022-04-12 DIAGNOSIS — R29.898 WEAKNESS OF RIGHT LOWER EXTREMITY: ICD-10-CM

## 2022-04-12 DIAGNOSIS — M25.651 IMPAIRED RANGE OF MOTION OF RIGHT HIP: ICD-10-CM

## 2022-04-12 PROCEDURE — 97110 THERAPEUTIC EXERCISES: CPT

## 2022-04-12 NOTE — PROGRESS NOTES
[facility]   Physical Therapy Treatment Note     Name: Jennifer Sutherland Jose Antonio  Clinic Number: [id number]    Therapy Diagnosis:   Encounter Diagnoses   Name Primary?    Hip pain, chronic, right Yes    Weakness of right lower extremity     Impaired range of motion of right hip      Physician: Juventino Langford III, *    Visit Date: 4/12/2022    Physician Orders: PT Eval and Treat   Medical Diagnosis from Referral:   M54.50 (ICD-10-CM) - Lumbar back pain   M25.551,G89.29 (ICD-10-CM) - Chronic right hip pain   Evaluation Date: 2/15/2022  Authorization Period Expiration: 4/2/2022  Plan of Care Expiration: 5/14/2022  Progress Note Due: 3/15/2022  Visit # / Visits authorized: 9/17   Total Visits: 9     Precautions: Standard       Time In: 145 (arrived late)   Time Out: 225   Total Billable Time: 40 minutes      SUBJECTIVE     Pt reports: she has been working out in the gym as of recently and feels that she is continuing to feel good. Patient reports she would like to try 1x a week. Patient reports her pain is much less and has no pain at rest. Patient reports her pain use to be 7/10 at rest.        She was compliant with home exercise program.  Response to previous treatment: Good with reduction of symptoms  Functional change: Walking more    Pain: 5/10 - when moving and 0/10 at rest  Location: right hip      OBJECTIVE     Objective Measures updated at progress report unless specified.       TREATMENT         Jennifer received therapeutic exercises to develop strength, ROM and flexibility for 40 minutes including:      Supine hamstring stretch 3x30 sec ea  Supine piriformis stretch 3x30  IT Band Stretch 30 sec x 3  LTR 2x10   Bridging 3x10  Sidelying clams 3x10 ea Blue TB   Ball Roll Outs 10x               NOT PERFORMED   Shuttle double leg press  - 2 cord 3x10  - 2.5 cord 3x10   Shuttle single leg press   - 1.5 cord 3x10   Nustep x 10 min for hip range of motion  PPT 2x10  Seated physioball  roll outs 12x   Glute set 2x10  Supine isometric abdominal crunch c/ physioball 3x10   Paloff press 2x10 ea GTB         Jennifer received the following manual therapy techniques: were applied to the: R LE for 0 minutes, including:    R LE long axis distraction  Jennifer received MH to R posterior hip x 10 min while performing therex in supine       Muscle roller, percussion gun to R quad an hip NOT PERFORMED         PATIENT EDUCATION AND HOME EXERCISES     Home Exercises Provided and Patient Education Provided     Education provided:   - HEP issued    Written Home Exercises Provided: Patient instructed to cont prior HEP. Exercises were reviewed and Jennifer was able to demonstrate them prior to the end of the session.  Jennifer demonstrated good  understanding of the education provided. See EMR under Patient Instructions for exercises provided during therapy sessions    ASSESSMENT     Patient presents to physical therapy with continued decreased hip pain, however, she continues to have hip pain with certain movements. Patient is going to be regressed to 1x per week to start weaning off of therapy.        Jennifer Is progressing well towards her goals.   Pt prognosis is Good.     Pt will continue to benefit from skilled outpatient physical therapy to address the deficits listed in the problem list box on initial evaluation, provide pt/family education and to maximize pt's level of independence in the home and community environment.     Pt's spiritual, cultural and educational needs considered and pt agreeable to plan of care and goals.     Anticipated Barriers for therapy: chronic symptoms     Goals:  Short Term Goals (2 Weeks):   1. Pt will be compliant with HEP to supplement PT in restoring pain free function.     Long Term Goals (6 Weeks):  1. Pt will improve FOTO score to </= 60% limited to decrease perceived limitation with mobility  2. Pt will improve impaired LE MMTs by 1/2 grade to improve strength for functional  tasks.  3. Pt improve impaired hip ROM to = L in all planes to improve mobility for normal movement patterns.   4. Pt will improve hip pain to </= 3/10 at its worst to better tolerate daily activities          PLAN     Continue with skilled PT towards POC.       Nik Bruno, PT

## 2022-04-17 ENCOUNTER — OFFICE VISIT (OUTPATIENT)
Dept: URGENT CARE | Facility: CLINIC | Age: 62
End: 2022-04-17
Payer: MEDICARE

## 2022-04-17 VITALS
HEIGHT: 70 IN | BODY MASS INDEX: 30.92 KG/M2 | HEART RATE: 82 BPM | SYSTOLIC BLOOD PRESSURE: 113 MMHG | OXYGEN SATURATION: 98 % | RESPIRATION RATE: 16 BRPM | WEIGHT: 216 LBS | DIASTOLIC BLOOD PRESSURE: 58 MMHG | TEMPERATURE: 98 F

## 2022-04-17 DIAGNOSIS — M54.31 SCIATICA OF RIGHT SIDE: Primary | ICD-10-CM

## 2022-04-17 PROCEDURE — 99214 PR OFFICE/OUTPT VISIT, EST, LEVL IV, 30-39 MIN: ICD-10-PCS | Mod: 25,S$GLB,, | Performed by: EMERGENCY MEDICINE

## 2022-04-17 PROCEDURE — 4010F ACE/ARB THERAPY RXD/TAKEN: CPT | Mod: CPTII,S$GLB,, | Performed by: EMERGENCY MEDICINE

## 2022-04-17 PROCEDURE — 1159F PR MEDICATION LIST DOCUMENTED IN MEDICAL RECORD: ICD-10-PCS | Mod: CPTII,S$GLB,, | Performed by: EMERGENCY MEDICINE

## 2022-04-17 PROCEDURE — 96372 THER/PROPH/DIAG INJ SC/IM: CPT | Mod: S$GLB,,, | Performed by: EMERGENCY MEDICINE

## 2022-04-17 PROCEDURE — 99214 OFFICE O/P EST MOD 30 MIN: CPT | Mod: 25,S$GLB,, | Performed by: EMERGENCY MEDICINE

## 2022-04-17 PROCEDURE — 4010F PR ACE/ARB THEARPY RXD/TAKEN: ICD-10-PCS | Mod: CPTII,S$GLB,, | Performed by: EMERGENCY MEDICINE

## 2022-04-17 PROCEDURE — 3008F PR BODY MASS INDEX (BMI) DOCUMENTED: ICD-10-PCS | Mod: CPTII,S$GLB,, | Performed by: EMERGENCY MEDICINE

## 2022-04-17 PROCEDURE — 3008F BODY MASS INDEX DOCD: CPT | Mod: CPTII,S$GLB,, | Performed by: EMERGENCY MEDICINE

## 2022-04-17 PROCEDURE — 1160F RVW MEDS BY RX/DR IN RCRD: CPT | Mod: CPTII,S$GLB,, | Performed by: EMERGENCY MEDICINE

## 2022-04-17 PROCEDURE — 3078F PR MOST RECENT DIASTOLIC BLOOD PRESSURE < 80 MM HG: ICD-10-PCS | Mod: CPTII,S$GLB,, | Performed by: EMERGENCY MEDICINE

## 2022-04-17 PROCEDURE — 3078F DIAST BP <80 MM HG: CPT | Mod: CPTII,S$GLB,, | Performed by: EMERGENCY MEDICINE

## 2022-04-17 PROCEDURE — 1160F PR REVIEW ALL MEDS BY PRESCRIBER/CLIN PHARMACIST DOCUMENTED: ICD-10-PCS | Mod: CPTII,S$GLB,, | Performed by: EMERGENCY MEDICINE

## 2022-04-17 PROCEDURE — 96372 PR INJECTION,THERAP/PROPH/DIAG2ST, IM OR SUBCUT: ICD-10-PCS | Mod: S$GLB,,, | Performed by: EMERGENCY MEDICINE

## 2022-04-17 PROCEDURE — 3074F PR MOST RECENT SYSTOLIC BLOOD PRESSURE < 130 MM HG: ICD-10-PCS | Mod: CPTII,S$GLB,, | Performed by: EMERGENCY MEDICINE

## 2022-04-17 PROCEDURE — 3074F SYST BP LT 130 MM HG: CPT | Mod: CPTII,S$GLB,, | Performed by: EMERGENCY MEDICINE

## 2022-04-17 PROCEDURE — 1159F MED LIST DOCD IN RCRD: CPT | Mod: CPTII,S$GLB,, | Performed by: EMERGENCY MEDICINE

## 2022-04-17 RX ORDER — METHYLPREDNISOLONE 4 MG/1
4 TABLET ORAL DAILY
Qty: 1 EACH | Refills: 0 | Status: SHIPPED | OUTPATIENT
Start: 2022-04-17 | End: 2022-04-23

## 2022-04-17 RX ORDER — KETOROLAC TROMETHAMINE 30 MG/ML
30 INJECTION, SOLUTION INTRAMUSCULAR; INTRAVENOUS
Status: COMPLETED | OUTPATIENT
Start: 2022-04-17 | End: 2022-04-17

## 2022-04-17 RX ORDER — METHOCARBAMOL 500 MG/1
1000 TABLET, FILM COATED ORAL 4 TIMES DAILY
Qty: 56 TABLET | Refills: 0 | Status: SHIPPED | OUTPATIENT
Start: 2022-04-17 | End: 2022-04-24

## 2022-04-17 RX ORDER — LIDOCAINE 50 MG/G
1 PATCH TOPICAL DAILY
Qty: 5 PATCH | Refills: 1 | Status: SHIPPED | OUTPATIENT
Start: 2022-04-17 | End: 2022-04-27

## 2022-04-17 RX ADMIN — KETOROLAC TROMETHAMINE 30 MG: 30 INJECTION, SOLUTION INTRAMUSCULAR; INTRAVENOUS at 03:04

## 2022-04-17 NOTE — PROGRESS NOTES
"Subjective:       Patient ID: Jennifer Brady is a 61 y.o. female.    Vitals:  height is 5' 9.5" (1.765 m) and weight is 98 kg (216 lb). Her oral temperature is 98.3 °F (36.8 °C). Her blood pressure is 113/58 (abnormal) and her pulse is 82. Her respiration is 16 and oxygen saturation is 98%.     Chief Complaint: Back Pain    Patient stated of having lower back pain.  Patient has a history of sciatic nerve pain.  Symptoms occurred on today.     Back Pain  This is a recurrent problem. The current episode started today. The problem occurs constantly. The problem has been gradually worsening since onset. The quality of the pain is described as aching. The pain radiates to the right thigh. The pain is at a severity of 8/10. The pain is severe. The symptoms are aggravated by bending, standing, twisting, lying down and sitting. Pertinent negatives include no abdominal pain, bladder incontinence, bowel incontinence, chest pain, dysuria, fever, headaches, leg pain, numbness, paresis, paresthesias, pelvic pain, perianal numbness, tingling, weakness or weight loss. She has tried heat for the symptoms. The treatment provided mild relief.       Constitution: Negative for fever.   Cardiovascular: Negative for chest pain.   Gastrointestinal: Negative for abdominal pain and bowel incontinence.   Genitourinary: Negative for dysuria, bladder incontinence and pelvic pain.   Musculoskeletal: Positive for back pain.   Skin: Negative for erythema.   Neurological: Negative for headaches and numbness.       Objective:      Physical Exam   Constitutional: She is oriented to person, place, and time. She appears well-developed.   HENT:   Head: Normocephalic and atraumatic. Head is without abrasion, without contusion and without laceration.   Ears:   Right Ear: External ear normal.   Left Ear: External ear normal.   Oropharyngeal exam not performed due to risk of viral transmission during global pandemic-- risks outweigh benefits of " exam          Comments: Oropharyngeal exam not performed due to risk of viral transmission during global pandemic-- risks outweigh benefits of exam      Eyes: Conjunctivae, EOM and lids are normal. Pupils are equal, round, and reactive to light.   Neck: Trachea normal and phonation normal. Neck supple.   Cardiovascular: Normal rate, regular rhythm and normal heart sounds. PMI is not displaced. Exam reveals no decreased pulses. No thrill  Pulmonary/Chest: Effort normal and breath sounds normal. No stridor. No respiratory distress.   Abdominal: She exhibits no pulsatile midline mass.   Musculoskeletal:      Thoracic back: Normal.      Lumbar back: She exhibits decreased range of motion, tenderness and spasm. She exhibits no bony tenderness, no swelling, no edema, no deformity and no laceration.   Neurological: She is alert and oriented to person, place, and time.   Skin: Skin is warm, dry, intact and no rash. Capillary refill takes less than 2 seconds. No abrasion, No burn, No bruising, No erythema and No ecchymosis   Psychiatric: Her speech is normal and behavior is normal. Judgment and thought content normal.   Nursing note and vitals reviewed.        Assessment:       1. Sciatica of right side          Plan:         Sciatica of right side    Other orders  -     ketorolac injection 30 mg  -     methylPREDNISolone (MEDROL DOSEPACK) 4 mg tablet; Take 1 tablet (4 mg total) by mouth once daily. use as directed for 6 days  Dispense: 1 each; Refill: 0  -     methocarbamoL (ROBAXIN) 500 MG Tab; Take 2 tablets (1,000 mg total) by mouth 4 (four) times daily. for 7 days  Dispense: 56 tablet; Refill: 0  -     LIDOcaine (LIDODERM) 5 %; Place 1 patch onto the skin once daily. Remove & Discard patch within 12 hours or as directed by MD for 10 days  Dispense: 5 patch; Refill: 1                 Patient Instructions     Go to the Emergency Room if symptoms or condition worsens in any way    Follow up with Primary Care Provider or  Back Specialist in 5-7 days if not improved      Sciatica    Sciatica is a condition that causes pain in the lower back that spreads down into the buttock, hip, and leg. Sometimes the leg pain can happen without any back pain. Sciatica happens when a spinal nerve is irritated or has pressure put on it as comes out of the spinal canal in the lower back. This most often happens when a bulge or rupture of a nearby spinal disk presses on the nerve. Sciatica can also be caused by a narrowing of the spinal canal (spinal stenosis) or spasm of the muscle in the buttocks that the sciatic nerve passes through (pyriform muscle). Sciatica is also called lumbar radiculopathy.  Sciatica may begin after a sudden twisting or bending force, such as in a car accident. Or it can happen after a simple awkward movement. In either case, muscle spasm often also happens. Muscle spasm makes the pain worse.  A healthcare provider makes a diagnosis of sciatica from your symptoms and a physical exam. Unless you had an injury from a car accident or fall, you usually wont have X-rays taken at this time. This is because the nerves and disks in your back cant be seen on an X-ray. If the provider sees signs of a compressed nerve, you will need to schedule an MRI scan as an outpatient. Signs of a compressed nerve include loss of strength in a leg.  Most sciatica gets better with medicine, exercise, and physical therapy. If your symptoms continue after at least 3 months of medical treatment, you may need surgery or injections to your lower back.  Home care  Follow these tips when caring for yourself at home:  · You may need to stay in bed the first few days. But as soon as possible, begin sitting up or walking. This will help you avoid problems that come from staying in bed for long periods.  · When in bed, try to find a position that is comfortable. A firm mattress is best. Try lying flat on your back with pillows under your knees. You can also  try lying on your side with your knees bent up toward your chest and a pillow between your knees.  · Avoid sitting for long periods. This puts more stress on your lower back than standing or walking.  · Use heat from a hot shower, hot bath, or heating pad to help ease pain. Massage can also help. You can also try using an ice pack. You can make your own ice pack by putting ice cubes in a plastic bag. Wrap the bag in a thin towel. Try both heat and cold to see which works best. Use the method that feels best for 20 minutes several times a day.  · You may use acetaminophen or ibuprofen to ease pain, unless another pain medicine was prescribed. Note: If you have chronic liver or kidney disease, talk with your healthcare provider before taking these medicines. Also talk with your provider if youve had a stomach ulcer or gastrointestinal bleeding.  · Use safe lifting methods. Dont lift anything heavier than 15 pounds until all of the pain is gone.  Follow-up care  Follow up with your healthcare provider, or as advised. You may need physical therapy or additional tests.  If X-rays were taken, a radiologist will look at them. You will be told of any new findings that may affect your care.  When to seek medical advice  Call your healthcare provider right away if any of these occur:  · Pain gets worse even after taking prescribed medicine  · Weakness or numbness in 1 or both legs or hips  · Numbness in your groin or genital area  · You cant control your bowel or bladder  · Fever  · Redness or swelling over your back or spine   Date Last Reviewed: 8/1/2016 © 2000-2016 [employer]. [address] [place] [address], [place] [address]. All rights reserved. This information is not intended as a substitute for professional medical care. Always follow your healthcare professional's instructions.          Back Care Tips    Caring for your back  These are things you can do to prevent a recurrence of acute back pain and to  reduce symptoms from chronic back pain:  · Maintain a healthy weight. If you are overweight, losing weight will help most types of back pain.  · Exercise is an important part of recovery from most types of back pain. The muscles behind and in front of the spine support the back. This means strengthening both the back muscles and the abdominal muscles will provide better support for your spine.   · Swimming and brisk walking are good overall exercises to improve your fitness level.  · Practice safe lifting methods (below).  · Practice good posture when sitting, standing and walking. Avoid prolonged sitting. This puts more stress on the lower back than standing or walking.  · Wear quality shoes with sufficient arch support. Foot and ankle alignment can affect back symptoms. Women should avoid wearing high heels.  · Therapeutic massage can help relax the back muscles without stretching them.  · During the first 24 to 72 hours after an acute injury or flare-up of chronic back pain, apply an ice pack to the painful area for 20 minutes and then remove it for 20 minutes, over a period of 60 to 90 minutes, or several times a day. As a safety precaution, do not use a heating pad at bedtime. Sleeping on a heating pad can lead to skin burns or tissue damage.  · You can alternate ice and heat therapies.  Medications  Talk to your healthcare provider before using medicines, especially if you have other medical problems or are taking other medicines.  · You may use acetaminophen or ibuprofen to control pain, unless your healthcare provider prescribed other pain medicine. If you have chronic conditions like diabetes, liver or kidney disease, stomach ulcers, or gastrointestinal bleeding, or are taking blood thinners, talk with your healthcare provider before taking any medicines.  · Be careful if you are given prescription pain medicines, narcotics, or medicine for muscle spasm. They can cause drowsiness, affect your coordination,  reflexes, and judgment. Do not drive or operate heavy machinery while taking these types of medicines. Take prescription pain medicine only as prescribed by your healthcare provider.  Lumbar stretch  Here is a simple stretching exercise that will help relax muscle spasm and keep your back more limber. If exercise makes your back pain worse, dont do it.  · Lie on your back with your knees bent and both feet on the ground.  · Slowly raise your left knee to your chest as you flatten your lower back against the floor. Hold for 5 seconds.  · Relax and repeat the exercise with your right knee.  · Do 10 of these exercises for each leg.  Safe lifting method  · Dont bend over at the waist to lift an object off the floor.  Instead, bend your knees and hips in a squat.   · Keep your back and head upright  · Hold the object close to your body, directly in front of you.  · Straighten your legs to lift the object.   · Lower the object to the floor in the reverse fashion.  · If you must slide something across the floor, push it.  Posture tips  Sitting  Sit in chairs with straight backs or low-back support. Keep your knees lower than your hips, with your feet flat on the floor.  When driving, sit up straight. Adjust the seat forward so you are not leaning toward the steering wheel.  A small pillow or rolled towel behind your lower back may help if you are driving long distances.   Standing  When standing for long periods, shift most of your weight to one leg at a time. Alternate legs every few minutes.   Sleeping  The best way to sleep is on your side with your knees bent. Put a low pillow under your head to support your neck in a neutral spine position. Avoid thick pillows that bend your neck to one side. Put a pillow between your legs to further relax your lower back. If you sleep on your back, put pillows under your knees to support your legs in a slightly flexed position. Use a firm mattress. If your mattress sags, replace  it, or use a 1/2-inch plywood board under the mattress to add support.  Follow-up care  Follow up with your healthcare provider, or as advised.  If X-rays, a CT scan or an MRI scan were taken, they will be reviewed by a radiologist. You will be notified of any new findings that may affect your care.  Call 911  Seek emergency medical care if any of the following occur:  · Trouble breathing  · Confusion  · Very drowsy  · Fainting or loss of consciousness  · Rapid or very slow heart rate  · Loss of  bowel or bladder control  When to seek medical care  Call your healthcare provider if any of the following occur:  · Pain becomes worse or spreads to your arms or legs  · Weakness or numbness in one or both arms or legs  · Numbness in the groin area  Date Last Reviewed: 6/1/2016  © 1985-1410 [employer]. [address] [place] [address]. All rights reserved. This information is not intended as a substitute for professional medical care. Always follow your healthcare professional's instructions.

## 2022-04-17 NOTE — PATIENT INSTRUCTIONS
Go to the Emergency Room if symptoms or condition worsens in any way    Follow up with Primary Care Provider or Back Specialist in 5-7 days if not improved      Sciatica    Sciatica is a condition that causes pain in the lower back that spreads down into the buttock, hip, and leg. Sometimes the leg pain can happen without any back pain. Sciatica happens when a spinal nerve is irritated or has pressure put on it as comes out of the spinal canal in the lower back. This most often happens when a bulge or rupture of a nearby spinal disk presses on the nerve. Sciatica can also be caused by a narrowing of the spinal canal (spinal stenosis) or spasm of the muscle in the buttocks that the sciatic nerve passes through (pyriform muscle). Sciatica is also called lumbar radiculopathy.  Sciatica may begin after a sudden twisting or bending force, such as in a car accident. Or it can happen after a simple awkward movement. In either case, muscle spasm often also happens. Muscle spasm makes the pain worse.  A healthcare provider makes a diagnosis of sciatica from your symptoms and a physical exam. Unless you had an injury from a car accident or fall, you usually wont have X-rays taken at this time. This is because the nerves and disks in your back cant be seen on an X-ray. If the provider sees signs of a compressed nerve, you will need to schedule an MRI scan as an outpatient. Signs of a compressed nerve include loss of strength in a leg.  Most sciatica gets better with medicine, exercise, and physical therapy. If your symptoms continue after at least 3 months of medical treatment, you may need surgery or injections to your lower back.  Home care  Follow these tips when caring for yourself at home:  You may need to stay in bed the first few days. But as soon as possible, begin sitting up or walking. This will help you avoid problems that come from staying in bed for long periods.  When in bed, try to find a position that is  comfortable. A firm mattress is best. Try lying flat on your back with pillows under your knees. You can also try lying on your side with your knees bent up toward your chest and a pillow between your knees.  Avoid sitting for long periods. This puts more stress on your lower back than standing or walking.  Use heat from a hot shower, hot bath, or heating pad to help ease pain. Massage can also help. You can also try using an ice pack. You can make your own ice pack by putting ice cubes in a plastic bag. Wrap the bag in a thin towel. Try both heat and cold to see which works best. Use the method that feels best for 20 minutes several times a day.  You may use acetaminophen or ibuprofen to ease pain, unless another pain medicine was prescribed. Note: If you have chronic liver or kidney disease, talk with your healthcare provider before taking these medicines. Also talk with your provider if youve had a stomach ulcer or gastrointestinal bleeding.  Use safe lifting methods. Dont lift anything heavier than 15 pounds until all of the pain is gone.  Follow-up care  Follow up with your healthcare provider, or as advised. You may need physical therapy or additional tests.  If X-rays were taken, a radiologist will look at them. You will be told of any new findings that may affect your care.  When to seek medical advice  Call your healthcare provider right away if any of these occur:  Pain gets worse even after taking prescribed medicine  Weakness or numbness in 1 or both legs or hips  Numbness in your groin or genital area  You cant control your bowel or bladder  Fever  Redness or swelling over your back or spine   Date Last Reviewed: 8/1/2016 © 2000-2016 [employer]. [address] [place] [address], [place] [address]. All rights reserved. This information is not intended as a substitute for professional medical care. Always follow your healthcare professional's instructions.          Back Care Tips    Caring for  your back  These are things you can do to prevent a recurrence of acute back pain and to reduce symptoms from chronic back pain:  Maintain a healthy weight. If you are overweight, losing weight will help most types of back pain.  Exercise is an important part of recovery from most types of back pain. The muscles behind and in front of the spine support the back. This means strengthening both the back muscles and the abdominal muscles will provide better support for your spine.   Swimming and brisk walking are good overall exercises to improve your fitness level.  Practice safe lifting methods (below).  Practice good posture when sitting, standing and walking. Avoid prolonged sitting. This puts more stress on the lower back than standing or walking.  Wear quality shoes with sufficient arch support. Foot and ankle alignment can affect back symptoms. Women should avoid wearing high heels.  Therapeutic massage can help relax the back muscles without stretching them.  During the first 24 to 72 hours after an acute injury or flare-up of chronic back pain, apply an ice pack to the painful area for 20 minutes and then remove it for 20 minutes, over a period of 60 to 90 minutes, or several times a day. As a safety precaution, do not use a heating pad at bedtime. Sleeping on a heating pad can lead to skin burns or tissue damage.  You can alternate ice and heat therapies.  Medications  Talk to your healthcare provider before using medicines, especially if you have other medical problems or are taking other medicines.  You may use acetaminophen or ibuprofen to control pain, unless your healthcare provider prescribed other pain medicine. If you have chronic conditions like diabetes, liver or kidney disease, stomach ulcers, or gastrointestinal bleeding, or are taking blood thinners, talk with your healthcare provider before taking any medicines.  Be careful if you are given prescription pain medicines, narcotics, or medicine for  muscle spasm. They can cause drowsiness, affect your coordination, reflexes, and judgment. Do not drive or operate heavy machinery while taking these types of medicines. Take prescription pain medicine only as prescribed by your healthcare provider.  Lumbar stretch  Here is a simple stretching exercise that will help relax muscle spasm and keep your back more limber. If exercise makes your back pain worse, dont do it.  Lie on your back with your knees bent and both feet on the ground.  Slowly raise your left knee to your chest as you flatten your lower back against the floor. Hold for 5 seconds.  Relax and repeat the exercise with your right knee.  Do 10 of these exercises for each leg.  Safe lifting method  Dont bend over at the waist to lift an object off the floor.  Instead, bend your knees and hips in a squat.   Keep your back and head upright  Hold the object close to your body, directly in front of you.  Straighten your legs to lift the object.   Lower the object to the floor in the reverse fashion.  If you must slide something across the floor, push it.  Posture tips  Sitting  Sit in chairs with straight backs or low-back support. Keep your knees lower than your hips, with your feet flat on the floor.  When driving, sit up straight. Adjust the seat forward so you are not leaning toward the steering wheel.  A small pillow or rolled towel behind your lower back may help if you are driving long distances.   Standing  When standing for long periods, shift most of your weight to one leg at a time. Alternate legs every few minutes.   Sleeping  The best way to sleep is on your side with your knees bent. Put a low pillow under your head to support your neck in a neutral spine position. Avoid thick pillows that bend your neck to one side. Put a pillow between your legs to further relax your lower back. If you sleep on your back, put pillows under your knees to support your legs in a slightly flexed position. Use a  firm mattress. If your mattress sags, replace it, or use a 1/2-inch plywood board under the mattress to add support.  Follow-up care  Follow up with your healthcare provider, or as advised.  If X-rays, a CT scan or an MRI scan were taken, they will be reviewed by a radiologist. You will be notified of any new findings that may affect your care.  Call 911  Seek emergency medical care if any of the following occur:  Trouble breathing  Confusion  Very drowsy  Fainting or loss of consciousness  Rapid or very slow heart rate  Loss of  bowel or bladder control  When to seek medical care  Call your healthcare provider if any of the following occur:  Pain becomes worse or spreads to your arms or legs  Weakness or numbness in one or both arms or legs  Numbness in the groin area  Date Last Reviewed: 6/1/2016  © 7015-3116 [employer]. [address] [place] [address], [place] [address]. All rights reserved. This information is not intended as a substitute for professional medical care. Always follow your healthcare professional's instructions.

## 2022-04-21 ENCOUNTER — CLINICAL SUPPORT (OUTPATIENT)
Dept: REHABILITATION | Facility: HOSPITAL | Age: 62
End: 2022-04-21
Payer: MEDICARE

## 2022-04-21 DIAGNOSIS — M25.651 IMPAIRED RANGE OF MOTION OF RIGHT HIP: ICD-10-CM

## 2022-04-21 DIAGNOSIS — G89.29 HIP PAIN, CHRONIC, RIGHT: Primary | ICD-10-CM

## 2022-04-21 DIAGNOSIS — R29.898 WEAKNESS OF RIGHT LOWER EXTREMITY: ICD-10-CM

## 2022-04-21 DIAGNOSIS — M25.551 HIP PAIN, CHRONIC, RIGHT: Primary | ICD-10-CM

## 2022-04-21 PROCEDURE — 97110 THERAPEUTIC EXERCISES: CPT

## 2022-04-21 NOTE — PROGRESS NOTES
[facility]   Physical Therapy Treatment Note     Name: Jennifer Brady  Clinic Number: [id number]    Therapy Diagnosis:   Encounter Diagnoses   Name Primary?    Hip pain, chronic, right Yes    Weakness of right lower extremity     Impaired range of motion of right hip      Physician: Juventino Langford III, *    Visit Date: 4/21/2022    Physician Orders: PT Eval and Treat   Medical Diagnosis from Referral:   M54.50 (ICD-10-CM) - Lumbar back pain   M25.551,G89.29 (ICD-10-CM) - Chronic right hip pain   Evaluation Date: 2/15/2022  Authorization Period Expiration: 4/2/2022  Plan of Care Expiration: 5/14/2022  Visit # / Visits authorized: 10/17   Total Visits: 10     Precautions: Standard       Time In: 803  Time Out: 845   Total Billable Time: 42 minutes      SUBJECTIVE     Pt reports: she went to the [facility] urgent [facility] on Sunday due to pain in her R leg from the sciatica. Patient reports they gave her some medication and is feeling better. However she is still having pain in her R leg.          She was compliant with home exercise program.  Response to previous treatment: Good with reduction of symptoms  Functional change: Walking more    Pain: 7/10 - when moving and 0/10 at rest  Location: right hip      OBJECTIVE     Objective Measures updated at progress report unless specified.       TREATMENT         Jennifer received therapeutic exercises to develop strength, ROM and flexibility for 42 minutes including:    NuStep 10 minutes for hip ROM   Kneeling hip flexor stretch 30 sec x 3   Piriformis Stretch 30 sec x 3  Groin Stretch 30 sec x 3    Heat on L spine for 10 minutes     NOT PERFORMED   Supine hamstring stretch 3x30 sec ea  IT Band Stretch 30 sec x 3  LTR 2x10   Bridging 3x10  Sidelying clams 3x10 ea Blue TB   Ball Roll Outs 10x   Shuttle double leg press  - 2 cord 3x10  - 2.5 cord 3x10   Shuttle single leg press   - 1.5 cord 3x10   Nustep x 10 min for hip range of motion  PPT  2x10  Seated physioball roll outs 12x   Glute set 2x10  Supine isometric abdominal crunch c/ physioball 3x10   Paloff press 2x10 ea GTB         Jennifer received the following manual therapy techniques: were applied to the: R LE for 0 minutes, including:    R LE long axis distraction  Jennifer received MH to R posterior hip x 0 min while performing therex in supine       Muscle roller, percussion gun to R quad an hip NOT PERFORMED         PATIENT EDUCATION AND HOME EXERCISES     Home Exercises Provided and Patient Education Provided     Education provided:   - HEP issued    Written Home Exercises Provided: Patient instructed to cont prior HEP. Exercises were reviewed and Jennifer was able to demonstrate them prior to the end of the session.  Jennifer demonstrated good  understanding of the education provided. See EMR under Patient Instructions for exercises provided during therapy sessions    ASSESSMENT     Patient presents to PT with increased pain of R hip and less pain in low back. Majority of time was spent trying to get into a comfortable position to do therex. Patient was in pain throughout session and kept getting cramp like pain in R hip. Session was very light today due to the limited tolerance of the patient.         Jennifer Is progressing well towards her goals.   Pt prognosis is Good.     Pt will continue to benefit from skilled outpatient physical therapy to address the deficits listed in the problem list box on initial evaluation, provide pt/family education and to maximize pt's level of independence in the home and community environment.     Pt's spiritual, cultural and educational needs considered and pt agreeable to plan of care and goals.     Anticipated Barriers for therapy: chronic symptoms     Goals:  Short Term Goals (2 Weeks):   1. Pt will be compliant with HEP to supplement PT in restoring pain free function.     Long Term Goals (6 Weeks):  1. Pt will improve FOTO score to </= 60% limited to decrease  perceived limitation with mobility  2. Pt will improve impaired LE MMTs by 1/2 grade to improve strength for functional tasks.  3. Pt improve impaired hip ROM to = L in all planes to improve mobility for normal movement patterns.   4. Pt will improve hip pain to </= 3/10 at its worst to better tolerate daily activities          PLAN     Continue with skilled PT towards POC.       Nik Bruno, PT

## 2022-04-26 ENCOUNTER — CLINICAL SUPPORT (OUTPATIENT)
Dept: REHABILITATION | Facility: HOSPITAL | Age: 62
End: 2022-04-26
Payer: MEDICARE

## 2022-04-26 DIAGNOSIS — M25.551 HIP PAIN, CHRONIC, RIGHT: Primary | ICD-10-CM

## 2022-04-26 DIAGNOSIS — M25.651 IMPAIRED RANGE OF MOTION OF RIGHT HIP: ICD-10-CM

## 2022-04-26 DIAGNOSIS — G89.29 HIP PAIN, CHRONIC, RIGHT: Primary | ICD-10-CM

## 2022-04-26 DIAGNOSIS — R29.898 WEAKNESS OF RIGHT LOWER EXTREMITY: ICD-10-CM

## 2022-04-26 NOTE — PROGRESS NOTES
[facility]   Discharge Note    Name: Jennifer Sutherland Jose Antonio  Clinic Number: [id number]    Therapy Diagnosis:   Encounter Diagnoses   Name Primary?    Hip pain, chronic, right Yes    Weakness of right lower extremity     Impaired range of motion of right hip      Physician: Juventino Langford III, *    Physician Orders: PT Eval and Treat   Medical Diagnosis from Referral:   M54.50 (ICD-10-CM) - Lumbar back pain   M25.551,G89.29 (ICD-10-CM) - Chronic right hip pain   Evaluation Date: 2/15/2022        Date of Last visit: 4/26/2022  Total Visits Received: 11    ASSESSMENT      Patient presents to PT today with continued pain in back and radiating down her R leg. Patient has not improved over the last few weeks and we have decided that it would be best for her to return to her doctor for further management due to physical therapy failing. Patient agreed to the plan     Discharge reason: Patient has reached the maximum rehab potential for the present time    Discharge FOTO Score: 64% limitations     Goals:   Short Term Goals (2 Weeks):   1. Pt will be compliant with HEP to supplement PT in restoring pain free function. MET      Long Term Goals (6 Weeks):  1. Pt will improve FOTO score to </= 60% limited to decrease perceived limitation with mobility NOT MET  2. Pt will improve impaired LE MMTs by 1/2 grade to improve strength for functional tasks. NOT MET  3. Pt improve impaired hip ROM to = L in all planes to improve mobility for normal movement patterns. NOT MET   4. Pt will improve hip pain to </= 3/10 at its worst to better tolerate daily activities  NOT MET       PLAN   This patient is discharged from Physical Therapy      Nik Bruno, PT

## 2022-05-16 ENCOUNTER — OFFICE VISIT (OUTPATIENT)
Dept: CARDIOLOGY | Facility: CLINIC | Age: 62
End: 2022-05-16
Payer: MEDICARE

## 2022-05-16 VITALS
WEIGHT: 218.81 LBS | HEART RATE: 82 BPM | BODY MASS INDEX: 31.32 KG/M2 | DIASTOLIC BLOOD PRESSURE: 64 MMHG | HEIGHT: 70 IN | OXYGEN SATURATION: 97 % | SYSTOLIC BLOOD PRESSURE: 135 MMHG

## 2022-05-16 DIAGNOSIS — M25.551 HIP PAIN, CHRONIC, RIGHT: ICD-10-CM

## 2022-05-16 DIAGNOSIS — J45.20 MILD INTERMITTENT ASTHMA WITHOUT COMPLICATION: ICD-10-CM

## 2022-05-16 DIAGNOSIS — E78.00 PURE HYPERCHOLESTEROLEMIA: ICD-10-CM

## 2022-05-16 DIAGNOSIS — I25.2 OLD MI (MYOCARDIAL INFARCTION): ICD-10-CM

## 2022-05-16 DIAGNOSIS — I49.3 PVCS (PREMATURE VENTRICULAR CONTRACTIONS): ICD-10-CM

## 2022-05-16 DIAGNOSIS — G89.29 HIP PAIN, CHRONIC, RIGHT: ICD-10-CM

## 2022-05-16 DIAGNOSIS — I73.9 PAD (PERIPHERAL ARTERY DISEASE): ICD-10-CM

## 2022-05-16 DIAGNOSIS — I10 PRIMARY HYPERTENSION: ICD-10-CM

## 2022-05-16 DIAGNOSIS — Z98.61 S/P PTCA (PERCUTANEOUS TRANSLUMINAL CORONARY ANGIOPLASTY): ICD-10-CM

## 2022-05-16 DIAGNOSIS — I25.10 CORONARY ARTERY DISEASE INVOLVING NATIVE CORONARY ARTERY OF NATIVE HEART WITHOUT ANGINA PECTORIS: Primary | ICD-10-CM

## 2022-05-16 PROCEDURE — 1160F PR REVIEW ALL MEDS BY PRESCRIBER/CLIN PHARMACIST DOCUMENTED: ICD-10-PCS | Mod: CPTII,S$GLB,, | Performed by: INTERNAL MEDICINE

## 2022-05-16 PROCEDURE — 4010F ACE/ARB THERAPY RXD/TAKEN: CPT | Mod: CPTII,S$GLB,, | Performed by: INTERNAL MEDICINE

## 2022-05-16 PROCEDURE — 99999 PR PBB SHADOW E&M-EST. PATIENT-LVL IV: CPT | Mod: PBBFAC,,, | Performed by: INTERNAL MEDICINE

## 2022-05-16 PROCEDURE — 3008F BODY MASS INDEX DOCD: CPT | Mod: CPTII,S$GLB,, | Performed by: INTERNAL MEDICINE

## 2022-05-16 PROCEDURE — 1159F PR MEDICATION LIST DOCUMENTED IN MEDICAL RECORD: ICD-10-PCS | Mod: CPTII,S$GLB,, | Performed by: INTERNAL MEDICINE

## 2022-05-16 PROCEDURE — 99214 OFFICE O/P EST MOD 30 MIN: CPT | Mod: S$GLB,,, | Performed by: INTERNAL MEDICINE

## 2022-05-16 PROCEDURE — 3078F PR MOST RECENT DIASTOLIC BLOOD PRESSURE < 80 MM HG: ICD-10-PCS | Mod: CPTII,S$GLB,, | Performed by: INTERNAL MEDICINE

## 2022-05-16 PROCEDURE — 1160F RVW MEDS BY RX/DR IN RCRD: CPT | Mod: CPTII,S$GLB,, | Performed by: INTERNAL MEDICINE

## 2022-05-16 PROCEDURE — 3078F DIAST BP <80 MM HG: CPT | Mod: CPTII,S$GLB,, | Performed by: INTERNAL MEDICINE

## 2022-05-16 PROCEDURE — 3075F PR MOST RECENT SYSTOLIC BLOOD PRESS GE 130-139MM HG: ICD-10-PCS | Mod: CPTII,S$GLB,, | Performed by: INTERNAL MEDICINE

## 2022-05-16 PROCEDURE — 3008F PR BODY MASS INDEX (BMI) DOCUMENTED: ICD-10-PCS | Mod: CPTII,S$GLB,, | Performed by: INTERNAL MEDICINE

## 2022-05-16 PROCEDURE — 1159F MED LIST DOCD IN RCRD: CPT | Mod: CPTII,S$GLB,, | Performed by: INTERNAL MEDICINE

## 2022-05-16 PROCEDURE — 99214 PR OFFICE/OUTPT VISIT, EST, LEVL IV, 30-39 MIN: ICD-10-PCS | Mod: S$GLB,,, | Performed by: INTERNAL MEDICINE

## 2022-05-16 PROCEDURE — 99999 PR PBB SHADOW E&M-EST. PATIENT-LVL IV: ICD-10-PCS | Mod: PBBFAC,,, | Performed by: INTERNAL MEDICINE

## 2022-05-16 PROCEDURE — 3075F SYST BP GE 130 - 139MM HG: CPT | Mod: CPTII,S$GLB,, | Performed by: INTERNAL MEDICINE

## 2022-05-16 PROCEDURE — 4010F PR ACE/ARB THEARPY RXD/TAKEN: ICD-10-PCS | Mod: CPTII,S$GLB,, | Performed by: INTERNAL MEDICINE

## 2022-05-16 NOTE — PROGRESS NOTES
Subjective:      Patient ID: Jennifer Brady is a 61 y.o. female.    Chief Complaint: bilateral leg pain x 6 mths    HPI:  Pt c/o pain in right groin and right buttockand anterior thigh and right anterior thigh..  Pt c/o pain in left buttock radiating down the left lateral thigh.      Pt had an MRI of back by Dr Langford 1/22 and he dx arthritis in back    On Easter Sunday pt went to Urgent Care for the leg pain.  Pt was given muscle relaxers and a medrol dose pack.    The right groin pain never goes away--it is constant.    The left buttock pain never goes away--it is constant.    Pt had an epidural steroid shot on the right side which gave pt relief for 3 weeks.    Shot of steroids in right hip in January by Dr Langford did not help.    Pt is going to water aerobics.    Pt stopped antihypertensive meds a week ago.    Muscle relaxers helped the bilateral upper leg pain.    The pain is worse when laying down    The pain is worse when walking.    Review of Systems   Cardiovascular: Positive for claudication. Negative for chest pain, dyspnea on exertion, irregular heartbeat, leg swelling, near-syncope, orthopnea, palpitations and syncope.        Past Medical History:   Diagnosis Date    Acute coronary syndrome     Anxiety     Asthma     Behavioral problem     Borderline personality disorder     Coronary angioplasty status 04/2018    Coronary artery disease     Depression     Depression     Fatigue     Hx of psychiatric care     Hyperlipidemia     Hypertension     Myocardial infarction 05/2018    Psychiatric problem     S/P hysterectomy with oophorectomy     Sleep apnea     pt uses CPAP    Syncope and collapse     Therapy     Thyroid disease         Past Surgical History:   Procedure Laterality Date    CORONARY ANGIOPLASTY      HYSTERECTOMY  1999    INJECTION OF ANESTHETIC AGENT AROUND NERVE Right 11/20/2019    Procedure: MEDIAL BRANCH BLOCK RIGHT L3, L4, L5;  Surgeon: Petros JJ  MD Damir;  Location: [facility] PAIN [facility]GT;  Service: Pain Management;  Laterality: Right;  NEEDS CONSENT, PT NO LONGER TAKES PLAVIX    INJECTION OF ANESTHETIC AGENT AROUND NERVE Right 6/17/2020    Procedure: BLOCK, NERVE RIGHT L3, 4, 5 MEDIAL BRANCH;  Surgeon: Petros Reich MD;  Location: [facility] PAIN [facility]GT;  Service: Pain Management;  Laterality: Right;  NEEDS CONSENT?    RADIOFREQUENCY ABLATION Right 5/12/2021    Procedure: RADIOFREQUENCY ABLATION RIGHT L3, 4, 5;  Surgeon: Petros Reich MD;  Location: [facility]IN [facility];  Service: Pain Management;  Laterality: Right;  NEEDS CONSENT    TILT TABLE TEST N/A 3/22/2019    Procedure: TILT TABLE TEST;  Surgeon: Roman Lala MD;  Location: [facility];  Service: Cardiology;  Laterality: N/A;  seizure/migraine, HUT referred by Dr Brit Merlos, EEG confirmed    TRANSFORAMINAL EPIDURAL INJECTION OF STEROID Right 10/14/2020    Procedure: LUMBAR TRANSFORAMINAL RIGHT L4/L5;  Surgeon: Petros Reich MD;  Location: [facility]IN [facility];  Service: Pain Management;  Laterality: Right;  NEEDS CONSENT       Family History   Problem Relation Age of Onset    Seizures Mother     Heart attack Mother     Hypertension Mother     Stroke Mother     Hypertension Paternal Grandfather     Hypertension Paternal Grandmother     Heart attacks under age 50 Maternal Grandmother     Hypertension Maternal Grandmother     Hypertension Maternal Grandfather     Heart attacks under age 50 Father 33    Hypertension Father     Hypertension Sister     Stroke Sister     Seizures Sister     Breast cancer Neg Hx     Colon cancer Neg Hx     Diabetes Neg Hx     Ovarian cancer Neg Hx        Social History     Socioeconomic History    Marital status:    Occupational History     Employer: [employer]     Comment: [employer]   Tobacco Use    Smoking status: Former Smoker     Packs/day: 0.25     Types: Cigarettes     Start date: 9/14/1986     Quit date:  2022     Years since quittin.1    Smokeless tobacco: Never Used    Tobacco comment: quit actual cigarettes a few mo ago, switched to e cigarette   Substance and Sexual Activity    Alcohol use: Yes     Comment: Socially    Drug use: No    Sexual activity: Yes     Partners: Male   Other Topics Concern    Financial Status: Employed Yes    Caffeine Use: Moderate Yes    Spirituality: Organized Hindu Yes    Patient feels they ought to cut down on drinking/drug use No    Patient annoyed by others criticizing their drinking/drug use No    Patient has felt bad or guilty about drinking/drug use No    Patient has had a drink/used drugs as an eye opener in the AM No   Social History Narrative    Patient discusses recent decisions she has made that she is pleased about; has clarified for self and others her values and standards.  Though she is aware of difficulty of  struggle, feels new confidence about  results.  She expresses sense of wholeness and independence at this point. Her previously explored painful symptoms are improved (i.e, .diminished - 2or3/10), thought processes are logical and outlook is optimistic, as well as realistic.    Discusses trip she's planned.  intent to reschedule after return in mid-August. She expresses feelings of appreciation       Current Outpatient Medications on File Prior to Visit   Medication Sig Dispense Refill    aspirin (ECOTRIN) 81 MG EC tablet Take 1 tablet (81 mg total) by mouth once daily. 100 tablet 6    atorvastatin (LIPITOR) 40 MG tablet Take 1 tablet (40 mg total) by mouth once daily. 90 tablet 3    CARAFATE 100 mg/mL suspension Take 1 g by mouth 2 (two) times daily as needed.      estradioL (ESTRACE) 0.01 % (0.1 mg/gram) vaginal cream Place 0.5 g vaginally twice a week. Insert 0.5grams intravaginally twice weekly 42 g 4    HYDROcodone-acetaminophen (NORCO)  mg per tablet Take by mouth.      hydrOXYzine pamoate (VISTARIL) 25 MG Cap Take 1 capsule  (25 mg total) by mouth 3 (three) times daily as needed (anxiety). 180 capsule 3    lactulose (CHRONULAC) 10 gram/15 mL solution Take 15 mLs by mouth 2 (two) times daily.      loratadine (CLARITIN) 10 mg tablet Take 10 mg by mouth once daily.      pantoprazole (PROTONIX) 40 MG tablet pantoprazole 40 mg tablet,delayed release      topiramate (TOPAMAX) 100 MG tablet Take 1 tablet (100 mg total) by mouth every evening. 90 tablet 3    traZODone (DESYREL) 150 MG tablet TAKE 1 OR 2 TABLETS BY MOUTH BEFORE BED AS NEEDED FOR SLEEP 180 tablet 3    venlafaxine (EFFEXOR-XR) 150 MG Cp24 Take 1 capsule (150 mg total) by mouth once daily. 90 capsule 3    losartan (COZAAR) 25 MG tablet Take 1 tablet (25 mg total) by mouth once daily. (Patient not taking: Reported on 5/16/2022) 90 tablet 3    nicotine (NICODERM CQ) 21 mg/24 hr Place 1 patch onto the skin every 24 hours.      [DISCONTINUED] azithromycin (Z-VALERIE) 250 MG tablet Take 250 mg by mouth.      [DISCONTINUED] citalopram (CELEXA) 10 MG tablet Take 10 mg by mouth once daily.      [DISCONTINUED] cyclobenzaprine (FLEXERIL) 10 MG tablet Take 10 mg by mouth nightly.      [DISCONTINUED] ezetimibe (ZETIA) 10 mg tablet Take 1 tablet (10 mg total) by mouth once daily. (Patient not taking: No sig reported) 90 tablet 3    [DISCONTINUED] gabapentin (NEURONTIN) 600 MG tablet Take 600 mg by mouth 3 (three) times daily.      [DISCONTINUED] guaiFENesin 100 mg/5 ml (ROBITUSSIN) 100 mg/5 mL syrup SMARTSIG:Milliliter(s) By Mouth      [DISCONTINUED] lurasidone (LATUDA) 40 mg Tab tablet Take 1 tablet (40 mg total) by mouth once daily. 30 tablet 5     Current Facility-Administered Medications on File Prior to Visit   Medication Dose Route Frequency Provider Last Rate Last Admin    onabotulinumtoxina injection 200 Units  200 Units Intramuscular q12 weeks Maximiliano Kuhn MD   200 Units at 06/23/21 1504    triamcinolone acetonide injection 40 mg  40 mg Intra-articular 1 time in  "Clinic/HOD Gabriel Wong DO           Review of patient's allergies indicates:   Allergen Reactions    Iodine and iodide containing products      Objective:     Vitals:    05/16/22 1517   BP: 135/64   BP Location: Left arm   Patient Position: Sitting   BP Method: Large (Automatic)   Pulse: 82   SpO2: 97%   Weight: 99.2 kg (218 lb 12.9 oz)   Height: 5' 9.5" (1.765 m)        Physical Exam  Vitals reviewed.   Constitutional:       Appearance: She is well-developed.   Eyes:      General: No scleral icterus.  Neck:      Vascular: No carotid bruit or JVD.   Cardiovascular:      Rate and Rhythm: Normal rate and regular rhythm.      Pulses:           Dorsalis pedis pulses are 2+ on the right side and 2+ on the left side.        Posterior tibial pulses are 2+ on the right side and 1+ on the left side.      Heart sounds: No murmur heard.    No gallop.   Pulmonary:      Breath sounds: Normal breath sounds.   Musculoskeletal:      Right lower leg: No edema.      Left lower leg: No edema.   Skin:     General: Skin is warm and dry.   Neurological:      Mental Status: She is alert and oriented to person, place, and time.   Psychiatric:         Behavior: Behavior normal.         Thought Content: Thought content normal.         Judgment: Judgment normal.                Lab Visit on 03/02/2022   Component Date Value Ref Range Status    WBC 03/02/2022 9.53  3.90 - 12.70 K/uL Final    RBC 03/02/2022 3.97 (A) 4.00 - 5.40 M/uL Final    Hemoglobin 03/02/2022 12.0  12.0 - 16.0 g/dL Final    Hematocrit 03/02/2022 38.7  37.0 - 48.5 % Final    MCV 03/02/2022 98  82 - 98 fL Final    MCH 03/02/2022 30.2  27.0 - 31.0 pg Final    MCHC 03/02/2022 31.0 (A) 32.0 - 36.0 g/dL Final    RDW 03/02/2022 12.4  11.5 - 14.5 % Final    Platelets 03/02/2022 397  150 - 450 K/uL Final    MPV 03/02/2022 8.6 (A) 9.2 - 12.9 fL Final    Immature Granulocytes 03/02/2022 0.2  0.0 - 0.5 % Final    Gran # (ANC) 03/02/2022 4.4  1.8 - 7.7 K/uL Final    " Immature Grans (Abs) 03/02/2022 0.02  0.00 - 0.04 K/uL Final    Lymph # 03/02/2022 4.2  1.0 - 4.8 K/uL Final    Mono # 03/02/2022 0.6  0.3 - 1.0 K/uL Final    Eos # 03/02/2022 0.3  0.0 - 0.5 K/uL Final    Baso # 03/02/2022 0.04  0.00 - 0.20 K/uL Final    nRBC 03/02/2022 0  0 /100 WBC Final    Gran % 03/02/2022 46.5  38.0 - 73.0 % Final    Lymph % 03/02/2022 44.4  18.0 - 48.0 % Final    Mono % 03/02/2022 5.9  4.0 - 15.0 % Final    Eosinophil % 03/02/2022 2.6  0.0 - 8.0 % Final    Basophil % 03/02/2022 0.4  0.0 - 1.9 % Final    Differential Method 03/02/2022 Automated   Final    Sodium 03/02/2022 141  136 - 145 mmol/L Final    Potassium 03/02/2022 3.9  3.5 - 5.1 mmol/L Final    Chloride 03/02/2022 108  95 - 110 mmol/L Final    CO2 03/02/2022 24  23 - 29 mmol/L Final    Glucose 03/02/2022 92  70 - 110 mg/dL Final    BUN 03/02/2022 15  8 - 23 mg/dL Final    Creatinine 03/02/2022 0.9  0.5 - 1.4 mg/dL Final    Calcium 03/02/2022 9.7  8.7 - 10.5 mg/dL Final    Total Protein 03/02/2022 7.3  6.0 - 8.4 g/dL Final    Albumin 03/02/2022 3.7  3.5 - 5.2 g/dL Final    Total Bilirubin 03/02/2022 0.6  0.1 - 1.0 mg/dL Final    Alkaline Phosphatase 03/02/2022 133  55 - 135 U/L Final    AST 03/02/2022 21  10 - 40 U/L Final    ALT 03/02/2022 22  10 - 44 U/L Final    Anion Gap 03/02/2022 9  8 - 16 mmol/L Final    eGFR if African American 03/02/2022 >60.0  >60 mL/min/1.73 m^2 Final    eGFR if non African American 03/02/2022 >60.0  >60 mL/min/1.73 m^2 Final    CPK 03/02/2022 239 (A) 20 - 180 U/L Final    Cholesterol 03/02/2022 157  120 - 199 mg/dL Final    Triglycerides 03/02/2022 114  30 - 150 mg/dL Final    HDL 03/02/2022 48  40 - 75 mg/dL Final    LDL Cholesterol 03/02/2022 86.2  63.0 - 159.0 mg/dL Final    HDL/Cholesterol Ratio 03/02/2022 30.6  20.0 - 50.0 % Final    Total Cholesterol/HDL Ratio 03/02/2022 3.3  2.0 - 5.0 Final    Non-HDL Cholesterol 03/02/2022 109  mg/dL Final    TSH 03/02/2022 0.494   0.400 - 4.000 uIU/mL Final   Lab Visit on 12/16/2021   Component Date Value Ref Range Status    WBC 12/16/2021 8.01  3.90 - 12.70 K/uL Final    RBC 12/16/2021 3.90 (A) 4.00 - 5.40 M/uL Final    Hemoglobin 12/16/2021 11.9 (A) 12.0 - 16.0 g/dL Final    Hematocrit 12/16/2021 38.8  37.0 - 48.5 % Final    MCV 12/16/2021 100 (A) 82 - 98 fL Final    MCH 12/16/2021 30.5  27.0 - 31.0 pg Final    MCHC 12/16/2021 30.7 (A) 32.0 - 36.0 g/dL Final    RDW 12/16/2021 12.3  11.5 - 14.5 % Final    Platelets 12/16/2021 365  150 - 450 K/uL Final    MPV 12/16/2021 9.1 (A) 9.2 - 12.9 fL Final    Immature Granulocytes 12/16/2021 0.4  0.0 - 0.5 % Final    Gran # (ANC) 12/16/2021 3.8  1.8 - 7.7 K/uL Final    Immature Grans (Abs) 12/16/2021 0.03  0.00 - 0.04 K/uL Final    Lymph # 12/16/2021 3.3  1.0 - 4.8 K/uL Final    Mono # 12/16/2021 0.7  0.3 - 1.0 K/uL Final    Eos # 12/16/2021 0.2  0.0 - 0.5 K/uL Final    Baso # 12/16/2021 0.05  0.00 - 0.20 K/uL Final    nRBC 12/16/2021 0  0 /100 WBC Final    Gran % 12/16/2021 47.6  38.0 - 73.0 % Final    Lymph % 12/16/2021 40.6  18.0 - 48.0 % Final    Mono % 12/16/2021 8.6  4.0 - 15.0 % Final    Eosinophil % 12/16/2021 2.2  0.0 - 8.0 % Final    Basophil % 12/16/2021 0.6  0.0 - 1.9 % Final    Differential Method 12/16/2021 Automated   Final    Sodium 12/16/2021 142  136 - 145 mmol/L Final    Potassium 12/16/2021 4.0  3.5 - 5.1 mmol/L Final    Chloride 12/16/2021 107  95 - 110 mmol/L Final    CO2 12/16/2021 24  23 - 29 mmol/L Final    Glucose 12/16/2021 100  70 - 110 mg/dL Final    BUN 12/16/2021 18  8 - 23 mg/dL Final    Creatinine 12/16/2021 0.9  0.5 - 1.4 mg/dL Final    Calcium 12/16/2021 9.7  8.7 - 10.5 mg/dL Final    Total Protein 12/16/2021 6.8  6.0 - 8.4 g/dL Final    Albumin 12/16/2021 3.6  3.5 - 5.2 g/dL Final    Total Bilirubin 12/16/2021 0.5  0.1 - 1.0 mg/dL Final    Alkaline Phosphatase 12/16/2021 141 (A) 55 - 135 U/L Final    AST 12/16/2021 17  10 - 40  U/L Final    ALT 12/16/2021 20  10 - 44 U/L Final    Anion Gap 12/16/2021 11  8 - 16 mmol/L Final    eGFR if African American 12/16/2021 >60.0  >60 mL/min/1.73 m^2 Final    eGFR if non African American 12/16/2021 >60.0  >60 mL/min/1.73 m^2 Final    TSH 12/16/2021 1.270  0.400 - 4.000 uIU/mL Final    Cholesterol 12/16/2021 182  120 - 199 mg/dL Final    Triglycerides 12/16/2021 82  30 - 150 mg/dL Final    HDL 12/16/2021 50  40 - 75 mg/dL Final    LDL Cholesterol 12/16/2021 115.6  63.0 - 159.0 mg/dL Final    HDL/Cholesterol Ratio 12/16/2021 27.5  20.0 - 50.0 % Final    Total Cholesterol/HDL Ratio 12/16/2021 3.6  2.0 - 5.0 Final    Non-HDL Cholesterol 12/16/2021 132  mg/dL Final   (  PACS Images for Clip Viewer     Show images for MRI Lumbar Spine Without Contrast    All Reviewers List    Juventino Langford III, PA-C on 9/19/2021 13:16     MRI Lumbar Spine Without Contrast  Order: [id number]  · Status: Final result     · Visible to patient: Yes (seen)      · Next appt: None     · Dx: Right sided sciatica; Dorsalgia, unsp...     · 0 Result Notes      Details    Reading Physician Reading Date Result Priority   Iván Richardson MD  [phone] 9/17/2021 Routine     Narrative & Impression  EXAMINATION:  MRI LUMBAR SPINE WITHOUT CONTRAST     CLINICAL HISTORY:  Back pain or radiculopathy, > 6 wks;failed PT and conservative management.; Sciatica, right side     TECHNIQUE:  Multiplanar, multisequence MR images were acquired from the thoracolumbar junction to the sacrum without the administration of contrast.     FINDINGS:  Alignment: Normal.     Vertebrae: 5 lumbar-type vertebral bodies. No aggressive marrow replacement process or fracture.     Discs: Satisfactory height and signal.  Early desiccation L2-L3 through L5-S1.     Cord: Normal. Conus terminates at .     Degenerative findings:     T12-L1: There is no focal disc herniation.No significant central canal narrowing . No significant  neural foraminal narrowing.     L1-L2: There is no focal disc herniation.No significant central canal narrowing . No significant neural foraminal narrowing.     L2-L3: There is no focal disc herniation.No significant central canal narrowing . No significant neural foraminal narrowing.     L3-L4: There is no focal disc herniation.No significant central canal narrowing . No significant neural foraminal narrowing.     L4-L5: There is no focal disc herniation.No significant central canal narrowing . No significant neural foraminal narrowing.     L5-S1: Note made of reactive bone marrow edema about the bilateral facet joints and facet effusion consistent with active inflammatory change, presumably degenerative in nature.There is no focal disc herniation.No significant central canal narrowing . No significant neural foraminal narrowing.     Paraspinal muscles & soft tissues: Unremarkable.     Impression:     No evidence for central canal narrowing or foraminal stenosis.  Degenerative changes of facets predominantly L5-S1.        Electronically signed by: Iván Richardson MD  Date:                                            09/17/2021  Time:                                           06:53               Exam Ended: 09/16/21 18:40           US Lower Extremity Arteries Bilateral    Status: Final result       [facility] Results Release    [facility] Status: Active  Results Release       PACS Images for [facility] [place] Viewer     Show images for US Lower Extremity Arteries Bilateral    All Reviewers List    Edwin Reza MD on 7/2/2020 14:02     US Lower Extremity Arteries Bilateral  Order: [id number]  · Status: Final result     · Visible to patient: Yes (seen)      · Next appt: None     · Dx: Right leg claudication      · 0 Result Notes      Details    Reading Physician Reading Date Result Priority   Franco Cpauto MD  [phone]  [phone] 7/1/2020 Routine     Narrative & Impression  EXAMINATION:  US LOWER EXTREMITY  ARTERIES BILATERAL     CLINICAL HISTORY:  Peripheral vascular disease, unspecified     TECHNIQUE:  Bilateral spectral, color and grayscale images of the large arteries of both lower extremities were performed.     COMPARISON:  None     FINDINGS:  Right lower extremity arterial velocities in cm/sec:     Common femoral artery--137, triphasic     Proximal deep femoral artery--84, triphasic     Proximal superficial femoral artery--134, triphasic     Mid superficial femoral artery--118, triphasic     Distal superficial femoral artery--94, triphasic     Popliteal artery--69, biphasic     Peroneal artery--92, biphasic     Posterior tibial artery--25, biphasic     Anterior tibial artery--21, monophasic     Dorsalis pedis artery--79, monophasic     Left lower extremity arterial velocities in cm/sec:     Common femoral artery--114, triphasic     Proximal deep femoral artery--72, monophasic     Proximal superficial femoral artery--169, triphasic     Mid superficial femoral artery--138, triphasic     Distal superficial femoral artery--103, biphasic     Popliteal artery--82, biphasic     Peroneal artery--76, biphasic     Posterior tibial artery--64, biphasic     Anterior tibial artery--100, biphasic     Dorsalis pedis artery--67, biphasic     Impression:     There is atherosclerotic plaque identified within the major arteries of both lower extremities.  These major arteries are patent as detailed above.  Elevated velocities are identified within the right dorsalis pedis artery suggesting a hemodynamically significant stenosis within the distal most right anterior tibial artery or origin of the right dorsalis pedis artery.        Electronically signed by: Franco Caputo MD  Date:                                            07/01/2020  Time:                                           14:00               Exam Ended: 07/01/20 11:49           Accession #: [id number]    Echocardiogram exercise stress test  Order# [id number]  Reading  physician: Alvaro Sanchez MD Ordering physician: Alvaro Sanchez MD Study date: 9/21/20       Reason for Exam  Priority: Routine  Dx: Coronary artery disease involving native coronary artery of native heart without angina pectoris [I25.10 (ICD-10-CM)]; Old MI (myocardial infarction) [I25.2 (ICD-10-CM)]; S/P PTCA (percutaneous transluminal coronary angioplasty) [Z98.61 (ICD-10-CM)]     Result Image Hyperlink     Show images for Stress Echo Which stress agent will be used? Treadmill Exercise; Color Flow Doppler? No    Summary    · The patient reached the end of the protocol.  · Normal left ventricular systolic function. The estimated ejection fraction is 60%.  · The stress echo portion of this study is negative for myocardial ischemia.  · The patient's exercise capacity was mildly impaired.  · There were no arrhythmias during stress.  · The ECG portion of this study is negative for myocardial ischemia.         Accession #: [id number]    Jennifer Brady  Holter monitor - 48 hour  Order# [id number]  Reading physician: Alvaro Sanchez MD Ordering physician: Alvaro Sanchez MD Study date: 12/28/20       Reason for Exam  Priority: Routine  Dx: Syncope and collapse [R55 (ICD-10-CM)]         Conclusion    · Normal sinus rhythm with a heart rate variable between 54 and 141 bpm, averaging 84 bpm  · There are occasional PVC's, some of which are interpolated. There are 2 PVC couplets. The total PVC burden is 0.3%  · There are rare isolated PAC's.         Performing Clinician       Order: [id number]  · Status: Final result   · Visible to patient: Yes (not seen)    · Next appt: None   · Dx: Pure hypercholesterolemia; RNE (obstr...   · 0 Result Notes    · 1 HM Topic    Component Ref Range & Units 07:42 1 yr ago 3 yr ago   Cholesterol 120 - 199 mg/dL 182  172 R, CM  158 CM    Comment: The National Cholesterol Education Program (NCEP) has set the   following guidelines (reference ranges) for Cholesterol:    Optimal.....................<200 mg/dL   Borderline High.............200-239 mg/dL   High........................> or = 240 mg/dL    Triglycerides 30 - 150 mg/dL 82  102 CM  65 CM    Comment: The National Cholesterol Education Program (NCEP) has set the   following guidelines (reference values) for triglycerides:   Normal......................<150 mg/dL   Borderline High.............150-199 mg/dL   High........................200-499 mg/dL    HDL 40 - 75 mg/dL 50  67 CM  55 CM    Comment: The National Cholesterol Education Program (NCEP) has set the   following guidelines (reference values) for HDL Cholesterol:   Low...............<40 mg/dL   Optimal...........>60 mg/dL    LDL Cholesterol 63.0 - 159.0 mg/dL 115.6  85 R, CM  90.0 CM    Comment: The National Cholesterol Education Program (NCEP) has set the   following guidelines (reference values) for LDL Cholesterol:   Optimal.......................<130 mg/dL   Borderline High...............130-159 mg/dL   High..........................160-189 mg/dL   Very High.....................>190 mg/dL    HDL/Cholesterol Ratio 20.0 - 50.0 % 27.5  39.0  34.8    Total Cholesterol/HDL Ratio 2.0 - 5.0 3.6  2.6  2.9    Non-HDL Cholesterol mg/dL 132  105 CM  103 CM    Comment: Risk category and Non-HDL cholesterol goals:   Coronary heart disease (CHD)or equivalent (10-year risk of CHD >20%):   Non-HDL cholesterol goal     <130 mg/dL   Two or more CHD risk factors and 10-year risk of CHD <= 20%:   Non-HDL cholesterol goal     <160 mg/dL   0 to 1 CHD risk factor:         D. Hemodynamic Results      LVEDP (Pre): 10 mmHg   LVEDP (Post): 10 mmHg   Ejection Fraction: 66%     E. Angiographic Results      Diagnostic:          Patient has a right dominant coronary artery.        - Left Main Coronary Artery:              The LM is normal. There is LASHANDA 3 flow.      - Left Anterior Descending Artery:              The mid LAD has luminal irregularities. There is LASHANDA 3 flow.      - Left  Circumflex Artery:              The proximal LCX has a 95% stenosis. There is LASHANDA 3 flow.                      Lesion Details:  Mild to moderate calcification.      - Right Coronary Artery:              The proximal RCA has a 95% stenosis. There is LASHANDA 3 flow.                      Lesion Details:   The length is 10mm. The lesion is ulcerated.      - Common Femoral Artery:              The right CFA is normal.       Intervention           Proximal LCX:               The lesion was successfully intervened. Post-stenosis of 0% and post-LASHANDA 3 flow. The vessel was accessed natively.  The following items were used: Stent Resolute  2.75x12 (ZELALEM) and Stent Resolute  2.75x14 (ZELALEM).        Proximal RCA:               The lesion was successfully intervened. Post-stenosis of 0% and post-LASHANDA 3 flow. The vessel was accessed natively.  The following items were used: Blln Mini Trek Otw 2.5x20 and Stent Resolute  2.75x26 (ZELALEM).   Alvaro Sanchez MD Ordering physician: Alvaro Sanchez MD Study date: 9/21/20       Reason for Exam  Priority: Routine  Dx: Coronary artery disease involving native coronary artery of native heart without angina pectoris [I25.10 (ICD-10-CM)]; Old MI (myocardial infarction) [I25.2 (ICD-10-CM)]; S/P PTCA (percutaneous transluminal coronary angioplasty) [Z98.61 (ICD-10-CM)]     Result Image Hyperlink     Show images for Stress Echo Which stress agent will be used? Treadmill Exercise; Color Flow Doppler? No    Summary    · The patient reached the end of the protocol.  · Normal left ventricular systolic function. The estimated ejection fraction is 60%.  · The stress echo portion of this study is negative for myocardial ischemia.  · The patient's exercise capacity was mildly impaired.  · There were no arrhythmias during stress.  · The ECG portion of this study is negative for myocardial ischemia.         Accession #: [id number]    Jennifer Brady  Holter monitor - 48 hour  Order#  [id number]  Reading physician: Alvaro Sanchez MD Ordering physician: Alvaro Sanchez MD Study date: 12/28/20       Reason for Exam  Priority: Routine  Dx: Syncope and collapse [R55 (ICD-10-CM)]     Conclusion    · Normal sinus rhythm with a heart rate variable between 54 and 141 bpm, averaging 84 bpm  · There are occasional PVC's, some of which are interpolated. There are 2 PVC couplets. The total PVC burden is 0.3%  · There are rare isolated PAC's.         Performing Clinician    Yomaira Veliz      Reason for Exam  Priority: Routine  Dx: Syncope and collapse [R55 (ICD-10-CM)]        Vitals      Assessment:     1. Coronary artery disease involving native coronary artery of native heart without angina pectoris    2. Primary hypertension    3. Old MI (myocardial infarction)    4. PAD (peripheral artery disease)    5. Pure hypercholesterolemia    6. PVCs (premature ventricular contractions)    7. S/P PTCA (percutaneous transluminal coronary angioplasty)    8. Hip pain, chronic, right    9. Mild intermittent asthma without complication      Plan:   Jennifer Sutherland was seen today for bilateral leg pain x 6 mths.    Diagnoses and all orders for this visit:    Coronary artery disease involving native coronary artery of native heart without angina pectoris    Primary hypertension    Old MI (myocardial infarction)    PAD (peripheral artery disease)  -     US Lower Extremity Arteries Bilateral; Future    Pure hypercholesterolemia    PVCs (premature ventricular contractions)    S/P PTCA (percutaneous transluminal coronary angioplasty)    Hip pain, chronic, right  -     Ambulatory referral/consult to Pain Clinic; Future    Mild intermittent asthma without complication     f/u with pain management--consult pain management    Although pt has PAD the current bilateral hip pain is more consistent with OA of hips or lumbosacral radiculopathy.    Nevertheless will repeat the arterial ultrasound.    Follow up in about 6 months  (around 11/16/2022).

## 2022-05-23 ENCOUNTER — HOSPITAL ENCOUNTER (OUTPATIENT)
Dept: RADIOLOGY | Facility: OTHER | Age: 62
Discharge: HOME | End: 2022-05-23
Attending: INTERNAL MEDICINE
Payer: MEDICARE

## 2022-05-23 ENCOUNTER — TELEPHONE (OUTPATIENT)
Dept: CARDIOLOGY | Facility: CLINIC | Age: 62
End: 2022-05-23
Payer: MEDICARE

## 2022-05-23 DIAGNOSIS — I73.9 PAD (PERIPHERAL ARTERY DISEASE): ICD-10-CM

## 2022-05-23 PROCEDURE — 93925 LOWER EXTREMITY STUDY: CPT | Mod: 26,,, | Performed by: RADIOLOGY

## 2022-05-23 PROCEDURE — 93925 US LOWER EXTREMITY ARTERIES BILATERAL: ICD-10-PCS | Mod: 26,,, | Performed by: RADIOLOGY

## 2022-05-23 PROCEDURE — 93925 LOWER EXTREMITY STUDY: CPT | Mod: TC

## 2022-05-23 NOTE — TELEPHONE ENCOUNTER
Message left on voicemail: Arterial ultrasound shows no significant blockage of the arteries to the legs.

## 2022-07-20 ENCOUNTER — OFFICE VISIT (OUTPATIENT)
Dept: SLEEP MEDICINE | Facility: CLINIC | Age: 62
End: 2022-07-20
Payer: MEDICARE

## 2022-07-20 DIAGNOSIS — G47.33 OSA (OBSTRUCTIVE SLEEP APNEA): Primary | ICD-10-CM

## 2022-07-20 DIAGNOSIS — I10 PRIMARY HYPERTENSION: ICD-10-CM

## 2022-07-20 DIAGNOSIS — G43.719 INTRACTABLE CHRONIC MIGRAINE WITHOUT AURA AND WITHOUT STATUS MIGRAINOSUS: ICD-10-CM

## 2022-07-20 PROCEDURE — 99214 PR OFFICE/OUTPT VISIT, EST, LEVL IV, 30-39 MIN: ICD-10-PCS | Mod: S$GLB,,, | Performed by: NURSE PRACTITIONER

## 2022-07-20 PROCEDURE — 4010F PR ACE/ARB THEARPY RXD/TAKEN: ICD-10-PCS | Mod: CPTII,S$GLB,, | Performed by: NURSE PRACTITIONER

## 2022-07-20 PROCEDURE — 4010F ACE/ARB THERAPY RXD/TAKEN: CPT | Mod: CPTII,S$GLB,, | Performed by: NURSE PRACTITIONER

## 2022-07-20 PROCEDURE — 99214 OFFICE O/P EST MOD 30 MIN: CPT | Mod: S$GLB,,, | Performed by: NURSE PRACTITIONER

## 2022-07-20 NOTE — PROGRESS NOTES
Cc: REN, initial visit with him, last seen by colleague 7/2021    She continues to use apap 6-20cm qhs but not sleeping as well. Can't get her hose off to change. Using dreamwear mask  W/o chin strap. Nose is dry and mouth/throat. Ongoing migraines, not seen neuro recently (on topamax, tried botox x1). Still awaiting replacement machine  Interrogation avg 30d 8h/n AHI 6.9-9. 100% mask fit. 0% PB. 90% tile 13.5-15.4cm            Requal: 10/09/2019  lb. The overall AHI was 10  and overall RDI was 16. The oxygen mark was 83.2 % and % time < 90% SpO2 was 1.8 %.  04/15/2016 retitration study CPAP 8 cm           Assessment:   REN, mild by AHI, moderate by RDI. Adherent with pap, benefits terms of snoring resolution but residual mild REN and poor sleep/drying problematic  Medical co-morbidities: migraine headaches, HTN, and obesity.     Plan:   Adjust today apap 10-16cm, ramp 15min/8cm.showed how she can adjust her ramp start pressure/changee hose/filters. Defers FFM. Consider mouth tape to reduce oral drying, saline Ayr to nose prn    Send message to  EDD RT about why care  not transmitting data since 8/2021    See neuro back (so many other preventive therapies to consider ie cgrp inhibitors)

## 2022-07-21 ENCOUNTER — PATIENT MESSAGE (OUTPATIENT)
Dept: SLEEP MEDICINE | Facility: CLINIC | Age: 62
End: 2022-07-21
Payer: MEDICARE

## 2022-10-03 ENCOUNTER — OFFICE VISIT (OUTPATIENT)
Dept: OTOLARYNGOLOGY | Facility: CLINIC | Age: 62
End: 2022-10-03
Payer: MEDICARE

## 2022-10-03 VITALS
DIASTOLIC BLOOD PRESSURE: 84 MMHG | WEIGHT: 214.06 LBS | BODY MASS INDEX: 30.65 KG/M2 | HEIGHT: 70 IN | SYSTOLIC BLOOD PRESSURE: 144 MMHG

## 2022-10-03 DIAGNOSIS — J30.2 SEASONAL ALLERGIC RHINITIS, UNSPECIFIED TRIGGER: ICD-10-CM

## 2022-10-03 DIAGNOSIS — G47.33 OSA (OBSTRUCTIVE SLEEP APNEA): Primary | ICD-10-CM

## 2022-10-03 DIAGNOSIS — Z78.9 DIFFICULTY USING CONTINUOUS POSITIVE AIRWAY PRESSURE (CPAP) DEVICE: ICD-10-CM

## 2022-10-03 DIAGNOSIS — J34.1 MAXILLARY SINUS CYST: ICD-10-CM

## 2022-10-03 PROCEDURE — 3077F SYST BP >= 140 MM HG: CPT | Mod: CPTII,S$GLB,, | Performed by: OTOLARYNGOLOGY

## 2022-10-03 PROCEDURE — 3079F DIAST BP 80-89 MM HG: CPT | Mod: CPTII,S$GLB,, | Performed by: OTOLARYNGOLOGY

## 2022-10-03 PROCEDURE — 99204 OFFICE O/P NEW MOD 45 MIN: CPT | Mod: S$GLB,,, | Performed by: OTOLARYNGOLOGY

## 2022-10-03 PROCEDURE — 3077F PR MOST RECENT SYSTOLIC BLOOD PRESSURE >= 140 MM HG: ICD-10-PCS | Mod: CPTII,S$GLB,, | Performed by: OTOLARYNGOLOGY

## 2022-10-03 PROCEDURE — 4010F PR ACE/ARB THEARPY RXD/TAKEN: ICD-10-PCS | Mod: CPTII,S$GLB,, | Performed by: OTOLARYNGOLOGY

## 2022-10-03 PROCEDURE — 3079F PR MOST RECENT DIASTOLIC BLOOD PRESSURE 80-89 MM HG: ICD-10-PCS | Mod: CPTII,S$GLB,, | Performed by: OTOLARYNGOLOGY

## 2022-10-03 PROCEDURE — 1159F PR MEDICATION LIST DOCUMENTED IN MEDICAL RECORD: ICD-10-PCS | Mod: CPTII,S$GLB,, | Performed by: OTOLARYNGOLOGY

## 2022-10-03 PROCEDURE — 99204 PR OFFICE/OUTPT VISIT, NEW, LEVL IV, 45-59 MIN: ICD-10-PCS | Mod: S$GLB,,, | Performed by: OTOLARYNGOLOGY

## 2022-10-03 PROCEDURE — 1159F MED LIST DOCD IN RCRD: CPT | Mod: CPTII,S$GLB,, | Performed by: OTOLARYNGOLOGY

## 2022-10-03 PROCEDURE — 4010F ACE/ARB THERAPY RXD/TAKEN: CPT | Mod: CPTII,S$GLB,, | Performed by: OTOLARYNGOLOGY

## 2022-10-03 RX ORDER — IPRATROPIUM BROMIDE 42 UG/1
2 SPRAY, METERED NASAL 3 TIMES DAILY PRN
Qty: 15 ML | Refills: 3 | Status: SHIPPED | OUTPATIENT
Start: 2022-10-03

## 2022-10-03 RX ORDER — AZELASTINE 1 MG/ML
1 SPRAY, METERED NASAL 2 TIMES DAILY
Qty: 30 ML | Refills: 3 | Status: SHIPPED | OUTPATIENT
Start: 2022-10-03

## 2022-10-03 RX ORDER — FLUTICASONE PROPIONATE 50 MCG
2 SPRAY, SUSPENSION (ML) NASAL 2 TIMES DAILY
Qty: 18.2 ML | Refills: 3 | Status: SHIPPED | OUTPATIENT
Start: 2022-10-03

## 2022-10-03 NOTE — PROGRESS NOTES
"  OTOLARYNGOLOGY CLINIC NOTE  Date:  10/03/2022     Chief complaint:  Chief Complaint   Patient presents with    inspire     Consult   Sleep study done 2019  Gets a very dry mouth with the cpap machine and wakes up pt       History of Present Illness  Jennifer Brady is a 62 y.o. female  presenting today for a new evaluation and treatment of REN. Self referral ; sees NP johnie mathur for her sleep apnea and is using apap.     Home sleep study 10-9-19 with AHI of 10 and RDI of 16    Had facial fracture 30 years ago in sinus area   Wears a nasal mask and not full face mask , mouth is open all the time and wakes up in middle of the night . Has a chin strap at home but does not wear it   Uses humidification.   Feels tired still but does wear mask     Sometimes coughs up yellow mucous from her mouth ; still with cough     Was given a z pack which did not seem to help  Coughs a lot in the am . No ceiling fan      Sometimes gets stuffy in the nose     Note from johnie mathur " Interrogation avg 30d 8h/n AHI 6.9-9. 100% mask fit. 0% PB. 90% tile 13.5-15.4 cm"    Has used flonase but does not use astelin ; flonase burned the nose  Uses saline now     Was given ipratropium  Gets runny nose often and ipratroprium has been helping her ; was given that in urgent care and helped.     Past Medical History  Past Medical History:   Diagnosis Date    Acute coronary syndrome     Anxiety     Asthma     Behavioral problem     Borderline personality disorder     Coronary angioplasty status 04/2018    Coronary artery disease     Depression     Depression     Fatigue     Hx of psychiatric care     Hyperlipidemia     Hypertension     Myocardial infarction 05/2018    Psychiatric problem     S/P hysterectomy with oophorectomy     Sleep apnea     pt uses CPAP    Syncope and collapse     Therapy     Thyroid disease         Past Surgical History  Past Surgical History:   Procedure Laterality Date    CORONARY ANGIOPLASTY      " HYSTERECTOMY  1999    INJECTION OF ANESTHETIC AGENT AROUND NERVE Right 11/20/2019    Procedure: MEDIAL BRANCH BLOCK RIGHT L3, L4, L5;  Surgeon: Petros Reihc MD;  Location: [facility];  Service: Pain Management;  Laterality: Right;  NEEDS CONSENT, PT NO LONGER TAKES PLAVIX    INJECTION OF ANESTHETIC AGENT AROUND NERVE Right 6/17/2020    Procedure: BLOCK, NERVE RIGHT L3, 4, 5 MEDIAL BRANCH;  Surgeon: Petros Reich MD;  Location: [facility];  Service: Pain Management;  Laterality: Right;  NEEDS CONSENT?    RADIOFREQUENCY ABLATION Right 5/12/2021    Procedure: RADIOFREQUENCY ABLATION RIGHT L3, 4, 5;  Surgeon: Petros Reich MD;  Location: [facility];  Service: Pain Management;  Laterality: Right;  NEEDS CONSENT    TILT TABLE TEST N/A 3/22/2019    Procedure: TILT TABLE TEST;  Surgeon: Roman Lala MD;  [facility];  Service: Cardiology;  Laterality: N/A;  seizure/migraine, HUT referred by Dr Brit Merlos, EEG confirmed    TRANSFORAMINAL EPIDURAL INJECTION OF STEROID Right 10/14/2020    Procedure: LUMBAR TRANSFORAMINAL RIGHT L4/L5;  Surgeon: Petros Reich MD;  Location: [facility];  Service: Pain Management;  Laterality: Right;  NEEDS CONSENT        Medications  Current Outpatient Medications on File Prior to Visit   Medication Sig Dispense Refill    atorvastatin (LIPITOR) 40 MG tablet TAKE 1 TABLET(40 MG) BY MOUTH EVERY DAY 90 tablet 3    CARAFATE 100 mg/mL suspension Take 1 g by mouth 2 (two) times daily as needed.      HYDROcodone-acetaminophen (NORCO)  mg per tablet Take by mouth.      hydrOXYzine pamoate (VISTARIL) 25 MG Cap Take 1 capsule (25 mg total) by mouth 3 (three) times daily as needed (anxiety). 180 capsule 3    lactulose (CHRONULAC) 10 gram/15 mL solution Take 15 mLs by mouth 2 (two) times daily.      loratadine (CLARITIN) 10 mg tablet Take 10 mg by mouth once daily.      nicotine (NICODERM CQ) 21 mg/24 hr Place 1 patch onto the skin every 24 hours.       pantoprazole (PROTONIX) 40 MG tablet pantoprazole 40 mg tablet,delayed release      topiramate (TOPAMAX) 100 MG tablet Take 1 tablet (100 mg total) by mouth every evening. 90 tablet 3    traZODone (DESYREL) 150 MG tablet TAKE 1 OR 2 TABLETS BY MOUTH BEFORE BED AS NEEDED FOR SLEEP 180 tablet 3    venlafaxine (EFFEXOR-XR) 150 MG Cp24 Take 1 capsule (150 mg total) by mouth once daily. 90 capsule 3    aspirin (ECOTRIN) 81 MG EC tablet Take 1 tablet (81 mg total) by mouth once daily. 100 tablet 6    estradioL (ESTRACE) 0.01 % (0.1 mg/gram) vaginal cream Place 0.5 g vaginally twice a week. Insert 0.5grams intravaginally twice weekly 42 g 4    losartan (COZAAR) 25 MG tablet Take 1 tablet (25 mg total) by mouth once daily. (Patient not taking: Reported on 5/16/2022) 90 tablet 3    [DISCONTINUED] citalopram (CELEXA) 10 MG tablet Take 10 mg by mouth once daily.      [DISCONTINUED] lurasidone (LATUDA) 40 mg Tab tablet Take 1 tablet (40 mg total) by mouth once daily. 30 tablet 5     Current Facility-Administered Medications on File Prior to Visit   Medication Dose Route Frequency Provider Last Rate Last Admin    triamcinolone acetonide injection 40 mg  40 mg Intra-articular 1 time in Clinic/HOD Gabriel Wong DO           Review of Systems  Review of Systems   Constitutional:  Positive for malaise/fatigue.   HENT:  Positive for ear pain.    Cardiovascular: Negative.    Gastrointestinal:  Positive for abdominal pain and constipation.   Genitourinary: Negative.    Musculoskeletal:  Positive for back pain.   Skin: Negative.    Neurological:  Positive for headaches.   Endo/Heme/Allergies:  Bruises/bleeds easily.   Psychiatric/Behavioral:  Positive for depression. The patient is nervous/anxious.     Answers submitted by the patient for this visit:  Review of Symptoms Questionnaire  (Submitted on 10/3/2022)  sinus pressure : Yes  Sinus infection(s)?: Yes  postnasal drip: Yes  eye itching: Yes  Snoring?: Yes  Sleep Apnea?:  "Yes  Acid Reflux?: Yes  Muscle aches / pain?: Yes  Light-headedness: Yes  decreased concentration: Yes  sleep disturbance: Yes  Social History   reports that she quit smoking about 6 months ago. Her smoking use included cigarettes. She started smoking about 36 years ago. She smoked an average of .25 packs per day. She has never used smokeless tobacco. She reports current alcohol use. She reports that she does not use drugs.     Family History  Family History   Problem Relation Age of Onset    Seizures Mother     Heart attack Mother     Hypertension Mother     Stroke Mother     Hypertension Paternal Grandfather     Hypertension Paternal Grandmother     Heart attacks under age 50 Maternal Grandmother     Hypertension Maternal Grandmother     Hypertension Maternal Grandfather     Heart attacks under age 50 Father 33    Hypertension Father     Hypertension Sister     Stroke Sister     Seizures Sister     Breast cancer Neg Hx     Colon cancer Neg Hx     Diabetes Neg Hx     Ovarian cancer Neg Hx         Physical Exam   Vitals:    10/03/22 1008   BP: (!) 144/84    Body mass index is 31.16 kg/m².  Weight: 97.1 kg (214 lb 1.1 oz)   Height: 5' 9.5" (176.5 cm)     GENERAL: no acute distress.  HEAD: normocephalic.   EYES: lids and lashes normal. No scleral icterus  EARS: external ear without lesion, normal pinna shape and position.  External auditory canal with normal cerumen, tympanic membrane fully visible, no perforation , no retraction. No middle ear effusion. Ossicles intact.   NOSE: external nose without significant bony abnormality, turbinate hypertrophy  ORAL CAVITY/OROPHARYNX: tongue midline and mobile. Symmetric palate rise. Uvula midline. Mod mal 2 , tonsils endophytic   NECK: trachea midline.   LYMPH NODES:No cervical lymphadenopathy.  RESPIRATORY: no stridor, no stertor. Voice normal. Respirations nonlabored.  NEURO: alert, responds to questions appropriately.   Cranial nerve exam as indicated in above sections and " additionally showed facial movement symmetric with good eye closure and symmetric smile.   PSYCH:mood appropriate      Imaging:  The patient does have any pertinent and/or recent imaging of the head and neck.  Ct head from 2020    Right max cyst  No left sided disease    Labs:  CBC  Recent Labs   Lab 07/15/21  1631 12/16/21  0742 03/02/22  1023   WBC 9.90 8.01 9.53   Hemoglobin 13.2 11.9 L 12.0   Hematocrit 40.1 38.8 38.7   MCV 94 100 H 98   Platelets 402 365 397     BMP  Recent Labs   Lab 07/15/21  1631 12/16/21  0742 03/02/22  1023   Glucose 92 100 92   Sodium 141 142 141   Potassium 3.8 4.0 3.9   Chloride 108 107 108   CO2 25 24 24   BUN 12 18 15   Creatinine 1.1 0.9 0.9   Calcium 10.1 9.7 9.7     COAGS        Assessment  1. REN (obstructive sleep apnea)  - Polysomnogram (CPAP will be added if patient meets diagnostic criteria.); Future    2. Maxillary sinus cyst    3. Seasonal allergic rhinitis, unspecified trigger    4. Difficulty using continuous positive airway pressure (CPAP) device     Plan:  Discussed plan of care with patient in detail and all questions answered. Patient reported understanding of plan of care.   Allergic rhinitis(AR): We discussed about treatment options for this. I recommend dual nasal spray therapy as combo of steroid and antihistamine nasal spray has been shown in evidence based studies to be better than either alone. Discussed medication administration technique ( point toward outer corner of eye and not towards nasal septum) and use nasal saline prior to medication sprays. Counseled on risk of bleeding, risk of increased intraocular pressure/cataract with long term use. We discussed importance of saline use prior to medication sprays to help sprays work better and to clean the nose. We discussed and physical documentation with photos were given to the patient about the saline and other medication sprays ( paperwork summarized discussion topics)      May need rigid and may need ct  sinus as well for follow up of cyst    REN: I have reached out to her sleep medicine NP to discuss case as well. She needs a new sleep study and would like to get psg for accuracy and to ensure no central apnea issues. With info from her prior study , not a candidate for inspire with ahi of 10. We discussed candidacy for inspire eval and next steps including but not limited to bmi, ahi, intolerance of cpap. We discussed other options for treatment of ren including oral appliance and septoplasty ( improves mask tolerance but not curative). Do not recommend UP3. Discussed that if sleep test shows ahi over 15 and still having issue with cpap tolerance despite any potential adjustments that may or may not be able to occur per pulm, next step would be drug induced sleep endoscopy. We also discussed about the surgical options that potentially could exist and briefly about what to expect and prognosis of those surgeries.     F/u next available with new sleep test , will likely perform rigid cope at that visit for further eval of sinus/allergy issues as well ( depending on response to nasal spray regimen)         Please be aware that this note has been generated with the assistance of MModal voice-to-text.  Please excuse any spelling or grammatical errors.

## 2022-10-03 NOTE — PATIENT INSTRUCTIONS
"Information and instructions from your visit with me today:    Start using the following medication nasal sprays:   Fluticasone spray:    This medication is a steroid spray. It stays within the nose and does not have absorption into the body that leads to side effects that one has with oral steroid medication. Fluticasone nasal spray is the same as the Flonase brand nasal spray. Discuss with your pharmacist if the price is lower over the counter or with a prescription ( this varies depending on insurance). The medication that is over the counter is the same as the prescription medication. Use this medication as instructed on the prescription, 1-2 sprays on each side of your nose twice daily.     Azelastine  spray:  This medication is an antihistamine used to treat nasal symptoms of allergy, which works specifically in the nose unlike antihistamine pills which have more of an effect on the whole body. Use this medication as instructed on the prescription, 1 spray on each side of your nose twice daily.     Additional instructions for medication sprays  Place the tip of the medication bottle in your nose and aim slightly up and out on each side to get medication high and deep into your nose and sinuses, and not have it all deposit in the very front of your nose. Aim the tip of the nozzle towards the outer corner of your eye . You can imagine aiming towards the back of your eyeball on each side for this, as opposed to straight back to the center of your nose and head.     You need to use this medication every day regardless of symptoms, as it takes time ( a few weeks) to work and get the benefits. It does not work on an "as needed" basis like taking a decongestant. If your symptoms only occur in a particular season, then the medication can be used seasonally instead of year long. For seasonal symptoms, you should start using the spray twice daily a month before when you normally have symptoms ( for example, if symptoms " start in August, should start at the end of June).     Start nasal irrigations with saline solution- you can either use a rinse or a mist spray:    SALINE SINUS RINSE (Andrea Med brand): You should do a full bottle, half on one side of your nose and half on the other, 1-2 times per day (or more if able to, you cannot do this too much). Follow the instructions on the box: mix the salt packet with clean water (bottle, previously boiled, distilled, etc -- not tap water) to the line on the bottle to make the irrigation.         NASAL SALINE SPRAY ( simply saline and arm and hammer are examples) There are several different brands found in the cold and flu aisle of the pharmacy. You can use any brand of saline spray - this will deliver the saline by a gentle mist ( if you have difficulty or discomfort with nasal rinse/ a lot of fluid in the nose, this will be more comfortable).       Always rinse your nose with saline prior to using medication sprays and wait a couple of hours before using again. You can use the saline throughout the day to help with stuffy nose or dry nose.    Do not use nasal decongestant sprays such as Afrin or similar products long term ( over 3 days) .  This can cause long term physical nasal addiction. Afrin should only be used if having nose bleeds, severe nasal congestion , or severe ear pain/fullness and should not be used for more than 2-3 days in a row . It is a not a medication that should be used for a long period of time.     It was nice meeting you today, and I look forward to helping you feel better soon. Please don't hesitate to call if you have any other questions or concerns, or if I can be of any assistance in the meantime.      Nina Rutherford MD    [facility]     Phone  [phone]    Fax      [phone]        Nina Rutherford MD  Otorhinolaryngology

## 2022-10-04 DIAGNOSIS — G47.33 OSA (OBSTRUCTIVE SLEEP APNEA): Primary | ICD-10-CM

## 2022-10-04 NOTE — PROGRESS NOTES
Still tired with pap, still could be mouth breathing and having residual elevated mild ahi. Plan dedicated cpaptitration ensure optimal therapy

## 2022-10-07 ENCOUNTER — TELEPHONE (OUTPATIENT)
Dept: SLEEP MEDICINE | Facility: OTHER | Age: 62
End: 2022-10-07
Payer: MEDICARE

## 2022-10-10 ENCOUNTER — TELEPHONE (OUTPATIENT)
Dept: SLEEP MEDICINE | Facility: OTHER | Age: 62
End: 2022-10-10
Payer: MEDICARE

## 2022-10-10 ENCOUNTER — HOSPITAL ENCOUNTER (OUTPATIENT)
Dept: SLEEP MEDICINE | Facility: OTHER | Age: 62
Discharge: HOME | End: 2022-10-10
Attending: NURSE PRACTITIONER
Payer: MEDICARE

## 2022-10-10 DIAGNOSIS — G47.33 OSA (OBSTRUCTIVE SLEEP APNEA): ICD-10-CM

## 2022-10-10 PROCEDURE — 95811 POLYSOM 6/>YRS CPAP 4/> PARM: CPT | Mod: 26,,, | Performed by: INTERNAL MEDICINE

## 2022-10-10 PROCEDURE — 95811 PR POLYSOMNOGRAPHY W/CPAP: ICD-10-PCS | Mod: 26,,, | Performed by: INTERNAL MEDICINE

## 2022-10-10 PROCEDURE — 95811 POLYSOM 6/>YRS CPAP 4/> PARM: CPT

## 2022-10-11 ENCOUNTER — DOCUMENTATION ONLY (OUTPATIENT)
Dept: OTOLARYNGOLOGY | Facility: CLINIC | Age: 62
End: 2022-10-11
Payer: MEDICARE

## 2022-10-11 ENCOUNTER — TELEPHONE (OUTPATIENT)
Dept: OTOLARYNGOLOGY | Facility: CLINIC | Age: 62
End: 2022-10-11
Payer: MEDICARE

## 2022-10-11 DIAGNOSIS — G47.33 OSA (OBSTRUCTIVE SLEEP APNEA): Primary | ICD-10-CM

## 2022-10-11 NOTE — TELEPHONE ENCOUNTER
Spoke with patient and explained to her that the sleep study she had done that was order By Dr. Taya Mcelroy may not have the criteria we are looking for to continue with the Inspire Consult. That we will have to look at that report when it is ready.

## 2022-10-11 NOTE — PROGRESS NOTES
A Cpap titration was preformed on 62 year old Jennifer Brday on the night of 10/10/2022. The procedure was explained to the patient prior to the setup. Education was giving which included the entire set up process, the cpap titration protocal and why the tech would need to enter the room. All questions were asked and answered prior to the start of the study. Disposable equipment was used where applicable. An end of the night instruction sheet was giving to the patient upon leaving the lab. The patient's response to cpap was that it was ok.

## 2022-10-14 ENCOUNTER — TELEPHONE (OUTPATIENT)
Dept: SLEEP MEDICINE | Facility: HOSPITAL | Age: 62
End: 2022-10-14
Payer: MEDICARE

## 2022-10-18 ENCOUNTER — TELEPHONE (OUTPATIENT)
Dept: BARIATRICS | Facility: CLINIC | Age: 62
End: 2022-10-18
Payer: MEDICARE

## 2022-10-24 NOTE — PROCEDURES
[facility]  Tel: [phone]    CPAP TITRATION       Patient Name: RODGER [facility] #: [id number]   Sex: Female Study Date: 10/10/2022   : 1960 Clinic #: [id number]   Age: 62 Referring Physician: LAURENT OJEDA NP   Height: 69.5 in Referring Physician #    Weight: 214.0 lbs Sleep Specialist:    B.M.I.: 31.1 Sleep Specialist #    Hypopnea rule: AASM 1B Scoring Tech: COLETTE Ferrell, RPSGT   Total AHI: 13.3 Recording Tech KAVON PAUL RRT, RPSGT   Lowest O2 sat: 88.0% Recording Location: [facility]     Sleep architecture: This is a CPAP titration study. At light's out, the patient fell asleep in 12.9 minutes. Sleep efficiency was 85.2%. Total sleep time (TST) was 379.0 minutes. Slow wave sleep proportion of TST was reduced and REM stage sleep proportion of TST was reduced. REM latency was 223.5 minutes.    Motor movement / Parasomnia: There were no significant limb movements of sleep noted.    Cardiac: single lead EKG revealed normal sinus rhythm    CPAP titration:  The mask used in the study was Resmed nasal pillows switched to Resmed AirFit F30i size medium due to mouth leaks.  CPAP was explored to a maximum of 13 cwp.  Consolidated sleep was first seen at 7 cwp in lateral NREM sleep.  CPAP = 9 cwp was effective in lateral N3 sleep  CPAP = 10 cwp was effective in lateral N2 sleep, but was insufficient at controlling events in supine REM.  CPAP = 13 cwp was largely effective in semi-recumbent stage REM sleep.    LIMITATIONS:   Mouth-venting with nasal interface  occasional post-arousals central apneas were seen, but no periodic breathing.    Oxygenation:  At therapeutic levels of PAP therapy, there was no baseline hypoxemia.    Impression:  -obstructive sleep apnea     Recommendations:    -trial of full-face mask due to mouth-venting seen on this study  auto-CPAP with CPAP min = 9 cwp  and CPAP max =  20 cwp is recommended  -pressures should be adjusted to CPAP min =  13cwp depending on pressure tolerance  -the patient has follow up with Sleep Medicine        Petros Lopez MD    (This Sleep Study was interpreted by a Board Certified Sleep Specialist who conducted an epoch-by-epoch review of the entire raw data recording.)  (The indication for this sleep study was reviewed and deemed appropriate by AASM Practice Parameters or other reasons by a Board Certified Sleep Specialist.)        TABLES

## 2022-10-25 ENCOUNTER — TELEPHONE (OUTPATIENT)
Dept: SLEEP MEDICINE | Facility: CLINIC | Age: 62
End: 2022-10-25
Payer: MEDICARE

## 2022-10-31 ENCOUNTER — TELEPHONE (OUTPATIENT)
Dept: BARIATRICS | Facility: CLINIC | Age: 62
End: 2022-10-31
Payer: MEDICARE

## 2022-10-31 NOTE — TELEPHONE ENCOUNTER
----- Message from Sydney Tabor sent at 10/31/2022 10:38 AM CDT -----  Regarding: Appt  Contact: pt @ [phone]  Pt is calling to get appt, asking for a call back

## 2022-10-31 NOTE — TELEPHONE ENCOUNTER
"Called pt to confirm ht of 5'9", wt of 214 lbs and BMI of 31.  Explained to pt, BMI has to be between 35-40 with co-morbidity or over 40 for surgical weight loss. Asked pt if she would be interested in medical wt loss, pt agreeable, information forwarded to VESTA Romero and bariatric surgery consult appointments cancelled.   "

## 2022-10-31 NOTE — TELEPHONE ENCOUNTER
See documentation attached to earlier telephone note today. Bariatric dashboard updated to med wt loss and GN and med requirements found in media copied into dashboard.

## 2022-11-01 ENCOUNTER — TELEPHONE (OUTPATIENT)
Dept: BARIATRICS | Facility: CLINIC | Age: 62
End: 2022-11-01
Payer: MEDICARE

## 2022-11-03 ENCOUNTER — TELEPHONE (OUTPATIENT)
Dept: BARIATRICS | Facility: CLINIC | Age: 62
End: 2022-11-03
Payer: MEDICARE

## 2022-12-06 ENCOUNTER — TELEPHONE (OUTPATIENT)
Dept: CARDIOLOGY | Facility: CLINIC | Age: 62
End: 2022-12-06
Payer: MEDICARE

## 2022-12-06 NOTE — TELEPHONE ENCOUNTER
Spoke with patient, scheduled appointment with Dr. Sanchez's colleague this week.  Patient verbalized understanding.

## 2022-12-07 ENCOUNTER — OFFICE VISIT (OUTPATIENT)
Dept: CARDIOLOGY | Facility: CLINIC | Age: 62
End: 2022-12-07
Payer: MEDICARE

## 2022-12-07 VITALS
OXYGEN SATURATION: 97 % | DIASTOLIC BLOOD PRESSURE: 72 MMHG | WEIGHT: 220.38 LBS | BODY MASS INDEX: 31.55 KG/M2 | HEIGHT: 70 IN | HEART RATE: 81 BPM | SYSTOLIC BLOOD PRESSURE: 130 MMHG

## 2022-12-07 DIAGNOSIS — Z01.810 PRE-OPERATIVE CARDIOVASCULAR EXAMINATION: ICD-10-CM

## 2022-12-07 DIAGNOSIS — I10 PRIMARY HYPERTENSION: ICD-10-CM

## 2022-12-07 DIAGNOSIS — Z01.818 ENCOUNTER FOR OTHER PREPROCEDURAL EXAMINATION: ICD-10-CM

## 2022-12-07 DIAGNOSIS — I25.10 CORONARY ARTERY DISEASE INVOLVING NATIVE CORONARY ARTERY OF NATIVE HEART WITHOUT ANGINA PECTORIS: ICD-10-CM

## 2022-12-07 DIAGNOSIS — I73.9 PAD (PERIPHERAL ARTERY DISEASE): ICD-10-CM

## 2022-12-07 DIAGNOSIS — R94.31 NONSPECIFIC ABNORMAL ELECTROCARDIOGRAM (ECG) (EKG): ICD-10-CM

## 2022-12-07 DIAGNOSIS — I49.3 PVCS (PREMATURE VENTRICULAR CONTRACTIONS): Primary | ICD-10-CM

## 2022-12-07 DIAGNOSIS — E78.00 PURE HYPERCHOLESTEROLEMIA: ICD-10-CM

## 2022-12-07 PROCEDURE — 99999 PR PBB SHADOW E&M-EST. PATIENT-LVL IV: CPT | Mod: PBBFAC,,, | Performed by: INTERNAL MEDICINE

## 2022-12-07 PROCEDURE — 3075F SYST BP GE 130 - 139MM HG: CPT | Mod: CPTII,S$GLB,, | Performed by: INTERNAL MEDICINE

## 2022-12-07 PROCEDURE — 3008F PR BODY MASS INDEX (BMI) DOCUMENTED: ICD-10-PCS | Mod: CPTII,S$GLB,, | Performed by: INTERNAL MEDICINE

## 2022-12-07 PROCEDURE — 99213 PR OFFICE/OUTPT VISIT, EST, LEVL III, 20-29 MIN: ICD-10-PCS | Mod: 25,S$GLB,, | Performed by: INTERNAL MEDICINE

## 2022-12-07 PROCEDURE — 3078F DIAST BP <80 MM HG: CPT | Mod: CPTII,S$GLB,, | Performed by: INTERNAL MEDICINE

## 2022-12-07 PROCEDURE — 4010F PR ACE/ARB THEARPY RXD/TAKEN: ICD-10-PCS | Mod: CPTII,S$GLB,, | Performed by: INTERNAL MEDICINE

## 2022-12-07 PROCEDURE — 3075F PR MOST RECENT SYSTOLIC BLOOD PRESS GE 130-139MM HG: ICD-10-PCS | Mod: CPTII,S$GLB,, | Performed by: INTERNAL MEDICINE

## 2022-12-07 PROCEDURE — 1159F PR MEDICATION LIST DOCUMENTED IN MEDICAL RECORD: ICD-10-PCS | Mod: CPTII,S$GLB,, | Performed by: INTERNAL MEDICINE

## 2022-12-07 PROCEDURE — 93000 EKG 12-LEAD: ICD-10-PCS | Mod: S$GLB,,, | Performed by: INTERNAL MEDICINE

## 2022-12-07 PROCEDURE — 4010F ACE/ARB THERAPY RXD/TAKEN: CPT | Mod: CPTII,S$GLB,, | Performed by: INTERNAL MEDICINE

## 2022-12-07 PROCEDURE — 99999 PR PBB SHADOW E&M-EST. PATIENT-LVL IV: ICD-10-PCS | Mod: PBBFAC,,, | Performed by: INTERNAL MEDICINE

## 2022-12-07 PROCEDURE — 3078F PR MOST RECENT DIASTOLIC BLOOD PRESSURE < 80 MM HG: ICD-10-PCS | Mod: CPTII,S$GLB,, | Performed by: INTERNAL MEDICINE

## 2022-12-07 PROCEDURE — 99213 OFFICE O/P EST LOW 20 MIN: CPT | Mod: 25,S$GLB,, | Performed by: INTERNAL MEDICINE

## 2022-12-07 PROCEDURE — 1159F MED LIST DOCD IN RCRD: CPT | Mod: CPTII,S$GLB,, | Performed by: INTERNAL MEDICINE

## 2022-12-07 PROCEDURE — 3008F BODY MASS INDEX DOCD: CPT | Mod: CPTII,S$GLB,, | Performed by: INTERNAL MEDICINE

## 2022-12-07 PROCEDURE — 93000 ELECTROCARDIOGRAM COMPLETE: CPT | Mod: S$GLB,,, | Performed by: INTERNAL MEDICINE

## 2022-12-07 RX ORDER — METHOCARBAMOL 500 MG/1
500 TABLET, FILM COATED ORAL
COMMUNITY
Start: 2022-11-10 | End: 2024-01-23

## 2022-12-07 RX ORDER — NITROGLYCERIN 0.4 MG/1
0.4 TABLET SUBLINGUAL
COMMUNITY
Start: 2022-09-09

## 2022-12-07 NOTE — PROGRESS NOTES
[facility] - Cardiology [address]  Cardiology Clinic Note      Chief Complaint  Chief Complaint   Patient presents with    Pre-op Exam     Hip replacement scheduled 12/21/2022       HPI:    62-year-old female with a past medical history of syncope normal tilt-table 2019, thyroid disease, tobacco, REN, hypertension, hyperlipidemia, PVC 0.3% by Holter 2020, peripheral artery disease no hemodynamically significant stenosis ultrasound 05/2022, coronary artery disease status post MI PCI to the proximal RCA and LCX in 2018 subsequent stress echo 2020 negative for ischemia    Patient was seen in the past by Dr. Sanchez but is new to me  Presents for preop orthopedic surgery clearance  No symptoms but unable to reach adequate Mets due to MSK discomfort      Medications  Current Outpatient Medications   Medication Sig Dispense Refill    aspirin (ECOTRIN) 81 MG EC tablet Take 1 tablet (81 mg total) by mouth once daily. 100 tablet 6    atorvastatin (LIPITOR) 40 MG tablet TAKE 1 TABLET(40 MG) BY MOUTH EVERY DAY 90 tablet 3    azelastine (ASTELIN) 137 mcg (0.1 %) nasal spray 1 spray (137 mcg total) by Nasal route 2 (two) times daily. 30 mL 3    estradioL (ESTRACE) 0.01 % (0.1 mg/gram) vaginal cream Place 0.5 g vaginally twice a week. Insert 0.5grams intravaginally twice weekly 42 g 4    fluticasone propionate (FLONASE) 50 mcg/actuation nasal spray 2 sprays (100 mcg total) by Each Nostril route 2 (two) times daily. 18.2 mL 3    HYDROcodone-acetaminophen (NORCO)  mg per tablet Take by mouth.      hydrOXYzine pamoate (VISTARIL) 25 MG Cap Take 1 capsule (25 mg total) by mouth 3 (three) times daily as needed (anxiety). 180 capsule 3    ipratropium (ATROVENT) 42 mcg (0.06 %) nasal spray 2 sprays by Nasal route 3 (three) times daily as needed for Rhinitis. 15 mL 3    lactulose (CHRONULAC) 10 gram/15 mL solution Take 15 mLs by mouth 2 (two) times daily.      loratadine (CLARITIN) 10 mg tablet Take 10 mg by mouth once daily.       losartan (COZAAR) 25 MG tablet Take 1 tablet (25 mg total) by mouth once daily. 90 tablet 3    methocarbamoL (ROBAXIN) 500 MG Tab Take 500 mg by mouth.      nitroGLYCERIN (NITROSTAT) 0.4 MG SL tablet Place 0.4 mg under the tongue.      pantoprazole (PROTONIX) 40 MG tablet pantoprazole 40 mg tablet,delayed release      traZODone (DESYREL) 150 MG tablet TAKE 1 OR 2 TABLETS BY MOUTH BEFORE BED AS NEEDED FOR SLEEP 180 tablet 3    venlafaxine (EFFEXOR-XR) 150 MG Cp24 Take 1 capsule (150 mg total) by mouth once daily. 90 capsule 3    CARAFATE 100 mg/mL suspension Take 1 g by mouth 2 (two) times daily as needed.      nicotine (NICODERM CQ) 21 mg/24 hr Place 1 patch onto the skin every 24 hours.      topiramate (TOPAMAX) 100 MG tablet Take 1 tablet (100 mg total) by mouth every evening. (Patient not taking: Reported on 12/7/2022) 90 tablet 3     Current Facility-Administered Medications   Medication Dose Route Frequency Provider Last Rate Last Admin    triamcinolone acetonide injection 40 mg  40 mg Intra-articular 1 time in Clinic/HOD Gabriel Wong DO            History  Past Medical History:   Diagnosis Date    Acute coronary syndrome     Anxiety     Asthma     Behavioral problem     Borderline personality disorder     Coronary angioplasty status 04/2018    Coronary artery disease     Depression     Depression     Fatigue     Hx of psychiatric care     Hyperlipidemia     Hypertension     Myocardial infarction 05/2018    Psychiatric problem     S/P hysterectomy with oophorectomy     Sleep apnea     pt uses CPAP    Syncope and collapse     Therapy     Thyroid disease      Past Surgical History:   Procedure Laterality Date    CORONARY ANGIOPLASTY      HYSTERECTOMY  1999    INJECTION OF ANESTHETIC AGENT AROUND NERVE Right 11/20/2019    Procedure: MEDIAL BRANCH BLOCK RIGHT L3, L4, L5;  Surgeon: Petros Reich MD;  Location: [facility] PAIN MGT;  Service: Pain Management;  Laterality: Right;  NEEDS CONSENT, PT NO LONGER TAKES  PLAVIX    INJECTION OF ANESTHETIC AGENT AROUND NERVE Right 2020    Procedure: BLOCK, NERVE RIGHT L3, 4, 5 MEDIAL BRANCH;  Surgeon: Petros Reich MD;  Location: [facility] PAIN MGT;  Service: Pain Management;  Laterality: Right;  NEEDS CONSENT?    RADIOFREQUENCY ABLATION Right 2021    Procedure: RADIOFREQUENCY ABLATION RIGHT L3, 4, 5;  Surgeon: Petros Reich MD;  Location: [facility] PAIN MGT;  Service: Pain Management;  Laterality: Right;  NEEDS CONSENT    TILT TABLE TEST N/A 3/22/2019    Procedure: TILT TABLE TEST;  Surgeon: Roman Lala MD;  Location: [facility] EP LAB;  Service: Cardiology;  Laterality: N/A;  seizure/migraine, HUT referred by Dr Brit Merlos, EEG confirmed    TRANSFORAMINAL EPIDURAL INJECTION OF STEROID Right 10/14/2020    Procedure: LUMBAR TRANSFORAMINAL RIGHT L4/L5;  Surgeon: Petros Reich MD;  Location: [facility] PAIN MGT;  Service: Pain Management;  Laterality: Right;  NEEDS CONSENT     Social History     Socioeconomic History    Marital status:    Occupational History     Employer: [employer]     Comment: [employer]   Tobacco Use    Smoking status: Former     Packs/day: 0.25     Types: Cigarettes     Start date: 1986     Quit date: 2022     Years since quittin.6    Smokeless tobacco: Never    Tobacco comments:     quit actual cigarettes a few mo ago, switched to e cigarette   Substance and Sexual Activity    Alcohol use: Yes     Comment: Socially    Drug use: No    Sexual activity: Yes     Partners: Male   Other Topics Concern    Financial Status: Employed Yes    Caffeine Use: Moderate Yes    Spirituality: Organized Jew Yes    Patient feels they ought to cut down on drinking/drug use No    Patient annoyed by others criticizing their drinking/drug use No    Patient has felt bad or guilty about drinking/drug use No    Patient has had a drink/used drugs as an eye opener in the AM No   Social History Narrative    Patient  discusses recent decisions she has made that she is pleased about; has clarified for self and others her values and standards.  Though she is aware of difficulty of  struggle, feels new confidence about  results.  She expresses sense of wholeness and independence at this point. Her previously explored painful symptoms are improved (i.e, .diminished - 2or3/10), thought processes are logical and outlook is optimistic, as well as realistic.    Discusses trip she's planned.  intent to reschedule after return in mid-August. She expresses feelings of appreciation     Family History   Problem Relation Age of Onset    Seizures Mother     Heart attack Mother     Hypertension Mother     Stroke Mother     Hypertension Paternal Grandfather     Hypertension Paternal Grandmother     Heart attacks under age 50 Maternal Grandmother     Hypertension Maternal Grandmother     Hypertension Maternal Grandfather     Heart attacks under age 50 Father 33    Hypertension Father     Hypertension Sister     Stroke Sister     Seizures Sister     Breast cancer Neg Hx     Colon cancer Neg Hx     Diabetes Neg Hx     Ovarian cancer Neg Hx         Allergies  Review of patient's allergies indicates:   Allergen Reactions    Iodine and iodide containing products        Review of Systems   Review of Systems   Constitutional: Negative for fever.   HENT:  Negative for nosebleeds.    Eyes:  Negative for visual disturbance.   Cardiovascular:  Negative for chest pain, claudication, dyspnea on exertion, palpitations and syncope.   Respiratory:  Negative for cough, hemoptysis and wheezing.    Endocrine: Negative for cold intolerance, heat intolerance, polyphagia and polyuria.   Hematologic/Lymphatic: Negative for bleeding problem.   Skin:  Negative for rash.   Musculoskeletal:  Negative for myalgias.   Gastrointestinal:  Negative for hematemesis, hematochezia, nausea and vomiting.   Genitourinary:  Negative for dysuria.   Neurological:  Negative for focal  weakness and sensory change.   Psychiatric/Behavioral:  Negative for altered mental status.      Physical Exam  Vitals:    12/07/22 0901   BP: 130/72   Pulse: 81     Wt Readings from Last 1 Encounters:   12/07/22 100 kg (220 lb 5.6 oz)     Physical Exam  Constitutional:       General: She is not in acute distress.  HENT:      Head: Normocephalic and atraumatic.      Mouth/Throat:      Mouth: Mucous membranes are moist.   Eyes:      Extraocular Movements: Extraocular movements intact.      Pupils: Pupils are equal, round, and reactive to light.   Neck:      Vascular: No carotid bruit or JVD.   Cardiovascular:      Rate and Rhythm: Normal rate and regular rhythm.      Heart sounds: No murmur heard.    No friction rub. No gallop.   Pulmonary:      Effort: Pulmonary effort is normal.      Breath sounds: Normal breath sounds.   Abdominal:      Tenderness: There is no abdominal tenderness. There is no guarding or rebound.   Musculoskeletal:      Right lower leg: No edema.      Left lower leg: No edema.   Skin:     General: Skin is warm and dry.      Capillary Refill: Capillary refill takes less than 2 seconds.   Neurological:      General: No focal deficit present.      Mental Status: She is alert.   Psychiatric:         Mood and Affect: Mood normal.       Labs  No visits with results within 6 Month(s) from this visit.   Latest known visit with results is:   Lab Visit on 03/02/2022   Component Date Value Ref Range Status    WBC 03/02/2022 9.53  3.90 - 12.70 K/uL Final    RBC 03/02/2022 3.97 (L)  4.00 - 5.40 M/uL Final    Hemoglobin 03/02/2022 12.0  12.0 - 16.0 g/dL Final    Hematocrit 03/02/2022 38.7  37.0 - 48.5 % Final    MCV 03/02/2022 98  82 - 98 fL Final    MCH 03/02/2022 30.2  27.0 - 31.0 pg Final    MCHC 03/02/2022 31.0 (L)  32.0 - 36.0 g/dL Final    RDW 03/02/2022 12.4  11.5 - 14.5 % Final    Platelets 03/02/2022 397  150 - 450 K/uL Final    MPV 03/02/2022 8.6 (L)  9.2 - 12.9 fL Final    Immature Granulocytes  03/02/2022 0.2  0.0 - 0.5 % Final    Gran # (ANC) 03/02/2022 4.4  1.8 - 7.7 K/uL Final    Immature Grans (Abs) 03/02/2022 0.02  0.00 - 0.04 K/uL Final    Comment: Mild elevation in immature granulocytes is non specific and   can be seen in a variety of conditions including stress response,   acute inflammation, trauma and pregnancy. Correlation with other   laboratory and clinical findings is essential.      Lymph # 03/02/2022 4.2  1.0 - 4.8 K/uL Final    Mono # 03/02/2022 0.6  0.3 - 1.0 K/uL Final    Eos # 03/02/2022 0.3  0.0 - 0.5 K/uL Final    Baso # 03/02/2022 0.04  0.00 - 0.20 K/uL Final    nRBC 03/02/2022 0  0 /100 WBC Final    Gran % 03/02/2022 46.5  38.0 - 73.0 % Final    Lymph % 03/02/2022 44.4  18.0 - 48.0 % Final    Mono % 03/02/2022 5.9  4.0 - 15.0 % Final    Eosinophil % 03/02/2022 2.6  0.0 - 8.0 % Final    Basophil % 03/02/2022 0.4  0.0 - 1.9 % Final    Differential Method 03/02/2022 Automated   Final    Sodium 03/02/2022 141  136 - 145 mmol/L Final    Potassium 03/02/2022 3.9  3.5 - 5.1 mmol/L Final    Chloride 03/02/2022 108  95 - 110 mmol/L Final    CO2 03/02/2022 24  23 - 29 mmol/L Final    Glucose 03/02/2022 92  70 - 110 mg/dL Final    BUN 03/02/2022 15  8 - 23 mg/dL Final    Creatinine 03/02/2022 0.9  0.5 - 1.4 mg/dL Final    Calcium 03/02/2022 9.7  8.7 - 10.5 mg/dL Final    Total Protein 03/02/2022 7.3  6.0 - 8.4 g/dL Final    Albumin 03/02/2022 3.7  3.5 - 5.2 g/dL Final    Total Bilirubin 03/02/2022 0.6  0.1 - 1.0 mg/dL Final    Comment: For infants and newborns, interpretation of results should be based  on gestational age, weight and in agreement with clinical  observations.    Premature Infant recommended reference ranges:  Up to 24 hours.............<8.0 mg/dL  Up to 48 hours............<12.0 mg/dL  3-5 days..................<15.0 mg/dL  6-29 days.................<15.0 mg/dL      Alkaline Phosphatase 03/02/2022 133  55 - 135 U/L Final    AST 03/02/2022 21  10 - 40 U/L Final    ALT  03/02/2022 22  10 - 44 U/L Final    Anion Gap 03/02/2022 9  8 - 16 mmol/L Final    eGFR if African American 03/02/2022 >60.0  >60 mL/min/1.73 m^2 Final    eGFR if non African American 03/02/2022 >60.0  >60 mL/min/1.73 m^2 Final    Comment: Calculation used to obtain the estimated glomerular filtration  rate (eGFR) is the CKD-EPI equation.       CPK 03/02/2022 239 (H)  20 - 180 U/L Final    Cholesterol 03/02/2022 157  120 - 199 mg/dL Final    Comment: The National Cholesterol Education Program (NCEP) has set the  following guidelines (reference ranges) for Cholesterol:  Optimal.....................<200 mg/dL  Borderline High.............200-239 mg/dL  High........................> or = 240 mg/dL      Triglycerides 03/02/2022 114  30 - 150 mg/dL Final    Comment: The National Cholesterol Education Program (NCEP) has set the  following guidelines (reference values) for triglycerides:  Normal......................<150 mg/dL  Borderline High.............150-199 mg/dL  High........................200-499 mg/dL      HDL 03/02/2022 48  40 - 75 mg/dL Final    Comment: The National Cholesterol Education Program (NCEP) has set the  following guidelines (reference values) for HDL Cholesterol:  Low...............<40 mg/dL  Optimal...........>60 mg/dL      LDL Cholesterol 03/02/2022 86.2  63.0 - 159.0 mg/dL Final    Comment: The National Cholesterol Education Program (NCEP) has set the  following guidelines (reference values) for LDL Cholesterol:  Optimal.......................<130 mg/dL  Borderline High...............130-159 mg/dL  High..........................160-189 mg/dL  Very High.....................>190 mg/dL      HDL/Cholesterol Ratio 03/02/2022 30.6  20.0 - 50.0 % Final    Total Cholesterol/HDL Ratio 03/02/2022 3.3  2.0 - 5.0 Final    Non-HDL Cholesterol 03/02/2022 109  mg/dL Final    Comment: Risk category and Non-HDL cholesterol goals:  Coronary heart disease (CHD)or equivalent (10-year risk of CHD >20%):  Non-HDL  cholesterol goal     <130 mg/dL  Two or more CHD risk factors and 10-year risk of CHD <= 20%:  Non-HDL cholesterol goal     <160 mg/dL  0 to 1 CHD risk factor:  Non-HDL cholesterol goal     <190 mg/dL      TSH 03/02/2022 0.494  0.400 - 4.000 uIU/mL Final       EKG  12/7/2022 normal sinus rhythm borderline nonspecific ST-T changes inferiorly    Echo   No results found for this or any previous visit.    Imaging  No results found.    Prior coronary angiogram / intervention:  See HPI    Assessment and Plan  1. Pre-operative cardiovascular examination  MPI, CXR, EKG, labs as requested by surgeon   If MPI is low risk may proceed with surgery    2. PVCs (premature ventricular contractions)  Consider beta-blocker appears to have been discontinued 2018 investigate rationale    3. Coronary artery disease involving native coronary artery of native heart without angina pectoris  Continue aspirin 81 Lipitor 40 consider beta-blocker see # 2    4. Pure hypercholesterolemia  Statin    5. Primary hypertension  Losartan    6. PAD (peripheral artery disease)  No obstructive disease see ultrasound HPI    Continue aspirin statin continue aspirin statin  Follow Up  Dr. Sanchez as directed       Glen Dowell MD, FACC, RPVI  Interventional Cardiology

## 2022-12-09 ENCOUNTER — HOSPITAL ENCOUNTER (OUTPATIENT)
Dept: RADIOLOGY | Facility: HOSPITAL | Age: 62
Discharge: HOME | End: 2022-12-09
Attending: INTERNAL MEDICINE
Payer: MEDICARE

## 2022-12-09 ENCOUNTER — HOSPITAL ENCOUNTER (OUTPATIENT)
Dept: CARDIOLOGY | Facility: HOSPITAL | Age: 62
Discharge: HOME | End: 2022-12-09
Attending: INTERNAL MEDICINE
Payer: MEDICARE

## 2022-12-09 DIAGNOSIS — I10 PRIMARY HYPERTENSION: ICD-10-CM

## 2022-12-09 DIAGNOSIS — R94.31 NONSPECIFIC ABNORMAL ELECTROCARDIOGRAM (ECG) (EKG): ICD-10-CM

## 2022-12-09 DIAGNOSIS — Z01.818 ENCOUNTER FOR OTHER PREPROCEDURAL EXAMINATION: ICD-10-CM

## 2022-12-09 DIAGNOSIS — I25.10 CORONARY ARTERY DISEASE INVOLVING NATIVE CORONARY ARTERY OF NATIVE HEART WITHOUT ANGINA PECTORIS: ICD-10-CM

## 2022-12-09 LAB
CV STRESS BASE HR: 63 BPM
DIASTOLIC BLOOD PRESSURE: 86 MMHG
OHS CV CPX 85 PERCENT MAX PREDICTED HEART RATE MALE: 127
OHS CV CPX MAX PREDICTED HEART RATE: 150
OHS CV CPX PATIENT IS FEMALE: 1
OHS CV CPX PATIENT IS MALE: 0
OHS CV CPX PEAK DIASTOLIC BLOOD PRESSURE: 75 MMHG
OHS CV CPX PEAK HEAR RATE: 90 BPM
OHS CV CPX PEAK RATE PRESSURE PRODUCT: NORMAL
OHS CV CPX PEAK SYSTOLIC BLOOD PRESSURE: 152 MMHG
OHS CV CPX PERCENT MAX PREDICTED HEART RATE ACHIEVED: 60
OHS CV CPX RATE PRESSURE PRODUCT PRESENTING: 8568
SYSTOLIC BLOOD PRESSURE: 136 MMHG

## 2022-12-09 PROCEDURE — 71046 X-RAY EXAM CHEST 2 VIEWS: CPT | Mod: TC,FY

## 2022-12-09 PROCEDURE — A9502 TC99M TETROFOSMIN: HCPCS

## 2022-12-09 PROCEDURE — 71046 XR CHEST PA AND LATERAL: ICD-10-PCS | Mod: 26,,, | Performed by: RADIOLOGY

## 2022-12-09 PROCEDURE — 93016 NUCLEAR STRESS TEST (CUPID ONLY): ICD-10-PCS | Mod: ,,, | Performed by: INTERNAL MEDICINE

## 2022-12-09 PROCEDURE — 78452 HT MUSCLE IMAGE SPECT MULT: CPT | Mod: 26,,, | Performed by: STUDENT IN AN ORGANIZED HEALTH CARE EDUCATION/TRAINING PROGRAM

## 2022-12-09 PROCEDURE — 71046 X-RAY EXAM CHEST 2 VIEWS: CPT | Mod: 26,,, | Performed by: RADIOLOGY

## 2022-12-09 PROCEDURE — 93018 NUCLEAR STRESS TEST (CUPID ONLY): ICD-10-PCS | Mod: ,,, | Performed by: INTERNAL MEDICINE

## 2022-12-09 PROCEDURE — 93018 CV STRESS TEST I&R ONLY: CPT | Mod: ,,, | Performed by: INTERNAL MEDICINE

## 2022-12-09 PROCEDURE — 78452 NM MYOCARDIAL PERFUSION SPECT MULTI PHARM: ICD-10-PCS | Mod: 26,,, | Performed by: STUDENT IN AN ORGANIZED HEALTH CARE EDUCATION/TRAINING PROGRAM

## 2022-12-09 PROCEDURE — 93017 CV STRESS TEST TRACING ONLY: CPT

## 2022-12-09 PROCEDURE — 93016 CV STRESS TEST SUPVJ ONLY: CPT | Mod: ,,, | Performed by: INTERNAL MEDICINE

## 2022-12-09 RX ORDER — REGADENOSON 0.08 MG/ML
0.4 INJECTION, SOLUTION INTRAVENOUS ONCE
Status: DISCONTINUED | OUTPATIENT
Start: 2022-12-09 | End: 2022-12-17 | Stop reason: HOSPADM

## 2022-12-13 ENCOUNTER — TELEPHONE (OUTPATIENT)
Dept: BARIATRICS | Facility: CLINIC | Age: 62
End: 2022-12-13
Payer: MEDICARE

## 2022-12-13 NOTE — TELEPHONE ENCOUNTER
Informed patient that no information was documented on a call to her. Informed her that she would receive a call if anything become available.

## 2022-12-13 NOTE — TELEPHONE ENCOUNTER
----- Message from Anuj Wolf sent at 12/13/2022 12:48 PM CST -----  Contact: Jennifer [phone]  Pt is returning call but wasn't sure what it was for. Please call back to further assist.

## 2022-12-14 ENCOUNTER — PATIENT MESSAGE (OUTPATIENT)
Dept: CARDIOLOGY | Facility: CLINIC | Age: 62
End: 2022-12-14
Payer: MEDICARE

## 2022-12-14 ENCOUNTER — TELEPHONE (OUTPATIENT)
Dept: CARDIOLOGY | Facility: CLINIC | Age: 62
End: 2022-12-14
Payer: MEDICARE

## 2022-12-14 NOTE — TELEPHONE ENCOUNTER
----- Message from Susi Hutchinson CMA sent at 12/14/2022  8:12 AM CST -----  Regarding: Please  Contact: Pt [phone]  Pt returning Dr. Dowell's call. Pt states the phone was disconnected. Please call.     Thank you

## 2022-12-14 NOTE — TELEPHONE ENCOUNTER
Stress test shows no reversible ischemia and normal overall left ventricular systolic function.  Pt is medically stable for hip surgery.

## 2022-12-14 NOTE — TELEPHONE ENCOUNTER
Called patient discussed MPI  and SPN  Low risk for surgery may proceed  Sent copy of MPI with details of SPN to PCP though she may switch to [facility] PCP for SPN follow up  Also informed patient SPN must be followed as per report  Will ask nurse to send paperwork to clear for surgery  RTC as directed

## 2022-12-20 ENCOUNTER — TELEPHONE (OUTPATIENT)
Dept: CARDIOLOGY | Facility: CLINIC | Age: 62
End: 2022-12-20
Payer: MEDICARE

## 2022-12-20 NOTE — TELEPHONE ENCOUNTER
----- Message from Mary Ulrich MA sent at 12/20/2022  1:20 PM CST -----  Contact: [phone]  Lisset from [facility] is calling requesting a copy of stress test and echo for pt. Lisset can be reached at [phone].

## 2022-12-20 NOTE — TELEPHONE ENCOUNTER
----- Message from Sydney Tabor sent at 12/20/2022  1:42 PM CST -----  Regarding: Fax EKG  Contact: Cady @ [phone]  Message is for Lisset Lazar from [facility] is calling to let you know that page 3 or EKG did not come thru, asking if you can refax to [phone]

## 2023-02-03 ENCOUNTER — OFFICE VISIT (OUTPATIENT)
Dept: PSYCHIATRY | Facility: CLINIC | Age: 63
End: 2023-02-03
Payer: COMMERCIAL

## 2023-02-03 VITALS
SYSTOLIC BLOOD PRESSURE: 159 MMHG | WEIGHT: 219.56 LBS | HEART RATE: 101 BPM | DIASTOLIC BLOOD PRESSURE: 73 MMHG | BODY MASS INDEX: 31.96 KG/M2

## 2023-02-03 DIAGNOSIS — F41.1 GAD (GENERALIZED ANXIETY DISORDER): Primary | ICD-10-CM

## 2023-02-03 DIAGNOSIS — F32.A DEPRESSION, UNSPECIFIED DEPRESSION TYPE: ICD-10-CM

## 2023-02-03 DIAGNOSIS — G47.00 INSOMNIA DISORDER, WITH NON-SLEEP DISORDER MENTAL COMORBIDITY: ICD-10-CM

## 2023-02-03 PROCEDURE — 1160F RVW MEDS BY RX/DR IN RCRD: CPT | Mod: CPTII,S$GLB,, | Performed by: NURSE PRACTITIONER

## 2023-02-03 PROCEDURE — 1159F MED LIST DOCD IN RCRD: CPT | Mod: CPTII,S$GLB,, | Performed by: NURSE PRACTITIONER

## 2023-02-03 PROCEDURE — 3078F PR MOST RECENT DIASTOLIC BLOOD PRESSURE < 80 MM HG: ICD-10-PCS | Mod: CPTII,S$GLB,, | Performed by: NURSE PRACTITIONER

## 2023-02-03 PROCEDURE — 3077F SYST BP >= 140 MM HG: CPT | Mod: CPTII,S$GLB,, | Performed by: NURSE PRACTITIONER

## 2023-02-03 PROCEDURE — 1159F PR MEDICATION LIST DOCUMENTED IN MEDICAL RECORD: ICD-10-PCS | Mod: CPTII,S$GLB,, | Performed by: NURSE PRACTITIONER

## 2023-02-03 PROCEDURE — 99999 PR PBB SHADOW E&M-EST. PATIENT-LVL IV: CPT | Mod: PBBFAC,,, | Performed by: NURSE PRACTITIONER

## 2023-02-03 PROCEDURE — 3008F PR BODY MASS INDEX (BMI) DOCUMENTED: ICD-10-PCS | Mod: CPTII,S$GLB,, | Performed by: NURSE PRACTITIONER

## 2023-02-03 PROCEDURE — 99999 PR PBB SHADOW E&M-EST. PATIENT-LVL IV: ICD-10-PCS | Mod: PBBFAC,,, | Performed by: NURSE PRACTITIONER

## 2023-02-03 PROCEDURE — 1160F PR REVIEW ALL MEDS BY PRESCRIBER/CLIN PHARMACIST DOCUMENTED: ICD-10-PCS | Mod: CPTII,S$GLB,, | Performed by: NURSE PRACTITIONER

## 2023-02-03 PROCEDURE — 99214 OFFICE O/P EST MOD 30 MIN: CPT | Mod: S$GLB,,, | Performed by: NURSE PRACTITIONER

## 2023-02-03 PROCEDURE — 3008F BODY MASS INDEX DOCD: CPT | Mod: CPTII,S$GLB,, | Performed by: NURSE PRACTITIONER

## 2023-02-03 PROCEDURE — 3077F PR MOST RECENT SYSTOLIC BLOOD PRESSURE >= 140 MM HG: ICD-10-PCS | Mod: CPTII,S$GLB,, | Performed by: NURSE PRACTITIONER

## 2023-02-03 PROCEDURE — 3078F DIAST BP <80 MM HG: CPT | Mod: CPTII,S$GLB,, | Performed by: NURSE PRACTITIONER

## 2023-02-03 PROCEDURE — 99214 PR OFFICE/OUTPT VISIT, EST, LEVL IV, 30-39 MIN: ICD-10-PCS | Mod: S$GLB,,, | Performed by: NURSE PRACTITIONER

## 2023-02-03 RX ORDER — HYDROXYZINE PAMOATE 25 MG/1
25 CAPSULE ORAL 3 TIMES DAILY PRN
Qty: 270 CAPSULE | Refills: 3 | Status: SHIPPED | OUTPATIENT
Start: 2023-02-03

## 2023-02-03 RX ORDER — VENLAFAXINE HYDROCHLORIDE 150 MG/1
150 CAPSULE, EXTENDED RELEASE ORAL DAILY
Qty: 90 CAPSULE | Refills: 3 | Status: SHIPPED | OUTPATIENT
Start: 2023-02-03 | End: 2024-01-23

## 2023-02-03 RX ORDER — ZALEPLON 5 MG/1
5 CAPSULE ORAL NIGHTLY PRN
Qty: 30 CAPSULE | Refills: 5 | Status: SHIPPED | OUTPATIENT
Start: 2023-02-03 | End: 2023-02-14

## 2023-02-03 NOTE — PROGRESS NOTES
Outpatient Psychiatry Follow-Up Visit (MD/NP)    2/3/2023    Clinical Status of Patient:  Outpatient (Ambulatory)    Chief Complaint:  Jennifer Brady is a 62 y.o. female who presents today for follow-up of depression and anxiety.  Met with patient.      Last Visit: was  1/20/22. Chart and PMPreviewed.     Interval History and Content of Current Session:  Current Psychiatric Medications as of last visit.  Continue Effexor  mg po daily for depression, anxiety, and mood  Continue Topamax 100 mg po BID (for migraines) per Neurology  Continue Trazodone 200 - 300 mg po qhs PRN insomnia  Continue Vistaril 25 mg po TID PRN anxiety    Pt presents bright affect and euthymic mood. Discussed feeling kimberly since hip surgery. Reports improved mood and anxiety with Effexor. Denies side effects. Reports that she continues to have trouble falling asleep even with Trazodone and Vistaril. Will discontinue and try Zaleplon. Reports hx of adverse rx with Seroquel. Pt uses CPAP for sleep apnea.   Denies SI/HI/AVH.    Psychotherapy:  Target symptoms: depression, anxiety , grief  Why chosen therapy is appropriate versus another modality: relevant to diagnosis  Outcome monitoring methods: self-report, observation  Therapeutic intervention type: supportive psychotherapy  Topics discussed/themes: relationships difficulties, difficulty managing affect in interpersonal relationships, building skills sets for symptom management, symptom recognition  The patient's response to the intervention is accepting. The patient's progress toward treatment goals is fair.   Duration of intervention: 11 minutes.    Review of Systems   PSYCHIATRIC: Pertinant items are noted in the narrative.  CONSTITUTIONAL: No weight gain or loss.   MUSCULOSKELETAL: No pain or stiffness of the joints.  NEUROLOGIC: No weakness, sensory changes, seizures, confusion, memory loss, tremor or other abnormal movements.  ENDOCRINE: No polydipsia or  polyuria.  INTEGUMENTARY: No rashes or lacerations.  EYES: No exophthalmos, jaundice or blindness.  ENT: No dizziness, tinnitus or hearing loss.  RESPIRATORY: No shortness of breath.  CARDIOVASCULAR: No tachycardia or chest pain.  GASTROINTESTINAL: No nausea, vomiting, pain, constipation or diarrhea.  GENITOURINARY: No frequency, dysuria or sexual dysfunction.  HEMATOLOGIC/LYMPHATIC: No excessive bleeding, prolonged or excessive bleeding after dental extraction/injury.  ALLERGIC/IMMUNOLOGIC: No allergic response to materials, foods or animals at this time.    Past Medical, Family and Social History: The patient's past medical, family and social history have been reviewed and updated as appropriate within the electronic medical record - see encounter notes.    Compliance: yes    Side effects: None    Risk Parameters:  Patient reports no suicidal ideation  Patient reports no homicidal ideation  Patient reports no self-injurious behavior  Patient reports no violent behavior    Exam (detailed: at least 9 elements; comprehensive: all 15 elements)   Constitutional  Vitals:  Most recent vital signs, dated greater than 90 days prior to this appointment, were reviewed.   Vitals:    02/03/23 0851   BP: (!) 159/73   Pulse: 101   Weight: 99.6 kg (219 lb 9.3 oz)      General:  unremarkable, age appropriate     Musculoskeletal  Muscle Strength/Tone:  no tremor, no tic   Gait & Station:  non-ataxic     Psychiatric  Speech:  no latency; no press   Mood & Affect:  steady  congruent and appropriate   Thought Process:  normal and logical   Associations:  intact   Thought Content:  normal, no suicidality, no homicidality, delusions, or paranoia   Insight:  intact   Judgement: behavior is adequate to circumstances   Orientation:  grossly intact   Memory: intact for content of interview   Language: grossly intact   Attention Span & Concentration:  able to focus   Fund of Knowledge:  intact and appropriate to age and level of education      Assessment and Diagnosis   Status/Progress: Based on the examination today, the patient's problem(s) is/are adequately but not ideally controlled.  New problems have not been presented today   Co-morbidities and Lack of compliance are not complicating management of the primary condition.  There are no active rule-out diagnoses for this patient at this time.     General Impression:   Diagnoses and all orders for this visit:    LAURA (generalized anxiety disorder)  -     hydrOXYzine pamoate (VISTARIL) 25 MG Cap; Take 1 capsule (25 mg total) by mouth 3 (three) times daily as needed (anxiety).  -     venlafaxine (EFFEXOR-XR) 150 MG Cp24; Take 1 capsule (150 mg total) by mouth once daily.    Depression, unspecified depression type  -     venlafaxine (EFFEXOR-XR) 150 MG Cp24; Take 1 capsule (150 mg total) by mouth once daily.    Insomnia disorder, with non-sleep disorder mental comorbidity  -     zaleplon (SONATA) 5 MG Cap; Take 1 capsule (5 mg total) by mouth nightly as needed (insomnia).      Intervention/Counseling/Treatment Plan   Medication Management: The risks and benefits of medication were discussed with the patient.  Care Coordination: During the visit, care coordination was conducted with  social work.   Stop Trazodone  Zaleplon 5 mg before bed as needed for sleep  Effexor  mg daily  Vistaril 25 mg 3x daily as needed for anxiety  Continue individual psychotherapy with social work    Return to Clinic: 6 months     Risks, benefits, side effects and alternative treatments discussed with patient. Patient agrees with the current plan as documented.  Encouraged Patient to keep future appointments.  Take medications as prescribed and abstain from substance abuse.  Pt to present to ED for thoughts to harm herself or others

## 2023-02-03 NOTE — PATIENT INSTRUCTIONS
Stop Trazodone  Zaleplon 5 mg before bed as needed for sleep  Effexor  mg daily  Vistaril 25 mg 3x daily as needed for anxiety

## 2023-02-13 ENCOUNTER — OFFICE VISIT (OUTPATIENT)
Dept: BARIATRICS | Facility: CLINIC | Age: 63
End: 2023-02-13
Payer: MEDICARE

## 2023-02-13 VITALS
BODY MASS INDEX: 32.76 KG/M2 | HEART RATE: 86 BPM | SYSTOLIC BLOOD PRESSURE: 146 MMHG | HEIGHT: 68 IN | DIASTOLIC BLOOD PRESSURE: 82 MMHG | OXYGEN SATURATION: 99 % | WEIGHT: 216.19 LBS

## 2023-02-13 DIAGNOSIS — Z71.3 ENCOUNTER FOR WEIGHT LOSS COUNSELING: ICD-10-CM

## 2023-02-13 DIAGNOSIS — I10 PRIMARY HYPERTENSION: ICD-10-CM

## 2023-02-13 DIAGNOSIS — E78.00 PURE HYPERCHOLESTEROLEMIA: ICD-10-CM

## 2023-02-13 DIAGNOSIS — G47.33 OSA (OBSTRUCTIVE SLEEP APNEA): ICD-10-CM

## 2023-02-13 DIAGNOSIS — E66.09 CLASS 1 OBESITY DUE TO EXCESS CALORIES WITH SERIOUS COMORBIDITY AND BODY MASS INDEX (BMI) OF 32.0 TO 32.9 IN ADULT: Primary | ICD-10-CM

## 2023-02-13 DIAGNOSIS — I25.10 CORONARY ARTERY DISEASE INVOLVING NATIVE CORONARY ARTERY OF NATIVE HEART WITHOUT ANGINA PECTORIS: ICD-10-CM

## 2023-02-13 PROCEDURE — 1159F MED LIST DOCD IN RCRD: CPT | Mod: CPTII,S$GLB,, | Performed by: STUDENT IN AN ORGANIZED HEALTH CARE EDUCATION/TRAINING PROGRAM

## 2023-02-13 PROCEDURE — 3008F BODY MASS INDEX DOCD: CPT | Mod: CPTII,S$GLB,, | Performed by: STUDENT IN AN ORGANIZED HEALTH CARE EDUCATION/TRAINING PROGRAM

## 2023-02-13 PROCEDURE — 3079F PR MOST RECENT DIASTOLIC BLOOD PRESSURE 80-89 MM HG: ICD-10-PCS | Mod: CPTII,S$GLB,, | Performed by: STUDENT IN AN ORGANIZED HEALTH CARE EDUCATION/TRAINING PROGRAM

## 2023-02-13 PROCEDURE — 99204 OFFICE O/P NEW MOD 45 MIN: CPT | Mod: S$GLB,,, | Performed by: STUDENT IN AN ORGANIZED HEALTH CARE EDUCATION/TRAINING PROGRAM

## 2023-02-13 PROCEDURE — 3008F PR BODY MASS INDEX (BMI) DOCUMENTED: ICD-10-PCS | Mod: CPTII,S$GLB,, | Performed by: STUDENT IN AN ORGANIZED HEALTH CARE EDUCATION/TRAINING PROGRAM

## 2023-02-13 PROCEDURE — 1159F PR MEDICATION LIST DOCUMENTED IN MEDICAL RECORD: ICD-10-PCS | Mod: CPTII,S$GLB,, | Performed by: STUDENT IN AN ORGANIZED HEALTH CARE EDUCATION/TRAINING PROGRAM

## 2023-02-13 PROCEDURE — 3077F SYST BP >= 140 MM HG: CPT | Mod: CPTII,S$GLB,, | Performed by: STUDENT IN AN ORGANIZED HEALTH CARE EDUCATION/TRAINING PROGRAM

## 2023-02-13 PROCEDURE — 99204 PR OFFICE/OUTPT VISIT, NEW, LEVL IV, 45-59 MIN: ICD-10-PCS | Mod: S$GLB,,, | Performed by: STUDENT IN AN ORGANIZED HEALTH CARE EDUCATION/TRAINING PROGRAM

## 2023-02-13 PROCEDURE — 3079F DIAST BP 80-89 MM HG: CPT | Mod: CPTII,S$GLB,, | Performed by: STUDENT IN AN ORGANIZED HEALTH CARE EDUCATION/TRAINING PROGRAM

## 2023-02-13 PROCEDURE — 1160F PR REVIEW ALL MEDS BY PRESCRIBER/CLIN PHARMACIST DOCUMENTED: ICD-10-PCS | Mod: CPTII,S$GLB,, | Performed by: STUDENT IN AN ORGANIZED HEALTH CARE EDUCATION/TRAINING PROGRAM

## 2023-02-13 PROCEDURE — 3077F PR MOST RECENT SYSTOLIC BLOOD PRESSURE >= 140 MM HG: ICD-10-PCS | Mod: CPTII,S$GLB,, | Performed by: STUDENT IN AN ORGANIZED HEALTH CARE EDUCATION/TRAINING PROGRAM

## 2023-02-13 PROCEDURE — 1160F RVW MEDS BY RX/DR IN RCRD: CPT | Mod: CPTII,S$GLB,, | Performed by: STUDENT IN AN ORGANIZED HEALTH CARE EDUCATION/TRAINING PROGRAM

## 2023-02-13 PROCEDURE — 99999 PR PBB SHADOW E&M-EST. PATIENT-LVL IV: CPT | Mod: PBBFAC,,, | Performed by: STUDENT IN AN ORGANIZED HEALTH CARE EDUCATION/TRAINING PROGRAM

## 2023-02-13 PROCEDURE — 99999 PR PBB SHADOW E&M-EST. PATIENT-LVL IV: ICD-10-PCS | Mod: PBBFAC,,, | Performed by: STUDENT IN AN ORGANIZED HEALTH CARE EDUCATION/TRAINING PROGRAM

## 2023-02-13 RX ORDER — SEMAGLUTIDE 1.34 MG/ML
0.5 INJECTION, SOLUTION SUBCUTANEOUS
Qty: 1 PEN | Refills: 2 | Status: SHIPPED | OUTPATIENT
Start: 2023-02-13 | End: 2023-04-03

## 2023-02-14 ENCOUNTER — HOSPITAL ENCOUNTER (OUTPATIENT)
Dept: RADIOLOGY | Facility: OTHER | Age: 63
Discharge: HOME | End: 2023-02-14
Attending: OBSTETRICS & GYNECOLOGY
Payer: MEDICARE

## 2023-02-14 ENCOUNTER — OFFICE VISIT (OUTPATIENT)
Dept: OBSTETRICS AND GYNECOLOGY | Facility: CLINIC | Age: 63
End: 2023-02-14
Attending: OBSTETRICS & GYNECOLOGY
Payer: MEDICARE

## 2023-02-14 VITALS
DIASTOLIC BLOOD PRESSURE: 82 MMHG | SYSTOLIC BLOOD PRESSURE: 136 MMHG | HEIGHT: 68 IN | HEART RATE: 84 BPM | WEIGHT: 219 LBS | BODY MASS INDEX: 33.19 KG/M2

## 2023-02-14 DIAGNOSIS — N95.2 POSTMENOPAUSAL ATROPHIC VAGINITIS: ICD-10-CM

## 2023-02-14 DIAGNOSIS — Z01.419 ENCOUNTER FOR GYNECOLOGICAL EXAMINATION WITHOUT ABNORMAL FINDING: Primary | ICD-10-CM

## 2023-02-14 DIAGNOSIS — Z12.31 ENCOUNTER FOR SCREENING MAMMOGRAM FOR MALIGNANT NEOPLASM OF BREAST: ICD-10-CM

## 2023-02-14 PROCEDURE — 77063 MAMMO DIGITAL SCREENING BILAT WITH TOMO: ICD-10-PCS | Mod: 26,,, | Performed by: RADIOLOGY

## 2023-02-14 PROCEDURE — 99999 PR PBB SHADOW E&M-EST. PATIENT-LVL III: CPT | Mod: PBBFAC,,, | Performed by: OBSTETRICS & GYNECOLOGY

## 2023-02-14 PROCEDURE — 77067 SCR MAMMO BI INCL CAD: CPT | Mod: 26,,, | Performed by: RADIOLOGY

## 2023-02-14 PROCEDURE — 77067 SCR MAMMO BI INCL CAD: CPT | Mod: TC

## 2023-02-14 PROCEDURE — 3079F DIAST BP 80-89 MM HG: CPT | Mod: CPTII,S$GLB,, | Performed by: OBSTETRICS & GYNECOLOGY

## 2023-02-14 PROCEDURE — 77063 BREAST TOMOSYNTHESIS BI: CPT | Mod: 26,,, | Performed by: RADIOLOGY

## 2023-02-14 PROCEDURE — 3075F SYST BP GE 130 - 139MM HG: CPT | Mod: CPTII,S$GLB,, | Performed by: OBSTETRICS & GYNECOLOGY

## 2023-02-14 PROCEDURE — G0101 CA SCREEN;PELVIC/BREAST EXAM: HCPCS | Mod: S$GLB,,, | Performed by: OBSTETRICS & GYNECOLOGY

## 2023-02-14 PROCEDURE — G0101 PR CA SCREEN;PELVIC/BREAST EXAM: ICD-10-PCS | Mod: S$GLB,,, | Performed by: OBSTETRICS & GYNECOLOGY

## 2023-02-14 PROCEDURE — 3075F PR MOST RECENT SYSTOLIC BLOOD PRESS GE 130-139MM HG: ICD-10-PCS | Mod: CPTII,S$GLB,, | Performed by: OBSTETRICS & GYNECOLOGY

## 2023-02-14 PROCEDURE — 3008F BODY MASS INDEX DOCD: CPT | Mod: CPTII,S$GLB,, | Performed by: OBSTETRICS & GYNECOLOGY

## 2023-02-14 PROCEDURE — 99999 PR PBB SHADOW E&M-EST. PATIENT-LVL III: ICD-10-PCS | Mod: PBBFAC,,, | Performed by: OBSTETRICS & GYNECOLOGY

## 2023-02-14 PROCEDURE — 77067 MAMMO DIGITAL SCREENING BILAT WITH TOMO: ICD-10-PCS | Mod: 26,,, | Performed by: RADIOLOGY

## 2023-02-14 PROCEDURE — 3008F PR BODY MASS INDEX (BMI) DOCUMENTED: ICD-10-PCS | Mod: CPTII,S$GLB,, | Performed by: OBSTETRICS & GYNECOLOGY

## 2023-02-14 PROCEDURE — 3079F PR MOST RECENT DIASTOLIC BLOOD PRESSURE 80-89 MM HG: ICD-10-PCS | Mod: CPTII,S$GLB,, | Performed by: OBSTETRICS & GYNECOLOGY

## 2023-02-14 RX ORDER — TRAMADOL HYDROCHLORIDE 50 MG/1
50 TABLET ORAL EVERY 6 HOURS PRN
COMMUNITY
Start: 2022-12-13 | End: 2024-01-23

## 2023-02-14 RX ORDER — ESTRADIOL 0.1 MG/G
1 CREAM VAGINAL
Qty: 42 G | Refills: 4 | Status: SHIPPED | OUTPATIENT
Start: 2023-02-16 | End: 2024-01-23 | Stop reason: SDUPTHER

## 2023-02-14 NOTE — PROGRESS NOTES
Subjective:       Patient ID: Jennifer Brady is a 62 y.o. female.    Chief Complaint:  Gynecologic Exam      History of Present Illness  Gynecologic Exam  Pertinent negatives include no abdominal pain, back pain or headaches.   Jennifer Brady is a 62 y.o. female  here for her annual GYN exam.   She states she had a hip replacement about 7 weeks ago and is feeling much better, has been able to be active again. She also feels better since stopping smoking last July, and feels her overall health has greatly improved with this change.   She is menopausal since age 45.  denies vaginal itching or irritation.  Denies vaginal discharge.  She is not sexually active.   History of abnormal pap: No  Last Pap:  had a hysterectomy  Last MMG: normal-2021-routine follow-up in 12 months  Last Colonoscopy:  NA  denies domestic violence. She does feel safe at home.     Past Medical History:   Diagnosis Date    Acute coronary syndrome     Anxiety     Asthma     Behavioral problem     Borderline personality disorder     Coronary angioplasty status 2018    Coronary artery disease     Depression     Depression     Fatigue     Hx of psychiatric care     Hyperlipidemia     Hypertension     Myocardial infarction 2018    Psychiatric problem     S/P hysterectomy with oophorectomy     Sleep apnea     pt uses CPAP    Syncope and collapse     Therapy     Thyroid disease      Past Surgical History:   Procedure Laterality Date    CORONARY ANGIOPLASTY      HIP REPLACEMENT ARTHROPLASTY      HIP REPLACEMENT ARTHROPLASTY Right     HYSTERECTOMY      INJECTION OF ANESTHETIC AGENT AROUND NERVE Right 2019    Procedure: MEDIAL BRANCH BLOCK RIGHT L3, L4, L5;  Surgeon: Petros Reich MD;  Location: [facility] PAIN MGT;  Service: Pain Management;  Laterality: Right;  NEEDS CONSENT, PT NO LONGER TAKES PLAVIX    INJECTION OF ANESTHETIC AGENT AROUND NERVE Right 2020    Procedure: BLOCK, NERVE RIGHT L3, 4, 5  MEDIAL BRANCH;  Surgeon: Petros Reich MD;  Location: [facility] PAIN MGT;  Service: Pain Management;  Laterality: Right;  NEEDS CONSENT?    RADIOFREQUENCY ABLATION Right 2021    Procedure: RADIOFREQUENCY ABLATION RIGHT L3, 4, 5;  Surgeon: Petros Reich MD;  Location: [facility] PAIN MGT;  Service: Pain Management;  Laterality: Right;  NEEDS CONSENT    TILT TABLE TEST N/A 2019    Procedure: TILT TABLE TEST;  Surgeon: Roman Lala MD;  Location: [facility] EP LAB;  Service: Cardiology;  Laterality: N/A;  seizure/migraine, HUT referred by Dr Perea Neuro, EEG confirmed    TRANSFORAMINAL EPIDURAL INJECTION OF STEROID Right 10/14/2020    Procedure: LUMBAR TRANSFORAMINAL RIGHT L4/L5;  Surgeon: Petros Reich MD;  Location: [facility] PAIN MGT;  Service: Pain Management;  Laterality: Right;  NEEDS CONSENT     Social History     Socioeconomic History    Marital status:    Occupational History     Employer: [employer]     Comment: [employer]   Tobacco Use    Smoking status: Former     Packs/day: 0.25     Types: Cigarettes     Start date: 1986     Quit date: 2022     Years since quittin.5    Smokeless tobacco: Never    Tobacco comments:     quit actual cigarettes a few mo ago, switched to e cigarette   Substance and Sexual Activity    Alcohol use: Yes     Comment: Socially    Drug use: No    Sexual activity: Not Currently     Partners: Male   Other Topics Concern    Financial Status: Employed Yes    Caffeine Use: Moderate Yes    Spirituality: Organized Confucianist Yes    Patient feels they ought to cut down on drinking/drug use No    Patient annoyed by others criticizing their drinking/drug use No    Patient has felt bad or guilty about drinking/drug use No    Patient has had a drink/used drugs as an eye opener in the AM No   Social History Narrative    Patient discusses recent decisions she has made that she is pleased about; has clarified for self and others  "her values and standards.  Though she is aware of difficulty of  struggle, feels new confidence about  results.  She expresses sense of wholeness and independence at this point. Her previously explored painful symptoms are improved (i.e, .diminished - 2or3/10), thought processes are logical and outlook is optimistic, as well as realistic.    Discusses trip she's planned.  intent to reschedule after return in mid-August. She expresses feelings of appreciation     Family History   Problem Relation Age of Onset    Hypertension Paternal Grandfather     Hypertension Paternal Grandmother     Heart attacks under age 50 Maternal Grandmother     Hypertension Maternal Grandmother     Hypertension Maternal Grandfather     Heart attacks under age 50 Father 33    Hypertension Father     Seizures Mother     Heart attack Mother     Hypertension Mother     Stroke Mother     Hypertension Sister     Stroke Sister     Seizures Sister     Breast cancer Neg Hx     Colon cancer Neg Hx     Diabetes Neg Hx     Ovarian cancer Neg Hx     Cancer Neg Hx      OB History          4    Para   3    Term   3            AB   1    Living   3         SAB   1    IAB        Ectopic        Multiple        Live Births   3                 /82   Pulse 84   Ht 5' 8" (1.727 m)   Wt 99.3 kg (219 lb)   BMI 33.30 kg/m²         GYN & OB History    Date of Last Pap: No result found    OB History    Para Term  AB Living   4 3 3   1 3   SAB IAB Ectopic Multiple Live Births   1       3      # Outcome Date GA Lbr Lon/2nd Weight Sex Delivery Anes PTL Lv   4 SAB            3 Term      Vag-Spont   BOO   2 Term      Vag-Spont   BOO   1 Term      Vag-Spont   BOO       Review of Systems  Review of Systems   Constitutional:  Negative for activity change, appetite change, fatigue and unexpected weight change.   HENT: Negative.     Eyes:  Negative for visual disturbance.   Respiratory:  Negative for shortness of breath and wheezing.  "   Cardiovascular:  Negative for chest pain, palpitations and leg swelling.   Gastrointestinal:  Negative for abdominal pain, bloating and blood in stool.   Endocrine: Negative for diabetes and hair loss.   Genitourinary:  Positive for hot flashes. Negative for decreased libido, dyspareunia, postmenopausal bleeding and vaginal dryness.   Musculoskeletal:  Negative for back pain and joint swelling.   Integumentary:  Negative for acne, hair changes and nipple discharge.   Neurological:  Negative for headaches.   Hematological:  Does not bruise/bleed easily.   Psychiatric/Behavioral:  Positive for sleep disturbance. Negative for depression. The patient is not nervous/anxious.    Breast: Negative for mastodynia and nipple discharge        Objective:      Physical Exam:   Constitutional: She is oriented to person, place, and time. She appears well-developed and well-nourished.    HENT:   Head: Normocephalic and atraumatic.    Eyes: Pupils are equal, round, and reactive to light. EOM are normal.     Cardiovascular:  Normal rate and regular rhythm.             Pulmonary/Chest: Effort normal and breath sounds normal.   BREASTS:  no mass, no tenderness, no deformity and no retraction. Right breast exhibits no inverted nipple, no mass, no nipple discharge, no skin change, no tenderness, no bleeding and no swelling. Left breast exhibits no inverted nipple, no mass, no nipple discharge, no skin change, no tenderness, no bleeding and no swelling. Breasts are symmetrical.              Abdominal: Soft. Bowel sounds are normal.     Genitourinary:    Pelvic exam was performed with patient supine.      Genitourinary Comments: PELVIC: Normal external female genitalia without lesions. Normal hair distribution. Adequate perineal body, normal urethral meatus. Vagina Moist and well rugated without lesions or discharge. No significant cystocele or rectocele. Bimanual exam shows uterus and cervix to be surgically absent. Adnexa without  masses or tenderness.  RECTAL: Deferred                 Musculoskeletal: Normal range of motion and moves all extremeties.       Neurological: She is alert and oriented to person, place, and time.    Skin: Skin is warm and dry.    Psychiatric: She has a normal mood and affect.            Assessment:        1. Encounter for gynecological examination without abnormal finding    2. Encounter for screening mammogram for malignant neoplasm of breast    3. Postmenopausal atrophic vaginitis                Plan:      Problem List Items Addressed This Visit    None  Visit Diagnoses       Encounter for gynecological examination without abnormal finding    -  Primary    Encounter for screening mammogram for malignant neoplasm of breast        Relevant Orders    Mammo Digital Screening Bilat w/ [facility]    Postmenopausal atrophic vaginitis        Relevant Medications    estradioL (ESTRACE) 0.01 % (0.1 mg/gram) vaginal cream (Start on 2/16/2023)             Follow up in about 1 year (around 2/14/2024).

## 2023-02-22 ENCOUNTER — PATIENT MESSAGE (OUTPATIENT)
Dept: BARIATRICS | Facility: CLINIC | Age: 63
End: 2023-02-22
Payer: MEDICARE

## 2023-02-28 ENCOUNTER — OFFICE VISIT (OUTPATIENT)
Dept: PODIATRY | Facility: CLINIC | Age: 63
End: 2023-02-28
Payer: MEDICARE

## 2023-02-28 DIAGNOSIS — I73.9 PAD (PERIPHERAL ARTERY DISEASE): ICD-10-CM

## 2023-02-28 DIAGNOSIS — M79.672 PAIN OF LEFT HEEL: Primary | ICD-10-CM

## 2023-02-28 DIAGNOSIS — M72.2 PLANTAR FASCIITIS, LEFT: ICD-10-CM

## 2023-02-28 PROCEDURE — 99203 PR OFFICE/OUTPT VISIT, NEW, LEVL III, 30-44 MIN: ICD-10-PCS | Mod: S$GLB,,, | Performed by: PODIATRIST

## 2023-02-28 PROCEDURE — 99999 PR PBB SHADOW E&M-EST. PATIENT-LVL III: CPT | Mod: PBBFAC,,, | Performed by: PODIATRIST

## 2023-02-28 PROCEDURE — 1159F MED LIST DOCD IN RCRD: CPT | Mod: CPTII,S$GLB,, | Performed by: PODIATRIST

## 2023-02-28 PROCEDURE — 99203 OFFICE O/P NEW LOW 30 MIN: CPT | Mod: S$GLB,,, | Performed by: PODIATRIST

## 2023-02-28 PROCEDURE — 99999 PR PBB SHADOW E&M-EST. PATIENT-LVL III: ICD-10-PCS | Mod: PBBFAC,,, | Performed by: PODIATRIST

## 2023-02-28 PROCEDURE — 1159F PR MEDICATION LIST DOCUMENTED IN MEDICAL RECORD: ICD-10-PCS | Mod: CPTII,S$GLB,, | Performed by: PODIATRIST

## 2023-02-28 RX ORDER — DICLOFENAC SODIUM 10 MG/G
2 GEL TOPICAL 4 TIMES DAILY
Qty: 350 G | Refills: 2 | Status: SHIPPED | OUTPATIENT
Start: 2023-02-28 | End: 2024-03-12

## 2023-02-28 NOTE — PROGRESS NOTES
Subjective:      Patient ID: Jennifer Brady is a 62 y.o. female.    Chief Complaint: No chief complaint on file.    Jennifer Sutherland is a 62 y.o. female who presents to the clinic complaining of heel pain in the left foot, especially standing w/out shoes.  States it has been bothering her for about a month but it is not as bad when she is in a thick pair of shoes such as her Uggs or she massages the area. Jennifer Sutherland rates the pain as 8/10 at its worst not. She denies a history of trauma.  However, had hip replacement about 2 months ago and is doing very well in that regard.    Arvind Juarez MD 9/9/22    Past Medical History:   Diagnosis Date    Acute coronary syndrome     Anxiety     Asthma     Behavioral problem     Borderline personality disorder     Coronary angioplasty status 04/2018    Coronary artery disease     Depression     Depression     Fatigue     Hx of psychiatric care     Hyperlipidemia     Hypertension     Myocardial infarction 05/2018    Psychiatric problem     S/P hysterectomy with oophorectomy     Sleep apnea     pt uses CPAP    Syncope and collapse     Therapy     Thyroid disease       Patient Active Problem List   Diagnosis    Adjustment disorder with mixed anxiety and depressed mood    Depression    Episodic mood disorder    Cluster B personality disorder    HTN (hypertension)    Pure hypercholesterolemia    Anxiety    REN (obstructive sleep apnea)    Intractable chronic migraine without aura and without status migrainosus    Old MI (myocardial infarction)    Coronary artery disease involving native coronary artery of native heart without angina pectoris    Ex-smoker    Smoker    Obesity    Lumbar spondylosis    PAD (peripheral artery disease)    S/P PTCA (percutaneous transluminal coronary angioplasty)    Mild intermittent asthma without complication    Lumbar degenerative disc disease    Syncope and collapse    BMI 31.0-31.9,adult    PVCs (premature ventricular contractions)     Hip pain, chronic, right    Lower extremity weakness    Impaired range of motion of right hip    Pre-operative cardiovascular examination       Objective:      Review of Systems   Constitutional: Positive for malaise/fatigue. Negative for weight gain.   Cardiovascular:  Negative for claudication and leg swelling.   Skin:  Negative for color change, poor wound healing and suspicious lesions.   Musculoskeletal:  Positive for back pain and myalgias. Negative for joint pain, muscle cramps and muscle weakness.   Neurological:  Negative for focal weakness, loss of balance, sensory change and weakness.   Psychiatric/Behavioral:  The patient is not nervous/anxious.    Physical Exam  Vitals reviewed.   Constitutional:       General: She is not in acute distress.     Appearance: She is well-developed. She is obese.   Cardiovascular:      Pulses: Normal pulses.           Dorsalis pedis pulses are 2+ on the left side.   Musculoskeletal:         General: Tenderness present. No swelling or signs of injury.      Right lower leg: No edema.      Left lower leg: No edema.      Left foot: Normal range of motion. Deformity (cavus foot) and prominent metatarsal heads present.        Feet:    Feet:      Left foot:      Skin integrity: Skin integrity normal.      Comments: Equinus B/L ankles with < 90 deg foot to leg noted with knees extended.      MS strength of extrinsics to foot and ankle L + 5/5 in DF/PF/Inv/Ev to resistance with no reproduction of pain in any direction.   TTP with palpable edema central heel extending up to the distal insertion fascia into the calcaneus and medially; no radiating pain.  No pain along the course of the PT tendon L.    Passive ROM of ankle and pedal joints is painless and without crepitation B/L.     Skin:     General: Skin is warm and dry.      Capillary Refill: Capillary refill takes less than 2 seconds.      Findings: No bruising or erythema.   Neurological:      Mental Status: She is alert and  oriented to person, place, and time.      Sensory: No sensory deficit.      Motor: No weakness.      Gait: Gait normal.   Psychiatric:         Mood and Affect: Affect normal. Mood is anxious.         Behavior: Behavior normal. Behavior is cooperative.       Assessment:      Encounter Diagnoses   Name Primary?    Pain of left heel Yes    Plantar fasciitis, left     PAD (peripheral artery disease)         Problem List Items Addressed This Visit          Cardiac/Vascular    PAD (peripheral artery disease)     Other Visit Diagnoses       Pain of left heel    -  Primary    Relevant Orders    HEEL LIFTS FOR HOME USE    Plantar fasciitis, left        Relevant Medications    diclofenac sodium (VOLTAREN) 1 % Gel           Plan:       Diagnoses and all orders for this visit:    Pain of left heel  -     HEEL LIFTS FOR HOME USE    Plantar fasciitis, left  -     diclofenac sodium (VOLTAREN) 1 % Gel; Apply 2 g topically 4 (four) times daily.    PAD (peripheral artery disease)    I counseled the patient on her conditions, their implications and medical management.    - Shoe inspection. We discussed wearing proper shoe gear, daily foot inspections, never walking without protective shoe gear .      Dispensed a pair of Tulis heel cups and as Silipos gel strap for the left arch.    Advised on use of Voltaren gel to the area of CC up to q.i.d. p.r.n.    Follow-up in 4 weeks, sooner p.r.n.    .

## 2023-03-23 ENCOUNTER — OFFICE VISIT (OUTPATIENT)
Dept: PODIATRY | Facility: CLINIC | Age: 63
End: 2023-03-23
Payer: MEDICARE

## 2023-03-23 VITALS
HEART RATE: 79 BPM | DIASTOLIC BLOOD PRESSURE: 73 MMHG | HEIGHT: 68 IN | SYSTOLIC BLOOD PRESSURE: 128 MMHG | WEIGHT: 219 LBS | BODY MASS INDEX: 33.19 KG/M2

## 2023-03-23 DIAGNOSIS — I73.9 PAD (PERIPHERAL ARTERY DISEASE): ICD-10-CM

## 2023-03-23 DIAGNOSIS — M72.2 PLANTAR FASCIITIS, LEFT: ICD-10-CM

## 2023-03-23 DIAGNOSIS — M79.672 PAIN OF LEFT HEEL: Primary | ICD-10-CM

## 2023-03-23 DIAGNOSIS — M76.60 DISTAL ACHILLES TENDINITIS: ICD-10-CM

## 2023-03-23 PROCEDURE — 3074F PR MOST RECENT SYSTOLIC BLOOD PRESSURE < 130 MM HG: ICD-10-PCS | Mod: CPTII,S$GLB,, | Performed by: PODIATRIST

## 2023-03-23 PROCEDURE — 3008F BODY MASS INDEX DOCD: CPT | Mod: CPTII,S$GLB,, | Performed by: PODIATRIST

## 2023-03-23 PROCEDURE — 4010F PR ACE/ARB THEARPY RXD/TAKEN: ICD-10-PCS | Mod: CPTII,S$GLB,, | Performed by: PODIATRIST

## 2023-03-23 PROCEDURE — 1159F PR MEDICATION LIST DOCUMENTED IN MEDICAL RECORD: ICD-10-PCS | Mod: CPTII,S$GLB,, | Performed by: PODIATRIST

## 2023-03-23 PROCEDURE — 99214 OFFICE O/P EST MOD 30 MIN: CPT | Mod: S$GLB,,, | Performed by: PODIATRIST

## 2023-03-23 PROCEDURE — 3078F DIAST BP <80 MM HG: CPT | Mod: CPTII,S$GLB,, | Performed by: PODIATRIST

## 2023-03-23 PROCEDURE — 3074F SYST BP LT 130 MM HG: CPT | Mod: CPTII,S$GLB,, | Performed by: PODIATRIST

## 2023-03-23 PROCEDURE — 4010F ACE/ARB THERAPY RXD/TAKEN: CPT | Mod: CPTII,S$GLB,, | Performed by: PODIATRIST

## 2023-03-23 PROCEDURE — 99214 PR OFFICE/OUTPT VISIT, EST, LEVL IV, 30-39 MIN: ICD-10-PCS | Mod: S$GLB,,, | Performed by: PODIATRIST

## 2023-03-23 PROCEDURE — 3078F PR MOST RECENT DIASTOLIC BLOOD PRESSURE < 80 MM HG: ICD-10-PCS | Mod: CPTII,S$GLB,, | Performed by: PODIATRIST

## 2023-03-23 PROCEDURE — 99999 PR PBB SHADOW E&M-EST. PATIENT-LVL IV: ICD-10-PCS | Mod: PBBFAC,,, | Performed by: PODIATRIST

## 2023-03-23 PROCEDURE — 1159F MED LIST DOCD IN RCRD: CPT | Mod: CPTII,S$GLB,, | Performed by: PODIATRIST

## 2023-03-23 PROCEDURE — 3008F PR BODY MASS INDEX (BMI) DOCUMENTED: ICD-10-PCS | Mod: CPTII,S$GLB,, | Performed by: PODIATRIST

## 2023-03-23 PROCEDURE — 99999 PR PBB SHADOW E&M-EST. PATIENT-LVL IV: CPT | Mod: PBBFAC,,, | Performed by: PODIATRIST

## 2023-03-23 NOTE — PROGRESS NOTES
Subjective:      Patient ID: Jennifer Brady is a 62 y.o. female.    Chief Complaint: No chief complaint on file.    Jennifer Sutherland is a 62 y.o. female who presented new last month c/o heel pain L oot, especially standing w/out shoes x 1 month but not as bad in Uggs or if she massages the area. Had hip replacement  2 months prior & doing very well in that regard.  States still hurts but better w/ a heel. Does not think heel lifts help; given Silopos gelstrap for arch & Tulis heel cups. In [employer]-style sandal today w/out lift. Using Voltaren gel.    Arvind Juarez MD 2/28/23    Past Medical History:   Diagnosis Date    Acute coronary syndrome     Anxiety     Asthma     Behavioral problem     Borderline personality disorder     Coronary angioplasty status 04/2018    Coronary artery disease     Depression     Depression     Fatigue     Hx of psychiatric care     Hyperlipidemia     Hypertension     Myocardial infarction 05/2018    Psychiatric problem     S/P hysterectomy with oophorectomy     Sleep apnea     pt uses CPAP    Syncope and collapse     Therapy     Thyroid disease       Patient Active Problem List   Diagnosis    Adjustment disorder with mixed anxiety and depressed mood    Depression    Episodic mood disorder    Cluster B personality disorder    HTN (hypertension)    Pure hypercholesterolemia    Anxiety    REN (obstructive sleep apnea)    Intractable chronic migraine without aura and without status migrainosus    Old MI (myocardial infarction)    Coronary artery disease involving native coronary artery of native heart without angina pectoris    Ex-smoker    Smoker    Obesity    Lumbar spondylosis    PAD (peripheral artery disease)    S/P PTCA (percutaneous transluminal coronary angioplasty)    Mild intermittent asthma without complication    Lumbar degenerative disc disease    Syncope and collapse    BMI 31.0-31.9,adult    PVCs (premature ventricular contractions)    Hip pain, chronic, right     Lower extremity weakness    Impaired range of motion of right hip    Pre-operative cardiovascular examination       Objective:      Physical Exam  Vitals reviewed.   Constitutional:       Appearance: She is well-developed. She is obese.   Cardiovascular:      Pulses: Normal pulses.           Dorsalis pedis pulses are 2+ on the left side.   Musculoskeletal:         General: Tenderness present. No swelling.      Right lower leg: No edema.      Left lower leg: No edema.      Left foot: Normal range of motion. Deformity (cavus foot) and prominent metatarsal heads present.        Feet:    Feet:      Left foot:      Skin integrity: Skin integrity normal.      Comments: Equinus B/L ankles with < 90 deg foot to leg noted w/ knees extended.      MS strength of extrinsics to foot and ankle L + 5/5 in DF/PF/Inv/Ev to resistance w/ no reproduction of pain in any direction.   TTP w/ palpable edema central heel & distal Achilles extending into calcaneus medially.  No pain along the course of the PT tendon L.    Passive ROM of ankle and pedal joints is painless & w/out crepitation B/L.     Skin:     General: Skin is warm and dry.      Capillary Refill: Capillary refill takes less than 2 seconds.   Neurological:      Mental Status: She is alert and oriented to person, place, and time.      Motor: No weakness.      Gait: Gait normal.   Psychiatric:         Mood and Affect: Affect normal. Mood is not anxious.         Behavior: Behavior normal. Behavior is cooperative.       Assessment:      Encounter Diagnoses   Name Primary?    Pain of left heel Yes    PAD (peripheral artery disease)     Plantar fasciitis, left     Distal Achilles tendinitis       Problem List Items Addressed This Visit          Cardiac/Vascular    PAD (peripheral artery disease)     Other Visit Diagnoses       Pain of left heel    -  Primary    Plantar fasciitis, left        Distal Achilles tendinitis               Plan:       Diagnoses and all orders for this  "visit:    Pain of left heel    PAD (peripheral artery disease)    Plantar fasciitis, left    Distal Achilles tendinitis    I counseled the patient on her conditions, their implications and medical management.    - Shoe inspection. We discussed wearing proper shoe gear, daily foot inspections, never walking without protective shoe gear .      Continue Tulis heel cups in all closed heel shoes & Silipos gel strap L arch @ all times WB .    Dispensed PPT 1/4" heel lift for current sandal (adequate incorporated MLA support in sandal)    Continue Voltaren gel up to q.i.d. p.r.n.    Bring in various shoes for evaluation & modification prn @ next visit prn.    Follow-up in 4 weeks, sooner p.r.n.    .    "

## 2023-04-03 NOTE — PLAN OF CARE
Problem: NPPV/CPAP (Adult)  Goal: Signs and Symptoms of Listed Potential Problems Will be Absent, Minimized or Managed (NPPV/CPAP)  Signs and symptoms of listed potential problems will be absent, minimized or managed by discharge/transition of care (reference NPPV/CPAP (Adult) CPG).  Outcome: Ongoing (interventions implemented as appropriate)  Patient in no apparent distress. Sat's   % on room air. Patient paced on CPAP with settings as documented for night. Patient found off at 0445 and did not want to wear any longer. EKG's done . Will continue to monitor.       03-Apr-2023 09:30 02-Apr-2023 22:38 Term [place] Vaginal Delivery (>/= 37 weeks)

## 2023-04-04 ENCOUNTER — OFFICE VISIT (OUTPATIENT)
Dept: PSYCHIATRY | Facility: CLINIC | Age: 63
End: 2023-04-04
Payer: COMMERCIAL

## 2023-04-04 VITALS
WEIGHT: 211.56 LBS | HEART RATE: 71 BPM | SYSTOLIC BLOOD PRESSURE: 119 MMHG | BODY MASS INDEX: 32.16 KG/M2 | DIASTOLIC BLOOD PRESSURE: 60 MMHG

## 2023-04-04 DIAGNOSIS — G47.00 INSOMNIA DISORDER, WITH NON-SLEEP DISORDER MENTAL COMORBIDITY: Primary | ICD-10-CM

## 2023-04-04 DIAGNOSIS — F41.1 GAD (GENERALIZED ANXIETY DISORDER): ICD-10-CM

## 2023-04-04 DIAGNOSIS — F32.A DEPRESSION, UNSPECIFIED DEPRESSION TYPE: ICD-10-CM

## 2023-04-04 PROCEDURE — 1159F MED LIST DOCD IN RCRD: CPT | Mod: CPTII,S$GLB,, | Performed by: NURSE PRACTITIONER

## 2023-04-04 PROCEDURE — 4010F PR ACE/ARB THEARPY RXD/TAKEN: ICD-10-PCS | Mod: CPTII,S$GLB,, | Performed by: NURSE PRACTITIONER

## 2023-04-04 PROCEDURE — 3008F BODY MASS INDEX DOCD: CPT | Mod: CPTII,S$GLB,, | Performed by: NURSE PRACTITIONER

## 2023-04-04 PROCEDURE — 99214 PR OFFICE/OUTPT VISIT, EST, LEVL IV, 30-39 MIN: ICD-10-PCS | Mod: S$GLB,,, | Performed by: NURSE PRACTITIONER

## 2023-04-04 PROCEDURE — 99999 PR PBB SHADOW E&M-EST. PATIENT-LVL IV: CPT | Mod: PBBFAC,,, | Performed by: NURSE PRACTITIONER

## 2023-04-04 PROCEDURE — 4010F ACE/ARB THERAPY RXD/TAKEN: CPT | Mod: CPTII,S$GLB,, | Performed by: NURSE PRACTITIONER

## 2023-04-04 PROCEDURE — 99999 PR PBB SHADOW E&M-EST. PATIENT-LVL IV: ICD-10-PCS | Mod: PBBFAC,,, | Performed by: NURSE PRACTITIONER

## 2023-04-04 PROCEDURE — 1160F PR REVIEW ALL MEDS BY PRESCRIBER/CLIN PHARMACIST DOCUMENTED: ICD-10-PCS | Mod: CPTII,S$GLB,, | Performed by: NURSE PRACTITIONER

## 2023-04-04 PROCEDURE — 3074F SYST BP LT 130 MM HG: CPT | Mod: CPTII,S$GLB,, | Performed by: NURSE PRACTITIONER

## 2023-04-04 PROCEDURE — 1159F PR MEDICATION LIST DOCUMENTED IN MEDICAL RECORD: ICD-10-PCS | Mod: CPTII,S$GLB,, | Performed by: NURSE PRACTITIONER

## 2023-04-04 PROCEDURE — 3078F DIAST BP <80 MM HG: CPT | Mod: CPTII,S$GLB,, | Performed by: NURSE PRACTITIONER

## 2023-04-04 PROCEDURE — 3008F PR BODY MASS INDEX (BMI) DOCUMENTED: ICD-10-PCS | Mod: CPTII,S$GLB,, | Performed by: NURSE PRACTITIONER

## 2023-04-04 PROCEDURE — 3078F PR MOST RECENT DIASTOLIC BLOOD PRESSURE < 80 MM HG: ICD-10-PCS | Mod: CPTII,S$GLB,, | Performed by: NURSE PRACTITIONER

## 2023-04-04 PROCEDURE — 99214 OFFICE O/P EST MOD 30 MIN: CPT | Mod: S$GLB,,, | Performed by: NURSE PRACTITIONER

## 2023-04-04 PROCEDURE — 1160F RVW MEDS BY RX/DR IN RCRD: CPT | Mod: CPTII,S$GLB,, | Performed by: NURSE PRACTITIONER

## 2023-04-04 PROCEDURE — 3074F PR MOST RECENT SYSTOLIC BLOOD PRESSURE < 130 MM HG: ICD-10-PCS | Mod: CPTII,S$GLB,, | Performed by: NURSE PRACTITIONER

## 2023-04-04 RX ORDER — MIRTAZAPINE 15 MG/1
15 TABLET, FILM COATED ORAL NIGHTLY
Qty: 30 TABLET | Refills: 5 | Status: SHIPPED | OUTPATIENT
Start: 2023-04-04 | End: 2023-04-10

## 2023-04-04 NOTE — PROGRESS NOTES
Outpatient Psychiatry Follow-Up Visit (MD/NP)    4/4/2023    Clinical Status of Patient:  Outpatient (Ambulatory)    Chief Complaint:  Jennifer Brady is a 62 y.o. female who presents today for follow-up of depression and anxiety.  Met with patient.      Last Visit: was 2/03/23 Chart and PMPreviewed.     Interval History and Content of Current Session:  Current Psychiatric Medications as of last visit.  Stop Trazodone  Zaleplon 5 mg before bed as needed for sleep  Effexor  mg daily  Vistaril 25 mg 3x daily as needed for anxiety    Pt presents bright affect and euthymic mood. Discussed insomnia. Reports that Sonata gave her nightmares and did not help with sleep. Will try Remeron. Pt is taking Ozempic so less concerned of side effects for appetite and weight gain.  Reports hx of adverse rx with Seroquel. Pt uses CPAP for sleep apnea.  Reports improved mood and anxiety with Effexor. Denies side effects. Denies SI/HI/AVH.    Psychotherapy:  Target symptoms: depression, anxiety , grief  Why chosen therapy is appropriate versus another modality: relevant to diagnosis  Outcome monitoring methods: self-report, observation  Therapeutic intervention type: supportive psychotherapy  Topics discussed/themes: relationships difficulties, difficulty managing affect in interpersonal relationships, building skills sets for symptom management, symptom recognition  The patient's response to the intervention is accepting. The patient's progress toward treatment goals is fair.   Duration of intervention: 11 minutes.    Review of Systems   PSYCHIATRIC: Pertinant items are noted in the narrative.  CONSTITUTIONAL: No weight gain or loss.   MUSCULOSKELETAL: No pain or stiffness of the joints.  NEUROLOGIC: No weakness, sensory changes, seizures, confusion, memory loss, tremor or other abnormal movements.  ENDOCRINE: No polydipsia or polyuria.  INTEGUMENTARY: No rashes or lacerations.  EYES: No exophthalmos, jaundice or  blindness.  ENT: No dizziness, tinnitus or hearing loss.  RESPIRATORY: No shortness of breath.  CARDIOVASCULAR: No tachycardia or chest pain.  GASTROINTESTINAL: No nausea, vomiting, pain, constipation or diarrhea.  GENITOURINARY: No frequency, dysuria or sexual dysfunction.  HEMATOLOGIC/LYMPHATIC: No excessive bleeding, prolonged or excessive bleeding after dental extraction/injury.  ALLERGIC/IMMUNOLOGIC: No allergic response to materials, foods or animals at this time.    Past Medical, Family and Social History: The patient's past medical, family and social history have been reviewed and updated as appropriate within the electronic medical record - see encounter notes.    Compliance: yes    Side effects: None    Risk Parameters:  Patient reports no suicidal ideation  Patient reports no homicidal ideation  Patient reports no self-injurious behavior  Patient reports no violent behavior    Exam (detailed: at least 9 elements; comprehensive: all 15 elements)   Constitutional  Vitals:  Most recent vital signs, dated greater than 90 days prior to this appointment, were reviewed.   Vitals:    04/04/23 0925   BP: 119/60   Pulse: 71   Weight: 95.9 kg (211 lb 8.5 oz)      General:  unremarkable, age appropriate     Musculoskeletal  Muscle Strength/Tone:  no tremor, no tic   Gait & Station:  non-ataxic     Psychiatric  Speech:  no latency; no press   Mood & Affect:  steady  congruent and appropriate   Thought Process:  normal and logical   Associations:  intact   Thought Content:  normal, no suicidality, no homicidality, delusions, or paranoia   Insight:  intact   Judgement: behavior is adequate to circumstances   Orientation:  grossly intact   Memory: intact for content of interview   Language: grossly intact   Attention Span & Concentration:  able to focus   Fund of Knowledge:  intact and appropriate to age and level of education     Assessment and Diagnosis   Status/Progress: Based on the examination today, the patient's  problem(s) is/are adequately but not ideally controlled.  New problems have not been presented today   Co-morbidities and Lack of compliance are not complicating management of the primary condition.  There are no active rule-out diagnoses for this patient at this time.     General Impression:   Diagnoses and all orders for this visit:    Insomnia disorder, with non-sleep disorder mental comorbidity  -     mirtazapine (REMERON) 15 MG tablet; Take 1 tablet (15 mg total) by mouth every evening.    LAURA (generalized anxiety disorder)    Depression, unspecified depression type      Intervention/Counseling/Treatment Plan   Medication Management: The risks and benefits of medication were discussed with the patient.  Care Coordination: During the visit, care coordination was conducted with  social work.   DC Zaleplon 5 mg (gave me nightmares)  Start REMERON 15 mg before bed for sleep. (Call if you don't like the medicine and I will place you back on Trazodone)  Effexor  mg daily  Vistaril 25 mg 3x daily as needed for anxiety  Continue individual psychotherapy with social work    Return to Clinic: 6 months     Risks, benefits, side effects and alternative treatments discussed with patient. Patient agrees with the current plan as documented.  Encouraged Patient to keep future appointments.  Take medications as prescribed and abstain from substance abuse.  Pt to present to ED for thoughts to harm herself or others

## 2023-04-04 NOTE — PATIENT INSTRUCTIONS
Start REMERON 15 mg before bed for sleep. (Call if you don't like the medicine and I will place you back on Trazodone)  Effexor  mg daily  Vistaril 25 mg 3x daily as needed for anxiety

## 2023-04-08 ENCOUNTER — PATIENT MESSAGE (OUTPATIENT)
Dept: PSYCHIATRY | Facility: CLINIC | Age: 63
End: 2023-04-08
Payer: MEDICARE

## 2023-04-10 RX ORDER — TRAZODONE HYDROCHLORIDE 150 MG/1
TABLET ORAL
Qty: 60 TABLET | Refills: 11 | Status: SHIPPED | OUTPATIENT
Start: 2023-04-10

## 2023-04-17 ENCOUNTER — OFFICE VISIT (OUTPATIENT)
Dept: PULMONOLOGY | Facility: CLINIC | Age: 63
End: 2023-04-17
Payer: MEDICARE

## 2023-04-17 DIAGNOSIS — J45.20 MILD INTERMITTENT ASTHMATIC BRONCHITIS WITHOUT COMPLICATION: ICD-10-CM

## 2023-04-17 DIAGNOSIS — J44.89 ASTHMA WITH COPD: ICD-10-CM

## 2023-04-17 DIAGNOSIS — Z87.891 STOPPED SMOKING WITH GREATER THAN 30 PACK YEAR HISTORY: ICD-10-CM

## 2023-04-17 DIAGNOSIS — R91.1 NODULE OF LEFT LUNG: Primary | ICD-10-CM

## 2023-04-17 PROCEDURE — 4010F PR ACE/ARB THEARPY RXD/TAKEN: ICD-10-PCS | Mod: CPTII,95,, | Performed by: INTERNAL MEDICINE

## 2023-04-17 PROCEDURE — 4010F ACE/ARB THERAPY RXD/TAKEN: CPT | Mod: CPTII,95,, | Performed by: INTERNAL MEDICINE

## 2023-04-17 PROCEDURE — 99203 PR OFFICE/OUTPT VISIT, NEW, LEVL III, 30-44 MIN: ICD-10-PCS | Mod: 95,,, | Performed by: INTERNAL MEDICINE

## 2023-04-17 PROCEDURE — 99203 OFFICE O/P NEW LOW 30 MIN: CPT | Mod: 95,,, | Performed by: INTERNAL MEDICINE

## 2023-04-17 NOTE — PROGRESS NOTES
Initial Outpatient Pulmonary Evaluation       SUBJECTIVE:   The patient location is: Home  The chief complaint leading to consultation is:  Lung nodule  Visit type: Virtual visit with synchronous audio and video  Total time spent with patient 25 min   Each patient to whom he or she provides medical services by telemedicine is:  (1) informed of the relationship between the physician and patient and the respective role of any other health care provider with respect to management of the patient; and (2) notified that he or she may decline to receive medical services by telemedicine and may withdraw from such care at any time.  Total time spent in face to face counseling and coordination of care -  15minutes over 50% of time was used in discussion of prognosis, risks, benefits of treatment, instructions and compliance with regimen .     Chief Complaint   Patient presents with    lung nodule       History of Present Illness:    Patient is a 62 y.o. female presenting for evaluation of left lung nodule.      Patient underwent a hip surgery in 2023 prior to the hip surgery she had a preop chest x-ray that showed that the right lung was not optimally reviewed.      A repeat chest x-ray was done April 2023 and a faint 1 cm left mid lung nodule was seen.      She denies personal history of cancer.      She does have about 35 pack year smoking history.  Quit smoking 2022.       Started 20 years for 10 years then from age 33 to 61 years old     She also she also reports history of wheezing and coughing and usage of Breo inhaler over the last year the CT did not refill.  She started inhalers at the age of 18.  Review of Systems   Constitutional:  Positive for fatigue. Negative for fever and chills.   HENT:  Negative for nosebleeds.    Eyes:  Negative for redness.   Respiratory:  Positive for cough, shortness of breath and use of rescue inhaler. Negative for choking.    Cardiovascular:   Negative for chest pain.   Genitourinary:  Negative for hematuria.   Endocrine:  Negative for cold intolerance.    Musculoskeletal:  Positive for arthralgias.   Gastrointestinal:  Negative for vomiting.   Neurological:  Negative for syncope.   Hematological:  Negative for adenopathy.   Psychiatric/Behavioral:  Negative for confusion.      Review of patient's allergies indicates:   Allergen Reactions    Iodine and iodide containing products        Current Outpatient Medications   Medication Sig Dispense Refill    aspirin (ECOTRIN) 81 MG EC tablet Take 1 tablet (81 mg total) by mouth once daily. 100 tablet 6    atorvastatin (LIPITOR) 40 MG tablet TAKE 1 TABLET(40 MG) BY MOUTH EVERY DAY 90 tablet 3    azelastine (ASTELIN) 137 mcg (0.1 %) nasal spray 1 spray (137 mcg total) by Nasal route 2 (two) times daily. 30 mL 3    CARAFATE 100 mg/mL suspension Take 1 g by mouth 2 (two) times daily as needed.      diclofenac sodium (VOLTAREN) 1 % Gel Apply 2 g topically 4 (four) times daily. 350 g 2    estradioL (ESTRACE) 0.01 % (0.1 mg/gram) vaginal cream Place 1 g vaginally twice a week. 42 g 4    fluticasone propionate (FLONASE) 50 mcg/actuation nasal spray 2 sprays (100 mcg total) by Each Nostril route 2 (two) times daily. 18.2 mL 3    HYDROcodone-acetaminophen (NORCO)  mg per tablet Take by mouth.      hydrOXYzine pamoate (VISTARIL) 25 MG Cap Take 1 capsule (25 mg total) by mouth 3 (three) times daily as needed (anxiety). 270 capsule 3    ipratropium (ATROVENT) 42 mcg (0.06 %) nasal spray 2 sprays by Nasal route 3 (three) times daily as needed for Rhinitis. 15 mL 3    lactulose (CHRONULAC) 10 gram/15 mL solution Take 15 mLs by mouth 2 (two) times daily.      loratadine (CLARITIN) 10 mg tablet Take 10 mg by mouth once daily.      losartan (COZAAR) 25 MG tablet Take 1 tablet (25 mg total) by mouth once daily. 90 tablet 3    methocarbamoL (ROBAXIN) 500 MG Tab Take 500 mg by mouth.      nitroGLYCERIN (NITROSTAT) 0.4 MG SL  tablet Place 0.4 mg under the tongue.      pantoprazole (PROTONIX) 40 MG tablet pantoprazole 40 mg tablet,delayed release      semaglutide (OZEMPIC) 0.25 mg or 0.5 mg(2 mg/1.5 mL) pen injector Inject 0.5 mg into the skin every 7 days. 1.5 mL 2    traMADoL (ULTRAM) 50 mg tablet Take 50 mg by mouth every 6 (six) hours as needed.      traZODone (DESYREL) 150 MG tablet Take 1 -2 tablets by mouth before bed as needed for insomnia 60 tablet 11    venlafaxine (EFFEXOR-XR) 150 MG Cp24 Take 1 capsule (150 mg total) by mouth once daily. 90 capsule 3     Current Facility-Administered Medications   Medication Dose Route Frequency Provider Last Rate Last Admin    triamcinolone acetonide injection 40 mg  40 mg Intra-articular 1 time in Clinic/HOD Gabriel Wong DO           Past Medical History:   Diagnosis Date    Acute coronary syndrome     Anxiety     Asthma     Behavioral problem     Borderline personality disorder     Coronary angioplasty status 04/2018    Coronary artery disease     Depression     Depression     Fatigue     Hx of psychiatric care     Hyperlipidemia     Hypertension     Myocardial infarction 05/2018    Psychiatric problem     S/P hysterectomy with oophorectomy     Sleep apnea     pt uses CPAP    Syncope and collapse     Therapy     Thyroid disease      Past Surgical History:   Procedure Laterality Date    CORONARY ANGIOPLASTY      HIP REPLACEMENT ARTHROPLASTY Right 12/21/2022    HYSTERECTOMY  1999    INJECTION OF ANESTHETIC AGENT AROUND NERVE Right 11/20/2019    Procedure: MEDIAL BRANCH BLOCK RIGHT L3, L4, L5;  Surgeon: Petros Reich MD;  Location: [facility];  Service: Pain Management;  Laterality: Right;  NEEDS CONSENT, PT NO LONGER TAKES PLAVIX    INJECTION OF ANESTHETIC AGENT AROUND NERVE Right 06/17/2020    Procedure: BLOCK, NERVE RIGHT L3, 4, 5 MEDIAL BRANCH;  Surgeon: Petros Reich MD;  Location: [facility];  Service: Pain Management;  Laterality: Right;  NEEDS CONSENT?     RADIOFREQUENCY ABLATION Right 2021    Procedure: RADIOFREQUENCY ABLATION RIGHT L3, 4, 5;  Surgeon: Petros Reich MD;  Location: [facility];  Service: Pain Management;  Laterality: Right;  NEEDS CONSENT    TILT TABLE TEST N/A 2019    Procedure: TILT TABLE TEST;  Surgeon: Roman Lala MD;  Location: [facility];  Service: Cardiology;  Laterality: N/A;  seizure/migraine, HUT referred by Dr Perea Neuro, EEG confirmed    TRANSFORAMINAL EPIDURAL INJECTION OF STEROID Right 10/14/2020    Procedure: LUMBAR TRANSFORAMINAL RIGHT L4/L5;  Surgeon: Petros Reich MD;  Location: [facility];  Service: Pain Management;  Laterality: Right;  NEEDS CONSENT     Family History   Problem Relation Age of Onset    Hypertension Paternal Grandfather     Hypertension Paternal Grandmother     Heart attacks under age 50 Maternal Grandmother     Hypertension Maternal Grandmother     Hypertension Maternal Grandfather     Heart attacks under age 50 Father 33    Hypertension Father     Seizures Mother     Heart attack Mother     Hypertension Mother     Stroke Mother     Hypertension Sister     Stroke Sister     Seizures Sister     Breast cancer Neg Hx     Colon cancer Neg Hx     Diabetes Neg Hx     Ovarian cancer Neg Hx     Cancer Neg Hx      Social History     Tobacco Use    Smoking status: Former     Packs/day: 0.25     Types: Cigarettes     Start date: 1986     Quit date: 2022     Years since quittin.7    Smokeless tobacco: Never    Tobacco comments:     quit actual cigarettes a few mo ago, switched to e cigarette   Substance Use Topics    Alcohol use: Yes     Comment: Socially    Drug use: No          OBJECTIVE:     Vital Signs (Most Recent)   ]  Wt Readings from Last 2 Encounters:   23 99.3 kg (219 lb)   23 99.3 kg (219 lb)         Physical Exam:  Physical Exam   Constitutional: She is oriented to person, place, and time. She appears well-developed and well-nourished.   HENT:   Head:  Normocephalic.   Pulmonary/Chest: No stridor. No respiratory distress.   Neurological: She is alert and oriented to person, place, and time.   Psychiatric: She has a normal mood and affect. Her behavior is normal. Judgment and thought content normal.     Laboratory  Lab Results   Component Value Date    WBC 7.20 12/09/2022    RBC 4.07 12/09/2022    HGB 12.2 12/09/2022    HCT 38.3 12/09/2022    MCV 94 12/09/2022    MCH 30.0 12/09/2022    MCHC 31.9 (L) 12/09/2022    RDW 12.0 12/09/2022     12/09/2022    MPV 8.6 (L) 12/09/2022    GRAN 3.8 12/09/2022    GRAN 52.2 12/09/2022    LYMPH 2.7 12/09/2022    LYMPH 38.1 12/09/2022    MONO 0.4 12/09/2022    MONO 6.1 12/09/2022    EOS 0.2 12/09/2022    BASO 0.05 12/09/2022    EOSINOPHIL 2.6 12/09/2022    BASOPHIL 0.7 12/09/2022       BMP  Lab Results   Component Value Date     12/09/2022    K 4.0 12/09/2022     12/09/2022    CO2 22 (L) 12/09/2022    BUN 9 12/09/2022    CREATININE 0.8 12/09/2022    CALCIUM 9.4 12/09/2022    ANIONGAP 12 12/09/2022    ESTGFRAFRICA >60.0 03/02/2022    EGFRNONAA >60.0 03/02/2022    AST 21 03/02/2022    ALT 22 03/02/2022    PROT 7.3 03/02/2022       Lab Results   Component Value Date    BNP <10 07/15/2021    BNP 24 04/04/2018       Lab Results   Component Value Date    TSH 0.494 03/02/2022       No results found for: SEDRATE    No results found for: CRP    No results found for: IGE    No results found for: ASPERGILLUS  No results found for: AFUMIGATUSCL     No results found for: ACE    Diagnostic Results:  I have personally reviewed today the following studies :    CXR 4/12/2023       The patient read the report to me over the phone and the report stated that there is a faint 1 cm left mid lung nodule.    CXR 12/2022       CLINICAL HISTORY:  Essential (primary) hypertension     TECHNIQUE:  PA and lateral views of the chest were performed.     COMPARISON:  07/15/2021     FINDINGS:  Overlying material at the right chest which could  obscure subtle abnormality.  Mild prominence of the cardiac silhouette.  No confluent opacity or lobar consolidation.  No pleural effusion or gross pneumothorax.  No acute osseous abnormality.       ASSESSMENT/PLAN:     Nodule of left lung  -     CT Chest Without Contrast; Future; Expected date: 04/17/2023    Mild intermittent asthmatic bronchitis without complication  -     IgE; Future; Expected date: 04/17/2023  -     Spirometry with/without bronchodilator; Future    Asthma with COPD    Stopped smoking with greater than 30 pack year history      Encouraged patient to continue smoking cessation.      Currently asymptomatic not using albuterol.  Not on Breo.      Check spirometry with bronchodilator and IgE.      Further define left mid lung opacity with a CT scan of the chest.      High-risk patient with 30 pack year smoking history.      Follow up in about 2 weeks (around 5/1/2023).    This note was prepared using voice recognition system and is likely to have sound alike errors that may have been overlooked even after proof reading.  Please call me with any questions    Discussed diagnosis, its evaluation, treatment and usual course. All questions answered.    Thank you for the courtesy of participating in the care of this patient    Alba Hartmann MD

## 2023-04-28 ENCOUNTER — OFFICE VISIT (OUTPATIENT)
Dept: PULMONOLOGY | Facility: CLINIC | Age: 63
End: 2023-04-28
Payer: MEDICARE

## 2023-04-28 VITALS
HEART RATE: 103 BPM | HEIGHT: 68 IN | DIASTOLIC BLOOD PRESSURE: 82 MMHG | WEIGHT: 211.88 LBS | OXYGEN SATURATION: 95 % | SYSTOLIC BLOOD PRESSURE: 130 MMHG | BODY MASS INDEX: 32.11 KG/M2

## 2023-04-28 DIAGNOSIS — G47.33 OSA (OBSTRUCTIVE SLEEP APNEA): ICD-10-CM

## 2023-04-28 DIAGNOSIS — R91.1 LUNG NODULE: ICD-10-CM

## 2023-04-28 DIAGNOSIS — F17.201 TOBACCO ABUSE, IN REMISSION: ICD-10-CM

## 2023-04-28 NOTE — PROGRESS NOTES
[facility]    SUBJECTIVE:     Chief Complaint: Lung nodule    History of Present Illness:  Jennifer Brady is a 62 y.o. female who presents for evaluation for lung nodule. Initial CXR was obtained 2/2 preop for hip replacement. There a small 1cm nodule was identified in the left mid zone. Follow up CXR April 2023 shows the same left mid zone nodule. She scheudled a phone appt with Dr. Koehler in [place] as she was unaware he was based in [place]. He ordered a CT chest, IgE, and PFTs for her to obtain at an [facility] facility. She made an appt here as she enjoys face to face visits. She denies any issues at this time, including respiratory issues. Does had REN and wears a CPAP. Currently having issues with her CPAP mask and is awaiting a new mask. Denies any SOB, CURRY, cough, sputum production, hemoptysis, or wheezing. She is attempting to lose weight intentionally.     Other substances: Social drinker.   Inhalers: Denies  Travel: November 2022 [place]  Incarceration/homelessness: Denies  Occupational/environmental exposures: CPAP recall   Pets/hobbies: Denies  Recent illness: Denies  B-Symptoms: night sweats  Autoimmune symptoms/features: Joint stiffness that improves throughout the day, dry eyes/mouth  Intubation Hx: Denies  Family Hx: mother-MI/CVA, Father-MI/CVA      Review of patient's allergies indicates:   Allergen Reactions    Iodine and iodide containing products        Past Medical History:   Diagnosis Date    Acute coronary syndrome     Anxiety     Asthma     Behavioral problem     Borderline personality disorder     Coronary angioplasty status 04/2018    Coronary artery disease     Depression     Depression     Fatigue     Hx of psychiatric care     Hyperlipidemia     Hypertension     Myocardial infarction 05/2018    Psychiatric problem     S/P hysterectomy with oophorectomy     Sleep apnea     pt uses CPAP    Syncope and collapse     Therapy     Thyroid disease      Past  Surgical History:   Procedure Laterality Date    CORONARY ANGIOPLASTY      HIP REPLACEMENT ARTHROPLASTY Right 12/21/2022    HYSTERECTOMY  1999    INJECTION OF ANESTHETIC AGENT AROUND NERVE Right 11/20/2019    Procedure: MEDIAL BRANCH BLOCK RIGHT L3, L4, L5;  Surgeon: Petros Reich MD;  Location: [facility];  Service: Pain Management;  Laterality: Right;  NEEDS CONSENT, PT NO LONGER TAKES PLAVIX    INJECTION OF ANESTHETIC AGENT AROUND NERVE Right 06/17/2020    Procedure: BLOCK, NERVE RIGHT L3, 4, 5 MEDIAL BRANCH;  Surgeon: Petros Reich MD;  Location: [facility];  Service: Pain Management;  Laterality: Right;  NEEDS CONSENT?    RADIOFREQUENCY ABLATION Right 05/12/2021    Procedure: RADIOFREQUENCY ABLATION RIGHT L3, 4, 5;  Surgeon: Petros Reich MD;  Location: [facility];  Service: Pain Management;  Laterality: Right;  NEEDS CONSENT    TILT TABLE TEST N/A 03/22/2019    Procedure: TILT TABLE TEST;  Surgeon: Roman Lala MD;  Location: [facility] EP LAB;  Service: Cardiology;  Laterality: N/A;  seizure/migraine, HUT referred by Dr Perea Neuro, EEG confirmed    TRANSFORAMINAL EPIDURAL INJECTION OF STEROID Right 10/14/2020    Procedure: LUMBAR TRANSFORAMINAL RIGHT L4/L5;  Surgeon: Petros Reich MD;  Location: [facility];  Service: Pain Management;  Laterality: Right;  NEEDS CONSENT     Family History   Problem Relation Age of Onset    Hypertension Paternal Grandfather     Hypertension Paternal Grandmother     Heart attacks under age 50 Maternal Grandmother     Hypertension Maternal Grandmother     Hypertension Maternal Grandfather     Heart attacks under age 50 Father 33    Hypertension Father     Seizures Mother     Heart attack Mother     Hypertension Mother     Stroke Mother     Hypertension Sister     Stroke Sister     Seizures Sister     Breast cancer Neg Hx     Colon cancer Neg Hx     Diabetes Neg Hx     Ovarian cancer Neg Hx     Cancer Neg Hx      Social History     Socioeconomic  History    Marital status:    Occupational History     Employer: [employer]     Comment: [employer]   Tobacco Use    Smoking status: Former     Packs/day: 0.25     Types: Cigarettes     Start date: 1986     Quit date: 2022     Years since quittin.7    Smokeless tobacco: Never    Tobacco comments:     quit actual cigarettes a few mo ago, switched to e cigarette   Substance and Sexual Activity    Alcohol use: Yes     Comment: Socially    Drug use: No    Sexual activity: Not Currently     Partners: Male   Other Topics Concern    Financial Status: Employed Yes    Caffeine Use: Moderate Yes    Spirituality: Organized Yarsani Yes    Patient feels they ought to cut down on drinking/drug use No    Patient annoyed by others criticizing their drinking/drug use No    Patient has felt bad or guilty about drinking/drug use No    Patient has had a drink/used drugs as an eye opener in the AM No   Social History Narrative    Patient discusses recent decisions she has made that she is pleased about; has clarified for self and others her values and standards.  Though she is aware of difficulty of  struggle, feels new confidence about  results.  She expresses sense of wholeness and independence at this point. Her previously explored painful symptoms are improved (i.e, .diminished - 2or3/10), thought processes are logical and outlook is optimistic, as well as realistic.    Discusses trip she's planned.  intent to reschedule after return in mid-August. She expresses feelings of appreciation       Review of Systems:  Review of Systems   Constitutional:  Negative for chills, diaphoresis, fever, malaise/fatigue and weight loss.        Hot flashes.    HENT:  Negative for congestion, sinus pain and sore throat.    Eyes:  Negative for blurred vision and double vision.   Respiratory:  Negative for cough, hemoptysis, sputum production, shortness of breath and wheezing.   "  Cardiovascular:  Negative for chest pain, palpitations, orthopnea, claudication, leg swelling and PND.   Gastrointestinal:  Negative for abdominal pain, constipation, diarrhea, heartburn, nausea and vomiting.   Musculoskeletal:  Negative for back pain, falls, joint pain and neck pain.   Skin:  Negative for itching and rash.   Neurological:  Negative for dizziness, weakness and headaches.   All other systems reviewed and are negative.      OBJECTIVE:     Vital Signs  Vitals:    04/28/23 1459   BP: 130/82   BP Location: Left arm   Patient Position: Sitting   BP Method: Medium (Manual)   Pulse: 103   SpO2: 95%   Weight: 96.1 kg (211 lb 13.8 oz)   Height: 5' 8" (1.727 m)     Body mass index is 32.21 kg/m².    Physical Exam  Vitals and nursing note reviewed.   Constitutional:       General: She is not in acute distress.     Appearance: Normal appearance. She is normal weight. She is not ill-appearing, toxic-appearing or diaphoretic.   HENT:      Head: Normocephalic and atraumatic.      Right Ear: External ear normal.      Left Ear: External ear normal.      Nose: Nose normal.      Mouth/Throat:      Mouth: Mucous membranes are moist.      Pharynx: Oropharynx is clear.   Eyes:      General: No scleral icterus.     Extraocular Movements: Extraocular movements intact.      Conjunctiva/sclera: Conjunctivae normal.   Cardiovascular:      Rate and Rhythm: Normal rate and regular rhythm.      Pulses: Normal pulses.      Heart sounds: Normal heart sounds.   Pulmonary:      Effort: Pulmonary effort is normal.      Breath sounds: Normal breath sounds.   Abdominal:      General: Abdomen is flat. Bowel sounds are normal.      Palpations: Abdomen is soft.   Musculoskeletal:         General: Normal range of motion.      Cervical back: Normal range of motion.      Right lower leg: No edema.      Left lower leg: No edema.   Skin:     General: Skin is warm and dry.   Neurological:      General: No focal deficit present.      Mental " Status: She is alert and oriented to person, place, and time.   Psychiatric:         Mood and Affect: Mood normal.         Behavior: Behavior normal.         Thought Content: Thought content normal.         Judgment: Judgment normal.       Laboratory:  CBC  Lab Results   Component Value Date    WBC 7.20 12/09/2022    HGB 12.2 12/09/2022    HCT 38.3 12/09/2022     12/09/2022    MCV 94 12/09/2022    RDW 12.0 12/09/2022     BMP  Lab Results   Component Value Date     12/09/2022    K 4.0 12/09/2022     12/09/2022    CO2 22 (L) 12/09/2022    BUN 9 12/09/2022    CREATININE 0.8 12/09/2022    GLU 95 12/09/2022    CALCIUM 9.4 12/09/2022    MG 2.1 01/14/2019    PHOS 3.5 01/14/2019     LFTs  Lab Results   Component Value Date    PROT 7.3 03/02/2022    ALBUMIN 3.7 03/02/2022    BILITOT 0.6 03/02/2022    AST 21 03/02/2022    ALKPHOS 133 03/02/2022    ALT 22 03/02/2022    GGT 40 10/31/2019     Chest Imaging, My Impression:   4/2023 CXR- 1cm nodule in left mid zone      Diagnostic Results:  ECG: Reviewed  X-Ray: Reviewed  Echo: Reviewed    ASSESSMENT/PLAN:     Problem Noted   Lung Nodule 4/28/2023    -~1cm left mid zone nodule seen on CXR.  -Denies any sxs that could be related to the nodule.  -CT chest ordered to better evaluate.  -PFTs ordered  -Will follow up CT chest.        Tobacco Abuse, in Remission 4/28/2023    -Quit July 2022  -Will need to begin obtaining LDCT chest for lung cancer screening based on her smoking history of >30 years.        Yayo (Obstructive Sleep Apnea) 7/11/2017    -Compliant with CPAP  -Awaiting new mask         Problem List Items Addressed This Visit          Pulmonary    Lung nodule    Overview     -~1cm left mid zone nodule seen on CXR.  -Denies any sxs that could be related to the nodule.  -CT chest ordered to better evaluate.  -PFTs ordered  -Will follow up CT chest.               Other    YAYO (obstructive sleep apnea)    Overview     -Compliant with CPAP  -Awaiting new mask            Tobacco abuse, in remission    Overview     -Quit July 2022  -Will need to begin obtaining LDCT chest for lung cancer screening based on her smoking history of >30 years.               Delmis Nettles MD  [facility] Pulmonary & Critical Care Fellow

## 2023-05-02 ENCOUNTER — OFFICE VISIT (OUTPATIENT)
Dept: PODIATRY | Facility: CLINIC | Age: 63
End: 2023-05-02
Payer: MEDICARE

## 2023-05-02 VITALS — HEART RATE: 103 BPM | BODY MASS INDEX: 31.98 KG/M2 | HEIGHT: 68 IN | WEIGHT: 211 LBS

## 2023-05-02 DIAGNOSIS — I73.9 PAD (PERIPHERAL ARTERY DISEASE): ICD-10-CM

## 2023-05-02 DIAGNOSIS — R29.898 WEAKNESS OF LOWER EXTREMITY, UNSPECIFIED LATERALITY: ICD-10-CM

## 2023-05-02 DIAGNOSIS — M76.60 DISTAL ACHILLES TENDINITIS: Primary | ICD-10-CM

## 2023-05-02 PROCEDURE — 4010F PR ACE/ARB THEARPY RXD/TAKEN: ICD-10-PCS | Mod: CPTII,S$GLB,, | Performed by: PODIATRIST

## 2023-05-02 PROCEDURE — 1159F PR MEDICATION LIST DOCUMENTED IN MEDICAL RECORD: ICD-10-PCS | Mod: CPTII,S$GLB,, | Performed by: PODIATRIST

## 2023-05-02 PROCEDURE — 3008F BODY MASS INDEX DOCD: CPT | Mod: CPTII,S$GLB,, | Performed by: PODIATRIST

## 2023-05-02 PROCEDURE — 99999 PR PBB SHADOW E&M-EST. PATIENT-LVL III: ICD-10-PCS | Mod: PBBFAC,,, | Performed by: PODIATRIST

## 2023-05-02 PROCEDURE — 99213 OFFICE O/P EST LOW 20 MIN: CPT | Mod: S$GLB,,, | Performed by: PODIATRIST

## 2023-05-02 PROCEDURE — 3008F PR BODY MASS INDEX (BMI) DOCUMENTED: ICD-10-PCS | Mod: CPTII,S$GLB,, | Performed by: PODIATRIST

## 2023-05-02 PROCEDURE — 4010F ACE/ARB THERAPY RXD/TAKEN: CPT | Mod: CPTII,S$GLB,, | Performed by: PODIATRIST

## 2023-05-02 PROCEDURE — 1159F MED LIST DOCD IN RCRD: CPT | Mod: CPTII,S$GLB,, | Performed by: PODIATRIST

## 2023-05-02 PROCEDURE — 99999 PR PBB SHADOW E&M-EST. PATIENT-LVL III: CPT | Mod: PBBFAC,,, | Performed by: PODIATRIST

## 2023-05-02 PROCEDURE — 99213 PR OFFICE/OUTPT VISIT, EST, LEVL III, 20-29 MIN: ICD-10-PCS | Mod: S$GLB,,, | Performed by: PODIATRIST

## 2023-05-11 ENCOUNTER — OFFICE VISIT (OUTPATIENT)
Dept: BARIATRICS | Facility: CLINIC | Age: 63
End: 2023-05-11
Payer: MEDICARE

## 2023-05-11 VITALS
HEART RATE: 63 BPM | OXYGEN SATURATION: 98 % | DIASTOLIC BLOOD PRESSURE: 84 MMHG | BODY MASS INDEX: 31.7 KG/M2 | WEIGHT: 209.13 LBS | HEIGHT: 68 IN | SYSTOLIC BLOOD PRESSURE: 126 MMHG

## 2023-05-11 DIAGNOSIS — E78.00 PURE HYPERCHOLESTEROLEMIA: ICD-10-CM

## 2023-05-11 DIAGNOSIS — Z71.3 ENCOUNTER FOR WEIGHT LOSS COUNSELING: ICD-10-CM

## 2023-05-11 DIAGNOSIS — E66.09 CLASS 1 OBESITY DUE TO EXCESS CALORIES WITH SERIOUS COMORBIDITY AND BODY MASS INDEX (BMI) OF 31.0 TO 31.9 IN ADULT: Primary | ICD-10-CM

## 2023-05-11 DIAGNOSIS — I10 PRIMARY HYPERTENSION: ICD-10-CM

## 2023-05-11 DIAGNOSIS — G47.33 OSA (OBSTRUCTIVE SLEEP APNEA): ICD-10-CM

## 2023-05-11 PROCEDURE — 3008F BODY MASS INDEX DOCD: CPT | Mod: CPTII,S$GLB,, | Performed by: STUDENT IN AN ORGANIZED HEALTH CARE EDUCATION/TRAINING PROGRAM

## 2023-05-11 PROCEDURE — 99999 PR PBB SHADOW E&M-EST. PATIENT-LVL IV: CPT | Mod: PBBFAC,,, | Performed by: STUDENT IN AN ORGANIZED HEALTH CARE EDUCATION/TRAINING PROGRAM

## 2023-05-11 PROCEDURE — 1159F PR MEDICATION LIST DOCUMENTED IN MEDICAL RECORD: ICD-10-PCS | Mod: CPTII,S$GLB,, | Performed by: STUDENT IN AN ORGANIZED HEALTH CARE EDUCATION/TRAINING PROGRAM

## 2023-05-11 PROCEDURE — 3074F SYST BP LT 130 MM HG: CPT | Mod: CPTII,S$GLB,, | Performed by: STUDENT IN AN ORGANIZED HEALTH CARE EDUCATION/TRAINING PROGRAM

## 2023-05-11 PROCEDURE — 4010F PR ACE/ARB THEARPY RXD/TAKEN: ICD-10-PCS | Mod: CPTII,S$GLB,, | Performed by: STUDENT IN AN ORGANIZED HEALTH CARE EDUCATION/TRAINING PROGRAM

## 2023-05-11 PROCEDURE — 4010F ACE/ARB THERAPY RXD/TAKEN: CPT | Mod: CPTII,S$GLB,, | Performed by: STUDENT IN AN ORGANIZED HEALTH CARE EDUCATION/TRAINING PROGRAM

## 2023-05-11 PROCEDURE — 3079F PR MOST RECENT DIASTOLIC BLOOD PRESSURE 80-89 MM HG: ICD-10-PCS | Mod: CPTII,S$GLB,, | Performed by: STUDENT IN AN ORGANIZED HEALTH CARE EDUCATION/TRAINING PROGRAM

## 2023-05-11 PROCEDURE — 99213 PR OFFICE/OUTPT VISIT, EST, LEVL III, 20-29 MIN: ICD-10-PCS | Mod: S$GLB,,, | Performed by: STUDENT IN AN ORGANIZED HEALTH CARE EDUCATION/TRAINING PROGRAM

## 2023-05-11 PROCEDURE — 99213 OFFICE O/P EST LOW 20 MIN: CPT | Mod: S$GLB,,, | Performed by: STUDENT IN AN ORGANIZED HEALTH CARE EDUCATION/TRAINING PROGRAM

## 2023-05-11 PROCEDURE — 3079F DIAST BP 80-89 MM HG: CPT | Mod: CPTII,S$GLB,, | Performed by: STUDENT IN AN ORGANIZED HEALTH CARE EDUCATION/TRAINING PROGRAM

## 2023-05-11 PROCEDURE — 1160F PR REVIEW ALL MEDS BY PRESCRIBER/CLIN PHARMACIST DOCUMENTED: ICD-10-PCS | Mod: CPTII,S$GLB,, | Performed by: STUDENT IN AN ORGANIZED HEALTH CARE EDUCATION/TRAINING PROGRAM

## 2023-05-11 PROCEDURE — 1159F MED LIST DOCD IN RCRD: CPT | Mod: CPTII,S$GLB,, | Performed by: STUDENT IN AN ORGANIZED HEALTH CARE EDUCATION/TRAINING PROGRAM

## 2023-05-11 PROCEDURE — 99999 PR PBB SHADOW E&M-EST. PATIENT-LVL IV: ICD-10-PCS | Mod: PBBFAC,,, | Performed by: STUDENT IN AN ORGANIZED HEALTH CARE EDUCATION/TRAINING PROGRAM

## 2023-05-11 PROCEDURE — 1160F RVW MEDS BY RX/DR IN RCRD: CPT | Mod: CPTII,S$GLB,, | Performed by: STUDENT IN AN ORGANIZED HEALTH CARE EDUCATION/TRAINING PROGRAM

## 2023-05-11 PROCEDURE — 3074F PR MOST RECENT SYSTOLIC BLOOD PRESSURE < 130 MM HG: ICD-10-PCS | Mod: CPTII,S$GLB,, | Performed by: STUDENT IN AN ORGANIZED HEALTH CARE EDUCATION/TRAINING PROGRAM

## 2023-05-11 PROCEDURE — 3008F PR BODY MASS INDEX (BMI) DOCUMENTED: ICD-10-PCS | Mod: CPTII,S$GLB,, | Performed by: STUDENT IN AN ORGANIZED HEALTH CARE EDUCATION/TRAINING PROGRAM

## 2023-05-11 NOTE — PROGRESS NOTES
Subjective:       Patient ID: Jennifer Brady is a 62 y.o. female.    Chief Complaint: Follow-up, Obesity, and Weight Check    Patient presents for treatment of obesity.       Co-morbidities  Migraines - was on topiramate, but was discontinued because didn't help with headaches  REN  MI  HTN  HLD    Weight History  Lowest adult weight: 165 lbs  Highest adult weight: 220 lbs     History of Weight Loss Efforts    Current Physical Activity  Hip replacement 7 weeks ago, doing exercises given to her by PT 4x/week  Walking about 30 mintues  Arm and core exercise    Current Eating Habits  Breakfast - skips most days, occassional cup of coffee  Lemonade and sweet tea  First meal around 2pm   Eats again around 5pm  Cereal, grits  Bananas  Turkey necks with white beans and brown rice      Medical Weight Loss  2/13/2023: 216.2 lbs, BMI 32.9, BFP 47.6%, .9 lbs, SMM 62.4 lbs, BMR 1480 kcal  5/11/2023: 209.1 lbs, BMI 31.8, BFP 45.8%, BFM 95.8 lbs, SMM 62.4 lbs, BMR 1481 kcal        Review of Systems   Constitutional:  Negative for chills and fever.   Respiratory:  Negative for shortness of breath.    Cardiovascular:  Negative for chest pain and palpitations.   Gastrointestinal:  Negative for abdominal pain, nausea and vomiting.   Neurological:  Negative for dizziness and light-headedness.   Psychiatric/Behavioral:  The patient is not nervous/anxious.        Objective:       Latest Reference Range & Units 03/02/22 10:23 12/09/22 08:04   WBC 3.90 - 12.70 K/uL 9.53 7.20   RBC 4.00 - 5.40 M/uL 3.97 (L) 4.07   Hemoglobin 12.0 - 16.0 g/dL 12.0 12.2   Hematocrit 37.0 - 48.5 % 38.7 38.3   MCV 82 - 98 fL 98 94   MCH 27.0 - 31.0 pg 30.2 30.0   MCHC 32.0 - 36.0 g/dL 31.0 (L) 31.9 (L)   RDW 11.5 - 14.5 % 12.4 12.0   Platelets 150 - 450 K/uL 397 389   MPV 9.2 - 12.9 fL 8.6 (L) 8.6 (L)   Gran % 38.0 - 73.0 % 46.5 52.2   Lymph % 18.0 - 48.0 % 44.4 38.1   Mono % 4.0 - 15.0 % 5.9 6.1   Eosinophil % 0.0 - 8.0 % 2.6 2.6   Basophil %  0.0 - 1.9 % 0.4 0.7   Immature Granulocytes 0.0 - 0.5 % 0.2 0.3   Gran # (ANC) 1.8 - 7.7 K/uL 4.4 3.8   Lymph # 1.0 - 4.8 K/uL 4.2 2.7   Mono # 0.3 - 1.0 K/uL 0.6 0.4   Eos # 0.0 - 0.5 K/uL 0.3 0.2   Baso # 0.00 - 0.20 K/uL 0.04 0.05   Immature Grans (Abs) 0.00 - 0.04 K/uL 0.02 0.02   nRBC 0 /100 WBC 0 0   Differential Method  Automated Automated   Protime 9.0 - 12.5 sec  10.3   INR 0.8 - 1.2   1.0   aPTT 21.0 - 32.0 sec  28.8   Sodium 136 - 145 mmol/L 141 140   Potassium 3.5 - 5.1 mmol/L 3.9 4.0   Chloride 95 - 110 mmol/L 108 106   CO2 23 - 29 mmol/L 24 22 (L)   Anion Gap 8 - 16 mmol/L 9 12   BUN 8 - 23 mg/dL 15 9   Creatinine 0.5 - 1.4 mg/dL 0.9 0.8   eGFR >60 mL/min/1.73 m^2  >60   eGFR if non African American >60 mL/min/1.73 m^2 >60.0    eGFR if African American >60 mL/min/1.73 m^2 >60.0    Glucose 70 - 110 mg/dL 92 95   Calcium 8.7 - 10.5 mg/dL 9.7 9.4   Alkaline Phosphatase 55 - 135 U/L 133    PROTEIN TOTAL 6.0 - 8.4 g/dL 7.3    Albumin 3.5 - 5.2 g/dL 3.7    BILIRUBIN TOTAL 0.1 - 1.0 mg/dL 0.6    AST 10 - 40 U/L 21    ALT 10 - 44 U/L 22    Cholesterol 120 - 199 mg/dL 157    HDL 40 - 75 mg/dL 48    HDL/Cholesterol Ratio 20.0 - 50.0 % 30.6    LDL Cholesterol External 63.0 - 159.0 mg/dL 86.2    Non-HDL Cholesterol mg/dL 109    Total Cholesterol/HDL Ratio 2.0 - 5.0  3.3    Triglycerides 30 - 150 mg/dL 114    CPK 20 - 180 U/L 239 (H)    TSH 0.400 - 4.000 uIU/mL 0.494    (L): Data is abnormally low  (H): Data is abnormally high    Vitals:    05/11/23 0956   BP: 126/84   Pulse: 63         Physical Exam  Vitals reviewed.   Constitutional:       General: She is not in acute distress.     Appearance: Normal appearance. She is obese. She is not ill-appearing, toxic-appearing or diaphoretic.   HENT:      Head: Normocephalic and atraumatic.   Cardiovascular:      Rate and Rhythm: Normal rate.   Pulmonary:      Effort: Pulmonary effort is normal. No respiratory distress.   Skin:     General: Skin is warm and dry.    Neurological:      Mental Status: She is alert and oriented to person, place, and time.       Assessment:       Problem List Items Addressed This Visit       HTN (hypertension)    Pure hypercholesterolemia    REN (obstructive sleep apnea)    Obesity - Primary     Other Visit Diagnoses       Encounter for weight loss counseling                  Plan:   - Ozempic 1 mg weekly injections    - Log all food and beverage intake with a daily calorie goal of 1500 calories per day    - Moderate intensity aerobic exercise for 30 minuets 3-5x/week

## 2023-05-11 NOTE — PROGRESS NOTES
"Subjective:      Patient ID: Jennifer Brady is a 62 y.o. female.    Chief Complaint: No chief complaint on file.    Jennifer Sutherland is a 62 y.o. female who presented new 2 months ago c/o heel pain L foot, especially standing w/out shoes x 1 month, but not as bad in Uggs or if she massages the area. Had hip replacement  2 months prior & doing very well in that regard. Seen 5-6 wks.ago & still hurt but better w/ a heel; did not think heel lifts help; given Silopos gelstrap for arch & Tulis heel cups. Was advised to continue use though. In [facility]-style sandal had added 1/4" PPT heel lift. Using Voltaren gel. States can wear heels but not flats, even w/ heel lifts. Noticed R arch was blue 2 days ago but no increased pain. Pain level @ worst 6/10.    Arvind Juarez MD 2/28/23    Past Medical History:   Diagnosis Date    Acute coronary syndrome     Anxiety     Asthma     Behavioral problem     Borderline personality disorder     Coronary angioplasty status 04/2018    Coronary artery disease     Depression     Depression     Fatigue     Hx of psychiatric care     Hyperlipidemia     Hypertension     Myocardial infarction 05/2018    Psychiatric problem     S/P hysterectomy with oophorectomy     Sleep apnea     pt uses CPAP    Syncope and collapse     Therapy     Thyroid disease       Patient Active Problem List   Diagnosis    Adjustment disorder with mixed anxiety and depressed mood    Depression    Episodic mood disorder    Cluster B personality disorder    HTN (hypertension)    Pure hypercholesterolemia    Anxiety    REN (obstructive sleep apnea)    Intractable chronic migraine without aura and without status migrainosus    Old MI (myocardial infarction)    Coronary artery disease involving native coronary artery of native heart without angina pectoris    Ex-smoker    Smoker    Obesity    Lumbar spondylosis    PAD (peripheral artery disease)    S/P PTCA (percutaneous transluminal coronary angioplasty)    " Mild intermittent asthma without complication    Lumbar degenerative disc disease    Syncope and collapse    BMI 31.0-31.9,adult    PVCs (premature ventricular contractions)    Hip pain, chronic, right    Lower extremity weakness    Impaired range of motion of right hip    Pre-operative cardiovascular examination    Lung nodule    Tobacco abuse, in remission       Objective:      Physical Exam  Vitals reviewed.   Constitutional:       Appearance: She is well-developed. She is obese.   Cardiovascular:      Pulses: Normal pulses.           Dorsalis pedis pulses are 2+ on the left side.   Musculoskeletal:         General: Tenderness present. No swelling.      Right lower leg: No edema.      Left lower leg: No edema.      Right foot: Normal range of motion.      Left foot: Normal range of motion. Deformity (cavus foot) and prominent metatarsal heads present.        Feet:    Feet:      Left foot:      Skin integrity: Skin integrity normal.      Comments: Equinus B/L ankles with < 90 deg foot to leg noted w/ knees extended.      MS strength of extrinsics to foot and ankle L + 5/5 in DF/PF/Inv/Ev to resistance w/ no reproduction of pain in any direction.   TTP w/ palpable edema central heel & distal Achilles extending into calcaneus medially.  No pain along the course of the PT tendon L.  No TTP nor swelling plantar MLA R.    Passive ROM of ankle and pedal joints is painless & w/out crepitation B/L.     Skin:     General: Skin is warm and dry.      Capillary Refill: Capillary refill takes less than 2 seconds.      Findings: No bruising, ecchymosis or erythema.   Neurological:      Mental Status: She is alert and oriented to person, place, and time.      Motor: No weakness.      Gait: Gait normal.   Psychiatric:         Mood and Affect: Affect normal. Mood is not anxious.         Behavior: Behavior normal. Behavior is cooperative.       Assessment:      Encounter Diagnoses   Name Primary?    Distal Achilles tendinitis Yes  "   PAD (peripheral artery disease)     Weakness of lower extremity, unspecified laterality       Problem List Items Addressed This Visit          Cardiac/Vascular    PAD (peripheral artery disease)       Orthopedic    Lower extremity weakness     Other Visit Diagnoses       Distal Achilles tendinitis    -  Primary           Plan:       Diagnoses and all orders for this visit:    Distal Achilles tendinitis    PAD (peripheral artery disease)    Weakness of lower extremity, unspecified laterality    I counseled the patient on her conditions, their implications and medical management.    - Shoe inspection. We discussed wearing proper shoe gear, daily foot inspections, never walking without protective shoe gear .      Continue Tulis heel cups in all closed heel shoes & Silipos gel strap L arch @ all times WB .    Continue PPT 1/4" heel lift for Birks. Added double PPT arch pads.    Continue Voltaren gel up to q.i.d. p.r.n.  Start 2 Aleve bid.    Follow-up in 4 weeks, sooner p.r.n.        A total of * mins.was spent on chart review, patient visit & documentation.     .      "

## 2023-05-18 ENCOUNTER — TELEPHONE (OUTPATIENT)
Dept: CARDIOLOGY | Facility: CLINIC | Age: 63
End: 2023-05-18
Payer: MEDICARE

## 2023-05-18 ENCOUNTER — HOSPITAL ENCOUNTER (OUTPATIENT)
Dept: RADIOLOGY | Facility: OTHER | Age: 63
Discharge: HOME | End: 2023-05-18
Attending: INTERNAL MEDICINE
Payer: MEDICARE

## 2023-05-18 DIAGNOSIS — R91.1 NODULE OF LEFT LUNG: ICD-10-CM

## 2023-05-18 DIAGNOSIS — Z87.891 STOPPED SMOKING WITH GREATER THAN 30 PACK YEAR HISTORY: ICD-10-CM

## 2023-05-18 PROCEDURE — 71250 CT THORAX DX C-: CPT | Mod: TC

## 2023-05-18 PROCEDURE — 71250 CT CHEST WITHOUT CONTRAST: ICD-10-PCS | Mod: 26,,, | Performed by: RADIOLOGY

## 2023-05-18 PROCEDURE — 71250 CT THORAX DX C-: CPT | Mod: 26,,, | Performed by: RADIOLOGY

## 2023-05-18 NOTE — TELEPHONE ENCOUNTER
Received CAROLINA from [employer].    Faxed CAROLINA to [facility] medical records for processing.  CAROLINA scanned in media.

## 2023-05-21 DIAGNOSIS — R91.1 NODULE OF LEFT LUNG: Primary | ICD-10-CM

## 2023-05-26 ENCOUNTER — OFFICE VISIT (OUTPATIENT)
Dept: PULMONOLOGY | Facility: CLINIC | Age: 63
End: 2023-05-26
Payer: MEDICARE

## 2023-05-26 VITALS
HEART RATE: 86 BPM | OXYGEN SATURATION: 97 % | SYSTOLIC BLOOD PRESSURE: 120 MMHG | DIASTOLIC BLOOD PRESSURE: 82 MMHG | BODY MASS INDEX: 31.91 KG/M2 | WEIGHT: 210.56 LBS | HEIGHT: 68 IN

## 2023-05-26 DIAGNOSIS — R91.1 LUNG NODULE: Primary | ICD-10-CM

## 2023-05-26 DIAGNOSIS — F17.201 TOBACCO ABUSE, IN REMISSION: ICD-10-CM

## 2023-05-26 NOTE — PROGRESS NOTES
[facility]    SUBJECTIVE:     Chief Complaint: Pulmonary nodule    History of Present Illness:  Jennifer Brady is a 62 y.o. female who presents for f/u of pulmonary nodule. Previously seen 4/28/2023 for evaluation of left pulmonary nodule. CT chest obtained 5/18 revealed several micronodules with an incidental 8mm left peripheral nodule. Appears to have some calcium and fat in the nodules. This is not the same nodule seen on CXR which was likely a blood vessel. Discussed with patient that this nodule is concerning and needs further work up. Explained that we had 2 options. She could either obtain a PET scan to assess for metabolic activity or she could get a biospy. Patient stated that she preferred to get the biopsy.       Smoking: Quit July 2022  Other substances: Social drinker.   Inhalers: Denies  Travel: November 2022 [place]  Incarceration/homelessness: Denies  Occupational/environmental exposures: CPAP recall   Pets/hobbies: Denies  Recent illness: Denies  B-Symptoms: night sweats  Autoimmune symptoms/features: Joint stiffness that improves throughout the day, dry eyes/mouth  Intubation Hx: Denies  Family Hx: mother-MI/CVA, Father-MI/CVA         Review of patient's allergies indicates:   Allergen Reactions    Iodine and iodide containing products        Past Medical History:   Diagnosis Date    Acute coronary syndrome     Anxiety     Asthma     Behavioral problem     Borderline personality disorder     Coronary angioplasty status 04/2018    Coronary artery disease     Depression     Depression     Fatigue     Hx of psychiatric care     Hyperlipidemia     Hypertension     Myocardial infarction 05/2018    Psychiatric problem     S/P hysterectomy with oophorectomy     Sleep apnea     pt uses CPAP    Syncope and collapse     Therapy     Thyroid disease      Past Surgical History:   Procedure Laterality Date    CORONARY ANGIOPLASTY      HIP REPLACEMENT ARTHROPLASTY Right 12/21/2022     HYSTERECTOMY  1999    INJECTION OF ANESTHETIC AGENT AROUND NERVE Right 11/20/2019    Procedure: MEDIAL BRANCH BLOCK RIGHT L3, L4, L5;  Surgeon: Petros Reich MD;  Location: [facility], [place], operated by [facility]IN [facility]GT;  Service: Pain Management;  Laterality: Right;  NEEDS CONSENT, PT NO LONGER TAKES PLAVIX    INJECTION OF ANESTHETIC AGENT AROUND NERVE Right 06/17/2020    Procedure: BLOCK, NERVE RIGHT L3, 4, 5 MEDIAL BRANCH;  Surgeon: Petros Reich MD;  Location: [facility], [place], operated by [facility] PAIN [facility]GT;  Service: Pain Management;  Laterality: Right;  NEEDS CONSENT?    RADIOFREQUENCY ABLATION Right 05/12/2021    Procedure: RADIOFREQUENCY ABLATION RIGHT L3, 4, 5;  Surgeon: Petros Reich MD;  Location: [facility], [place], operated by [facility] PAIN [facility]GT;  Service: Pain Management;  Laterality: Right;  NEEDS CONSENT    TILT TABLE TEST N/A 03/22/2019    Procedure: TILT TABLE TEST;  Surgeon: Roman Lala MD;  Location: [facility] EP LAB;  Service: Cardiology;  Laterality: N/A;  seizure/migraine, HUT referred by Dr Brit Merlos, EEG confirmed    TRANSFORAMINAL EPIDURAL INJECTION OF STEROID Right 10/14/2020    Procedure: LUMBAR TRANSFORAMINAL RIGHT L4/L5;  Surgeon: Petros Reich MD;  Location: [facility], [place], operated by [facility]IN [facility]GT;  Service: Pain Management;  Laterality: Right;  NEEDS CONSENT     Family History   Problem Relation Age of Onset    Hypertension Paternal Grandfather     Hypertension Paternal Grandmother     Heart attacks under age 50 Maternal Grandmother     Hypertension Maternal Grandmother     Hypertension Maternal Grandfather     Heart attacks under age 50 Father 33    Hypertension Father     Seizures Mother     Heart attack Mother     Hypertension Mother     Stroke Mother     Hypertension Sister     Stroke Sister     Seizures Sister     Breast cancer Neg Hx     Colon cancer Neg Hx     Diabetes Neg Hx     Ovarian cancer Neg Hx     Cancer Neg Hx      Social History     Socioeconomic History    Marital status:    Occupational History     Employer: [employer]      Comment: [employer]   Tobacco Use    Smoking status: Former     Packs/day: 0.25     Types: Cigarettes     Start date: 1986     Quit date: 2022     Years since quittin.8    Smokeless tobacco: Never    Tobacco comments:     quit actual cigarettes a few mo ago, switched to e cigarette   Substance and Sexual Activity    Alcohol use: Yes     Comment: Socially    Drug use: No    Sexual activity: Not Currently     Partners: Male   Other Topics Concern    Financial Status: Employed Yes    Caffeine Use: Moderate Yes    Spirituality: Organized Hindu Yes    Patient feels they ought to cut down on drinking/drug use No    Patient annoyed by others criticizing their drinking/drug use No    Patient has felt bad or guilty about drinking/drug use No    Patient has had a drink/used drugs as an eye opener in the AM No   Social History Narrative    Patient discusses recent decisions she has made that she is pleased about; has clarified for self and others her values and standards.  Though she is aware of difficulty of  struggle, feels new confidence about  results.  She expresses sense of wholeness and independence at this point. Her previously explored painful symptoms are improved (i.e, .diminished - 2or3/10), thought processes are logical and outlook is optimistic, as well as realistic.    Discusses trip she's planned.  intent to reschedule after return in mid-August. She expresses feelings of appreciation       Review of Systems:  Review of Systems   Constitutional:  Negative for chills, fever, malaise/fatigue and weight loss.   HENT:  Negative for congestion, sinus pain and sore throat.    Eyes:  Negative for blurred vision, double vision, photophobia and pain.   Respiratory:  Negative for cough, sputum production and shortness of breath.    Cardiovascular:  Negative for chest pain, palpitations, claudication and leg swelling.   Genitourinary:  Negative for dysuria, flank pain, frequency and urgency.  "  Musculoskeletal:  Negative for back pain, falls, joint pain and neck pain.   Skin:  Negative for itching and rash.   Neurological:  Negative for dizziness, weakness and headaches.   All other systems reviewed and are negative.    OBJECTIVE:     Vital Signs  Vitals:    05/26/23 1403   BP: 120/82   BP Location: Right arm   Patient Position: Sitting   BP Method: Medium (Manual)   Pulse: 86   SpO2: 97%   Weight: 95.5 kg (210 lb 8.6 oz)   Height: 5' 8" (1.727 m)     Body mass index is 32.01 kg/m².    Physical Exam:  Vitals and nursing note reviewed.   Constitutional:       General: She is not in acute distress.     Appearance: Normal appearance. She is normal weight. She is not ill-appearing, toxic-appearing or diaphoretic.   HENT:      Head: Normocephalic and atraumatic.      Right Ear: External ear normal.      Left Ear: External ear normal.      Nose: Nose normal.      Mouth/Throat:      Mouth: Mucous membranes are moist.      Pharynx: Oropharynx is clear.   Eyes:      General: No scleral icterus.     Extraocular Movements: Extraocular movements intact.      Conjunctiva/sclera: Conjunctivae normal.   Cardiovascular:      Rate and Rhythm: Normal rate and regular rhythm.      Pulses: Normal pulses.      Heart sounds: Normal heart sounds.   Pulmonary:      Effort: Pulmonary effort is normal.      Breath sounds: Normal breath sounds.   Abdominal:      General: Abdomen is flat. Bowel sounds are normal.      Palpations: Abdomen is soft.   Musculoskeletal:         General: Normal range of motion.      Cervical back: Normal range of motion.      Right lower leg: No edema.      Left lower leg: No edema.   Skin:     General: Skin is warm and dry.   Neurological:      General: No focal deficit present.      Mental Status: She is alert and oriented to person, place, and time.   Psychiatric:         Mood and Affect: Mood normal.         Behavior: Behavior normal.         Thought Content: Thought content normal.         Judgment: " Judgment normal.        Laboratory:  CBC  Lab Results   Component Value Date    WBC 9.24 06/01/2023    HGB 12.2 06/01/2023    HCT 38.3 06/01/2023     06/01/2023    MCV 94 06/01/2023    RDW 12.4 06/01/2023     BMP  Lab Results   Component Value Date     12/09/2022    K 4.0 12/09/2022     12/09/2022    CO2 22 (L) 12/09/2022    BUN 9 12/09/2022    CREATININE 0.8 12/09/2022    GLU 95 12/09/2022    CALCIUM 9.4 12/09/2022    MG 2.1 01/14/2019    PHOS 3.5 01/14/2019     LFTs  Lab Results   Component Value Date    PROT 7.3 03/02/2022    ALBUMIN 3.7 03/02/2022    BILITOT 0.6 03/02/2022    AST 21 03/02/2022    ALKPHOS 133 03/02/2022    ALT 22 03/02/2022    GGT 40 10/31/2019         Chest Imaging, My Impression:   5/18/2023 CT chest- several micronodules with an incidental 8mm left peripheral nodule      Diagnostic Results:  CT: Reviewed  X-Ray: Reviewed      ASSESSMENT/PLAN:     Problem Noted   Lung Nodule 4/28/2023    -new incidental nodule noted  -IR consulted for CT guide biposy due to hoe peripheral the nodule is.   -Will follow for results.      Tobacco Abuse, in Remission 4/28/2023    -Quit July 2022     Yayo (Obstructive Sleep Apnea) 7/11/2017    -Compliant with CPAP  -Awaiting new mask       Problem List Items Addressed This Visit          Pulmonary    Lung nodule - Primary    Overview     -new incidental nodule noted  -IR consulted for CT guide biposy due to hoe peripheral the nodule is.   -Will follow for results.             Other    Tobacco abuse, in remission    Overview     -Quit July 2022            Delmis Nettles MD  [employer] [facility] Fellow

## 2023-05-30 ENCOUNTER — TELEPHONE (OUTPATIENT)
Dept: PULMONOLOGY | Facility: CLINIC | Age: 63
End: 2023-05-30
Payer: MEDICARE

## 2023-05-30 DIAGNOSIS — R91.1 LUNG NODULE: Primary | ICD-10-CM

## 2023-05-31 ENCOUNTER — TELEPHONE (OUTPATIENT)
Dept: INTERVENTIONAL RADIOLOGY/VASCULAR | Facility: CLINIC | Age: 63
End: 2023-05-31
Payer: MEDICARE

## 2023-05-31 NOTE — TELEPHONE ENCOUNTER
Spoke to pt on phone, Pt is scheduled on 6/1/2023  for IR VV consult   at [facility]. Pt aware and confirmed, Thanks

## 2023-06-01 ENCOUNTER — OFFICE VISIT (OUTPATIENT)
Dept: INTERVENTIONAL RADIOLOGY/VASCULAR | Facility: CLINIC | Age: 63
End: 2023-06-01
Payer: MEDICARE

## 2023-06-01 DIAGNOSIS — R91.1 LUNG NODULE: Primary | ICD-10-CM

## 2023-06-01 DIAGNOSIS — D49.9 NEOPLASM: ICD-10-CM

## 2023-06-01 PROCEDURE — 1159F PR MEDICATION LIST DOCUMENTED IN MEDICAL RECORD: ICD-10-PCS | Mod: CPTII,95,, | Performed by: PHYSICIAN ASSISTANT

## 2023-06-01 PROCEDURE — 99204 OFFICE O/P NEW MOD 45 MIN: CPT | Mod: 95,,, | Performed by: PHYSICIAN ASSISTANT

## 2023-06-01 PROCEDURE — 1160F RVW MEDS BY RX/DR IN RCRD: CPT | Mod: CPTII,95,, | Performed by: PHYSICIAN ASSISTANT

## 2023-06-01 PROCEDURE — 1160F PR REVIEW ALL MEDS BY PRESCRIBER/CLIN PHARMACIST DOCUMENTED: ICD-10-PCS | Mod: CPTII,95,, | Performed by: PHYSICIAN ASSISTANT

## 2023-06-01 PROCEDURE — 1159F MED LIST DOCD IN RCRD: CPT | Mod: CPTII,95,, | Performed by: PHYSICIAN ASSISTANT

## 2023-06-01 PROCEDURE — 4010F PR ACE/ARB THEARPY RXD/TAKEN: ICD-10-PCS | Mod: CPTII,95,, | Performed by: PHYSICIAN ASSISTANT

## 2023-06-01 PROCEDURE — 99204 PR OFFICE/OUTPT VISIT, NEW, LEVL IV, 45-59 MIN: ICD-10-PCS | Mod: 95,,, | Performed by: PHYSICIAN ASSISTANT

## 2023-06-01 PROCEDURE — 4010F ACE/ARB THERAPY RXD/TAKEN: CPT | Mod: CPTII,95,, | Performed by: PHYSICIAN ASSISTANT

## 2023-06-01 NOTE — PROGRESS NOTES
Subjective     Patient ID: Jennifer Brady is a 62 y.o. female.    Chief Complaint: lung nodule    Virtual visit with patient referred to Interventional Radiology by Dr. Delmis Nettles for evaluation of L lung nodule.  62 y.o. female with significant past medical history of T2DM, REN on CPAP, CAD, h/o >30 pack yr history of smoking (quit July 2022).  CT chest obtained 5/18 revealed several micronodules with an incidental 8mm left peripheral nodule.  She is scheduled for PET scan on 6/8.  Pt reports feeling well.  Pt denies chills, fever, unexpected wt change, CP, SOB, abdominal pain, N/V/D, confusion.      Review of Systems   Constitutional:  Negative for activity change, appetite change, chills, fatigue, fever and unexpected weight change.   Respiratory:  Negative for cough and shortness of breath.    Cardiovascular:  Negative for chest pain and leg swelling.   Gastrointestinal:  Negative for abdominal distention, abdominal pain, constipation, diarrhea, nausea and vomiting.   Genitourinary:  Negative for difficulty urinating and flank pain.   Musculoskeletal:  Negative for back pain and gait problem.   Neurological:  Negative for weakness and memory loss.   Psychiatric/Behavioral:  Negative for confusion and sleep disturbance.         Objective     Physical Exam  Constitutional:       General: She is not in acute distress.     Appearance: She is well-developed. She is not diaphoretic.   HENT:      Head: Normocephalic and atraumatic.   Pulmonary:      Effort: Pulmonary effort is normal. No respiratory distress.   Neurological:      Mental Status: She is alert and oriented to person, place, and time.   Psychiatric:         Behavior: Behavior normal.         Thought Content: Thought content normal.         Judgment: Judgment normal.     CT Chest 5/18/23       Assessment and Plan     1. Lung nodule  Overview:  -~1cm left mid zone nodule seen on CXR.  -Denies any sxs that could be related to the nodule.  -CT  chest ordered to better evaluate.  -PFTs ordered  -Will follow up CT chest.     Orders:  -     CBC Auto Differential; Future; Expected date: 06/01/2023  -     IR Biopsy Lung w/ guidance; Future; Expected date: 06/01/2023    2. Neoplasm  -     Protime-INR; Future; Expected date: 06/01/2023    The patient location is: [place]  The chief complaint leading to consultation is: lung nodule, L    Visit type: audiovisual    Face to Face time with patient: 30  60 minutes of total time spent on the encounter, which includes face to face time and non-face to face time preparing to see the patient (eg, review of tests), Obtaining and/or reviewing separately obtained history, Documenting clinical information in the electronic or other health record, Independently interpreting results (not separately reported) and communicating results to the patient/family/caregiver, or Care coordination (not separately reported).     Each patient to whom he or she provides medical services by telemedicine is:  (1) informed of the relationship between the physician and patient and the respective role of any other health care provider with respect to management of the patient; and (2) notified that he or she may decline to receive medical services by telemedicine and may withdraw from such care at any time.    Notes:      Discussed how the procedure will be performed, risks (including, but not limited to, pain, bleeding, infection, damage to nearby structures, and the need for additional procedures), benefits, possible complications, pre-post procedure expectations, and alternatives.    Discussed given the size of the nodule the possibility of non diagnostic is higher.  The patient voices understanding and all questions have been answered.  The patient agrees to proceed as planned.     -Patient scheduled for 6/21 at 830AM with anesthesia.   -CBC/INR scheduled for today 6/1 in [place].  -Clinic phone number provided.      Ella Painter,  PA-C    No follow-ups on file.

## 2023-06-08 ENCOUNTER — HOSPITAL ENCOUNTER (OUTPATIENT)
Dept: RADIOLOGY | Facility: HOSPITAL | Age: 63
Discharge: HOME | End: 2023-06-08
Attending: INTERNAL MEDICINE
Payer: MEDICARE

## 2023-06-08 DIAGNOSIS — R91.1 NODULE OF LEFT LUNG: ICD-10-CM

## 2023-06-08 LAB — POCT GLUCOSE: 102 MG/DL (ref 70–110)

## 2023-06-08 PROCEDURE — 78815 PET IMAGE W/CT SKULL-THIGH: CPT | Mod: 26,PI,, | Performed by: STUDENT IN AN ORGANIZED HEALTH CARE EDUCATION/TRAINING PROGRAM

## 2023-06-08 PROCEDURE — 78815 PET IMAGE W/CT SKULL-THIGH: CPT | Mod: TC

## 2023-06-08 PROCEDURE — A9698 NON-RAD CONTRAST MATERIALNOC: HCPCS | Performed by: INTERNAL MEDICINE

## 2023-06-08 PROCEDURE — 25500020 PHARM REV CODE 255: Performed by: INTERNAL MEDICINE

## 2023-06-08 PROCEDURE — A9552 F18 FDG: HCPCS

## 2023-06-08 PROCEDURE — 78815 NM PET CT ROUTINE: ICD-10-PCS | Mod: 26,PI,, | Performed by: STUDENT IN AN ORGANIZED HEALTH CARE EDUCATION/TRAINING PROGRAM

## 2023-06-08 RX ADMIN — BARIUM SULFATE 450 ML: 20 SUSPENSION ORAL at 08:06

## 2023-06-12 NOTE — PROGRESS NOTES
I have reviewed the notes, assessments, and/or procedures performed by Dr. Simpson, I concur with her/his documentation of Jennifer Brady.

## 2023-06-13 ENCOUNTER — PATIENT MESSAGE (OUTPATIENT)
Dept: PULMONOLOGY | Facility: CLINIC | Age: 63
End: 2023-06-13
Payer: MEDICARE

## 2023-06-13 DIAGNOSIS — R91.1 LUNG NODULE: Primary | ICD-10-CM

## 2023-06-13 DIAGNOSIS — Z87.891 STOPPED SMOKING WITH GREATER THAN 30 PACK YEAR HISTORY: ICD-10-CM

## 2023-06-19 ENCOUNTER — DOCUMENTATION ONLY (OUTPATIENT)
Dept: PREADMISSION TESTING | Facility: HOSPITAL | Age: 63
End: 2023-06-19
Payer: MEDICARE

## 2023-06-20 ENCOUNTER — PATIENT MESSAGE (OUTPATIENT)
Dept: INTERVENTIONAL RADIOLOGY/VASCULAR | Facility: HOSPITAL | Age: 63
End: 2023-06-20
Payer: MEDICARE

## 2023-06-21 ENCOUNTER — HOSPITAL ENCOUNTER (OUTPATIENT)
Dept: INTERVENTIONAL RADIOLOGY/VASCULAR | Facility: HOSPITAL | Age: 63
Discharge: HOME | End: 2023-06-21
Attending: PHYSICIAN ASSISTANT
Payer: MEDICARE

## 2023-06-21 ENCOUNTER — ANESTHESIA EVENT (OUTPATIENT)
Dept: INTERVENTIONAL RADIOLOGY/VASCULAR | Facility: HOSPITAL | Age: 63
End: 2023-06-21
Payer: MEDICARE

## 2023-06-21 VITALS
DIASTOLIC BLOOD PRESSURE: 66 MMHG | TEMPERATURE: 98 F | RESPIRATION RATE: 16 BRPM | BODY MASS INDEX: 30.31 KG/M2 | WEIGHT: 200 LBS | SYSTOLIC BLOOD PRESSURE: 140 MMHG | OXYGEN SATURATION: 95 % | HEIGHT: 68 IN | HEART RATE: 69 BPM

## 2023-06-21 DIAGNOSIS — R91.1 LUNG NODULE SEEN ON IMAGING STUDY: Primary | ICD-10-CM

## 2023-06-21 DIAGNOSIS — R91.1 LUNG NODULE: ICD-10-CM

## 2023-06-21 PROCEDURE — 25000003 PHARM REV CODE 250: Performed by: NURSE ANESTHETIST, CERTIFIED REGISTERED

## 2023-06-21 PROCEDURE — 27201068 IR CT LIMITED

## 2023-06-21 PROCEDURE — 63600175 PHARM REV CODE 636 W HCPCS: Performed by: NURSE ANESTHETIST, CERTIFIED REGISTERED

## 2023-06-21 PROCEDURE — D9220A PRA ANESTHESIA: ICD-10-PCS | Mod: CRNA,,, | Performed by: NURSE ANESTHETIST, CERTIFIED REGISTERED

## 2023-06-21 PROCEDURE — 32408 IR CT LIMITED: ICD-10-PCS | Mod: 52,LT,, | Performed by: RADIOLOGY

## 2023-06-21 PROCEDURE — 37000009 HC ANESTHESIA EA ADD 15 MINS

## 2023-06-21 PROCEDURE — D9220A PRA ANESTHESIA: Mod: ANES,,, | Performed by: ANESTHESIOLOGY

## 2023-06-21 PROCEDURE — 32408 CORE NDL BX LNG/MED PERQ: CPT | Mod: 52,LT,, | Performed by: RADIOLOGY

## 2023-06-21 PROCEDURE — 32408 CORE NDL BX LNG/MED PERQ: CPT | Performed by: RADIOLOGY

## 2023-06-21 PROCEDURE — 37000008 HC ANESTHESIA 1ST 15 MINUTES

## 2023-06-21 PROCEDURE — D9220A PRA ANESTHESIA: ICD-10-PCS | Mod: ANES,,, | Performed by: ANESTHESIOLOGY

## 2023-06-21 PROCEDURE — D9220A PRA ANESTHESIA: Mod: CRNA,,, | Performed by: NURSE ANESTHETIST, CERTIFIED REGISTERED

## 2023-06-21 RX ORDER — SODIUM CHLORIDE 0.9 % (FLUSH) 0.9 %
3 SYRINGE (ML) INJECTION
Status: DISCONTINUED | OUTPATIENT
Start: 2023-06-21 | End: 2023-06-22 | Stop reason: HOSPADM

## 2023-06-21 RX ORDER — DEXAMETHASONE SODIUM PHOSPHATE 4 MG/ML
INJECTION, SOLUTION INTRA-ARTICULAR; INTRALESIONAL; INTRAMUSCULAR; INTRAVENOUS; SOFT TISSUE
Status: DISCONTINUED | OUTPATIENT
Start: 2023-06-21 | End: 2023-06-21

## 2023-06-21 RX ORDER — LIDOCAINE HYDROCHLORIDE 20 MG/ML
INJECTION, SOLUTION EPIDURAL; INFILTRATION; INTRACAUDAL; PERINEURAL
Status: DISCONTINUED | OUTPATIENT
Start: 2023-06-21 | End: 2023-06-21

## 2023-06-21 RX ORDER — OXYCODONE HYDROCHLORIDE 5 MG/1
5 TABLET ORAL
Status: DISCONTINUED | OUTPATIENT
Start: 2023-06-21 | End: 2023-06-22 | Stop reason: HOSPADM

## 2023-06-21 RX ORDER — LIDOCAINE HYDROCHLORIDE 10 MG/ML
1 INJECTION, SOLUTION EPIDURAL; INFILTRATION; INTRACAUDAL; PERINEURAL ONCE
Status: DISCONTINUED | OUTPATIENT
Start: 2023-06-21 | End: 2023-06-22 | Stop reason: HOSPADM

## 2023-06-21 RX ORDER — FENTANYL CITRATE 50 UG/ML
INJECTION, SOLUTION INTRAMUSCULAR; INTRAVENOUS
Status: DISCONTINUED | OUTPATIENT
Start: 2023-06-21 | End: 2023-06-21

## 2023-06-21 RX ORDER — MIDAZOLAM HYDROCHLORIDE 1 MG/ML
INJECTION, SOLUTION INTRAMUSCULAR; INTRAVENOUS
Status: DISCONTINUED | OUTPATIENT
Start: 2023-06-21 | End: 2023-06-21

## 2023-06-21 RX ORDER — SODIUM CHLORIDE 9 MG/ML
INJECTION, SOLUTION INTRAVENOUS CONTINUOUS
Status: DISCONTINUED | OUTPATIENT
Start: 2023-06-21 | End: 2023-06-22 | Stop reason: HOSPADM

## 2023-06-21 RX ORDER — ROCURONIUM BROMIDE 10 MG/ML
INJECTION, SOLUTION INTRAVENOUS
Status: DISCONTINUED | OUTPATIENT
Start: 2023-06-21 | End: 2023-06-21

## 2023-06-21 RX ORDER — PROPOFOL 10 MG/ML
VIAL (ML) INTRAVENOUS
Status: DISCONTINUED | OUTPATIENT
Start: 2023-06-21 | End: 2023-06-21

## 2023-06-21 RX ORDER — PHENYLEPHRINE HYDROCHLORIDE 10 MG/ML
INJECTION INTRAVENOUS
Status: DISCONTINUED | OUTPATIENT
Start: 2023-06-21 | End: 2023-06-21

## 2023-06-21 RX ORDER — ONDANSETRON 2 MG/ML
INJECTION INTRAMUSCULAR; INTRAVENOUS
Status: DISCONTINUED | OUTPATIENT
Start: 2023-06-21 | End: 2023-06-21

## 2023-06-21 RX ORDER — SUCCINYLCHOLINE CHLORIDE 20 MG/ML
INJECTION INTRAMUSCULAR; INTRAVENOUS
Status: DISCONTINUED | OUTPATIENT
Start: 2023-06-21 | End: 2023-06-21

## 2023-06-21 RX ORDER — FENTANYL CITRATE 50 UG/ML
25 INJECTION, SOLUTION INTRAMUSCULAR; INTRAVENOUS EVERY 5 MIN PRN
Status: DISCONTINUED | OUTPATIENT
Start: 2023-06-21 | End: 2023-06-22 | Stop reason: HOSPADM

## 2023-06-21 RX ORDER — HALOPERIDOL 5 MG/ML
0.5 INJECTION INTRAMUSCULAR EVERY 10 MIN PRN
Status: DISCONTINUED | OUTPATIENT
Start: 2023-06-21 | End: 2023-06-22 | Stop reason: HOSPADM

## 2023-06-21 RX ADMIN — FENTANYL CITRATE 100 MCG: 50 INJECTION, SOLUTION INTRAMUSCULAR; INTRAVENOUS at 08:06

## 2023-06-21 RX ADMIN — SUCCINYLCHOLINE CHLORIDE 180 MG: 20 INJECTION, SOLUTION INTRAMUSCULAR; INTRAVENOUS at 08:06

## 2023-06-21 RX ADMIN — PHENYLEPHRINE HYDROCHLORIDE 100 MCG: 10 INJECTION INTRAVENOUS at 08:06

## 2023-06-21 RX ADMIN — ONDANSETRON 4 MG: 2 INJECTION INTRAMUSCULAR; INTRAVENOUS at 09:06

## 2023-06-21 RX ADMIN — PHENYLEPHRINE HYDROCHLORIDE 100 MCG: 10 INJECTION INTRAVENOUS at 09:06

## 2023-06-21 RX ADMIN — LIDOCAINE HYDROCHLORIDE 100 MG: 20 INJECTION, SOLUTION EPIDURAL; INFILTRATION; INTRACAUDAL; PERINEURAL at 08:06

## 2023-06-21 RX ADMIN — SUGAMMADEX 300 MG: 100 INJECTION, SOLUTION INTRAVENOUS at 09:06

## 2023-06-21 RX ADMIN — MIDAZOLAM HYDROCHLORIDE 2 MG: 1 INJECTION, SOLUTION INTRAMUSCULAR; INTRAVENOUS at 08:06

## 2023-06-21 RX ADMIN — SODIUM CHLORIDE: 0.9 INJECTION, SOLUTION INTRAVENOUS at 08:06

## 2023-06-21 RX ADMIN — ROCURONIUM BROMIDE 70 MG: 10 INJECTION INTRAVENOUS at 08:06

## 2023-06-21 RX ADMIN — PROPOFOL 140 MG: 10 INJECTION, EMULSION INTRAVENOUS at 08:06

## 2023-06-21 RX ADMIN — DEXAMETHASONE SODIUM PHOSPHATE 4 MG: 4 INJECTION, SOLUTION INTRAMUSCULAR; INTRAVENOUS at 09:06

## 2023-06-21 NOTE — H&P
Radiology History & Physical      SUBJECTIVE:     Chief Complaint: L lung nodule    History of Present Illness:  Jennifer Brady is a 62 y.o. female who presents for L lung nodule biopsy (no history of malignancy).    Past Medical History:   Diagnosis Date    Acute coronary syndrome     Anxiety     Asthma     Behavioral problem     Borderline personality disorder     Coronary angioplasty status 04/2018    Coronary artery disease     Depression     Depression     Fatigue     Hx of psychiatric care     Hyperlipidemia     Hypertension     Myocardial infarction 05/2018    Psychiatric problem     S/P hysterectomy with oophorectomy     Sleep apnea     pt uses CPAP    Syncope and collapse     Therapy     Thyroid disease      Past Surgical History:   Procedure Laterality Date    CORONARY ANGIOPLASTY      HIP REPLACEMENT ARTHROPLASTY Right 12/21/2022    HYSTERECTOMY  1999    INJECTION OF ANESTHETIC AGENT AROUND NERVE Right 11/20/2019    Procedure: MEDIAL BRANCH BLOCK RIGHT L3, L4, L5;  Surgeon: Petros Reich MD;  Location: [facility] [place] PAIN MGT;  Service: Pain Management;  Laterality: Right;  NEEDS CONSENT, PT NO LONGER TAKES PLAVIX    INJECTION OF ANESTHETIC AGENT AROUND NERVE Right 06/17/2020    Procedure: BLOCK, NERVE RIGHT L3, 4, 5 MEDIAL BRANCH;  Surgeon: Petros Reich MD;  Location: [facility] [place] [facility]IN [facility]GT;  Service: Pain Management;  Laterality: Right;  NEEDS CONSENT?    RADIOFREQUENCY ABLATION Right 05/12/2021    Procedure: RADIOFREQUENCY ABLATION RIGHT L3, 4, 5;  Surgeon: Petros Reich MD;  Location: [facility] [place] [facility]IN [facility]GT;  Service: Pain Management;  Laterality: Right;  NEEDS CONSENT    TILT TABLE TEST N/A 03/22/2019    Procedure: TILT TABLE TEST;  Surgeon: Roman Lala MD;  Location: [facility] EP LAB;  Service: Cardiology;  Laterality: N/A;  seizure/migraine, HUT referred by Dr Perea Neuro, EEG confirmed    TRANSFORAMINAL EPIDURAL INJECTION OF STEROID Right 10/14/2020    Procedure: LUMBAR TRANSFORAMINAL RIGHT L4/L5;   Surgeon: Petros Reich MD;  Location: [facility] PAIN MGT;  Service: Pain Management;  Laterality: Right;  NEEDS CONSENT       Home Meds:   Prior to Admission medications    Medication Sig Start Date End Date Taking? Authorizing Provider   atorvastatin (LIPITOR) 40 MG tablet TAKE 1 TABLET(40 MG) BY MOUTH EVERY DAY 9/6/22  Yes Alvaro Sanchez MD   azelastine (ASTELIN) 137 mcg (0.1 %) nasal spray 1 spray (137 mcg total) by Nasal route 2 (two) times daily. 10/3/22  Yes Nina Rutherford MD   estradioL (ESTRACE) 0.01 % (0.1 mg/gram) vaginal cream Place 1 g vaginally twice a week. 2/16/23  Yes Erin Louis MD   fluticasone propionate (FLONASE) 50 mcg/actuation nasal spray 2 sprays (100 mcg total) by Each Nostril route 2 (two) times daily. 10/3/22  Yes Nina Rutherford MD   HYDROcodone-acetaminophen (NORCO)  mg per tablet Take by mouth daily as needed. 12/15/21  Yes Historical Provider   hydrOXYzine pamoate (VISTARIL) 25 MG Cap Take 1 capsule (25 mg total) by mouth 3 (three) times daily as needed (anxiety). 2/3/23  Yes Parish Ayala III, JASON   ipratropium (ATROVENT) 42 mcg (0.06 %) nasal spray 2 sprays by Nasal route 3 (three) times daily as needed for Rhinitis. 10/3/22  Yes Nina Rutherford MD   loratadine (CLARITIN) 10 mg tablet Take 10 mg by mouth once daily. 2/22/22  Yes Historical Provider   losartan (COZAAR) 25 MG tablet Take 1 tablet (25 mg total) by mouth once daily.  Patient taking differently: Take 25 mg by mouth every morning. 8/25/21 6/21/23 Yes Alvaro Sanchez MD   methocarbamoL (ROBAXIN) 500 MG Tab Take 500 mg by mouth. 11/10/22  Yes Historical Provider   pantoprazole (PROTONIX) 40 MG tablet pantoprazole 40 mg tablet,delayed release   Yes Historical Provider   semaglutide (OZEMPIC) 1 mg/dose (4 mg/3 mL) Inject 1 mg into the skin every 7 days. for 12 doses  Patient taking differently: Inject 1 mg into the skin every 7 days. TUESDAYS 5/11/23 7/28/23 Yes Mena Adrian MD    traZODone (DESYREL) 150 MG tablet Take 1 -2 tablets by mouth before bed as needed for insomnia 4/10/23  Yes Parish Ayala III, NP   venlafaxine (EFFEXOR-XR) 150 MG Cp24 Take 1 capsule (150 mg total) by mouth once daily. 2/3/23  Yes Parish Ayala III, NP   aspirin (ECOTRIN) 81 MG EC tablet Take 1 tablet (81 mg total) by mouth once daily. 4/6/18 6/19/23  Edwin Reza MD   diclofenac sodium (VOLTAREN) 1 % Gel Apply 2 g topically 4 (four) times daily. 2/28/23   Clarissa Ching DPM   lactulose (CHRONULAC) 10 gram/15 mL solution Take 15 mLs by mouth 2 (two) times daily. 2/12/21   Historical Provider   nitroGLYCERIN (NITROSTAT) 0.4 MG SL tablet Place 0.4 mg under the tongue. 9/9/22   Historical Provider   traMADoL (ULTRAM) 50 mg tablet Take 50 mg by mouth every 6 (six) hours as needed. 12/13/22   Historical Provider   citalopram (CELEXA) 10 MG tablet Take 10 mg by mouth once daily.  7/30/18  Historical Provider   lurasidone (LATUDA) 40 mg Tab tablet Take 1 tablet (40 mg total) by mouth once daily. 10/11/18 4/1/19  Parish Ayala III, NP     Anticoagulants/Antiplatelets: no anticoagulation    Allergies:   Review of patient's allergies indicates:   Allergen Reactions    Iodine and iodide containing products      SHELLFISH     Sedation History:  no adverse reactions    Review of Systems:   Hematological: no known coagulopathies  Respiratory: no shortness of breath  Cardiovascular: no chest pain  Gastrointestinal: no abdominal pain  Genito-Urinary: no dysuria  Musculoskeletal: negative  Neurological: no TIA or stroke symptoms         OBJECTIVE:     Vital Signs (Most Recent)  Temp: 97.9 °F (36.6 °C) (06/21/23 0634)  Pulse: 76 (06/21/23 0634)  Resp: 18 (06/21/23 0634)  BP: 112/60 (06/21/23 0634)  SpO2: 96 % (06/21/23 0634)    Physical Exam:  ASA: per ANESTHESIA  Mallampati: per ANESTHESIA    General: no acute distress  Mental Status: alert and oriented to person, place and time  HEENT: normocephalic,  atraumatic  Chest: unlabored breathing  Heart: regular heart rate  Abdomen: nondistended  Extremity: moves all extremities    Laboratory  Lab Results   Component Value Date    INR 1.0 06/01/2023       Lab Results   Component Value Date    WBC 9.24 06/01/2023    HGB 12.2 06/01/2023    HCT 38.3 06/01/2023    MCV 94 06/01/2023     06/01/2023      Lab Results   Component Value Date    GLU 95 12/09/2022     12/09/2022    K 4.0 12/09/2022     12/09/2022    CO2 22 (L) 12/09/2022    BUN 9 12/09/2022    CREATININE 0.8 12/09/2022    CALCIUM 9.4 12/09/2022    MG 2.1 01/14/2019    ALT 22 03/02/2022    AST 21 03/02/2022    ALBUMIN 3.7 03/02/2022    BILITOT 0.6 03/02/2022       ASSESSMENT/PLAN:     Sedation Plan: per ANESTHESIA    Patient will undergo L lung nodule biopsy (no history of malignancy).    Last Landaverde MD  PGY-4  RADIOLOGY

## 2023-06-21 NOTE — ANESTHESIA PROCEDURE NOTES
Intubation    Date/Time: 6/21/2023 8:37 AM  Performed by: Rayne Magallanes CRNA  Authorized by: Anuj Owusu MD     Intubation:     Induction:  Intravenous    Intubated:  Postinduction    Mask Ventilation:  Easy mask    Attempts:  1    Attempted By:  Student    Method of Intubation:  Video laryngoscopy    Blade:  Buckley 3    Laryngeal View Grade: Grade I - full view of cords      Difficult Airway Encountered?: No      Complications:  None    Airway Device Size:  7.0    Style/Cuff Inflation:  Cuffed (inflated to minimal occlusive pressure)    Tube secured:  22    Secured at:  The lips    Placement Verified By:  Capnometry    Complicating Factors:  None    Findings Post-Intubation:  BS equal bilateral

## 2023-06-21 NOTE — TRANSFER OF CARE
"Anesthesia Transfer of Care Note    Patient: Jennifer Brady    Procedure(s) Performed: * No procedures listed *    Patient location: [facility]    Anesthesia Type: general    Transport from OR: Transported from OR on room air with adequate spontaneous ventilation    Post pain: adequate analgesia    Post assessment: no apparent anesthetic complications    Post vital signs: stable    Level of consciousness: awake and alert    Nausea/Vomiting: no nausea/vomiting    Complications: none    Transfer of care protocol was followed      Last vitals:   Visit Vitals  /64   Pulse 73   Temp 36.5 °C (97.7 °F) (Temporal)   Resp 17   Ht 5' 8" (1.727 m)   Wt 90.7 kg (200 lb)   SpO2 97%   Breastfeeding No   BMI 30.41 kg/m²     "

## 2023-06-21 NOTE — PLAN OF CARE
Pt arrived to 173 for Lung biopsy. Pt oriented to unit and staff. Plan of care reviewed with patient, patient verbalizes understanding. Comfort measures utilized. Pt safely transferred from stretcher to procedural table. Fall risk reviewed with patient, fall risk interventions maintained. Safety strap applied, positioner pillows utilized to minimize pressure points. Blankets applied. Pt prepped and draped utilizing standard sterile technique. Patient placed on continuous monitoring, as required by sedation policy. Timeouts completed utilizing standard universal time-out, per department and facility policy. RN to remain at bedside, continuous monitoring maintained. Pt resting comfortably. Denies pain/discomfort. Will continue to monitor. See flow sheets for monitoring, medication administration, and updates.

## 2023-06-21 NOTE — PLAN OF CARE
Per Dr. Evans, not proceeding with case due to machine malfunction. Patient will recover in DOSC, awaiting recovery orders.

## 2023-06-21 NOTE — PROCEDURES
Radiology Post-Procedure Note    Pre Op Diagnosis: Lung nodule  Post Op Diagnosis: Same    Procedure: Aborted CT-guided lung biopsy    Procedure performed by: Petros Evans MD    Written Informed Consent Obtained: Yes  Specimen Removed: NO  Estimated Blood Loss: None    Findings:   Stable SHAR pulmonary nodule. After administration of lidocaine, the CT scanner broke and the procedure had to be aborted. The pleura was not punctured.     Patient tolerated procedure well.    Will discuss with ordering team, either reschedule biopsy or follow-up CT.     Petros Evans MD  Interventional Radiology  Department of Radiology

## 2023-06-21 NOTE — ANESTHESIA PREPROCEDURE EVALUATION
06/21/2023  Pre-operative evaluation for * No procedures listed *    Jennifer Brady is a 62 y.o. female HTN, REN, CAD (PCI x1 2017, asa only), here for lung nodule biopsy.     Patient Active Problem List   Diagnosis    Adjustment disorder with mixed anxiety and depressed mood    Depression    Episodic mood disorder    Cluster B personality disorder    HTN (hypertension)    Pure hypercholesterolemia    Anxiety    REN (obstructive sleep apnea)    Intractable chronic migraine without aura and without status migrainosus    Old MI (myocardial infarction)    Coronary artery disease involving native coronary artery of native heart without angina pectoris    Ex-smoker    Smoker    Obesity    Lumbar spondylosis    PAD (peripheral artery disease)    S/P PTCA (percutaneous transluminal coronary angioplasty)    Mild intermittent asthma without complication    Lumbar degenerative disc disease    Syncope and collapse    BMI 31.0-31.9,adult    PVCs (premature ventricular contractions)    Hip pain, chronic, right    Lower extremity weakness    Impaired range of motion of right hip    Pre-operative cardiovascular examination    Lung nodule    Tobacco abuse, in remission       Review of patient's allergies indicates:   Allergen Reactions    Iodine and iodide containing products      SHELLFISH       Current Outpatient Medications on File Prior to Encounter   Medication Sig Dispense Refill    atorvastatin (LIPITOR) 40 MG tablet TAKE 1 TABLET(40 MG) BY MOUTH EVERY DAY 90 tablet 3    azelastine (ASTELIN) 137 mcg (0.1 %) nasal spray 1 spray (137 mcg total) by Nasal route 2 (two) times daily. 30 mL 3    estradioL (ESTRACE) 0.01 % (0.1 mg/gram) vaginal cream Place 1 g vaginally twice a week. 42 g 4    fluticasone propionate (FLONASE) 50 mcg/actuation nasal spray 2 sprays (100 mcg total) by  Each Nostril route 2 (two) times daily. 18.2 mL 3    HYDROcodone-acetaminophen (NORCO)  mg per tablet Take by mouth daily as needed.      hydrOXYzine pamoate (VISTARIL) 25 MG Cap Take 1 capsule (25 mg total) by mouth 3 (three) times daily as needed (anxiety). 270 capsule 3    ipratropium (ATROVENT) 42 mcg (0.06 %) nasal spray 2 sprays by Nasal route 3 (three) times daily as needed for Rhinitis. 15 mL 3    loratadine (CLARITIN) 10 mg tablet Take 10 mg by mouth once daily.      losartan (COZAAR) 25 MG tablet Take 1 tablet (25 mg total) by mouth once daily. (Patient taking differently: Take 25 mg by mouth every morning.) 90 tablet 3    methocarbamoL (ROBAXIN) 500 MG Tab Take 500 mg by mouth.      pantoprazole (PROTONIX) 40 MG tablet pantoprazole 40 mg tablet,delayed release      semaglutide (OZEMPIC) 1 mg/dose (4 mg/3 mL) Inject 1 mg into the skin every 7 days. for 12 doses (Patient taking differently: Inject 1 mg into the skin every 7 days. TUESDAYS) 3 mL 2    traZODone (DESYREL) 150 MG tablet Take 1 -2 tablets by mouth before bed as needed for insomnia 60 tablet 11    venlafaxine (EFFEXOR-XR) 150 MG Cp24 Take 1 capsule (150 mg total) by mouth once daily. 90 capsule 3    aspirin (ECOTRIN) 81 MG EC tablet Take 1 tablet (81 mg total) by mouth once daily. 100 tablet 6    diclofenac sodium (VOLTAREN) 1 % Gel Apply 2 g topically 4 (four) times daily. 350 g 2    lactulose (CHRONULAC) 10 gram/15 mL solution Take 15 mLs by mouth 2 (two) times daily.      nitroGLYCERIN (NITROSTAT) 0.4 MG SL tablet Place 0.4 mg under the tongue.      traMADoL (ULTRAM) 50 mg tablet Take 50 mg by mouth every 6 (six) hours as needed.      [DISCONTINUED] citalopram (CELEXA) 10 MG tablet Take 10 mg by mouth once daily.      [DISCONTINUED] lurasidone (LATUDA) 40 mg Tab tablet Take 1 tablet (40 mg total) by mouth once daily. 30 tablet 5     Current Facility-Administered Medications on File Prior to Encounter   Medication Dose  Route Frequency Provider Last Rate Last Admin    triamcinolone acetonide injection 40 mg  40 mg Intra-articular 1 time in Clinic/HOD Gabriel Wong DO           Past Surgical History:   Procedure Laterality Date    CORONARY ANGIOPLASTY      HIP REPLACEMENT ARTHROPLASTY Right 2022    HYSTERECTOMY  1999    INJECTION OF ANESTHETIC AGENT AROUND NERVE Right 2019    Procedure: MEDIAL BRANCH BLOCK RIGHT L3, L4, L5;  Surgeon: Petros Reich MD;  Location: [facility] PAIN MGT;  Service: Pain Management;  Laterality: Right;  NEEDS CONSENT, PT NO LONGER TAKES PLAVIX    INJECTION OF ANESTHETIC AGENT AROUND NERVE Right 2020    Procedure: BLOCK, NERVE RIGHT L3, 4, 5 MEDIAL BRANCH;  Surgeon: Petros Reich MD;  Location: [facility] PAIN MGT;  Service: Pain Management;  Laterality: Right;  NEEDS CONSENT?    RADIOFREQUENCY ABLATION Right 2021    Procedure: RADIOFREQUENCY ABLATION RIGHT L3, 4, 5;  Surgeon: Petros Reich MD;  Location: [facility] PAIN MGT;  Service: Pain Management;  Laterality: Right;  NEEDS CONSENT    TILT TABLE TEST N/A 2019    Procedure: TILT TABLE TEST;  Surgeon: Roman Lala MD;  Location: [facility] EP LAB;  Service: Cardiology;  Laterality: N/A;  seizure/migraine, HUT referred by Dr Perea Neuro, EEG confirmed    TRANSFORAMINAL EPIDURAL INJECTION OF STEROID Right 10/14/2020    Procedure: LUMBAR TRANSFORAMINAL RIGHT L4/L5;  Surgeon: Petros Reich MD;  Location: [facility] PAIN MGT;  Service: Pain Management;  Laterality: Right;  NEEDS CONSENT       Social History     Socioeconomic History    Marital status:    Occupational History     Employer: [employer]     Comment: [employer]   Tobacco Use    Smoking status: Former     Packs/day: 0.25     Types: Cigarettes     Start date: 1986     Quit date: 2022     Years since quittin.9    Smokeless tobacco: Never    Tobacco comments:     quit actual cigarettes a few mo ago,  switched to e cigarette   Substance and Sexual Activity    Alcohol use: Yes     Comment: Socially    Drug use: No    Sexual activity: Not Currently     Partners: Male   Other Topics Concern    Financial Status: Employed Yes    Caffeine Use: Moderate Yes    Spirituality: Organized [employer] Yes    Patient feels they ought to cut down on drinking/drug use No    Patient annoyed by others criticizing their drinking/drug use No    Patient has felt bad or guilty about drinking/drug use No    Patient has had a drink/used drugs as an eye opener in the AM No   Social History Narrative    Patient discusses recent decisions she has made that she is pleased about; has clarified for self and others her values and standards.  Though she is aware of difficulty of  struggle, feels new confidence about  results.  She expresses sense of wholeness and independence at this point. Her previously explored painful symptoms are improved (i.e, .diminished - 2or3/10), thought processes are logical and outlook is optimistic, as well as realistic.    Discusses trip she's planned.  intent to reschedule after return in mid-August. She expresses feelings of appreciation         CBC: No results for input(s): WBC, RBC, HGB, HCT, PLT, MCV, MCH, MCHC in the last 72 hours.    CMP: No results for input(s): NA, K, CL, CO2, BUN, CREATININE, GLU, MG, PHOS, CALCIUM, ALBUMIN, PROT, ALKPHOS, ALT, AST, BILITOT in the last 72 hours.    INR  No results for input(s): PT, INR, PROTIME, APTT in the last 72 hours.        Diagnostic Studies:      EKD Echo:  Results for orders placed or performed in visit on 17   2D Echo w/ Color Flow Doppler   Result Value Ref Range    Right Vent (Diastole) 2.0 0.8 - 2.6    Septum (Diastole) 1.2 0.6 - 1.2    Left Ventricle (Diastole) 4.9 3.5 - 5.5    Posterior Wall (Diastole) 1.1 0.6 - 1.2    Aortic Valve (Diastole) 1.7 1.5 - 2.6    Aortic Root (Diastole) 2.8 1.3 - 3.7    Left Atrium (Diastole) 3.8 2.5 - 4     Septum (Systole)  0.5 - 1.1    Left Ventricle (Systole) 3.2 2.2 - 4    Posteriol Wall (Systole)  0.5 - 1.2    EF + QEF 63 %    Aortic Valve Area  2.5 - 3.5 cm2    Aortic Peak Gradient 10 (A) 0 - 9.9 mm Hg    Aortic Mean Gradient 5 0 - 9.9 mm Hg    Aortic Peak Velocity 1.5 0 - 1.9 m/s    Aortic Pres. Half Time  599 - 999 m/sec    Mitral Valve Area 5 4 - 6 cm2    Mitral Pres. Half Time  30 - 60 m/sec    Mitral Valve E-A Ratio 0.83 0.75 - 999    Mitral Valve E-E prime 7 0 - 7    Pulmonary Artery Pressure 21 0 - 29 mm Hg           Pre-op Assessment    I have reviewed the Patient Summary Reports.    I have reviewed the NPO Status.   I have reviewed the Medications.     Review of Systems  Anesthesia Hx:  History of prior surgery of interest to airway management or planning: Denies Family Hx of Anesthesia complications.   Denies Personal Hx of Anesthesia complications.       Physical Exam  General: Well nourished, Cooperative, Alert and Oriented    Airway:  Mallampati: II / I  Mouth Opening: Normal  TM Distance: Normal  Tongue: Normal  Neck ROM: Normal ROM    Dental:  Intact    Chest/Lungs:  Clear to auscultation, Normal Respiratory Rate    Heart:  Rate: Normal  Rhythm: Regular Rhythm        Anesthesia Plan  Type of Anesthesia, risks & benefits discussed:    Anesthesia Type: Gen ETT  Intra-op Monitoring Plan: Standard ASA Monitors  Post Op Pain Control Plan: multimodal analgesia and IV/PO Opioids PRN  Induction:  IV  Airway Plan: Direct  Informed Consent: Informed consent signed with the Patient and all parties understand the risks and agree with anesthesia plan.  All questions answered.   ASA Score: 3  Day of Surgery Review of History & Physical: H&P Update referred to the surgeon/provider.    Ready For Surgery From Anesthesia Perspective.     .

## 2023-06-21 NOTE — PLAN OF CARE
Patient arrived to room. PIV placed. Admit assessment completed. Plan of care discussed with patient. Nurse call bell within reach. Will monitor

## 2023-06-22 ENCOUNTER — DOCUMENTATION ONLY (OUTPATIENT)
Dept: PREADMISSION TESTING | Facility: HOSPITAL | Age: 63
End: 2023-06-22
Payer: MEDICARE

## 2023-06-22 NOTE — ANESTHESIA POSTPROCEDURE EVALUATION
Anesthesia Post Evaluation    Patient: Jennifer Brady    Procedure(s) Performed: * No procedures listed *    Final Anesthesia Type: general      Patient location during evaluation: PACU  Patient participation: Yes- Able to Participate  Level of consciousness: awake and alert and oriented  Post-procedure vital signs: reviewed and stable  Pain management: adequate  Airway patency: patent  REN mitigation strategies: Intraoperative administration of CPAP, nasopharyngeal airway, or oral appliance during sedation, Multimodal analgesia, Extubation while patient is awake, Verification of full reversal of neuromuscular block and Extubation and recovery carried out in lateral, semiupright, or other nonsupine position  PONV status at discharge: No PONV  Anesthetic complications: no      Cardiovascular status: hemodynamically stable  Respiratory status: unassisted  Hydration status: euvolemic  Follow-up not needed.          Vitals Value Taken Time   /66 06/21/23 1117   Temp 36.5 °C (97.7 °F) 06/21/23 1115   Pulse 80 06/21/23 1119   Resp 16 06/21/23 1115   SpO2 95 % 06/21/23 1118   Vitals shown include unvalidated device data.      No case tracking events are documented in the log.      Pain/Tracey Score: Tracey Score: 10 (6/21/2023 11:30 AM)

## 2023-06-23 ENCOUNTER — ANESTHESIA (OUTPATIENT)
Dept: INTERVENTIONAL RADIOLOGY/VASCULAR | Facility: HOSPITAL | Age: 63
End: 2023-06-23
Payer: MEDICARE

## 2023-06-23 ENCOUNTER — HOSPITAL ENCOUNTER (OUTPATIENT)
Dept: RADIOLOGY | Facility: HOSPITAL | Age: 63
Discharge: HOME | End: 2023-06-23
Attending: RADIOLOGY
Payer: MEDICARE

## 2023-06-23 ENCOUNTER — HOSPITAL ENCOUNTER (OUTPATIENT)
Dept: INTERVENTIONAL RADIOLOGY/VASCULAR | Facility: HOSPITAL | Age: 63
Discharge: HOME | End: 2023-06-23
Attending: RADIOLOGY
Payer: MEDICARE

## 2023-06-23 VITALS
OXYGEN SATURATION: 92 % | WEIGHT: 199 LBS | SYSTOLIC BLOOD PRESSURE: 94 MMHG | HEIGHT: 68 IN | RESPIRATION RATE: 20 BRPM | HEART RATE: 77 BPM | DIASTOLIC BLOOD PRESSURE: 52 MMHG | TEMPERATURE: 98 F | BODY MASS INDEX: 30.16 KG/M2

## 2023-06-23 DIAGNOSIS — R91.1 LUNG NODULE SEEN ON IMAGING STUDY: ICD-10-CM

## 2023-06-23 PROCEDURE — 63600175 PHARM REV CODE 636 W HCPCS: Performed by: NURSE ANESTHETIST, CERTIFIED REGISTERED

## 2023-06-23 PROCEDURE — 32408 CORE NDL BX LNG/MED PERQ: CPT | Performed by: RADIOLOGY

## 2023-06-23 PROCEDURE — D9220A PRA ANESTHESIA: Mod: ANES,,, | Performed by: ANESTHESIOLOGY

## 2023-06-23 PROCEDURE — 32408 IR BIOPSY LUNG W/ GUIDANCE: ICD-10-PCS | Mod: LT,,, | Performed by: RADIOLOGY

## 2023-06-23 PROCEDURE — 87070 CULTURE OTHR SPECIMN AEROBIC: CPT | Performed by: RADIOLOGY

## 2023-06-23 PROCEDURE — 88305 TISSUE EXAM BY PATHOLOGIST: ICD-10-PCS | Mod: 26,,, | Performed by: STUDENT IN AN ORGANIZED HEALTH CARE EDUCATION/TRAINING PROGRAM

## 2023-06-23 PROCEDURE — D9220A PRA ANESTHESIA: ICD-10-PCS | Mod: CRNA,,, | Performed by: NURSE ANESTHETIST, CERTIFIED REGISTERED

## 2023-06-23 PROCEDURE — 37000009 HC ANESTHESIA EA ADD 15 MINS

## 2023-06-23 PROCEDURE — 25000003 PHARM REV CODE 250: Performed by: NURSE ANESTHETIST, CERTIFIED REGISTERED

## 2023-06-23 PROCEDURE — D9220A PRA ANESTHESIA: Mod: CRNA,,, | Performed by: NURSE ANESTHETIST, CERTIFIED REGISTERED

## 2023-06-23 PROCEDURE — 37000008 HC ANESTHESIA 1ST 15 MINUTES

## 2023-06-23 PROCEDURE — 27201068 IR BIOPSY LUNG W/ GUIDANCE

## 2023-06-23 PROCEDURE — 88305 TISSUE EXAM BY PATHOLOGIST: CPT | Performed by: STUDENT IN AN ORGANIZED HEALTH CARE EDUCATION/TRAINING PROGRAM

## 2023-06-23 PROCEDURE — 88333 PATH CONSLTJ SURG CYTO XM 1: CPT | Performed by: STUDENT IN AN ORGANIZED HEALTH CARE EDUCATION/TRAINING PROGRAM

## 2023-06-23 PROCEDURE — 71045 XR CHEST AP PORTABLE: ICD-10-PCS | Mod: 26,,, | Performed by: RADIOLOGY

## 2023-06-23 PROCEDURE — 88305 TISSUE EXAM BY PATHOLOGIST: CPT | Mod: 26,,, | Performed by: STUDENT IN AN ORGANIZED HEALTH CARE EDUCATION/TRAINING PROGRAM

## 2023-06-23 PROCEDURE — 32408 CORE NDL BX LNG/MED PERQ: CPT | Mod: LT,,, | Performed by: RADIOLOGY

## 2023-06-23 PROCEDURE — 87075 CULTR BACTERIA EXCEPT BLOOD: CPT | Performed by: RADIOLOGY

## 2023-06-23 PROCEDURE — 71045 X-RAY EXAM CHEST 1 VIEW: CPT | Mod: 26,,, | Performed by: RADIOLOGY

## 2023-06-23 PROCEDURE — 25000003 PHARM REV CODE 250: Performed by: RADIOLOGY

## 2023-06-23 PROCEDURE — 88333 PR  INTRAOPERATIVE CYTO PATH CONSULT, INITIAL SITE: ICD-10-PCS | Mod: 26,,, | Performed by: STUDENT IN AN ORGANIZED HEALTH CARE EDUCATION/TRAINING PROGRAM

## 2023-06-23 PROCEDURE — 88333 PATH CONSLTJ SURG CYTO XM 1: CPT | Mod: 26,,, | Performed by: STUDENT IN AN ORGANIZED HEALTH CARE EDUCATION/TRAINING PROGRAM

## 2023-06-23 PROCEDURE — 71045 X-RAY EXAM CHEST 1 VIEW: CPT | Mod: TC

## 2023-06-23 PROCEDURE — D9220A PRA ANESTHESIA: ICD-10-PCS | Mod: ANES,,, | Performed by: ANESTHESIOLOGY

## 2023-06-23 RX ORDER — PHENYLEPHRINE HYDROCHLORIDE 10 MG/ML
INJECTION INTRAVENOUS
Status: DISCONTINUED | OUTPATIENT
Start: 2023-06-23 | End: 2023-06-23

## 2023-06-23 RX ORDER — SODIUM CHLORIDE 9 MG/ML
INJECTION, SOLUTION INTRAVENOUS CONTINUOUS
Status: DISCONTINUED | OUTPATIENT
Start: 2023-06-23 | End: 2023-06-24 | Stop reason: HOSPADM

## 2023-06-23 RX ORDER — EPINEPHRINE 1 MG/ML
INJECTION, SOLUTION, CONCENTRATE INTRAVENOUS
Status: DISCONTINUED | OUTPATIENT
Start: 2023-06-23 | End: 2023-06-23

## 2023-06-23 RX ORDER — LIDOCAINE HYDROCHLORIDE 10 MG/ML
INJECTION INFILTRATION; PERINEURAL
Status: COMPLETED | OUTPATIENT
Start: 2023-06-23 | End: 2023-06-23

## 2023-06-23 RX ORDER — LIDOCAINE HYDROCHLORIDE 10 MG/ML
1 INJECTION, SOLUTION EPIDURAL; INFILTRATION; INTRACAUDAL; PERINEURAL ONCE
Status: DISCONTINUED | OUTPATIENT
Start: 2023-06-23 | End: 2023-06-24 | Stop reason: HOSPADM

## 2023-06-23 RX ORDER — PROPOFOL 10 MG/ML
VIAL (ML) INTRAVENOUS
Status: DISCONTINUED | OUTPATIENT
Start: 2023-06-23 | End: 2023-06-23

## 2023-06-23 RX ORDER — MIDAZOLAM HYDROCHLORIDE 1 MG/ML
INJECTION, SOLUTION INTRAMUSCULAR; INTRAVENOUS
Status: DISCONTINUED | OUTPATIENT
Start: 2023-06-23 | End: 2023-06-23

## 2023-06-23 RX ORDER — ROCURONIUM BROMIDE 10 MG/ML
INJECTION, SOLUTION INTRAVENOUS
Status: DISCONTINUED | OUTPATIENT
Start: 2023-06-23 | End: 2023-06-23

## 2023-06-23 RX ORDER — ONDANSETRON 2 MG/ML
INJECTION INTRAMUSCULAR; INTRAVENOUS
Status: DISCONTINUED | OUTPATIENT
Start: 2023-06-23 | End: 2023-06-23

## 2023-06-23 RX ORDER — LIDOCAINE HYDROCHLORIDE 20 MG/ML
INJECTION INTRAVENOUS
Status: DISCONTINUED | OUTPATIENT
Start: 2023-06-23 | End: 2023-06-23

## 2023-06-23 RX ORDER — SUCCINYLCHOLINE CHLORIDE 20 MG/ML
INJECTION INTRAMUSCULAR; INTRAVENOUS
Status: DISCONTINUED | OUTPATIENT
Start: 2023-06-23 | End: 2023-06-23

## 2023-06-23 RX ORDER — FENTANYL CITRATE 50 UG/ML
INJECTION, SOLUTION INTRAMUSCULAR; INTRAVENOUS
Status: DISCONTINUED | OUTPATIENT
Start: 2023-06-23 | End: 2023-06-23

## 2023-06-23 RX ORDER — METOPROLOL TARTRATE 1 MG/ML
INJECTION, SOLUTION INTRAVENOUS
Status: DISCONTINUED | OUTPATIENT
Start: 2023-06-23 | End: 2023-06-23

## 2023-06-23 RX ADMIN — LIDOCAINE HYDROCHLORIDE 80 MG: 20 INJECTION INTRAVENOUS at 07:06

## 2023-06-23 RX ADMIN — PHENYLEPHRINE HYDROCHLORIDE 100 MCG: 10 INJECTION INTRAVENOUS at 08:06

## 2023-06-23 RX ADMIN — FENTANYL CITRATE 100 MCG: 50 INJECTION, SOLUTION INTRAMUSCULAR; INTRAVENOUS at 07:06

## 2023-06-23 RX ADMIN — PHENYLEPHRINE HYDROCHLORIDE 200 MCG: 10 INJECTION INTRAVENOUS at 08:06

## 2023-06-23 RX ADMIN — LIDOCAINE HYDROCHLORIDE 20 MG: 20 INJECTION INTRAVENOUS at 08:06

## 2023-06-23 RX ADMIN — MIDAZOLAM HYDROCHLORIDE 2 MG: 1 INJECTION, SOLUTION INTRAMUSCULAR; INTRAVENOUS at 07:06

## 2023-06-23 RX ADMIN — CALCIUM CHLORIDE 200 MG: 100 INJECTION, SOLUTION INTRAVENOUS at 08:06

## 2023-06-23 RX ADMIN — GLYCOPYRROLATE 0.2 MG: 0.2 INJECTION, SOLUTION INTRAMUSCULAR; INTRAVENOUS at 08:06

## 2023-06-23 RX ADMIN — CALCIUM CHLORIDE 100 MG: 100 INJECTION, SOLUTION INTRAVENOUS at 08:06

## 2023-06-23 RX ADMIN — SUGAMMADEX 300 MG: 100 INJECTION, SOLUTION INTRAVENOUS at 08:06

## 2023-06-23 RX ADMIN — EPINEPHRINE 10 MCG: 1 INJECTION, SOLUTION, CONCENTRATE INTRAVENOUS at 08:06

## 2023-06-23 RX ADMIN — ROCURONIUM BROMIDE 30 MG: 10 INJECTION INTRAVENOUS at 07:06

## 2023-06-23 RX ADMIN — METOPROLOL TARTRATE 2.5 MG: 5 INJECTION, SOLUTION INTRAVENOUS at 08:06

## 2023-06-23 RX ADMIN — LIDOCAINE HYDROCHLORIDE 5 ML: 10 INJECTION, SOLUTION INFILTRATION; PERINEURAL at 08:06

## 2023-06-23 RX ADMIN — PROPOFOL 150 MG: 10 INJECTION, EMULSION INTRAVENOUS at 07:06

## 2023-06-23 RX ADMIN — SUCCINYLCHOLINE CHLORIDE 180 MG: 20 INJECTION, SOLUTION INTRAMUSCULAR; INTRAVENOUS at 07:06

## 2023-06-23 RX ADMIN — PHENYLEPHRINE HYDROCHLORIDE 100 MCG: 10 INJECTION INTRAVENOUS at 07:06

## 2023-06-23 RX ADMIN — SODIUM CHLORIDE: 0.9 INJECTION, SOLUTION INTRAVENOUS at 07:06

## 2023-06-23 NOTE — TRANSFER OF CARE
"Anesthesia Transfer of Care Note    Patient: Jennifer Brady    Procedure(s) Performed: * No procedures listed *    Patient location: PACU    Anesthesia Type: general    Transport from OR: Transported from OR on 6-10 L/min O2 by face mask with adequate spontaneous ventilation    Post pain: adequate analgesia    Post assessment: no apparent anesthetic complications    Post vital signs: stable    Level of consciousness: responds to stimulation and sedated    Nausea/Vomiting: no nausea/vomiting    Complications: none    Transfer of care protocol was followed      Last vitals:   Visit Vitals  BP 92/60 (BP Location: Left arm, Patient Position: Lying)   Pulse 77   Temp 36.6 °C (97.9 °F) (Oral)   Resp 18   Ht 5' 8" (1.727 m)   Wt 90.3 kg (199 lb)   SpO2 96%   Breastfeeding No   BMI 30.26 kg/m²     "

## 2023-06-23 NOTE — NURSING
Pt arrived to 173 for left lung biopsy with anesthesia, escorted to room by RN and CRNA who will assume care. Pt oriented to unit and staff. Plan of care reviewed with patient, patient verbalizes understanding. Comfort measures utilized. Pt safely transferred from stretcher to procedural table. Fall risk reviewed with patient, fall risk interventions maintained with direct observation.attendance. Safety strap applied, positioner pillows utilized to minimize pressure points. Blankets applied. Pt prepped and draped utilizing standard sterile technique. Patient placed on continuous monitoring, as required by sedation policy. Timeouts completed utilizing standard universal time-out, per department and facility policy. RN to remain at bedside, continuous monitoring maintained. Pt resting comfortably. Denies pain/discomfort. Will continue to monitor. See flow sheets for monitoring, medication administration, and updates.

## 2023-06-23 NOTE — PLAN OF CARE
Pt is AAOx4 and complains of R lower back pain; she associates this with her hip replacement.  VSS.  Admit questions completed.  IV access obtained.  Will continue to monitor.

## 2023-06-23 NOTE — DISCHARGE INSTRUCTIONS
For scheduling: Call Terrie at [phone]    For questions or concerns call: ERAN MON-FRI 8 AM- 5PM [phone]. Radiology resident on call [phone].    For immediate concerns that are not emergent, you may call our radiology clinic at: [phone]    May remove dressing in 24 hours and shower.   Do Not sit in bath water, hot tub, or swim for 7 days

## 2023-06-23 NOTE — ANESTHESIA PREPROCEDURE EVALUATION
06/23/2023  Pre-operative evaluation for * IR BIOPSY LUNG W/ GUIDANCE   *    Jennifer Brady is a 62 y.o. female HTN, REN, CAD (PCI x1 2017, asa only), here for lung nodule biopsy.     Patient Active Problem List   Diagnosis    Adjustment disorder with mixed anxiety and depressed mood    Depression    Episodic mood disorder    Cluster B personality disorder    HTN (hypertension)    Pure hypercholesterolemia    Anxiety    REN (obstructive sleep apnea)    Intractable chronic migraine without aura and without status migrainosus    Old MI (myocardial infarction)    Coronary artery disease involving native coronary artery of native heart without angina pectoris    Ex-smoker    Smoker    Obesity    Lumbar spondylosis    PAD (peripheral artery disease)    S/P PTCA (percutaneous transluminal coronary angioplasty)    Mild intermittent asthma without complication    Lumbar degenerative disc disease    Syncope and collapse    BMI 31.0-31.9,adult    PVCs (premature ventricular contractions)    Hip pain, chronic, right    Lower extremity weakness    Impaired range of motion of right hip    Pre-operative cardiovascular examination    Lung nodule    Tobacco abuse, in remission       Review of patient's allergies indicates:   Allergen Reactions    Iodine and iodide containing products      SHELLFISH       Current Outpatient Medications on File Prior to Visit   Medication Sig Dispense Refill    aspirin (ECOTRIN) 81 MG EC tablet Take 1 tablet (81 mg total) by mouth once daily. 100 tablet 6    atorvastatin (LIPITOR) 40 MG tablet TAKE 1 TABLET(40 MG) BY MOUTH EVERY DAY 90 tablet 3    azelastine (ASTELIN) 137 mcg (0.1 %) nasal spray 1 spray (137 mcg total) by Nasal route 2 (two) times daily. 30 mL 3    diclofenac sodium (VOLTAREN) 1 % Gel Apply 2 g topically 4 (four) times daily. 350 g 2     estradioL (ESTRACE) 0.01 % (0.1 mg/gram) vaginal cream Place 1 g vaginally twice a week. 42 g 4    fluticasone propionate (FLONASE) 50 mcg/actuation nasal spray 2 sprays (100 mcg total) by Each Nostril route 2 (two) times daily. 18.2 mL 3    HYDROcodone-acetaminophen (NORCO)  mg per tablet Take by mouth daily as needed.      hydrOXYzine pamoate (VISTARIL) 25 MG Cap Take 1 capsule (25 mg total) by mouth 3 (three) times daily as needed (anxiety). 270 capsule 3    ipratropium (ATROVENT) 42 mcg (0.06 %) nasal spray 2 sprays by Nasal route 3 (three) times daily as needed for Rhinitis. 15 mL 3    lactulose (CHRONULAC) 10 gram/15 mL solution Take 15 mLs by mouth 2 (two) times daily.      loratadine (CLARITIN) 10 mg tablet Take 10 mg by mouth once daily.      losartan (COZAAR) 25 MG tablet Take 1 tablet (25 mg total) by mouth once daily. (Patient taking differently: Take 25 mg by mouth every morning.) 90 tablet 3    methocarbamoL (ROBAXIN) 500 MG Tab Take 500 mg by mouth.      nitroGLYCERIN (NITROSTAT) 0.4 MG SL tablet Place 0.4 mg under the tongue.      pantoprazole (PROTONIX) 40 MG tablet pantoprazole 40 mg tablet,delayed release      semaglutide (OZEMPIC) 1 mg/dose (4 mg/3 mL) Inject 1 mg into the skin every 7 days. for 12 doses (Patient taking differently: Inject 1 mg into the skin every 7 days. TUESDAYS) 3 mL 2    traMADoL (ULTRAM) 50 mg tablet Take 50 mg by mouth every 6 (six) hours as needed.      traZODone (DESYREL) 150 MG tablet Take 1 -2 tablets by mouth before bed as needed for insomnia 60 tablet 11    venlafaxine (EFFEXOR-XR) 150 MG Cp24 Take 1 capsule (150 mg total) by mouth once daily. 90 capsule 3    [DISCONTINUED] citalopram (CELEXA) 10 MG tablet Take 10 mg by mouth once daily.      [DISCONTINUED] lurasidone (LATUDA) 40 mg Tab tablet Take 1 tablet (40 mg total) by mouth once daily. 30 tablet 5     Current Facility-Administered Medications on File Prior to Visit   Medication Dose  Route Frequency Provider Last Rate Last Admin    triamcinolone acetonide injection 40 mg  40 mg Intra-articular 1 time in Clinic/HOD Gabriel Wong DO           Past Surgical History:   Procedure Laterality Date    CORONARY ANGIOPLASTY      HIP REPLACEMENT ARTHROPLASTY Right 2022    HYSTERECTOMY  1999    INJECTION OF ANESTHETIC AGENT AROUND NERVE Right 2019    Procedure: MEDIAL BRANCH BLOCK RIGHT L3, L4, L5;  Surgeon: Petros Reich MD;  Location: [facility] PAIN MGT;  Service: Pain Management;  Laterality: Right;  NEEDS CONSENT, PT NO LONGER TAKES PLAVIX    INJECTION OF ANESTHETIC AGENT AROUND NERVE Right 2020    Procedure: BLOCK, NERVE RIGHT L3, 4, 5 MEDIAL BRANCH;  Surgeon: Petros Reich MD;  Location: [facility] PAIN MGT;  Service: Pain Management;  Laterality: Right;  NEEDS CONSENT?    RADIOFREQUENCY ABLATION Right 2021    Procedure: RADIOFREQUENCY ABLATION RIGHT L3, 4, 5;  Surgeon: Petros Reich MD;  Location: [facility] PAIN MGT;  Service: Pain Management;  Laterality: Right;  NEEDS CONSENT    TILT TABLE TEST N/A 2019    Procedure: TILT TABLE TEST;  Surgeon: Roman Lala MD;  Location: [facility] EP LAB;  Service: Cardiology;  Laterality: N/A;  seizure/migraine, HUT referred by Dr Perea Neuro, EEG confirmed    TRANSFORAMINAL EPIDURAL INJECTION OF STEROID Right 10/14/2020    Procedure: LUMBAR TRANSFORAMINAL RIGHT L4/L5;  Surgeon: Petros Reich MD;  Location: [facility] PAIN MGT;  Service: Pain Management;  Laterality: Right;  NEEDS CONSENT       Social History     Socioeconomic History    Marital status:    Occupational History     Employer: [employer]     Comment: [employer]   Tobacco Use    Smoking status: Former     Packs/day: 0.25     Types: Cigarettes     Start date: 1986     Quit date: 2022     Years since quittin.9    Smokeless tobacco: Never    Tobacco comments:     quit actual cigarettes a few mo ago,  switched to e cigarette   Substance and Sexual Activity    Alcohol use: Yes     Comment: Socially    Drug use: No    Sexual activity: Not Currently     Partners: Male   Other Topics Concern    Financial Status: Employed Yes    Caffeine Use: Moderate Yes    Spirituality: [employer] Yes    Patient feels they ought to cut down on drinking/drug use No    Patient annoyed by others criticizing their drinking/drug use No    Patient has felt bad or guilty about drinking/drug use No    Patient has had a drink/used drugs as an eye opener in the AM No   Social History Narrative    Patient discusses recent decisions she has made that she is pleased about; has clarified for self and others her values and standards.  Though she is aware of difficulty of  struggle, feels new confidence about  results.  She expresses sense of wholeness and independence at this point. Her previously explored painful symptoms are improved (i.e, .diminished - 2or3/10), thought processes are logical and outlook is optimistic, as well as realistic.    Discusses trip she's planned.  intent to reschedule after return in mid-August. She expresses feelings of appreciation               Diagnostic Studies:      EKD Echo:  Results for orders placed or performed in visit on 17   2D Echo w/ Color Flow Doppler   Result Value Ref Range    Right Vent (Diastole) 2.0 0.8 - 2.6    Septum (Diastole) 1.2 0.6 - 1.2    Left Ventricle (Diastole) 4.9 3.5 - 5.5    Posterior Wall (Diastole) 1.1 0.6 - 1.2    Aortic Valve (Diastole) 1.7 1.5 - 2.6    Aortic Root (Diastole) 2.8 1.3 - 3.7    Left Atrium (Diastole) 3.8 2.5 - 4    Septum (Systole)  0.5 - 1.1    Left Ventricle (Systole) 3.2 2.2 - 4    Posteriol Wall (Systole)  0.5 - 1.2    EF + QEF 63 %    Aortic Valve Area  2.5 - 3.5 cm2    Aortic Peak Gradient 10 (A) 0 - 9.9 mm Hg    Aortic Mean Gradient 5 0 - 9.9 mm Hg    Aortic Peak Velocity 1.5 0 - 1.9 m/s    Aortic Pres. Half Time  599 - 416  m/sec    Mitral Valve Area 5 4 - 6 cm2    Mitral Pres. Half Time  30 - 60 m/sec    Mitral Valve E-A Ratio 0.83 0.75 - 999    Mitral Valve E-E prime 7 0 - 7    Pulmonary Artery Pressure 21 0 - 29 mm Hg           Pre-op Assessment    I have reviewed the Patient Summary Reports.    I have reviewed the NPO Status.   I have reviewed the Medications.     Review of Systems  Anesthesia Hx:  History of prior surgery of interest to airway management or planning: Denies Family Hx of Anesthesia complications.   Denies Personal Hx of Anesthesia complications.   Social:  Former Smoker, Alcohol Use    Cardiovascular:   Hypertension Past MI CAD  CABG/stent  PVD    Pulmonary:   Asthma Sleep Apnea    Neurological:   Headaches    Psych:   anxiety          Physical Exam  General: Well nourished, Cooperative, Alert and Oriented    Airway:  Mallampati: II / I  Mouth Opening: Normal  TM Distance: Normal  Tongue: Normal  Neck ROM: Normal ROM    Dental:  Intact    Chest/Lungs:  Clear to auscultation, Normal Respiratory Rate    Heart:  Rate: Normal  Rhythm: Regular Rhythm        Anesthesia Plan  Type of Anesthesia, risks & benefits discussed:    Anesthesia Type: Gen ETT  Intra-op Monitoring Plan: Standard ASA Monitors  Post Op Pain Control Plan: multimodal analgesia and IV/PO Opioids PRN  Induction:  IV  Airway Plan: Direct  Informed Consent: Informed consent signed with the Patient and all parties understand the risks and agree with anesthesia plan.  All questions answered.   ASA Score: 3  Day of Surgery Review of History & Physical: H&P Update referred to the surgeon/provider.    Ready For Surgery From Anesthesia Perspective.     .

## 2023-06-23 NOTE — ANESTHESIA PROCEDURE NOTES
Intubation    Date/Time: 6/23/2023 7:50 AM  Performed by: Mena Nicholas CRNA  Authorized by: Brandon Brewer MD     Intubation:     Induction:  Intravenous    Intubated:  Postinduction    Mask Ventilation:  Easy mask    Attempts:  1    Attempted By:  CRNA    Method of Intubation:  Video laryngoscopy    Blade:  Buckley 3    Laryngeal View Grade: Grade I - full view of cords      Difficult Airway Encountered?: No      Complications:  None    Airway Device:  Oral endotracheal tube    Airway Device Size:  7.5    Style/Cuff Inflation:  Cuffed (inflated to minimal occlusive pressure)    Tube secured:  21    Secured at:  The lips    Placement Verified By:  Capnometry    Complicating Factors:  None    Findings Post-Intubation:  BS equal bilateral and atraumatic/condition of teeth unchanged

## 2023-06-23 NOTE — NURSING
Lung biopsy complete. Pt tolerated well. VSS. No signs or symptoms of distress noted per CRNA. Pt will be transferred to PACU bed after extubation/stabilization escorted by RN and CRNA who will give report. Specimen to path per pathologist

## 2023-06-23 NOTE — NURSING TRANSFER
Nursing Transfer Note      6/23/2023     Reason patient is being transferred: post procedure    Transfer To: [facility] slot 37    Transfer via stretcher    Transfer with Patient belongings    Transported by RN    Medicines sent: none    Any special needs or follow-up needed: routine    Chart send with patient: Yes    Notified: daughter    Patient reassessed at: 6/23/2023 10:40

## 2023-06-23 NOTE — PROCEDURES
Radiology Post-Procedure Note    Pre Op Diagnosis: Left lung nodule  Post Op Diagnosis: Same    Procedure: CT-guided lung biopsy    Procedure performed by: Petros Evans MD    Written Informed Consent Obtained: Yes  Specimen Removed: YES 8 19g core biopsies obtained  Estimated Blood Loss: Minimal    Findings:   Grossly stable SHAR pulmonary nodule. Minimal tissue obtained with needle in nodule. Therefore, a sample was also sent for culture.     Patient tolerated procedure well.    Petros Evans MD  Interventional Radiology  Department of Radiology

## 2023-06-23 NOTE — H&P
Radiology History & Physical      SUBJECTIVE:     Chief Complaint: L lung nodule    History of Present Illness:  Jennifer Brady is a 62 y.o. female who presents for L lung nodule biopsy (no history of malignancy).  Past Medical History:   Diagnosis Date    Acute coronary syndrome     Anxiety     Arthritis     Asthma     Behavioral problem     Borderline personality disorder     Coronary angioplasty status 04/2018    Coronary artery disease     Depression     Depression     Fatigue     Hx of psychiatric care     Hyperlipidemia     Hypertension     Myocardial infarction 05/2018    Psychiatric problem     S/P hysterectomy with oophorectomy     Sleep apnea     pt uses CPAP    Syncope and collapse     Therapy      Past Surgical History:   Procedure Laterality Date    CORONARY ANGIOPLASTY      HIP REPLACEMENT ARTHROPLASTY Right 12/21/2022    HYSTERECTOMY  1999    INJECTION OF ANESTHETIC AGENT AROUND NERVE Right 11/20/2019    Procedure: MEDIAL BRANCH BLOCK RIGHT L3, L4, L5;  Surgeon: Petros Reich MD;  Location: [facility];  Service: Pain Management;  Laterality: Right;  NEEDS CONSENT, PT NO LONGER TAKES PLAVIX    INJECTION OF ANESTHETIC AGENT AROUND NERVE Right 06/17/2020    Procedure: BLOCK, NERVE RIGHT L3, 4, 5 MEDIAL BRANCH;  Surgeon: Petros Reich MD;  Location: [facility];  Service: Pain Management;  Laterality: Right;  NEEDS CONSENT?    RADIOFREQUENCY ABLATION Right 05/12/2021    Procedure: RADIOFREQUENCY ABLATION RIGHT L3, 4, 5;  Surgeon: Petros Reich MD;  Location: [facility];  Service: Pain Management;  Laterality: Right;  NEEDS CONSENT    TILT TABLE TEST N/A 03/22/2019    Procedure: TILT TABLE TEST;  Surgeon: Roman Lala MD;  Location: [facility];  Service: Cardiology;  Laterality: N/A;  seizure/migraine, HUT referred by Dr Brit Merlos, EEG confirmed    TRANSFORAMINAL EPIDURAL INJECTION OF STEROID Right 10/14/2020    Procedure: LUMBAR TRANSFORAMINAL RIGHT L4/L5;   Surgeon: Petros Reich MD;  Location: [facility];  Service: Pain Management;  Laterality: Right;  NEEDS CONSENT       Home Meds:   Prior to Admission medications    Medication Sig Start Date End Date Taking? Authorizing Provider   atorvastatin (LIPITOR) 40 MG tablet TAKE 1 TABLET(40 MG) BY MOUTH EVERY DAY 9/6/22  Yes Alvaro Sancehz MD   azelastine (ASTELIN) 137 mcg (0.1 %) nasal spray 1 spray (137 mcg total) by Nasal route 2 (two) times daily. 10/3/22  Yes Nina Rutherford MD   diclofenac sodium (VOLTAREN) 1 % Gel Apply 2 g topically 4 (four) times daily. 2/28/23  Yes Clarissa Ching DPM   estradioL (ESTRACE) 0.01 % (0.1 mg/gram) vaginal cream Place 1 g vaginally twice a week. 2/16/23  Yes Erin Louis MD   fluticasone propionate (FLONASE) 50 mcg/actuation nasal spray 2 sprays (100 mcg total) by Each Nostril route 2 (two) times daily. 10/3/22  Yes Nina Rutherford MD   hydrOXYzine pamoate (VISTARIL) 25 MG Cap Take 1 capsule (25 mg total) by mouth 3 (three) times daily as needed (anxiety). 2/3/23  Yes Parish Ayala III, NP   ipratropium (ATROVENT) 42 mcg (0.06 %) nasal spray 2 sprays by Nasal route 3 (three) times daily as needed for Rhinitis. 10/3/22  Yes Nina Rutherford MD   loratadine (CLARITIN) 10 mg tablet Take 10 mg by mouth once daily. 2/22/22  Yes Historical Provider   losartan (COZAAR) 25 MG tablet Take 1 tablet (25 mg total) by mouth once daily.  Patient taking differently: Take 25 mg by mouth every morning. 8/25/21 6/23/23 Yes Alvaro Sanchez MD   pantoprazole (PROTONIX) 40 MG tablet pantoprazole 40 mg tablet,delayed release   Yes Historical Provider   traZODone (DESYREL) 150 MG tablet Take 1 -2 tablets by mouth before bed as needed for insomnia 4/10/23  Yes Parish Ayala III, NP   venlafaxine (EFFEXOR-XR) 150 MG Cp24 Take 1 capsule (150 mg total) by mouth once daily. 2/3/23  Yes Parish Ayala III, NP   aspirin (ECOTRIN) 81 MG EC tablet Take 1 tablet (81 mg total) by  mouth once daily. 4/6/18 6/19/23  Edwin Reza MD   HYDROcodone-acetaminophen (NORCO)  mg per tablet Take by mouth daily as needed. 12/15/21   Historical Provider   lactulose (CHRONULAC) 10 gram/15 mL solution Take 15 mLs by mouth 2 (two) times daily. 2/12/21   Historical Provider   methocarbamoL (ROBAXIN) 500 MG Tab Take 500 mg by mouth. 11/10/22   Historical Provider   nitroGLYCERIN (NITROSTAT) 0.4 MG SL tablet Place 0.4 mg under the tongue. 9/9/22   Historical Provider   semaglutide (OZEMPIC) 1 mg/dose (4 mg/3 mL) Inject 1 mg into the skin every 7 days. for 12 doses  Patient taking differently: Inject 1 mg into the skin every 7 days. TUESDAYS 5/11/23 7/28/23  Mean Adrian MD   traMADoL (ULTRAM) 50 mg tablet Take 50 mg by mouth every 6 (six) hours as needed. 12/13/22   Historical Provider   citalopram (CELEXA) 10 MG tablet Take 10 mg by mouth once daily.  7/30/18  Historical Provider   lurasidone (LATUDA) 40 mg Tab tablet Take 1 tablet (40 mg total) by mouth once daily. 10/11/18 4/1/19  Parish Ayala III, NP     Anticoagulants/Antiplatelets: no anticoagulation    Allergies:   Review of patient's allergies indicates:   Allergen Reactions    Iodine and iodide containing products      SHELLFISH     Sedation History:  no adverse reactions    Review of Systems:   Hematological: no known coagulopathies  Respiratory: no shortness of breath  Cardiovascular: no chest pain  Gastrointestinal: no abdominal pain  Genito-Urinary: no dysuria  Musculoskeletal: negative  Neurological: no TIA or stroke symptoms         OBJECTIVE:     Vital Signs (Most Recent)  Temp: 97.9 °F (36.6 °C) (06/23/23 0604)  Pulse: 77 (06/23/23 0604)  Resp: 18 (06/23/23 0604)  BP: 120/73 (06/23/23 0607)  SpO2: 96 % (06/23/23 0604)    Physical Exam:  ASA: per ANESTHESIA  Mallampati: per ANESTHESIA    General: no acute distress  Mental Status: alert and oriented to person, place and time  HEENT: normocephalic, atraumatic  Chest:  unlabored breathing  Heart: regular heart rate  Abdomen: nondistended  Extremity: moves all extremities    Laboratory  Lab Results   Component Value Date    INR 1.0 06/01/2023       Lab Results   Component Value Date    WBC 9.24 06/01/2023    HGB 12.2 06/01/2023    HCT 38.3 06/01/2023    MCV 94 06/01/2023     06/01/2023      Lab Results   Component Value Date    GLU 95 12/09/2022     12/09/2022    K 4.0 12/09/2022     12/09/2022    CO2 22 (L) 12/09/2022    BUN 9 12/09/2022    CREATININE 0.8 12/09/2022    CALCIUM 9.4 12/09/2022    MG 2.1 01/14/2019    ALT 22 03/02/2022    AST 21 03/02/2022    ALBUMIN 3.7 03/02/2022    BILITOT 0.6 03/02/2022       ASSESSMENT/PLAN:     Sedation Plan: per ANESTHESIA    Patient will undergo L lung nodule biopsy.    Last Landaverde MD  PGY-4  RADIOLOGY

## 2023-06-26 ENCOUNTER — TELEPHONE (OUTPATIENT)
Dept: PULMONOLOGY | Facility: CLINIC | Age: 63
End: 2023-06-26
Payer: MEDICARE

## 2023-06-26 LAB — BACTERIA SPEC AEROBE CULT: NO GROWTH

## 2023-06-26 NOTE — ANESTHESIA POSTPROCEDURE EVALUATION
Anesthesia Post Evaluation    Patient: Jennifer Brady    Procedure(s) Performed: * No procedures listed *    Final Anesthesia Type: general      Patient location during evaluation: PACU  Patient participation: Yes- Able to Participate  Level of consciousness: awake and alert  Post-procedure vital signs: reviewed and stable  Pain management: adequate  Airway patency: patent    PONV status at discharge: No PONV  Anesthetic complications: no      Cardiovascular status: blood pressure returned to baseline  Respiratory status: unassisted  Hydration status: euvolemic  Follow-up not needed.          Vitals Value Taken Time   BP 94/52 06/23/23 1102   Temp 36.6 °C (97.9 °F) 06/23/23 1015   Pulse 77 06/23/23 1115   Resp 20 06/23/23 1115   SpO2 92 % 06/23/23 1115         Event Time   Out of Recovery 10:45:00         Pain/Tracey Score: No data recorded

## 2023-06-26 NOTE — TELEPHONE ENCOUNTER
Spoke with patient, informed her that I have received her appointment request. Patient states that she is not experiencing any symptoms but wants to come in to visit with Dr Nettles for her Biopsy Test results. I verbalized to patient that I understand and advised patient that her Biospy test results are not resulted at this current time and for patient to give Dr Nettles atleast a week or two to get back with her in regards to explaining the test results as the results are going to take some time coming in. Patient verbalized that she understand.

## 2023-06-27 NOTE — PATIENT INSTRUCTIONS
"PPT 1/4" heel lifts    PPT arch pads w/ adhesive - large & medium  "
Patient is a 75y old  Female who presents with a chief complaint of Chest pain     (27 Jun 2023 12:56)    ________________________________  ALO ÁLVAREZ is a 75y year old Female with a past medical history of coronary artery disease status post PCI with most recent cardiac catheterization in 2018, chronic diastolic heart failure, history of CVA, frequent APCs, history of recurrent DVT on anticoagulation, hypertension, hyperlipidemia and obesity.    She presents for evaluation of chest discomfort, described as squeezing in nature.  Cardiac enzymes negative.  EKG shows new right bundle branch block.  BNP minimally elevated.    PREVIOUS CARDIAC WORKUP:    Echocardiogram  6/10/2022 interventricular septum is mildly hypertrophied, normal EF with trace MR, mild TR/mild [place]    Cardiac Catheterization  July 16, 2018-left main normal, LAD large-caliber vessel.  Minor irregularities.  Proximal LCx 90% stenosis, RCA and ramus with large caliber vessel with minor irregularities.  Patient is status post PCI to proximal LCx  ________________________________  Review of systems: A 10 point review of system has been performed, and is negative except for what has been mentioned in the above history of present illness.     PAST MEDICAL & SURGICAL HISTORY:  HTN (hypertension)      DVT (deep venous thrombosis)      CVA (cerebral vascular accident)      CHF (congestive heart failure)      HLD (hyperlipidemia)      Nonintractable epilepsy without status epilepticus  2/23/2018      Asthmatic bronchitis      Behcet's syndrome      Lumbago      Scoliosis      S/P hysterectomy        FAMILY HISTORY:  No pertinent family history in first degree relatives         SOCIAL HISTORY: The patient denies any tobacco abuse, alcohol abuse or illicit drug use.    ALLERGIES:  penicillin (Rash)    Home Medications:  atorvastatin 40 mg oral tablet: 1 tab(s) orally once a day (at bedtime) (26 Jun 2023 18:40)  Eliquis 5 mg oral tablet: 0.5 tab(s) orally 2 times a day (26 Jun 2023 18:43)  furosemide 40 mg oral tablet: 1 tab(s) orally once a day (in the morning) (26 Jun 2023 18:43)  losartan 100 mg oral tablet: 1 tab(s) orally once a day (in the morning) (26 Jun 2023 18:43)  metoprolol tartrate 50 mg oral tablet: 0.25 tab(s) orally 2 times a day (26 Jun 2023 18:43)  pantoprazole 40 mg oral delayed release tablet: 1 tab(s) orally once a day as needed for  heartburn (26 Jun 2023 18:42)  predniSONE 10 mg oral tablet: 1 tab(s) orally once a day (in the morning) (26 Jun 2023 18:43)    MEDICATIONS  (STANDING):  apixaban 2.5 milliGRAM(s) Oral every 12 hours  atorvastatin 40 milliGRAM(s) Oral at bedtime  furosemide   Injectable 40 milliGRAM(s) IV Push daily  losartan 100 milliGRAM(s) Oral daily  metoprolol tartrate 12.5 milliGRAM(s) Oral two times a day  potassium chloride   Powder 40 milliEquivalent(s) Oral every 4 hours  predniSONE   Tablet 10 milliGRAM(s) Oral daily  regadenoson Injectable 0.4 milliGRAM(s) IV Push once    MEDICATIONS  (PRN):  pantoprazole    Tablet 40 milliGRAM(s) Oral before breakfast PRN for heartburn    Vital Signs Last 24 Hrs  T(C): 36.3 (27 Jun 2023 15:55), Max: 36.8 (26 Jun 2023 17:53)  T(F): 97.3 (27 Jun 2023 15:55), Max: 98.3 (26 Jun 2023 20:02)  HR: 82 (27 Jun 2023 15:55) (66 - 100)  BP: 120/59 (27 Jun 2023 15:55) (114/71 - 140/63)  BP(mean): 87 (26 Jun 2023 20:02) (84 - 87)  RR: 18 (27 Jun 2023 15:55) (17 - 18)  SpO2: 93% (27 Jun 2023 15:55) (91% - 95%)    Parameters below as of 27 Jun 2023 15:55  Patient On (Oxygen Delivery Method): room air      I&O's Summary    27 Jun 2023 07:01  -  27 Jun 2023 17:44  --------------------------------------------------------  IN: 480 mL / OUT: 0 mL / NET: 480 mL      ________________________________  GENERAL APPEARANCE:  No acute distress  HEAD: normocephalic, atraumatic  NECK: supple, no jugular venous distention, no carotid bruit    HEART: Regular rate and rhythm, S1, S2 normal, 1/6 murmur    CHEST:  No anterior chest wall tenderness    LUNGS:  Clear to auscultation, without any wheezing, rhonchi or rales    ABDOMEN: soft, nontender, nondistended, with positive bowel sounds appreciated  EXTREMITIES: no edema.   NEURO: Alert and oriented x3  PSYC:  Normal affect  SKIN:  Dry  ________________________________   TELEMETRY:    ECG:    LABS:                        11.4   8.27  )-----------( 187      ( 27 Jun 2023 07:38 )             38.5             06-27    143  |  110<H>  |  18  ----------------------------<  109<H>  3.2<L>   |  28  |  0.76    Ca    8.5      27 Jun 2023 07:38    TPro  5.4<L>  /  Alb  2.6<L>  /  TBili  0.6  /  DBili  x   /  AST  18  /  ALT  20  /  AlkPhos  56  06-27      Lipid Panel  Chl 117  HDL 52  LDL --  Trg 81  LIVER FUNCTIONS - ( 27 Jun 2023 07:38 )  Alb: 2.6 g/dL / Pro: 5.4 gm/dL / ALK PHOS: 56 U/L / ALT: 20 U/L / AST: 18 U/L / GGT: x         PT/INR - ( 27 Jun 2023 07:38 )   PT: 12.2 sec;   INR: 1.05 ratio         PTT - ( 27 Jun 2023 07:38 )  PTT:27.3 sec  Urinalysis Basic - ( 27 Jun 2023 07:38 )    Color: x / Appearance: x / SG: x / pH: x  Gluc: 109 mg/dL / Ketone: x  / Bili: x / Urobili: x   Blood: x / Protein: x / Nitrite: x   Leuk Esterase: x / RBC: x / WBC x   Sq Epi: x / Non Sq Epi: x / Bacteria: x      Pro BNP  -- 06-26 @ 15:16  D Dimer  <150 06-26 @ 15:16    PT/INR - ( 27 Jun 2023 07:38 )   PT: 12.2 sec;   INR: 1.05 ratio         PTT - ( 27 Jun 2023 07:38 )  PTT:27.3 sec  Urinalysis Basic - ( 27 Jun 2023 07:38 )    Color: x / Appearance: x / SG: x / pH: x  Gluc: 109 mg/dL / Ketone: x  / Bili: x / Urobili: x   Blood: x / Protein: x / Nitrite: x   Leuk Esterase: x / RBC: x / WBC x   Sq Epi: x / Non Sq Epi: x / Bacteria: x             ________________________________    RADIOLOGY & ADDITIONAL STUDIES: IMPRESSION:  1. Age-appropriate involutional changes. Nonacute infarct on the left at   the level the corona radiata, as on prior. Atherosclerotic changes. No   large arterial distribution acute infarct. No acute intracranial   hemorrhage.  2. Soft tissue process again appreciated within the region of the left   nasal cavity, possibly a polyp, which is stable in appearance compared   with the prior dated 11/19/2019. Nonetheless, consider further assessment   via direct visualization if patient has never had dedicated assessment of   this finding.    Findings were communicated by Dr. Behr Ventura to Dr. Zi Oseguera in the   emergency department at approximately 6:38 PM on 6/26/2023.    IMPRESSION: Left base process from 2021 largely cleared. There is some   linear atelectasis right lower lung at this time.    ________________________________    ASSESSMENT:  Chest pain with history of CAD  Hypertension  Hyperlipidemia  Chronic diastolic heart failure  History of CVA  History of DVT on anticoagulation    PLAN:  In summary, this is a 75y Female with a past medical history of coronary artery disease status post PCI with most recent cardiac catheterization in 2018, chronic diastolic heart failure, history of CVA, frequent APCs, history of recurrent DVT on anticoagulation, hypertension, hyperlipidemia and obesity.  No evidence of ACS.  Given history of CAD, and EKG changes, recommend SPECT stress test.  Continue anticoagulation but increase to 5 mg twice daily.  Continue Lasix, and repeat creatinine in the morning.  Continue beta-blocker and losartan.      ____________________________________________  (Dragon Dictation software used). Thank you for allowing me to participate in the care of your patient. Please contact me should any questions arise.    YOUNG Manzano DO, [facility]  Office: [phone]

## 2023-06-28 LAB
ADEQUACY: ABNORMAL
FINAL PATHOLOGIC DIAGNOSIS: ABNORMAL
GROSS: ABNORMAL
Lab: ABNORMAL
MICROSCOPIC EXAM: ABNORMAL

## 2023-06-30 LAB — BACTERIA SPEC ANAEROBE CULT: NORMAL

## 2023-07-07 ENCOUNTER — PATIENT MESSAGE (OUTPATIENT)
Dept: INFECTIOUS DISEASES | Facility: CLINIC | Age: 63
End: 2023-07-07
Payer: MEDICARE

## 2023-07-13 ENCOUNTER — OFFICE VISIT (OUTPATIENT)
Dept: BARIATRICS | Facility: CLINIC | Age: 63
End: 2023-07-13
Payer: MEDICARE

## 2023-07-13 VITALS
SYSTOLIC BLOOD PRESSURE: 137 MMHG | BODY MASS INDEX: 31.01 KG/M2 | HEIGHT: 68 IN | HEART RATE: 79 BPM | WEIGHT: 204.63 LBS | DIASTOLIC BLOOD PRESSURE: 65 MMHG | OXYGEN SATURATION: 97 %

## 2023-07-13 DIAGNOSIS — I25.10 CORONARY ARTERY DISEASE INVOLVING NATIVE CORONARY ARTERY OF NATIVE HEART WITHOUT ANGINA PECTORIS: ICD-10-CM

## 2023-07-13 DIAGNOSIS — E78.00 PURE HYPERCHOLESTEROLEMIA: ICD-10-CM

## 2023-07-13 DIAGNOSIS — I10 PRIMARY HYPERTENSION: ICD-10-CM

## 2023-07-13 DIAGNOSIS — Z71.3 ENCOUNTER FOR WEIGHT LOSS COUNSELING: ICD-10-CM

## 2023-07-13 DIAGNOSIS — E66.09 CLASS 1 OBESITY DUE TO EXCESS CALORIES WITH SERIOUS COMORBIDITY AND BODY MASS INDEX (BMI) OF 31.0 TO 31.9 IN ADULT: Primary | ICD-10-CM

## 2023-07-13 DIAGNOSIS — G47.33 OSA (OBSTRUCTIVE SLEEP APNEA): ICD-10-CM

## 2023-07-13 PROCEDURE — 1159F PR MEDICATION LIST DOCUMENTED IN MEDICAL RECORD: ICD-10-PCS | Mod: CPTII,S$GLB,, | Performed by: STUDENT IN AN ORGANIZED HEALTH CARE EDUCATION/TRAINING PROGRAM

## 2023-07-13 PROCEDURE — 3075F PR MOST RECENT SYSTOLIC BLOOD PRESS GE 130-139MM HG: ICD-10-PCS | Mod: CPTII,S$GLB,, | Performed by: STUDENT IN AN ORGANIZED HEALTH CARE EDUCATION/TRAINING PROGRAM

## 2023-07-13 PROCEDURE — 4010F ACE/ARB THERAPY RXD/TAKEN: CPT | Mod: CPTII,S$GLB,, | Performed by: STUDENT IN AN ORGANIZED HEALTH CARE EDUCATION/TRAINING PROGRAM

## 2023-07-13 PROCEDURE — 3078F PR MOST RECENT DIASTOLIC BLOOD PRESSURE < 80 MM HG: ICD-10-PCS | Mod: CPTII,S$GLB,, | Performed by: STUDENT IN AN ORGANIZED HEALTH CARE EDUCATION/TRAINING PROGRAM

## 2023-07-13 PROCEDURE — 99999 PR PBB SHADOW E&M-EST. PATIENT-LVL IV: CPT | Mod: PBBFAC,,, | Performed by: STUDENT IN AN ORGANIZED HEALTH CARE EDUCATION/TRAINING PROGRAM

## 2023-07-13 PROCEDURE — 3008F PR BODY MASS INDEX (BMI) DOCUMENTED: ICD-10-PCS | Mod: CPTII,S$GLB,, | Performed by: STUDENT IN AN ORGANIZED HEALTH CARE EDUCATION/TRAINING PROGRAM

## 2023-07-13 PROCEDURE — 1160F RVW MEDS BY RX/DR IN RCRD: CPT | Mod: CPTII,S$GLB,, | Performed by: STUDENT IN AN ORGANIZED HEALTH CARE EDUCATION/TRAINING PROGRAM

## 2023-07-13 PROCEDURE — 1160F PR REVIEW ALL MEDS BY PRESCRIBER/CLIN PHARMACIST DOCUMENTED: ICD-10-PCS | Mod: CPTII,S$GLB,, | Performed by: STUDENT IN AN ORGANIZED HEALTH CARE EDUCATION/TRAINING PROGRAM

## 2023-07-13 PROCEDURE — 1159F MED LIST DOCD IN RCRD: CPT | Mod: CPTII,S$GLB,, | Performed by: STUDENT IN AN ORGANIZED HEALTH CARE EDUCATION/TRAINING PROGRAM

## 2023-07-13 PROCEDURE — 99213 PR OFFICE/OUTPT VISIT, EST, LEVL III, 20-29 MIN: ICD-10-PCS | Mod: S$GLB,,, | Performed by: STUDENT IN AN ORGANIZED HEALTH CARE EDUCATION/TRAINING PROGRAM

## 2023-07-13 PROCEDURE — 3008F BODY MASS INDEX DOCD: CPT | Mod: CPTII,S$GLB,, | Performed by: STUDENT IN AN ORGANIZED HEALTH CARE EDUCATION/TRAINING PROGRAM

## 2023-07-13 PROCEDURE — 4010F PR ACE/ARB THEARPY RXD/TAKEN: ICD-10-PCS | Mod: CPTII,S$GLB,, | Performed by: STUDENT IN AN ORGANIZED HEALTH CARE EDUCATION/TRAINING PROGRAM

## 2023-07-13 PROCEDURE — 3078F DIAST BP <80 MM HG: CPT | Mod: CPTII,S$GLB,, | Performed by: STUDENT IN AN ORGANIZED HEALTH CARE EDUCATION/TRAINING PROGRAM

## 2023-07-13 PROCEDURE — 3075F SYST BP GE 130 - 139MM HG: CPT | Mod: CPTII,S$GLB,, | Performed by: STUDENT IN AN ORGANIZED HEALTH CARE EDUCATION/TRAINING PROGRAM

## 2023-07-13 PROCEDURE — 99999 PR PBB SHADOW E&M-EST. PATIENT-LVL IV: ICD-10-PCS | Mod: PBBFAC,,, | Performed by: STUDENT IN AN ORGANIZED HEALTH CARE EDUCATION/TRAINING PROGRAM

## 2023-07-13 PROCEDURE — 99213 OFFICE O/P EST LOW 20 MIN: CPT | Mod: S$GLB,,, | Performed by: STUDENT IN AN ORGANIZED HEALTH CARE EDUCATION/TRAINING PROGRAM

## 2023-07-13 NOTE — PROGRESS NOTES
Subjective:       Patient ID: Jennifer Brady is a 62 y.o. female.    Chief Complaint: Obesity, Follow-up, and Weight Check    Patient presents for treatment of obesity.       Co-morbidities  Migraines - was on topiramate, but was discontinued because didn't help with headaches  REN  MI  HTN  HLD    Weight History  Lowest adult weight: 165 lbs  Highest adult weight: 220 lbs     History of Weight Loss Efforts    Current Physical Activity  Hip replacement 7 weeks ago, doing exercises given to her by PT 4x/week  Walking about 30 minutes - not walking due to heel spur      Current Eating Habits  Breakfast - skips most days, occassional cup of coffee  Lemonade and sweet tea  First meal around 2pm   Eats again around 5pm  Cereal, grits  Bananas  Turkey necks with white beans and brown rice      Medical Weight Loss  2/13/2023: 216.2 lbs, BMI 32.9, BFP 47.6%, .9 lbs, SMM 62.4 lbs, BMR 1480 kcal  5/11/2023: 209.1 lbs, BMI 31.8, BFP 45.8%, BFM 95.8 lbs, SMM 62.4 lbs, BMR 1481 kcal  7/13/2023: 204.6 lbs, BMI 31.1, BFP 44.4%, BFM 90.9 lbs, SMM 62.6 lbs, BMR 1485 kcal      Review of Systems   Constitutional:  Negative for chills and fever.   Respiratory:  Negative for shortness of breath.    Cardiovascular:  Negative for chest pain and palpitations.   Gastrointestinal:  Negative for abdominal pain, nausea and vomiting.   Neurological:  Negative for dizziness and light-headedness.   Psychiatric/Behavioral:  The patient is not nervous/anxious.        Objective:       Latest Reference Range & Units 03/02/22 10:23 12/09/22 08:04   WBC 3.90 - 12.70 K/uL 9.53 7.20   RBC 4.00 - 5.40 M/uL 3.97 (L) 4.07   Hemoglobin 12.0 - 16.0 g/dL 12.0 12.2   Hematocrit 37.0 - 48.5 % 38.7 38.3   MCV 82 - 98 fL 98 94   MCH 27.0 - 31.0 pg 30.2 30.0   MCHC 32.0 - 36.0 g/dL 31.0 (L) 31.9 (L)   RDW 11.5 - 14.5 % 12.4 12.0   Platelets 150 - 450 K/uL 397 389   MPV 9.2 - 12.9 fL 8.6 (L) 8.6 (L)   Gran % 38.0 - 73.0 % 46.5 52.2   Lymph % 18.0 -  48.0 % 44.4 38.1   Mono % 4.0 - 15.0 % 5.9 6.1   Eosinophil % 0.0 - 8.0 % 2.6 2.6   Basophil % 0.0 - 1.9 % 0.4 0.7   Immature Granulocytes 0.0 - 0.5 % 0.2 0.3   Gran # (ANC) 1.8 - 7.7 K/uL 4.4 3.8   Lymph # 1.0 - 4.8 K/uL 4.2 2.7   Mono # 0.3 - 1.0 K/uL 0.6 0.4   Eos # 0.0 - 0.5 K/uL 0.3 0.2   Baso # 0.00 - 0.20 K/uL 0.04 0.05   Immature Grans (Abs) 0.00 - 0.04 K/uL 0.02 0.02   nRBC 0 /100 WBC 0 0   Differential Method  Automated Automated   Protime 9.0 - 12.5 sec  10.3   INR 0.8 - 1.2   1.0   aPTT 21.0 - 32.0 sec  28.8   Sodium 136 - 145 mmol/L 141 140   Potassium 3.5 - 5.1 mmol/L 3.9 4.0   Chloride 95 - 110 mmol/L 108 106   CO2 23 - 29 mmol/L 24 22 (L)   Anion Gap 8 - 16 mmol/L 9 12   BUN 8 - 23 mg/dL 15 9   Creatinine 0.5 - 1.4 mg/dL 0.9 0.8   eGFR >60 mL/min/1.73 m^2  >60   eGFR if non African American >60 mL/min/1.73 m^2 >60.0    eGFR if African American >60 mL/min/1.73 m^2 >60.0    Glucose 70 - 110 mg/dL 92 95   Calcium 8.7 - 10.5 mg/dL 9.7 9.4   Alkaline Phosphatase 55 - 135 U/L 133    PROTEIN TOTAL 6.0 - 8.4 g/dL 7.3    Albumin 3.5 - 5.2 g/dL 3.7    BILIRUBIN TOTAL 0.1 - 1.0 mg/dL 0.6    AST 10 - 40 U/L 21    ALT 10 - 44 U/L 22    Cholesterol 120 - 199 mg/dL 157    HDL 40 - 75 mg/dL 48    HDL/Cholesterol Ratio 20.0 - 50.0 % 30.6    LDL Cholesterol External 63.0 - 159.0 mg/dL 86.2    Non-HDL Cholesterol mg/dL 109    Total Cholesterol/HDL Ratio 2.0 - 5.0  3.3    Triglycerides 30 - 150 mg/dL 114    CPK 20 - 180 U/L 239 (H)    TSH 0.400 - 4.000 uIU/mL 0.494    (L): Data is abnormally low  (H): Data is abnormally high    Vitals:    07/13/23 1010   BP: 137/65   Pulse: 79           Physical Exam  Vitals reviewed.   Constitutional:       General: She is not in acute distress.     Appearance: Normal appearance. She is obese. She is not ill-appearing, toxic-appearing or diaphoretic.   HENT:      Head: Normocephalic and atraumatic.   Cardiovascular:      Rate and Rhythm: Normal rate.   Pulmonary:      Effort: Pulmonary  effort is normal. No respiratory distress.   Skin:     General: Skin is warm and dry.   Neurological:      Mental Status: She is alert and oriented to person, place, and time.       Assessment:       Problem List Items Addressed This Visit       HTN (hypertension)    Pure hypercholesterolemia    REN (obstructive sleep apnea)    Coronary artery disease involving native coronary artery of native heart without angina pectoris    Obesity - Primary     Other Visit Diagnoses       Encounter for weight loss counseling                    Plan:   - Ozempic 1 mg weekly injections    - Log all food and beverage intake with a daily calorie goal of 1500 calories per day    - Moderate intensity aerobic exercise for 30 minuets 3-5x/week

## 2023-08-14 ENCOUNTER — OFFICE VISIT (OUTPATIENT)
Dept: PODIATRY | Facility: CLINIC | Age: 63
End: 2023-08-14
Payer: MEDICARE

## 2023-08-14 VITALS
BODY MASS INDEX: 30.8 KG/M2 | DIASTOLIC BLOOD PRESSURE: 97 MMHG | WEIGHT: 203.25 LBS | SYSTOLIC BLOOD PRESSURE: 142 MMHG | HEART RATE: 76 BPM | HEIGHT: 68 IN

## 2023-08-14 DIAGNOSIS — M77.32 INFERIOR CALCANEAL SPUR OF LEFT FOOT: ICD-10-CM

## 2023-08-14 DIAGNOSIS — M76.60 DISTAL ACHILLES TENDINITIS: ICD-10-CM

## 2023-08-14 DIAGNOSIS — M72.2 PLANTAR FASCIITIS, LEFT: ICD-10-CM

## 2023-08-14 DIAGNOSIS — M79.672 PAIN OF LEFT HEEL: ICD-10-CM

## 2023-08-14 DIAGNOSIS — M77.8 ENTHESOPATHY OF LEFT FOOT: Primary | ICD-10-CM

## 2023-08-14 PROCEDURE — 1159F PR MEDICATION LIST DOCUMENTED IN MEDICAL RECORD: ICD-10-PCS | Mod: CPTII,S$GLB,, | Performed by: PODIATRIST

## 2023-08-14 PROCEDURE — 99999 PR PBB SHADOW E&M-EST. PATIENT-LVL IV: ICD-10-PCS | Mod: PBBFAC,,, | Performed by: PODIATRIST

## 2023-08-14 PROCEDURE — 1159F MED LIST DOCD IN RCRD: CPT | Mod: CPTII,S$GLB,, | Performed by: PODIATRIST

## 2023-08-14 PROCEDURE — 4010F ACE/ARB THERAPY RXD/TAKEN: CPT | Mod: CPTII,S$GLB,, | Performed by: PODIATRIST

## 2023-08-14 PROCEDURE — 99214 PR OFFICE/OUTPT VISIT, EST, LEVL IV, 30-39 MIN: ICD-10-PCS | Mod: 25,S$GLB,, | Performed by: PODIATRIST

## 2023-08-14 PROCEDURE — 3077F SYST BP >= 140 MM HG: CPT | Mod: CPTII,S$GLB,, | Performed by: PODIATRIST

## 2023-08-14 PROCEDURE — 99214 OFFICE O/P EST MOD 30 MIN: CPT | Mod: 25,S$GLB,, | Performed by: PODIATRIST

## 2023-08-14 PROCEDURE — 3077F PR MOST RECENT SYSTOLIC BLOOD PRESSURE >= 140 MM HG: ICD-10-PCS | Mod: CPTII,S$GLB,, | Performed by: PODIATRIST

## 2023-08-14 PROCEDURE — 20550 PR INJECT TENDON SHEATH/LIGAMENT: ICD-10-PCS | Mod: LT,S$GLB,, | Performed by: PODIATRIST

## 2023-08-14 PROCEDURE — 1101F PT FALLS ASSESS-DOCD LE1/YR: CPT | Mod: CPTII,S$GLB,, | Performed by: PODIATRIST

## 2023-08-14 PROCEDURE — 3288F FALL RISK ASSESSMENT DOCD: CPT | Mod: CPTII,S$GLB,, | Performed by: PODIATRIST

## 2023-08-14 PROCEDURE — 20550 NJX 1 TENDON SHEATH/LIGAMENT: CPT | Mod: LT,S$GLB,, | Performed by: PODIATRIST

## 2023-08-14 PROCEDURE — 1101F PR PT FALLS ASSESS DOC 0-1 FALLS W/OUT INJ PAST YR: ICD-10-PCS | Mod: CPTII,S$GLB,, | Performed by: PODIATRIST

## 2023-08-14 PROCEDURE — 3288F PR FALLS RISK ASSESSMENT DOCUMENTED: ICD-10-PCS | Mod: CPTII,S$GLB,, | Performed by: PODIATRIST

## 2023-08-14 PROCEDURE — 3008F PR BODY MASS INDEX (BMI) DOCUMENTED: ICD-10-PCS | Mod: CPTII,S$GLB,, | Performed by: PODIATRIST

## 2023-08-14 PROCEDURE — 3080F PR MOST RECENT DIASTOLIC BLOOD PRESSURE >= 90 MM HG: ICD-10-PCS | Mod: CPTII,S$GLB,, | Performed by: PODIATRIST

## 2023-08-14 PROCEDURE — 4010F PR ACE/ARB THEARPY RXD/TAKEN: ICD-10-PCS | Mod: CPTII,S$GLB,, | Performed by: PODIATRIST

## 2023-08-14 PROCEDURE — 3080F DIAST BP >= 90 MM HG: CPT | Mod: CPTII,S$GLB,, | Performed by: PODIATRIST

## 2023-08-14 PROCEDURE — 99999 PR PBB SHADOW E&M-EST. PATIENT-LVL IV: CPT | Mod: PBBFAC,,, | Performed by: PODIATRIST

## 2023-08-14 PROCEDURE — 3008F BODY MASS INDEX DOCD: CPT | Mod: CPTII,S$GLB,, | Performed by: PODIATRIST

## 2023-08-14 RX ORDER — PREGABALIN 25 MG/1
25 CAPSULE ORAL
Qty: 90 CAPSULE | Refills: 1 | Status: SHIPPED | OUTPATIENT
Start: 2023-08-14

## 2023-08-14 RX ORDER — PREGABALIN 50 MG/1
50 CAPSULE ORAL NIGHTLY
Qty: 30 CAPSULE | Refills: 1 | Status: SHIPPED | OUTPATIENT
Start: 2023-08-14

## 2023-08-14 RX ADMIN — DEXAMETHASONE SODIUM PHOSPHATE 4 MG: 4 INJECTION, SOLUTION INTRA-ARTICULAR; INTRALESIONAL; INTRAMUSCULAR; INTRAVENOUS; SOFT TISSUE at 04:08

## 2023-08-14 RX ADMIN — METHYLPREDNISOLONE ACETATE 40 MG: 40 INJECTION, SUSPENSION INTRA-ARTICULAR; INTRALESIONAL; INTRAMUSCULAR; SOFT TISSUE at 04:08

## 2023-08-14 NOTE — PROGRESS NOTES
"Subjective:      Patient ID: Jennifer Brady is a 63 y.o. female.    Chief Complaint: Foot Pain and Follow-up    Patient states that pain is still there L foot.  Patient has not been here in > 3 months. At that time, had distal Achilles tendinitis & arch pain/ plantar fasciitis L. Advised on arch support such as the gel strap/ PPT arch pads, as well as heel cups/ PPT heel lifts. OTC P.O. & topical NSAID.  Not using the double arch pads, however.  Now states it went from arch to heel about 2 weeks ago, indicating medial.  Only thing she can wear is a pair of slides that she just purchased & presents in today, instead of other modified shoes.  Concerned something is seriously wrong. Has been taking Norco & Robaxin for her back but not helping foot.  Did put foot in paraffin - helped slightly. Pain level 6/10.  5/2/23  Jennifer Sutherland is a 63 y.o. female who presented new 2 months ago c/o heel pain L foot, especially standing w/out shoes x 1 month, but not as bad in Uggs or if she massages the area. Had hip replacement  2 months prior & doing very well in that regard. Seen 5-6 wks.ago & still hurt but better w/ a heel; did not think heel lifts help; given Silopos gelstrap for arch & Tulis heel cups. Was advised to continue use though. In [place]-style sandal had added 1/4" PPT heel lift. Using Voltaren gel. States can wear heels but not flats, even w/ heel lifts. Noticed R arch was blue 2 days ago but no increased pain. Pain level @ worst 6/10.    Arvind Juarez MD 6/30/23    Past Medical History:   Diagnosis Date    Acute coronary syndrome     Anxiety     Arthritis     Asthma     Behavioral problem     Borderline personality disorder     Coronary angioplasty status 04/2018    Coronary artery disease     Depression     Depression     Fatigue     Hx of psychiatric care     Hyperlipidemia     Hypertension     Myocardial infarction 05/2018    Psychiatric problem     S/P hysterectomy with oophorectomy     " Sleep apnea     pt uses CPAP    Syncope and collapse     Therapy       Patient Active Problem List   Diagnosis    Adjustment disorder with mixed anxiety and depressed mood    Depression    Episodic mood disorder    Cluster B personality disorder    HTN (hypertension)    Pure hypercholesterolemia    Anxiety    REN (obstructive sleep apnea)    Intractable chronic migraine without aura and without status migrainosus    Old MI (myocardial infarction)    Coronary artery disease involving native coronary artery of native heart without angina pectoris    Ex-smoker    Smoker    Obesity    Lumbar spondylosis    PAD (peripheral artery disease)    S/P PTCA (percutaneous transluminal coronary angioplasty)    Mild intermittent asthma without complication    Lumbar degenerative disc disease    Syncope and collapse    BMI 31.0-31.9,adult    PVCs (premature ventricular contractions)    Hip pain, chronic, right    Lower extremity weakness    Impaired range of motion of right hip    Pre-operative cardiovascular examination    Lung nodule    Tobacco abuse, in remission       Objective:      Physical Exam  Vitals reviewed.   Constitutional:       Appearance: She is well-developed. She is obese.   Cardiovascular:      Pulses: Normal pulses.           Dorsalis pedis pulses are 2+ on the left side.   Musculoskeletal:         General: Swelling and tenderness present. No signs of injury.      Right lower leg: No edema.      Left lower leg: No edema.      Left foot: Normal range of motion. Deformity (cavus foot) and prominent metatarsal heads present.        Feet:    Feet:      Left foot:      Skin integrity: Skin integrity normal.      Comments: Equinus B/L ankles w/ < 90 deg foot to leg noted w/ knees extended.      MS strength of extrinsics to foot & ankle L + 5/5 in DF/PF/Inv/Ev to resistance w/ no reproduction of pain in any direction.   No TTP along PT tendon nor along retro malleolar area medial left ankle.  Resolution of  TTP central  anterior heel & distal medial Achilles L.  Tenderness & fullness along medial heel extending from inferior navicular tuberosity to about mid medial heel, inferior to the PT tendon L    Current thong sandal has slight arch but not adequate for her cavus foot type.  Heel is less than an inch.    Passive ROM of ankle and pedal joints is painless & w/out crepitation B/L.     Skin:     General: Skin is warm and dry.      Capillary Refill: Capillary refill takes less than 2 seconds.      Findings: No bruising, ecchymosis, erythema or lesion.   Neurological:      Mental Status: She is alert and oriented to person, place, and time.      Sensory: No sensory deficit.      Motor: No weakness.      Gait: Gait normal.      Comments: No TTP nor fullness L tarsal tunnel.      Psychiatric:         Mood and Affect: Mood is anxious. Affect is tearful.         Behavior: Behavior normal. Behavior is cooperative.      X-Ray Calcaneus 2 View Left  Narrative: EXAMINATION:  XR CALCANEUS 2 VIEW LEFT    CLINICAL HISTORY:  Other enthesopathies, not elsewhere classified    TECHNIQUE:  Tangential and lateral views of the left calcaneus were performed.    COMPARISON:  None    FINDINGS:  No evidence of fracture or focal osseous destructive process.    Mild enthesophyte formation at the plantar insertion.    No focal soft tissue swelling or radiopaque foreign body.  Impression: As above    Electronically signed by: Cristian Abreu MD  Date:    08/15/2023  Time:    10:21   I independently reviewed the Xray images. Enthesophyte infracalcaneal insertion of plantar fascia consistent w/ area of initial cc.     Assessment:      Encounter Diagnoses   Name Primary?    Enthesopathy of left foot Yes    Plantar fasciitis, left     Distal Achilles tendinitis     Pain of left heel     Inferior calcaneal spur of left foot       Problem List Items Addressed This Visit    None  Visit Diagnoses       Enthesopathy of left foot    -  Primary    Relevant Medications  "   pregabalin (LYRICA) 25 MG capsule    pregabalin (LYRICA) 50 MG capsule    Other Relevant Orders    X-Ray Calcaneus 2 View Left (Completed)    Injection left medial heel (@ level inferior to TNJ & PT tendon insertion)    Plantar fasciitis, left        Distal Achilles tendinitis        Pain of left heel        Inferior calcaneal spur of left foot               Plan:       Jennifer Sutherland was seen today for foot pain and follow-up.    Diagnoses and all orders for this visit:    Enthesopathy of left foot  -     pregabalin (LYRICA) 25 MG capsule; Take 1 capsule (25 mg total) by mouth after meals as needed.  -     pregabalin (LYRICA) 50 MG capsule; Take 1 capsule (50 mg total) by mouth every evening.  -     X-Ray Calcaneus 2 View Left; Future  -     Injection left medial heel (@ level inferior to TNJ & PT tendon insertion)    Plantar fasciitis, left    Distal Achilles tendinitis    Pain of left heel    Inferior calcaneal spur of left foot    I counseled the patient on her conditions, their implications and medical management.    - Shoe inspection. We discussed wearing proper supportive shoe gear @t all times WB including at home.  Continue Tulis heel cups/ PPT heel lifts !/4" in all shoes w/ <1-1/4" heel wedge & Silipos gelstrap/ PPT arch pad for arch support, @ all times WB .    PPT heel lift and arch pad added to B/L current shoe gear.    Continue Voltaren gel up to q.i.d. p.r.n.& OTC 2 Aleve bid.    Injection left heel with local anesthetic and steroid.    We will review x-rays with the patient at her next visit    To bring in other shoes for evaluation/ modification p.r.n.    Follow-up in 3-4 weeks, sooner p.r.n.        A total of 34 mins.was spent on chart review, patient visit & documentation.     .      "

## 2023-08-15 RX ORDER — DEXAMETHASONE SODIUM PHOSPHATE 4 MG/ML
4 INJECTION, SOLUTION INTRA-ARTICULAR; INTRALESIONAL; INTRAMUSCULAR; INTRAVENOUS; SOFT TISSUE
Status: DISCONTINUED | OUTPATIENT
Start: 2023-08-14 | End: 2023-08-15 | Stop reason: HOSPADM

## 2023-08-15 RX ORDER — METHYLPREDNISOLONE ACETATE 40 MG/ML
40 INJECTION, SUSPENSION INTRA-ARTICULAR; INTRALESIONAL; INTRAMUSCULAR; SOFT TISSUE
Status: DISCONTINUED | OUTPATIENT
Start: 2023-08-14 | End: 2023-08-15 | Stop reason: HOSPADM

## 2023-08-15 NOTE — PROCEDURES
Injection left medial heel (@ level inferior to TNJ & PT tendon insertion)    Date/Time: 8/14/2023 4:00 PM    Performed by: Clarissa Ching DPM  Authorized by: Clarissa Ching DPM    Indications:  Pain  Anesthesia:  Local infiltration  Local anesthetic:  Bupivacaine 0.5% without epinephrine, lidocaine 2% without epinephrine and topical anesthetic  Anesthetic total (ml):  1    Location:  Foot  Needle size:  25 G  Approach:  Medial  Medications:  4 mg dexAMETHasone 4 mg/mL; 40 mg methylPREDNISolone acetate 40 mg/mL  Patient tolerance:  Patient tolerated the procedure well with no immediate complications

## 2023-09-19 ENCOUNTER — HOSPITAL ENCOUNTER (OUTPATIENT)
Dept: RADIOLOGY | Facility: OTHER | Age: 63
Discharge: HOME | End: 2023-09-19
Attending: INTERNAL MEDICINE
Payer: MEDICARE

## 2023-09-19 DIAGNOSIS — R91.1 LUNG NODULE: ICD-10-CM

## 2023-09-19 DIAGNOSIS — Z87.891 STOPPED SMOKING WITH GREATER THAN 30 PACK YEAR HISTORY: ICD-10-CM

## 2023-09-19 PROCEDURE — 71250 CT CHEST WITHOUT CONTRAST: ICD-10-PCS | Mod: 26,,, | Performed by: RADIOLOGY

## 2023-09-19 PROCEDURE — 71250 CT THORAX DX C-: CPT | Mod: 26,,, | Performed by: RADIOLOGY

## 2023-09-19 PROCEDURE — 71250 CT THORAX DX C-: CPT | Mod: TC

## 2023-09-22 ENCOUNTER — OFFICE VISIT (OUTPATIENT)
Dept: URGENT CARE | Facility: CLINIC | Age: 63
End: 2023-09-22
Payer: MEDICARE

## 2023-09-22 VITALS
BODY MASS INDEX: 30.31 KG/M2 | HEART RATE: 85 BPM | WEIGHT: 200 LBS | HEIGHT: 68 IN | OXYGEN SATURATION: 98 % | SYSTOLIC BLOOD PRESSURE: 134 MMHG | TEMPERATURE: 97 F | RESPIRATION RATE: 16 BRPM | DIASTOLIC BLOOD PRESSURE: 61 MMHG

## 2023-09-22 DIAGNOSIS — S09.90XA HEAD TRAUMA, INITIAL ENCOUNTER: ICD-10-CM

## 2023-09-22 DIAGNOSIS — S60.212A CONTUSION OF LEFT WRIST, INITIAL ENCOUNTER: ICD-10-CM

## 2023-09-22 DIAGNOSIS — S63.502A LEFT WRIST SPRAIN, INITIAL ENCOUNTER: ICD-10-CM

## 2023-09-22 DIAGNOSIS — M25.532 LEFT WRIST PAIN: Primary | ICD-10-CM

## 2023-09-22 PROCEDURE — 99214 PR OFFICE/OUTPT VISIT, EST, LEVL IV, 30-39 MIN: ICD-10-PCS | Mod: S$GLB,,, | Performed by: FAMILY MEDICINE

## 2023-09-22 PROCEDURE — 73110 XR WRIST COMPLETE 3 VIEWS LEFT: ICD-10-PCS | Mod: LT,S$GLB,, | Performed by: RADIOLOGY

## 2023-09-22 PROCEDURE — 99214 OFFICE O/P EST MOD 30 MIN: CPT | Mod: S$GLB,,, | Performed by: FAMILY MEDICINE

## 2023-09-22 PROCEDURE — 73110 X-RAY EXAM OF WRIST: CPT | Mod: LT,S$GLB,, | Performed by: RADIOLOGY

## 2023-09-22 RX ORDER — IBUPROFEN 200 MG
1 TABLET ORAL
COMMUNITY
Start: 2023-08-11 | End: 2023-10-20 | Stop reason: SDUPTHER

## 2023-09-22 NOTE — PATIENT INSTRUCTIONS
Rest ice and elevate the left wrist and you can use the wrist brace for extra compression and support and feel pain has resolved.  If the pain is persisting for more than a week or worsening at any time I recommend following up with an orthopedic specialist.  You can use her own orthopedic or you may reach out to our  like to schedule the appointment through [facility]. The referral has been placed through [facility]. You can call our  line at [phone] and they can assist you in making this specialist appointment.    As for the head trauma likely there will be no brain bleed or fracture of the skull however it is warranted to obtain a CT scan later today to rule out these issues.  If the CT scan is normal and you experience mild headache that is expected you can take Tylenol for that.      Otherwise if you have any significantly worsening headache or numbness or tingling or weakness in the extremities or dizziness or vomiting even with a negative CT scan I would recommend going to the ER for further evaluation and management.

## 2023-09-22 NOTE — PROGRESS NOTES
"Subjective:      Patient ID: Jennifer Brady is a 63 y.o. female.    Vitals:  height is 5' 8" (1.727 m) and weight is 90.7 kg (200 lb). Her temperature is 97.3 °F (36.3 °C). Her blood pressure is 134/61 and her pulse is 85. Her respiration is 16 and oxygen saturation is 98%.     Chief Complaint: Hand Injury    Presents today with complaints of left hand pain and tenderness of the scalp following recent injury. Pt states she was at [facility] just prior to presentation when a shelf that was over her head while she bent down to pick something up fell onto the top of her head and also onto her dorsal aspect left wrist/hand.  She does not believe hit her neck or shoulders but she is having some mild pain at the right trapezius.    She denies any numbness tingling weakness.  She does take aspirin 81 mg a day.    Hand Injury   Her dominant hand is their right hand. The incident occurred less than 1 hour ago. Incident location: AT [facility]. The injury mechanism was a direct blow. The pain is present in the left hand. The quality of the pain is described as burning. The pain does not radiate. The pain is at a severity of 9/10. The pain is severe. The pain has been Constant since the incident. Pertinent negatives include no chest pain, muscle weakness, numbness or tingling. Nothing aggravates the symptoms. She has tried nothing for the symptoms.       Cardiovascular:  Negative for chest pain.   Neurological:  Negative for numbness.      Objective:     Physical Exam  Constitutional: Pt oriented to person, place, and time.  Non-toxic appearance.   Patient does not appear ill. No distress. normal  HENT: No icterus or facial swelling appreciated  Head: Normocephalic and atraumatic. There is mild tenderness to palpation of the vertex of the scalp.  There is no gross hematoma or ecchymosis or depression of skull palpated.  Nose: No congestion.   Pulmonary/Chest: Effort normal. No stridor. No respiratory distress. "   Abdominal: Normal appearance. Abdomen exhibits no distension.   Musculoskeletal:         Left wrist/hand: there is 2cm area of ecchymosis radial aspect of the wrist, dorsum. Mild TTP. Pain with F/ E / supinate / pronate wrist.   Neurological: no focal deficit. Patient is alert and oriented to person, place, and time.   Skin: Skin is not diaphoretic and not pale. no jaundice  Psychiatric: Patients behavior is normal. Mood, judgment and thought content normal.     Assessment:     1. Left wrist pain    2. Head trauma, initial encounter    3. Contusion of left wrist, initial encounter    4. Left wrist sprain, initial encounter        Plan:       Left wrist pain  -     XR WRIST COMPLETE 3 VIEWS LEFT; Future; Expected date: 09/22/2023    Head trauma, initial encounter  Likely no intracranial process however patient does take daily aspirin therapy/blood thinner so I do recommend getting a CT scan to fully exclude bleed or fracture  -     CT Head Without Contrast; Future; Expected date: 09/22/2023- appointment made with [facility] service prior to patient discharge.  Appointment for today at 3:30 p.m..  Patient states she will be able to attend this appointment    Contusion of left wrist, initial encounter  -     WRIST BRACE FOR HOME USE  -     Ambulatory referral/consult to Orthopedics    Left wrist sprain, initial encounter  -     WRIST BRACE FOR HOME USE  -     Ambulatory referral/consult to Orthopedics- prn no improvement in next week or so            Pt advised to seek medical attention in nearest Emergency Department if experiencing any worsening or worrisome symptoms

## 2023-09-27 ENCOUNTER — TELEPHONE (OUTPATIENT)
Dept: PULMONOLOGY | Facility: CLINIC | Age: 63
End: 2023-09-27
Payer: MEDICARE

## 2023-09-27 DIAGNOSIS — Z87.891 STOPPED SMOKING WITH GREATER THAN 30 PACK YEAR HISTORY: ICD-10-CM

## 2023-09-27 DIAGNOSIS — R91.1 SOLITARY PULMONARY NODULE: Primary | ICD-10-CM

## 2023-09-27 NOTE — TELEPHONE ENCOUNTER
Called pt to discuss scheduling a follow up appt, but there was no answer. A voice message was left.

## 2023-09-27 NOTE — TELEPHONE ENCOUNTER
----- Message from Aviva Mendez PA-C sent at 9/27/2023 12:36 PM CDT -----  LDCT ordered to be scheduled for 1 year. Please contact patient and determine whether she would like her CT and Pulm follow up to be in [place] or here in  and schedule a follow up appt with LDCT accordingly.

## 2023-09-29 ENCOUNTER — TELEPHONE (OUTPATIENT)
Dept: PULMONOLOGY | Facility: CLINIC | Age: 63
End: 2023-09-29
Payer: MEDICARE

## 2023-09-29 NOTE — TELEPHONE ENCOUNTER
I spoke with Ms Brady in regards to scheduling her follow up visit. Appointment made with Dr Cheng on 10/20/23 at 2:00 PM. Appointment mailed. Patient confirmed and verbalized understanding.

## 2023-10-02 ENCOUNTER — OFFICE VISIT (OUTPATIENT)
Dept: BARIATRICS | Facility: CLINIC | Age: 63
End: 2023-10-02
Payer: MEDICARE

## 2023-10-02 VITALS
DIASTOLIC BLOOD PRESSURE: 72 MMHG | HEART RATE: 77 BPM | BODY MASS INDEX: 30.73 KG/M2 | WEIGHT: 202.13 LBS | SYSTOLIC BLOOD PRESSURE: 135 MMHG | OXYGEN SATURATION: 98 %

## 2023-10-02 DIAGNOSIS — Z71.3 ENCOUNTER FOR WEIGHT LOSS COUNSELING: ICD-10-CM

## 2023-10-02 DIAGNOSIS — I10 PRIMARY HYPERTENSION: ICD-10-CM

## 2023-10-02 DIAGNOSIS — E66.09 CLASS 1 OBESITY DUE TO EXCESS CALORIES WITH SERIOUS COMORBIDITY AND BODY MASS INDEX (BMI) OF 30.0 TO 30.9 IN ADULT: Primary | ICD-10-CM

## 2023-10-02 DIAGNOSIS — G47.33 OSA (OBSTRUCTIVE SLEEP APNEA): ICD-10-CM

## 2023-10-02 DIAGNOSIS — E78.00 PURE HYPERCHOLESTEROLEMIA: ICD-10-CM

## 2023-10-02 PROCEDURE — 99213 OFFICE O/P EST LOW 20 MIN: CPT | Mod: S$GLB,,, | Performed by: STUDENT IN AN ORGANIZED HEALTH CARE EDUCATION/TRAINING PROGRAM

## 2023-10-02 PROCEDURE — 3075F PR MOST RECENT SYSTOLIC BLOOD PRESS GE 130-139MM HG: ICD-10-PCS | Mod: CPTII,S$GLB,, | Performed by: STUDENT IN AN ORGANIZED HEALTH CARE EDUCATION/TRAINING PROGRAM

## 2023-10-02 PROCEDURE — 99213 PR OFFICE/OUTPT VISIT, EST, LEVL III, 20-29 MIN: ICD-10-PCS | Mod: S$GLB,,, | Performed by: STUDENT IN AN ORGANIZED HEALTH CARE EDUCATION/TRAINING PROGRAM

## 2023-10-02 PROCEDURE — 1101F PT FALLS ASSESS-DOCD LE1/YR: CPT | Mod: CPTII,S$GLB,, | Performed by: STUDENT IN AN ORGANIZED HEALTH CARE EDUCATION/TRAINING PROGRAM

## 2023-10-02 PROCEDURE — 3288F FALL RISK ASSESSMENT DOCD: CPT | Mod: CPTII,S$GLB,, | Performed by: STUDENT IN AN ORGANIZED HEALTH CARE EDUCATION/TRAINING PROGRAM

## 2023-10-02 PROCEDURE — 1160F RVW MEDS BY RX/DR IN RCRD: CPT | Mod: CPTII,S$GLB,, | Performed by: STUDENT IN AN ORGANIZED HEALTH CARE EDUCATION/TRAINING PROGRAM

## 2023-10-02 PROCEDURE — 3075F SYST BP GE 130 - 139MM HG: CPT | Mod: CPTII,S$GLB,, | Performed by: STUDENT IN AN ORGANIZED HEALTH CARE EDUCATION/TRAINING PROGRAM

## 2023-10-02 PROCEDURE — 4010F ACE/ARB THERAPY RXD/TAKEN: CPT | Mod: CPTII,S$GLB,, | Performed by: STUDENT IN AN ORGANIZED HEALTH CARE EDUCATION/TRAINING PROGRAM

## 2023-10-02 PROCEDURE — 3008F BODY MASS INDEX DOCD: CPT | Mod: CPTII,S$GLB,, | Performed by: STUDENT IN AN ORGANIZED HEALTH CARE EDUCATION/TRAINING PROGRAM

## 2023-10-02 PROCEDURE — 1101F PR PT FALLS ASSESS DOC 0-1 FALLS W/OUT INJ PAST YR: ICD-10-PCS | Mod: CPTII,S$GLB,, | Performed by: STUDENT IN AN ORGANIZED HEALTH CARE EDUCATION/TRAINING PROGRAM

## 2023-10-02 PROCEDURE — 3288F PR FALLS RISK ASSESSMENT DOCUMENTED: ICD-10-PCS | Mod: CPTII,S$GLB,, | Performed by: STUDENT IN AN ORGANIZED HEALTH CARE EDUCATION/TRAINING PROGRAM

## 2023-10-02 PROCEDURE — 99999 PR PBB SHADOW E&M-EST. PATIENT-LVL IV: ICD-10-PCS | Mod: PBBFAC,,, | Performed by: STUDENT IN AN ORGANIZED HEALTH CARE EDUCATION/TRAINING PROGRAM

## 2023-10-02 PROCEDURE — 4010F PR ACE/ARB THEARPY RXD/TAKEN: ICD-10-PCS | Mod: CPTII,S$GLB,, | Performed by: STUDENT IN AN ORGANIZED HEALTH CARE EDUCATION/TRAINING PROGRAM

## 2023-10-02 PROCEDURE — 1160F PR REVIEW ALL MEDS BY PRESCRIBER/CLIN PHARMACIST DOCUMENTED: ICD-10-PCS | Mod: CPTII,S$GLB,, | Performed by: STUDENT IN AN ORGANIZED HEALTH CARE EDUCATION/TRAINING PROGRAM

## 2023-10-02 PROCEDURE — 99999 PR PBB SHADOW E&M-EST. PATIENT-LVL IV: CPT | Mod: PBBFAC,,, | Performed by: STUDENT IN AN ORGANIZED HEALTH CARE EDUCATION/TRAINING PROGRAM

## 2023-10-02 PROCEDURE — 3078F DIAST BP <80 MM HG: CPT | Mod: CPTII,S$GLB,, | Performed by: STUDENT IN AN ORGANIZED HEALTH CARE EDUCATION/TRAINING PROGRAM

## 2023-10-02 PROCEDURE — 3008F PR BODY MASS INDEX (BMI) DOCUMENTED: ICD-10-PCS | Mod: CPTII,S$GLB,, | Performed by: STUDENT IN AN ORGANIZED HEALTH CARE EDUCATION/TRAINING PROGRAM

## 2023-10-02 PROCEDURE — 1159F MED LIST DOCD IN RCRD: CPT | Mod: CPTII,S$GLB,, | Performed by: STUDENT IN AN ORGANIZED HEALTH CARE EDUCATION/TRAINING PROGRAM

## 2023-10-02 PROCEDURE — 3078F PR MOST RECENT DIASTOLIC BLOOD PRESSURE < 80 MM HG: ICD-10-PCS | Mod: CPTII,S$GLB,, | Performed by: STUDENT IN AN ORGANIZED HEALTH CARE EDUCATION/TRAINING PROGRAM

## 2023-10-02 PROCEDURE — 1159F PR MEDICATION LIST DOCUMENTED IN MEDICAL RECORD: ICD-10-PCS | Mod: CPTII,S$GLB,, | Performed by: STUDENT IN AN ORGANIZED HEALTH CARE EDUCATION/TRAINING PROGRAM

## 2023-10-02 NOTE — PROGRESS NOTES
Subjective:       Patient ID: Jennifer Brady is a 63 y.o. female.    Chief Complaint: Obesity, Follow-up, and Weight Check    Patient presents for treatment of obesity.       Co-morbidities  Migraines - was on topiramate, but was discontinued because didn't help with headaches  REN  MI  HTN  HLD    Weight History  Lowest adult weight: 165 lbs  Highest adult weight: 220 lbs         Current Physical Activity  No regular exercise, some walking but not consistent  Foot pain due to dropped arches, but that has improved      Current Eating Habits  Breakfast - skips most days, occassional cup of coffee  [place]bert  Potatoes - mashed, roasted  White beans  Roast  Turkey necks   Biscuits  Cabbage  Gumbo  Recently stopped drinking sweet tea and lemonade      Medical Weight Loss  2/13/2023: 216.2 lbs, BMI 32.9, BFP 47.6%, .9 lbs, SMM 62.4 lbs, BMR 1480 kcal  5/11/2023: 209.1 lbs, BMI 31.8, BFP 45.8%, BFM 95.8 lbs, SMM 62.4 lbs, BMR 1481 kcal  7/13/2023: 204.6 lbs, BMI 31.1, BFP 44.4%, BFM 90.9 lbs, SMM 62.6 lbs, BMR 1485 kcal  10/2/2023: 202.1 lbs, BMI 30.7, BFP 43.1%, BFM 87 lbs, SMM 64.4 lbs, BMR 1498 kcal      Review of Systems   Constitutional:  Negative for chills and fever.   Respiratory:  Negative for shortness of breath.    Cardiovascular:  Negative for chest pain and palpitations.   Gastrointestinal:  Negative for abdominal pain, nausea and vomiting.   Neurological:  Negative for dizziness and light-headedness.   Psychiatric/Behavioral:  The patient is not nervous/anxious.          Objective:       Latest Reference Range & Units 03/02/22 10:23 12/09/22 08:04   WBC 3.90 - 12.70 K/uL 9.53 7.20   RBC 4.00 - 5.40 M/uL 3.97 (L) 4.07   Hemoglobin 12.0 - 16.0 g/dL 12.0 12.2   Hematocrit 37.0 - 48.5 % 38.7 38.3   MCV 82 - 98 fL 98 94   MCH 27.0 - 31.0 pg 30.2 30.0   MCHC 32.0 - 36.0 g/dL 31.0 (L) 31.9 (L)   RDW 11.5 - 14.5 % 12.4 12.0   Platelets 150 - 450 K/uL 397 389   MPV 9.2 - 12.9 fL 8.6 (L) 8.6 (L)   Gran  % 38.0 - 73.0 % 46.5 52.2   Lymph % 18.0 - 48.0 % 44.4 38.1   Mono % 4.0 - 15.0 % 5.9 6.1   Eosinophil % 0.0 - 8.0 % 2.6 2.6   Basophil % 0.0 - 1.9 % 0.4 0.7   Immature Granulocytes 0.0 - 0.5 % 0.2 0.3   Gran # (ANC) 1.8 - 7.7 K/uL 4.4 3.8   Lymph # 1.0 - 4.8 K/uL 4.2 2.7   Mono # 0.3 - 1.0 K/uL 0.6 0.4   Eos # 0.0 - 0.5 K/uL 0.3 0.2   Baso # 0.00 - 0.20 K/uL 0.04 0.05   Immature Grans (Abs) 0.00 - 0.04 K/uL 0.02 0.02   nRBC 0 /100 WBC 0 0   Differential Method  Automated Automated   Protime 9.0 - 12.5 sec  10.3   INR 0.8 - 1.2   1.0   aPTT 21.0 - 32.0 sec  28.8   Sodium 136 - 145 mmol/L 141 140   Potassium 3.5 - 5.1 mmol/L 3.9 4.0   Chloride 95 - 110 mmol/L 108 106   CO2 23 - 29 mmol/L 24 22 (L)   Anion Gap 8 - 16 mmol/L 9 12   BUN 8 - 23 mg/dL 15 9   Creatinine 0.5 - 1.4 mg/dL 0.9 0.8   eGFR >60 mL/min/1.73 m^2  >60   eGFR if non African American >60 mL/min/1.73 m^2 >60.0    eGFR if African American >60 mL/min/1.73 m^2 >60.0    Glucose 70 - 110 mg/dL 92 95   Calcium 8.7 - 10.5 mg/dL 9.7 9.4   Alkaline Phosphatase 55 - 135 U/L 133    PROTEIN TOTAL 6.0 - 8.4 g/dL 7.3    Albumin 3.5 - 5.2 g/dL 3.7    BILIRUBIN TOTAL 0.1 - 1.0 mg/dL 0.6    AST 10 - 40 U/L 21    ALT 10 - 44 U/L 22    Cholesterol 120 - 199 mg/dL 157    HDL 40 - 75 mg/dL 48    HDL/Cholesterol Ratio 20.0 - 50.0 % 30.6    LDL Cholesterol External 63.0 - 159.0 mg/dL 86.2    Non-HDL Cholesterol mg/dL 109    Total Cholesterol/HDL Ratio 2.0 - 5.0  3.3    Triglycerides 30 - 150 mg/dL 114    CPK 20 - 180 U/L 239 (H)    TSH 0.400 - 4.000 uIU/mL 0.494    (L): Data is abnormally low  (H): Data is abnormally high    Vitals:    10/02/23 1000   BP: 135/72   Pulse: 77     Physical Exam  Vitals reviewed.   Constitutional:       General: She is not in acute distress.     Appearance: Normal appearance. She is obese. She is not ill-appearing, toxic-appearing or diaphoretic.   HENT:      Head: Normocephalic and atraumatic.   Cardiovascular:      Rate and Rhythm: Normal  rate.   Pulmonary:      Effort: Pulmonary effort is normal. No respiratory distress.   Skin:     General: Skin is warm and dry.   Neurological:      Mental Status: She is alert and oriented to person, place, and time.         Assessment:       Problem List Items Addressed This Visit       HTN (hypertension)    Pure hypercholesterolemia    REN (obstructive sleep apnea)    Obesity - Primary     Other Visit Diagnoses       Encounter for weight loss counseling                      Plan:   - Ozempic 1 mg weekly injections    - Log all food and beverage intake with a daily calorie goal of 1500 calories per day    - Moderate intensity aerobic exercise for 30 minuets 3-5x/week

## 2023-10-20 ENCOUNTER — OFFICE VISIT (OUTPATIENT)
Dept: PULMONOLOGY | Facility: CLINIC | Age: 63
End: 2023-10-20
Payer: MEDICARE

## 2023-10-20 VITALS — HEIGHT: 68 IN | WEIGHT: 204.56 LBS | BODY MASS INDEX: 31 KG/M2

## 2023-10-20 DIAGNOSIS — R91.1 LUNG NODULE: ICD-10-CM

## 2023-10-20 DIAGNOSIS — G47.33 OSA (OBSTRUCTIVE SLEEP APNEA): ICD-10-CM

## 2023-10-20 DIAGNOSIS — R55 SYNCOPE AND COLLAPSE: ICD-10-CM

## 2023-10-20 DIAGNOSIS — R91.1 SOLITARY PULMONARY NODULE: ICD-10-CM

## 2023-10-20 DIAGNOSIS — Z87.891 EX-SMOKER: ICD-10-CM

## 2023-10-20 DIAGNOSIS — J45.20 MILD INTERMITTENT ASTHMA WITHOUT COMPLICATION: Primary | ICD-10-CM

## 2023-10-20 PROCEDURE — 4010F ACE/ARB THERAPY RXD/TAKEN: CPT | Mod: CPTII,GC,S$GLB, | Performed by: INTERNAL MEDICINE

## 2023-10-20 PROCEDURE — 4010F PR ACE/ARB THEARPY RXD/TAKEN: ICD-10-PCS | Mod: CPTII,GC,S$GLB, | Performed by: INTERNAL MEDICINE

## 2023-10-20 PROCEDURE — 99215 PR OFFICE/OUTPT VISIT, EST, LEVL V, 40-54 MIN: ICD-10-PCS | Mod: GC,S$GLB,, | Performed by: INTERNAL MEDICINE

## 2023-10-20 PROCEDURE — 3008F PR BODY MASS INDEX (BMI) DOCUMENTED: ICD-10-PCS | Mod: CPTII,GC,S$GLB, | Performed by: INTERNAL MEDICINE

## 2023-10-20 PROCEDURE — 3008F BODY MASS INDEX DOCD: CPT | Mod: CPTII,GC,S$GLB, | Performed by: INTERNAL MEDICINE

## 2023-10-20 PROCEDURE — 99999 PR PBB SHADOW E&M-EST. PATIENT-LVL III: CPT | Mod: PBBFAC,GC,, | Performed by: STUDENT IN AN ORGANIZED HEALTH CARE EDUCATION/TRAINING PROGRAM

## 2023-10-20 PROCEDURE — 1159F MED LIST DOCD IN RCRD: CPT | Mod: CPTII,GC,S$GLB, | Performed by: INTERNAL MEDICINE

## 2023-10-20 PROCEDURE — 99215 OFFICE O/P EST HI 40 MIN: CPT | Mod: GC,S$GLB,, | Performed by: INTERNAL MEDICINE

## 2023-10-20 PROCEDURE — 1100F PTFALLS ASSESS-DOCD GE2>/YR: CPT | Mod: CPTII,GC,S$GLB, | Performed by: INTERNAL MEDICINE

## 2023-10-20 PROCEDURE — 99999 PR PBB SHADOW E&M-EST. PATIENT-LVL III: ICD-10-PCS | Mod: PBBFAC,GC,, | Performed by: STUDENT IN AN ORGANIZED HEALTH CARE EDUCATION/TRAINING PROGRAM

## 2023-10-20 PROCEDURE — 1160F PR REVIEW ALL MEDS BY PRESCRIBER/CLIN PHARMACIST DOCUMENTED: ICD-10-PCS | Mod: CPTII,GC,S$GLB, | Performed by: INTERNAL MEDICINE

## 2023-10-20 PROCEDURE — 1159F PR MEDICATION LIST DOCUMENTED IN MEDICAL RECORD: ICD-10-PCS | Mod: CPTII,GC,S$GLB, | Performed by: INTERNAL MEDICINE

## 2023-10-20 PROCEDURE — 1100F PR PT FALLS ASSESS DOC 2+ FALLS/FALL W/INJURY/YR: ICD-10-PCS | Mod: CPTII,GC,S$GLB, | Performed by: INTERNAL MEDICINE

## 2023-10-20 PROCEDURE — 1160F RVW MEDS BY RX/DR IN RCRD: CPT | Mod: CPTII,GC,S$GLB, | Performed by: INTERNAL MEDICINE

## 2023-10-20 PROCEDURE — 3288F FALL RISK ASSESSMENT DOCD: CPT | Mod: CPTII,GC,S$GLB, | Performed by: INTERNAL MEDICINE

## 2023-10-20 PROCEDURE — 3288F PR FALLS RISK ASSESSMENT DOCUMENTED: ICD-10-PCS | Mod: CPTII,GC,S$GLB, | Performed by: INTERNAL MEDICINE

## 2023-10-20 RX ORDER — ALBUTEROL SULFATE 90 UG/1
AEROSOL, METERED RESPIRATORY (INHALATION)
COMMUNITY
Start: 2023-10-16

## 2023-10-20 RX ORDER — GUAIFENESIN/DEXTROMETHORPHAN 100-10MG/5
5 SYRUP ORAL 4 TIMES DAILY PRN
COMMUNITY
Start: 2023-09-25 | End: 2024-01-23 | Stop reason: ALTCHOICE

## 2023-10-20 RX ORDER — LEVOCETIRIZINE DIHYDROCHLORIDE 5 MG/1
5 TABLET, FILM COATED ORAL NIGHTLY
Qty: 30 TABLET | Refills: 11 | Status: SHIPPED | OUTPATIENT
Start: 2023-10-20 | End: 2024-10-19
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
## 2023-10-20 NOTE — ASSESSMENT & PLAN NOTE
8mm peripheral left nodule. CT Sept of this year is unchanged from CT in May. Biopsy with insufficient sample, no clear pathology.  Ex-smoker, quit in 2022, 5 cigarettes daily for 10 years.    Discussed with patient; will surveil again in May 2024 for 1 year follow-up.     In the meantime, will see Pulm again in 3 mo after PFTs and 6MWT; suspect that her long-standing shortness of breath on exertion is unrelated to this small nodule.
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
## 2023-10-20 NOTE — PROGRESS NOTES
63 year old woman presenting for evaluation of lung nodules and chronic dyspnea on exertion.  +seasonal allergies, PND  Remote smoking history that is under whelming.   Incidental finding of a lung nodule on a CT Chest measuring 8mm.  Stable at 4 months. No alarm signs.      Check PFT tests.  Schedule follow up CT surveillance for April of 2024.

## 2023-10-25 ENCOUNTER — HOSPITAL ENCOUNTER (OUTPATIENT)
Dept: PULMONOLOGY | Facility: CLINIC | Age: 63
Discharge: HOME | End: 2023-10-25
Payer: MEDICARE

## 2023-10-25 VITALS — BODY MASS INDEX: 31 KG/M2 | HEIGHT: 68 IN | WEIGHT: 204.56 LBS

## 2023-10-25 DIAGNOSIS — J45.20 MILD INTERMITTENT ASTHMA WITHOUT COMPLICATION: ICD-10-CM

## 2023-10-25 PROCEDURE — 94727 GAS DIL/WSHOT DETER LNG VOL: CPT | Mod: S$GLB,,, | Performed by: INTERNAL MEDICINE

## 2023-10-25 PROCEDURE — 94060 EVALUATION OF WHEEZING: CPT | Mod: 59,S$GLB,, | Performed by: INTERNAL MEDICINE

## 2023-10-25 PROCEDURE — 94618 PULMONARY STRESS TESTING: ICD-10-PCS | Mod: S$GLB,,, | Performed by: INTERNAL MEDICINE

## 2023-10-25 PROCEDURE — 94729 PR C02/MEMBANE DIFFUSE CAPACITY: ICD-10-PCS | Mod: S$GLB,,, | Performed by: INTERNAL MEDICINE

## 2023-10-25 PROCEDURE — 94729 DIFFUSING CAPACITY: CPT | Mod: S$GLB,,, | Performed by: INTERNAL MEDICINE

## 2023-10-25 PROCEDURE — 94727 PR PULM FUNCTION TEST BY GAS: ICD-10-PCS | Mod: S$GLB,,, | Performed by: INTERNAL MEDICINE

## 2023-10-25 PROCEDURE — 94060 PR EVAL OF BRONCHOSPASM: ICD-10-PCS | Mod: 59,S$GLB,, | Performed by: INTERNAL MEDICINE

## 2023-10-25 PROCEDURE — 94618 PULMONARY STRESS TESTING: CPT | Mod: S$GLB,,, | Performed by: INTERNAL MEDICINE

## 2023-10-25 NOTE — PROCEDURES
Jennifer Brady is a 63 y.o.  female patient, who presents for a 6 minute walk test ordered by MD Prosper.  The diagnosis is Shortness of Breath; Asthma.  The patient's BMI is 31.1 kg/m2.  Predicted distance (lower limit of normal) is 304.99 meters.      Test Results:    The test was completed without stopping. The total time walked was 360 seconds. During walking, the patient reported:  No complaints. The patient used no assistive devices during testing.     10/25/2023---------Distance: 396.24 meters (1300 feet)     O2 Sat % Supplemental Oxygen Heart Rate Blood Pressure Edis Scale   Pre-exercise  (Resting) 96 % Room Air 76 bpm 134/64 mmHg 2   During Exercise 98 % Room Air 85 bpm 142/67 mmHg 2   Post-exercise  (Recovery) 98 % Room Air  81 bpm       Recovery Time: 98 seconds    Performing nurse/tech: Juli REN      PREVIOUS STUDY:   The patient has not had a previous study.      CLINICAL INTERPRETATION:  Six minute walk distance is 396.24 meters (1300 feet) with light dyspnea.  During exercise, there was no desaturation while breathing room air.  Both blood pressure and heart rate remained stable with walking.  The patient did not report non-pulmonary symptoms during exercise.  No previous study performed.  Based upon age and body mass index, exercise capacity is normal.

## 2023-11-07 ENCOUNTER — OFFICE VISIT (OUTPATIENT)
Dept: OTOLARYNGOLOGY | Facility: CLINIC | Age: 63
End: 2023-11-07
Payer: MEDICARE

## 2023-11-07 VITALS
DIASTOLIC BLOOD PRESSURE: 84 MMHG | HEART RATE: 78 BPM | WEIGHT: 200.81 LBS | BODY MASS INDEX: 30.44 KG/M2 | SYSTOLIC BLOOD PRESSURE: 144 MMHG | HEIGHT: 68 IN

## 2023-11-07 DIAGNOSIS — J45.20 MILD INTERMITTENT ASTHMA WITHOUT COMPLICATION: ICD-10-CM

## 2023-11-07 PROCEDURE — 1160F PR REVIEW ALL MEDS BY PRESCRIBER/CLIN PHARMACIST DOCUMENTED: ICD-10-PCS | Mod: CPTII,S$GLB,, | Performed by: STUDENT IN AN ORGANIZED HEALTH CARE EDUCATION/TRAINING PROGRAM

## 2023-11-07 PROCEDURE — 3077F PR MOST RECENT SYSTOLIC BLOOD PRESSURE >= 140 MM HG: ICD-10-PCS | Mod: CPTII,S$GLB,, | Performed by: STUDENT IN AN ORGANIZED HEALTH CARE EDUCATION/TRAINING PROGRAM

## 2023-11-07 PROCEDURE — 3079F PR MOST RECENT DIASTOLIC BLOOD PRESSURE 80-89 MM HG: ICD-10-PCS | Mod: CPTII,S$GLB,, | Performed by: STUDENT IN AN ORGANIZED HEALTH CARE EDUCATION/TRAINING PROGRAM

## 2023-11-07 PROCEDURE — 1160F RVW MEDS BY RX/DR IN RCRD: CPT | Mod: CPTII,S$GLB,, | Performed by: STUDENT IN AN ORGANIZED HEALTH CARE EDUCATION/TRAINING PROGRAM

## 2023-11-07 PROCEDURE — 99999 PR PBB SHADOW E&M-EST. PATIENT-LVL V: CPT | Mod: PBBFAC,,, | Performed by: STUDENT IN AN ORGANIZED HEALTH CARE EDUCATION/TRAINING PROGRAM

## 2023-11-07 PROCEDURE — 3079F DIAST BP 80-89 MM HG: CPT | Mod: CPTII,S$GLB,, | Performed by: STUDENT IN AN ORGANIZED HEALTH CARE EDUCATION/TRAINING PROGRAM

## 2023-11-07 PROCEDURE — 3008F PR BODY MASS INDEX (BMI) DOCUMENTED: ICD-10-PCS | Mod: CPTII,S$GLB,, | Performed by: STUDENT IN AN ORGANIZED HEALTH CARE EDUCATION/TRAINING PROGRAM

## 2023-11-07 PROCEDURE — 99214 PR OFFICE/OUTPT VISIT, EST, LEVL IV, 30-39 MIN: ICD-10-PCS | Mod: 25,S$GLB,, | Performed by: STUDENT IN AN ORGANIZED HEALTH CARE EDUCATION/TRAINING PROGRAM

## 2023-11-07 PROCEDURE — 99999 PR PBB SHADOW E&M-EST. PATIENT-LVL V: ICD-10-PCS | Mod: PBBFAC,,, | Performed by: STUDENT IN AN ORGANIZED HEALTH CARE EDUCATION/TRAINING PROGRAM

## 2023-11-07 PROCEDURE — 1159F MED LIST DOCD IN RCRD: CPT | Mod: CPTII,S$GLB,, | Performed by: STUDENT IN AN ORGANIZED HEALTH CARE EDUCATION/TRAINING PROGRAM

## 2023-11-07 PROCEDURE — 4010F ACE/ARB THERAPY RXD/TAKEN: CPT | Mod: CPTII,S$GLB,, | Performed by: STUDENT IN AN ORGANIZED HEALTH CARE EDUCATION/TRAINING PROGRAM

## 2023-11-07 PROCEDURE — 3077F SYST BP >= 140 MM HG: CPT | Mod: CPTII,S$GLB,, | Performed by: STUDENT IN AN ORGANIZED HEALTH CARE EDUCATION/TRAINING PROGRAM

## 2023-11-07 PROCEDURE — 3008F BODY MASS INDEX DOCD: CPT | Mod: CPTII,S$GLB,, | Performed by: STUDENT IN AN ORGANIZED HEALTH CARE EDUCATION/TRAINING PROGRAM

## 2023-11-07 PROCEDURE — 99214 OFFICE O/P EST MOD 30 MIN: CPT | Mod: 25,S$GLB,, | Performed by: STUDENT IN AN ORGANIZED HEALTH CARE EDUCATION/TRAINING PROGRAM

## 2023-11-07 PROCEDURE — 4010F PR ACE/ARB THEARPY RXD/TAKEN: ICD-10-PCS | Mod: CPTII,S$GLB,, | Performed by: STUDENT IN AN ORGANIZED HEALTH CARE EDUCATION/TRAINING PROGRAM

## 2023-11-07 PROCEDURE — 1159F PR MEDICATION LIST DOCUMENTED IN MEDICAL RECORD: ICD-10-PCS | Mod: CPTII,S$GLB,, | Performed by: STUDENT IN AN ORGANIZED HEALTH CARE EDUCATION/TRAINING PROGRAM
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
## 2024-01-03 ENCOUNTER — TELEPHONE (OUTPATIENT)
Dept: BARIATRICS | Facility: CLINIC | Age: 64
End: 2024-01-03

## 2024-01-03 ENCOUNTER — OFFICE VISIT (OUTPATIENT)
Dept: BARIATRICS | Facility: CLINIC | Age: 64
End: 2024-01-03
Payer: MEDICARE

## 2024-01-03 VITALS
OXYGEN SATURATION: 96 % | HEART RATE: 82 BPM | WEIGHT: 202.69 LBS | BODY MASS INDEX: 30.82 KG/M2 | SYSTOLIC BLOOD PRESSURE: 143 MMHG | DIASTOLIC BLOOD PRESSURE: 70 MMHG

## 2024-01-03 DIAGNOSIS — Z71.3 ENCOUNTER FOR WEIGHT LOSS COUNSELING: ICD-10-CM

## 2024-01-03 DIAGNOSIS — I10 PRIMARY HYPERTENSION: ICD-10-CM

## 2024-01-03 DIAGNOSIS — E78.00 PURE HYPERCHOLESTEROLEMIA: ICD-10-CM

## 2024-01-03 DIAGNOSIS — E66.09 CLASS 1 OBESITY DUE TO EXCESS CALORIES WITH SERIOUS COMORBIDITY AND BODY MASS INDEX (BMI) OF 30.0 TO 30.9 IN ADULT: Primary | ICD-10-CM

## 2024-01-03 DIAGNOSIS — G47.33 OSA (OBSTRUCTIVE SLEEP APNEA): ICD-10-CM

## 2024-01-03 PROCEDURE — 99999 PR PBB SHADOW E&M-EST. PATIENT-LVL IV: CPT | Mod: PBBFAC,,, | Performed by: STUDENT IN AN ORGANIZED HEALTH CARE EDUCATION/TRAINING PROGRAM

## 2024-01-03 PROCEDURE — 99213 OFFICE O/P EST LOW 20 MIN: CPT | Mod: S$GLB,,, | Performed by: STUDENT IN AN ORGANIZED HEALTH CARE EDUCATION/TRAINING PROGRAM

## 2024-01-03 RX ORDER — SEMAGLUTIDE 1.7 MG/.75ML
1.7 INJECTION, SOLUTION SUBCUTANEOUS
Qty: 3 ML | Refills: 2 | Status: SHIPPED | OUTPATIENT
Start: 2024-01-03 | End: 2024-01-23

## 2024-01-03 NOTE — TELEPHONE ENCOUNTER
----- Message from Anny Dupree sent at 1/3/2024  2:44 PM CST -----  Regarding: pt advice  Contact: [phone]  Pt stated she needs to go back on the Ozempic. The insurance stated they are not going to pay for the Mijovi. Pls call to discuss.

## 2024-01-03 NOTE — PROGRESS NOTES
Subjective:       Patient ID: Jennifer Brady is a 63 y.o. female.    Chief Complaint: Follow-up, Obesity, and Weight Check    Patient presents for treatment of obesity.       Co-morbidities  Migraines - was on topiramate, but was discontinued because didn't help with headaches  REN  MI  CAD s/p PTCA  HTN  HLD    Weight History  Lowest adult weight: 165 lbs  Highest adult weight: 220 lbs         Current Physical Activity  No regular exercise, some walking but not consistent  Foot pain due to dropped arches, but that has improved      Current Eating Habits  Breakfast - skips most days, occassional cup of coffee  Sherbert  Potatoes - mashed, roasted  White beans  Roast  Turkey necks   Biscuits  Cabbage  Gumbo  Recently stopped drinking sweet tea and lemonade      Medical Weight Loss  2/13/2023: 216.2 lbs, BMI 32.9, BFP 47.6%, .9 lbs, SMM 62.4 lbs, BMR 1480 kcal  5/11/2023: 209.1 lbs, BMI 31.8, BFP 45.8%, BFM 95.8 lbs, SMM 62.4 lbs, BMR 1481 kcal  7/13/2023: 204.6 lbs, BMI 31.1, BFP 44.4%, BFM 90.9 lbs, SMM 62.6 lbs, BMR 1485 kcal  10/2/2023: 202.1 lbs, BMI 30.7, BFP 43.1%, BFM 87 lbs, SMM 64.4 lbs, BMR 1498 kcal  1/3/2024: 202.7 lbs, BMI 30.8, BFP 42.3%, BFM 85.8 lbs, SMM 64.4 lbs, BMR 1516 kcal      Review of Systems   Constitutional:  Negative for chills and fever.   Respiratory:  Negative for shortness of breath.    Cardiovascular:  Negative for chest pain and palpitations.   Gastrointestinal:  Negative for abdominal pain, nausea and vomiting.   Neurological:  Negative for dizziness and light-headedness.   Psychiatric/Behavioral:  The patient is not nervous/anxious.          Objective:       Latest Reference Range & Units 03/02/22 10:23 12/09/22 08:04   WBC 3.90 - 12.70 K/uL 9.53 7.20   RBC 4.00 - 5.40 M/uL 3.97 (L) 4.07   Hemoglobin 12.0 - 16.0 g/dL 12.0 12.2   Hematocrit 37.0 - 48.5 % 38.7 38.3   MCV 82 - 98 fL 98 94   MCH 27.0 - 31.0 pg 30.2 30.0   MCHC 32.0 - 36.0 g/dL 31.0 (L) 31.9 (L)   RDW  11.5 - 14.5 % 12.4 12.0   Platelets 150 - 450 K/uL 397 389   MPV 9.2 - 12.9 fL 8.6 (L) 8.6 (L)   Gran % 38.0 - 73.0 % 46.5 52.2   Lymph % 18.0 - 48.0 % 44.4 38.1   Mono % 4.0 - 15.0 % 5.9 6.1   Eosinophil % 0.0 - 8.0 % 2.6 2.6   Basophil % 0.0 - 1.9 % 0.4 0.7   Immature Granulocytes 0.0 - 0.5 % 0.2 0.3   Gran # (ANC) 1.8 - 7.7 K/uL 4.4 3.8   Lymph # 1.0 - 4.8 K/uL 4.2 2.7   Mono # 0.3 - 1.0 K/uL 0.6 0.4   Eos # 0.0 - 0.5 K/uL 0.3 0.2   Baso # 0.00 - 0.20 K/uL 0.04 0.05   Immature Grans (Abs) 0.00 - 0.04 K/uL 0.02 0.02   nRBC 0 /100 WBC 0 0   Differential Method  Automated Automated   Protime 9.0 - 12.5 sec  10.3   INR 0.8 - 1.2   1.0   aPTT 21.0 - 32.0 sec  28.8   Sodium 136 - 145 mmol/L 141 140   Potassium 3.5 - 5.1 mmol/L 3.9 4.0   Chloride 95 - 110 mmol/L 108 106   CO2 23 - 29 mmol/L 24 22 (L)   Anion Gap 8 - 16 mmol/L 9 12   BUN 8 - 23 mg/dL 15 9   Creatinine 0.5 - 1.4 mg/dL 0.9 0.8   eGFR >60 mL/min/1.73 m^2  >60   eGFR if non African American >60 mL/min/1.73 m^2 >60.0    eGFR if African American >60 mL/min/1.73 m^2 >60.0    Glucose 70 - 110 mg/dL 92 95   Calcium 8.7 - 10.5 mg/dL 9.7 9.4   Alkaline Phosphatase 55 - 135 U/L 133    PROTEIN TOTAL 6.0 - 8.4 g/dL 7.3    Albumin 3.5 - 5.2 g/dL 3.7    BILIRUBIN TOTAL 0.1 - 1.0 mg/dL 0.6    AST 10 - 40 U/L 21    ALT 10 - 44 U/L 22    Cholesterol 120 - 199 mg/dL 157    HDL 40 - 75 mg/dL 48    HDL/Cholesterol Ratio 20.0 - 50.0 % 30.6    LDL Cholesterol External 63.0 - 159.0 mg/dL 86.2    Non-HDL Cholesterol mg/dL 109    Total Cholesterol/HDL Ratio 2.0 - 5.0  3.3    Triglycerides 30 - 150 mg/dL 114    CPK 20 - 180 U/L 239 (H)    TSH 0.400 - 4.000 uIU/mL 0.494    (L): Data is abnormally low  (H): Data is abnormally high    Vitals:    01/03/24 0951   BP: (!) 143/70   Pulse: 82     Physical Exam  Vitals reviewed.   Constitutional:       General: She is not in acute distress.     Appearance: Normal appearance. She is obese. She is not ill-appearing, toxic-appearing or  diaphoretic.   HENT:      Head: Normocephalic and atraumatic.   Cardiovascular:      Rate and Rhythm: Normal rate.   Pulmonary:      Effort: Pulmonary effort is normal. No respiratory distress.   Skin:     General: Skin is warm and dry.   Neurological:      Mental Status: She is alert and oriented to person, place, and time.         Assessment:       Problem List Items Addressed This Visit       HTN (hypertension)    Relevant Medications    semaglutide, weight loss, (WEGOVY) 1.7 mg/0.75 mL PnIj    Pure hypercholesterolemia    Relevant Medications    semaglutide, weight loss, (WEGOVY) 1.7 mg/0.75 mL PnIj    REN (obstructive sleep apnea)    Relevant Medications    semaglutide, weight loss, (WEGOVY) 1.7 mg/0.75 mL PnIj    Obesity - Primary    Relevant Medications    semaglutide, weight loss, (WEGOVY) 1.7 mg/0.75 mL PnIj     Other Visit Diagnoses       Encounter for weight loss counseling                        Plan:   - Wegovy 1.7 mg weekly injections    - Log all food and beverage intake with a daily calorie goal of 1500 calories per day    - Moderate intensity aerobic exercise for 30 minuets 3-5x/week

## 2024-01-23 ENCOUNTER — OFFICE VISIT (OUTPATIENT)
Dept: OBSTETRICS AND GYNECOLOGY | Facility: CLINIC | Age: 64
End: 2024-01-23
Attending: OBSTETRICS & GYNECOLOGY
Payer: MEDICARE

## 2024-01-23 VITALS
WEIGHT: 210 LBS | BODY MASS INDEX: 31.83 KG/M2 | SYSTOLIC BLOOD PRESSURE: 132 MMHG | DIASTOLIC BLOOD PRESSURE: 85 MMHG | HEIGHT: 68 IN

## 2024-01-23 DIAGNOSIS — N89.8 VAGINAL ODOR: Primary | ICD-10-CM

## 2024-01-23 DIAGNOSIS — Z12.31 ENCOUNTER FOR SCREENING MAMMOGRAM FOR MALIGNANT NEOPLASM OF BREAST: ICD-10-CM

## 2024-01-23 DIAGNOSIS — N95.2 POSTMENOPAUSAL ATROPHIC VAGINITIS: ICD-10-CM

## 2024-01-23 PROCEDURE — 99999 PR PBB SHADOW E&M-EST. PATIENT-LVL IV: CPT | Mod: PBBFAC,,, | Performed by: OBSTETRICS & GYNECOLOGY

## 2024-01-23 PROCEDURE — 99213 OFFICE O/P EST LOW 20 MIN: CPT | Mod: S$GLB,,, | Performed by: OBSTETRICS & GYNECOLOGY

## 2024-01-23 PROCEDURE — 81514 NFCT DS BV&VAGINITIS DNA ALG: CPT | Performed by: OBSTETRICS & GYNECOLOGY

## 2024-01-23 RX ORDER — ESTRADIOL 0.1 MG/G
1 CREAM VAGINAL
Qty: 42 G | Refills: 4 | Status: SHIPPED | OUTPATIENT
Start: 2024-01-25

## 2024-01-23 NOTE — PROGRESS NOTES
Subjective:       Patient ID: Jennifer Brady is a 63 y.o. female.    Chief Complaint:  other (Vaginal odor) and vaginal odor      History of Present Illness  HPI  This 64 y/o P3 presents today with complaints of a vaginal odor for the past month. She reports that she is not currently sexually active but was active with a partner in .     GYN & OB History  No LMP recorded. Patient has had a hysterectomy.   Date of Last Pap: No result found    OB History    Para Term  AB Living   4 3 3   1 3   SAB IAB Ectopic Multiple Live Births   1       3      # Outcome Date GA Lbr Lon/2nd Weight Sex Delivery Anes PTL Lv   4 SAB            3 Term      Vag-Spont   BOO   2 Term      Vag-Spont   BOO   1 Term      Vag-Spont   BOO       Past Medical History:   Diagnosis Date    Acute coronary syndrome     Anxiety     Arthritis     Asthma     Behavioral problem     Borderline personality disorder     Coronary angioplasty status 2018    Coronary artery disease     Depression     Depression     Fatigue     Hx of psychiatric care     Hyperlipidemia     Hypertension     Myocardial infarction 2018    Psychiatric problem     S/P hysterectomy with oophorectomy     Sleep apnea     pt uses CPAP    Syncope and collapse     Therapy        Past Surgical History:   Procedure Laterality Date    CORONARY ANGIOPLASTY      HIP REPLACEMENT ARTHROPLASTY Right 2022    HYSTERECTOMY  1999    INJECTION OF ANESTHETIC AGENT AROUND NERVE Right 2019    Procedure: MEDIAL BRANCH BLOCK RIGHT L3, L4, L5;  Surgeon: Petros Reich MD;  Location: [facility] PAIN MGT;  Service: Pain Management;  Laterality: Right;  NEEDS CONSENT, PT NO LONGER TAKES PLAVIX    INJECTION OF ANESTHETIC AGENT AROUND NERVE Right 2020    Procedure: BLOCK, NERVE RIGHT L3, 4, 5 MEDIAL BRANCH;  Surgeon: Petros Reich MD;  Location: [facility] PAIN [facility]GT;  Service: Pain Management;  Laterality: Right;  NEEDS  "CONSENT?    RADIOFREQUENCY ABLATION Right 05/12/2021    Procedure: RADIOFREQUENCY ABLATION RIGHT L3, 4, 5;  Surgeon: Petros Reich MD;  Location: [facility];  Service: Pain Management;  Laterality: Right;  NEEDS CONSENT    TILT TABLE TEST N/A 03/22/2019    Procedure: TILT TABLE TEST;  Surgeon: Roman Lala MD;  Location: [facility] EP LAB;  Service: Cardiology;  Laterality: N/A;  seizure/migraine, HUT referred by Dr Perea Neuro, EEG confirmed    TRANSFORAMINAL EPIDURAL INJECTION OF STEROID Right 10/14/2020    Procedure: LUMBAR TRANSFORAMINAL RIGHT L4/L5;  Surgeon: Petros Reich MD;  Location: [facility];  Service: Pain Management;  Laterality: Right;  NEEDS CONSENT       Review of Systems  Review of Systems   Constitutional:  Negative for activity change, appetite change, fatigue and unexpected weight change.   HENT: Negative.     Eyes:  Negative for visual disturbance.   Respiratory:  Negative for shortness of breath and wheezing.    Cardiovascular:  Negative for chest pain, palpitations and leg swelling.   Gastrointestinal:  Negative for abdominal pain, bloating and blood in stool.   Endocrine: Negative for diabetes and hair loss.   Genitourinary:  Positive for frequency and vaginal odor. Negative for bladder incontinence, decreased libido, dyspareunia and urgency.   Integumentary:  Negative for acne, hair changes and nipple discharge.   Neurological:  Negative for headaches.   Hematological:  Does not bruise/bleed easily.   Psychiatric/Behavioral:  Negative for depression and sleep disturbance. The patient is not nervous/anxious.    Breast: Negative for mastodynia and nipple discharge          Objective:      /85   Ht 5' 8" (1.727 m)   Wt 95.3 kg (210 lb)   BMI 31.93 kg/m²   Physical Exam:               Genitourinary:    Pelvic exam was performed with patient supine.      Genitourinary Comments: PELVIC: Normal external female genitalia without lesions. Normal hair distribution. " Adequate perineal body, normal urethral meatus. Vagina atrophic without lesions or discharge. No significant cystocele or rectocele. Bimanual exam shows uterus and cervix to be surgically absent. Adnexa without masses or tenderness.  RECTAL: Deferred                                Assessment:        1. Vaginal odor    2. Encounter for screening mammogram for malignant neoplasm of breast    3. Postmenopausal atrophic vaginitis                Plan:      Problem List Items Addressed This Visit    None  Visit Diagnoses       Vaginal odor    -  Primary    Relevant Orders    Vaginosis Screen by DNA Probe    Encounter for screening mammogram for malignant neoplasm of breast        Relevant Orders    Mammo Digital Screening Bilat w/ [facility]    Postmenopausal atrophic vaginitis        Relevant Medications    estradioL (ESTRACE) 0.01 % (0.1 mg/gram) vaginal cream (Start on 1/25/2024)             Follow up if symptoms worsen or fail to improve.

## 2024-01-25 LAB
BACTERIAL VAGINOSIS DNA: NEGATIVE
CANDIDA GLABRATA DNA: NEGATIVE
CANDIDA KRUSEI DNA: NEGATIVE
CANDIDA RRNA VAG QL PROBE: NEGATIVE
T VAGINALIS RRNA GENITAL QL PROBE: NEGATIVE

## 2024-02-02 ENCOUNTER — OFFICE VISIT (OUTPATIENT)
Dept: PRIMARY CARE CLINIC | Facility: CLINIC | Age: 64
End: 2024-02-02
Payer: MEDICARE

## 2024-02-02 VITALS
WEIGHT: 203.69 LBS | TEMPERATURE: 97 F | BODY MASS INDEX: 30.87 KG/M2 | HEART RATE: 79 BPM | SYSTOLIC BLOOD PRESSURE: 128 MMHG | DIASTOLIC BLOOD PRESSURE: 72 MMHG | HEIGHT: 68 IN | OXYGEN SATURATION: 97 % | RESPIRATION RATE: 17 BRPM

## 2024-02-02 DIAGNOSIS — R55 SYNCOPE AND COLLAPSE: ICD-10-CM

## 2024-02-02 DIAGNOSIS — F43.23 ADJUSTMENT DISORDER WITH MIXED ANXIETY AND DEPRESSED MOOD: ICD-10-CM

## 2024-02-02 DIAGNOSIS — Z13.820 OSTEOPOROSIS SCREENING: ICD-10-CM

## 2024-02-02 DIAGNOSIS — Z23 NEED FOR VACCINATION: ICD-10-CM

## 2024-02-02 DIAGNOSIS — E78.00 PURE HYPERCHOLESTEROLEMIA: ICD-10-CM

## 2024-02-02 DIAGNOSIS — J45.20 MILD INTERMITTENT ASTHMA WITHOUT COMPLICATION: ICD-10-CM

## 2024-02-02 DIAGNOSIS — I10 PRIMARY HYPERTENSION: ICD-10-CM

## 2024-02-02 DIAGNOSIS — Z76.89 ENCOUNTER TO ESTABLISH CARE: ICD-10-CM

## 2024-02-02 DIAGNOSIS — R79.9 ABNORMAL BLOOD CHEMISTRY: Primary | ICD-10-CM

## 2024-02-02 DIAGNOSIS — I25.2 OLD MI (MYOCARDIAL INFARCTION): ICD-10-CM

## 2024-02-02 PROCEDURE — 90677 PCV20 VACCINE IM: CPT | Mod: S$GLB,,, | Performed by: NURSE PRACTITIONER

## 2024-02-02 PROCEDURE — 99999 PR PBB SHADOW E&M-EST. PATIENT-LVL V: CPT | Mod: PBBFAC,,, | Performed by: NURSE PRACTITIONER

## 2024-02-02 PROCEDURE — G0009 ADMIN PNEUMOCOCCAL VACCINE: HCPCS | Mod: S$GLB,,, | Performed by: NURSE PRACTITIONER

## 2024-02-02 PROCEDURE — 99214 OFFICE O/P EST MOD 30 MIN: CPT | Mod: S$GLB,,, | Performed by: NURSE PRACTITIONER

## 2024-02-02 RX ORDER — ZOSTER VACCINE RECOMBINANT, ADJUVANTED 50 MCG/0.5
0.5 KIT INTRAMUSCULAR ONCE
Qty: 1 EACH | Refills: 0 | Status: SHIPPED | OUTPATIENT
Start: 2024-02-02 | End: 2024-02-02

## 2024-02-02 NOTE — PROGRESS NOTES
Verified pt ID using name and . Verified allergies with patient. Administered Prevnar 20 in left arm per physician order using aseptic technique.  Pt tolerated well with no adverse reactions noted.

## 2024-02-02 NOTE — PROGRESS NOTES
Chief Complaint  Chief Complaint   Patient presents with    Establish Care       HPI    Patient with multiple medical complaints at this time.     Patient here to establish care. Currently under the care of [facility] for all other specialities. Previously under the care of Dr. Juarez with [facility] requesting to establish care more locally. Complaints of recurrent cough and rhinorrhea. Takes claritin and xyzal which as not been helping. Does suffer from chronic allergies. H/o asthma and migraines.     Migraines - gets headaches three times out of a year. Has tried various treatments including botox. Has tried fiorcet and was taking too frequently. Headaches 10/10 frontal, neck pain. Associating aura seeing spots. Does pass out occasionally with headaches. Headaches last for over a month. Has seen neurology who treated her with topamax which did not help. Has not seen Dr. Lombardo in the past.     On lyrica for chronic heel pain.     H/o MDD, anxiety currently on trazodone for sleep.     Syncope - Intermittent syncopal issues occurs with no noted trigger. Last occurrence 10/2023. Had her cpap machine on and felt relaxed and could feel herself passing out. Associated vomiting. No associated dizziness. Room just blacks out. Wakes after a moment. Does endorse a post ictal component where she feels dazed for a few minutes after the event. Has seen a neurologist completed EMG with no seizure activity. Did a tilt test.     HTN - On losartan 25 mg daily for hypertension - Dr. Sanchez  with cardiology manages. Does not check her blood pressure at home. He is aware of syncopal events. MI in 2016 with stent placement. Overall feeling well. Continues to smoke only occasionally. On atorvastatin 80 mg daily.           PAST MEDICAL HISTORY:  Past Medical History:   Diagnosis Date    Acute coronary syndrome     Anxiety     Arthritis     Asthma     Behavioral problem     Borderline personality disorder     Coronary angioplasty status  04/2018    Coronary artery disease     Depression     Depression     Fatigue     Hx of psychiatric care     Hyperlipidemia     Hypertension     Myocardial infarction 05/2018    Psychiatric problem     S/P hysterectomy with oophorectomy     Sleep apnea     pt uses CPAP    Syncope and collapse     Therapy        PAST SURGICAL HISTORY:  Past Surgical History:   Procedure Laterality Date    CORONARY ANGIOPLASTY      HIP REPLACEMENT ARTHROPLASTY Right 12/21/2022    HYSTERECTOMY  1999    INJECTION OF ANESTHETIC AGENT AROUND NERVE Right 11/20/2019    Procedure: MEDIAL BRANCH BLOCK RIGHT L3, L4, L5;  Surgeon: Petros Reich MD;  Location: [facility] PAIN MGT;  Service: Pain Management;  Laterality: Right;  NEEDS CONSENT, PT NO LONGER TAKES PLAVIX    INJECTION OF ANESTHETIC AGENT AROUND NERVE Right 06/17/2020    Procedure: BLOCK, NERVE RIGHT L3, 4, 5 MEDIAL BRANCH;  Surgeon: Petros Reich MD;  Location: [facility] PAIN MGT;  Service: Pain Management;  Laterality: Right;  NEEDS CONSENT?    RADIOFREQUENCY ABLATION Right 05/12/2021    Procedure: RADIOFREQUENCY ABLATION RIGHT L3, 4, 5;  Surgeon: Petros Reich MD;  Location: [facility] PAIN MGT;  Service: Pain Management;  Laterality: Right;  NEEDS CONSENT    TILT TABLE TEST N/A 03/22/2019    Procedure: TILT TABLE TEST;  Surgeon: Roman Lala MD;  Location: [facility] EP LAB;  Service: Cardiology;  Laterality: N/A;  seizure/migraine, HUT referred by Dr Brit Merlos, EEG confirmed    TRANSFORAMINAL EPIDURAL INJECTION OF STEROID Right 10/14/2020    Procedure: LUMBAR TRANSFORAMINAL RIGHT L4/L5;  Surgeon: Petros Reich MD;  Location: [facility] PAIN MGT;  Service: Pain Management;  Laterality: Right;  NEEDS CONSENT       SOCIAL HISTORY:  Social History     Socioeconomic History    Marital status:    Occupational History     Employer: [employer]     Comment: [employer]   Tobacco Use    Smoking status: Former     Current packs/day: 0.00      Average packs/day: 0.3 packs/day for 35.8 years (9.0 ttl pk-yrs)     Types: Cigarettes     Start date: 1986     Quit date: 2022     Years since quittin.5     Passive exposure: Past    Smokeless tobacco: Never    Tobacco comments:     quit actual cigarettes a few mo ago, switched to e cigarette   Substance and Sexual Activity    Alcohol use: Yes     Comment: Socially    Drug use: No    Sexual activity: Not Currently     Partners: Male   Other Topics Concern    Financial Status: Employed Yes    Caffeine Use: Moderate Yes    Spirituality: Organized Confucianist Yes    Patient feels they ought to cut down on drinking/drug use No    Patient annoyed by others criticizing their drinking/drug use No    Patient has felt bad or guilty about drinking/drug use No    Patient has had a drink/used drugs as an eye opener in the AM No   Social History Narrative    Patient discusses recent decisions she has made that she is pleased about; has clarified for self and others her values and standards.  Though she is aware of difficulty of  struggle, feels new confidence about  results.  She expresses sense of wholeness and independence at this point. Her previously explored painful symptoms are improved (i.e, .diminished - 2or3/10), thought processes are logical and outlook is optimistic, as well as realistic.    Discusses trip she's planned.  intent to reschedule after return in mid-August. She expresses feelings of appreciation       FAMILY HISTORY:  Family History   Problem Relation Age of Onset    Hypertension Paternal Grandfather     Hypertension Paternal Grandmother     Heart attacks under age 50 Maternal Grandmother     Hypertension Maternal Grandmother     Hypertension Maternal Grandfather     Heart attacks under age 50 Father 33    Hypertension Father     Seizures Mother     Heart attack Mother     Hypertension Mother     Stroke Mother     Hypertension Sister     Stroke Sister     Seizures Sister     Breast cancer Neg  Hx     Colon cancer Neg Hx     Diabetes Neg Hx     Ovarian cancer Neg Hx     Cancer Neg Hx        ALLERGIES AND MEDICATIONS: updated and reviewed.  Review of patient's allergies indicates:   Allergen Reactions    Iodine and iodide containing products      SHELLFISH     Current Outpatient Medications   Medication Sig Dispense Refill    albuterol (PROVENTIL/VENTOLIN HFA) 90 mcg/actuation inhaler Inhale into the lungs.      aspirin (ECOTRIN) 81 MG EC tablet Take 1 tablet (81 mg total) by mouth once daily. 100 tablet 6    azelastine (ASTELIN) 137 mcg (0.1 %) nasal spray 1 spray (137 mcg total) by Nasal route 2 (two) times daily. 30 mL 3    diclofenac sodium (VOLTAREN) 1 % Gel Apply 2 g topically 4 (four) times daily. 350 g 2    estradioL (ESTRACE) 0.01 % (0.1 mg/gram) vaginal cream Place 1 g vaginally twice a week. 42 g 4    fluticasone propionate (FLONASE) 50 mcg/actuation nasal spray 2 sprays (100 mcg total) by Each Nostril route 2 (two) times daily. 18.2 mL 3    HYDROcodone-acetaminophen (NORCO)  mg per tablet Take by mouth daily as needed.      hydrOXYzine pamoate (VISTARIL) 25 MG Cap Take 1 capsule (25 mg total) by mouth 3 (three) times daily as needed (anxiety). 270 capsule 3    ipratropium (ATROVENT) 42 mcg (0.06 %) nasal spray 2 sprays by Nasal route 3 (three) times daily as needed for Rhinitis. 15 mL 3    levocetirizine (XYZAL) 5 MG tablet Take 1 tablet (5 mg total) by mouth every evening. 30 tablet 11    loratadine (CLARITIN) 10 mg tablet Take 10 mg by mouth once daily.      losartan (COZAAR) 25 MG tablet Take 1 tablet (25 mg total) by mouth once daily. (Patient taking differently: Take 25 mg by mouth every morning.) 90 tablet 3    nitroGLYCERIN (NITROSTAT) 0.4 MG SL tablet Place 0.4 mg under the tongue.      pantoprazole (PROTONIX) 40 MG tablet pantoprazole 40 mg tablet,delayed release      pregabalin (LYRICA) 25 MG capsule Take 1 capsule (25 mg total) by mouth after meals as needed. 90 capsule 1     "pregabalin (LYRICA) 50 MG capsule Take 1 capsule (50 mg total) by mouth every evening. 30 capsule 1    traZODone (DESYREL) 150 MG tablet Take 1 -2 tablets by mouth before bed as needed for insomnia 60 tablet 11     Current Facility-Administered Medications   Medication Dose Route Frequency Provider Last Rate Last Admin    triamcinolone acetonide injection 40 mg  40 mg Intra-articular 1 time in Clinic/HOD Gabriel Wong DO ROS  Review of Systems   Constitutional:  Positive for fatigue and unexpected weight change. Negative for chills and fever.   HENT:  Negative for ear pain, postnasal drip and sinus pain.    Respiratory:  Negative for cough and shortness of breath.    Cardiovascular:  Negative for chest pain.   Gastrointestinal:  Negative for diarrhea, nausea and vomiting.   Neurological:  Positive for dizziness, syncope, light-headedness and headaches.   Psychiatric/Behavioral:  Positive for dysphoric mood and sleep disturbance. The patient is nervous/anxious.            PHYSICAL EXAM  Vitals:    02/02/24 0803   BP: 128/72   BP Location: Right arm   Patient Position: Sitting   BP Method: Medium (Manual)   Pulse: 79   Resp: 17   Temp: 96.9 °F (36.1 °C)   SpO2: 97%   Weight: 92.4 kg (203 lb 11.3 oz)   Height: 5' 8" (1.727 m)    Body mass index is 30.97 kg/m².  Weight: 92.4 kg (203 lb 11.3 oz)   Height: 5' 8" (172.7 cm)     Physical Exam  Constitutional:       Appearance: She is well-developed.   HENT:      Head: Normocephalic.      Right Ear: Tympanic membrane normal.      Left Ear: Tympanic membrane normal.      Mouth/Throat:      Pharynx: Uvula midline.   Eyes:      Conjunctiva/sclera: Conjunctivae normal.   Cardiovascular:      Rate and Rhythm: Normal rate and regular rhythm.      Pulses: Normal pulses.           Radial pulses are 2+ on the right side and 2+ on the left side.      Heart sounds: Normal heart sounds. No murmur heard.     Comments: No LE swelling noted  Pulmonary:      Effort: " Pulmonary effort is normal.      Breath sounds: Normal breath sounds. No wheezing.   Abdominal:      General: Bowel sounds are normal.      Palpations: Abdomen is soft.      Tenderness: There is no abdominal tenderness.   Lymphadenopathy:      Cervical: No cervical adenopathy.   Skin:     General: Skin is warm and dry.      Findings: No rash.   Neurological:      Mental Status: She is alert and oriented to person, place, and time.           Health Maintenance         Date Due Completion Date    HIV Screening Never done ---    Aspirin/Antiplatelet Therapy Never done ---    High Dose Statin Never done ---    Colorectal Cancer Screening Never done ---    Shingles Vaccine (1 of 2) Never done ---    RSV Vaccine (Age 60+ and Pregnant patients) (1 - 1-dose 60+ series) Never done ---    DEXA Scan 09/26/2022 9/26/2019    Hemoglobin A1c (Diabetic Prevention Screening) 08/19/2023 8/19/2020    COVID-19 Vaccine (5 - 2023-24 season) 09/01/2023 3/29/2021    Mammogram 02/14/2024 2/14/2023    Lipid Panel 06/30/2028 6/30/2023    TETANUS VACCINE 08/31/2033 8/31/2023              Assessment & Plan    Problem List Items Addressed This Visit          Unprioritized    Adjustment disorder with mixed anxiety and depressed mood  Unclear if this is well controlled. Continue trazodone at this time. Will address at upcoming visit.       HTN (hypertension)  Well controlled on losartan.       Mild intermittent asthma without complication  Well controlled. PAM for rescue.       Old MI (myocardial infarction)  Continue under the care of cardiology Dr. Sanchez.       Pure hypercholesterolemia    Syncope and collapse  This is not new. Patient with multiple medical complaints. Will establish care today and can address additional complaints at upcoming visit.            Other Visit Diagnoses       Abnormal blood chemistry    -  Primary    Relevant Orders    CBC Auto Differential    Comprehensive Metabolic Panel    Lipid Panel    TSH    Hemoglobin A1C     HIV 1/2 Ag/Ab (4th Gen)    Osteoporosis screening        Relevant Orders    DXA Bone Density Axial Skeleton 1 or more sites    Encounter to establish care        Relevant Orders    CBC Auto Differential    Comprehensive Metabolic Panel    Lipid Panel    TSH    Hemoglobin A1C    HIV 1/2 Ag/Ab (4th Gen)    (In Office Administered) Pneumococcal Conjugate Vaccine (20 Valent) (IM) (Preferred) (Completed)    DXA Bone Density Axial Skeleton 1 or more sites    Need for vaccination        Relevant Orders    (In Office Administered) Pneumococcal Conjugate Vaccine (20 Valent) (IM) (Preferred) (Completed)            Follow-up: Follow up in about 4 weeks (around 3/1/2024).    Ninfa Maria    Medication List with Changes/Refills   Current Medications    ALBUTEROL (PROVENTIL/VENTOLIN HFA) 90 MCG/ACTUATION INHALER    Inhale into the lungs.    ASPIRIN (ECOTRIN) 81 MG EC TABLET    Take 1 tablet (81 mg total) by mouth once daily.    AZELASTINE (ASTELIN) 137 MCG (0.1 %) NASAL SPRAY    1 spray (137 mcg total) by Nasal route 2 (two) times daily.    DICLOFENAC SODIUM (VOLTAREN) 1 % GEL    Apply 2 g topically 4 (four) times daily.    ESTRADIOL (ESTRACE) 0.01 % (0.1 MG/GRAM) VAGINAL CREAM    Place 1 g vaginally twice a week.    FLUTICASONE PROPIONATE (FLONASE) 50 MCG/ACTUATION NASAL SPRAY    2 sprays (100 mcg total) by Each Nostril route 2 (two) times daily.    HYDROCODONE-ACETAMINOPHEN (NORCO)  MG PER TABLET    Take by mouth daily as needed.    HYDROXYZINE PAMOATE (VISTARIL) 25 MG CAP    Take 1 capsule (25 mg total) by mouth 3 (three) times daily as needed (anxiety).    IPRATROPIUM (ATROVENT) 42 MCG (0.06 %) NASAL SPRAY    2 sprays by Nasal route 3 (three) times daily as needed for Rhinitis.    LEVOCETIRIZINE (XYZAL) 5 MG TABLET    Take 1 tablet (5 mg total) by mouth every evening.    LORATADINE (CLARITIN) 10 MG TABLET    Take 10 mg by mouth once daily.    LOSARTAN (COZAAR) 25 MG TABLET    Take 1 tablet (25 mg total) by mouth  once daily.    NITROGLYCERIN (NITROSTAT) 0.4 MG SL TABLET    Place 0.4 mg under the tongue.    PANTOPRAZOLE (PROTONIX) 40 MG TABLET    pantoprazole 40 mg tablet,delayed release    PREGABALIN (LYRICA) 25 MG CAPSULE    Take 1 capsule (25 mg total) by mouth after meals as needed.    PREGABALIN (LYRICA) 50 MG CAPSULE    Take 1 capsule (50 mg total) by mouth every evening.    TRAZODONE (DESYREL) 150 MG TABLET    Take 1 -2 tablets by mouth before bed as needed for insomnia

## 2024-02-14 ENCOUNTER — OFFICE VISIT (OUTPATIENT)
Dept: CARDIOLOGY | Facility: CLINIC | Age: 64
End: 2024-02-14
Payer: MEDICARE

## 2024-02-14 VITALS
WEIGHT: 208.44 LBS | HEART RATE: 69 BPM | SYSTOLIC BLOOD PRESSURE: 137 MMHG | BODY MASS INDEX: 31.69 KG/M2 | OXYGEN SATURATION: 95 % | DIASTOLIC BLOOD PRESSURE: 65 MMHG

## 2024-02-14 DIAGNOSIS — F17.200 SMOKER: ICD-10-CM

## 2024-02-14 DIAGNOSIS — I10 PRIMARY HYPERTENSION: Primary | ICD-10-CM

## 2024-02-14 DIAGNOSIS — I49.3 PVCS (PREMATURE VENTRICULAR CONTRACTIONS): ICD-10-CM

## 2024-02-14 DIAGNOSIS — I73.9 PAD (PERIPHERAL ARTERY DISEASE): ICD-10-CM

## 2024-02-14 DIAGNOSIS — R91.1 LUNG NODULE: ICD-10-CM

## 2024-02-14 DIAGNOSIS — J45.20 MILD INTERMITTENT ASTHMA WITHOUT COMPLICATION: ICD-10-CM

## 2024-02-14 DIAGNOSIS — R55 SYNCOPE AND COLLAPSE: ICD-10-CM

## 2024-02-14 DIAGNOSIS — I25.10 CORONARY ARTERY DISEASE INVOLVING NATIVE CORONARY ARTERY OF NATIVE HEART WITHOUT ANGINA PECTORIS: ICD-10-CM

## 2024-02-14 DIAGNOSIS — I25.2 OLD MI (MYOCARDIAL INFARCTION): ICD-10-CM

## 2024-02-14 DIAGNOSIS — Z98.61 S/P PTCA (PERCUTANEOUS TRANSLUMINAL CORONARY ANGIOPLASTY): ICD-10-CM

## 2024-02-14 DIAGNOSIS — E78.00 PURE HYPERCHOLESTEROLEMIA: ICD-10-CM

## 2024-02-14 DIAGNOSIS — Z87.891 EX-SMOKER: ICD-10-CM

## 2024-02-14 PROCEDURE — 99214 OFFICE O/P EST MOD 30 MIN: CPT | Mod: S$GLB,,, | Performed by: INTERNAL MEDICINE

## 2024-02-14 PROCEDURE — 99999 PR PBB SHADOW E&M-EST. PATIENT-LVL IV: CPT | Mod: PBBFAC,,, | Performed by: INTERNAL MEDICINE

## 2024-02-14 RX ORDER — ATORVASTATIN CALCIUM 40 MG/1
40 TABLET, FILM COATED ORAL DAILY
COMMUNITY

## 2024-02-14 RX ORDER — VENLAFAXINE HYDROCHLORIDE 150 MG/1
150 CAPSULE, EXTENDED RELEASE ORAL DAILY
COMMUNITY
End: 2024-04-11

## 2024-02-14 NOTE — PROGRESS NOTES
"  Subjective:      Patient ID: Jennifer Brady is a 63 y.o. female.    Chief Complaint: Follow-up    HPI:  Pt takes Norco and Lyrica for chronic back pain.    Walks.    Does housework and laundry      Review of Systems   Cardiovascular:  Positive for dyspnea on exertion (Mild, chronic). Negative for chest pain, claudication, irregular heartbeat, leg swelling, near-syncope, orthopnea, palpitations and syncope.      No syncope for over a year.  Pt used to see a neurologist.  Pt had a tilt table test.  "They never found out why it was happening."    Smokes about on cigarette a week.    Past Medical History:   Diagnosis Date    Acute coronary syndrome     Anxiety     Arthritis     Asthma     Behavioral problem     Borderline personality disorder     Coronary angioplasty status 04/2018    Coronary artery disease     Depression     Depression     Fatigue     Hx of psychiatric care     Hyperlipidemia     Hypertension     Myocardial infarction 05/2018    Psychiatric problem     S/P hysterectomy with oophorectomy     Sleep apnea     pt uses CPAP    Syncope and collapse     Therapy         Past Surgical History:   Procedure Laterality Date    CORONARY ANGIOPLASTY      HIP REPLACEMENT ARTHROPLASTY Right 12/21/2022    HYSTERECTOMY  1999    INJECTION OF ANESTHETIC AGENT AROUND NERVE Right 11/20/2019    Procedure: MEDIAL BRANCH BLOCK RIGHT L3, L4, L5;  Surgeon: Petros Reich MD;  Location: [facility];  Service: Pain Management;  Laterality: Right;  NEEDS CONSENT, PT NO LONGER TAKES PLAVIX    INJECTION OF ANESTHETIC AGENT AROUND NERVE Right 06/17/2020    Procedure: BLOCK, NERVE RIGHT L3, 4, 5 MEDIAL BRANCH;  Surgeon: Petros Reich MD;  [facility];  Service: Pain Management;  Laterality: Right;  NEEDS CONSENT?    RADIOFREQUENCY ABLATION Right 05/12/2021    Procedure: RADIOFREQUENCY ABLATION RIGHT L3, 4, 5;  Surgeon: Petros Reich MD;  Location: [facility];  Service: Pain Management;  " Laterality: Right;  NEEDS CONSENT    TILT TABLE TEST N/A 2019    Procedure: TILT TABLE TEST;  Surgeon: Roman Lala MD;  Location: [facility] EP LAB;  Service: Cardiology;  Laterality: N/A;  seizure/migraine, HUT referred by Dr Perea Neuro, EEG confirmed    TRANSFORAMINAL EPIDURAL INJECTION OF STEROID Right 10/14/2020    Procedure: LUMBAR TRANSFORAMINAL RIGHT L4/L5;  Surgeon: Petros Reich MD;  Location: [facility] PAIN MGT;  Service: Pain Management;  Laterality: Right;  NEEDS CONSENT       Family History   Problem Relation Age of Onset    Hypertension Paternal Grandfather     Hypertension Paternal Grandmother     Heart attacks under age 50 Maternal Grandmother     Hypertension Maternal Grandmother     Hypertension Maternal Grandfather     Heart attacks under age 50 Father 33    Hypertension Father     Seizures Mother     Heart attack Mother     Hypertension Mother     Stroke Mother     Hypertension Sister     Stroke Sister     Seizures Sister     Breast cancer Neg Hx     Colon cancer Neg Hx     Diabetes Neg Hx     Ovarian cancer Neg Hx     Cancer Neg Hx        Social History     Socioeconomic History    Marital status:    Occupational History     Employer: [employer]     Comment: [employer]   Tobacco Use    Smoking status: Former     Current packs/day: 0.00     Average packs/day: 0.3 packs/day for 35.8 years (9.0 ttl pk-yrs)     Types: Cigarettes     Start date: 1986     Quit date: 2022     Years since quittin.5     Passive exposure: Past    Smokeless tobacco: Never    Tobacco comments:     quit actual cigarettes a few mo ago, switched to e cigarette   Substance and Sexual Activity    Alcohol use: Yes     Comment: Socially    Drug use: No    Sexual activity: Not Currently     Partners: Male   Other Topics Concern    Financial Status: Employed Yes    Caffeine Use: Moderate Yes    Spirituality: Organized Sabianist Yes    Patient feels they ought to cut  down on drinking/drug use No    Patient annoyed by others criticizing their drinking/drug use No    Patient has felt bad or guilty about drinking/drug use No    Patient has had a drink/used drugs as an eye opener in the AM No   Social History Narrative    Patient discusses recent decisions she has made that she is pleased about; has clarified for self and others her values and standards.  Though she is aware of difficulty of  struggle, feels new confidence about  results.  She expresses sense of wholeness and independence at this point. Her previously explored painful symptoms are improved (i.e, .diminished - 2or3/10), thought processes are logical and outlook is optimistic, as well as realistic.    Discusses trip she's planned.  intent to reschedule after return in mid-August. She expresses feelings of appreciation       Current Outpatient Medications on File Prior to Visit   Medication Sig Dispense Refill    albuterol (PROVENTIL/VENTOLIN HFA) 90 mcg/actuation inhaler Inhale into the lungs.      aspirin (ECOTRIN) 81 MG EC tablet Take 1 tablet (81 mg total) by mouth once daily. 100 tablet 6    azelastine (ASTELIN) 137 mcg (0.1 %) nasal spray 1 spray (137 mcg total) by Nasal route 2 (two) times daily. 30 mL 3    estradioL (ESTRACE) 0.01 % (0.1 mg/gram) vaginal cream Place 1 g vaginally twice a week. 42 g 4    fluticasone propionate (FLONASE) 50 mcg/actuation nasal spray 2 sprays (100 mcg total) by Each Nostril route 2 (two) times daily. 18.2 mL 3    HYDROcodone-acetaminophen (NORCO)  mg per tablet Take by mouth daily as needed.      hydrOXYzine pamoate (VISTARIL) 25 MG Cap Take 1 capsule (25 mg total) by mouth 3 (three) times daily as needed (anxiety). 270 capsule 3    ipratropium (ATROVENT) 42 mcg (0.06 %) nasal spray 2 sprays by Nasal route 3 (three) times daily as needed for Rhinitis. 15 mL 3    levocetirizine (XYZAL) 5 MG tablet Take 1 tablet (5 mg total) by mouth every evening. 30 tablet 11    loratadine  (CLARITIN) 10 mg tablet Take 10 mg by mouth once daily.      losartan (COZAAR) 25 MG tablet Take 1 tablet (25 mg total) by mouth once daily. (Patient taking differently: Take 25 mg by mouth every morning.) 90 tablet 3    nitroGLYCERIN (NITROSTAT) 0.4 MG SL tablet Place 0.4 mg under the tongue.      pantoprazole (PROTONIX) 40 MG tablet pantoprazole 40 mg tablet,delayed release      pregabalin (LYRICA) 25 MG capsule Take 1 capsule (25 mg total) by mouth after meals as needed. 90 capsule 1    pregabalin (LYRICA) 50 MG capsule Take 1 capsule (50 mg total) by mouth every evening. 30 capsule 1    traZODone (DESYREL) 150 MG tablet Take 1 -2 tablets by mouth before bed as needed for insomnia 60 tablet 11    venlafaxine (EFFEXOR-XR) 150 MG Cp24 Take 150 mg by mouth once daily.      atorvastatin (LIPITOR) 40 MG tablet Take 40 mg by mouth once daily.      diclofenac sodium (VOLTAREN) 1 % Gel Apply 2 g topically 4 (four) times daily. (Patient not taking: Reported on 2/14/2024) 350 g 2    [DISCONTINUED] citalopram (CELEXA) 10 MG tablet Take 10 mg by mouth once daily.      [DISCONTINUED] lurasidone (LATUDA) 40 mg Tab tablet Take 1 tablet (40 mg total) by mouth once daily. 30 tablet 5     Current Facility-Administered Medications on File Prior to Visit   Medication Dose Route Frequency Provider Last Rate Last Admin    triamcinolone acetonide injection 40 mg  40 mg Intra-articular 1 time in Clinic/HOD Gabriel Wong,            Review of patient's allergies indicates:   Allergen Reactions    Iodine and iodide containing products      SHELLFISH     Objective:     Vitals:    02/14/24 1005   BP: 137/65   BP Location: Left arm   Patient Position: Sitting   BP Method: Large (Automatic)   Pulse: 69   SpO2: 95%   Weight: 94.6 kg (208 lb 7.1 oz)        Physical Exam  Vitals reviewed.   Constitutional:       Appearance: She is well-developed.   Eyes:      General: No scleral icterus.  Neck:      Vascular: No carotid bruit or JVD.    Cardiovascular:      Rate and Rhythm: Normal rate and regular rhythm.      Heart sounds: No murmur heard.     No gallop.   Pulmonary:      Breath sounds: Normal breath sounds.   Musculoskeletal:      Right lower leg: No edema.      Left lower leg: No edema.   Skin:     General: Skin is warm and dry.   Neurological:      Mental Status: She is alert and oriented to person, place, and time.   Psychiatric:         Behavior: Behavior normal.         Thought Content: Thought content normal.         Judgment: Judgment normal.      ECG today reviewed by me: NSR, nonspecific T wave abnormality, unchanged    Lab Visit on 02/09/2024   Component Date Value Ref Range Status    WBC 02/09/2024 7.21  3.90 - 12.70 K/uL Final    RBC 02/09/2024 4.13  4.00 - 5.40 M/uL Final    Hemoglobin 02/09/2024 12.5  12.0 - 16.0 g/dL Final    Hematocrit 02/09/2024 39.2  37.0 - 48.5 % Final    MCV 02/09/2024 95  82 - 98 fL Final    MCH 02/09/2024 30.3  27.0 - 31.0 pg Final    MCHC 02/09/2024 31.9 (L)  32.0 - 36.0 g/dL Final    RDW 02/09/2024 12.2  11.5 - 14.5 % Final    Platelets 02/09/2024 407  150 - 450 K/uL Final    MPV 02/09/2024 8.7 (L)  9.2 - 12.9 fL Final    Immature Granulocytes 02/09/2024 CANCELED  0.0 - 0.5 % Final    Immature Grans (Abs) 02/09/2024 CANCELED  0.00 - 0.04 K/uL Final    Lymph # 02/09/2024 CANCELED  1.0 - 4.8 K/uL Final    Mono # 02/09/2024 CANCELED  0.3 - 1.0 K/uL Final    Eos # 02/09/2024 CANCELED  0.0 - 0.5 K/uL Final    Baso # 02/09/2024 CANCELED  0.00 - 0.20 K/uL Final    nRBC 02/09/2024 0  0 /100 WBC Final    Gran % 02/09/2024 49.0  38.0 - 73.0 % Final    Lymph % 02/09/2024 47.0  18.0 - 48.0 % Final    Mono % 02/09/2024 3.0 (L)  4.0 - 15.0 % Final    Eosinophil % 02/09/2024 1.0  0.0 - 8.0 % Final    Basophil % 02/09/2024 0.0  0.0 - 1.9 % Final    Platelet Estimate 02/09/2024 Appears normal   Final    Differential Method 02/09/2024 Manual   Corrected    Sodium 02/09/2024 143  136 - 145 mmol/L Final    Potassium  02/09/2024 4.1  3.5 - 5.1 mmol/L Final    Chloride 02/09/2024 108  95 - 110 mmol/L Final    CO2 02/09/2024 27  23 - 29 mmol/L Final    Glucose 02/09/2024 94  70 - 110 mg/dL Final    BUN 02/09/2024 9  8 - 23 mg/dL Final    Creatinine 02/09/2024 0.9  0.5 - 1.4 mg/dL Final    Calcium 02/09/2024 9.5  8.7 - 10.5 mg/dL Final    Total Protein 02/09/2024 7.1  6.0 - 8.4 g/dL Final    Albumin 02/09/2024 3.5  3.5 - 5.2 g/dL Final    Total Bilirubin 02/09/2024 0.4  0.1 - 1.0 mg/dL Final    Alkaline Phosphatase 02/09/2024 131  55 - 135 U/L Final    AST 02/09/2024 18  10 - 40 U/L Final    ALT 02/09/2024 15  10 - 44 U/L Final    eGFR 02/09/2024 >60.0  >60 mL/min/1.73 m^2 Final    Anion Gap 02/09/2024 8  8 - 16 mmol/L Final    Cholesterol 02/09/2024 195  120 - 199 mg/dL Final    Triglycerides 02/09/2024 76  30 - 150 mg/dL Final    HDL 02/09/2024 109 (H)  40 - 75 mg/dL Final    LDL Cholesterol 02/09/2024 70.8  63.0 - 159.0 mg/dL Final    HDL/Cholesterol Ratio 02/09/2024 55.9 (H)  20.0 - 50.0 % Final    Total Cholesterol/HDL Ratio 02/09/2024 1.8 (L)  2.0 - 5.0 Final    Non-HDL Cholesterol 02/09/2024 86  mg/dL Final    TSH 02/09/2024 0.840  0.400 - 4.000 uIU/mL Final    Hemoglobin A1C 02/09/2024 5.0  4.0 - 5.6 % Final    Estimated Avg Glucose 02/09/2024 97  68 - 131 mg/dL Final    HIV 1/2 Ag/Ab 02/09/2024 Non-reactive  Non-reactive Final   Office Visit on 01/23/2024   Component Date Value Ref Range Status    Trichomonas vaginalis 01/23/2024 Negative  Negative Final    Candida sp 01/23/2024 Negative  Negative Final    Marilu glabrata DNA 01/23/2024 Negative  Negative Final    Marilu krusei DNA 01/23/2024 Negative  Negative Final    Bacterial vaginosis DNA 01/23/2024 Negative  Negative Final   Admission on 09/17/2023, Discharged on 09/17/2023   Component Date Value Ref Range Status    WBC 09/17/2023 9.13  3.90 - 12.70 K/uL Final    RBC 09/17/2023 3.93 (L)  4.00 - 5.40 M/uL Final    Hemoglobin 09/17/2023 11.7 (L)  12.0 - 16.0 g/dL  Final    Hematocrit 09/17/2023 36.7 (L)  37.0 - 48.5 % Final    MCV 09/17/2023 93  82 - 98 fL Final    MCH 09/17/2023 29.8  27.0 - 31.0 pg Final    MCHC 09/17/2023 31.9 (L)  32.0 - 36.0 g/dL Final    RDW 09/17/2023 11.8  11.5 - 14.5 % Final    Platelets 09/17/2023 404  150 - 450 K/uL Final    MPV 09/17/2023 8.3 (L)  9.2 - 12.9 fL Final    Immature Granulocytes 09/17/2023 0.3  0.0 - 0.5 % Final    Gran # (ANC) 09/17/2023 4.6  1.8 - 7.7 K/uL Final    Immature Grans (Abs) 09/17/2023 0.03  0.00 - 0.04 K/uL Final    Lymph # 09/17/2023 3.7  1.0 - 4.8 K/uL Final    Mono # 09/17/2023 0.5  0.3 - 1.0 K/uL Final    Eos # 09/17/2023 0.3  0.0 - 0.5 K/uL Final    Baso # 09/17/2023 0.05  0.00 - 0.20 K/uL Final    nRBC 09/17/2023 0  0 /100 WBC Final    Gran % 09/17/2023 50.8  38.0 - 73.0 % Final    Lymph % 09/17/2023 40.7  18.0 - 48.0 % Final    Mono % 09/17/2023 5.0  4.0 - 15.0 % Final    Eosinophil % 09/17/2023 2.7  0.0 - 8.0 % Final    Basophil % 09/17/2023 0.5  0.0 - 1.9 % Final    Differential Method 09/17/2023 Automated   Final    Sodium 09/17/2023 140  136 - 145 mmol/L Final    Potassium 09/17/2023 4.2  3.5 - 5.1 mmol/L Final    Chloride 09/17/2023 106  95 - 110 mmol/L Final    CO2 09/17/2023 24  23 - 29 mmol/L Final    Glucose 09/17/2023 101  70 - 110 mg/dL Final    BUN 09/17/2023 14  8 - 23 mg/dL Final    Creatinine 09/17/2023 0.9  0.5 - 1.4 mg/dL Final    Calcium 09/17/2023 10.0  8.7 - 10.5 mg/dL Final    Total Protein 09/17/2023 7.5  6.0 - 8.4 g/dL Final    Albumin 09/17/2023 3.6  3.5 - 5.2 g/dL Final    Total Bilirubin 09/17/2023 0.4  0.1 - 1.0 mg/dL Final    Alkaline Phosphatase 09/17/2023 154 (H)  55 - 135 U/L Final    AST 09/17/2023 16  10 - 40 U/L Final    ALT 09/17/2023 16  10 - 44 U/L Final    eGFR 09/17/2023 >60.0  >60 mL/min/1.73 m^2 Final    Anion Gap 09/17/2023 10  8 - 16 mmol/L Final    Troponin I 09/17/2023 0.016  0.000 - 0.026 ng/mL Final    BNP 09/17/2023 <10  0 - 99 pg/mL Final    D-Dimer 09/17/2023 0.42   <0.50 mg/L FEU Final    CRP 09/17/2023 3.7  0.0 - 8.2 mg/L Final   (  Encounter Form Printed    Encounter form printed on 12/09/22 08:00 AM         NM Myocardial Perfusion Spect Multi Pharmacologic  Status: Final result     MyChart Results Release    MyChart Status: Active  Results Release     PACS Images for [employer]AL [employer] Viewer     Show images for NM Myocardial Perfusion Spect Multi Pharmacologic  All Reviewers List    Glen Dowell MD on 12/14/2022 19:40     NM Myocardial Perfusion Spect Multi Pharmacologic  Order: [id number]  Status: Final result     Visible to patient: Yes (seen)     Next appt: Today at 11:30 AM in Radiology ([facility] DEXA[facility])     Dx: Nonspecific abnormal electrocardiogra...     0 Result Notes  Details    Reading Physician Reading Date Result Priority   Deniz Blake MD  [phone] 12/12/2022 Routine     Narrative & Impression  EXAMINATION:  NM MYOCARDIAL PERFUSION SPECT MULTI PHARM     CLINICAL HISTORY:  Pre-op noncardiac surgery, moderate risk;  Atherosclerotic heart disease of native coronary artery without angina pectoris     TECHNIQUE:  SPECT images in short, vertical and horizontal long axis were acquired minutes after the injection of 10.8 mCi of Tc-99m tetrofosmin at rest and 30.5 mCi during a cardiac stress. The clinical stress and ECG portion of the study is to be read separately.  CT images of the chest were obtained for attenuation correction.     COMPARISON:  None.     FINDINGS:  The quality of the study is good.     Small perfusion defect within the lateral wall which fully corrects on attenuation correction sequencing favored to represent attenuation artifact.  Otherwise, stress SPECT images demonstrate homogenous distribution of the tracer throughout the left ventricle. On the resting images, there is matched homogenous distribution of the tracer throughout the left ventricle.     The gated post-stress images reveal normal wall motion and normal systolic wall thickening  with an estimated LVEF of 66 %. The LV cavity is not dilated with an end-diastolic volume of 94 ml and an end-systolic volume of 32 ml.     CT images demonstrate calcific atherosclerosis of the aorta and coronary arteries.  0.7 cm left upper lobe pulmonary nodule (series 1, image 6).     Impression:     1. No convincing scintigraphic evidence of ischemia or infarct.  Favor attenuation artifact within the lateral wall.  2. The global left ventricular systolic function is within normal limits with an LV ejection fraction of 66 % and no evidence of LV dilatation. Wall motion is normal.  3. 0.7 cm left upper lobe pulmonary nodule.  For a solid nodule 6-8 mm, Fleischner Society 2017 guidelines recommend follow up with non-contrast chest CT at 6-12 months and 18-24 months after discovery.        Electronically signed by: Deniz Blake  Date:                                            12/12/2022  Time:                                           08:48   US Lower Extremity Arteries Bilateral    Status: Final result       [employer] Results Release    Zoop Status: Active  Results Release       PACS Images for ViTAL [employer] Viewer     Show images for US Lower Extremity Arteries Bilateral    All Reviewers List    Edwin Reza MD on 7/2/2020 14:02     US Lower Extremity Arteries Bilateral  Order: [id number]  Status: Final result     Visible to patient: Yes (seen)      Next appt: None     Dx: Right leg claudication      0 Result Notes      Details    Reading Physician Reading Date Result Priority   Franco Caputo MD  [phone]  [phone] 7/1/2020 Routine     Narrative & Impression  EXAMINATION:  US LOWER EXTREMITY ARTERIES BILATERAL     CLINICAL HISTORY:  Peripheral vascular disease, unspecified     TECHNIQUE:  Bilateral spectral, color and grayscale images of the large arteries of both lower extremities were performed.     COMPARISON:  None     FINDINGS:  Right lower extremity arterial velocities in cm/sec:      Common femoral artery--137, triphasic     Proximal deep femoral artery--84, triphasic     Proximal superficial femoral artery--134, triphasic     Mid superficial femoral artery--118, triphasic     Distal superficial femoral artery--94, triphasic     Popliteal artery--69, biphasic     Peroneal artery--92, biphasic     Posterior tibial artery--25, biphasic     Anterior tibial artery--21, monophasic     Dorsalis pedis artery--79, monophasic     Left lower extremity arterial velocities in cm/sec:     Common femoral artery--114, triphasic     Proximal deep femoral artery--72, monophasic     Proximal superficial femoral artery--169, triphasic     Mid superficial femoral artery--138, triphasic     Distal superficial femoral artery--103, biphasic     Popliteal artery--82, biphasic     Peroneal artery--76, biphasic     Posterior tibial artery--64, biphasic     Anterior tibial artery--100, biphasic     Dorsalis pedis artery--67, biphasic     Impression:     There is atherosclerotic plaque identified within the major arteries of both lower extremities.  These major arteries are patent as detailed above.  Elevated velocities are identified within the right dorsalis pedis artery suggesting a hemodynamically significant stenosis within the distal most right anterior tibial artery or origin of the right dorsalis pedis artery.        Electronically signed by: Franco Caputo MD  Date:                                            07/01/2020  Time:                                           14:00               Exam Ended: 07/01/20 11:49           Name: Petros Evans MD Fax: [phone]   Phone: [phone] Pager:      All Reviewers List    Petros Evans MD on 7/19/2023 08:23   Cait Nicholas, RN on 6/30/2023 09:23   Cait Nicholas, RN on 6/29/2023 09:53      Contains abnormal data Radiology-guided Core Biopsy with Path Adequacy  Order: [id number]  Status: Final result     Visible to patient: Yes (seen)     Next appt: Today  at 11:30 AM in Radiology ([facility] DEXA[facility])     0 Result Notes      Component 7 mo ago   Final Pathologic Diagnosis  Abnormal   Lung, CT-guided core needle biopsy:   - Alveolar parenchyma and bronchial wall fragments without significant histopathologic alteration, insufficient for diagnosis (see comment)     Comment:   No evidence of dysplasia, malignancy, or mass-forming lesion is identified on multiple examined H&E levels. If clinical concern persists, repeat tissue sampling is recommended.     Comment: Interp By Vijay Moore M.D., Signed on 06/28/2023 at 19:20   Gross  Abnormal   Hospital/clinic label MRN:  [id number]   Pathology label MRN:  [id number]     The specimen is received in formalin lacking a written label, but is indicated as &quot;lung nodule&quot; per the requisition form.  The specimen consists of scant tan-pink fragments of soft tissue.  The specimen is submitted entirely as cell block in   cassette [id number] Hernandez,      Encounter Form Printed    Encounter form printed on 09/19/23 03:33 PM         CT Chest Without Contrast  Status: Final result     MyChart Results Release    [employer] Status: Active  Results Release        CT Chest Without Contrast (Order [id number])     CT Chest Without Contrast (Order [id number])    CT Chest Without Contrast: Result Notes     Aviva Mendez PA-C   9/27/2023 12:36 PM CDT       LDCT ordered to be scheduled for 1 year. Please contact patient and determine whether she would like her CT and Pulm follow up to be in [place] or here in  and schedule a follow up appt with LDCT accordingly.             PACS Images for [employer] Viewer     Show images for CT Chest Without Contrast     CT Chest Without Contrast (Order [id number])  All Reviewers List    Stuart Cuello MA on 9/27/2023 13:56   Aviva Mendez PA-C on 9/27/2023 12:36     CT Chest Without Contrast  Order: [id number]  Status: Final result     Visible to patient: Yes (not seen)     Next  appt: Today at 11:30 AM in Radiology ([facility] DEXA1)     Dx: Lung nodule; Stopped smoking with gre...     1 Result Note    1 Follow-up Encounter  Details    Reading Physician Reading Date Result Priority   Meera Jefferson MD  [phone] 9/19/2023 Routine     Narrative & Impression  EXAMINATION:  CT CHEST WITHOUT CONTRAST     CLINICAL HISTORY:  SHAR nodule 0.8 cm; Solitary pulmonary nodule     TECHNIQUE:  Using low dose technique the chest was surveyed from above the pulmonary apices through the posterior costophrenic angles without the use of intravenous contrast.  Coronal and sagittal reformats were obtained     COMPARISON:  05/18/2023     FINDINGS:  Base of Neck: No significant abnormality.     Aorta: Left-sided aortic arch. The aorta maintains normal caliber, contour and course. Calcified plaque lines the aorta and coronary arteries.     Heart/pericardium: Normal size.  Trace pericardial effusion.     Rachele/Mediastinum: No pathologic mediastinal katherine enlargement. Hilar contours appear unremarkable on these non contrast images.     Airways: Patent.     Lungs:     On the right, a few pulmonary micro nodules scattered in the right upper lobe, for instance series 4, image 170.  No new or enlarging nodule.     On the left, nodule in the upper lobe abutting the pleura measures 0.8 cm series 4, image 194 (previously 0.8 cm when measured in a similar manner).  No new nodule.     Scattered subsegmental atelectasis at the lung bases.     Pleura: No significant pleural effusion.     Upper Abdomen: No abnormality of the partially imaged upper abdomen.     Bones: No acute fracture. No suspicious lytic or sclerotic lesion.     Impression:     Pulmonary nodules are similar in size and number, the largest on the left measuring 8 mm.  For multiple solid pulmonary nodules greater than 8mm in size 2017 fleischner society guidelines recommendCT at 3-6 months, then consider CT at 18-24months in a low risk patient. In a high risk  patient, CT at 3-6months, then 18-24 months.        Electronically signed by: Meera Jefferson  Date:                                            09/19/2023  Time:                                           16:22           Exam Ended: 09/19/23 15:46 CDT           Assessment:     1. Primary hypertension    2. Old MI (myocardial infarction)    3. S/P PTCA (percutaneous transluminal coronary angioplasty)    4. PVCs (premature ventricular contractions)    5. Pure hypercholesterolemia    6. PAD (peripheral artery disease)    7. Coronary artery disease involving native coronary artery of native heart without angina pectoris    8. Mild intermittent asthma without complication    9. Ex-smoker    10. Syncope and collapse    11. Smoker    12. Lung nodule      Plan:   Jennifer Sutherland was seen today for follow-up.    Diagnoses and all orders for this visit:    Primary hypertension  -     IN OFFICE EKG 12-LEAD (to [place])    Old MI (myocardial infarction)  -     IN OFFICE EKG 12-LEAD (to [facility])    S/P PTCA (percutaneous transluminal coronary angioplasty)  -     IN OFFICE EKG 12-LEAD (to Muse)    PVCs (premature ventricular contractions)  -     IN OFFICE EKG 12-LEAD (to [facility])    Pure hypercholesterolemia  -     IN OFFICE EKG 12-LEAD (to [facility])    PAD (peripheral artery disease)  -     IN OFFICE EKG 12-LEAD (to [place])    Coronary artery disease involving native coronary artery of native heart without angina pectoris  -     IN OFFICE EKG 12-LEAD (to [facility])    Mild intermittent asthma without complication  -     IN OFFICE EKG 12-LEAD (to Muse)    Ex-smoker  -     IN OFFICE EKG 12-LEAD (to [place])    Syncope and collapse  -     IN OFFICE EKG 12-LEAD (to [place])    Smoker    Lung nodule     Pt is doing well    Same meds    Smoking cessation counseled    F/u with pain management    F/u with new PCP, NP [place]    F/u with pulmonary medicine at the [facility]    Follow up in about 6 months (around 8/14/2024).

## 2024-02-16 ENCOUNTER — HOSPITAL ENCOUNTER (OUTPATIENT)
Dept: RADIOLOGY | Facility: HOSPITAL | Age: 64
Discharge: HOME | End: 2024-02-16
Attending: OBSTETRICS & GYNECOLOGY
Payer: MEDICARE

## 2024-02-16 DIAGNOSIS — Z12.31 ENCOUNTER FOR SCREENING MAMMOGRAM FOR MALIGNANT NEOPLASM OF BREAST: ICD-10-CM

## 2024-02-16 LAB
OHS QRS DURATION: 100 MS
OHS QTC CALCULATION: 415 MS

## 2024-02-16 PROCEDURE — 77067 SCR MAMMO BI INCL CAD: CPT | Mod: 26,,, | Performed by: RADIOLOGY

## 2024-02-16 PROCEDURE — 77067 SCR MAMMO BI INCL CAD: CPT | Mod: TC

## 2024-02-16 PROCEDURE — 77063 BREAST TOMOSYNTHESIS BI: CPT | Mod: 26,,, | Performed by: RADIOLOGY

## 2024-02-20 ENCOUNTER — OFFICE VISIT (OUTPATIENT)
Dept: OTOLARYNGOLOGY | Facility: CLINIC | Age: 64
End: 2024-02-20
Payer: MEDICARE

## 2024-02-20 VITALS
DIASTOLIC BLOOD PRESSURE: 77 MMHG | HEART RATE: 73 BPM | SYSTOLIC BLOOD PRESSURE: 145 MMHG | BODY MASS INDEX: 31.84 KG/M2 | WEIGHT: 209.44 LBS

## 2024-02-20 DIAGNOSIS — G47.33 OSA (OBSTRUCTIVE SLEEP APNEA): ICD-10-CM

## 2024-02-20 DIAGNOSIS — J34.3 HYPERTROPHY OF BOTH INFERIOR NASAL TURBINATES: ICD-10-CM

## 2024-02-20 DIAGNOSIS — J34.2 NASAL SEPTAL DEVIATION: Primary | ICD-10-CM

## 2024-02-20 DIAGNOSIS — M95.0 NASAL VALVE COLLAPSE: ICD-10-CM

## 2024-02-20 PROCEDURE — 99999 PR PBB SHADOW E&M-EST. PATIENT-LVL IV: CPT | Mod: PBBFAC,,, | Performed by: STUDENT IN AN ORGANIZED HEALTH CARE EDUCATION/TRAINING PROGRAM

## 2024-02-20 PROCEDURE — 99213 OFFICE O/P EST LOW 20 MIN: CPT | Mod: S$GLB,,, | Performed by: STUDENT IN AN ORGANIZED HEALTH CARE EDUCATION/TRAINING PROGRAM

## 2024-02-20 NOTE — PROGRESS NOTES
"    Subjective:      Jennifer Sutherland is a 63 y.o. female who comes for follow-up of  nasal obstruction .  Her last visit with me was on 11/7/2023.  Was unable to fill budesonide given costs. Still reporting nasal congestion when taking deep inspiration.     Her current sinus regime consists of: no medications.     The patient's medications, allergies, past medical, surgical, social and family histories were reviewed and updated as appropriate.     A detailed review of systems was obtained with pertinent positives as per the above HPI, and otherwise negative.        Objective:     BP (!) 145/77 (BP Location: Right arm, Patient Position: Sitting, BP Method: Large (Automatic))   Pulse 73   Wt 95 kg (209 lb 7 oz)   BMI 31.84 kg/m²        Constitutional:   Vital signs are normal. She appears well-developed. Normal speech.      Head:  Normocephalic and atraumatic.     Ears:    Right Ear: No drainage or tenderness. No middle ear effusion.   Left Ear: No drainage or tenderness.  No middle ear effusion.     Mouth/Throat  Oropharynx clear and moist without lesions or asymmetry and normal uvula midline. No trismus. No oropharyngeal exudate. Mirror exam not performed due to patient tolerance.  Mirror exam not performed due to patient tolerance.      Neck:  Neck normal without thyromegaly masses, asymmetry, normal tracheal structure, crepitus, and tenderness, thyroid normal and trachea normal.     Pulmonary/Chest:   Effort normal.     Psychiatric:   She has a normal mood and affect. Her speech is normal.     Skin:   No abrasions, lacerations, lesions, or rashes.     Positive modified maverick.     Procedure    None    Data Reviewed    WBC (K/uL)   Date Value   02/09/2024 7.21     Eosinophil % (%)   Date Value   02/09/2024 1.0     Eos # (K/uL)   Date Value   02/09/2024 CANCELED     Platelets (K/uL)   Date Value   02/09/2024 407     Glucose (mg/dL)   Date Value   02/09/2024 94     No results found for: "IGE"    No sinus imaging " available.      Assessment:     1. Nasal septal deviation    2. Hypertrophy of both inferior nasal turbinates    3. REN (obstructive sleep apnea)    4. Nasal valve collapse       Plan:     - Discussed surgical interventions to correct nasal valve collapse  - Patient not interested at this time  - Recommend continuing flonase for nasal congestion.   - RTC PRN    Tomas Marshall MD

## 2024-02-23 ENCOUNTER — PATIENT OUTREACH (OUTPATIENT)
Dept: ADMINISTRATIVE | Facility: HOSPITAL | Age: 64
End: 2024-02-23
Payer: MEDICARE

## 2024-02-23 NOTE — PROGRESS NOTES
Health Maintenance Due   Topic Date Due    Aspirin/Antiplatelet Therapy  Never done    Colorectal Cancer Screening  Never done    Shingles Vaccine (1 of 2) Never done    RSV Vaccine (Age 60+ and Pregnant patients) (1 - 1-dose 60+ series) Never done    DEXA Scan  09/26/2022    COVID-19 Vaccine (5 - 2023-24 season) 09/01/2023        Chart review done.   HM updated.   Immunizations reviewed & updated.   Care Everywhere updated.

## 2024-03-08 ENCOUNTER — TELEPHONE (OUTPATIENT)
Dept: PRIMARY CARE CLINIC | Facility: CLINIC | Age: 64
End: 2024-03-08
Payer: MEDICARE

## 2024-03-08 ENCOUNTER — PATIENT MESSAGE (OUTPATIENT)
Dept: PRIMARY CARE CLINIC | Facility: CLINIC | Age: 64
End: 2024-03-08
Payer: MEDICARE

## 2024-03-08 NOTE — TELEPHONE ENCOUNTER
----- Message from Sally Martinez sent at 3/7/2024  8:52 AM CST -----  Contact: [phone]  Pt is requesting this through the portal please advise    Preferred Date Range: 3/7/2024 - 3/12/2024    Preferred Times: Any Time    Reason for visit: Headaches    Comments:  Headache

## 2024-03-12 ENCOUNTER — OFFICE VISIT (OUTPATIENT)
Dept: PRIMARY CARE CLINIC | Facility: CLINIC | Age: 64
End: 2024-03-12
Payer: MEDICARE

## 2024-03-12 VITALS
SYSTOLIC BLOOD PRESSURE: 134 MMHG | OXYGEN SATURATION: 97 % | BODY MASS INDEX: 32.42 KG/M2 | HEIGHT: 68 IN | RESPIRATION RATE: 18 BRPM | WEIGHT: 213.94 LBS | HEART RATE: 76 BPM | DIASTOLIC BLOOD PRESSURE: 86 MMHG

## 2024-03-12 DIAGNOSIS — I25.2 OLD MI (MYOCARDIAL INFARCTION): ICD-10-CM

## 2024-03-12 DIAGNOSIS — E78.00 PURE HYPERCHOLESTEROLEMIA: ICD-10-CM

## 2024-03-12 DIAGNOSIS — F39 EPISODIC MOOD DISORDER: ICD-10-CM

## 2024-03-12 DIAGNOSIS — Z98.61 S/P PTCA (PERCUTANEOUS TRANSLUMINAL CORONARY ANGIOPLASTY): ICD-10-CM

## 2024-03-12 DIAGNOSIS — F32.9 REACTIVE DEPRESSION: ICD-10-CM

## 2024-03-12 DIAGNOSIS — J40 BRONCHITIS: ICD-10-CM

## 2024-03-12 DIAGNOSIS — R91.1 LUNG NODULE: ICD-10-CM

## 2024-03-12 DIAGNOSIS — J01.00 ACUTE NON-RECURRENT MAXILLARY SINUSITIS: Primary | ICD-10-CM

## 2024-03-12 DIAGNOSIS — I10 HYPERTENSION, UNSPECIFIED TYPE: ICD-10-CM

## 2024-03-12 DIAGNOSIS — I25.119 CORONARY ARTERY DISEASE INVOLVING NATIVE CORONARY ARTERY OF NATIVE HEART WITH ANGINA PECTORIS: ICD-10-CM

## 2024-03-12 DIAGNOSIS — F17.201 TOBACCO ABUSE, IN REMISSION: ICD-10-CM

## 2024-03-12 DIAGNOSIS — J44.9 CHRONIC OBSTRUCTIVE PULMONARY DISEASE, UNSPECIFIED COPD TYPE: ICD-10-CM

## 2024-03-12 LAB
CTP QC/QA: YES
CTP QC/QA: YES
POC MOLECULAR INFLUENZA A AGN: NEGATIVE
POC MOLECULAR INFLUENZA B AGN: NEGATIVE
SARS-COV-2 RDRP RESP QL NAA+PROBE: NEGATIVE

## 2024-03-12 PROCEDURE — 99999 PR PBB SHADOW E&M-EST. PATIENT-LVL IV: CPT | Mod: PBBFAC,,, | Performed by: FAMILY MEDICINE

## 2024-03-12 PROCEDURE — 99214 OFFICE O/P EST MOD 30 MIN: CPT | Mod: 25,S$GLB,, | Performed by: FAMILY MEDICINE

## 2024-03-12 PROCEDURE — 87635 SARS-COV-2 COVID-19 AMP PRB: CPT | Mod: QW,S$GLB,, | Performed by: FAMILY MEDICINE

## 2024-03-12 PROCEDURE — 87502 INFLUENZA DNA AMP PROBE: CPT | Mod: QW,S$GLB,, | Performed by: FAMILY MEDICINE

## 2024-03-12 PROCEDURE — 96372 THER/PROPH/DIAG INJ SC/IM: CPT | Mod: S$GLB,,, | Performed by: FAMILY MEDICINE

## 2024-03-12 RX ORDER — PREDNISONE 5 MG/1
TABLET ORAL
Qty: 20 TABLET | Refills: 0 | Status: SHIPPED | OUTPATIENT
Start: 2024-03-12 | End: 2024-04-11

## 2024-03-12 RX ORDER — CEFDINIR 300 MG/1
300 CAPSULE ORAL 2 TIMES DAILY
Qty: 20 CAPSULE | Refills: 0 | Status: SHIPPED | OUTPATIENT
Start: 2024-03-12 | End: 2024-03-22

## 2024-03-12 RX ORDER — PROMETHAZINE HYDROCHLORIDE AND DEXTROMETHORPHAN HYDROBROMIDE 6.25; 15 MG/5ML; MG/5ML
5 SYRUP ORAL EVERY 6 HOURS PRN
Qty: 180 ML | Refills: 1 | Status: SHIPPED | OUTPATIENT
Start: 2024-03-12 | End: 2024-04-11

## 2024-03-12 RX ORDER — TRIAMCINOLONE ACETONIDE 40 MG/ML
40 INJECTION, SUSPENSION INTRA-ARTICULAR; INTRAMUSCULAR ONCE
Status: COMPLETED | OUTPATIENT
Start: 2024-03-12 | End: 2024-03-12

## 2024-03-12 RX ADMIN — TRIAMCINOLONE ACETONIDE 40 MG: 40 INJECTION, SUSPENSION INTRA-ARTICULAR; INTRAMUSCULAR at 11:03

## 2024-03-12 NOTE — PROGRESS NOTES
Subjective:       Patient ID: Jennifer Brady is a 63 y.o. female.    Chief Complaint: Headache, Sore Throat, Cough, and Otalgia    HPI:  63-year-old black female in for headaches sore throat cough earpain  Sick x2 weeks--headache occipital areas and temples bilaterally---subjective fever--breaking out in sweats---+runny stuffy nose--+sore throat--+cough--+phlegm yellow--left ear pain  No pneumonia asthma TB--no smoking.  No nausea vomiting diarrhea.  Had COVID vaccine  No exposure COVID or flu  Was on Ozempic --got down 198 now 213.   Lab reviewed CBCs CMP lipids TSH HIV all within normal limits    ROS:  Skin: no psoriasis, eczema, skin cancer  HEENT:+ headache, NO ocular pain,+ blurred vision--left eye ,no  diplopia, epistaxis,+ hoarseness +change in voice, no thyroid trouble  Lung: No pneumonia, asthma, Tb, wheezing, +SOB,--no smoking quit  smoked 35 yrs history pulmonary nodules sleep apnea  Heart: No chest pain, ankle edema, palpitations, + hx MI,no  asia murmur,+ hypertension,+ hyperlipidemia history CAD with stent Hx PVC's   Abdomen: No nausea, vomiting, diarrhea, constipation, + hx ulcers, no  hepatitis, gallbladder disease, melena, hematochezia, hematemesis history hiatal hernia with GERD  : no UTI, renal disease, stones  MS: no fractures, O/A, lupus, rheumatoid, gout history fractured left wrist  Neuro: No dizziness, LOC, seizures   No diabetes, no anemia, + anxiety, + depression--lonliness   [facility]   9 years    4 children retired disabled due AA unable work lives alone coleen rough with children don't come around  [address]ne [facility] [place] one [place] son     Objective:   Physical Exam:  General: Well nourished, well developed, no acute distress +obesity  Skin: No lesions  HEENT: Eyes PERRLA, EOM intact, nose patent, throat 1/4 erythematous ears clear fluid no significant bulging  NECK: Supple, no bruits, No JVD, no nodes  Lungs: Clear, no rales, rhonchi, wheezing  +coarse cough  Heart: Regular rate and rhythm, no murmurs, gallops, or rubs  Abdomen: flat, bowel sounds positive, no tenderness, or organomegaly  MS: Range of motion and muscle strength intact  Neuro: Alert, CN intact, oriented X 3  Extremities: No cyanosis, clubbing, or edema         Assessment:       1. Acute non-recurrent maxillary sinusitis    2. Bronchitis    3. Chronic obstructive pulmonary disease, unspecified COPD type    4. Episodic mood disorder    5. Coronary artery disease involving native coronary artery of native heart with angina pectoris    6. Hypertension, unspecified type    7. Pure hypercholesterolemia    8. Old MI (myocardial infarction)    9. S/P PTCA (percutaneous transluminal coronary angioplasty)    10. Lung nodule    11. Reactive depression    12. Tobacco abuse, in remission        Plan:       Acute non-recurrent maxillary sinusitis    Bronchitis  -     POCT COVID-19 Rapid Screening  -     POCT Influenza A/B Molecular    Chronic obstructive pulmonary disease, unspecified COPD type    Episodic mood disorder    Coronary artery disease involving native coronary artery of native heart with angina pectoris    Hypertension, unspecified type    Pure hypercholesterolemia    Old MI (myocardial infarction)    S/P PTCA (percutaneous transluminal coronary angioplasty)    Lung nodule    Reactive depression    Tobacco abuse, in remission    Other orders  -     triamcinolone acetonide injection 40 mg  -     cefdinir (OMNICEF) 300 MG capsule; Take 1 capsule (300 mg total) by mouth 2 (two) times daily. for 10 days  Dispense: 20 capsule; Refill: 0  -     promethazine-dextromethorphan (PROMETHAZINE-DM) 6.25-15 mg/5 mL Syrp; Take 5 mLs by mouth every 6 (six) hours as needed (cough).  Dispense: 180 mL; Refill: 1  -     predniSONE (DELTASONE) 5 MG tablet; 4 po qd x 2, 3 po qd x2, 2 po qd x2, 1 po qd x2  Dispense: 20 tablet; Refill: 0        Main Reason for Visit  Sinusitis/bronchitis--flu swab COVID  swab--Kenalog 40 mg--Omnicef 300 mg 1 p.o. b.i.d. times 10 days/prednisone taper/Phenergan DM 1 tsp q.6 hours p.r.n. cough  History COPD lung lesions on albuterol could use that for wheezing or shortness of breath  History hyperlipidemia on atorvastatin  History of hypertension on losartan  History GERD on Protonix  History CAD with stent on Nitrostat  History of neuropathies on Lyrica  History sinus issues has been on Flonase and Astelin  History depression on Effexor trazodone  Lab just done CBCs CMP lipids TSH HIV hemoglobin A1c  Ret 6 mo or to PCP

## 2024-03-12 NOTE — PROGRESS NOTES
Verified pt ID using name and . SHALOM Benavidez. Administered Kenalog 40mg in  Right Dorsal Gluteal per physician order using aseptic technique. Aspirated and no blood return noted. Pt tolerated well with no adverse reactions noted.

## 2024-03-28 ENCOUNTER — PATIENT OUTREACH (OUTPATIENT)
Dept: ADMINISTRATIVE | Facility: HOSPITAL | Age: 64
End: 2024-03-28
Payer: MEDICARE

## 2024-04-04 ENCOUNTER — OFFICE VISIT (OUTPATIENT)
Dept: BARIATRICS | Facility: CLINIC | Age: 64
End: 2024-04-04
Payer: MEDICARE

## 2024-04-04 VITALS
OXYGEN SATURATION: 100 % | SYSTOLIC BLOOD PRESSURE: 124 MMHG | HEART RATE: 74 BPM | HEIGHT: 68 IN | DIASTOLIC BLOOD PRESSURE: 78 MMHG | WEIGHT: 211.69 LBS | BODY MASS INDEX: 32.08 KG/M2

## 2024-04-04 DIAGNOSIS — G47.33 OSA (OBSTRUCTIVE SLEEP APNEA): ICD-10-CM

## 2024-04-04 DIAGNOSIS — Z71.3 ENCOUNTER FOR WEIGHT LOSS COUNSELING: ICD-10-CM

## 2024-04-04 DIAGNOSIS — E66.09 CLASS 1 OBESITY DUE TO EXCESS CALORIES WITH SERIOUS COMORBIDITY AND BODY MASS INDEX (BMI) OF 30.0 TO 30.9 IN ADULT: Primary | ICD-10-CM

## 2024-04-04 DIAGNOSIS — I25.10 CORONARY ARTERY DISEASE INVOLVING NATIVE CORONARY ARTERY OF NATIVE HEART WITHOUT ANGINA PECTORIS: ICD-10-CM

## 2024-04-04 DIAGNOSIS — E78.00 PURE HYPERCHOLESTEROLEMIA: ICD-10-CM

## 2024-04-04 DIAGNOSIS — I10 PRIMARY HYPERTENSION: ICD-10-CM

## 2024-04-04 DIAGNOSIS — Z98.61 S/P PTCA (PERCUTANEOUS TRANSLUMINAL CORONARY ANGIOPLASTY): ICD-10-CM

## 2024-04-04 PROCEDURE — 3008F BODY MASS INDEX DOCD: CPT | Mod: CPTII,S$GLB,, | Performed by: STUDENT IN AN ORGANIZED HEALTH CARE EDUCATION/TRAINING PROGRAM

## 2024-04-04 PROCEDURE — 3288F FALL RISK ASSESSMENT DOCD: CPT | Mod: CPTII,S$GLB,, | Performed by: STUDENT IN AN ORGANIZED HEALTH CARE EDUCATION/TRAINING PROGRAM

## 2024-04-04 PROCEDURE — 1159F MED LIST DOCD IN RCRD: CPT | Mod: CPTII,S$GLB,, | Performed by: STUDENT IN AN ORGANIZED HEALTH CARE EDUCATION/TRAINING PROGRAM

## 2024-04-04 PROCEDURE — 3044F HG A1C LEVEL LT 7.0%: CPT | Mod: CPTII,S$GLB,, | Performed by: STUDENT IN AN ORGANIZED HEALTH CARE EDUCATION/TRAINING PROGRAM

## 2024-04-04 PROCEDURE — 3074F SYST BP LT 130 MM HG: CPT | Mod: CPTII,S$GLB,, | Performed by: STUDENT IN AN ORGANIZED HEALTH CARE EDUCATION/TRAINING PROGRAM

## 2024-04-04 PROCEDURE — 3078F DIAST BP <80 MM HG: CPT | Mod: CPTII,S$GLB,, | Performed by: STUDENT IN AN ORGANIZED HEALTH CARE EDUCATION/TRAINING PROGRAM

## 2024-04-04 PROCEDURE — 1160F RVW MEDS BY RX/DR IN RCRD: CPT | Mod: CPTII,S$GLB,, | Performed by: STUDENT IN AN ORGANIZED HEALTH CARE EDUCATION/TRAINING PROGRAM

## 2024-04-04 PROCEDURE — 99213 OFFICE O/P EST LOW 20 MIN: CPT | Mod: S$GLB,,, | Performed by: STUDENT IN AN ORGANIZED HEALTH CARE EDUCATION/TRAINING PROGRAM

## 2024-04-04 PROCEDURE — 99999 PR PBB SHADOW E&M-EST. PATIENT-LVL IV: CPT | Mod: PBBFAC,,, | Performed by: STUDENT IN AN ORGANIZED HEALTH CARE EDUCATION/TRAINING PROGRAM

## 2024-04-04 PROCEDURE — 1126F AMNT PAIN NOTED NONE PRSNT: CPT | Mod: CPTII,S$GLB,, | Performed by: STUDENT IN AN ORGANIZED HEALTH CARE EDUCATION/TRAINING PROGRAM

## 2024-04-04 PROCEDURE — 1101F PT FALLS ASSESS-DOCD LE1/YR: CPT | Mod: CPTII,S$GLB,, | Performed by: STUDENT IN AN ORGANIZED HEALTH CARE EDUCATION/TRAINING PROGRAM

## 2024-04-04 RX ORDER — SEMAGLUTIDE 1 MG/.5ML
1 INJECTION, SOLUTION SUBCUTANEOUS
Qty: 2 ML | Refills: 0 | Status: SHIPPED | OUTPATIENT
Start: 2024-04-04 | End: 2024-04-11

## 2024-04-04 NOTE — PROGRESS NOTES
Subjective:       Patient ID: Jennifer Brady is a 63 y.o. female.    Chief Complaint: Follow-up, Weight Check, and Obesity    Patient presents for treatment of obesity.       Co-morbidities  Migraines - was on topiramate, but was discontinued because didn't help with headaches  REN  MI  CAD s/p PTCA  HTN  HLD    Weight History  Lowest adult weight: 165 lbs  Highest adult weight: 220 lbs         Current Physical Activity  No regular exercise, some walking but not consistent  Foot pain due to dropped arches, but that has improved      Current Eating Habits  Breakfast - skips most days, occassional cup of coffee  [place]  Potatoes - mashed, roasted  White beans  Roast  Turkey necks   Biscuits  Cabbage  Gumbo  Recently stopped drinking sweet tea and lemonade      Medical Weight Loss  2/13/2023: 216.2 lbs, BMI 32.9, BFP 47.6%, .9 lbs, SMM 62.4 lbs, BMR 1480 kcal  5/11/2023: 209.1 lbs, BMI 31.8, BFP 45.8%, BFM 95.8 lbs, SMM 62.4 lbs, BMR 1481 kcal  7/13/2023: 204.6 lbs, BMI 31.1, BFP 44.4%, BFM 90.9 lbs, SMM 62.6 lbs, BMR 1485 kcal  10/2/2023: 202.1 lbs, BMI 30.7, BFP 43.1%, BFM 87 lbs, SMM 64.4 lbs, BMR 1498 kcal  1/3/2024: 202.7 lbs, BMI 30.8, BFP 42.3%, BFM 85.8 lbs, SMM 64.4 lbs, BMR 1516 kcal  4/4/2024: 211.7 lbs, BMI 32.2, BFP 37.3%, BFM 79 lbs, SMM 75 lbs, BMR 1670 kcal      Review of Systems   Constitutional:  Negative for chills and fever.   Respiratory:  Negative for shortness of breath.    Cardiovascular:  Negative for chest pain and palpitations.   Gastrointestinal:  Negative for abdominal pain, nausea and vomiting.   Neurological:  Negative for dizziness and light-headedness.   Psychiatric/Behavioral:  The patient is not nervous/anxious.          Objective:       Latest Reference Range & Units 03/02/22 10:23 12/09/22 08:04   WBC 3.90 - 12.70 K/uL 9.53 7.20   RBC 4.00 - 5.40 M/uL 3.97 (L) 4.07   Hemoglobin 12.0 - 16.0 g/dL 12.0 12.2   Hematocrit 37.0 - 48.5 % 38.7 38.3   MCV 82 - 98 fL 98 94    MCH 27.0 - 31.0 pg 30.2 30.0   MCHC 32.0 - 36.0 g/dL 31.0 (L) 31.9 (L)   RDW 11.5 - 14.5 % 12.4 12.0   Platelets 150 - 450 K/uL 397 389   MPV 9.2 - 12.9 fL 8.6 (L) 8.6 (L)   Gran % 38.0 - 73.0 % 46.5 52.2   Lymph % 18.0 - 48.0 % 44.4 38.1   Mono % 4.0 - 15.0 % 5.9 6.1   Eosinophil % 0.0 - 8.0 % 2.6 2.6   Basophil % 0.0 - 1.9 % 0.4 0.7   Immature Granulocytes 0.0 - 0.5 % 0.2 0.3   Gran # (ANC) 1.8 - 7.7 K/uL 4.4 3.8   Lymph # 1.0 - 4.8 K/uL 4.2 2.7   Mono # 0.3 - 1.0 K/uL 0.6 0.4   Eos # 0.0 - 0.5 K/uL 0.3 0.2   Baso # 0.00 - 0.20 K/uL 0.04 0.05   Immature Grans (Abs) 0.00 - 0.04 K/uL 0.02 0.02   nRBC 0 /100 WBC 0 0   Differential Method  Automated Automated   Protime 9.0 - 12.5 sec  10.3   INR 0.8 - 1.2   1.0   aPTT 21.0 - 32.0 sec  28.8   Sodium 136 - 145 mmol/L 141 140   Potassium 3.5 - 5.1 mmol/L 3.9 4.0   Chloride 95 - 110 mmol/L 108 106   CO2 23 - 29 mmol/L 24 22 (L)   Anion Gap 8 - 16 mmol/L 9 12   BUN 8 - 23 mg/dL 15 9   Creatinine 0.5 - 1.4 mg/dL 0.9 0.8   eGFR >60 mL/min/1.73 m^2  >60   eGFR if non African American >60 mL/min/1.73 m^2 >60.0    eGFR if African American >60 mL/min/1.73 m^2 >60.0    Glucose 70 - 110 mg/dL 92 95   Calcium 8.7 - 10.5 mg/dL 9.7 9.4   Alkaline Phosphatase 55 - 135 U/L 133    PROTEIN TOTAL 6.0 - 8.4 g/dL 7.3    Albumin 3.5 - 5.2 g/dL 3.7    BILIRUBIN TOTAL 0.1 - 1.0 mg/dL 0.6    AST 10 - 40 U/L 21    ALT 10 - 44 U/L 22    Cholesterol 120 - 199 mg/dL 157    HDL 40 - 75 mg/dL 48    HDL/Cholesterol Ratio 20.0 - 50.0 % 30.6    LDL Cholesterol External 63.0 - 159.0 mg/dL 86.2    Non-HDL Cholesterol mg/dL 109    Total Cholesterol/HDL Ratio 2.0 - 5.0  3.3    Triglycerides 30 - 150 mg/dL 114    CPK 20 - 180 U/L 239 (H)    TSH 0.400 - 4.000 uIU/mL 0.494    (L): Data is abnormally low  (H): Data is abnormally high    Vitals:    04/04/24 0939   BP: 124/78   Pulse: 74     Physical Exam  Vitals reviewed.   Constitutional:       General: She is not in acute distress.     Appearance: Normal  appearance. She is obese. She is not ill-appearing, toxic-appearing or diaphoretic.   HENT:      Head: Normocephalic and atraumatic.   Cardiovascular:      Rate and Rhythm: Normal rate.   Pulmonary:      Effort: Pulmonary effort is normal. No respiratory distress.   Skin:     General: Skin is warm and dry.   Neurological:      Mental Status: She is alert and oriented to person, place, and time.         Assessment:       Problem List Items Addressed This Visit       HTN (hypertension)    Relevant Medications    semaglutide, weight loss, (WEGOVY) 1 mg/0.5 mL PnIj    Pure hypercholesterolemia    Relevant Medications    semaglutide, weight loss, (WEGOVY) 1 mg/0.5 mL PnIj    REN (obstructive sleep apnea)    Relevant Medications    semaglutide, weight loss, (WEGOVY) 1 mg/0.5 mL PnIj    Obesity - Primary    Relevant Medications    semaglutide, weight loss, (WEGOVY) 1 mg/0.5 mL PnIj    S/P PTCA (percutaneous transluminal coronary angioplasty)    Relevant Medications    semaglutide, weight loss, (WEGOVY) 1 mg/0.5 mL PnIj     Other Visit Diagnoses       Coronary artery disease involving native coronary artery of native heart without angina pectoris        Relevant Medications    semaglutide, weight loss, (WEGOVY) 1 mg/0.5 mL PnIj    Encounter for weight loss counseling                  Plan:     - Log all food and beverage intake with a daily calorie goal of 1500 calories per day    - Moderate intensity aerobic exercise for 30 minuets 3-5x/week

## 2024-04-11 ENCOUNTER — OFFICE VISIT (OUTPATIENT)
Dept: PRIMARY CARE CLINIC | Facility: CLINIC | Age: 64
End: 2024-04-11
Payer: MEDICARE

## 2024-04-11 VITALS
BODY MASS INDEX: 32.31 KG/M2 | DIASTOLIC BLOOD PRESSURE: 82 MMHG | TEMPERATURE: 98 F | SYSTOLIC BLOOD PRESSURE: 120 MMHG | HEIGHT: 68 IN | WEIGHT: 213.19 LBS | OXYGEN SATURATION: 98 % | HEART RATE: 65 BPM | RESPIRATION RATE: 16 BRPM

## 2024-04-11 DIAGNOSIS — Z63.4 BEREAVEMENT REACTION: ICD-10-CM

## 2024-04-11 DIAGNOSIS — R93.89 ABNORMAL X-RAY: ICD-10-CM

## 2024-04-11 DIAGNOSIS — R91.1 LUNG NODULE: ICD-10-CM

## 2024-04-11 DIAGNOSIS — J44.9 CHRONIC OBSTRUCTIVE PULMONARY DISEASE, UNSPECIFIED COPD TYPE: ICD-10-CM

## 2024-04-11 DIAGNOSIS — Z98.61 S/P PTCA (PERCUTANEOUS TRANSLUMINAL CORONARY ANGIOPLASTY): ICD-10-CM

## 2024-04-11 DIAGNOSIS — F39 EPISODIC MOOD DISORDER: ICD-10-CM

## 2024-04-11 DIAGNOSIS — F32.9 REACTIVE DEPRESSION: ICD-10-CM

## 2024-04-11 DIAGNOSIS — F43.20 BEREAVEMENT REACTION: ICD-10-CM

## 2024-04-11 DIAGNOSIS — M25.561 CHRONIC PAIN OF RIGHT KNEE: ICD-10-CM

## 2024-04-11 DIAGNOSIS — G89.29 CHRONIC PAIN OF RIGHT KNEE: ICD-10-CM

## 2024-04-11 DIAGNOSIS — F60.89 CLUSTER B PERSONALITY DISORDER: ICD-10-CM

## 2024-04-11 DIAGNOSIS — Z76.89 ENCOUNTER TO ESTABLISH CARE: Primary | ICD-10-CM

## 2024-04-11 PROCEDURE — 1159F MED LIST DOCD IN RCRD: CPT | Mod: CPTII,S$GLB,, | Performed by: STUDENT IN AN ORGANIZED HEALTH CARE EDUCATION/TRAINING PROGRAM

## 2024-04-11 PROCEDURE — 1125F AMNT PAIN NOTED PAIN PRSNT: CPT | Mod: CPTII,S$GLB,, | Performed by: STUDENT IN AN ORGANIZED HEALTH CARE EDUCATION/TRAINING PROGRAM

## 2024-04-11 PROCEDURE — 3044F HG A1C LEVEL LT 7.0%: CPT | Mod: CPTII,S$GLB,, | Performed by: STUDENT IN AN ORGANIZED HEALTH CARE EDUCATION/TRAINING PROGRAM

## 2024-04-11 PROCEDURE — 3008F BODY MASS INDEX DOCD: CPT | Mod: CPTII,S$GLB,, | Performed by: STUDENT IN AN ORGANIZED HEALTH CARE EDUCATION/TRAINING PROGRAM

## 2024-04-11 PROCEDURE — 99214 OFFICE O/P EST MOD 30 MIN: CPT | Mod: S$GLB,,, | Performed by: STUDENT IN AN ORGANIZED HEALTH CARE EDUCATION/TRAINING PROGRAM

## 2024-04-11 PROCEDURE — 1160F RVW MEDS BY RX/DR IN RCRD: CPT | Mod: CPTII,S$GLB,, | Performed by: STUDENT IN AN ORGANIZED HEALTH CARE EDUCATION/TRAINING PROGRAM

## 2024-04-11 PROCEDURE — 99999 PR PBB SHADOW E&M-EST. PATIENT-LVL V: CPT | Mod: PBBFAC,,, | Performed by: STUDENT IN AN ORGANIZED HEALTH CARE EDUCATION/TRAINING PROGRAM

## 2024-04-11 PROCEDURE — 3288F FALL RISK ASSESSMENT DOCD: CPT | Mod: CPTII,S$GLB,, | Performed by: STUDENT IN AN ORGANIZED HEALTH CARE EDUCATION/TRAINING PROGRAM

## 2024-04-11 PROCEDURE — 3079F DIAST BP 80-89 MM HG: CPT | Mod: CPTII,S$GLB,, | Performed by: STUDENT IN AN ORGANIZED HEALTH CARE EDUCATION/TRAINING PROGRAM

## 2024-04-11 PROCEDURE — 3074F SYST BP LT 130 MM HG: CPT | Mod: CPTII,S$GLB,, | Performed by: STUDENT IN AN ORGANIZED HEALTH CARE EDUCATION/TRAINING PROGRAM

## 2024-04-11 PROCEDURE — 1101F PT FALLS ASSESS-DOCD LE1/YR: CPT | Mod: CPTII,S$GLB,, | Performed by: STUDENT IN AN ORGANIZED HEALTH CARE EDUCATION/TRAINING PROGRAM

## 2024-04-11 RX ORDER — VENLAFAXINE HYDROCHLORIDE 150 MG/1
150 CAPSULE, EXTENDED RELEASE ORAL DAILY
COMMUNITY

## 2024-04-11 SDOH — SOCIAL DETERMINANTS OF HEALTH (SDOH): DISSAPEARANCE AND DEATH OF FAMILY MEMBER: Z63.4
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
## 2024-04-13 ENCOUNTER — PATIENT MESSAGE (OUTPATIENT)
Dept: ADMINISTRATIVE | Facility: HOSPITAL | Age: 64
End: 2024-04-13
Payer: MEDICARE

## 2024-04-14 DIAGNOSIS — Z12.11 SCREENING FOR COLON CANCER: ICD-10-CM

## 2024-04-23 ENCOUNTER — TELEPHONE (OUTPATIENT)
Dept: PRIMARY CARE CLINIC | Facility: CLINIC | Age: 64
End: 2024-04-23
Payer: MEDICARE

## 2024-04-23 NOTE — TELEPHONE ENCOUNTER
----- Message from Hyacinth Levin sent at 4/23/2024 12:09 PM CDT -----  Patient dropped off paperwork last week . The paperwork is needed as soon as possible . Please call this is a second request the patient said. [phone]

## 2024-05-03 LAB — HEMOCCULT STL QL IA: NEGATIVE

## 2024-05-09 DIAGNOSIS — M25.561 RIGHT KNEE PAIN, UNSPECIFIED CHRONICITY: Primary | ICD-10-CM

## 2024-05-09 NOTE — PROGRESS NOTES
Individual Psychotherapy (PhD/LCSW)    10/1/2019    Site:  [facility]         Therapeutic Intervention: Met with patient.  Outpatient - Supportive psychotherapy 45 min - CPT Code 83582    Chief complaint/reason for encounter: depression and anxiety     Interval history and content of current session: Pt has not been seen in 17 months.  She returns at the urging of her medication NP.  She states she has been doing better over the past 5-6 months due to seeing a man she has known for some time.  They enjoy each other's company a great deal.  She is well dressed today and looks happy.  She states her biggest issue is that he daughter in [place] brought her 2 children, 13 and 11, to stay with her this summer with the intent that the daughter was going to move here also.  However, daughter has not been able to get a job here and plans to stay in [place].  Pt is aggravated with being stuck with her children, especially since the 13 year old girl is very difficult, disrespectful, and will not help her out around the house.  She wants to send them back before the end of the year but seems to have no specific plans to do so.  Her other adult children, 2 daughters and an adopted son, are not that close to her any longer but she does not seem as worried about it as in the past.  She was told to return as needed.    Treatment plan:  · Target symptoms: depression, anxiety   · Why chosen therapy is appropriate versus another modality: relevant to diagnosis  · Outcome monitoring methods: self-report, observation  · Therapeutic intervention type: supportive psychotherapy    Risk parameters:  Patient reports no suicidal ideation  Patient reports no homicidal ideation  Patient reports no self-injurious behavior  Patient reports no violent behavior    Verbal deficits: None    Patient's response to intervention:  The patient's response to intervention is accepting.    Progress toward goals and other mental status  changes:  The patient's progress toward goals is good.    Diagnosis:     ICD-10-CM ICD-9-CM   1. LAURA (generalized anxiety disorder) F41.1 300.02       Plan:  individual psychotherapy and medication management by physician    Return to clinic: 1 month    Length of Service (minutes): 45   chronic pji   wound looks excellent this am as per DHF   surgery is postponed for now   sutures out today   cont bactrinm   will monitor wound   asa for deep vein thrombosis prophylaxis  chronic pji   drainage is improved  this am as per DHF   surgery is postponed for now   sutures out today   cont bactrinm   will monitor wound   asa for deep vein thrombosis prophylaxis

## 2024-05-10 ENCOUNTER — OFFICE VISIT (OUTPATIENT)
Dept: ORTHOPEDICS | Facility: CLINIC | Age: 64
End: 2024-05-10
Payer: MEDICARE

## 2024-05-10 VITALS
SYSTOLIC BLOOD PRESSURE: 139 MMHG | HEIGHT: 68 IN | HEART RATE: 69 BPM | BODY MASS INDEX: 32.7 KG/M2 | DIASTOLIC BLOOD PRESSURE: 96 MMHG | WEIGHT: 215.75 LBS

## 2024-05-10 DIAGNOSIS — M17.11 PRIMARY OSTEOARTHRITIS OF RIGHT KNEE: ICD-10-CM

## 2024-05-10 DIAGNOSIS — F41.1 GAD (GENERALIZED ANXIETY DISORDER): ICD-10-CM

## 2024-05-10 DIAGNOSIS — G89.29 CHRONIC PAIN OF RIGHT KNEE: Primary | ICD-10-CM

## 2024-05-10 DIAGNOSIS — M25.561 CHRONIC PAIN OF RIGHT KNEE: Primary | ICD-10-CM

## 2024-05-10 PROCEDURE — 3008F BODY MASS INDEX DOCD: CPT | Mod: CPTII,S$GLB,,

## 2024-05-10 PROCEDURE — 99999 PR PBB SHADOW E&M-EST. PATIENT-LVL V: CPT | Mod: PBBFAC,,,

## 2024-05-10 PROCEDURE — 20610 DRAIN/INJ JOINT/BURSA W/O US: CPT | Mod: RT,S$GLB,,

## 2024-05-10 PROCEDURE — 3044F HG A1C LEVEL LT 7.0%: CPT | Mod: CPTII,S$GLB,,

## 2024-05-10 PROCEDURE — 1160F RVW MEDS BY RX/DR IN RCRD: CPT | Mod: CPTII,S$GLB,,

## 2024-05-10 PROCEDURE — 3080F DIAST BP >= 90 MM HG: CPT | Mod: CPTII,S$GLB,,

## 2024-05-10 PROCEDURE — 99213 OFFICE O/P EST LOW 20 MIN: CPT | Mod: 25,S$GLB,,

## 2024-05-10 PROCEDURE — 1125F AMNT PAIN NOTED PAIN PRSNT: CPT | Mod: CPTII,S$GLB,,

## 2024-05-10 PROCEDURE — 3075F SYST BP GE 130 - 139MM HG: CPT | Mod: CPTII,S$GLB,,

## 2024-05-10 PROCEDURE — 1159F MED LIST DOCD IN RCRD: CPT | Mod: CPTII,S$GLB,,

## 2024-05-10 RX ORDER — TRIAMCINOLONE ACETONIDE 40 MG/ML
40 INJECTION, SUSPENSION INTRA-ARTICULAR; INTRAMUSCULAR
Status: COMPLETED | OUTPATIENT
Start: 2024-05-10 | End: 2024-05-10

## 2024-05-10 RX ORDER — SEMAGLUTIDE 1 MG/.5ML
INJECTION, SOLUTION SUBCUTANEOUS
COMMUNITY
Start: 2024-04-04 | End: 2024-06-03

## 2024-05-10 RX ORDER — HYDROXYZINE PAMOATE 25 MG/1
25 CAPSULE ORAL 3 TIMES DAILY PRN
Qty: 270 CAPSULE | Refills: 3 | Status: SHIPPED | OUTPATIENT
Start: 2024-05-10

## 2024-05-10 RX ORDER — LEVOCETIRIZINE DIHYDROCHLORIDE 5 MG/1
5 TABLET, FILM COATED ORAL
COMMUNITY
Start: 2024-05-04

## 2024-05-10 RX ORDER — TRIAMCINOLONE ACETONIDE 40 MG/ML
40 INJECTION, SUSPENSION INTRA-ARTICULAR; INTRAMUSCULAR
Status: DISCONTINUED | OUTPATIENT
Start: 2024-05-10 | End: 2024-05-10 | Stop reason: HOSPADM

## 2024-05-10 RX ADMIN — TRIAMCINOLONE ACETONIDE 40 MG: 40 INJECTION, SUSPENSION INTRA-ARTICULAR; INTRAMUSCULAR at 10:05

## 2024-05-10 RX ADMIN — TRIAMCINOLONE ACETONIDE 40 MG: 40 INJECTION, SUSPENSION INTRA-ARTICULAR; INTRAMUSCULAR at 09:05

## 2024-05-10 NOTE — PROGRESS NOTES
Patient ID: Jennifer Brady is a 63 y.o. female    Pain of the Right Knee      History of Present Illness:    Jennifer Brady presents to clinic for right knee pain. She is s/p R CHANDA in 12/2022 with Dr. Rodriguez at [facility]. She is now experiencing right knee pain. 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 Denies recent falls. The pain started 3 months ago and is becoming progressively worse.  Pain is located over (points to) lateral and pes bursa. She reports that the pain is a 6 /10 aching and dull pain toda. The pain is affecting ADLs and limiting desired level of activity. Denies numbness, tingling, radiation and inability to bear weight.    She has tried icing/heat with improvement. Denies PT, injections, or surgery. Interested in injections and physical therapy.    Mechanical symptoms: +  Instability: +    Occupation: retired     Ambulating: unassisted  Diabetic: no  Smoking: current, trying to quit/wean down  Hx of DVT/PE: no  Hi MI, on ASA    PAST MEDICAL HISTORY:   Past Medical History:   Diagnosis Date    Acute coronary syndrome     Anxiety     Arthritis     Asthma     Behavioral problem     Borderline personality disorder     Coronary angioplasty status 04/2018    Coronary artery disease     Depression     Depression     Fatigue     Hx of psychiatric care     Hyperlipidemia     Hypertension     Myocardial infarction 05/2018    Psychiatric problem     S/P hysterectomy with oophorectomy     Sleep apnea     pt uses CPAP    Syncope and collapse     Therapy      PAST SURGICAL HISTORY:   Past Surgical History:   Procedure Laterality Date    CORONARY ANGIOPLASTY      HIP REPLACEMENT ARTHROPLASTY Right 12/21/2022    HYSTERECTOMY  1999    INJECTION OF ANESTHETIC AGENT AROUND NERVE Right 11/20/2019    Procedure: MEDIAL BRANCH BLOCK RIGHT L3, L4, L5;  Surgeon: Petros Reich MD;  Location: [facility] PAIN MGT;  Service: Pain Management;  Laterality: Right;  NEEDS CONSENT, PT  NO LONGER TAKES PLAVIX    INJECTION OF ANESTHETIC AGENT AROUND NERVE Right 06/17/2020    Procedure: BLOCK, NERVE RIGHT L3, 4, 5 MEDIAL BRANCH;  Surgeon: Petros Reich MD;  Location: [facility] PAIN MGT;  Service: Pain Management;  Laterality: Right;  NEEDS CONSENT?    RADIOFREQUENCY ABLATION Right 05/12/2021    Procedure: RADIOFREQUENCY ABLATION RIGHT L3, 4, 5;  Surgeon: Petros Reich MD;  Location: [facility] PAIN MGT;  Service: Pain Management;  Laterality: Right;  NEEDS CONSENT    TILT TABLE TEST N/A 03/22/2019    Procedure: TILT TABLE TEST;  Surgeon: Roman Lala MD;  Location: [facility] EP LAB;  Service: Cardiology;  Laterality: N/A;  seizure/migraine, HUT referred by Dr Brit Merlos, EEG confirmed    TRANSFORAMINAL EPIDURAL INJECTION OF STEROID Right 10/14/2020    Procedure: LUMBAR TRANSFORAMINAL RIGHT L4/L5;  Surgeon: Petros Riech MD;  Location: [facility] PAIN MGT;  Service: Pain Management;  Laterality: Right;  NEEDS CONSENT     FAMILY HISTORY:   Family History   Problem Relation Name Age of Onset    Hypertension Paternal Grandfather      Hypertension Paternal Grandmother      Heart attacks under age 50 Maternal Grandmother      Hypertension Maternal Grandmother      Hypertension Maternal Grandfather      Heart attacks under age 50 Father  33    Hypertension Father      Seizures Mother      Heart attack Mother      Hypertension Mother      Stroke Mother      Hypertension Sister      Stroke Sister      Seizures Sister      Breast cancer Neg Hx      Colon cancer Neg Hx      Diabetes Neg Hx      Ovarian cancer Neg Hx      Cancer Neg Hx       SOCIAL HISTORY:   Social History     Occupational History     Employer: [employer]     Comment: [employer]   Tobacco Use    Smoking status: Former     Current packs/day: 0.00     Average packs/day: 0.3 packs/day for 35.8 years (9.0 ttl pk-yrs)     Types: Cigarettes     Start date: 9/14/1986     Quit date: 7/21/2022     Years since  quittin.8     Passive exposure: Past    Smokeless tobacco: Never    Tobacco comments:     quit actual cigarettes a few mo ago, switched to e cigarette   Substance and Sexual Activity    Alcohol use: Yes     Comment: Socially    Drug use: No    Sexual activity: Not Currently     Partners: Male        MEDICATIONS:   Current Outpatient Medications:     albuterol (PROVENTIL/VENTOLIN HFA) 90 mcg/actuation inhaler, Inhale 2 puffs into the lungs every 4 (four) hours as needed for Wheezing., Disp: , Rfl:     aspirin (ECOTRIN) 81 MG EC tablet, Take 1 tablet (81 mg total) by mouth once daily., Disp: 100 tablet, Rfl: 6    atorvastatin (LIPITOR) 40 MG tablet, Take 40 mg by mouth once daily., Disp: , Rfl:     azelastine (ASTELIN) 137 mcg (0.1 %) nasal spray, 1 spray (137 mcg total) by Nasal route 2 (two) times daily., Disp: 30 mL, Rfl: 3    estradioL (ESTRACE) 0.01 % (0.1 mg/gram) vaginal cream, Place 1 g vaginally twice a week., Disp: 42 g, Rfl: 4    fluticasone propionate (FLONASE) 50 mcg/actuation nasal spray, 2 sprays (100 mcg total) by Each Nostril route 2 (two) times daily., Disp: 18.2 mL, Rfl: 3    HYDROcodone-acetaminophen (NORCO)  mg per tablet, Take by mouth daily as needed., Disp: , Rfl:     ipratropium (ATROVENT) 42 mcg (0.06 %) nasal spray, 2 sprays by Nasal route 3 (three) times daily as needed for Rhinitis., Disp: 15 mL, Rfl: 3    levocetirizine (XYZAL) 5 MG tablet, Take 5 mg by mouth., Disp: , Rfl:     loratadine (CLARITIN) 10 mg tablet, Take 10 mg by mouth once daily., Disp: , Rfl:     losartan (COZAAR) 25 MG tablet, Take 1 tablet (25 mg total) by mouth once daily. (Patient taking differently: Take 25 mg by mouth every morning.), Disp: 90 tablet, Rfl: 3    nitroGLYCERIN (NITROSTAT) 0.4 MG SL tablet, Place 0.4 mg under the tongue., Disp: , Rfl:     pantoprazole (PROTONIX) 40 MG tablet, pantoprazole 40 mg tablet,delayed release, Disp: , Rfl:     pregabalin (LYRICA) 50 MG capsule, Take 1 capsule (50 mg  total) by mouth every evening., Disp: 30 capsule, Rfl: 1    traZODone (DESYREL) 150 MG tablet, Take 1 -2 tablets by mouth before bed as needed for insomnia, Disp: 60 tablet, Rfl: 11    venlafaxine (EFFEXOR-XR) 150 MG Cp24, Take 150 mg by mouth once daily., Disp: , Rfl:     hydrOXYzine pamoate (VISTARIL) 25 MG Cap, Take 1 capsule (25 mg total) by mouth 3 (three) times daily as needed (anxiety)., Disp: 270 capsule, Rfl: 3    semaglutide, weight loss, (WEGOVY) 1 mg/0.5 mL PnIj, , Disp: , Rfl:   No current facility-administered medications for this visit.  ALLERGIES:   Review of patient's allergies indicates:   Allergen Reactions    Iodine and iodide containing products      SHELLFISH         Physical Exam     Vitals:    05/10/24 0916   BP: (!) 139/96   Pulse: 69     Alert and oriented to person, place and time. No acute distress. Well-groomed, not ill appearing. Pupils round and reactive, normal respiratory effort, no audible wheezing.     OBSERVATION / INSPECTION   There is global tenderness to the right knee.  Range of motion 0-120.  No erythema,  warmth or abrasions.  Ligaments stable to valgus and varus stress.  There is pain at pes bursa.  Strength 4/5 to hamstrings and quads.    EXTREMITY NEURO-VASCULAR EXAMINATION:   Sensation:  Grossly intact to light touch all dermatomal regions.   Motor Function:  Fully intact motor function at hip, knee, foot and ankle    DTRs;  quadriceps and  achilles 2+.  No clonus and downgoing Babinski.    Vascular status:  DP and PT pulses 2+, brisk capillary refill, symmetric.     Imaging:    bilateral Knee X-rays ordered/reviewed by me showing no evidence of fracture or dislocation. There is no obvious malalignment. No evidence of masses, lesions or foreign bodies.  Mild right knee lateral compartment osteoarthritis.  Kellgren Salvador grade 2-3.      Assessment & Plan    Chronic pain of right knee  -     Ambulatory referral/consult to Sports Medicine  -     Ambulatory  referral/consult to Physical/Occupational Therapy; Future; Expected date: 05/17/2024  -     triamcinolone acetonide injection 40 mg  -     Large Joint Aspiration/Injection: R knee    Primary osteoarthritis of right knee  -     Ambulatory referral/consult to Physical/Occupational Therapy; Future; Expected date: 05/17/2024  -     triamcinolone acetonide injection 40 mg  -     Large Joint Aspiration/Injection: R knee         I made the decision to obtain old records of the patient including previous notes and imaging. New imaging was ordered today of the extremity or extremities evaluated. I independently reviewed and interpreted the radiographs and/or MRIs/CT scan today as well as prior imaging.    We discussed at length different treatment options including conservative vs surgical management. These include anti-inflammatories, acetaminophen, rest, ice, heat, formal physical therapy including strengthening and stretching exercises, home exercise programs, injections, dry needling, and finally surgical intervention.      Patient here for chronic right knee pain.  We discussed this is likely arthritic flare-up.  We discussed treatment options in detail.  Patient would like to proceed with formal physical therapy and steroid injection today.  Formal physical therapy referral placed, patient responsible to establish and continue care  Right knee CSI given, post-injection instructions reviewed  May consider viscosupplementation if no improvement, she will contact clinic if needed    Follow up:  If symptoms worsen or fail to improve   X-rays next visit:  None    All questions were answered and patient is agreeable to the above plan.

## 2024-05-10 NOTE — PROCEDURES
Large Joint Aspiration/Injection: R knee    Date/Time: 5/10/2024 9:30 AM    Performed by: Lottie Katz PA-C  Authorized by: Lottie Katz PA-C    Consent Done?:  Yes (Verbal)  Indications:  Pain  Site marked: the procedure site was marked    Timeout: prior to procedure the correct patient, procedure, and site was verified    Prep: patient was prepped and draped in usual sterile fashion      Local anesthesia used?: Yes    Local anesthetic:  Bupivacaine 0.25% without epinephrine and topical anesthetic  Anesthetic total (ml):  4      Details:  Needle Size:  22 G  Ultrasonic Guidance for needle placement?: No    Approach:  Anterolateral  Location:  Knee  Site:  R knee  Medications:  40 mg triamcinolone acetonide 40 mg/mL  Patient tolerance:  Patient tolerated the procedure well with no immediate complications

## 2024-05-13 RX ORDER — IPRATROPIUM BROMIDE 42 UG/1
2 SPRAY, METERED NASAL 3 TIMES DAILY PRN
Qty: 15 ML | Refills: 3 | Status: SHIPPED | OUTPATIENT
Start: 2024-05-13

## 2024-05-13 RX ORDER — FLUTICASONE PROPIONATE 50 MCG
2 SPRAY, SUSPENSION (ML) NASAL 2 TIMES DAILY
Qty: 18.2 ML | Refills: 11 | Status: SHIPPED | OUTPATIENT
Start: 2024-05-13

## 2024-05-13 RX ORDER — AZELASTINE 1 MG/ML
1 SPRAY, METERED NASAL 2 TIMES DAILY
Qty: 30 ML | Refills: 11 | Status: SHIPPED | OUTPATIENT
Start: 2024-05-13

## 2024-05-22 ENCOUNTER — TELEPHONE (OUTPATIENT)
Dept: PRIMARY CARE CLINIC | Facility: CLINIC | Age: 64
End: 2024-05-22

## 2024-05-22 ENCOUNTER — OFFICE VISIT (OUTPATIENT)
Dept: PRIMARY CARE CLINIC | Facility: CLINIC | Age: 64
End: 2024-05-22
Payer: MEDICARE

## 2024-05-22 VITALS
DIASTOLIC BLOOD PRESSURE: 80 MMHG | RESPIRATION RATE: 19 BRPM | HEART RATE: 70 BPM | WEIGHT: 217.69 LBS | SYSTOLIC BLOOD PRESSURE: 138 MMHG | BODY MASS INDEX: 32.99 KG/M2 | OXYGEN SATURATION: 98 % | HEIGHT: 68 IN

## 2024-05-22 DIAGNOSIS — J44.9 CHRONIC OBSTRUCTIVE PULMONARY DISEASE, UNSPECIFIED COPD TYPE: Primary | ICD-10-CM

## 2024-05-22 DIAGNOSIS — M25.561 CHRONIC PAIN OF RIGHT KNEE: ICD-10-CM

## 2024-05-22 DIAGNOSIS — I10 HYPERTENSION, UNSPECIFIED TYPE: ICD-10-CM

## 2024-05-22 DIAGNOSIS — F32.9 REACTIVE DEPRESSION: ICD-10-CM

## 2024-05-22 DIAGNOSIS — G89.29 CHRONIC PAIN OF RIGHT KNEE: ICD-10-CM

## 2024-05-22 DIAGNOSIS — R91.1 LUNG NODULE: ICD-10-CM

## 2024-05-22 DIAGNOSIS — G43.719 INTRACTABLE CHRONIC MIGRAINE WITHOUT AURA AND WITHOUT STATUS MIGRAINOSUS: ICD-10-CM

## 2024-05-22 PROCEDURE — 1160F RVW MEDS BY RX/DR IN RCRD: CPT | Mod: CPTII,S$GLB,, | Performed by: STUDENT IN AN ORGANIZED HEALTH CARE EDUCATION/TRAINING PROGRAM

## 2024-05-22 PROCEDURE — 3044F HG A1C LEVEL LT 7.0%: CPT | Mod: CPTII,S$GLB,, | Performed by: STUDENT IN AN ORGANIZED HEALTH CARE EDUCATION/TRAINING PROGRAM

## 2024-05-22 PROCEDURE — 3079F DIAST BP 80-89 MM HG: CPT | Mod: CPTII,S$GLB,, | Performed by: STUDENT IN AN ORGANIZED HEALTH CARE EDUCATION/TRAINING PROGRAM

## 2024-05-22 PROCEDURE — 1101F PT FALLS ASSESS-DOCD LE1/YR: CPT | Mod: CPTII,S$GLB,, | Performed by: STUDENT IN AN ORGANIZED HEALTH CARE EDUCATION/TRAINING PROGRAM

## 2024-05-22 PROCEDURE — 3075F SYST BP GE 130 - 139MM HG: CPT | Mod: CPTII,S$GLB,, | Performed by: STUDENT IN AN ORGANIZED HEALTH CARE EDUCATION/TRAINING PROGRAM

## 2024-05-22 PROCEDURE — 1126F AMNT PAIN NOTED NONE PRSNT: CPT | Mod: CPTII,S$GLB,, | Performed by: STUDENT IN AN ORGANIZED HEALTH CARE EDUCATION/TRAINING PROGRAM

## 2024-05-22 PROCEDURE — 3288F FALL RISK ASSESSMENT DOCD: CPT | Mod: CPTII,S$GLB,, | Performed by: STUDENT IN AN ORGANIZED HEALTH CARE EDUCATION/TRAINING PROGRAM

## 2024-05-22 PROCEDURE — 99999 PR PBB SHADOW E&M-EST. PATIENT-LVL V: CPT | Mod: PBBFAC,,, | Performed by: STUDENT IN AN ORGANIZED HEALTH CARE EDUCATION/TRAINING PROGRAM

## 2024-05-22 PROCEDURE — 3008F BODY MASS INDEX DOCD: CPT | Mod: CPTII,S$GLB,, | Performed by: STUDENT IN AN ORGANIZED HEALTH CARE EDUCATION/TRAINING PROGRAM

## 2024-05-22 PROCEDURE — 1159F MED LIST DOCD IN RCRD: CPT | Mod: CPTII,S$GLB,, | Performed by: STUDENT IN AN ORGANIZED HEALTH CARE EDUCATION/TRAINING PROGRAM

## 2024-05-22 PROCEDURE — 99214 OFFICE O/P EST MOD 30 MIN: CPT | Mod: S$GLB,,, | Performed by: STUDENT IN AN ORGANIZED HEALTH CARE EDUCATION/TRAINING PROGRAM

## 2024-05-22 RX ORDER — SUMATRIPTAN SUCCINATE 100 MG/1
100 TABLET ORAL
Qty: 9 TABLET | Refills: 3 | Status: SHIPPED | OUTPATIENT
Start: 2024-05-22

## 2024-05-22 RX ORDER — BUDESONIDE AND FORMOTEROL FUMARATE DIHYDRATE 80; 4.5 UG/1; UG/1
2 AEROSOL RESPIRATORY (INHALATION) 2 TIMES DAILY
Qty: 10.2 G | Refills: 5 | Status: SHIPPED | OUTPATIENT
Start: 2024-05-22 | End: 2025-05-22

## 2024-05-22 NOTE — PATIENT INSTRUCTIONS
Chronic obstructive pulmonary disease, unspecified COPD type  -     budesonide-formoterol 80-4.5 mcg (SYMBICORT) 80-4.5 mcg/actuation HFAA; Inhale 2 puffs into the lungs 2 (two) times a day. Controller  Dispense: 10.2 g; Refill: 5   - patient has a history of asthma and reactive airway issues.  Has used albuterol but was needing more frequently and having episodes of dizziness or lightheadedness when getting short of breath.  Had previously been on preventative daily medication such as Anoro.  Would like to have this resumed at this time.  - CT scan had shown a 0.8 cm lesion in the left upper lung, had biopsy that had been completed without results.  Recommended annual screening.  - sending order for Symbicort 2 puffs twice a day with as needed albuterol rescue inhaler.    Intractable chronic migraine without aura and without status migrainosus  -     Ambulatory referral/consult to Neurology; Future; Expected date: 05/29/2024  -     sumatriptan (IMITREX) 100 MG tablet; Take 1 tablet (100 mg total) by mouth every 48 hours as needed for Migraine.  Dispense: 9 tablet; Refill: 3   - patient had chronic history of migraines.  Was seeing Neurology for Botox injections in 2020.  Had additionally previously tried topiramate and Ubrelvy.   - restarting patient on Imitrex at this time.   - referral to Neurology to discuss further as this has been addressed in specialty care previously.      Hypertension, unspecified type   - history of hypertension, has been taking blood pressure medications and tolerating.   - blood pressure 138/80 on rooming today.      Lung nodule   - discussed with patient today.  Should be getting annual low-dose lung CT to evaluate and address any changes.      Reactive depression   - patient with significant depression issues.  States she has been seeing Psychiatry getting medications from them for this issue. continue with their care.       Chronic pain of right knee   -  Patient had pain  management.  Continue to follow with their care.

## 2024-05-22 NOTE — PROGRESS NOTES
"Subjective:           Patient ID: Jennifer Brady   Age:  63 y.o.  Sex: female     Chief Complaint:   Follow-up (4 week )      History of Present Illness:    Jennifer Brady is a 63 y.o. female who presents today with a chief complaint of Follow-up (4 week )  .    62yo female presenting for f/u appt.    Reports having SOB at [facility] yesterday.   Says she used her Albuterol inhaler.     States was previously seeing Dr. Juarez and on a preventative inhaler before.  States was never Rx'd as was just getting samples from her provider.    Has been SOB with heat, when out, or when home laying down.   Most recently was with walking into [facility].     States she had a hx of syncope before and was evaled in hospital and was normal.     States her job pays for her meds and there was a form that she     Is getting Wegovy from an outside clinic.   States last picked the Wegovy up in December, last took med in December.    Will speak with her bariatric provider about this and possibly getting it refilled.  Last A1c was 5.0% so advised would not be cleared through our clinic for regular indiactions.    States she stopped the pregabapin.  Was taking 50mg nightly.   Says it was not helping the pain in her feet, in the instep.    Then her pain management doctor has continued it, but has not been adequate, so stopped taking it.       HA:  has bad headaches and wanted to discuss.  Has been seeing Neuro for headaches before.    Got botox injections in 2022 with Dr. Kuhn for at time.  Was also noted in Neuro note "was on topiramate, but was discontinued because didn't help with headaches "  Was also on Ubrelvy in 2020 and 2021, then patient states that insurance did not want to pay for her meds.  Will just try to bear the headaches.         Review of Systems   Constitutional:  Positive for fatigue and unexpected weight change. Negative for chills and fever.   HENT:  Positive for mouth sores. Negative for ear " "pain, postnasal drip and sinus pain.    Respiratory:  Positive for shortness of breath (worse w/ exertion, has CPAP). Negative for cough and wheezing.    Cardiovascular:  Negative for chest pain and palpitations.   Gastrointestinal:  Negative for constipation, diarrhea, nausea and vomiting.   Genitourinary:  Negative for difficulty urinating, frequency and urgency.   Musculoskeletal:  Positive for arthralgias, back pain, gait problem, joint swelling and myalgias.   Neurological:  Positive for dizziness, syncope, light-headedness and headaches.   Psychiatric/Behavioral:  Positive for dysphoric mood and sleep disturbance. The patient is nervous/anxious.            Objective:        Vitals:    05/22/24 0803   BP: 138/80   BP Location: Left arm   Patient Position: Sitting   BP Method: Medium (Manual)   Pulse: 70   Resp: 19   SpO2: 98%   Weight: 98.8 kg (217 lb 11.3 oz)   Height: 5' 8" (1.727 m)       Body mass index is 33.1 kg/m².      Physical Exam  Vitals reviewed.   Constitutional:       General: She is not in acute distress.     Appearance: She is well-developed. She is obese.   HENT:      Head: Normocephalic.      Right Ear: Tympanic membrane and external ear normal.      Left Ear: Tympanic membrane and external ear normal.      Nose: No rhinorrhea.      Mouth/Throat:      Mouth: Mucous membranes are moist.      Pharynx: Uvula midline. No oropharyngeal exudate or posterior oropharyngeal erythema.      Comments: Oropharynx appears completely normal.  No redness, no lesions.  No pus or swelling.  Eyes:      Conjunctiva/sclera: Conjunctivae normal.   Cardiovascular:      Rate and Rhythm: Normal rate and regular rhythm.      Pulses: Normal pulses.      Heart sounds: Normal heart sounds. No murmur heard.  Pulmonary:      Effort: Pulmonary effort is normal.      Breath sounds: Normal breath sounds. No wheezing.   Abdominal:      General: Bowel sounds are normal.      Palpations: Abdomen is soft.      Tenderness: There is " no abdominal tenderness.   Musculoskeletal:      Right lower leg: No edema.      Left lower leg: No edema.   Lymphadenopathy:      Cervical: No cervical adenopathy.   Skin:     General: Skin is warm and dry.      Capillary Refill: Capillary refill takes 2 to 3 seconds.      Findings: No bruising or rash.      Comments: Reporting pain with palpation of right knee.  Has full ROM.  No apparent swelling or rubor.    Neurological:      Mental Status: She is alert and oriented to person, place, and time.      Motor: No weakness.      Gait: Gait normal.   Psychiatric:      Comments: Patient brings up very numerous issue during interview.  Has a nearly pan-positive review of systems.             Past Medical History:   Diagnosis Date    Acute coronary syndrome     Anxiety     Arthritis     Asthma     Behavioral problem     Borderline personality disorder     Coronary angioplasty status 04/2018    Coronary artery disease     Depression     Depression     Fatigue     Hx of psychiatric care     Hyperlipidemia     Hypertension     Myocardial infarction 05/2018    Psychiatric problem     S/P hysterectomy with oophorectomy     Sleep apnea     pt uses CPAP    Syncope and collapse     Therapy        Lab Results   Component Value Date     02/09/2024    K 4.1 02/09/2024     02/09/2024    CO2 27 02/09/2024    BUN 9 02/09/2024    CREATININE 0.9 02/09/2024    ANIONGAP 8 02/09/2024     Lab Results   Component Value Date    HGBA1C 5.0 02/09/2024     Lab Results   Component Value Date    BNP <10 09/17/2023    BNP <10 07/15/2021    BNP 24 04/04/2018       Lab Results   Component Value Date    WBC 7.21 02/09/2024    HGB 12.5 02/09/2024    HCT 39.2 02/09/2024     02/09/2024    GRAN 49.0 02/09/2024    GRAN 4.6 09/17/2023     Lab Results   Component Value Date    CHOL 195 02/09/2024     (H) 02/09/2024    LDLCALC 70.8 02/09/2024    TRIG 76 02/09/2024        Outpatient Encounter Medications as of 5/22/2024   Medication  Sig Dispense Refill    albuterol (PROVENTIL/VENTOLIN HFA) 90 mcg/actuation inhaler Inhale 2 puffs into the lungs every 4 (four) hours as needed for Wheezing.      aspirin (ECOTRIN) 81 MG EC tablet Take 1 tablet (81 mg total) by mouth once daily. 100 tablet 6    atorvastatin (LIPITOR) 40 MG tablet Take 40 mg by mouth once daily.      azelastine (ASTELIN) 137 mcg (0.1 %) nasal spray 1 spray (137 mcg total) by Nasal route 2 (two) times daily. 30 mL 11    estradioL (ESTRACE) 0.01 % (0.1 mg/gram) vaginal cream Place 1 g vaginally twice a week. 42 g 4    fluticasone propionate (FLONASE) 50 mcg/actuation nasal spray 2 sprays (100 mcg total) by Each Nostril route 2 (two) times daily. 18.2 mL 11    HYDROcodone-acetaminophen (NORCO)  mg per tablet Take by mouth daily as needed.      hydrOXYzine pamoate (VISTARIL) 25 MG Cap Take 1 capsule (25 mg total) by mouth 3 (three) times daily as needed (anxiety). 270 capsule 3    ipratropium (ATROVENT) 42 mcg (0.06 %) nasal spray 2 sprays by Each Nostril route 3 (three) times daily as needed for Rhinitis. 15 mL 3    levocetirizine (XYZAL) 5 MG tablet Take 5 mg by mouth.      losartan (COZAAR) 25 MG tablet Take 1 tablet (25 mg total) by mouth once daily. (Patient taking differently: Take 25 mg by mouth every morning.) 90 tablet 3    nitroGLYCERIN (NITROSTAT) 0.4 MG SL tablet Place 0.4 mg under the tongue.      pantoprazole (PROTONIX) 40 MG tablet pantoprazole 40 mg tablet,delayed release      pregabalin (LYRICA) 50 MG capsule Take 1 capsule (50 mg total) by mouth every evening. 30 capsule 1    semaglutide, weight loss, (WEGOVY) 1 mg/0.5 mL PnIj       traZODone (DESYREL) 150 MG tablet Take 1 -2 tablets by mouth before bed as needed for insomnia 60 tablet 11    venlafaxine (EFFEXOR-XR) 150 MG Cp24 Take 150 mg by mouth once daily.      [DISCONTINUED] loratadine (CLARITIN) 10 mg tablet Take 10 mg by mouth once daily.      budesonide-formoterol 80-4.5 mcg (SYMBICORT) 80-4.5  mcg/actuation HFAA Inhale 2 puffs into the lungs 2 (two) times a day. Controller 10.2 g 5    sumatriptan (IMITREX) 100 MG tablet Take 1 tablet (100 mg total) by mouth every 48 hours as needed for Migraine. 9 tablet 3    [DISCONTINUED] azelastine (ASTELIN) 137 mcg (0.1 %) nasal spray 1 spray (137 mcg total) by Nasal route 2 (two) times daily. 30 mL 3    [DISCONTINUED] citalopram (CELEXA) 10 MG tablet Take 10 mg by mouth once daily.      [DISCONTINUED] fluticasone propionate (FLONASE) 50 mcg/actuation nasal spray 2 sprays (100 mcg total) by Each Nostril route 2 (two) times daily. 18.2 mL 3    [DISCONTINUED] hydrOXYzine pamoate (VISTARIL) 25 MG Cap Take 1 capsule (25 mg total) by mouth 3 (three) times daily as needed (anxiety). 270 capsule 3    [DISCONTINUED] ipratropium (ATROVENT) 42 mcg (0.06 %) nasal spray 2 sprays by Nasal route 3 (three) times daily as needed for Rhinitis. 15 mL 3    [DISCONTINUED] lurasidone (LATUDA) 40 mg Tab tablet Take 1 tablet (40 mg total) by mouth once daily. 30 tablet 5    [DISCONTINUED] triamcinolone acetonide injection 40 mg        No facility-administered encounter medications on file as of 5/22/2024.          Assessment:       1. Chronic obstructive pulmonary disease, unspecified COPD type    2. Intractable chronic migraine without aura and without status migrainosus    3. Hypertension, unspecified type    4. Lung nodule    5. Reactive depression    6. Chronic pain of right knee           Plan:         Chronic obstructive pulmonary disease, unspecified COPD type  -     budesonide-formoterol 80-4.5 mcg (SYMBICORT) 80-4.5 mcg/actuation HFAA; Inhale 2 puffs into the lungs 2 (two) times a day. Controller  Dispense: 10.2 g; Refill: 5   - patient has a history of asthma and reactive airway issues.  Has used albuterol but was needing more frequently and having episodes of dizziness or lightheadedness when getting short of breath.  Had previously been on preventative daily medication such as  Anoro.  Would like to have this resumed at this time.  - CT scan had shown a 0.8 cm lesion in the left upper lung, had biopsy that had been completed without results.  Recommended annual screening.  - sending order for Symbicort 2 puffs twice a day with as needed albuterol rescue inhaler.    Intractable chronic migraine without aura and without status migrainosus  -     Ambulatory referral/consult to Neurology; Future; Expected date: 05/29/2024  -     sumatriptan (IMITREX) 100 MG tablet; Take 1 tablet (100 mg total) by mouth every 48 hours as needed for Migraine.  Dispense: 9 tablet; Refill: 3   - patient had chronic history of migraines.  Was seeing Neurology for Botox injections in 2020.  Had additionally previously tried topiramate and Ubrelvy.   - restarting patient on Imitrex at this time.   - referral to Neurology to discuss further as this has been addressed in specialty care previously.      Hypertension, unspecified type   - history of hypertension, has been taking blood pressure medications and tolerating.   - blood pressure 138/80 on rooming today.      Lung nodule   - discussed with patient today.  Should be getting annual low-dose lung CT to evaluate and address any changes.      Reactive depression   - patient with significant depression issues.  States she has been seeing Psychiatry getting medications from them for this issue. continue with their care.       Chronic pain of right knee   -  Patient had pain management.  Continue to follow with their care.

## 2024-05-22 NOTE — TELEPHONE ENCOUNTER
----- Message from Ander Moulton sent at 5/22/2024  9:56 AM CDT -----  Regarding: paperwork drop off  Drop off paperwork for Dr. Abdirahman Luu

## 2024-05-23 ENCOUNTER — TELEPHONE (OUTPATIENT)
Dept: NEUROLOGY | Facility: CLINIC | Age: 64
End: 2024-05-23
Payer: MEDICARE

## 2024-05-23 NOTE — TELEPHONE ENCOUNTER
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  Time/Date Preference: Soonest available  Relationship to Patient?:hilda     Contact Preference?:[phone]     Treating Provider:Abdirahman Luu     Do you feel you need to be seen today? No        Additional Notes:  "Thank you for all that you do for our patients  "

## 2024-05-23 NOTE — TELEPHONE ENCOUNTER
I spoke with patient, assisted with scheduling Neurology appointment with Dr Jauregui on 8/19/24 @ 1 PM.

## 2024-05-27 ENCOUNTER — TELEPHONE (OUTPATIENT)
Dept: PRIMARY CARE CLINIC | Facility: CLINIC | Age: 64
End: 2024-05-27
Payer: MEDICARE

## 2024-05-27 NOTE — TELEPHONE ENCOUNTER
----- Message from Ander Moulton sent at 5/27/2024  8:41 AM CDT -----  Regarding:  paperwork  Pt is looking for paperwork she dropped off last Wednesday. Please give pt a call [phone]

## 2024-05-28 ENCOUNTER — TELEPHONE (OUTPATIENT)
Dept: PRIMARY CARE CLINIC | Facility: CLINIC | Age: 64
End: 2024-05-28
Payer: MEDICARE

## 2024-05-28 DIAGNOSIS — Z00.00 ENCOUNTER FOR MEDICARE ANNUAL WELLNESS EXAM: ICD-10-CM

## 2024-05-28 NOTE — TELEPHONE ENCOUNTER
----- Message from Cici Corey sent at 5/28/2024 10:02 AM CDT -----  Type:  Needs Medical Advice    Who Called: pt     Would the patient rather a call back or a response via [facility]? Call back   Best Call Back Number: [phone]  Additional Information: pt is requesting to get a order to have her thyroids checked

## 2024-06-03 DIAGNOSIS — U07.1 COVID-19 VIRUS DETECTED: ICD-10-CM

## 2024-06-04 ENCOUNTER — NURSE TRIAGE (OUTPATIENT)
Dept: ADMINISTRATIVE | Facility: CLINIC | Age: 64
End: 2024-06-04
Payer: MEDICARE

## 2024-06-04 NOTE — TELEPHONE ENCOUNTER
DX Covid 6/3, seen at ED yesterday. Was advised to see provider in one week. She made appointment for today and is unsure is she should go to this appointment. No further questions or concerns. Advised I could send message to them to call her.   Reason for Disposition   [1] Follow-up call from patient regarding patient's clinical status AND [2] information NON-URGENT    Protocols used: PCP Call - No Triage-A-AH

## 2024-06-05 ENCOUNTER — OFFICE VISIT (OUTPATIENT)
Dept: PRIMARY CARE CLINIC | Facility: CLINIC | Age: 64
End: 2024-06-05
Payer: MEDICARE

## 2024-06-05 DIAGNOSIS — I10 PRIMARY HYPERTENSION: ICD-10-CM

## 2024-06-05 DIAGNOSIS — G43.719 INTRACTABLE CHRONIC MIGRAINE WITHOUT AURA AND WITHOUT STATUS MIGRAINOSUS: ICD-10-CM

## 2024-06-05 DIAGNOSIS — U07.1 COVID-19: Primary | ICD-10-CM

## 2024-06-05 PROCEDURE — 3044F HG A1C LEVEL LT 7.0%: CPT | Mod: CPTII,95,, | Performed by: STUDENT IN AN ORGANIZED HEALTH CARE EDUCATION/TRAINING PROGRAM

## 2024-06-05 PROCEDURE — 99214 OFFICE O/P EST MOD 30 MIN: CPT | Mod: 95,,, | Performed by: STUDENT IN AN ORGANIZED HEALTH CARE EDUCATION/TRAINING PROGRAM

## 2024-06-05 NOTE — PROGRESS NOTES
The patient location is: [place]  The chief complaint leading to consultation is: HTN and positive COVID test    Visit type: audiovisual    Face to Face time with patient: 15  20 minutes of total time spent on the encounter, which includes face to face time and non-face to face time preparing to see the patient (eg, review of tests), Obtaining and/or reviewing separately obtained history, Documenting clinical information in the electronic or other health record, Independently interpreting results (not separately reported) and communicating results to the patient/family/caregiver, or Care coordination (not separately reported).         Each patient to whom he or she provides medical services by telemedicine is:  (1) informed of the relationship between the physician and patient and the respective role of any other health care provider with respect to management of the patient; and (2) notified that he or she may decline to receive medical services by telemedicine and may withdraw from such care at any time.    Notes:     States she had a HA for a month, was having ears hurting.  Then went to [place] and started feeling worse on return.    BP was at 191/107, so went to ED, was given some meds and got a COVID test that was positive.     In ED, BP was 214/102.  Was given Clonidone.   Was rx'd Augmentin, Dexamethasone, and Fioricet.       Has been feeling lousy.  State is not like last time when she did not feel anything at all.  Feels that sinus infection is making the COVID worse.    States she had started getting lower pressures and her cardiologist had stopped her Lisinopril/HCTZ and changed to Losartan a few weeks ago.    Has been sweating like crazy.      Physical Exam  Constitutional:       General: She is not in acute distress.     Appearance: Normal appearance. She is not ill-appearing.      Comments: Speaking in full sentences.  No increased work of breathing.  No coughing or overt wheezing noted during  interview.    HENT:      Right Ear: External ear normal.      Left Ear: External ear normal.      Nose: No rhinorrhea.   Eyes:      Extraocular Movements: Extraocular movements intact.   Pulmonary:      Effort: Pulmonary effort is normal. No respiratory distress.   Neurological:      Mental Status: She is alert and oriented to person, place, and time.   Psychiatric:         Mood and Affect: Mood normal.         Behavior: Behavior normal.           Positive COVID Test:   - patient had COVID test completed in ED that was positive. Having some flu like s/s.     - advised patient to keep well hydrated.  Get plenty of rest.  Use NSAIDs to help reduce inflammation and pain.   - patient was given antibiotic from ED which shows advised she can complete though may not be helpful and viral illness.  Additionally patient last given dexamethasone to take for inflammation, patient states she would like to avoid taking his steroid, so will not take it at this time.  Was advised that if symptoms persist for over a week would be reasonable to use this to help reduce inflammation and body aches.    HTN:   - patient blood pressure was highly elevated in the emergency room.   - states was given medications in ED to help reduce blood pressure, sent home on her routine meds.  Will continue at this time.   - advised patient to get home blood pressure monitoring to check her blood pressure at home on daily basis.  If pressures are over 150s, would recommend doubling losartan from 25 mg daily to 50 mg daily.  If over 180 and having symptoms, would recommend patient coming to emergency room.   - will have nursing call patient in 1 week to discuss with her the blood pressure log she is maintained and how effective the losartan 25 or losartan 50 mg doses have been.    Headaches:   - patient with history of headaches.  Has used Imitrex before with some relief.  Given Fioricet from emergency room, 20 tablets.  Patient states she is become  addicted to this medication for when she was taking it on a daily basis for chronic headaches.  Advise patient that should use extreme care with this medication and make sure not to take more than 2 or 3 doses per week.  Has an appointment with Neurology in several weeks.  All

## 2024-06-05 NOTE — PATIENT INSTRUCTIONS
Positive COVID Test:   - patient had COVID test completed in ED that was positive. Having some flu like s/s.     - advised patient to keep well hydrated.  Get plenty of rest.  Use NSAIDs to help reduce inflammation and pain.   - patient was given antibiotic from ED which shows advised she can complete though may not be helpful and viral illness.  Additionally patient last given dexamethasone to take for inflammation, patient states she would like to avoid taking his steroid, so will not take it at this time.  Was advised that if symptoms persist for over a week would be reasonable to use this to help reduce inflammation and body aches.    HTN:   - patient blood pressure was highly elevated in the emergency room.   - states was given medications in ED to help reduce blood pressure, sent home on her routine meds.  Will continue at this time.   - advised patient to get home blood pressure monitoring to check her blood pressure at home on daily basis.  If pressures are over 150s, would recommend doubling losartan from 25 mg daily to 50 mg daily.  If over 180 and having symptoms, would recommend patient coming to emergency room.   - will have nursing call patient in 1 week to discuss with her the blood pressure log she is maintained and how effective the losartan 25 or losartan 50 mg doses have been.    Headaches:   - patient with history of headaches.  Has used Imitrex before with some relief.  Given Fioricet from emergency room, 20 tablets.  Patient states she is become addicted to this medication for when she was taking it on a daily basis for chronic headaches.  Advise patient that should use extreme care with this medication and make sure not to take more than 2 or 3 doses per week.  Has an appointment with Neurology in several weeks.

## 2024-06-14 ENCOUNTER — TELEPHONE (OUTPATIENT)
Dept: PRIMARY CARE CLINIC | Facility: CLINIC | Age: 64
End: 2024-06-14
Payer: MEDICARE

## 2024-06-14 NOTE — TELEPHONE ENCOUNTER
----- Message from Abdirahman Luu MD sent at 6/5/2024  5:12 PM CDT -----  Please call patient on Monday and get history on her BP for the last week.

## 2024-06-14 NOTE — TELEPHONE ENCOUNTER
Patient reports her BP is improving has been running 140s/88  , patient stated she did not want to make another appt at this time, she will continue to monitor and if it is not any better than the current reading she will then come back in.        ----- Message from Abdirahman Luu MD sent at 6/5/2024  5:12 PM CDT -----  Please call patient on Monday and get history on her BP for the last week.

## 2024-06-27 ENCOUNTER — OFFICE VISIT (OUTPATIENT)
Dept: PRIMARY CARE CLINIC | Facility: CLINIC | Age: 64
End: 2024-06-27
Payer: MEDICARE

## 2024-06-27 ENCOUNTER — OFFICE VISIT (OUTPATIENT)
Dept: BARIATRICS | Facility: CLINIC | Age: 64
End: 2024-06-27
Payer: MEDICARE

## 2024-06-27 VITALS
SYSTOLIC BLOOD PRESSURE: 130 MMHG | HEART RATE: 90 BPM | TEMPERATURE: 98 F | OXYGEN SATURATION: 97 % | RESPIRATION RATE: 16 BRPM | BODY MASS INDEX: 34.21 KG/M2 | WEIGHT: 225.75 LBS | HEIGHT: 68 IN | DIASTOLIC BLOOD PRESSURE: 76 MMHG

## 2024-06-27 VITALS
SYSTOLIC BLOOD PRESSURE: 120 MMHG | WEIGHT: 224.38 LBS | HEART RATE: 80 BPM | OXYGEN SATURATION: 98 % | DIASTOLIC BLOOD PRESSURE: 60 MMHG | BODY MASS INDEX: 34.12 KG/M2

## 2024-06-27 DIAGNOSIS — E66.9 OBESITY (BMI 30.0-34.9): ICD-10-CM

## 2024-06-27 DIAGNOSIS — G47.33 OSA (OBSTRUCTIVE SLEEP APNEA): ICD-10-CM

## 2024-06-27 DIAGNOSIS — G44.209 TENSION HEADACHE: ICD-10-CM

## 2024-06-27 DIAGNOSIS — I25.2 OLD MI (MYOCARDIAL INFARCTION): ICD-10-CM

## 2024-06-27 DIAGNOSIS — Z98.61 S/P PTCA (PERCUTANEOUS TRANSLUMINAL CORONARY ANGIOPLASTY): ICD-10-CM

## 2024-06-27 DIAGNOSIS — F32.A DEPRESSION, UNSPECIFIED DEPRESSION TYPE: ICD-10-CM

## 2024-06-27 DIAGNOSIS — I10 PRIMARY HYPERTENSION: ICD-10-CM

## 2024-06-27 DIAGNOSIS — I10 PRIMARY HYPERTENSION: Primary | ICD-10-CM

## 2024-06-27 DIAGNOSIS — I73.9 PAD (PERIPHERAL ARTERY DISEASE): ICD-10-CM

## 2024-06-27 DIAGNOSIS — E66.09 CLASS 1 OBESITY DUE TO EXCESS CALORIES WITH SERIOUS COMORBIDITY AND BODY MASS INDEX (BMI) OF 34.0 TO 34.9 IN ADULT: Primary | ICD-10-CM

## 2024-06-27 DIAGNOSIS — Z71.3 ENCOUNTER FOR WEIGHT LOSS COUNSELING: ICD-10-CM

## 2024-06-27 DIAGNOSIS — I25.10 CORONARY ARTERY DISEASE INVOLVING NATIVE CORONARY ARTERY OF NATIVE HEART WITHOUT ANGINA PECTORIS: ICD-10-CM

## 2024-06-27 DIAGNOSIS — K21.9 GASTROESOPHAGEAL REFLUX DISEASE, UNSPECIFIED WHETHER ESOPHAGITIS PRESENT: ICD-10-CM

## 2024-06-27 PROCEDURE — 99214 OFFICE O/P EST MOD 30 MIN: CPT | Mod: S$GLB,,, | Performed by: STUDENT IN AN ORGANIZED HEALTH CARE EDUCATION/TRAINING PROGRAM

## 2024-06-27 PROCEDURE — 99213 OFFICE O/P EST LOW 20 MIN: CPT | Mod: S$GLB,,, | Performed by: STUDENT IN AN ORGANIZED HEALTH CARE EDUCATION/TRAINING PROGRAM

## 2024-06-27 PROCEDURE — 3044F HG A1C LEVEL LT 7.0%: CPT | Mod: CPTII,S$GLB,, | Performed by: STUDENT IN AN ORGANIZED HEALTH CARE EDUCATION/TRAINING PROGRAM

## 2024-06-27 PROCEDURE — 3075F SYST BP GE 130 - 139MM HG: CPT | Mod: CPTII,S$GLB,, | Performed by: STUDENT IN AN ORGANIZED HEALTH CARE EDUCATION/TRAINING PROGRAM

## 2024-06-27 PROCEDURE — 3288F FALL RISK ASSESSMENT DOCD: CPT | Mod: CPTII,S$GLB,, | Performed by: STUDENT IN AN ORGANIZED HEALTH CARE EDUCATION/TRAINING PROGRAM

## 2024-06-27 PROCEDURE — 3008F BODY MASS INDEX DOCD: CPT | Mod: CPTII,S$GLB,, | Performed by: STUDENT IN AN ORGANIZED HEALTH CARE EDUCATION/TRAINING PROGRAM

## 2024-06-27 PROCEDURE — 1125F AMNT PAIN NOTED PAIN PRSNT: CPT | Mod: CPTII,S$GLB,, | Performed by: STUDENT IN AN ORGANIZED HEALTH CARE EDUCATION/TRAINING PROGRAM

## 2024-06-27 PROCEDURE — 1160F RVW MEDS BY RX/DR IN RCRD: CPT | Mod: CPTII,S$GLB,, | Performed by: STUDENT IN AN ORGANIZED HEALTH CARE EDUCATION/TRAINING PROGRAM

## 2024-06-27 PROCEDURE — 1159F MED LIST DOCD IN RCRD: CPT | Mod: CPTII,S$GLB,, | Performed by: STUDENT IN AN ORGANIZED HEALTH CARE EDUCATION/TRAINING PROGRAM

## 2024-06-27 PROCEDURE — 99999 PR PBB SHADOW E&M-EST. PATIENT-LVL IV: CPT | Mod: PBBFAC,,, | Performed by: STUDENT IN AN ORGANIZED HEALTH CARE EDUCATION/TRAINING PROGRAM

## 2024-06-27 PROCEDURE — 3078F DIAST BP <80 MM HG: CPT | Mod: CPTII,S$GLB,, | Performed by: STUDENT IN AN ORGANIZED HEALTH CARE EDUCATION/TRAINING PROGRAM

## 2024-06-27 PROCEDURE — 3074F SYST BP LT 130 MM HG: CPT | Mod: CPTII,S$GLB,, | Performed by: STUDENT IN AN ORGANIZED HEALTH CARE EDUCATION/TRAINING PROGRAM

## 2024-06-27 PROCEDURE — 4010F ACE/ARB THERAPY RXD/TAKEN: CPT | Mod: CPTII,S$GLB,, | Performed by: STUDENT IN AN ORGANIZED HEALTH CARE EDUCATION/TRAINING PROGRAM

## 2024-06-27 PROCEDURE — 1101F PT FALLS ASSESS-DOCD LE1/YR: CPT | Mod: CPTII,S$GLB,, | Performed by: STUDENT IN AN ORGANIZED HEALTH CARE EDUCATION/TRAINING PROGRAM

## 2024-06-27 RX ORDER — LACTULOSE 10 G/15ML
10 SOLUTION ORAL; RECTAL NIGHTLY
COMMUNITY
Start: 2024-06-23

## 2024-06-27 RX ORDER — LOSARTAN POTASSIUM 100 MG/1
100 TABLET ORAL DAILY
Qty: 90 TABLET | Refills: 3 | Status: SHIPPED | OUTPATIENT
Start: 2024-06-27 | End: 2025-06-27

## 2024-06-27 RX ORDER — SEMAGLUTIDE 0.5 MG/.5ML
0.5 INJECTION, SOLUTION SUBCUTANEOUS
Qty: 2 ML | Refills: 0 | Status: SHIPPED | OUTPATIENT
Start: 2024-07-25 | End: 2024-08-16

## 2024-06-27 RX ORDER — SEMAGLUTIDE 0.25 MG/.5ML
0.25 INJECTION, SOLUTION SUBCUTANEOUS
Qty: 2 ML | Refills: 0 | Status: SHIPPED | OUTPATIENT
Start: 2024-06-27 | End: 2024-07-19

## 2024-06-27 RX ORDER — METHOCARBAMOL 750 MG/1
750 TABLET, FILM COATED ORAL 2 TIMES DAILY PRN
Qty: 40 TABLET | Refills: 0 | Status: SHIPPED | OUTPATIENT
Start: 2024-06-27

## 2024-06-27 RX ORDER — VENLAFAXINE HYDROCHLORIDE 150 MG/1
150 CAPSULE, EXTENDED RELEASE ORAL DAILY
Qty: 90 CAPSULE | Refills: 3 | Status: SHIPPED | OUTPATIENT
Start: 2024-06-27

## 2024-06-27 RX ORDER — SEMAGLUTIDE 1 MG/.5ML
1 INJECTION, SOLUTION SUBCUTANEOUS
Qty: 2 ML | Refills: 0 | Status: SHIPPED | OUTPATIENT
Start: 2024-08-22 | End: 2024-09-13

## 2024-06-27 RX ORDER — PANTOPRAZOLE SODIUM 40 MG/1
40 TABLET, DELAYED RELEASE ORAL DAILY
Qty: 90 TABLET | Refills: 3 | Status: SHIPPED | OUTPATIENT
Start: 2024-06-27

## 2024-06-27 NOTE — PROGRESS NOTES
Subjective:           Patient ID: Jennifer Brady   Age:  63 y.o.  Sex: female     Chief Complaint:   Hypertension, Headache, Nasal Congestion, and Cough      History of Present Illness:    Jennifer Brady is a 63 y.o. female who presents today with a chief complaint of Hypertension, Headache, Nasal Congestion, and Cough  .    64yo female presenting for report of HTN, HA, nasal congestion and cough.    States home BP have been 150s/80s at home on an automatic home cuff.  Today at bariatric appt BP was 120/60.  And in our office BP was 130/76.    Did have documented BP in ED w/ systolic of 214 on 6/3/24, but had COVID at that time.    Had been on Lisinopril until Jan 2021 when was stopped.   Has been on Losartan longer than suggested in my last note - appears started Losartan 25mg in Jan 2021.    Has been taking 2 tables of the Losartan daily for the last 2-3 weeks and still seeing elevated pressures at times.    Has been taking Effexor daily for years; feels her depression is well controlled.     Saw Bariatric this AM.  Got a Rx for Wegovy. Was advised on diet change.    Headaches:   States has a MSK relaxant,       Review of Systems   Constitutional:  Positive for fatigue and unexpected weight change. Negative for chills and fever.   HENT:  Positive for mouth sores. Negative for ear pain, postnasal drip and sinus pain.    Respiratory:  Positive for shortness of breath (worse w/ exertion, has CPAP). Negative for cough and wheezing.    Cardiovascular:  Negative for chest pain and palpitations.   Gastrointestinal:  Negative for constipation, diarrhea, nausea and vomiting.   Genitourinary:  Negative for difficulty urinating, frequency and urgency.   Musculoskeletal:  Positive for arthralgias, back pain, gait problem, joint swelling and myalgias.   Neurological:  Positive for dizziness, syncope, light-headedness and headaches.   Psychiatric/Behavioral:  Positive for dysphoric mood and sleep disturbance.  "The patient is nervous/anxious.            Objective:        Vitals:    06/27/24 1412   BP: 130/76   BP Location: Right arm   Patient Position: Sitting   BP Method: Medium (Manual)   Pulse: 90   Resp: 16   Temp: 98.2 °F (36.8 °C)   TempSrc: Oral   SpO2: 97%   Weight: 102.4 kg (225 lb 12 oz)   Height: 5' 8" (1.727 m)       Body mass index is 34.33 kg/m².      Physical Exam  Vitals reviewed.   Constitutional:       General: She is not in acute distress.     Appearance: She is well-developed. She is obese.   HENT:      Head: Normocephalic.      Right Ear: Tympanic membrane and external ear normal.      Left Ear: Tympanic membrane and external ear normal.      Nose: No rhinorrhea.      Mouth/Throat:      Mouth: Mucous membranes are moist.      Pharynx: Uvula midline. No oropharyngeal exudate or posterior oropharyngeal erythema.      Comments: Oropharynx appears completely normal.  No redness, no lesions.  No pus or swelling.  Eyes:      Conjunctiva/sclera: Conjunctivae normal.   Cardiovascular:      Rate and Rhythm: Normal rate and regular rhythm.      Pulses: Normal pulses.      Heart sounds: Normal heart sounds. No murmur heard.  Pulmonary:      Effort: Pulmonary effort is normal.      Breath sounds: Normal breath sounds. No wheezing.   Abdominal:      General: Bowel sounds are normal.      Palpations: Abdomen is soft.      Tenderness: There is no abdominal tenderness.   Musculoskeletal:      Right lower leg: No edema.      Left lower leg: No edema.   Lymphadenopathy:      Cervical: No cervical adenopathy.   Skin:     General: Skin is warm and dry.      Capillary Refill: Capillary refill takes 2 to 3 seconds.      Findings: No bruising or rash.      Comments: Reporting pain with palpation of right knee.  Has full ROM.  No apparent swelling or rubor.    Neurological:      Mental Status: She is alert and oriented to person, place, and time.      Motor: No weakness.      Gait: Gait normal.   Psychiatric:      Comments: " Patient brings up very numerous issue during interview.  Has a nearly pan-positive review of systems.             Past Medical History:   Diagnosis Date    Acute coronary syndrome     Anxiety     Arthritis     Asthma     Behavioral problem     Borderline personality disorder     Coronary angioplasty status 04/2018    Coronary artery disease     Depression     Depression     Fatigue     Hx of psychiatric care     Hyperlipidemia     Hypertension     Myocardial infarction 05/2018    Psychiatric problem     S/P hysterectomy with oophorectomy     Sleep apnea     pt uses CPAP    Syncope and collapse     Therapy        Lab Results   Component Value Date     02/09/2024    K 4.1 02/09/2024     02/09/2024    CO2 27 02/09/2024    BUN 9 02/09/2024    CREATININE 0.9 02/09/2024    ANIONGAP 8 02/09/2024     Lab Results   Component Value Date    HGBA1C 5.0 02/09/2024     Lab Results   Component Value Date    BNP <10 09/17/2023    BNP <10 07/15/2021    BNP 24 04/04/2018       Lab Results   Component Value Date    WBC 7.21 02/09/2024    HGB 12.5 02/09/2024    HCT 39.2 02/09/2024     02/09/2024    GRAN 49.0 02/09/2024    GRAN 4.6 09/17/2023     Lab Results   Component Value Date    CHOL 195 02/09/2024     (H) 02/09/2024    LDLCALC 70.8 02/09/2024    TRIG 76 02/09/2024        Outpatient Encounter Medications as of 6/27/2024   Medication Sig Dispense Refill    albuterol (PROVENTIL/VENTOLIN HFA) 90 mcg/actuation inhaler Inhale 2 puffs into the lungs every 4 (four) hours as needed for Wheezing.      aspirin (ECOTRIN) 81 MG EC tablet Take 1 tablet (81 mg total) by mouth once daily. 100 tablet 6    atorvastatin (LIPITOR) 40 MG tablet Take 40 mg by mouth once daily.      azelastine (ASTELIN) 137 mcg (0.1 %) nasal spray 1 spray (137 mcg total) by Nasal route 2 (two) times daily. 30 mL 11    budesonide-formoterol 80-4.5 mcg (SYMBICORT) 80-4.5 mcg/actuation HFAA Inhale 2 puffs into the lungs 2 (two) times a day.  Controller 10.2 g 5    estradioL (ESTRACE) 0.01 % (0.1 mg/gram) vaginal cream Place 1 g vaginally twice a week. 42 g 4    fluticasone propionate (FLONASE) 50 mcg/actuation nasal spray 2 sprays (100 mcg total) by Each Nostril route 2 (two) times daily. 18.2 mL 11    HYDROcodone-acetaminophen (NORCO)  mg per tablet Take by mouth daily as needed.      hydrOXYzine pamoate (VISTARIL) 25 MG Cap Take 1 capsule (25 mg total) by mouth 3 (three) times daily as needed (anxiety). 270 capsule 3    ipratropium (ATROVENT) 42 mcg (0.06 %) nasal spray 2 sprays by Each Nostril route 3 (three) times daily as needed for Rhinitis. 15 mL 3    lactulose (CHRONULAC) 10 gram/15 mL solution Take 10 g by mouth every evening.      levocetirizine (XYZAL) 5 MG tablet Take 5 mg by mouth.      nitroGLYCERIN (NITROSTAT) 0.4 MG SL tablet Place 0.4 mg under the tongue.      semaglutide, weight loss, (WEGOVY) 0.25 mg/0.5 mL PnIj Inject 0.25 mg into the skin every 7 days. for 4 doses 2 mL 0    [START ON 7/25/2024] semaglutide, weight loss, (WEGOVY) 0.5 mg/0.5 mL PnIj Inject 0.5 mg into the skin every 7 days. for 4 doses 2 mL 0    [START ON 8/22/2024] semaglutide, weight loss, (WEGOVY) 1 mg/0.5 mL PnIj Inject 1 mg into the skin every 7 days. for 4 doses 2 mL 0    traZODone (DESYREL) 150 MG tablet Take 1 -2 tablets by mouth before bed as needed for insomnia 60 tablet 11    [DISCONTINUED] losartan (COZAAR) 25 MG tablet Take 1 tablet (25 mg total) by mouth once daily. (Patient taking differently: Take 25 mg by mouth every morning.) 90 tablet 3    [DISCONTINUED] pantoprazole (PROTONIX) 40 MG tablet pantoprazole 40 mg tablet,delayed release      [DISCONTINUED] sumatriptan (IMITREX) 100 MG tablet Take 1 tablet (100 mg total) by mouth every 48 hours as needed for Migraine. 9 tablet 3    [DISCONTINUED] venlafaxine (EFFEXOR-XR) 150 MG Cp24 Take 150 mg by mouth once daily.      losartan (COZAAR) 100 MG tablet Take 1 tablet (100 mg total) by mouth once  daily. 90 tablet 3    methocarbamoL (ROBAXIN) 750 MG Tab Take 1 tablet (750 mg total) by mouth 2 (two) times daily as needed (shoulder/neck pain, headaches). 40 tablet 0    pantoprazole (PROTONIX) 40 MG tablet Take 1 tablet (40 mg total) by mouth once daily. 90 tablet 3    venlafaxine (EFFEXOR-XR) 150 MG Cp24 Take 1 capsule (150 mg total) by mouth once daily. 90 capsule 3    [DISCONTINUED] amoxicillin-clavulanate 875-125mg (AUGMENTIN) 875-125 mg per tablet Take 1 tablet by mouth 2 (two) times daily. 20 tablet 0    [DISCONTINUED] butalbital-acetaminophen-caffeine -40 mg (FIORICET, ESGIC) -40 mg per tablet Take 1 tablet by mouth every 4 (four) hours as needed. 20 tablet 0    [DISCONTINUED] citalopram (CELEXA) 10 MG tablet Take 10 mg by mouth once daily.      [DISCONTINUED] lurasidone (LATUDA) 40 mg Tab tablet Take 1 tablet (40 mg total) by mouth once daily. 30 tablet 5     No facility-administered encounter medications on file as of 6/27/2024.          Assessment:       1. Primary hypertension    2. Gastroesophageal reflux disease, unspecified whether esophagitis present    3. Depression, unspecified depression type    4. Tension headache    5. Obesity (BMI 30.0-34.9)           Plan:           Primary hypertension  -     losartan (COZAAR) 100 MG tablet; Take 1 tablet (100 mg total) by mouth once daily.  Dispense: 90 tablet; Refill: 3   - BP boarderline, will increase to 100mg daily from the 50mg daily she has been taking for last 2-3 weeks.    - watch for over treatment at home.    Gastroesophageal reflux disease, unspecified whether esophagitis present  -     pantoprazole (PROTONIX) 40 MG tablet; Take 1 tablet (40 mg total) by mouth once daily.  Dispense: 90 tablet; Refill: 3   - using daily, and is working well.   - continue at this time.     Depression, unspecified depression type  -     venlafaxine (EFFEXOR-XR) 150 MG Cp24; Take 1 capsule (150 mg total) by mouth once daily.  Dispense: 90 capsule;  Refill: 3   - taking and tolerating well.   - no significant mood concerns.    Tension headache  -     methocarbamoL (ROBAXIN) 750 MG Tab; Take 1 tablet (750 mg total) by mouth 2 (two) times daily as needed (shoulder/neck pain, headaches).  Dispense: 40 tablet; Refill: 0   - has tried Imitrex 100mg for headaches, but is not helping so will stop at this time.    - gets some pressure to the right side of the head on back side.  Could be muscle tension, but patient feels sinus related.   - advised use of Robaxin as has found this somewhat useful at times.      BMI 34:   - plans to start on Ozempic this week if insurance approves it.    - may also have the Ozempic approved due to hx of heart attack.    Tobacco use:   - started back smoking, advise cessation.

## 2024-06-27 NOTE — PATIENT INSTRUCTIONS
Primary hypertension  -     losartan (COZAAR) 100 MG tablet; Take 1 tablet (100 mg total) by mouth once daily.  Dispense: 90 tablet; Refill: 3   - BP boarderline, will increase to 100mg daily from the 50mg daily she has been taking for last 2-3 weeks.    - watch for over treatment at home.    Gastroesophageal reflux disease, unspecified whether esophagitis present  -     pantoprazole (PROTONIX) 40 MG tablet; Take 1 tablet (40 mg total) by mouth once daily.  Dispense: 90 tablet; Refill: 3   - using daily, and is working well.   - continue at this time.     Depression, unspecified depression type  -     venlafaxine (EFFEXOR-XR) 150 MG Cp24; Take 1 capsule (150 mg total) by mouth once daily.  Dispense: 90 capsule; Refill: 3   - taking and tolerating well.   - no significant mood concerns.    Tension headache  -     methocarbamoL (ROBAXIN) 750 MG Tab; Take 1 tablet (750 mg total) by mouth 2 (two) times daily as needed (shoulder/neck pain, headaches).  Dispense: 40 tablet; Refill: 0   - has tried Imitrex 100mg for headaches, but is not helping so will stop at this time.    - gets some pressure to the right side of the head on back side.  Could be muscle tension, but patient feels sinus related.   - advised use of Robaxin as has found this somewhat useful at times.      BMI 34:   - plans to start on Ozempic this week if insurance approves it.

## 2024-06-27 NOTE — PROGRESS NOTES
Subjective:       Patient ID: Jennifer Brady is a 63 y.o. female.    Chief Complaint: Follow-up, Obesity, and Weight Check    Patient presents for treatment of obesity.       Co-morbidities  Migraines - was on topiramate, but was discontinued because didn't help with headaches  REN  MI  CAD s/p PTCA  HTN  HLD    Weight History  Lowest adult weight: 165 lbs  Highest adult weight: 220 lbs         Current Physical Activity  No regular exercise, some walking but not consistent  Foot pain due to dropped arches, but that has improved      Current Eating Habits  Breakfast - skips most days, occassional cup of coffee  Sherbert  Potatoes - mashed, roasted  White beans  Roast  Turkey necks   Biscuits  Cabbage  Gumbo  Recently stopped drinking sweet tea and lemonade      Medical Weight Loss  2/13/2023: 216.2 lbs, BMI 32.9, BFP 47.6%, .9 lbs, SMM 62.4 lbs, BMR 1480 kcal  5/11/2023: 209.1 lbs, BMI 31.8, BFP 45.8%, BFM 95.8 lbs, SMM 62.4 lbs, BMR 1481 kcal  7/13/2023: 204.6 lbs, BMI 31.1, BFP 44.4%, BFM 90.9 lbs, SMM 62.6 lbs, BMR 1485 kcal  10/2/2023: 202.1 lbs, BMI 30.7, BFP 43.1%, BFM 87 lbs, SMM 64.4 lbs, BMR 1498 kcal  1/3/2024: 202.7 lbs, BMI 30.8, BFP 42.3%, BFM 85.8 lbs, SMM 64.4 lbs, BMR 1516 kcal  4/4/2024: 211.7 lbs, BMI 32.2, BFP 37.3%, BFM 79 lbs, SMM 75 lbs, BMR 1670 kcal  6/27/2024      Review of Systems   Constitutional:  Negative for chills and fever.   Respiratory:  Negative for shortness of breath.    Cardiovascular:  Negative for chest pain and palpitations.   Gastrointestinal:  Negative for abdominal pain, nausea and vomiting.   Neurological:  Negative for dizziness and light-headedness.   Psychiatric/Behavioral:  The patient is not nervous/anxious.          Objective:       Latest Reference Range & Units 03/02/22 10:23 12/09/22 08:04   WBC 3.90 - 12.70 K/uL 9.53 7.20   RBC 4.00 - 5.40 M/uL 3.97 (L) 4.07   Hemoglobin 12.0 - 16.0 g/dL 12.0 12.2   Hematocrit 37.0 - 48.5 % 38.7 38.3   MCV 82 - 98  fL 98 94   MCH 27.0 - 31.0 pg 30.2 30.0   MCHC 32.0 - 36.0 g/dL 31.0 (L) 31.9 (L)   RDW 11.5 - 14.5 % 12.4 12.0   Platelets 150 - 450 K/uL 397 389   MPV 9.2 - 12.9 fL 8.6 (L) 8.6 (L)   Gran % 38.0 - 73.0 % 46.5 52.2   Lymph % 18.0 - 48.0 % 44.4 38.1   Mono % 4.0 - 15.0 % 5.9 6.1   Eosinophil % 0.0 - 8.0 % 2.6 2.6   Basophil % 0.0 - 1.9 % 0.4 0.7   Immature Granulocytes 0.0 - 0.5 % 0.2 0.3   Gran # (ANC) 1.8 - 7.7 K/uL 4.4 3.8   Lymph # 1.0 - 4.8 K/uL 4.2 2.7   Mono # 0.3 - 1.0 K/uL 0.6 0.4   Eos # 0.0 - 0.5 K/uL 0.3 0.2   Baso # 0.00 - 0.20 K/uL 0.04 0.05   Immature Grans (Abs) 0.00 - 0.04 K/uL 0.02 0.02   nRBC 0 /100 WBC 0 0   Differential Method  Automated Automated   Protime 9.0 - 12.5 sec  10.3   INR 0.8 - 1.2   1.0   aPTT 21.0 - 32.0 sec  28.8   Sodium 136 - 145 mmol/L 141 140   Potassium 3.5 - 5.1 mmol/L 3.9 4.0   Chloride 95 - 110 mmol/L 108 106   CO2 23 - 29 mmol/L 24 22 (L)   Anion Gap 8 - 16 mmol/L 9 12   BUN 8 - 23 mg/dL 15 9   Creatinine 0.5 - 1.4 mg/dL 0.9 0.8   eGFR >60 mL/min/1.73 m^2  >60   eGFR if non African American >60 mL/min/1.73 m^2 >60.0    eGFR if African American >60 mL/min/1.73 m^2 >60.0    Glucose 70 - 110 mg/dL 92 95   Calcium 8.7 - 10.5 mg/dL 9.7 9.4   Alkaline Phosphatase 55 - 135 U/L 133    PROTEIN TOTAL 6.0 - 8.4 g/dL 7.3    Albumin 3.5 - 5.2 g/dL 3.7    BILIRUBIN TOTAL 0.1 - 1.0 mg/dL 0.6    AST 10 - 40 U/L 21    ALT 10 - 44 U/L 22    Cholesterol 120 - 199 mg/dL 157    HDL 40 - 75 mg/dL 48    HDL/Cholesterol Ratio 20.0 - 50.0 % 30.6    LDL Cholesterol External 63.0 - 159.0 mg/dL 86.2    Non-HDL Cholesterol mg/dL 109    Total Cholesterol/HDL Ratio 2.0 - 5.0  3.3    Triglycerides 30 - 150 mg/dL 114    CPK 20 - 180 U/L 239 (H)    TSH 0.400 - 4.000 uIU/mL 0.494    (L): Data is abnormally low  (H): Data is abnormally high    CV U/S 1/2020:  Moderate heterogenous plaques in right common femoral, SFA and popliteal arteries, with a moderate stenosis of the proximal Right SFA  Moderate  heterogenous plaques in left common femoral, SFA and popliteal arteries with a moderate stenosis of the left common femoral artery.      EKG 4/2018:  Normal sinus rhythm   Septal infarct ,age undetermined   Marked ST abnormality, possible inferior subendocardial injury   Abnormal ECG       Vitals:    06/27/24 0956   BP: 120/60   Pulse: 80       Physical Exam  Vitals reviewed.   Constitutional:       General: She is not in acute distress.     Appearance: Normal appearance. She is obese. She is not ill-appearing, toxic-appearing or diaphoretic.   HENT:      Head: Normocephalic and atraumatic.   Cardiovascular:      Rate and Rhythm: Normal rate.   Pulmonary:      Effort: Pulmonary effort is normal. No respiratory distress.   Skin:     General: Skin is warm and dry.   Neurological:      Mental Status: She is alert and oriented to person, place, and time.         Assessment:       Problem List Items Addressed This Visit       HTN (hypertension)    Relevant Medications    semaglutide, weight loss, (WEGOVY) 0.25 mg/0.5 mL PnIj    semaglutide, weight loss, (WEGOVY) 0.5 mg/0.5 mL PnIj (Start on 7/25/2024)    semaglutide, weight loss, (WEGOVY) 1 mg/0.5 mL PnIj (Start on 8/22/2024)    REN (obstructive sleep apnea)    Relevant Medications    semaglutide, weight loss, (WEGOVY) 0.25 mg/0.5 mL PnIj    semaglutide, weight loss, (WEGOVY) 0.5 mg/0.5 mL PnIj (Start on 7/25/2024)    semaglutide, weight loss, (WEGOVY) 1 mg/0.5 mL PnIj (Start on 8/22/2024)    Old MI (myocardial infarction)    Relevant Medications    semaglutide, weight loss, (WEGOVY) 0.25 mg/0.5 mL PnIj    semaglutide, weight loss, (WEGOVY) 0.5 mg/0.5 mL PnIj (Start on 7/25/2024)    semaglutide, weight loss, (WEGOVY) 1 mg/0.5 mL PnIj (Start on 8/22/2024)    Obesity - Primary    Relevant Medications    semaglutide, weight loss, (WEGOVY) 0.25 mg/0.5 mL PnIj    semaglutide, weight loss, (WEGOVY) 0.5 mg/0.5 mL PnIj (Start on 7/25/2024)    semaglutide, weight loss, (WEGOVY) 1  mg/0.5 mL PnIj (Start on 8/22/2024)    PAD (peripheral artery disease)    Relevant Medications    semaglutide, weight loss, (WEGOVY) 0.25 mg/0.5 mL PnIj    semaglutide, weight loss, (WEGOVY) 0.5 mg/0.5 mL PnIj (Start on 7/25/2024)    semaglutide, weight loss, (WEGOVY) 1 mg/0.5 mL PnIj (Start on 8/22/2024)    S/P PTCA (percutaneous transluminal coronary angioplasty)    Relevant Medications    semaglutide, weight loss, (WEGOVY) 0.25 mg/0.5 mL PnIj    semaglutide, weight loss, (WEGOVY) 0.5 mg/0.5 mL PnIj (Start on 7/25/2024)    semaglutide, weight loss, (WEGOVY) 1 mg/0.5 mL PnIj (Start on 8/22/2024)     Other Visit Diagnoses       Coronary artery disease involving native coronary artery of native heart without angina pectoris        Relevant Medications    semaglutide, weight loss, (WEGOVY) 0.25 mg/0.5 mL PnIj    semaglutide, weight loss, (WEGOVY) 0.5 mg/0.5 mL PnIj (Start on 7/25/2024)    semaglutide, weight loss, (WEGOVY) 1 mg/0.5 mL PnIj (Start on 8/22/2024)    Encounter for weight loss counseling                    Plan:     - Log all food and beverage intake with a daily calorie goal of 1500 calories per day    - Moderate intensity aerobic exercise for 30 minuets 3-5x/week

## 2024-06-28 DIAGNOSIS — I10 PRIMARY HYPERTENSION: ICD-10-CM

## 2024-06-28 RX ORDER — FLUTICASONE PROPIONATE 50 MCG
2 SPRAY, SUSPENSION (ML) NASAL 2 TIMES DAILY
Qty: 90 ML | Refills: 4 | Status: SHIPPED | OUTPATIENT
Start: 2024-06-28 | End: 2024-09-26

## 2024-06-28 RX ORDER — AZELASTINE 1 MG/ML
2 SPRAY, METERED NASAL 2 TIMES DAILY
Qty: 90 ML | Refills: 4 | Status: SHIPPED | OUTPATIENT
Start: 2024-06-28

## 2024-06-28 RX ORDER — LOSARTAN POTASSIUM 100 MG/1
TABLET ORAL
Refills: 0 | OUTPATIENT
Start: 2024-06-28

## 2024-06-28 NOTE — TELEPHONE ENCOUNTER
No care due was identified.  [facility] Embedded Care Due Messages. Reference number: [id number].   6/28/2024 9:13:02 AM CDT

## 2024-06-28 NOTE — TELEPHONE ENCOUNTER
Refill Decision Note   Jennifer Sutherland Jose Antonio  is requesting a refill authorization.  Brief Assessment and Rationale for Refill:  Quick Discontinue     Medication Therapy Plan:    Pharmacy is requesting new scripts for the following medications without required information, (sig/ frequency/qty/etc)      Medication Reconciliation Completed: No     Comments: Pharmacies have been requesting medications for patients without required information, (sig, frequency, qty, etc.). In addition, requests are sent for medication(s) pt. are currently not taking, and medications patients have never taken.    We have spoken to the pharmacies about these request types and advised their teams previously that we are unable to assess these New Script requests and require all details for these requests. This is a known issue and has been reported.     Note composed:10:25 AM 06/28/2024             normal...

## 2024-07-17 ENCOUNTER — PATIENT MESSAGE (OUTPATIENT)
Dept: ADMINISTRATIVE | Facility: HOSPITAL | Age: 64
End: 2024-07-17
Payer: MEDICARE

## 2024-07-18 ENCOUNTER — PATIENT OUTREACH (OUTPATIENT)
Dept: ADMINISTRATIVE | Facility: HOSPITAL | Age: 64
End: 2024-07-18
Payer: MEDICARE

## 2024-07-18 NOTE — PROGRESS NOTES
Health Maintenance Due   Topic Date Due    RSV Vaccine (Age 60+ and Pregnant patients) (1 - 1-dose 60+ series) Never done    DEXA Scan  09/26/2022     Immunizations - reviewed and updated   Care Everywhere - triggered   Care Teams - updated  Outreach - Epic [employer]s outreach done. DEXA scan due, order already placed by Ninfa Maria. Portal message sent in regards to scheduling

## 2024-07-25 ENCOUNTER — TELEPHONE (OUTPATIENT)
Dept: ORTHOPEDICS | Facility: CLINIC | Age: 64
End: 2024-07-25

## 2024-07-25 ENCOUNTER — OFFICE VISIT (OUTPATIENT)
Dept: ORTHOPEDICS | Facility: CLINIC | Age: 64
End: 2024-07-25
Payer: MEDICARE

## 2024-07-25 VITALS
HEART RATE: 7 BPM | SYSTOLIC BLOOD PRESSURE: 158 MMHG | WEIGHT: 226.63 LBS | BODY MASS INDEX: 34.35 KG/M2 | HEIGHT: 68 IN | DIASTOLIC BLOOD PRESSURE: 90 MMHG

## 2024-07-25 DIAGNOSIS — Z96.641 S/P TOTAL RIGHT HIP ARTHROPLASTY: Primary | ICD-10-CM

## 2024-07-25 DIAGNOSIS — M17.11 PRIMARY OSTEOARTHRITIS OF RIGHT KNEE: ICD-10-CM

## 2024-07-25 PROCEDURE — 99214 OFFICE O/P EST MOD 30 MIN: CPT | Mod: S$GLB,,,

## 2024-07-25 PROCEDURE — 3080F DIAST BP >= 90 MM HG: CPT | Mod: CPTII,S$GLB,,

## 2024-07-25 PROCEDURE — 1100F PTFALLS ASSESS-DOCD GE2>/YR: CPT | Mod: CPTII,S$GLB,,

## 2024-07-25 PROCEDURE — 4010F ACE/ARB THERAPY RXD/TAKEN: CPT | Mod: CPTII,S$GLB,,

## 2024-07-25 PROCEDURE — 1125F AMNT PAIN NOTED PAIN PRSNT: CPT | Mod: CPTII,S$GLB,,

## 2024-07-25 PROCEDURE — 3008F BODY MASS INDEX DOCD: CPT | Mod: CPTII,S$GLB,,

## 2024-07-25 PROCEDURE — 99999 PR PBB SHADOW E&M-EST. PATIENT-LVL IV: CPT | Mod: PBBFAC,,,

## 2024-07-25 PROCEDURE — 3288F FALL RISK ASSESSMENT DOCD: CPT | Mod: CPTII,S$GLB,,

## 2024-07-25 PROCEDURE — 3044F HG A1C LEVEL LT 7.0%: CPT | Mod: CPTII,S$GLB,,

## 2024-07-25 PROCEDURE — 3077F SYST BP >= 140 MM HG: CPT | Mod: CPTII,S$GLB,,

## 2024-07-25 PROCEDURE — 1159F MED LIST DOCD IN RCRD: CPT | Mod: CPTII,S$GLB,,

## 2024-07-25 RX ORDER — TIZANIDINE 4 MG/1
4 TABLET ORAL EVERY 6 HOURS PRN
Qty: 60 TABLET | Refills: 1 | Status: SHIPPED | OUTPATIENT
Start: 2024-07-25 | End: 2024-08-04

## 2024-07-25 RX ORDER — TIZANIDINE 4 MG/1
4 TABLET ORAL 2 TIMES DAILY
COMMUNITY
End: 2024-07-25

## 2024-07-25 NOTE — TELEPHONE ENCOUNTER
----- Message from Lottie Katz PA-C sent at 7/25/2024  1:36 PM CDT -----  Please let patient know hip xrays are good, no fracture or complication noted

## 2024-07-25 NOTE — PROGRESS NOTES
Patient ID: Jennifer Brady is a 64 y.o. female    Pain of the Right Knee    History of Present Illness:    Jennifer Brady presents to clinic for right knee pain. She is s/p R CHANDA in 12/2022 with Dr. Rodriguez at [facility]. She is now experiencing right knee pain. 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 Denies recent falls. The pain started 3 months ago and is becoming progressively worse.  Pain is located over (points to) lateral and pes bursa. She reports that the pain is a 6 /10 aching and dull pain toda. The pain is affecting ADLs and limiting desired level of activity. Denies numbness, tingling, radiation and inability to bear weight.    She has tried icing/heat with improvement. Denies PT, injections, or surgery. Interested in injections and physical therapy.    Mechanical symptoms: +  Instability: +    Occupation: retired     Ambulating: unassisted  Diabetic: no  Smoking: current, trying to quit/wean down  Hx of DVT/PE: no  Hi MI, on ASA    ____________________________________________________________________    Interval history 7/25/2024 : Patient returns today for follow up of right knee pain. States injection helped about 80%.  She does continue to note right knee instability.  Unfortunately she had a fall on Saturday, she lives alone and had to lay there for a while.  She was now endorsing posterior right hip pain and medial-sided right knee pain.      PAST MEDICAL HISTORY:   Past Medical History:   Diagnosis Date    Acute coronary syndrome     Anxiety     Arthritis     Asthma     Behavioral problem     Borderline personality disorder     Coronary angioplasty status 04/2018    Coronary artery disease     Depression     Depression     Fatigue     Hx of psychiatric care     Hyperlipidemia     Hypertension     Myocardial infarction 05/2018    Psychiatric problem     S/P hysterectomy with oophorectomy     Sleep apnea     pt uses CPAP    Syncope and collapse      Therapy      PAST SURGICAL HISTORY:   Past Surgical History:   Procedure Laterality Date    CORONARY ANGIOPLASTY      HIP REPLACEMENT ARTHROPLASTY Right 12/21/2022    HYSTERECTOMY  1999    INJECTION OF ANESTHETIC AGENT AROUND NERVE Right 11/20/2019    Procedure: MEDIAL BRANCH BLOCK RIGHT L3, L4, L5;  Surgeon: Petros Reich MD;  Location: [facility] PAIN [facility]GT;  Service: Pain Management;  Laterality: Right;  NEEDS CONSENT, PT NO LONGER TAKES PLAVIX    INJECTION OF ANESTHETIC AGENT AROUND NERVE Right 06/17/2020    Procedure: BLOCK, NERVE RIGHT L3, 4, 5 MEDIAL BRANCH;  Surgeon: Petros Reich MD;  Location: [facility] PAIN [facility]GT;  Service: Pain Management;  Laterality: Right;  NEEDS CONSENT?    RADIOFREQUENCY ABLATION Right 05/12/2021    Procedure: RADIOFREQUENCY ABLATION RIGHT L3, 4, 5;  Surgeon: Petros Reich MD;  Location: [facility] PAIN [facility]GT;  Service: Pain Management;  Laterality: Right;  NEEDS CONSENT    TILT TABLE TEST N/A 03/22/2019    Procedure: TILT TABLE TEST;  Surgeon: Roman Lala MD;  Location: [facility] EP LAB;  Service: Cardiology;  Laterality: N/A;  seizure/migraine, HUT referred by Dr Perea Neuro, EEG confirmed    TRANSFORAMINAL EPIDURAL INJECTION OF STEROID Right 10/14/2020    Procedure: LUMBAR TRANSFORAMINAL RIGHT L4/L5;  Surgeon: Petros Riech MD;  Location: [facility]IN [facility];  Service: Pain Management;  Laterality: Right;  NEEDS CONSENT     FAMILY HISTORY:   Family History   Problem Relation Name Age of Onset    Hypertension Paternal Grandfather      Hypertension Paternal Grandmother      Heart attacks under age 50 Maternal Grandmother      Hypertension Maternal Grandmother      Hypertension Maternal Grandfather      Heart attacks under age 50 Father  33    Hypertension Father      Seizures Mother      Heart attack Mother      Hypertension Mother      Stroke Mother      Hypertension Sister      Stroke Sister      Seizures Sister      Breast cancer Neg Hx      Colon cancer Neg Hx      Diabetes Neg Hx       Ovarian cancer Neg Hx      Cancer Neg Hx       SOCIAL HISTORY:   Social History     Occupational History     Employer: [employer]     Comment: [employer]   Tobacco Use    Smoking status: Some Days     Current packs/day: 0.00     Average packs/day: 0.3 packs/day for 35.8 years (9.0 ttl pk-yrs)     Types: Cigarettes     Start date: 1986     Last attempt to quit: 2022     Years since quittin.0     Passive exposure: Past    Smokeless tobacco: Never    Tobacco comments:     quit actual cigarettes a few mo ago, switched to e cigarette   Substance and Sexual Activity    Alcohol use: Yes     Comment: Socially    Drug use: No    Sexual activity: Not Currently     Partners: Male        MEDICATIONS:   Current Outpatient Medications:     albuterol (PROVENTIL/VENTOLIN HFA) 90 mcg/actuation inhaler, Inhale 2 puffs into the lungs every 4 (four) hours as needed for Wheezing., Disp: , Rfl:     aspirin (ECOTRIN) 81 MG EC tablet, Take 1 tablet (81 mg total) by mouth once daily., Disp: 100 tablet, Rfl: 6    atorvastatin (LIPITOR) 40 MG tablet, Take 40 mg by mouth once daily., Disp: , Rfl:     azelastine (ASTELIN) 137 mcg (0.1 %) nasal spray, 2 sprays (274 mcg total) by Nasal route 2 (two) times daily., Disp: 90 mL, Rfl: 4    budesonide-formoterol 80-4.5 mcg (SYMBICORT) 80-4.5 mcg/actuation HFAA, Inhale 2 puffs into the lungs 2 (two) times a day. Controller, Disp: 10.2 g, Rfl: 5    estradioL (ESTRACE) 0.01 % (0.1 mg/gram) vaginal cream, Place 1 g vaginally twice a week., Disp: 42 g, Rfl: 4    fluticasone propionate (FLONASE) 50 mcg/actuation nasal spray, 2 sprays (100 mcg total) by Each Nostril route 2 (two) times daily., Disp: 90 mL, Rfl: 4    HYDROcodone-acetaminophen (NORCO)  mg per tablet, Take by mouth daily as needed., Disp: , Rfl:     hydrOXYzine pamoate (VISTARIL) 25 MG Cap, Take 1 capsule (25 mg total) by mouth 3 (three) times daily as needed (anxiety). (Patient taking  differently: Take 25 mg by mouth 3 (three) times daily.), Disp: 270 capsule, Rfl: 3    ipratropium (ATROVENT) 42 mcg (0.06 %) nasal spray, 2 sprays by Each Nostril route 3 (three) times daily as needed for Rhinitis., Disp: 15 mL, Rfl: 3    lactulose (CHRONULAC) 10 gram/15 mL solution, Take 10 g by mouth every evening., Disp: , Rfl:     levocetirizine (XYZAL) 5 MG tablet, Take 5 mg by mouth once daily., Disp: , Rfl:     nitroGLYCERIN (NITROSTAT) 0.4 MG SL tablet, Place 0.4 mg under the tongue., Disp: , Rfl:     pantoprazole (PROTONIX) 40 MG tablet, Take 1 tablet (40 mg total) by mouth once daily., Disp: 90 tablet, Rfl: 3    [START ON 8/22/2024] semaglutide, weight loss, (WEGOVY) 1 mg/0.5 mL PnIj, Inject 1 mg into the skin every 7 days. for 4 doses, Disp: 2 mL, Rfl: 0    traZODone (DESYREL) 150 MG tablet, Take 1 -2 tablets by mouth before bed as needed for insomnia, Disp: 60 tablet, Rfl: 11    venlafaxine (EFFEXOR-XR) 150 MG Cp24, Take 1 capsule (150 mg total) by mouth once daily., Disp: 90 capsule, Rfl: 3    losartan (COZAAR) 100 MG tablet, Take 1 tablet (100 mg total) by mouth once daily., Disp: 90 tablet, Rfl: 3    semaglutide, weight loss, (WEGOVY) 0.5 mg/0.5 mL PnIj, Inject 0.5 mg into the skin every 7 days. for 4 doses (Patient not taking: Reported on 7/25/2024), Disp: 2 mL, Rfl: 0    tiZANidine (ZANAFLEX) 4 MG tablet, Take 1 tablet (4 mg total) by mouth every 6 (six) hours as needed., Disp: 60 tablet, Rfl: 1  ALLERGIES:   Review of patient's allergies indicates:  No Known Allergies        Physical Exam     Vitals:    07/25/24 0957   BP: (!) 158/90   Pulse: (!) 7     Alert and oriented to person, place and time. No acute distress. Well-groomed, not ill appearing. Pupils round and reactive, normal respiratory effort, no audible wheezing.     OBSERVATION / INSPECTION   There is global tenderness to the right knee.  Range of motion 0-120.  No erythema,  warmth or abrasions.  Ligaments stable to valgus and varus  stress.  There is pain at pes bursa.  Strength 4/5 to hamstrings and quads.    Tenderness to palpation to trochanteric bursa, no wounds, masses or abrasions.  Negative log roll.  Positive tenderness to piriformis and SI joint.    EXTREMITY NEURO-VASCULAR EXAMINATION:   Sensation:  Grossly intact to light touch all dermatomal regions.   Motor Function:  Fully intact motor function at hip, knee, foot and ankle    DTRs;  quadriceps and  achilles 2+.  No clonus and downgoing Babinski.    Vascular status:  DP and PT pulses 2+, brisk capillary refill, symmetric.     Imaging:    bilateral Knee X-rays ordered/reviewed by me showing no evidence of fracture or dislocation. There is no obvious malalignment. No evidence of masses, lesions or foreign bodies.  Mild right knee lateral compartment osteoarthritis.  Kellgren Salvador grade 2-3.      Assessment & Plan    S/P total right hip arthroplasty  -     X-Ray Hips Bilateral 2 View Incl AP Pelvis; Future; Expected date: 07/25/2024  -     tiZANidine (ZANAFLEX) 4 MG tablet; Take 1 tablet (4 mg total) by mouth every 6 (six) hours as needed.  Dispense: 60 tablet; Refill: 1    Primary osteoarthritis of right knee  -     MRI Knee Without Contrast Right; Future; Expected date: 07/25/2024  -     tiZANidine (ZANAFLEX) 4 MG tablet; Take 1 tablet (4 mg total) by mouth every 6 (six) hours as needed.  Dispense: 60 tablet; Refill: 1  -     Prior authorization Order      Patient here today for follow up of right knee pain.  States she was now having hip pain after fall.  X-rays bilateral hip ordered to evaluate for any fracture or dislocation status post fall  MRI of the right knee ordered to evaluate for chronic degenerative changes, x-rays show mild-to-moderate degenerative changes  Had good relief with CSI however interested in viscosupplementation as well.  She is not ready for knee replacement as she has no one to help her postoperatively.  Flexeril Rx sent, may cause drowsiness, take 1  at night as needed    Medical Necessity for viscosupplementation use: After thorough evaluation of the patient, I have determined that viscosupplementation treatment is medically necessary. The patient has painful degenerative joint disease (DJD) of the knee(s) with failure of conservative treatments including lifestyle modifications and rehabilitation exercises. Oral analgesics including NSAIDs have not adequately controlled the patient's symptoms. There is radiographic evidence of Kellgren-Salvador grade II (or greater) osteoarthritic (OA) changes, or if lack of radiographic evidence, there is arthroscopic or other evidence of chondrosis of the knee(s).     Pre-authorization placed for right Durolane injections.  Ice compress to the affected area 2-3x a day for 15-20 minutes as needed for pain management.   RTC to see Lottie noble PA-C for right durolane injections.    Follow up: MRI results/Durolane  X-rays next visit:  None    All questions were answered and patient is agreeable to the above plan.

## 2024-08-05 ENCOUNTER — PATIENT MESSAGE (OUTPATIENT)
Dept: ORTHOPEDICS | Facility: CLINIC | Age: 64
End: 2024-08-05
Payer: MEDICARE

## 2024-08-05 DIAGNOSIS — M17.11 PRIMARY OSTEOARTHRITIS OF RIGHT KNEE: Primary | ICD-10-CM

## 2024-08-16 ENCOUNTER — OFFICE VISIT (OUTPATIENT)
Dept: ORTHOPEDICS | Facility: CLINIC | Age: 64
End: 2024-08-16
Payer: MEDICARE

## 2024-08-16 VITALS
HEIGHT: 68 IN | WEIGHT: 226.63 LBS | BODY MASS INDEX: 34.35 KG/M2 | SYSTOLIC BLOOD PRESSURE: 158 MMHG | DIASTOLIC BLOOD PRESSURE: 86 MMHG | HEART RATE: 80 BPM

## 2024-08-16 DIAGNOSIS — M17.11 PRIMARY OSTEOARTHRITIS OF RIGHT KNEE: Primary | ICD-10-CM

## 2024-08-16 PROCEDURE — 99999 PR PBB SHADOW E&M-EST. PATIENT-LVL III: CPT | Mod: PBBFAC,,,

## 2024-08-16 NOTE — PROCEDURES
Large Joint Aspiration/Injection: R knee    Date/Time: 8/16/2024 8:15 AM    Performed by: Lottie Katz PA-C  Authorized by: Lottie Katz PA-C    Consent Done?:  Yes (Verbal)  Indications:  Pain and arthritis  Site marked: the procedure site was marked    Timeout: prior to procedure the correct patient, procedure, and site was verified    Prep: patient was prepped and draped in usual sterile fashion    Local anesthetic:  Topical anesthetic    Details:  Needle Size:  22 G  Ultrasonic Guidance for needle placement?: No    Approach:  Anterolateral  Location:  Knee  Site:  R knee  Medications:  60 mg hyaluronate sodium, stabilized (DUROLANE) 60 mg/3 mL  Patient tolerance:  Patient tolerated the procedure well with no immediate complications

## 2024-08-16 NOTE — PROGRESS NOTES
Jennifer Brady is here for follow up of right knee arthritis. Pt is requesting Durolane injection.  [facility] reviewed per encounter record. Has failed other conservative modalities including NSAIDS, activity modification, weight loss.    The prior shot was tolerated well.    PHYSICAL EXAMINATION:     General: The patient is alert and oriented x 3. Mood is pleasant.   Observation of ears, eyes and nose reveals no gross abnormalities. No   labored breathing observed.     No signs of infection or adverse reaction to knee.    Medical Necessity for viscosupplementation use: After thorough evaluation of the patient, I have determined that viscosupplementation treatment is medically necessary. The patient has painful degenerative joint disease (DJD) of the knee(s) with failure of conservative treatments including lifestyle modifications and rehabilitation exercises. Oral analgesics including NSAIDs have not adequately controlled the patient's symptoms. There is radiographic evidence of Kellgren-Salvador grade II (or greater) osteoarthritic (OA) changes, or if lack of radiographic evidence, there is arthroscopic or other evidence of chondrosis of the knee(s).     Injection Procedure  A time out was performed, including verification of patient ID, procedure, site and side, availability of information and equipment, review of safety issues, and agreement with consent, the procedure site was marked.    After time out was performed, the patient was prepped aseptically with chloraprep. A diagnostic and therapeutic injection of 60mL's  Durolane  was given under sterile technique using a 22g x 1.5 needle from the anterolateral aspect of the right Knee Joint in the sitting position.      Jennifer Brady had no adverse reactions to the medication. She had increased pain temporarily immediately after injection was given, she sat down and had some water, pain did start to resolve when she left clinic. She was instructed  to apply ice to the joint for 20 minutes and avoid strenuous activities for 24-36 hours following the injection. She was warned of possible blood sugar and/or blood pressure changes during that time. Following that time, she can resume regular activities.    She was reminded to call the clinic immediately for any adverse side effects as explained in clinic today.    RTC to see Lottie Katz PA-C in 3 months for follow-up.    All of the patient's questions were answered and the patient will contact us if they have any questions or concerns in the interim.

## 2024-09-19 ENCOUNTER — PATIENT MESSAGE (OUTPATIENT)
Dept: PRIMARY CARE CLINIC | Facility: CLINIC | Age: 64
End: 2024-09-19
Payer: MEDICARE

## 2024-09-26 ENCOUNTER — OFFICE VISIT (OUTPATIENT)
Dept: BARIATRICS | Facility: CLINIC | Age: 64
End: 2024-09-26
Payer: MEDICARE

## 2024-09-26 VITALS
WEIGHT: 224.88 LBS | BODY MASS INDEX: 34.2 KG/M2 | SYSTOLIC BLOOD PRESSURE: 170 MMHG | HEART RATE: 79 BPM | OXYGEN SATURATION: 99 % | DIASTOLIC BLOOD PRESSURE: 74 MMHG

## 2024-09-26 DIAGNOSIS — I25.10 CORONARY ARTERY DISEASE INVOLVING NATIVE CORONARY ARTERY OF NATIVE HEART WITHOUT ANGINA PECTORIS: ICD-10-CM

## 2024-09-26 DIAGNOSIS — I73.9 PAD (PERIPHERAL ARTERY DISEASE): ICD-10-CM

## 2024-09-26 DIAGNOSIS — G47.33 OSA (OBSTRUCTIVE SLEEP APNEA): ICD-10-CM

## 2024-09-26 DIAGNOSIS — E66.09 CLASS 1 OBESITY DUE TO EXCESS CALORIES WITH SERIOUS COMORBIDITY AND BODY MASS INDEX (BMI) OF 34.0 TO 34.9 IN ADULT: Primary | ICD-10-CM

## 2024-09-26 DIAGNOSIS — I25.2 H/O NON-ST ELEVATION MYOCARDIAL INFARCTION (NSTEMI): ICD-10-CM

## 2024-09-26 DIAGNOSIS — I10 PRIMARY HYPERTENSION: ICD-10-CM

## 2024-09-26 DIAGNOSIS — Z98.61 S/P PTCA (PERCUTANEOUS TRANSLUMINAL CORONARY ANGIOPLASTY): ICD-10-CM

## 2024-09-26 DIAGNOSIS — Z71.3 ENCOUNTER FOR WEIGHT LOSS COUNSELING: ICD-10-CM

## 2024-09-26 PROCEDURE — 99999 PR PBB SHADOW E&M-EST. PATIENT-LVL III: CPT | Mod: PBBFAC,,, | Performed by: STUDENT IN AN ORGANIZED HEALTH CARE EDUCATION/TRAINING PROGRAM

## 2024-09-26 PROCEDURE — 1160F RVW MEDS BY RX/DR IN RCRD: CPT | Mod: CPTII,S$GLB,, | Performed by: STUDENT IN AN ORGANIZED HEALTH CARE EDUCATION/TRAINING PROGRAM

## 2024-09-26 PROCEDURE — 3077F SYST BP >= 140 MM HG: CPT | Mod: CPTII,S$GLB,, | Performed by: STUDENT IN AN ORGANIZED HEALTH CARE EDUCATION/TRAINING PROGRAM

## 2024-09-26 PROCEDURE — 1101F PT FALLS ASSESS-DOCD LE1/YR: CPT | Mod: CPTII,S$GLB,, | Performed by: STUDENT IN AN ORGANIZED HEALTH CARE EDUCATION/TRAINING PROGRAM

## 2024-09-26 PROCEDURE — 3288F FALL RISK ASSESSMENT DOCD: CPT | Mod: CPTII,S$GLB,, | Performed by: STUDENT IN AN ORGANIZED HEALTH CARE EDUCATION/TRAINING PROGRAM

## 2024-09-26 PROCEDURE — 4010F ACE/ARB THERAPY RXD/TAKEN: CPT | Mod: CPTII,S$GLB,, | Performed by: STUDENT IN AN ORGANIZED HEALTH CARE EDUCATION/TRAINING PROGRAM

## 2024-09-26 PROCEDURE — 99213 OFFICE O/P EST LOW 20 MIN: CPT | Mod: S$GLB,,, | Performed by: STUDENT IN AN ORGANIZED HEALTH CARE EDUCATION/TRAINING PROGRAM

## 2024-09-26 PROCEDURE — 3044F HG A1C LEVEL LT 7.0%: CPT | Mod: CPTII,S$GLB,, | Performed by: STUDENT IN AN ORGANIZED HEALTH CARE EDUCATION/TRAINING PROGRAM

## 2024-09-26 PROCEDURE — 1159F MED LIST DOCD IN RCRD: CPT | Mod: CPTII,S$GLB,, | Performed by: STUDENT IN AN ORGANIZED HEALTH CARE EDUCATION/TRAINING PROGRAM

## 2024-09-26 PROCEDURE — 3008F BODY MASS INDEX DOCD: CPT | Mod: CPTII,S$GLB,, | Performed by: STUDENT IN AN ORGANIZED HEALTH CARE EDUCATION/TRAINING PROGRAM

## 2024-09-26 PROCEDURE — 3078F DIAST BP <80 MM HG: CPT | Mod: CPTII,S$GLB,, | Performed by: STUDENT IN AN ORGANIZED HEALTH CARE EDUCATION/TRAINING PROGRAM

## 2024-09-26 PROCEDURE — 1126F AMNT PAIN NOTED NONE PRSNT: CPT | Mod: CPTII,S$GLB,, | Performed by: STUDENT IN AN ORGANIZED HEALTH CARE EDUCATION/TRAINING PROGRAM

## 2024-09-26 RX ORDER — SEMAGLUTIDE 0.25 MG/.5ML
0.25 INJECTION, SOLUTION SUBCUTANEOUS
Qty: 2 ML | Refills: 0 | Status: SHIPPED | OUTPATIENT
Start: 2024-09-26 | End: 2024-10-18

## 2024-09-26 RX ORDER — SEMAGLUTIDE 1 MG/.5ML
1 INJECTION, SOLUTION SUBCUTANEOUS
Qty: 2 ML | Refills: 0 | Status: SHIPPED | OUTPATIENT
Start: 2024-11-21 | End: 2024-12-13

## 2024-09-26 RX ORDER — SEMAGLUTIDE 0.5 MG/.5ML
0.5 INJECTION, SOLUTION SUBCUTANEOUS
Qty: 2 ML | Refills: 0 | Status: SHIPPED | OUTPATIENT
Start: 2024-10-24 | End: 2024-11-15

## 2024-09-26 NOTE — PROGRESS NOTES
Subjective:       Patient ID: Jennifer Brady is a 64 y.o. female.    Chief Complaint: Follow-up, Weight Check, and Obesity    Patient presents for treatment of obesity.       Co-morbidities  Migraines - was on topiramate, but was discontinued because didn't help with headaches  REN  MI  CAD s/p PTCA  HTN  HLD    Weight History  Lowest adult weight: 165 lbs  Highest adult weight: 220 lbs         Current Physical Activity  No regular exercise, some walking but not consistent  Foot pain due to dropped arches, but that has improved      Current Eating Habits  Breakfast - skips most days, occassional cup of coffee  [place]bert  Potatoes - mashed, roasted  White beans  Roast  Turkey necks   Biscuits  Cabbage  Gumbo  Recently stopped drinking sweet tea and lemonade      Medical Weight Loss  2/13/2023: 216.2 lbs, BMI 32.9, BFP 47.6%, .9 lbs, SMM 62.4 lbs, BMR 1480 kcal  5/11/2023: 209.1 lbs, BMI 31.8, BFP 45.8%, BFM 95.8 lbs, SMM 62.4 lbs, BMR 1481 kcal  7/13/2023: 204.6 lbs, BMI 31.1, BFP 44.4%, BFM 90.9 lbs, SMM 62.6 lbs, BMR 1485 kcal  10/2/2023: 202.1 lbs, BMI 30.7, BFP 43.1%, BFM 87 lbs, SMM 64.4 lbs, BMR 1498 kcal  1/3/2024: 202.7 lbs, BMI 30.8, BFP 42.3%, BFM 85.8 lbs, SMM 64.4 lbs, BMR 1516 kcal  4/4/2024: 211.7 lbs, BMI 32.2, BFP 37.3%, BFM 79 lbs, SMM 75 lbs, BMR 1670 kcal  6/27/2024: 224.4 lbs, BMI 34.1, BFP 47.1%, .8 lbs, SMM 65.5 lbs, BMR 1532 kcal  9/26/2024: 224.9 lbs, BMI 34.2, BFP 49%, .3 lbs, SMM 63.3 lbs, BMR 1494 kcal      Review of Systems   Constitutional:  Negative for chills and fever.   Respiratory:  Negative for shortness of breath.    Cardiovascular:  Negative for chest pain and palpitations.   Gastrointestinal:  Negative for abdominal pain, nausea and vomiting.   Neurological:  Negative for dizziness and light-headedness.   Psychiatric/Behavioral:  The patient is not nervous/anxious.          Objective:       Latest Reference Range & Units 03/02/22 10:23 12/09/22 08:04    WBC 3.90 - 12.70 K/uL 9.53 7.20   RBC 4.00 - 5.40 M/uL 3.97 (L) 4.07   Hemoglobin 12.0 - 16.0 g/dL 12.0 12.2   Hematocrit 37.0 - 48.5 % 38.7 38.3   MCV 82 - 98 fL 98 94   MCH 27.0 - 31.0 pg 30.2 30.0   MCHC 32.0 - 36.0 g/dL 31.0 (L) 31.9 (L)   RDW 11.5 - 14.5 % 12.4 12.0   Platelets 150 - 450 K/uL 397 389   MPV 9.2 - 12.9 fL 8.6 (L) 8.6 (L)   Gran % 38.0 - 73.0 % 46.5 52.2   Lymph % 18.0 - 48.0 % 44.4 38.1   Mono % 4.0 - 15.0 % 5.9 6.1   Eosinophil % 0.0 - 8.0 % 2.6 2.6   Basophil % 0.0 - 1.9 % 0.4 0.7   Immature Granulocytes 0.0 - 0.5 % 0.2 0.3   Gran # (ANC) 1.8 - 7.7 K/uL 4.4 3.8   Lymph # 1.0 - 4.8 K/uL 4.2 2.7   Mono # 0.3 - 1.0 K/uL 0.6 0.4   Eos # 0.0 - 0.5 K/uL 0.3 0.2   Baso # 0.00 - 0.20 K/uL 0.04 0.05   Immature Grans (Abs) 0.00 - 0.04 K/uL 0.02 0.02   nRBC 0 /100 WBC 0 0   Differential Method  Automated Automated   Protime 9.0 - 12.5 sec  10.3   INR 0.8 - 1.2   1.0   aPTT 21.0 - 32.0 sec  28.8   Sodium 136 - 145 mmol/L 141 140   Potassium 3.5 - 5.1 mmol/L 3.9 4.0   Chloride 95 - 110 mmol/L 108 106   CO2 23 - 29 mmol/L 24 22 (L)   Anion Gap 8 - 16 mmol/L 9 12   BUN 8 - 23 mg/dL 15 9   Creatinine 0.5 - 1.4 mg/dL 0.9 0.8   eGFR >60 mL/min/1.73 m^2  >60   eGFR if non African American >60 mL/min/1.73 m^2 >60.0    eGFR if African American >60 mL/min/1.73 m^2 >60.0    Glucose 70 - 110 mg/dL 92 95   Calcium 8.7 - 10.5 mg/dL 9.7 9.4   Alkaline Phosphatase 55 - 135 U/L 133    PROTEIN TOTAL 6.0 - 8.4 g/dL 7.3    Albumin 3.5 - 5.2 g/dL 3.7    BILIRUBIN TOTAL 0.1 - 1.0 mg/dL 0.6    AST 10 - 40 U/L 21    ALT 10 - 44 U/L 22    Cholesterol 120 - 199 mg/dL 157    HDL 40 - 75 mg/dL 48    HDL/Cholesterol Ratio 20.0 - 50.0 % 30.6    LDL Cholesterol External 63.0 - 159.0 mg/dL 86.2    Non-HDL Cholesterol mg/dL 109    Total Cholesterol/HDL Ratio 2.0 - 5.0  3.3    Triglycerides 30 - 150 mg/dL 114    CPK 20 - 180 U/L 239 (H)    TSH 0.400 - 4.000 uIU/mL 0.494    (L): Data is abnormally low  (H): Data is abnormally high    CV  U/S 1/2020:  Moderate heterogenous plaques in right common femoral, SFA and popliteal arteries, with a moderate stenosis of the proximal Right SFA  Moderate heterogenous plaques in left common femoral, SFA and popliteal arteries with a moderate stenosis of the left common femoral artery.  Left WOODROW: 1.21  Right WOODROW 0.99      EKG 4/2018:  Normal sinus rhythm   Septal infarct ,age undetermined   Marked ST abnormality, possible inferior subendocardial injury   Abnormal ECG       Vitals:    09/26/24 1107   BP: (!) 170/74   Pulse: 79         Physical Exam  Vitals reviewed.   Constitutional:       General: She is not in acute distress.     Appearance: Normal appearance. She is obese. She is not ill-appearing, toxic-appearing or diaphoretic.   HENT:      Head: Normocephalic and atraumatic.   Cardiovascular:      Rate and Rhythm: Normal rate.   Pulmonary:      Effort: Pulmonary effort is normal. No respiratory distress.   Skin:     General: Skin is warm and dry.   Neurological:      Mental Status: She is alert and oriented to person, place, and time.         Assessment:       Problem List Items Addressed This Visit       HTN (hypertension)    Relevant Medications    semaglutide, weight loss, (WEGOVY) 0.25 mg/0.5 mL PnIj    semaglutide, weight loss, (WEGOVY) 0.5 mg/0.5 mL PnIj (Start on 10/24/2024)    semaglutide, weight loss, (WEGOVY) 1 mg/0.5 mL PnIj (Start on 11/21/2024)    REN (obstructive sleep apnea)    Relevant Medications    semaglutide, weight loss, (WEGOVY) 0.25 mg/0.5 mL PnIj    semaglutide, weight loss, (WEGOVY) 0.5 mg/0.5 mL PnIj (Start on 10/24/2024)    semaglutide, weight loss, (WEGOVY) 1 mg/0.5 mL PnIj (Start on 11/21/2024)    Obesity - Primary    Relevant Medications    semaglutide, weight loss, (WEGOVY) 0.25 mg/0.5 mL PnIj    semaglutide, weight loss, (WEGOVY) 0.5 mg/0.5 mL PnIj (Start on 10/24/2024)    semaglutide, weight loss, (WEGOVY) 1 mg/0.5 mL PnIj (Start on 11/21/2024)    PAD (peripheral artery  disease)    Relevant Medications    semaglutide, weight loss, (WEGOVY) 0.25 mg/0.5 mL PnIj    semaglutide, weight loss, (WEGOVY) 0.5 mg/0.5 mL PnIj (Start on 10/24/2024)    semaglutide, weight loss, (WEGOVY) 1 mg/0.5 mL PnIj (Start on 11/21/2024)    S/P PTCA (percutaneous transluminal coronary angioplasty)    Relevant Medications    semaglutide, weight loss, (WEGOVY) 0.25 mg/0.5 mL PnIj    semaglutide, weight loss, (WEGOVY) 0.5 mg/0.5 mL PnIj (Start on 10/24/2024)    semaglutide, weight loss, (WEGOVY) 1 mg/0.5 mL PnIj (Start on 11/21/2024)     Other Visit Diagnoses       Coronary artery disease involving native coronary artery of native heart without angina pectoris        Relevant Medications    semaglutide, weight loss, (WEGOVY) 0.25 mg/0.5 mL PnIj    semaglutide, weight loss, (WEGOVY) 0.5 mg/0.5 mL PnIj (Start on 10/24/2024)    semaglutide, weight loss, (WEGOVY) 1 mg/0.5 mL PnIj (Start on 11/21/2024)    Encounter for weight loss counseling        Relevant Medications    semaglutide, weight loss, (WEGOVY) 0.25 mg/0.5 mL PnIj    semaglutide, weight loss, (WEGOVY) 0.5 mg/0.5 mL PnIj (Start on 10/24/2024)    semaglutide, weight loss, (WEGOVY) 1 mg/0.5 mL PnIj (Start on 11/21/2024)    H/O non-ST elevation myocardial infarction (NSTEMI)        Relevant Medications    semaglutide, weight loss, (WEGOVY) 0.25 mg/0.5 mL PnIj    semaglutide, weight loss, (WEGOVY) 0.5 mg/0.5 mL PnIj (Start on 10/24/2024)    semaglutide, weight loss, (WEGOVY) 1 mg/0.5 mL PnIj (Start on 11/21/2024)                  Plan:   - Wegovy weekly injections    - Log all food and beverage intake with a daily calorie goal of 1500 calories per day    - Moderate intensity aerobic exercise for 30 minuets 3-5x/week

## 2024-09-30 RX ORDER — TRAZODONE HYDROCHLORIDE 150 MG/1
TABLET ORAL
Qty: 60 TABLET | Refills: 11 | Status: SHIPPED | OUTPATIENT
Start: 2024-09-30

## 2024-10-21 ENCOUNTER — PATIENT MESSAGE (OUTPATIENT)
Dept: ADMINISTRATIVE | Facility: HOSPITAL | Age: 64
End: 2024-10-21
Payer: MEDICARE

## 2024-10-24 ENCOUNTER — PATIENT OUTREACH (OUTPATIENT)
Dept: ADMINISTRATIVE | Facility: HOSPITAL | Age: 64
End: 2024-10-24
Payer: MEDICARE

## 2024-10-24 DIAGNOSIS — Z78.0 ASYMPTOMATIC AGE-RELATED POSTMENOPAUSAL STATE: ICD-10-CM

## 2024-10-24 DIAGNOSIS — E78.00 PURE HYPERCHOLESTEROLEMIA: Primary | ICD-10-CM

## 2024-10-24 RX ORDER — ATORVASTATIN CALCIUM 40 MG/1
40 TABLET, FILM COATED ORAL DAILY
Qty: 90 TABLET | Refills: 3 | Status: SHIPPED | OUTPATIENT
Start: 2024-10-24

## 2024-10-24 NOTE — PROGRESS NOTES
Health Maintenance Due   Topic Date Due    RSV Vaccine (Age 60+ and Pregnant patients) (1 - Risk 60-74 years 1-dose series) Never done    DEXA Scan  09/26/2022    Influenza Vaccine (1) 09/01/2024    COVID-19 Vaccine (5 - 2024-25 season) 09/01/2024     Immunizations - reviewed and updated   Care Everywhere - triggered   Care Teams -   Outreach - Epic campaigns outreach done. Called patient in regards to scheduling bone density scan. Patient refused stating that it will cost her over $400 to have the screening done. Message sent to PCP to check DX code on   Patient requesting a refill on her Lipitor 40 mg, pended to PCP

## 2024-10-24 NOTE — TELEPHONE ENCOUNTER
Epic campaigns outreach done. Called patient in regards to scheduling DEXA scan. Patient states it would cost over her $400 for the screening. Verbalized to patient that I wasn't sure why, could be due to the DX code. New order pended with a new DX code.     Patient also requesting a refill on her Lipitor 40 mg. Please advise.

## 2024-10-26 DIAGNOSIS — N95.2 POSTMENOPAUSAL ATROPHIC VAGINITIS: ICD-10-CM

## 2024-10-27 RX ORDER — ESTRADIOL 0.1 MG/G
1 CREAM VAGINAL
Qty: 42 G | Refills: 0 | Status: SHIPPED | OUTPATIENT
Start: 2024-10-28

## 2024-11-07 ENCOUNTER — OFFICE VISIT (OUTPATIENT)
Dept: CARDIOLOGY | Facility: CLINIC | Age: 64
End: 2024-11-07
Payer: MEDICARE

## 2024-11-07 ENCOUNTER — OFFICE VISIT (OUTPATIENT)
Dept: PRIMARY CARE CLINIC | Facility: CLINIC | Age: 64
End: 2024-11-07
Payer: MEDICARE

## 2024-11-07 VITALS
SYSTOLIC BLOOD PRESSURE: 141 MMHG | OXYGEN SATURATION: 100 % | HEART RATE: 64 BPM | HEIGHT: 68 IN | DIASTOLIC BLOOD PRESSURE: 86 MMHG | BODY MASS INDEX: 33.41 KG/M2 | WEIGHT: 220.44 LBS

## 2024-11-07 VITALS
HEIGHT: 68 IN | WEIGHT: 220 LBS | BODY MASS INDEX: 33.34 KG/M2 | DIASTOLIC BLOOD PRESSURE: 81 MMHG | SYSTOLIC BLOOD PRESSURE: 133 MMHG | HEART RATE: 62 BPM

## 2024-11-07 DIAGNOSIS — R79.89 OTHER SPECIFIED ABNORMAL FINDINGS OF BLOOD CHEMISTRY: ICD-10-CM

## 2024-11-07 DIAGNOSIS — E78.5 HYPERLIPIDEMIA, UNSPECIFIED HYPERLIPIDEMIA TYPE: ICD-10-CM

## 2024-11-07 DIAGNOSIS — M62.838 MUSCLE SPASM: ICD-10-CM

## 2024-11-07 DIAGNOSIS — K21.9 GASTROESOPHAGEAL REFLUX DISEASE, UNSPECIFIED WHETHER ESOPHAGITIS PRESENT: ICD-10-CM

## 2024-11-07 DIAGNOSIS — Z79.899 OTHER LONG TERM (CURRENT) DRUG THERAPY: ICD-10-CM

## 2024-11-07 DIAGNOSIS — I10 PRIMARY HYPERTENSION: ICD-10-CM

## 2024-11-07 DIAGNOSIS — E66.811 OBESITY (BMI 30.0-34.9): ICD-10-CM

## 2024-11-07 DIAGNOSIS — I10 PRIMARY HYPERTENSION: Primary | ICD-10-CM

## 2024-11-07 DIAGNOSIS — Z98.61 S/P PTCA (PERCUTANEOUS TRANSLUMINAL CORONARY ANGIOPLASTY): ICD-10-CM

## 2024-11-07 DIAGNOSIS — E66.9 OBESITY, UNSPECIFIED CLASS, UNSPECIFIED OBESITY TYPE, UNSPECIFIED WHETHER SERIOUS COMORBIDITY PRESENT: ICD-10-CM

## 2024-11-07 DIAGNOSIS — E78.00 PURE HYPERCHOLESTEROLEMIA: ICD-10-CM

## 2024-11-07 DIAGNOSIS — I25.119 CORONARY ARTERY DISEASE INVOLVING NATIVE CORONARY ARTERY OF NATIVE HEART WITH ANGINA PECTORIS: Primary | ICD-10-CM

## 2024-11-07 DIAGNOSIS — I70.0 AORTIC ATHEROSCLEROSIS: ICD-10-CM

## 2024-11-07 DIAGNOSIS — J44.9 CHRONIC OBSTRUCTIVE PULMONARY DISEASE, UNSPECIFIED COPD TYPE: ICD-10-CM

## 2024-11-07 DIAGNOSIS — I73.9 PAD (PERIPHERAL ARTERY DISEASE): ICD-10-CM

## 2024-11-07 PROCEDURE — 1159F MED LIST DOCD IN RCRD: CPT | Mod: CPTII,S$GLB,, | Performed by: STUDENT IN AN ORGANIZED HEALTH CARE EDUCATION/TRAINING PROGRAM

## 2024-11-07 PROCEDURE — 3075F SYST BP GE 130 - 139MM HG: CPT | Mod: CPTII,S$GLB,, | Performed by: STUDENT IN AN ORGANIZED HEALTH CARE EDUCATION/TRAINING PROGRAM

## 2024-11-07 PROCEDURE — 3079F DIAST BP 80-89 MM HG: CPT | Mod: CPTII,S$GLB,, | Performed by: STUDENT IN AN ORGANIZED HEALTH CARE EDUCATION/TRAINING PROGRAM

## 2024-11-07 PROCEDURE — 3008F BODY MASS INDEX DOCD: CPT | Mod: CPTII,S$GLB,, | Performed by: STUDENT IN AN ORGANIZED HEALTH CARE EDUCATION/TRAINING PROGRAM

## 2024-11-07 PROCEDURE — 99214 OFFICE O/P EST MOD 30 MIN: CPT | Mod: S$GLB,,, | Performed by: STUDENT IN AN ORGANIZED HEALTH CARE EDUCATION/TRAINING PROGRAM

## 2024-11-07 PROCEDURE — 3044F HG A1C LEVEL LT 7.0%: CPT | Mod: CPTII,S$GLB,, | Performed by: STUDENT IN AN ORGANIZED HEALTH CARE EDUCATION/TRAINING PROGRAM

## 2024-11-07 PROCEDURE — 3288F FALL RISK ASSESSMENT DOCD: CPT | Mod: CPTII,S$GLB,, | Performed by: STUDENT IN AN ORGANIZED HEALTH CARE EDUCATION/TRAINING PROGRAM

## 2024-11-07 PROCEDURE — 1160F RVW MEDS BY RX/DR IN RCRD: CPT | Mod: CPTII,S$GLB,, | Performed by: STUDENT IN AN ORGANIZED HEALTH CARE EDUCATION/TRAINING PROGRAM

## 2024-11-07 PROCEDURE — 1125F AMNT PAIN NOTED PAIN PRSNT: CPT | Mod: CPTII,S$GLB,, | Performed by: STUDENT IN AN ORGANIZED HEALTH CARE EDUCATION/TRAINING PROGRAM

## 2024-11-07 PROCEDURE — 1101F PT FALLS ASSESS-DOCD LE1/YR: CPT | Mod: CPTII,S$GLB,, | Performed by: STUDENT IN AN ORGANIZED HEALTH CARE EDUCATION/TRAINING PROGRAM

## 2024-11-07 PROCEDURE — 4010F ACE/ARB THERAPY RXD/TAKEN: CPT | Mod: CPTII,S$GLB,, | Performed by: STUDENT IN AN ORGANIZED HEALTH CARE EDUCATION/TRAINING PROGRAM

## 2024-11-07 PROCEDURE — 99999 PR PBB SHADOW E&M-EST. PATIENT-LVL III: CPT | Mod: PBBFAC,,, | Performed by: INTERNAL MEDICINE

## 2024-11-07 PROCEDURE — 99999 PR PBB SHADOW E&M-EST. PATIENT-LVL III: CPT | Mod: PBBFAC,,, | Performed by: STUDENT IN AN ORGANIZED HEALTH CARE EDUCATION/TRAINING PROGRAM

## 2024-11-07 RX ORDER — ATORVASTATIN CALCIUM 40 MG/1
40 TABLET, FILM COATED ORAL DAILY
Qty: 90 TABLET | Refills: 3 | Status: SHIPPED | OUTPATIENT
Start: 2024-11-07

## 2024-11-07 RX ORDER — BUDESONIDE AND FORMOTEROL FUMARATE DIHYDRATE 80; 4.5 UG/1; UG/1
2 AEROSOL RESPIRATORY (INHALATION) 2 TIMES DAILY
Qty: 10.2 G | Refills: 11 | Status: SHIPPED | OUTPATIENT
Start: 2024-11-07 | End: 2025-11-07

## 2024-11-07 RX ORDER — FLUTICASONE PROPIONATE 50 MCG
SPRAY, SUSPENSION (ML) NASAL
COMMUNITY
Start: 2024-09-30

## 2024-11-07 RX ORDER — ASPIRIN 81 MG/1
81 TABLET ORAL DAILY
Qty: 90 TABLET | Refills: 3 | Status: SHIPPED | OUTPATIENT
Start: 2024-11-07 | End: 2025-11-07

## 2024-11-07 RX ORDER — CHLORTHALIDONE 25 MG/1
25 TABLET ORAL DAILY
Qty: 90 TABLET | Refills: 4 | Status: SHIPPED | OUTPATIENT
Start: 2024-11-07

## 2024-11-07 NOTE — ASSESSMENT & PLAN NOTE
She does report leg pains but it is not classic claudication type symptomatology.  Mild disease noted by arterial Doppler study 2017.  SFA popliteal disease noted on a different ultrasound.  Pedal pulses.  No additional workup advised at this time.      Mild carotid disease noted on previous carotid ultrasound.  Asymptomatic neurologic status.

## 2024-11-07 NOTE — PROGRESS NOTES
Assessment:       1. Primary hypertension    2. Gastroesophageal reflux disease, unspecified whether esophagitis present    3. Obesity (BMI 30.0-34.9)    4. Chronic obstructive pulmonary disease, unspecified COPD type    5. Pure hypercholesterolemia    6. Muscle spasm    7. Other long term (current) drug therapy    8. Other specified abnormal findings of blood chemistry           Plan:     Assessment & Plan    Considered switching losartan to valsartan due to potential shorter duration of action, but deferred change due to recent addition of chlorthalidone  Evaluated report of muscle spasms; ordered labs to investigate potential causes including anemia, magnesium deficiency, and thyroid issues  Noted recent diagnosis of osteopenia; patient already taking calcium supplementation  Assessed previously diagnosed knee osteoarthritis and meniscus tear; patient reports physical therapy was ineffective    HYPERTENSION:  - Explained losartan typically lasts 14 hours vs valsartan's 24-28 hour duration.  - Educated on chlorthalidone's effects on fluid status and blood pressure.  - Started chlorthalidone 25 mg daily for hypertension and fluid retention.  - Continued losartan 100 mg daily.    OSTEOPENIA:  - Discussed osteopenia as condition where bones are softer than ideal.    MEDICATIONS/SUPPLEMENTS:  - Continued atorvastatin, albuterol inhaler, Atrovent nasal spray, and Symbicort inhaler; provided refill.    LABS:  - Ordered CBC, CMP, TSH, magnesium, and iron studies as fasting labs.  - Contact the office when lab results are available; office will contact patient if results indicate need for magnesium or iron supplementation.    FOLLOW UP:  - Follow up in 2 months (early January) to reassess muscle spasms, blood pressure, and breathing.             Primary hypertension    Gastroesophageal reflux disease, unspecified whether esophagitis present    Obesity (BMI 30.0-34.9)  -     Lipid Panel; Future; Expected date:  11/07/2024  -     Hemoglobin A1C; Future; Expected date: 11/07/2024  -     TSH; Future; Expected date: 11/07/2024    Chronic obstructive pulmonary disease, unspecified COPD type  -     budesonide-formoterol 80-4.5 mcg (SYMBICORT) 80-4.5 mcg/actuation HFAA; Inhale 2 puffs into the lungs 2 (two) times a day. Controller  Dispense: 10.2 g; Refill: 11  -     CBC Auto Differential; Future; Expected date: 11/07/2024  -     Comprehensive Metabolic Panel; Future; Expected date: 11/07/2024  -     Hemoglobin A1C; Future; Expected date: 11/07/2024  -     TSH; Future; Expected date: 11/07/2024    Pure hypercholesterolemia  -     atorvastatin (LIPITOR) 40 MG tablet; Take 1 tablet (40 mg total) by mouth once daily.  Dispense: 90 tablet; Refill: 3  -     Lipid Panel; Future; Expected date: 11/07/2024    Muscle spasm  -     CBC Auto Differential; Future; Expected date: 11/07/2024  -     Comprehensive Metabolic Panel; Future; Expected date: 11/07/2024  -     Lipid Panel; Future; Expected date: 11/07/2024  -     Hemoglobin A1C; Future; Expected date: 11/07/2024  -     TSH; Future; Expected date: 11/07/2024  -     Ferritin; Future; Expected date: 11/07/2024  -     Iron and TIBC; Future; Expected date: 11/07/2024  -     Vitamin B12; Future; Expected date: 11/07/2024  -     Folate; Future; Expected date: 11/07/2024  -     Magnesium; Future; Expected date: 11/07/2024    Other long term (current) drug therapy  -     CBC Auto Differential; Future; Expected date: 11/07/2024  -     Comprehensive Metabolic Panel; Future; Expected date: 11/07/2024  -     Lipid Panel; Future; Expected date: 11/07/2024  -     Hemoglobin A1C; Future; Expected date: 11/07/2024  -     TSH; Future; Expected date: 11/07/2024  -     Ferritin; Future; Expected date: 11/07/2024  -     Iron and TIBC; Future; Expected date: 11/07/2024  -     Vitamin B12; Future; Expected date: 11/07/2024  -     Folate; Future; Expected date: 11/07/2024  -     Magnesium; Future; Expected  date: 11/07/2024    Other specified abnormal findings of blood chemistry  -     Ferritin; Future; Expected date: 11/07/2024  -     Iron and TIBC; Future; Expected date: 11/07/2024                This note was generated with the assistance of ambient listening technology. Verbal consent was obtained by the patient and accompanying visitor(s) for the recording of patient appointment to facilitate this note. I attest to having reviewed and edited the generated note for accuracy, though some syntax or spelling errors may persist. Please contact the author of this note for any clarification.      Subjective:           Patient ID: Jennifer Brady   Age:  64 y.o.  Sex: female     Chief Complaint:   Follow-up (HTN, H/A and COPD) and Spasms      History of Present Illness:    Jennifer Brady is a 64 y.o. female who presents today with a chief complaint of Follow-up (HTN, H/A and COPD) and Spasms  .    History of Present Illness    CHIEF COMPLAINT:  Jennifer presents today for follow-up of hypertension.    HYPERTENSION:  She reports variable blood pressure readings, with a recent high of 141 at another doctor's office. She also experiences episodes of low blood pressure, with readings as low as 90/60, accompanied by symptoms. She is currently taking Losartan 100 mg for management. She previously used Ozempic, which helped with blood pressure control, but discontinued it in October.    WEIGHT MANAGEMENT:  She expresses a strong desire to start weight loss medication. Despite efforts including walking and reducing sugar intake, she reports difficulty losing weight. She attributes her weight challenges to potential genetic factors, noting her mother's weight issues. She believes her excess weight contributes to back pain, leg pain, and increased chest and back size. She expresses frustration that her current weight loss attempts have been ineffective.    MUSCULOSKELETAL CONCERNS:  She reports experiencing 'bad'  muscle spasms primarily in her legs for about a month, occurring both during the day and at night, especially when at rest. She has a history of tricompartmental osteoarthritis in the knee with a complex horizontal tear of the meniscus, confirmed by an August MRI. Previous knee therapy was discontinued due to significant pain and lack of symptom alleviation.    NEUROLOGICAL SYMPTOMS:  She reports a persistent tremor in her left hand, describing the trembling as constant.    BONE HEALTH:  She has been diagnosed with osteopenia based on recent imaging studies and has started taking calcium supplements in response.    OTHER SYMPTOMS:  She reports mild fluid retention in her legs without significant edema.    MEDICATIONS AND SUPPLEMENTS:  Current medications include Losartan 100 mg, inhalers, Atrovent nasal spray (which she finds helpful), atorvastatin (long-term use), and calcium supplements. She discontinued Ozempic in October, which had previously helped with appetite control and smoking cessation.      ROS:  General: -weight loss  Musculoskeletal: +muscle pain, +back pain  Neurological: +tremors           Review of Systems   Constitutional:  Negative for activity change, fatigue, fever and unexpected weight change.   HENT:  Negative for congestion, nosebleeds, sinus pressure and sneezing.    Respiratory:  Negative for cough, shortness of breath and wheezing.    Cardiovascular:  Negative for chest pain, palpitations and leg swelling.   Gastrointestinal:  Negative for abdominal distention, constipation, diarrhea and nausea.   Genitourinary:  Negative for difficulty urinating and dysuria.   Musculoskeletal:  Negative for back pain and gait problem.   Skin:  Negative for pallor and rash.   Neurological:  Negative for weakness, numbness and headaches.   Psychiatric/Behavioral:  Negative for agitation. The patient is not nervous/anxious.            Objective:        Vitals:    11/07/24 1338   BP: 133/81   BP Location:  "Left arm   Patient Position: Sitting   Pulse: 62   Weight: 99.8 kg (220 lb 0.3 oz)   Height: 5' 8" (1.727 m)       Body mass index is 33.45 kg/m².      Physical Exam  Constitutional:       General: She is not in acute distress.     Appearance: Normal appearance.   HENT:      Head: Normocephalic and atraumatic.      Right Ear: External ear normal.      Left Ear: External ear normal.      Nose: No rhinorrhea.      Mouth/Throat:      Mouth: Mucous membranes are moist.   Eyes:      Extraocular Movements: Extraocular movements intact.      Conjunctiva/sclera: Conjunctivae normal.   Cardiovascular:      Rate and Rhythm: Normal rate.   Pulmonary:      Effort: Pulmonary effort is normal. No respiratory distress.   Musculoskeletal:      Right lower leg: No edema.      Left lower leg: No edema.   Skin:     Coloration: Skin is not jaundiced.      Findings: No bruising.   Neurological:      General: No focal deficit present.      Mental Status: She is alert and oriented to person, place, and time.      Motor: No weakness.      Gait: Gait normal.   Psychiatric:         Mood and Affect: Mood normal.         Physical Exam    Vitals: Blood pressure: 141/UNKNOWN.  Musculoskeletal: Audible knee clicking.               Past Medical History:   Diagnosis Date    Anxiety     Arthritis     Asthma     Behavioral problem     Borderline personality disorder     Depression     Depression     Fatigue     Hx of psychiatric care     Hyperlipidemia     Myocardial infarction 05/2018    Psychiatric problem     S/P hysterectomy with oophorectomy     Sleep apnea     pt uses CPAP    Syncope and collapse     Therapy        Lab Results   Component Value Date     02/09/2024    K 4.1 02/09/2024     02/09/2024    CO2 27 02/09/2024    BUN 9 02/09/2024    CREATININE 0.9 02/09/2024    ANIONGAP 8 02/09/2024     Lab Results   Component Value Date    HGBA1C 5.0 02/09/2024     Lab Results   Component Value Date    BNP <10 09/17/2023    BNP <10 " 07/15/2021    BNP 24 04/04/2018       Lab Results   Component Value Date    WBC 7.21 02/09/2024    HGB 12.5 02/09/2024    HCT 39.2 02/09/2024     02/09/2024    GRAN 49.0 02/09/2024    GRAN 4.6 09/17/2023     Lab Results   Component Value Date    CHOL 195 02/09/2024     (H) 02/09/2024    LDLCALC 70.8 02/09/2024    TRIG 76 02/09/2024        Outpatient Encounter Medications as of 11/7/2024   Medication Sig Dispense Refill    albuterol (PROVENTIL/VENTOLIN HFA) 90 mcg/actuation inhaler Inhale 2 puffs into the lungs every 4 (four) hours as needed for Wheezing.      aspirin (ECOTRIN) 81 MG EC tablet Take 1 tablet (81 mg total) by mouth once daily. 90 tablet 3    azelastine (ASTELIN) 137 mcg (0.1 %) nasal spray 2 sprays (274 mcg total) by Nasal route 2 (two) times daily. 90 mL 4    chlorthalidone (HYGROTEN) 25 MG Tab Take 1 tablet (25 mg total) by mouth once daily. 90 tablet 4    estradioL (ESTRACE) 0.01 % (0.1 mg/gram) vaginal cream Place 1 g vaginally twice a week. 42 g 0    fluticasone propionate (FLONASE) 50 mcg/actuation nasal spray       HYDROcodone-acetaminophen (NORCO)  mg per tablet Take by mouth daily as needed.      hydrOXYzine pamoate (VISTARIL) 25 MG Cap Take 1 capsule (25 mg total) by mouth 3 (three) times daily as needed (anxiety). 270 capsule 3    ipratropium (ATROVENT) 42 mcg (0.06 %) nasal spray 2 sprays by Each Nostril route 3 (three) times daily as needed for Rhinitis. 15 mL 3    lactulose (CHRONULAC) 10 gram/15 mL solution Take 10 g by mouth every evening.      levocetirizine (XYZAL) 5 MG tablet Take 5 mg by mouth once daily.      losartan (COZAAR) 100 MG tablet Take 1 tablet (100 mg total) by mouth once daily. 90 tablet 3    nitroGLYCERIN (NITROSTAT) 0.4 MG SL tablet Place 0.4 mg under the tongue.      pantoprazole (PROTONIX) 40 MG tablet Take 1 tablet (40 mg total) by mouth once daily. 90 tablet 3    traZODone (DESYREL) 150 MG tablet Take 1 -2 tablets by mouth before bed as needed  for insomnia 60 tablet 11    venlafaxine (EFFEXOR-XR) 150 MG Cp24 Take 1 capsule (150 mg total) by mouth once daily. 90 capsule 3    [DISCONTINUED] atorvastatin (LIPITOR) 40 MG tablet Take 1 tablet (40 mg total) by mouth once daily. 90 tablet 3    [DISCONTINUED] budesonide-formoterol 80-4.5 mcg (SYMBICORT) 80-4.5 mcg/actuation HFAA Inhale 2 puffs into the lungs 2 (two) times a day. Controller 10.2 g 5    atorvastatin (LIPITOR) 40 MG tablet Take 1 tablet (40 mg total) by mouth once daily. 90 tablet 3    budesonide-formoterol 80-4.5 mcg (SYMBICORT) 80-4.5 mcg/actuation HFAA Inhale 2 puffs into the lungs 2 (two) times a day. Controller 10.2 g 11    semaglutide, weight loss, (WEGOVY) 0.5 mg/0.5 mL PnIj Inject 0.5 mg into the skin every 7 days. for 4 doses (Patient not taking: Reported on 11/7/2024) 2 mL 0    [START ON 11/21/2024] semaglutide, weight loss, (WEGOVY) 1 mg/0.5 mL PnIj Inject 1 mg into the skin every 7 days. for 4 doses (Patient not taking: Reported on 11/7/2024) 2 mL 0    [DISCONTINUED] aspirin (ECOTRIN) 81 MG EC tablet Take 1 tablet (81 mg total) by mouth once daily. 100 tablet 6    [DISCONTINUED] citalopram (CELEXA) 10 MG tablet Take 10 mg by mouth once daily.      [DISCONTINUED] lurasidone (LATUDA) 40 mg Tab tablet Take 1 tablet (40 mg total) by mouth once daily. 30 tablet 5     No facility-administered encounter medications on file as of 11/7/2024.

## 2024-11-07 NOTE — ASSESSMENT & PLAN NOTE
Losartan 100 mg previously utilized.  Chlorthalidone 25 mg added today for better blood pressure control.  Blood pressure today in my office 141/86 mm Hg.

## 2024-11-07 NOTE — PATIENT INSTRUCTIONS
Assessment & Plan    Considered switching losartan to valsartan due to potential shorter duration of action, but deferred change due to recent addition of chlorthalidone  Evaluated report of muscle spasms; ordered labs to investigate potential causes including anemia, magnesium deficiency, and thyroid issues  Noted recent diagnosis of osteopenia; patient already taking calcium supplementation  Assessed previously diagnosed knee osteoarthritis and meniscus tear; patient reports physical therapy was ineffective    HYPERTENSION:  - Explained losartan typically lasts 14 hours vs valsartan's 24-28 hour duration.  - Educated on chlorthalidone's effects on fluid status and blood pressure.  - Started chlorthalidone 25 mg daily for hypertension and fluid retention.  - Continued losartan 100 mg daily.    OSTEOPENIA:  - Discussed osteopenia as condition where bones are softer than ideal.    MEDICATIONS/SUPPLEMENTS:  - Continued atorvastatin, albuterol inhaler, Atrovent nasal spray, and Symbicort inhaler; provided refill.    LABS:  - Ordered CBC, CMP, TSH, magnesium, and iron studies as fasting labs.  - Contact the office when lab results are available; office will contact patient if results indicate need for magnesium or iron supplementation.    FOLLOW UP:  - Follow up in 2 months (early January) to reassess muscle spasms, blood pressure, and breathing.

## 2024-11-07 NOTE — ASSESSMENT & PLAN NOTE
2018 two-vessel stenting.  No symptoms of angina reported.  She had a negative Lexiscan in 2020, normal perfusion normal ejection fraction.

## 2024-12-04 ENCOUNTER — OFFICE VISIT (OUTPATIENT)
Dept: NEUROLOGY | Facility: CLINIC | Age: 64
End: 2024-12-04
Payer: MEDICARE

## 2024-12-04 DIAGNOSIS — G47.33 OSA (OBSTRUCTIVE SLEEP APNEA): ICD-10-CM

## 2024-12-04 DIAGNOSIS — G43.719 INTRACTABLE CHRONIC MIGRAINE WITHOUT AURA AND WITHOUT STATUS MIGRAINOSUS: Primary | ICD-10-CM

## 2024-12-04 RX ORDER — UBROGEPANT 100 MG/1
100 TABLET ORAL
Qty: 16 TABLET | Refills: 3 | Status: SHIPPED | OUTPATIENT
Start: 2024-12-04

## 2024-12-04 RX ORDER — GALCANEZUMAB 120 MG/ML
240 INJECTION, SOLUTION SUBCUTANEOUS ONCE
Qty: 2 ML | Refills: 0 | Status: SHIPPED | OUTPATIENT
Start: 2024-12-04 | End: 2024-12-07

## 2024-12-04 RX ORDER — GALCANEZUMAB 120 MG/ML
120 INJECTION, SOLUTION SUBCUTANEOUS
Qty: 1 ML | Refills: 11 | Status: SHIPPED | OUTPATIENT
Start: 2024-12-04

## 2024-12-04 NOTE — PROGRESS NOTES
Cc: REN, 7/2022    She continues to use apap 6-20cm qhs with DS1 machine qhs.  F20 mask now   Ongoing migraines, not seen neuro recently . Same location as previous R retroorbital pain radiates to vertex or back of neck . Stays in dark quiet room often. Denies N/V.   TRIED (Topamax, Trileptal, Effexor, Elavil, Seroquel, Latuda, Abilify, Prozac, Geodon. Botox)    CAD  Remote 30davg 7:35h/n AHI 4.5, 90% tile 14cm        Requal: 10/09/2019  lb AH 10  and overall RDI was 16. The oxygen mark was 83.2 % and % time < 90% SpO2 was 1.8 %.  04/15/2016 retitration study CPAP 8 cm           Assessment:   REN, she remains adherent with pap, benefiting. AHI<5  Medical co-morbidities: chronic migraine headaches, CAD, HTN    Plan:   Continue cpap 4-20cm, supplies via  [facility]  Begin Emgality 240mg SC load dose then monthly 120mg  Ubrelvy 100mg PO q2hprn prodromal symptoms or headache onset  TRIPTANS contraindicated.   Rtc 2-3 mos re-evaluation

## 2024-12-19 ENCOUNTER — OFFICE VISIT (OUTPATIENT)
Dept: ORTHOPEDICS | Facility: CLINIC | Age: 64
End: 2024-12-19
Payer: MEDICARE

## 2024-12-19 VITALS
WEIGHT: 220.25 LBS | HEIGHT: 68 IN | SYSTOLIC BLOOD PRESSURE: 126 MMHG | DIASTOLIC BLOOD PRESSURE: 78 MMHG | HEART RATE: 93 BPM | BODY MASS INDEX: 33.38 KG/M2

## 2024-12-19 DIAGNOSIS — M17.11 PRIMARY OSTEOARTHRITIS OF RIGHT KNEE: Primary | ICD-10-CM

## 2024-12-19 DIAGNOSIS — M17.12 PRIMARY OSTEOARTHRITIS OF LEFT KNEE: ICD-10-CM

## 2024-12-19 PROCEDURE — 99999 PR PBB SHADOW E&M-EST. PATIENT-LVL III: CPT | Mod: PBBFAC,,,

## 2024-12-19 RX ORDER — TRIAMCINOLONE ACETONIDE 40 MG/ML
40 INJECTION, SUSPENSION INTRA-ARTICULAR; INTRAMUSCULAR
Status: DISCONTINUED | OUTPATIENT
Start: 2024-12-19 | End: 2024-12-19 | Stop reason: HOSPADM

## 2024-12-19 RX ORDER — TRIAMCINOLONE ACETONIDE 40 MG/ML
40 INJECTION, SUSPENSION INTRA-ARTICULAR; INTRAMUSCULAR
Status: COMPLETED | OUTPATIENT
Start: 2024-12-19 | End: 2024-12-19

## 2024-12-19 RX ORDER — METHOCARBAMOL 500 MG/1
500 TABLET, FILM COATED ORAL NIGHTLY PRN
Qty: 60 TABLET | Refills: 1 | Status: SHIPPED | OUTPATIENT
Start: 2024-12-19 | End: 2024-12-29

## 2024-12-19 RX ADMIN — TRIAMCINOLONE ACETONIDE 40 MG: 40 INJECTION, SUSPENSION INTRA-ARTICULAR; INTRAMUSCULAR at 03:12

## 2024-12-19 NOTE — PROCEDURES
Large Joint Aspiration/Injection: R knee    Date/Time: 12/19/2024 3:00 PM    Performed by: Lottie Katz PA-C  Authorized by: Lottie Katz PA-C    Consent Done?:  Yes (Verbal)  Indications:  Pain and arthritis  Site marked: the procedure site was marked    Timeout: prior to procedure the correct patient, procedure, and site was verified    Prep: patient was prepped and draped in usual sterile fashion      Local anesthesia used?: Yes    Local anesthetic:  Bupivacaine 0.25% without epinephrine and topical anesthetic  Anesthetic total (ml):  4      Details:  Needle Size:  21 G  Ultrasonic Guidance for needle placement?: No    Approach:  Anterolateral  Location:  Knee  Site:  R knee  Medications:  40 mg triamcinolone acetonide 40 mg/mL  Patient tolerance:  Patient tolerated the procedure well with no immediate complications

## 2024-12-19 NOTE — PROGRESS NOTES
Patient ID: Jennifer Brady is a 64 y.o. female    Pain of the Right Knee      History of Present Illness:    Jennifer Brady presents to clinic for right knee pain.  Has known arthritis to her right knee.  Previously treated by myself with cortisone and gel injections. Had durolane injection back in August which helped for about 3 months. She is also endorsing left sided hip pain that radiates past her knee into her ankle. She does endorse back pain.  Does see back and spine.  Feels like one leg is shorter than the other.    Autoimmune conditions:No    DEXA Scan:    Occupation: retired      Ambulating: cane today  Diabetic: no  Smoking: current, trying to quit/wean down  Hx of DVT/PE: no  Hi MI, on ASA    PAST MEDICAL HISTORY:   Past Medical History:   Diagnosis Date    Anxiety     Arthritis     Asthma     Behavioral problem     Borderline personality disorder     Depression     Depression     Fatigue     Hx of psychiatric care     Hyperlipidemia     Psychiatric problem     S/P hysterectomy with oophorectomy     Sleep apnea     pt uses CPAP    Syncope and collapse     Therapy      PAST SURGICAL HISTORY:   Past Surgical History:   Procedure Laterality Date    CORONARY ANGIOPLASTY      HIP REPLACEMENT ARTHROPLASTY Right 12/21/2022    HYSTERECTOMY  1999    INJECTION OF ANESTHETIC AGENT AROUND NERVE Right 11/20/2019    Procedure: MEDIAL BRANCH BLOCK RIGHT L3, L4, L5;  Surgeon: Petros Reich MD;  Location: [facility] PAIN MGT;  Service: Pain Management;  Laterality: Right;  NEEDS CONSENT, PT NO LONGER TAKES PLAVIX    INJECTION OF ANESTHETIC AGENT AROUND NERVE Right 06/17/2020    Procedure: BLOCK, NERVE RIGHT L3, 4, 5 MEDIAL BRANCH;  Surgeon: Petros Reich MD;  Location: [facility] PAIN MGT;  Service: Pain Management;  Laterality: Right;  NEEDS CONSENT?    RADIOFREQUENCY ABLATION Right 05/12/2021    Procedure: RADIOFREQUENCY ABLATION RIGHT L3, 4, 5;  Surgeon: Petros Reich MD;  Location: [facility] PAIN  MGT;  Service: Pain Management;  Laterality: Right;  NEEDS CONSENT    TILT TABLE TEST N/A 2019    Procedure: TILT TABLE TEST;  Surgeon: Roman Lala MD;  Location: [facility] EP LAB;  Service: Cardiology;  Laterality: N/A;  seizure/migraine, HUT referred by Dr Perea Neuro, EEG confirmed    TRANSFORAMINAL EPIDURAL INJECTION OF STEROID Right 10/14/2020    Procedure: LUMBAR TRANSFORAMINAL RIGHT L4/L5;  Surgeon: Petros Reich MD;  Location: [facility] PAIN MGT;  Service: Pain Management;  Laterality: Right;  NEEDS CONSENT     FAMILY HISTORY:   Family History   Problem Relation Name Age of Onset    Hypertension Paternal Grandfather      Hypertension Paternal Grandmother      Heart attacks under age 50 Maternal Grandmother      Hypertension Maternal Grandmother      Hypertension Maternal Grandfather      Heart attacks under age 50 Father  33    Hypertension Father      Seizures Mother      Heart attack Mother      Hypertension Mother      Stroke Mother      Hypertension Sister      Stroke Sister      Seizures Sister      Breast cancer Neg Hx      Colon cancer Neg Hx      Diabetes Neg Hx      Ovarian cancer Neg Hx      Cancer Neg Hx       SOCIAL HISTORY:   Social History     Occupational History     Employer: [employer]     Comment: [employer]   Tobacco Use    Smoking status: Some Days     Current packs/day: 0.00     Average packs/day: 0.3 packs/day for 35.8 years (9.0 ttl pk-yrs)     Types: Cigarettes     Start date: 1986     Last attempt to quit: 2022     Years since quittin.4     Passive exposure: Past    Smokeless tobacco: Never    Tobacco comments:     quit actual cigarettes a few mo ago, switched to e cigarette   Substance and Sexual Activity    Alcohol use: Yes     Comment: Socially    Drug use: No    Sexual activity: Not Currently     Partners: Male        MEDICATIONS:   Current Outpatient Medications:     albuterol (PROVENTIL/VENTOLIN HFA) 90 mcg/actuation  inhaler, Inhale 2 puffs into the lungs every 4 (four) hours as needed for Wheezing., Disp: , Rfl:     aspirin (ECOTRIN) 81 MG EC tablet, Take 1 tablet (81 mg total) by mouth once daily., Disp: 90 tablet, Rfl: 3    atorvastatin (LIPITOR) 40 MG tablet, Take 1 tablet (40 mg total) by mouth once daily., Disp: 90 tablet, Rfl: 3    azelastine (ASTELIN) 137 mcg (0.1 %) nasal spray, 2 sprays (274 mcg total) by Nasal route 2 (two) times daily., Disp: 90 mL, Rfl: 4    budesonide-formoterol 80-4.5 mcg (SYMBICORT) 80-4.5 mcg/actuation HFAA, Inhale 2 puffs into the lungs 2 (two) times a day. Controller, Disp: 10.2 g, Rfl: 11    chlorthalidone (HYGROTEN) 25 MG Tab, Take 1 tablet (25 mg total) by mouth once daily., Disp: 90 tablet, Rfl: 4    estradioL (ESTRACE) 0.01 % (0.1 mg/gram) vaginal cream, Place 1 g vaginally twice a week., Disp: 42 g, Rfl: 0    fluticasone propionate (FLONASE) 50 mcg/actuation nasal spray, , Disp: , Rfl:     galcanezumab-gnlm (EMGALITY PEN) 120 mg/mL PnIj, Inject 1 mL (120 mg total) into the skin every 28 days., Disp: 1 mL, Rfl: 11    HYDROcodone-acetaminophen (NORCO)  mg per tablet, Take by mouth daily as needed., Disp: , Rfl:     hydrOXYzine pamoate (VISTARIL) 25 MG Cap, Take 1 capsule (25 mg total) by mouth 3 (three) times daily as needed (anxiety)., Disp: 270 capsule, Rfl: 3    ipratropium (ATROVENT) 42 mcg (0.06 %) nasal spray, 2 sprays by Each Nostril route 3 (three) times daily as needed for Rhinitis., Disp: 15 mL, Rfl: 3    lactulose (CHRONULAC) 10 gram/15 mL solution, Take 10 g by mouth every evening., Disp: , Rfl:     levocetirizine (XYZAL) 5 MG tablet, Take 5 mg by mouth once daily., Disp: , Rfl:     losartan (COZAAR) 100 MG tablet, Take 1 tablet (100 mg total) by mouth once daily., Disp: 90 tablet, Rfl: 3    nitroGLYCERIN (NITROSTAT) 0.4 MG SL tablet, Place 0.4 mg under the tongue., Disp: , Rfl:     pantoprazole (PROTONIX) 40 MG tablet, Take 1 tablet (40 mg total) by mouth once daily.,  Disp: 90 tablet, Rfl: 3    traZODone (DESYREL) 150 MG tablet, Take 1 -2 tablets by mouth before bed as needed for insomnia, Disp: 60 tablet, Rfl: 11    ubrogepant (UBRELVY) 100 mg tablet, Take 1 tablet (100 mg total) by mouth every 2 (two) hours as needed for Migraine. Triptans contraindicated. Maximum: 200 mg per 24 hours, Disp: 16 tablet, Rfl: 3    venlafaxine (EFFEXOR-XR) 150 MG Cp24, Take 1 capsule (150 mg total) by mouth once daily., Disp: 90 capsule, Rfl: 3    methocarbamoL (ROBAXIN) 500 MG Tab, Take 1 tablet (500 mg total) by mouth nightly as needed., Disp: 60 tablet, Rfl: 1  No current facility-administered medications for this visit.  ALLERGIES: Review of patient's allergies indicates:  No Known Allergies      Physical Exam     Vitals:    12/19/24 1459   BP: 126/78   Pulse: 93     Alert and oriented to person, place and time. No acute distress. Well-groomed, not ill appearing. Pupils round and reactive, normal respiratory effort, no audible wheezing.     GENERAL:  A well-developed, well-nourished 64 y.o. female who is alert and       oriented in no acute distress.      Gait: She  walks with a normal gait.                   EXTREMITIES:  Examination of lower extremities reveals there is no visible mass or deformity.    Left knee:  ROM 0-120    Ligamentously stable to varus/valgus stress.    Anterior and posterior drawers negative.    No pain over pes bursa.    No warmth    No erythema     Effusion No    medial joint line tenderness    Positive Patellofemoral grind/crepitus     Right knee:  ROM 0-120    Ligamentously stable to varus/valgus stress.    Anterior and posterior drawers negative.    No pain over pes bursa.    No warmth    No erythema    Effusion No    medial joint line tenderness    Positive Patellofemoral grind/crepitus     The skin over both lower extremities is normal and unremarkable.  She has a  painless range of motion of the hips and ankles bilaterally.   Sensation is intact in both lower  extremities.    There are no motor deficits in the lower extremities bilaterally.   Pedal pulses are palpable distally bilaterally.    She has no calf tenderness to palpation nor edema.    Imaging:     Bilateral Knee X-rays ordered/reviewed by me showing no evidence of fracture or dislocation. There is no obvious malalignment. No evidence of masses, lesions or foreign bodies.  Mild right knee lateral compartment osteoarthritis.  Kellgren Salvador grade 2-3.     Right knee MRI:   1. Tricompartmental osteoarthritis with chondral loss most pronounced at the lateral tibial plateau.  2. Horizontal tear posterior horn medial meniscus that closely approximates the posterior root ligament.  3. Complex and horizontal tears lateral meniscus with a small displaced flap into the inferior recess of the lateral gutter.  4. Small joint effusion with associated synovitis.  Small popliteal cyst.    Assessment & Plan    Primary osteoarthritis of right knee  -     triamcinolone acetonide injection 40 mg  -     Large Joint Aspiration/Injection: R knee  -     methocarbamoL (ROBAXIN) 500 MG Tab; Take 1 tablet (500 mg total) by mouth nightly as needed.  Dispense: 60 tablet; Refill: 1    Primary osteoarthritis of left knee  -     triamcinolone acetonide injection 40 mg  -     Large Joint Aspiration/Injection: L knee  -     methocarbamoL (ROBAXIN) 500 MG Tab; Take 1 tablet (500 mg total) by mouth nightly as needed.  Dispense: 60 tablet; Refill: 1         I made the decision to obtain old records of the patient including previous notes and imaging. New imaging was ordered today of the extremity or extremities evaluated. I independently reviewed and interpreted the radiographs and/or MRIs/CT scan today as well as prior imaging.    We discussed at length different treatment options including conservative vs surgical management. These include anti-inflammatories, acetaminophen, rest, ice, heat, formal physical therapy including strengthening  and stretching exercises, home exercise programs, injections, dry needling, and finally surgical intervention.      Patient here for follow up of bilateral knee pain.  She would like to proceed with bilateral knee CSI today.  Bilateral knee CSI given, post-injection instructions reviewed.  Discussed her knee MRI results in detail which shows a fair amount of arthritis with associated degenerative meniscus.  She is interested in more definitive treatment for right knee.  We will bring her back to see the surgeon to discuss right TKA.  In the meantime also sent muscle relaxer, Robaxin p.r.n. at night.    Follow up: [facility]  X-rays next visit: none    All questions were answered and patient is agreeable to the above plan.

## 2025-01-06 ENCOUNTER — OFFICE VISIT (OUTPATIENT)
Dept: PRIMARY CARE CLINIC | Facility: CLINIC | Age: 65
End: 2025-01-06
Payer: MEDICARE

## 2025-01-06 VITALS
BODY MASS INDEX: 34.67 KG/M2 | WEIGHT: 228.75 LBS | OXYGEN SATURATION: 98 % | DIASTOLIC BLOOD PRESSURE: 76 MMHG | SYSTOLIC BLOOD PRESSURE: 132 MMHG | HEIGHT: 68 IN | HEART RATE: 77 BPM | RESPIRATION RATE: 19 BRPM

## 2025-01-06 DIAGNOSIS — K59.09 CHRONIC CONSTIPATION: ICD-10-CM

## 2025-01-06 DIAGNOSIS — G43.719 INTRACTABLE CHRONIC MIGRAINE WITHOUT AURA AND WITHOUT STATUS MIGRAINOSUS: ICD-10-CM

## 2025-01-06 DIAGNOSIS — J44.9 CHRONIC OBSTRUCTIVE PULMONARY DISEASE, UNSPECIFIED COPD TYPE: ICD-10-CM

## 2025-01-06 DIAGNOSIS — Z23 NEED FOR VACCINATION: ICD-10-CM

## 2025-01-06 DIAGNOSIS — J06.9 UPPER RESPIRATORY TRACT INFECTION, UNSPECIFIED TYPE: Primary | ICD-10-CM

## 2025-01-06 DIAGNOSIS — F60.89 CLUSTER B PERSONALITY DISORDER: ICD-10-CM

## 2025-01-06 PROCEDURE — 3078F DIAST BP <80 MM HG: CPT | Mod: CPTII,S$GLB,, | Performed by: STUDENT IN AN ORGANIZED HEALTH CARE EDUCATION/TRAINING PROGRAM

## 2025-01-06 PROCEDURE — 3075F SYST BP GE 130 - 139MM HG: CPT | Mod: CPTII,S$GLB,, | Performed by: STUDENT IN AN ORGANIZED HEALTH CARE EDUCATION/TRAINING PROGRAM

## 2025-01-06 PROCEDURE — 1126F AMNT PAIN NOTED NONE PRSNT: CPT | Mod: CPTII,S$GLB,, | Performed by: STUDENT IN AN ORGANIZED HEALTH CARE EDUCATION/TRAINING PROGRAM

## 2025-01-06 PROCEDURE — 1160F RVW MEDS BY RX/DR IN RCRD: CPT | Mod: CPTII,S$GLB,, | Performed by: STUDENT IN AN ORGANIZED HEALTH CARE EDUCATION/TRAINING PROGRAM

## 2025-01-06 PROCEDURE — 1101F PT FALLS ASSESS-DOCD LE1/YR: CPT | Mod: CPTII,S$GLB,, | Performed by: STUDENT IN AN ORGANIZED HEALTH CARE EDUCATION/TRAINING PROGRAM

## 2025-01-06 PROCEDURE — 3288F FALL RISK ASSESSMENT DOCD: CPT | Mod: CPTII,S$GLB,, | Performed by: STUDENT IN AN ORGANIZED HEALTH CARE EDUCATION/TRAINING PROGRAM

## 2025-01-06 PROCEDURE — 99999 PR PBB SHADOW E&M-EST. PATIENT-LVL V: CPT | Mod: PBBFAC,,, | Performed by: STUDENT IN AN ORGANIZED HEALTH CARE EDUCATION/TRAINING PROGRAM

## 2025-01-06 PROCEDURE — 99214 OFFICE O/P EST MOD 30 MIN: CPT | Mod: 25,S$GLB,, | Performed by: STUDENT IN AN ORGANIZED HEALTH CARE EDUCATION/TRAINING PROGRAM

## 2025-01-06 PROCEDURE — G0008 ADMIN INFLUENZA VIRUS VAC: HCPCS | Mod: S$GLB,,, | Performed by: STUDENT IN AN ORGANIZED HEALTH CARE EDUCATION/TRAINING PROGRAM

## 2025-01-06 PROCEDURE — 90653 IIV ADJUVANT VACCINE IM: CPT | Mod: S$GLB,,, | Performed by: STUDENT IN AN ORGANIZED HEALTH CARE EDUCATION/TRAINING PROGRAM

## 2025-01-06 PROCEDURE — 1159F MED LIST DOCD IN RCRD: CPT | Mod: CPTII,S$GLB,, | Performed by: STUDENT IN AN ORGANIZED HEALTH CARE EDUCATION/TRAINING PROGRAM

## 2025-01-06 PROCEDURE — 3008F BODY MASS INDEX DOCD: CPT | Mod: CPTII,S$GLB,, | Performed by: STUDENT IN AN ORGANIZED HEALTH CARE EDUCATION/TRAINING PROGRAM

## 2025-01-06 RX ORDER — LACTULOSE 10 G/15ML
10 SOLUTION ORAL; RECTAL EVERY MORNING
Qty: 450 ML | Refills: 5 | Status: SHIPPED | OUTPATIENT
Start: 2025-01-06

## 2025-01-06 RX ORDER — METHOCARBAMOL 500 MG/1
500 TABLET, FILM COATED ORAL 3 TIMES DAILY
COMMUNITY
Start: 2024-12-19

## 2025-01-06 RX ORDER — GALCANEZUMAB 120 MG/ML
120 INJECTION, SOLUTION SUBCUTANEOUS
Qty: 1 ML | Refills: 11 | Status: CANCELLED | OUTPATIENT
Start: 2025-01-06

## 2025-01-06 RX ORDER — AZITHROMYCIN 250 MG/1
TABLET, FILM COATED ORAL
Qty: 6 TABLET | Refills: 0 | Status: SHIPPED | OUTPATIENT
Start: 2025-01-06 | End: 2025-01-11

## 2025-01-06 RX ORDER — METHYLPREDNISOLONE 4 MG/1
TABLET ORAL
Qty: 21 TABLET | Refills: 0 | Status: SHIPPED | OUTPATIENT
Start: 2025-01-06

## 2025-01-06 NOTE — PROGRESS NOTES
Verified pt ID using name and . NDKA. Administered influenza 65+ in left deltoid per physician order using aseptic technique.  Pt tolerated well with no adverse reactions noted.

## 2025-01-06 NOTE — PROGRESS NOTES
Assessment:       1. Upper respiratory tract infection, unspecified type    2. Intractable chronic migraine without aura and without status migrainosus    3. Chronic constipation    4. Need for vaccination    5. Chronic obstructive pulmonary disease, unspecified COPD type    6. Cluster B personality disorder           Plan:     Assessment & Plan    IMPRESSION:  - Assessed ear pain, congestion, and other cold symptoms  - Considered antibiotic treatment due to symptom duration, throat streaking, and lymph node involvement  - Reviewed recent lab results, including normal kidney, liver, thyroid, and iron levels  - Evaluated DEXA scan results showing osteopenia    OBSTRUCTIVE PULMONARY DISEASE, UNSPECIFIED COPD TYPE:  - Monitored patient's history of COPD and pulmonary nodules, with the largest nodule in the left upper lobe measuring 0.8 cm.  - Lung exam revealed good lung sounds.  - Ongoing monitoring for nodules over 6 mm in size is in place.  - Jennifer is scheduled for a follow-up at the cancer center this week for further evaluation of the pulmonary nodules.    HEADACHES/MIGRAINES:  - Contacted neurology to schedule a follow-up visit in 2-3 months.  - Jennifer reported suffering from headaches.  - Reviewed previous neurologist's prescription, which was too expensive.  - Noted triptans are contraindicated for this patient's migraine headaches.  - Documented patient's history of trying multiple medications, including Topamax, Trileptal, Effexor, Elavil, Saraclo, Latuda, Abilify, Prozac, Geodone, and Botox.  - Recommend discussing headache management with neurology.  - Noted neurologist's recommendation to start Emgality for headache treatment and UBRELVY for prodromal headaches.    HYPERTENSION:  - Monitored patient's history of hypertension.  - Current blood pressure: 132/76.  - Noted cardiologist added chlorthalidone for better blood pressure control.  - Prescribed chlorthalidone 25 mg daily.    ARTERY DISEASE:  -  Noted patient's history of coronary artery disease.    HYPERLIPIDEMIA:  - Noted patient's history of hyperlipidemia.    EDEMA:  - Monitored patient's history of swelling.    SLEEP APNEA:  - Noted patient's obstructive sleep apnea.  - Confirmed patient's adherence to CPAP use and documented use of CPAP machine.    OSTEOPENIA:  - Reviewed DEXA scan showing osteopenia.  - Explained osteopenia and the importance of vitamin D and calcium supplements for bone health.  - Confirmed patient is taking vitamin D and calcium supplements daily.  - Recommend weight-bearing exercises in addition to supplements.  - Discussed benefits of weight-bearing exercises for bone strength, suggesting mild activities like walking or light weightlifting.    RESPIRATORY INFECTION:  - Jennifer reported having a cold for about a week with congestion, phlegm, mild sore throat, and left ear pain.  - Physical exam revealed streaking on the back of the throat, full lymph nodes (more pronounced on the right side), clear lung sounds, and no ear abnormalities.  - Initiated antibiotic treatment due to symptoms lasting a week, presence of lymph nodes, and throat streaking.    VACCINATION:  - Administered flu vaccine in office.  - Noted patient's last flu vaccine was in November.    CONSTIPATION:  - Continued lactulose 15 mL in the morning for bowel regulation.  - Refilled lactulose, increasing quantity to 450 mL for a 30-day supply.             Upper respiratory tract infection, unspecified type  -     azithromycin (Z-VALERIE) 250 MG tablet; Take 2 tablets by mouth on day 1; Take 1 tablet by mouth on days 2-5  Dispense: 6 tablet; Refill: 0  -     methylPREDNISolone (MEDROL DOSEPACK) 4 mg tablet; use as directed  Dispense: 21 tablet; Refill: 0    Intractable chronic migraine without aura and without status migrainosus    Chronic constipation  -     lactulose (CHRONULAC) 10 gram/15 mL solution; Take 15 mLs (10 g total) by mouth every morning.  Dispense: 450 mL;  Refill: 5    Need for vaccination  -     influenza (adjuvanted) (Fluad) 45 mcg/0.5 mL IM vaccine (> or = 66 yo) 0.5 mL    Chronic obstructive pulmonary disease, unspecified COPD type    Cluster B personality disorder                This note was generated with the assistance of ambient listening technology. Verbal consent was obtained by the patient and accompanying visitor(s) for the recording of patient appointment to facilitate this note. I attest to having reviewed and edited the generated note for accuracy, though some syntax or spelling errors may persist. Please contact the author of this note for any clarification.      Subjective:           Patient ID: Jennifer Brady   Age:  64 y.o.  Sex: female     Chief Complaint:   Leg Pain, Headache, and Sinus Problem      History of Present Illness:    Jennifer Brady is a 64 y.o. female who presents today with a chief complaint of Leg Pain, Headache, and Sinus Problem  .        History of Present Illness    CHIEF COMPLAINT:  Jennifer presents today for follow-up regarding headaches and leg pain.    HEADACHES:  She has tried multiple medications for headache management including Topamax, Trileptal, Effexor, Elavil, Saraclo, Latuda, Abilify, Prozac, Geodone, and Botox. She currently uses Ubrelvy for prodromal headaches. Emgality was prescribed by neurology but is cost-prohibitive. Triptans are contraindicated for her condition.    MUSCULOSKELETAL:  She reports leg pain without associated swelling, redness, or warmth in the affected area.    UPPER RESPIRATORY SYMPTOMS:  She reports 1-1.5 weeks of cold symptoms including nose burning, left ear pain, congestion, phlegm production, hoarseness, and mild sore throat. She denies fever or chills.    PULMONARY:  She has pulmonary nodules, with the largest measuring 0.8 cm in the left upper lobe.    HYPERTENSION:  She takes Chlorthalidone 25 mg for blood pressure control.    MEDICATIONS:  She takes lactulose 15 mL  "in the morning for bowel regulation. She takes vitamin D and calcium supplements for osteopenia.    LABS:  Recent laboratory studies including kidney function, liver function, hemoglobin A1C, thyroid studies, and iron levels were normal.      ROS:  General: -fever, -chills  ENT: +nasal congestion, +sore throat, -nasal discharge, +hoarseness  Musculoskeletal: +limb pain  Neurological: +headache  Head: +head pain           Review of Systems   Constitutional:  Negative for activity change, chills, fatigue, fever and unexpected weight change.   HENT:  Positive for congestion, ear pain (left ear), rhinorrhea, sinus pain and sore throat (mild). Negative for nosebleeds, sinus pressure and sneezing.    Respiratory:  Positive for shortness of breath. Negative for cough and wheezing.    Cardiovascular:  Negative for chest pain, palpitations and leg swelling.   Gastrointestinal:  Negative for abdominal distention, constipation, diarrhea and nausea.   Genitourinary:  Negative for difficulty urinating and dysuria.   Musculoskeletal:  Positive for arthralgias, joint swelling and myalgias. Negative for back pain and gait problem.   Skin:  Negative for pallor and rash.   Neurological:  Positive for headaches. Negative for weakness and numbness.   Psychiatric/Behavioral:  Negative for agitation. The patient is not nervous/anxious.            Objective:        Vitals:    01/06/25 0904   BP: 132/76   BP Location: Left arm   Patient Position: Sitting   Pulse: 77   Resp: 19   SpO2: 98%   Weight: 103.8 kg (228 lb 11.6 oz)   Height: 5' 8" (1.727 m)       Body mass index is 34.78 kg/m².      Physical Exam  Constitutional:       General: She is not in acute distress.     Appearance: She is obese.   HENT:      Head: Normocephalic and atraumatic.      Right Ear: External ear normal.      Left Ear: External ear normal.      Nose: Rhinorrhea present.      Mouth/Throat:      Mouth: Mucous membranes are moist.      Pharynx: Posterior " oropharyngeal erythema present.   Eyes:      Extraocular Movements: Extraocular movements intact.      Conjunctiva/sclera: Conjunctivae normal.   Cardiovascular:      Rate and Rhythm: Normal rate and regular rhythm.      Pulses: Normal pulses.      Heart sounds: No murmur heard.  Pulmonary:      Effort: Pulmonary effort is normal. No respiratory distress.   Abdominal:      Tenderness: There is no right CVA tenderness or left CVA tenderness.   Musculoskeletal:      Right lower leg: No edema.      Left lower leg: No edema.   Lymphadenopathy:      Cervical: Cervical adenopathy present.   Skin:     Coloration: Skin is not jaundiced.      Findings: No bruising.   Neurological:      General: No focal deficit present.      Mental Status: She is alert and oriented to person, place, and time.      Motor: No weakness.      Gait: Gait normal.   Psychiatric:         Mood and Affect: Mood normal.         Physical Exam    Lymphatics: Full lymph node on the right. Two anterior cervical lymph nodes. No posterior lymph nodes.  Throat: Little bit of streaking on the back of the throat.               Past Medical History:   Diagnosis Date    Anxiety     Arthritis     Asthma     Behavioral problem     Borderline personality disorder     Depression     Depression     Fatigue     Hx of psychiatric care     Hyperlipidemia     Psychiatric problem     S/P hysterectomy with oophorectomy     Sleep apnea     pt uses CPAP    Syncope and collapse     Therapy        Lab Results   Component Value Date     12/14/2024    K 3.7 12/14/2024     12/14/2024    CO2 28 12/14/2024    BUN 15 12/14/2024    CREATININE 1.1 12/14/2024    ANIONGAP 9 12/14/2024     Lab Results   Component Value Date    HGBA1C 5.1 12/14/2024     Lab Results   Component Value Date    BNP <10 09/17/2023    BNP <10 07/15/2021    BNP 24 04/04/2018       Lab Results   Component Value Date    WBC 10.11 12/14/2024    HGB 12.2 12/14/2024    HCT 38.4 12/14/2024      12/14/2024    GRAN 4.7 12/14/2024    GRAN 46.6 12/14/2024     Lab Results   Component Value Date    CHOL 198 12/14/2024    HDL 54 12/14/2024    LDLCALC 122.8 12/14/2024    TRIG 106 12/14/2024        Outpatient Encounter Medications as of 1/6/2025   Medication Sig Dispense Refill    albuterol (PROVENTIL/VENTOLIN HFA) 90 mcg/actuation inhaler Inhale 2 puffs into the lungs every 4 (four) hours as needed for Wheezing.      aspirin (ECOTRIN) 81 MG EC tablet Take 1 tablet (81 mg total) by mouth once daily. 90 tablet 3    atorvastatin (LIPITOR) 40 MG tablet Take 1 tablet (40 mg total) by mouth once daily. 90 tablet 3    azelastine (ASTELIN) 137 mcg (0.1 %) nasal spray 2 sprays (274 mcg total) by Nasal route 2 (two) times daily. 90 mL 4    budesonide-formoterol 80-4.5 mcg (SYMBICORT) 80-4.5 mcg/actuation HFAA Inhale 2 puffs into the lungs 2 (two) times a day. Controller 10.2 g 11    chlorthalidone (HYGROTEN) 25 MG Tab Take 1 tablet (25 mg total) by mouth once daily. 90 tablet 4    estradioL (ESTRACE) 0.01 % (0.1 mg/gram) vaginal cream Place 1 g vaginally twice a week. 42 g 0    fluticasone propionate (FLONASE) 50 mcg/actuation nasal spray       galcanezumab-gnlm (EMGALITY PEN) 120 mg/mL PnIj Inject 1 mL (120 mg total) into the skin every 28 days. 1 mL 11    HYDROcodone-acetaminophen (NORCO)  mg per tablet Take by mouth daily as needed.      hydrOXYzine pamoate (VISTARIL) 25 MG Cap Take 1 capsule (25 mg total) by mouth 3 (three) times daily as needed (anxiety). 270 capsule 3    ipratropium (ATROVENT) 42 mcg (0.06 %) nasal spray 2 sprays by Each Nostril route 3 (three) times daily as needed for Rhinitis. 15 mL 3    levocetirizine (XYZAL) 5 MG tablet Take 5 mg by mouth once daily.      losartan (COZAAR) 100 MG tablet Take 1 tablet (100 mg total) by mouth once daily. 90 tablet 3    methocarbamoL (ROBAXIN) 500 MG Tab Take 500 mg by mouth 3 (three) times daily.      nitroGLYCERIN (NITROSTAT) 0.4 MG SL tablet Place 0.4 mg  under the tongue.      pantoprazole (PROTONIX) 40 MG tablet Take 1 tablet (40 mg total) by mouth once daily. 90 tablet 3    traZODone (DESYREL) 150 MG tablet Take 1 -2 tablets by mouth before bed as needed for insomnia 60 tablet 11    ubrogepant (UBRELVY) 100 mg tablet Take 1 tablet (100 mg total) by mouth every 2 (two) hours as needed for Migraine. Triptans contraindicated. Maximum: 200 mg per 24 hours 16 tablet 3    venlafaxine (EFFEXOR-XR) 150 MG Cp24 Take 1 capsule (150 mg total) by mouth once daily. 90 capsule 3    [DISCONTINUED] lactulose (CHRONULAC) 10 gram/15 mL solution Take 10 g by mouth every evening.      azithromycin (Z-VALERIE) 250 MG tablet Take 2 tablets by mouth on day 1; Take 1 tablet by mouth on days 2-5 6 tablet 0    lactulose (CHRONULAC) 10 gram/15 mL solution Take 15 mLs (10 g total) by mouth every morning. 450 mL 5    [] methocarbamoL (ROBAXIN) 500 MG Tab Take 1 tablet (500 mg total) by mouth nightly as needed. 60 tablet 1    methylPREDNISolone (MEDROL DOSEPACK) 4 mg tablet use as directed 21 tablet 0    [DISCONTINUED] citalopram (CELEXA) 10 MG tablet Take 10 mg by mouth once daily.      [DISCONTINUED] lurasidone (LATUDA) 40 mg Tab tablet Take 1 tablet (40 mg total) by mouth once daily. 30 tablet 5     Facility-Administered Encounter Medications as of 2025   Medication Dose Route Frequency Provider Last Rate Last Admin    [COMPLETED] influenza (adjuvanted) (Fluad) 45 mcg/0.5 mL IM vaccine (> or = 64 yo) 0.5 mL  0.5 mL Intramuscular 1 time in Clinic/HOD Abdirahman Luu MD   0.5 mL at 25 1007

## 2025-01-07 ENCOUNTER — OFFICE VISIT (OUTPATIENT)
Dept: BARIATRICS | Facility: CLINIC | Age: 65
End: 2025-01-07
Payer: MEDICARE

## 2025-01-07 VITALS
HEART RATE: 80 BPM | BODY MASS INDEX: 34.13 KG/M2 | OXYGEN SATURATION: 97 % | HEIGHT: 68 IN | WEIGHT: 225.19 LBS | DIASTOLIC BLOOD PRESSURE: 61 MMHG | SYSTOLIC BLOOD PRESSURE: 124 MMHG

## 2025-01-07 DIAGNOSIS — I25.119 CORONARY ARTERY DISEASE INVOLVING NATIVE CORONARY ARTERY OF NATIVE HEART WITH ANGINA PECTORIS: ICD-10-CM

## 2025-01-07 DIAGNOSIS — E66.09 CLASS 1 OBESITY DUE TO EXCESS CALORIES WITH SERIOUS COMORBIDITY AND BODY MASS INDEX (BMI) OF 34.0 TO 34.9 IN ADULT: Primary | ICD-10-CM

## 2025-01-07 DIAGNOSIS — E66.811 CLASS 1 OBESITY DUE TO EXCESS CALORIES WITH SERIOUS COMORBIDITY AND BODY MASS INDEX (BMI) OF 34.0 TO 34.9 IN ADULT: Primary | ICD-10-CM

## 2025-01-07 DIAGNOSIS — I10 PRIMARY HYPERTENSION: ICD-10-CM

## 2025-01-07 DIAGNOSIS — I25.2 OLD MI (MYOCARDIAL INFARCTION): ICD-10-CM

## 2025-01-07 DIAGNOSIS — Z71.3 ENCOUNTER FOR WEIGHT LOSS COUNSELING: ICD-10-CM

## 2025-01-07 DIAGNOSIS — G47.33 OSA (OBSTRUCTIVE SLEEP APNEA): ICD-10-CM

## 2025-01-07 PROCEDURE — 3288F FALL RISK ASSESSMENT DOCD: CPT | Mod: CPTII,S$GLB,, | Performed by: STUDENT IN AN ORGANIZED HEALTH CARE EDUCATION/TRAINING PROGRAM

## 2025-01-07 PROCEDURE — 1160F RVW MEDS BY RX/DR IN RCRD: CPT | Mod: CPTII,S$GLB,, | Performed by: STUDENT IN AN ORGANIZED HEALTH CARE EDUCATION/TRAINING PROGRAM

## 2025-01-07 PROCEDURE — 99999 PR PBB SHADOW E&M-EST. PATIENT-LVL IV: CPT | Mod: PBBFAC,,, | Performed by: STUDENT IN AN ORGANIZED HEALTH CARE EDUCATION/TRAINING PROGRAM

## 2025-01-07 PROCEDURE — 1126F AMNT PAIN NOTED NONE PRSNT: CPT | Mod: CPTII,S$GLB,, | Performed by: STUDENT IN AN ORGANIZED HEALTH CARE EDUCATION/TRAINING PROGRAM

## 2025-01-07 PROCEDURE — 99213 OFFICE O/P EST LOW 20 MIN: CPT | Mod: S$GLB,,, | Performed by: STUDENT IN AN ORGANIZED HEALTH CARE EDUCATION/TRAINING PROGRAM

## 2025-01-07 PROCEDURE — 3008F BODY MASS INDEX DOCD: CPT | Mod: CPTII,S$GLB,, | Performed by: STUDENT IN AN ORGANIZED HEALTH CARE EDUCATION/TRAINING PROGRAM

## 2025-01-07 PROCEDURE — 3078F DIAST BP <80 MM HG: CPT | Mod: CPTII,S$GLB,, | Performed by: STUDENT IN AN ORGANIZED HEALTH CARE EDUCATION/TRAINING PROGRAM

## 2025-01-07 PROCEDURE — 1159F MED LIST DOCD IN RCRD: CPT | Mod: CPTII,S$GLB,, | Performed by: STUDENT IN AN ORGANIZED HEALTH CARE EDUCATION/TRAINING PROGRAM

## 2025-01-07 PROCEDURE — 3074F SYST BP LT 130 MM HG: CPT | Mod: CPTII,S$GLB,, | Performed by: STUDENT IN AN ORGANIZED HEALTH CARE EDUCATION/TRAINING PROGRAM

## 2025-01-07 PROCEDURE — 1101F PT FALLS ASSESS-DOCD LE1/YR: CPT | Mod: CPTII,S$GLB,, | Performed by: STUDENT IN AN ORGANIZED HEALTH CARE EDUCATION/TRAINING PROGRAM

## 2025-01-07 RX ORDER — SEMAGLUTIDE 0.5 MG/.5ML
0.5 INJECTION, SOLUTION SUBCUTANEOUS
Qty: 2 ML | Refills: 0 | Status: SHIPPED | OUTPATIENT
Start: 2025-02-04 | End: 2025-02-26

## 2025-01-07 RX ORDER — SEMAGLUTIDE 1 MG/.5ML
1 INJECTION, SOLUTION SUBCUTANEOUS
Qty: 2 ML | Refills: 0 | Status: SHIPPED | OUTPATIENT
Start: 2025-03-04 | End: 2025-03-26

## 2025-01-07 RX ORDER — SEMAGLUTIDE 0.25 MG/.5ML
0.25 INJECTION, SOLUTION SUBCUTANEOUS
Qty: 2 ML | Refills: 0 | Status: SHIPPED | OUTPATIENT
Start: 2025-01-07 | End: 2025-01-29

## 2025-01-07 NOTE — PROGRESS NOTES
Subjective:       Patient ID: Jennifer Brady is a 64 y.o. female.    Chief Complaint: Follow-up, Obesity, and Weight Check    Patient presents for treatment of obesity.       Co-morbidities  Migraines - was on topiramate, but was discontinued because didn't help with headaches  REN  MI  CAD s/p PTCA  HTN  HLD    Weight History  Lowest adult weight: 165 lbs  Highest adult weight: 220 lbs         Current Physical Activity  No regular exercise, some walking but not consistent    Current Eating Habits  Breakfast - skips most days, occassional cup of coffee  Sherbert  Potatoes - mashed, roasted  White beans  Roast  Turkey necks   Biscuits  Cabbage  Gumbo  Recently stopped drinking sweet tea and lemonade      Medical Weight Loss  2/13/2023: 216.2 lbs, BMI 32.9, BFP 47.6%, .9 lbs, SMM 62.4 lbs, BMR 1480 kcal  5/11/2023: 209.1 lbs, BMI 31.8, BFP 45.8%, BFM 95.8 lbs, SMM 62.4 lbs, BMR 1481 kcal  7/13/2023: 204.6 lbs, BMI 31.1, BFP 44.4%, BFM 90.9 lbs, SMM 62.6 lbs, BMR 1485 kcal  10/2/2023: 202.1 lbs, BMI 30.7, BFP 43.1%, BFM 87 lbs, SMM 64.4 lbs, BMR 1498 kcal  1/3/2024: 202.7 lbs, BMI 30.8, BFP 42.3%, BFM 85.8 lbs, SMM 64.4 lbs, BMR 1516 kcal  4/4/2024: 211.7 lbs, BMI 32.2, BFP 37.3%, BFM 79 lbs, SMM 75 lbs, BMR 1670 kcal  6/27/2024: 224.4 lbs, BMI 34.1, BFP 47.1%, .8 lbs, SMM 65.5 lbs, BMR 1532 kcal  9/26/2024: 224.9 lbs, BMI 34.2, BFP 49%, .3 lbs, SMM 63.3 lbs, BMR 1494 kcal  1/7/2025: 225.2 lbs, BMI 34.2, BFP 47.3%, .6 lbs, SMM 65.7 lbs, BMR 1532 kcal      Review of Systems   Constitutional:  Negative for chills and fever.   Respiratory:  Negative for shortness of breath.    Cardiovascular:  Negative for chest pain and palpitations.   Gastrointestinal:  Negative for abdominal pain, nausea and vomiting.   Neurological:  Negative for dizziness and light-headedness.   Psychiatric/Behavioral:  The patient is not nervous/anxious.          Objective:       Latest Reference Range & Units  12/14/24 09:38   WBC 3.90 - 12.70 K/uL 10.11   RBC 4.00 - 5.40 M/uL 3.95 (L)   Hemoglobin 12.0 - 16.0 g/dL 12.2   Hematocrit 37.0 - 48.5 % 38.4   MCV 82 - 98 fL 97   MCH 27.0 - 31.0 pg 30.9   MCHC 32.0 - 36.0 g/dL 31.8 (L)   RDW 11.5 - 14.5 % 12.4   Platelet Count 150 - 450 K/uL 412   MPV 9.2 - 12.9 fL 8.6 (L)   Gran % 38.0 - 73.0 % 46.6   Lymph % 18.0 - 48.0 % 44.7   Mono % 4.0 - 15.0 % 5.9   Eos % 0.0 - 8.0 % 1.9   Basophil % 0.0 - 1.9 % 0.5   Immature Granulocytes 0.0 - 0.5 % 0.4   Gran # (ANC) 1.8 - 7.7 K/uL 4.7   Lymph # 1.0 - 4.8 K/uL 4.5   Mono # 0.3 - 1.0 K/uL 0.6   Eos # 0.0 - 0.5 K/uL 0.2   Baso # 0.00 - 0.20 K/uL 0.05   Immature Grans (Abs) 0.00 - 0.04 K/uL 0.04   nRBC 0 /100 WBC 0   Differential Method  Automated   Iron 30 - 160 ug/dL 112   TIBC 250 - 450 ug/dL 345   Saturated Iron 20 - 50 % 32   Transferrin 200 - 375 mg/dL 233   Ferritin 20.0 - 300.0 ng/mL 283   Folate 4.0 - 24.0 ng/mL 8.3   Vitamin B12 210 - 950 pg/mL 299   Sodium 136 - 145 mmol/L 141   Potassium 3.5 - 5.1 mmol/L 3.7   Chloride 95 - 110 mmol/L 104   CO2 23 - 29 mmol/L 28   Anion Gap 8 - 16 mmol/L 9   BUN 8 - 23 mg/dL 15   Creatinine 0.5 - 1.4 mg/dL 1.1   eGFR >60 mL/min/1.73 m^2 55.4 !   Glucose 70 - 110 mg/dL 100   Calcium 8.7 - 10.5 mg/dL 9.6   Magnesium  1.6 - 2.6 mg/dL 2.2   ALP 40 - 150 U/L 143   PROTEIN TOTAL 6.0 - 8.4 g/dL 7.2   Albumin 3.5 - 5.2 g/dL 3.6   BILIRUBIN TOTAL 0.1 - 1.0 mg/dL 0.5   AST 10 - 40 U/L 20   ALT 10 - 44 U/L 22   Cholesterol Total 120 - 199 mg/dL 198   HDL 40 - 75 mg/dL 54   HDL/Cholesterol Ratio 20.0 - 50.0 % 27.3   Non-HDL Cholesterol mg/dL 144   Total Cholesterol/HDL Ratio 2.0 - 5.0  3.7   Triglycerides 30 - 150 mg/dL 106   LDL Cholesterol 63.0 - 159.0 mg/dL 122.8   Hemoglobin A1C External 4.0 - 5.6 % 5.1   Estimated Avg Glucose 68 - 131 mg/dL 100   TSH 0.400 - 4.000 uIU/mL 1.322   (L): Data is abnormally low  !: Data is abnormal    CV U/S 1/2020:  Moderate heterogenous plaques in right common femoral,  SFA and popliteal arteries, with a moderate stenosis of the proximal Right SFA  Moderate heterogenous plaques in left common femoral, SFA and popliteal arteries with a moderate stenosis of the left common femoral artery.  Left WOODROW: 1.21  Right WOODROW 0.99      EKG 4/2018:  Normal sinus rhythm   Septal infarct ,age undetermined   Marked ST abnormality, possible inferior subendocardial injury   Abnormal ECG       Vitals:    01/07/25 0907   BP: 124/61   Pulse: 80           Physical Exam  Vitals reviewed.   Constitutional:       General: She is not in acute distress.     Appearance: Normal appearance. She is obese. She is not ill-appearing, toxic-appearing or diaphoretic.   HENT:      Head: Normocephalic and atraumatic.   Cardiovascular:      Rate and Rhythm: Normal rate.   Pulmonary:      Effort: Pulmonary effort is normal. No respiratory distress.   Skin:     General: Skin is warm and dry.   Neurological:      Mental Status: She is alert and oriented to person, place, and time.         Assessment:       Problem List Items Addressed This Visit       HTN (hypertension)    Relevant Medications    semaglutide, weight loss, (WEGOVY) 0.25 mg/0.5 mL PnIj    semaglutide, weight loss, (WEGOVY) 0.5 mg/0.5 mL PnIj (Start on 2/4/2025)    semaglutide, weight loss, (WEGOVY) 1 mg/0.5 mL PnIj (Start on 3/4/2025)    REN (obstructive sleep apnea)    Relevant Medications    semaglutide, weight loss, (WEGOVY) 0.25 mg/0.5 mL PnIj    semaglutide, weight loss, (WEGOVY) 0.5 mg/0.5 mL PnIj (Start on 2/4/2025)    semaglutide, weight loss, (WEGOVY) 1 mg/0.5 mL PnIj (Start on 3/4/2025)    Old MI (myocardial infarction)    Relevant Medications    semaglutide, weight loss, (WEGOVY) 0.25 mg/0.5 mL PnIj    semaglutide, weight loss, (WEGOVY) 0.5 mg/0.5 mL PnIj (Start on 2/4/2025)    semaglutide, weight loss, (WEGOVY) 1 mg/0.5 mL PnIj (Start on 3/4/2025)    Coronary artery disease involving native coronary artery of native heart with angina pectoris     Relevant Medications    semaglutide, weight loss, (WEGOVY) 0.25 mg/0.5 mL PnIj    semaglutide, weight loss, (WEGOVY) 0.5 mg/0.5 mL PnIj (Start on 2/4/2025)    semaglutide, weight loss, (WEGOVY) 1 mg/0.5 mL PnIj (Start on 3/4/2025)    Obesity - Primary    Relevant Medications    semaglutide, weight loss, (WEGOVY) 0.25 mg/0.5 mL PnIj    semaglutide, weight loss, (WEGOVY) 0.5 mg/0.5 mL PnIj (Start on 2/4/2025)    semaglutide, weight loss, (WEGOVY) 1 mg/0.5 mL PnIj (Start on 3/4/2025)     Other Visit Diagnoses       Encounter for weight loss counseling        Relevant Medications    semaglutide, weight loss, (WEGOVY) 0.25 mg/0.5 mL PnIj    semaglutide, weight loss, (WEGOVY) 0.5 mg/0.5 mL PnIj (Start on 2/4/2025)    semaglutide, weight loss, (WEGOVY) 1 mg/0.5 mL PnIj (Start on 3/4/2025)                    Plan:   - Wegovy - sent to [facility] to complete PA    - Log all food and beverage intake with a daily calorie goal of 1500 calories per day    - Moderate intensity aerobic exercise for 30 minuets 3-5x/week

## 2025-01-14 ENCOUNTER — OFFICE VISIT (OUTPATIENT)
Dept: PSYCHIATRY | Facility: CLINIC | Age: 65
End: 2025-01-14
Payer: COMMERCIAL

## 2025-01-14 DIAGNOSIS — F41.1 GAD (GENERALIZED ANXIETY DISORDER): Primary | ICD-10-CM

## 2025-01-14 PROCEDURE — 90834 PSYTX W PT 45 MINUTES: CPT | Mod: 95,,, | Performed by: SOCIAL WORKER

## 2025-01-14 NOTE — PROGRESS NOTES
Individual Psychotherapy (PhD/LCSW)    1/14/2025    Site:  [facility]         Therapeutic Intervention: Met with patient.  Outpatient - Supportive psychotherapy 30 min - CPT Code 47660    Chief complaint/reason for encounter: anxiety     Interval history and content of current session: The patient location is: home  The chief complaint leading to consultation is: anxiety    Visit type: audiovisual    Face to Face time with patient: 30  40 minutes of total time spent on the encounter, which includes face to face time and non-face to face time preparing to see the patient (eg, review of tests), Obtaining and/or reviewing separately obtained history, Documenting clinical information in the electronic or other health record, Independently interpreting results (not separately reported) and communicating results to the patient/family/caregiver, or Care coordination (not separately reported).         Each patient to whom he or she provides medical services by telemedicine is:  (1) informed of the relationship between the physician and patient and the respective role of any other health care provider with respect to management of the patient; and (2) notified that he or she may decline to receive medical services by telemedicine and may withdraw from such care at any time.    Notes:  Pt has not been seen by me in almost 3 years.  She states she decided it was time to check in with me.  She reports she has been doing well and has been able to let go of worries that she has had.  She has gotten closer to her daughter and her family who live in the area and has enjoyed spending more time with them.  She recently paid off her house.  She needs to exercise and take better care of herself according to chart notes and I reiterated her need to do this.    Treatment plan:  Target symptoms: anxiety   Why chosen therapy is appropriate versus another modality: relevant to diagnosis  Outcome monitoring methods: self-report,  observation  Therapeutic intervention type: supportive psychotherapy    Risk parameters:  Patient reports no suicidal ideation  Patient reports no homicidal ideation  Patient reports no self-injurious behavior  Patient reports no violent behavior    Verbal deficits: None    Patient's response to intervention:  The patient's response to intervention is accepting.    Progress toward goals and other mental status changes:  The patient's progress toward goals is good.    Diagnosis:     ICD-10-CM ICD-9-CM   1. LAURA (generalized anxiety disorder)  F41.1 300.02       Plan:  individual psychotherapy and medication management by physician    Return to clinic: as needed    Length of Service (minutes): 30

## 2025-01-17 ENCOUNTER — OFFICE VISIT (OUTPATIENT)
Dept: OTOLARYNGOLOGY | Facility: CLINIC | Age: 65
End: 2025-01-17
Payer: MEDICARE

## 2025-01-17 ENCOUNTER — TELEPHONE (OUTPATIENT)
Dept: PULMONOLOGY | Facility: CLINIC | Age: 65
End: 2025-01-17
Payer: MEDICARE

## 2025-01-17 VITALS
DIASTOLIC BLOOD PRESSURE: 62 MMHG | BODY MASS INDEX: 34.73 KG/M2 | HEART RATE: 75 BPM | SYSTOLIC BLOOD PRESSURE: 140 MMHG | WEIGHT: 225.06 LBS

## 2025-01-17 DIAGNOSIS — J32.9 CHRONIC SINUSITIS, UNSPECIFIED LOCATION: Primary | ICD-10-CM

## 2025-01-17 DIAGNOSIS — J34.2 NASAL SEPTAL DEVIATION: ICD-10-CM

## 2025-01-17 DIAGNOSIS — R04.0 EPISTAXIS: ICD-10-CM

## 2025-01-17 DIAGNOSIS — J34.3 HYPERTROPHY OF BOTH INFERIOR NASAL TURBINATES: ICD-10-CM

## 2025-01-17 PROCEDURE — 31231 NASAL ENDOSCOPY DX: CPT | Mod: S$GLB,,, | Performed by: STUDENT IN AN ORGANIZED HEALTH CARE EDUCATION/TRAINING PROGRAM

## 2025-01-17 PROCEDURE — 3077F SYST BP >= 140 MM HG: CPT | Mod: CPTII,S$GLB,, | Performed by: STUDENT IN AN ORGANIZED HEALTH CARE EDUCATION/TRAINING PROGRAM

## 2025-01-17 PROCEDURE — 3008F BODY MASS INDEX DOCD: CPT | Mod: CPTII,S$GLB,, | Performed by: STUDENT IN AN ORGANIZED HEALTH CARE EDUCATION/TRAINING PROGRAM

## 2025-01-17 PROCEDURE — 99999 PR PBB SHADOW E&M-EST. PATIENT-LVL IV: CPT | Mod: PBBFAC,,, | Performed by: STUDENT IN AN ORGANIZED HEALTH CARE EDUCATION/TRAINING PROGRAM

## 2025-01-17 PROCEDURE — 1126F AMNT PAIN NOTED NONE PRSNT: CPT | Mod: CPTII,S$GLB,, | Performed by: STUDENT IN AN ORGANIZED HEALTH CARE EDUCATION/TRAINING PROGRAM

## 2025-01-17 PROCEDURE — 99214 OFFICE O/P EST MOD 30 MIN: CPT | Mod: 25,S$GLB,, | Performed by: STUDENT IN AN ORGANIZED HEALTH CARE EDUCATION/TRAINING PROGRAM

## 2025-01-17 PROCEDURE — 1159F MED LIST DOCD IN RCRD: CPT | Mod: CPTII,S$GLB,, | Performed by: STUDENT IN AN ORGANIZED HEALTH CARE EDUCATION/TRAINING PROGRAM

## 2025-01-17 PROCEDURE — 3078F DIAST BP <80 MM HG: CPT | Mod: CPTII,S$GLB,, | Performed by: STUDENT IN AN ORGANIZED HEALTH CARE EDUCATION/TRAINING PROGRAM

## 2025-01-17 PROCEDURE — 1160F RVW MEDS BY RX/DR IN RCRD: CPT | Mod: CPTII,S$GLB,, | Performed by: STUDENT IN AN ORGANIZED HEALTH CARE EDUCATION/TRAINING PROGRAM

## 2025-01-17 RX ORDER — MUPIROCIN 20 MG/G
OINTMENT TOPICAL DAILY
Qty: 44 G | Refills: 0 | Status: SHIPPED | OUTPATIENT
Start: 2025-01-17

## 2025-01-17 NOTE — PROGRESS NOTES
Subjective:      Jennifer Sutherland is a 64 y.o. female who comes for follow-up of  nasal obstruction .  Her last visit with me was on 2/20/24.  She reports her breathing has improved through her nose.  Was seen by her primary care doctor not that long ago and treated for acute sinusitis.  Reports his symptoms have been resolved after finishing course of antibiotics.  Notes that for the last few weeks has had some episodic bleeding bilaterally from her nose.    Her current sinus regime consists of: no medications.     The patient's medications, allergies, past medical, surgical, social and family histories were reviewed and updated as appropriate.     A detailed review of systems was obtained with pertinent positives as per the above HPI, and otherwise negative.        Objective:     BP (!) 140/62 (BP Location: Left arm, Patient Position: Sitting)   Pulse 75   Wt 102.1 kg (225 lb 1.4 oz)   BMI 34.73 kg/m²        Constitutional:   Vital signs are normal. She appears well-developed. Normal speech.      Head:  Normocephalic and atraumatic.     Ears:    Right Ear: No drainage or tenderness. No middle ear effusion.   Left Ear: No drainage or tenderness.  No middle ear effusion.     Mouth/Throat  Oropharynx clear and moist without lesions or asymmetry and normal uvula midline. No trismus. No oropharyngeal exudate. Mirror exam not performed due to patient tolerance.  Mirror exam not performed due to patient tolerance.      Neck:  Neck normal without thyromegaly masses, asymmetry, normal tracheal structure, crepitus, and tenderness, thyroid normal and trachea normal.     Pulmonary/Chest:   Effort normal.     Psychiatric:   She has a normal mood and affect. Her speech is normal.     Skin:   No abrasions, lacerations, lesions, or rashes.     Positive modified maverick.     Procedure    Nasal endoscopy performed.  See procedure note.    Nasal Endoscopy:  1/17/2025    The use of diagnostic nasal endoscopy was considered  "medically necessary for the evaluation and visualization of the nasal anatomy for symptoms suggestive of nasal or sinus origin. Physical examination (including a nasal speculum evaluation) did not provide sufficient clinical information to establish a diagnosis, or symptoms did not improve or worsened following treatment.     The nasal cavity was decongested with topical 1% phenylephrine and anesthetized with 4% lidocaine.  A rigid 0-degree endoscope was introduced into the nasal cavity.    The patient was seated in the examination chair. After discussion of risks and benefits, a nasal endoscope was inserted into the nose the endoscope was passed along the left nasal floor to the nasopharynx. It was then passed between the middle and superior meatus, nasal turbinates, nasal septum, nasopharynx and sphenoethmoid region. The nasal endoscope was withdrawn and there was no complications. An identical procedure was performed on the right side. I was present for the entire procedure.The patient tolerated the above procedure well. The findings of this procedure can be found in the dictated note from 1/17/2025 visit.                      Left septal deviation with spur some irritation anteriorly. Hypertrophic inferior turbinates bilaterally. No nasal masses or nasal polyposis. No purulent drainage in the middle meatus. Nasopharynx clear without lesions. Eustachian tubes WNL.      Data Reviewed    WBC (K/uL)   Date Value   12/14/2024 10.11     Eosinophil % (%)   Date Value   12/14/2024 1.9     Eos # (K/uL)   Date Value   12/14/2024 0.2     Platelets (K/uL)   Date Value   12/14/2024 412     Glucose (mg/dL)   Date Value   12/14/2024 100     No results found for: "IGE"    No sinus imaging available.      Assessment:     1. Chronic sinusitis, unspecified location    2. Nasal septal deviation    3. Hypertrophy of both inferior nasal turbinates    4. Epistaxis       Plan:     - mupirocin in her nose nightly.  - nasal saline " p.r.n.  - RTC 6 months.    Tomas Marshall MD

## 2025-02-19 ENCOUNTER — OFFICE VISIT (OUTPATIENT)
Dept: OTOLARYNGOLOGY | Facility: CLINIC | Age: 65
End: 2025-02-19
Payer: MEDICARE

## 2025-02-19 DIAGNOSIS — J30.9 ALLERGIC RHINITIS, UNSPECIFIED SEASONALITY, UNSPECIFIED TRIGGER: Primary | ICD-10-CM

## 2025-02-19 DIAGNOSIS — J45.20 MILD INTERMITTENT ASTHMA WITHOUT COMPLICATION: ICD-10-CM

## 2025-02-19 DIAGNOSIS — G47.33 OSA (OBSTRUCTIVE SLEEP APNEA): ICD-10-CM

## 2025-02-19 NOTE — PROGRESS NOTES
The patient location is:  Patient's home  The chief complaint leading to consultation is:  Establishing care  Visit type: Virtual visit with synchronous audio and video  Total time spent with patient:  10 minutes  Each patient to whom he or she provides medical services by telemedicine is:  (1) informed of the relationship between the physician and patient and the respective role of any other health care provider with respect to management of the patient; and (2) notified that he or she may decline to receive medical services by telemedicine and may withdraw from such care at any time.    Past Medical History:   Diagnosis Date    Anxiety     Arthritis     Asthma     Behavioral problem     Borderline personality disorder     Depression     Depression     Fatigue     Hx of psychiatric care     Hyperlipidemia     Psychiatric problem     S/P hysterectomy with oophorectomy     Sleep apnea     pt uses CPAP    Syncope and collapse     Therapy        Past Surgical History:   Procedure Laterality Date    CORONARY ANGIOPLASTY      HIP REPLACEMENT ARTHROPLASTY Right 12/21/2022    HYSTERECTOMY  1999    INJECTION OF ANESTHETIC AGENT AROUND NERVE Right 11/20/2019    Procedure: MEDIAL BRANCH BLOCK RIGHT L3, L4, L5;  Surgeon: Petros Reich MD;  Location: [facility];  Service: Pain Management;  Laterality: Right;  NEEDS CONSENT, PT NO LONGER TAKES PLAVIX    INJECTION OF ANESTHETIC AGENT AROUND NERVE Right 06/17/2020    Procedure: BLOCK, NERVE RIGHT L3, 4, 5 MEDIAL BRANCH;  Surgeon: Petros Reich MD;  Location: [facility];  Service: Pain Management;  Laterality: Right;  NEEDS CONSENT?    RADIOFREQUENCY ABLATION Right 05/12/2021    Procedure: RADIOFREQUENCY ABLATION RIGHT L3, 4, 5;  Surgeon: Petros Reich MD;  [facility];  Service: Pain Management;  Laterality: Right;  NEEDS CONSENT    TILT TABLE TEST N/A 03/22/2019    Procedure: TILT TABLE TEST;  Surgeon: Roman Lala MD;  Location: [facility];   Service: Cardiology;  Laterality: N/A;  seizure/migraine, HUT referred by Dr Perea Neuro, EEG confirmed    TRANSFORAMINAL EPIDURAL INJECTION OF STEROID Right 10/14/2020    Procedure: LUMBAR TRANSFORAMINAL RIGHT L4/L5;  Surgeon: Petros Reich MD;  Location: [facility];  Service: Pain Management;  Laterality: Right;  NEEDS CONSENT       Family History   Problem Relation Name Age of Onset    Hypertension Paternal Grandfather      Hypertension Paternal Grandmother      Heart attacks under age 50 Maternal Grandmother      Hypertension Maternal Grandmother      Hypertension Maternal Grandfather      Heart attacks under age 50 Father  33    Hypertension Father      Seizures Mother      Heart attack Mother      Hypertension Mother      Stroke Mother      Hypertension Sister      Stroke Sister      Seizures Sister      Breast cancer Neg Hx      Colon cancer Neg Hx      Diabetes Neg Hx      Ovarian cancer Neg Hx      Cancer Neg Hx         Social History     Socioeconomic History    Marital status:    Occupational History     Employer: [employer]     Comment: [employer]   Tobacco Use    Smoking status: Some Days     Current packs/day: 0.00     Average packs/day: 0.3 packs/day for 35.8 years (9.0 ttl pk-yrs)     Types: Cigarettes     Start date: 1986     Last attempt to quit: 2022     Years since quittin.5     Passive exposure: Past    Smokeless tobacco: Never    Tobacco comments:     quit actual cigarettes a few mo ago, switched to e cigarette   Substance and Sexual Activity    Alcohol use: Yes     Comment: Socially    Drug use: No    Sexual activity: Not Currently     Partners: Male   Other Topics Concern    Financial Status: Employed Yes    Caffeine Use: Moderate Yes    Spirituality: Organized Islam Yes    Patient feels they ought to cut down on drinking/drug use No    Patient annoyed by others criticizing their drinking/drug use No    Patient has felt  bad or guilty about drinking/drug use No    Patient has had a drink/used drugs as an eye opener in the AM No   Social History Narrative    Patient discusses recent decisions she has made that she is pleased about; has clarified for self and others her values and standards.  Though she is aware of difficulty of  struggle, feels new confidence about  results.  She expresses sense of wholeness and independence at this point. Her previously explored painful symptoms are improved (i.e, .diminished - 2or3/10), thought processes are logical and outlook is optimistic, as well as realistic.    Discusses trip she's planned.  intent to reschedule after return in mid-August. She expresses feelings of appreciation     Social Drivers of Health     Financial Resource Strain: Low Risk  (2/19/2025)    Overall Financial Resource Strain (CARDIA)     Difficulty of Paying Living Expenses: Not hard at all   Food Insecurity: No Food Insecurity (2/19/2025)    Hunger Vital Sign     Worried About Running Out of Food in the Last Year: Never true     Ran Out of Food in the Last Year: Never true   Transportation Needs: No Transportation Needs (2/19/2025)    PRAPARE - Transportation     Lack of Transportation (Medical): No     Lack of Transportation (Non-Medical): No   Physical Activity: Insufficiently Active (2/19/2025)    Exercise Vital Sign     Days of Exercise per Week: 3 days     Minutes of Exercise per Session: 10 min   Stress: No Stress Concern Present (2/19/2025)    [employer] [place] [employer] - Occupational Stress Questionnaire     Feeling of Stress : Not at all   Recent Concern: Stress - Stress Concern Present (1/14/2025)    [employer] [place] [employer] - Occupational Stress Questionnaire     Feeling of Stress : To some extent   Housing Stability: Low Risk  (2/19/2025)    Housing Stability Vital Sign     Unable to Pay for Housing in the Last Year: No     Homeless in the Last Year: No       Current  Medications[1]    Review of patient's allergies indicates:  No Known Allergies     HISTORY:   I have recently seen the patient's daughter, who thought that her mother should come seek care with me a made this appointment.  She does have allergy/sinus problems for which she uses azelastine and fluticasone sprays routinely and levo cetirizine on a daily basis.  She has ipratropium bromide spray which she uses less frequently she has intermittent asthma for which she uses a Symbicort 80/4.5 inhaler twice daily routinely.  She needs to use her rescue inhaler on average once weekly.  She also has obstructive sleep apnea.        REVIEW OF SYSTEMS:  Answers submitted by the patient for this visit:  Review of Symptoms Questionnaire  (Submitted on 2/19/2025)  Unexpected weight loss?: Yes  sinus pressure : Yes  postnasal drip: Yes  None of these : Yes  Snoring?: Yes  Sleep Apnea?: Yes  wheezing: Yes  None of these : Yes  None of these: Yes  None of these: Yes  Muscle aches / pain?: Yes  back pain: Yes  neck pain: Yes  None of these : Yes  None of these: Yes  None of these : Yes  headaches: Yes  None of these: Yes  None of these: Yes    PHYSICAL EXAMINATION:  General-Well developed, well nourished adult female in no acute distress, alert, communicative and pleasant  HEENT-no obvious nasal discharge no issues with hoarseness    ASSESSMENT:    ICD-10-CM ICD-9-CM   1. Allergic rhinitis, unspecified seasonality, unspecified trigger  J30.9 477.9   2. Mild intermittent asthma without complication  J45.20 493.90   3. REN (obstructive sleep apnea)  G47.33 327.23         RECOMMENDATIONS    I will have the patient continue with her present medications.  My office will contact the patient to set up an appointment.  I would like her to undergo comprehensive auditory evaluation prior to seeing me.         [1]   Current Outpatient Medications   Medication Sig Dispense Refill    albuterol (PROVENTIL/VENTOLIN HFA) 90 mcg/actuation inhaler  Inhale 2 puffs into the lungs every 4 (four) hours as needed for Wheezing.      aspirin (ECOTRIN) 81 MG EC tablet Take 1 tablet (81 mg total) by mouth once daily. 90 tablet 3    atorvastatin (LIPITOR) 40 MG tablet Take 1 tablet (40 mg total) by mouth once daily. 90 tablet 3    azelastine (ASTELIN) 137 mcg (0.1 %) nasal spray 2 sprays (274 mcg total) by Nasal route 2 (two) times daily. 90 mL 4    budesonide-formoterol 80-4.5 mcg (SYMBICORT) 80-4.5 mcg/actuation HFAA Inhale 2 puffs into the lungs 2 (two) times a day. Controller 10.2 g 11    chlorthalidone (HYGROTEN) 25 MG Tab Take 1 tablet (25 mg total) by mouth once daily. 90 tablet 4    estradioL (ESTRACE) 0.01 % (0.1 mg/gram) vaginal cream Place 1 g vaginally twice a week. 42 g 0    fluticasone propionate (FLONASE) 50 mcg/actuation nasal spray       galcanezumab-gnlm (EMGALITY PEN) 120 mg/mL PnIj Inject 1 mL (120 mg total) into the skin every 28 days. 1 mL 11    HYDROcodone-acetaminophen (NORCO)  mg per tablet Take by mouth daily as needed.      hydrOXYzine pamoate (VISTARIL) 25 MG Cap Take 1 capsule (25 mg total) by mouth 3 (three) times daily as needed (anxiety). 270 capsule 3    ipratropium (ATROVENT) 42 mcg (0.06 %) nasal spray 2 sprays by Each Nostril route 3 (three) times daily as needed for Rhinitis. 15 mL 3    lactulose (CHRONULAC) 10 gram/15 mL solution Take 15 mLs (10 g total) by mouth every morning. 450 mL 5    levocetirizine (XYZAL) 5 MG tablet Take 5 mg by mouth once daily.      losartan (COZAAR) 100 MG tablet Take 1 tablet (100 mg total) by mouth once daily. 90 tablet 3    methocarbamoL (ROBAXIN) 500 MG Tab Take 500 mg by mouth 3 (three) times daily.      methylPREDNISolone (MEDROL DOSEPACK) 4 mg tablet use as directed 21 tablet 0    mupirocin (BACTROBAN) 2 % ointment Apply topically once daily. 44 g 0    nitroGLYCERIN (NITROSTAT) 0.4 MG SL tablet Place 0.4 mg under the tongue.      pantoprazole (PROTONIX) 40 MG tablet Take 1 tablet (40 mg  total) by mouth once daily. 90 tablet 3    semaglutide, weight loss, (WEGOVY) 0.5 mg/0.5 mL PnIj Inject 0.5 mg into the skin every 7 days. for 4 doses 2 mL 0    [START ON 3/4/2025] semaglutide, weight loss, (WEGOVY) 1 mg/0.5 mL PnIj Inject 1 mg into the skin every 7 days. for 4 doses 2 mL 0    traZODone (DESYREL) 150 MG tablet Take 1 -2 tablets by mouth before bed as needed for insomnia 60 tablet 11    ubrogepant (UBRELVY) 100 mg tablet Take 1 tablet (100 mg total) by mouth every 2 (two) hours as needed for Migraine. Triptans contraindicated. Maximum: 200 mg per 24 hours 16 tablet 3    venlafaxine (EFFEXOR-XR) 150 MG Cp24 Take 1 capsule (150 mg total) by mouth once daily. 90 capsule 3     No current facility-administered medications for this visit.

## 2025-02-26 ENCOUNTER — OFFICE VISIT (OUTPATIENT)
Dept: ORTHOPEDICS | Facility: CLINIC | Age: 65
End: 2025-02-26
Payer: MEDICARE

## 2025-02-26 ENCOUNTER — PATIENT MESSAGE (OUTPATIENT)
Dept: ORTHOPEDICS | Facility: CLINIC | Age: 65
End: 2025-02-26
Payer: MEDICARE

## 2025-02-26 ENCOUNTER — TELEPHONE (OUTPATIENT)
Dept: ORTHOPEDICS | Facility: CLINIC | Age: 65
End: 2025-02-26
Payer: MEDICARE

## 2025-02-26 VITALS — RESPIRATION RATE: 18 BRPM | HEART RATE: 80 BPM | DIASTOLIC BLOOD PRESSURE: 78 MMHG | SYSTOLIC BLOOD PRESSURE: 121 MMHG

## 2025-02-26 DIAGNOSIS — M25.561 CHRONIC PAIN OF RIGHT KNEE: Primary | ICD-10-CM

## 2025-02-26 DIAGNOSIS — G89.29 CHRONIC PAIN OF RIGHT KNEE: Primary | ICD-10-CM

## 2025-02-26 DIAGNOSIS — M17.11 PRIMARY OSTEOARTHRITIS OF RIGHT KNEE: Primary | ICD-10-CM

## 2025-02-26 DIAGNOSIS — M17.12 PRIMARY OSTEOARTHRITIS OF LEFT KNEE: ICD-10-CM

## 2025-02-26 DIAGNOSIS — M25.561 CHRONIC PAIN OF RIGHT KNEE: ICD-10-CM

## 2025-02-26 DIAGNOSIS — G89.29 CHRONIC PAIN OF RIGHT KNEE: ICD-10-CM

## 2025-02-26 PROCEDURE — 1125F AMNT PAIN NOTED PAIN PRSNT: CPT | Mod: CPTII,S$GLB,,

## 2025-02-26 PROCEDURE — 3288F FALL RISK ASSESSMENT DOCD: CPT | Mod: CPTII,S$GLB,,

## 2025-02-26 PROCEDURE — 3078F DIAST BP <80 MM HG: CPT | Mod: CPTII,S$GLB,,

## 2025-02-26 PROCEDURE — 1159F MED LIST DOCD IN RCRD: CPT | Mod: CPTII,S$GLB,,

## 2025-02-26 PROCEDURE — 1160F RVW MEDS BY RX/DR IN RCRD: CPT | Mod: CPTII,S$GLB,,

## 2025-02-26 PROCEDURE — 99499 UNLISTED E&M SERVICE: CPT | Mod: S$GLB,,, | Performed by: ORTHOPAEDIC SURGERY

## 2025-02-26 PROCEDURE — 99999 PR PBB SHADOW E&M-EST. PATIENT-LVL III: CPT | Mod: PBBFAC,,,

## 2025-02-26 PROCEDURE — 20610 DRAIN/INJ JOINT/BURSA W/O US: CPT | Mod: 50,S$GLB,,

## 2025-02-26 PROCEDURE — 99213 OFFICE O/P EST LOW 20 MIN: CPT | Mod: 25,S$GLB,,

## 2025-02-26 PROCEDURE — 1101F PT FALLS ASSESS-DOCD LE1/YR: CPT | Mod: CPTII,S$GLB,,

## 2025-02-26 PROCEDURE — 3074F SYST BP LT 130 MM HG: CPT | Mod: CPTII,S$GLB,,

## 2025-02-26 RX ORDER — TRIAMCINOLONE ACETONIDE 40 MG/ML
40 INJECTION, SUSPENSION INTRA-ARTICULAR; INTRAMUSCULAR
Status: DISCONTINUED | OUTPATIENT
Start: 2025-02-26 | End: 2025-02-26 | Stop reason: HOSPADM

## 2025-02-26 RX ORDER — TRIAMCINOLONE ACETONIDE 40 MG/ML
40 INJECTION, SUSPENSION INTRA-ARTICULAR; INTRAMUSCULAR
Status: COMPLETED | OUTPATIENT
Start: 2025-02-26 | End: 2025-02-26

## 2025-02-26 RX ORDER — LIDOCAINE 50 MG/G
2 PATCH TOPICAL DAILY
Qty: 60 PATCH | Refills: 1 | Status: SHIPPED | OUTPATIENT
Start: 2025-02-26

## 2025-02-26 RX ADMIN — TRIAMCINOLONE ACETONIDE 40 MG: 40 INJECTION, SUSPENSION INTRA-ARTICULAR; INTRAMUSCULAR at 09:02

## 2025-02-26 NOTE — PROCEDURES
Large Joint Aspiration/Injection: R knee    Date/Time: 2/26/2025 9:30 AM    Performed by: Lottie Katz PA-C  Authorized by: Lottie Katz PA-C    Consent Done?:  Yes (Verbal)  Indications:  Pain and arthritis  Site marked: the procedure site was marked    Timeout: prior to procedure the correct patient, procedure, and site was verified    Prep: patient was prepped and draped in usual sterile fashion      Local anesthesia used?: Yes    Local anesthetic:  Bupivacaine 0.25% without epinephrine and topical anesthetic  Anesthetic total (ml):  4      Details:  Needle Size:  21 G  Ultrasonic Guidance for needle placement?: No    Approach:  Anterolateral  Location:  Knee  Site:  R knee  Medications:  40 mg triamcinolone acetonide 40 mg/mL  Patient tolerance:  Patient tolerated the procedure well with no immediate complications

## 2025-02-26 NOTE — PROGRESS NOTES
Patient ID: Jennifer Brady is a 64 y.o. female    CC: right knee pain    History of Present Illness:    Jennifer Brady presents to clinic for right knee pain.  Has known arthritis to her right knee.  Previously treated by myself with cortisone and gel injections. Had durolane injection back in August which helped for about 3 months. She is also endorsing left sided hip pain that radiates past her knee into her ankle. She does endorse back pain.  Does see back and spine.  Feels like one leg is shorter than the other.    Autoimmune conditions:No    DEXA Scan:    Occupation: retired      Ambulating: cane today  Diabetic: no  Smoking: current, trying to quit/wean down  Hx of DVT/PE: no  Hi MI, on ASA    ____________________________________________________________________    Interval history 2/26/2025 : Patient returns today for follow up of right knee pain.  She is going on a cruise in a few weeks and requesting bilateral knee CSI.  Also interested in Lidoderm patches.  She is not ready for surgery at this time.      PAST MEDICAL HISTORY:   Past Medical History:   Diagnosis Date    Anxiety     Arthritis     Asthma     Behavioral problem     Borderline personality disorder     Depression     Depression     Fatigue     Hx of psychiatric care     Hyperlipidemia     Psychiatric problem     S/P hysterectomy with oophorectomy     Sleep apnea     pt uses CPAP    Syncope and collapse     Therapy      PAST SURGICAL HISTORY:   Past Surgical History:   Procedure Laterality Date    CORONARY ANGIOPLASTY      HIP REPLACEMENT ARTHROPLASTY Right 12/21/2022    HYSTERECTOMY  1999    INJECTION OF ANESTHETIC AGENT AROUND NERVE Right 11/20/2019    Procedure: MEDIAL BRANCH BLOCK RIGHT L3, L4, L5;  Surgeon: Petros Reich MD;  Location: [facility];  Service: Pain Management;  Laterality: Right;  NEEDS CONSENT, PT NO LONGER TAKES PLAVIX    INJECTION OF ANESTHETIC AGENT AROUND NERVE Right 06/17/2020    Procedure:  BLOCK, NERVE RIGHT L3, 4, 5 MEDIAL BRANCH;  Surgeon: Petros Reich MD;  Location: [facility];  Service: Pain Management;  Laterality: Right;  NEEDS CONSENT?    RADIOFREQUENCY ABLATION Right 2021    Procedure: RADIOFREQUENCY ABLATION RIGHT L3, 4, 5;  Surgeon: Petros Reich MD;  Location: [facility];  Service: Pain Management;  Laterality: Right;  NEEDS CONSENT    TILT TABLE TEST N/A 2019    Procedure: TILT TABLE TEST;  Surgeon: Roman Lala MD;  Location: [facility];  Service: Cardiology;  Laterality: N/A;  seizure/migraine, HUT referred by Dr Perea Neuro, EEG confirmed    TRANSFORAMINAL EPIDURAL INJECTION OF STEROID Right 10/14/2020    Procedure: LUMBAR TRANSFORAMINAL RIGHT L4/L5;  Surgeon: Petros Reich MD;  Location: [facility];  Service: Pain Management;  Laterality: Right;  NEEDS CONSENT     FAMILY HISTORY:   Family History   Problem Relation Name Age of Onset    Hypertension Paternal Grandfather      Hypertension Paternal Grandmother      Heart attacks under age 50 Maternal Grandmother      Hypertension Maternal Grandmother      Hypertension Maternal Grandfather      Heart attacks under age 50 Father  33    Hypertension Father      Seizures Mother      Heart attack Mother      Hypertension Mother      Stroke Mother      Hypertension Sister      Stroke Sister      Seizures Sister      Breast cancer Neg Hx      Colon cancer Neg Hx      Diabetes Neg Hx      Ovarian cancer Neg Hx      Cancer Neg Hx       SOCIAL HISTORY:   Social History     Occupational History     Employer: [employer]     Comment: [employer]   Tobacco Use    Smoking status: Some Days     Current packs/day: 0.00     Average packs/day: 0.3 packs/day for 35.8 years (9.0 ttl pk-yrs)     Types: Cigarettes     Start date: 1986     Last attempt to quit: 2022     Years since quittin.6     Passive exposure: Past    Smokeless tobacco: Never    Tobacco comments:      quit actual cigarettes a few mo ago, switched to e cigarette   Substance and Sexual Activity    Alcohol use: Yes     Comment: Socially    Drug use: No    Sexual activity: Not Currently     Partners: Male        MEDICATIONS:   Current Outpatient Medications:     albuterol (PROVENTIL/VENTOLIN HFA) 90 mcg/actuation inhaler, Inhale 2 puffs into the lungs every 4 (four) hours as needed for Wheezing., Disp: , Rfl:     aspirin (ECOTRIN) 81 MG EC tablet, Take 1 tablet (81 mg total) by mouth once daily., Disp: 90 tablet, Rfl: 3    atorvastatin (LIPITOR) 40 MG tablet, Take 1 tablet (40 mg total) by mouth once daily., Disp: 90 tablet, Rfl: 3    azelastine (ASTELIN) 137 mcg (0.1 %) nasal spray, 2 sprays (274 mcg total) by Nasal route 2 (two) times daily., Disp: 90 mL, Rfl: 4    budesonide-formoterol 80-4.5 mcg (SYMBICORT) 80-4.5 mcg/actuation HFAA, Inhale 2 puffs into the lungs 2 (two) times a day. Controller, Disp: 10.2 g, Rfl: 11    chlorthalidone (HYGROTEN) 25 MG Tab, Take 1 tablet (25 mg total) by mouth once daily., Disp: 90 tablet, Rfl: 4    estradioL (ESTRACE) 0.01 % (0.1 mg/gram) vaginal cream, Place 1 g vaginally twice a week., Disp: 42 g, Rfl: 0    fluticasone propionate (FLONASE) 50 mcg/actuation nasal spray, , Disp: , Rfl:     galcanezumab-gnlm (EMGALITY PEN) 120 mg/mL PnIj, Inject 1 mL (120 mg total) into the skin every 28 days., Disp: 1 mL, Rfl: 11    HYDROcodone-acetaminophen (NORCO)  mg per tablet, Take by mouth daily as needed., Disp: , Rfl:     hydrOXYzine pamoate (VISTARIL) 25 MG Cap, Take 1 capsule (25 mg total) by mouth 3 (three) times daily as needed (anxiety)., Disp: 270 capsule, Rfl: 3    ipratropium (ATROVENT) 42 mcg (0.06 %) nasal spray, 2 sprays by Each Nostril route 3 (three) times daily as needed for Rhinitis., Disp: 15 mL, Rfl: 3    lactulose (CHRONULAC) 10 gram/15 mL solution, Take 15 mLs (10 g total) by mouth every morning., Disp: 450 mL, Rfl: 5    levocetirizine (XYZAL) 5 MG tablet, Take 5  mg by mouth once daily., Disp: , Rfl:     losartan (COZAAR) 100 MG tablet, Take 1 tablet (100 mg total) by mouth once daily., Disp: 90 tablet, Rfl: 3    methocarbamoL (ROBAXIN) 500 MG Tab, Take 500 mg by mouth 3 (three) times daily., Disp: , Rfl:     mupirocin (BACTROBAN) 2 % ointment, Apply topically once daily., Disp: 44 g, Rfl: 0    nitroGLYCERIN (NITROSTAT) 0.4 MG SL tablet, Place 0.4 mg under the tongue., Disp: , Rfl:     pantoprazole (PROTONIX) 40 MG tablet, Take 1 tablet (40 mg total) by mouth once daily., Disp: 90 tablet, Rfl: 3    semaglutide, weight loss, (WEGOVY) 0.5 mg/0.5 mL PnIj, Inject 0.5 mg into the skin every 7 days. for 4 doses, Disp: 2 mL, Rfl: 0    [START ON 3/4/2025] semaglutide, weight loss, (WEGOVY) 1 mg/0.5 mL PnIj, Inject 1 mg into the skin every 7 days. for 4 doses, Disp: 2 mL, Rfl: 0    traZODone (DESYREL) 150 MG tablet, Take 1 -2 tablets by mouth before bed as needed for insomnia, Disp: 60 tablet, Rfl: 11    ubrogepant (UBRELVY) 100 mg tablet, Take 1 tablet (100 mg total) by mouth every 2 (two) hours as needed for Migraine. Triptans contraindicated. Maximum: 200 mg per 24 hours, Disp: 16 tablet, Rfl: 3    venlafaxine (EFFEXOR-XR) 150 MG Cp24, Take 1 capsule (150 mg total) by mouth once daily., Disp: 90 capsule, Rfl: 3    LIDOcaine (LIDODERM) 5 %, Place 2 patches onto the skin once daily. Remove & Discard patch within 12 hours or as directed by MD, Disp: 60 patch, Rfl: 1    Current Facility-Administered Medications:     triamcinolone acetonide injection 40 mg, 40 mg, Intra-articular, 1 time in Clinic/HOD, Lottie Katz PA-C    triamcinolone acetonide injection 40 mg, 40 mg, Intra-articular, 1 time in Clinic/HOD, Lottie Katz PA-C  ALLERGIES: Review of patient's allergies indicates:  No Known Allergies      Physical Exam     Vitals:    02/26/25 0917   BP: 121/78   Pulse: 80   Resp: 18     Alert and oriented to person, place and time. No acute distress. Well-groomed, not  ill appearing. Pupils round and reactive, normal respiratory effort, no audible wheezing.     GENERAL:  A well-developed, well-nourished 64 y.o. female who is alert and       oriented in no acute distress.      Gait: She  walks with a normal gait.                   EXTREMITIES:  Examination of lower extremities reveals there is no visible mass or deformity.    Left knee:  ROM 0-120    Ligamentously stable to varus/valgus stress.    Anterior and posterior drawers negative.    No pain over pes bursa.    No warmth    No erythema     Effusion No    medial joint line tenderness    Positive Patellofemoral grind/crepitus     Right knee:  ROM 0-120    Ligamentously stable to varus/valgus stress.    Anterior and posterior drawers negative.    No pain over pes bursa.    No warmth    No erythema    Effusion No    medial joint line tenderness    Positive Patellofemoral grind/crepitus     The skin over both lower extremities is normal and unremarkable.  She has a  painless range of motion of the hips and ankles bilaterally.   Sensation is intact in both lower extremities.    There are no motor deficits in the lower extremities bilaterally.   Pedal pulses are palpable distally bilaterally.    She has no calf tenderness to palpation nor edema.    Imaging:     Bilateral Knee X-rays ordered/reviewed by me showing no evidence of fracture or dislocation. There is no obvious malalignment. No evidence of masses, lesions or foreign bodies.  Mild right knee lateral compartment osteoarthritis.  Kellgren Salvador grade 2-3.     Right knee MRI:   1. Tricompartmental osteoarthritis with chondral loss most pronounced at the lateral tibial plateau.  2. Horizontal tear posterior horn medial meniscus that closely approximates the posterior root ligament.  3. Complex and horizontal tears lateral meniscus with a small displaced flap into the inferior recess of the lateral gutter.  4. Small joint effusion with associated synovitis.  Small popliteal  cyst.    Assessment & Plan    Primary osteoarthritis of right knee  -     triamcinolone acetonide injection 40 mg  -     LIDOcaine (LIDODERM) 5 %; Place 2 patches onto the skin once daily. Remove & Discard patch within 12 hours or as directed by MD  Dispense: 60 patch; Refill: 1    Primary osteoarthritis of left knee  -     triamcinolone acetonide injection 40 mg  -     LIDOcaine (LIDODERM) 5 %; Place 2 patches onto the skin once daily. Remove & Discard patch within 12 hours or as directed by MD  Dispense: 60 patch; Refill: 1      Patient returns for follow up of bilateral knee pain.  She is not interested in surgery at this time and would like to stick with injections for now.  Discussed possible Iovera/RFA.  She is interested in Iovera, referral sent to [facility].  Bilateral knee CSI given, post-injection instructions reviewed.  Follow up [facility] for Iovera.     Lidoderm patches sent    Follow up: [facility]  X-rays next visit: none    All questions were answered and patient is agreeable to the above plan.

## 2025-02-26 NOTE — TELEPHONE ENCOUNTER
----- Message from Yolanda Krishnan PA-C sent at 2/26/2025  1:05 PM CST -----  Kassandra Gonzalez referred this pt to me for Naren husain. Please contact the pt to schedule both R and L (can be about 2 weeks apart or longer if needed) in [place]. I'd prefer for the appt to be at 3/3:30, please.Let me know if I need to put in the orders- I can't tell if it's been ordered or not.Thanks!!

## 2025-02-26 NOTE — PROCEDURES
Large Joint Aspiration/Injection: L knee    Date/Time: 2/26/2025 9:30 AM    Performed by: Lottie Katz PA-C  Authorized by: Lottie Katz PA-C    Consent Done?:  Yes (Verbal)  Indications:  Pain and arthritis  Site marked: the procedure site was marked    Timeout: prior to procedure the correct patient, procedure, and site was verified    Prep: patient was prepped and draped in usual sterile fashion      Local anesthesia used?: Yes    Local anesthetic:  Bupivacaine 0.25% without epinephrine and topical anesthetic  Anesthetic total (ml):  4      Details:  Needle Size:  21 G  Ultrasonic Guidance for needle placement?: No    Approach:  Anterolateral  Location:  Knee  Site:  L knee  Medications:  40 mg triamcinolone acetonide 40 mg/mL  Patient tolerance:  Patient tolerated the procedure well with no immediate complications

## 2025-02-26 NOTE — TELEPHONE ENCOUNTER
Spoke with  Jennifer, Iovera procedure has been scheduled for right knee on 04/07. Patient is aware to arrive at 3:00 and procedure is about 30-45 mins. In addition, appointment has been placed on wait-list for any cancellations.

## 2025-02-27 ENCOUNTER — TELEPHONE (OUTPATIENT)
Dept: ORTHOPEDICS | Facility: CLINIC | Age: 65
End: 2025-02-27
Payer: MEDICARE

## 2025-02-27 NOTE — TELEPHONE ENCOUNTER
Left message to contact clinic re: Iovera procedure. Soonest appointment we have is on 04/07 with PA. Appointment has been placed on wait-list.

## 2025-02-27 NOTE — TELEPHONE ENCOUNTER
----- Message from Marcos sent at 2/27/2025 10:39 AM CST -----  Regarding: appt  Name of Who is Calling:Pt What is the request in detail: Requesting to have appt reschedule to next week 4-7 Can the clinic reply by [facility]: yesWhat Number to Call Back if not in [facility]:Telephone Information:Mobile          [phone]

## 2025-03-01 DIAGNOSIS — N95.2 POSTMENOPAUSAL ATROPHIC VAGINITIS: ICD-10-CM

## 2025-03-03 NOTE — TELEPHONE ENCOUNTER
Refill Routing Note   Medication(s) are not appropriate for processing by [facility] for the following reason(s):        Required labs outdated-mammogram    [facility] action(s):  Defer               Appointments  past 12m or future 3m with PCP    Date Provider   Last Visit   1/23/2024 Erin Louis MD   Next Visit   Visit date not found Erin Louis MD   ED visits in past 90 days: 0        Note composed:8:43 AM 03/03/2025

## 2025-03-05 RX ORDER — ESTRADIOL 0.1 MG/G
CREAM VAGINAL
Qty: 42.5 G | Refills: 0 | Status: SHIPPED | OUTPATIENT
Start: 2025-03-05

## 2025-03-24 DIAGNOSIS — F41.1 GAD (GENERALIZED ANXIETY DISORDER): ICD-10-CM

## 2025-03-24 RX ORDER — HYDROXYZINE PAMOATE 25 MG/1
CAPSULE ORAL
Qty: 270 CAPSULE | Refills: 3 | Status: SHIPPED | OUTPATIENT
Start: 2025-03-24

## 2025-03-26 DIAGNOSIS — F32.A DEPRESSION, UNSPECIFIED DEPRESSION TYPE: ICD-10-CM

## 2025-03-26 RX ORDER — VENLAFAXINE HYDROCHLORIDE 150 MG/1
CAPSULE, EXTENDED RELEASE ORAL
Qty: 90 CAPSULE | Refills: 2 | Status: SHIPPED | OUTPATIENT
Start: 2025-03-26

## 2025-03-26 NOTE — TELEPHONE ENCOUNTER
Refill Routing Note   Medication(s) are not appropriate for processing by [facility] for the following reason(s):        Drug-disease interaction    [facility] action(s):  Defer        Medication Therapy Plan: venlafaxine and HTN (hypertension)    Pharmacist review requested: Yes     Appointments  past 12m or future 3m with PCP    Date Provider   Last Visit   1/6/2025 Abdirahman Luu MD   Next Visit   7/8/2025 Abdirahman Luu MD   ED visits in past 90 days: 0        Note composed:4:03 PM 03/26/2025

## 2025-03-26 NOTE — TELEPHONE ENCOUNTER
No care due was identified.  [facility] Embedded Care Due Messages. Reference number: [id number].   3/26/2025 3:29:14 PM CDT

## 2025-03-26 NOTE — TELEPHONE ENCOUNTER
Refill Decision Note   Jennifer Brady  is requesting a refill authorization.  Brief Assessment and Rationale for Refill:  Approve     Medication Therapy Plan:         Pharmacist review requested: Yes   Extended chart review required: Yes   Comments:     Note composed:4:05 PM 03/26/2025

## 2025-03-27 ENCOUNTER — PATIENT MESSAGE (OUTPATIENT)
Dept: NEUROLOGY | Facility: CLINIC | Age: 65
End: 2025-03-27
Payer: MEDICARE

## 2025-03-27 ENCOUNTER — TELEPHONE (OUTPATIENT)
Dept: SLEEP MEDICINE | Facility: CLINIC | Age: 65
End: 2025-03-27
Payer: MEDICARE

## 2025-03-27 NOTE — TELEPHONE ENCOUNTER
Copied from CRM #[id number]. Topic: General Inquiry - Patient Advice  >> Mar 27, 2025 12:33 PM Simone wrote:  Patient would like to have JASON Mcelroy call her about her Rx

## 2025-03-28 DIAGNOSIS — G43.719 INTRACTABLE CHRONIC MIGRAINE WITHOUT AURA AND WITHOUT STATUS MIGRAINOSUS: Primary | ICD-10-CM

## 2025-03-28 RX ORDER — ATOGEPANT 60 MG/1
60 TABLET ORAL DAILY
Qty: 30 TABLET | Refills: 5 | Status: SHIPPED | OUTPATIENT
Start: 2025-03-28

## 2025-03-28 RX ORDER — IPRATROPIUM BROMIDE 42 UG/1
2 SPRAY, METERED NASAL 3 TIMES DAILY PRN
Qty: 15 ML | Refills: 11 | Status: SHIPPED | OUTPATIENT
Start: 2025-03-28

## 2025-03-31 ENCOUNTER — PATIENT MESSAGE (OUTPATIENT)
Dept: SLEEP MEDICINE | Facility: CLINIC | Age: 65
End: 2025-03-31
Payer: MEDICARE

## 2025-04-06 DIAGNOSIS — I10 PRIMARY HYPERTENSION: ICD-10-CM

## 2025-04-06 RX ORDER — LOSARTAN POTASSIUM 100 MG/1
TABLET ORAL
Qty: 90 TABLET | Refills: 3 | Status: SHIPPED | OUTPATIENT
Start: 2025-04-06

## 2025-04-06 NOTE — TELEPHONE ENCOUNTER
Refill Decision Note   Jennifer Brady  is requesting a refill authorization.  Brief Assessment and Rationale for Refill:  Approve     Medication Therapy Plan:         Comments:     Note composed:6:33 PM 04/06/2025

## 2025-04-06 NOTE — TELEPHONE ENCOUNTER
No care due was identified.  [facility] Embedded Care Due Messages. Reference number: [id number].   4/06/2025 8:06:54 AM CDT

## 2025-04-09 ENCOUNTER — E-VISIT (OUTPATIENT)
Dept: PRIMARY CARE CLINIC | Facility: CLINIC | Age: 65
End: 2025-04-09
Payer: MEDICARE

## 2025-04-09 ENCOUNTER — TELEPHONE (OUTPATIENT)
Dept: OBSTETRICS AND GYNECOLOGY | Facility: CLINIC | Age: 65
End: 2025-04-09
Payer: MEDICARE

## 2025-04-09 DIAGNOSIS — J32.1 CHRONIC FRONTAL SINUSITIS: Primary | ICD-10-CM

## 2025-04-09 DIAGNOSIS — J02.9 SORE THROAT: ICD-10-CM

## 2025-04-10 NOTE — PATIENT INSTRUCTIONS
Sinus Infection:   - submitted E visit on 04/09/2025 with report of suspected sinus infection.  Patient states that she does not think she has flu or COVID.   - primarily seems to be suffering from sore throat and ear pain.   - additionally patient states she has been having fatigue, headache, runny nose, in addition to the sore throat and ear pain.   - denies changes to breathing, swallowing or vision.   - symptoms began over a month ago on 03/09/2025.   - states it is use cold medicine and try drinking more fluids without improvement.   - symptoms reported as 5/10 at this time.  Feel they have not gotten better or worse in the last month.   - no vitals were provided on this E visit.   - patient recently had a Z-Jae prescription at the end of January for similar symptoms.   - pollen levels in community have been extremely high over the last 6 weeks with thick coatings of polyps covering cars inside walks as well as collections debris in gutters.  Suspect allergic sinusitis to be most likely cause of patient's symptoms.   - due to recent use of antibiotics would not recommend 2nd course of antibiotic again without being evaluated in clinic.   - at this time would recommend patient use over-the-counter antihistamine as well as Flonase to help reduce pressure in the sinuses which may alleviate ear pain.   - additionally would recommend an office evaluation to check for possible ear infection or possible strep throat.  Without report of other more significant symptoms do not feel that empiric therapy would be recommended at this time.

## 2025-04-10 NOTE — PROGRESS NOTES
Patient ID: Jennifer Brady is a 64 y.o. female.    Chief Complaint: General Illness (Entered automatically based on patient selection in [employer].)    The patient initiated a request through [employer] on 4/9/2025 for evaluation and management with a chief complaint of General Illness (Entered automatically based on patient selection in [employer].)     I evaluated the questionnaire submission on 4/10/25.    [facility]-Cough And Cold    4/9/2025  8:53 PM CDT - Filed by Patient   What do you need help with? Sinus Infection   Do you agree to participate in an E-Visit? Yes   If you have any of the following symptoms, go to your local emergency room or call 911: I acknowledge   What is the main issue you would like addressed today? Ears hurt/ sore throat   Do you think you might have COVID-19 or the Flu? No   What symptoms do you currently have?  Fatigue;  Headache;  Runny nose;  Sore throat;  Ear pain   Have you had any trouble with your breathing, swollowing, or vision? None   Have you ever smoked? Smoked in the past   Do you have a fever? No   When did your concern begin? 3/9/2025   In the last two weeks, have you been in close contact with someone who has COVID-19, the Flu, or strep throat? No   What have you tried to help your symptoms? Cold medication;  Drinking more fluids   On a scale of 1-10, where 10 is the worst you can imagine, how severe are your symptoms? (range: 1 - 10) 5   How have your symptoms changed since they first started? No change   Do you have transportation to an [facility] location to get tested for COVID-19? No   Provide any additional information you feel is important.    Please attach any relevant images or files    Are you able to take your vital signs? Yes   Systolic Blood Pressure:    Diastolic Blood Pressure:    Weight:    Height:    Pulse:    Temperature:    Respiration rate:    Pulse Oxygen:          Encounter Diagnoses   Name Primary?    Chronic frontal sinusitis Yes     Sore throat         Sinus Infection:   - submitted E visit on 2025 with report of suspected sinus infection.  Patient states that she does not think she has flu or COVID.   - primarily seems to be suffering from sore throat and ear pain.   - additionally patient states she has been having fatigue, headache, runny nose, in addition to the sore throat and ear pain.   - denies changes to breathing, swallowing or vision.   - symptoms began over a month ago on 2025.   - states it is use cold medicine and try drinking more fluids without improvement.   - symptoms reported as 5/10 at this time.  Feel they have not gotten better or worse in the last month.   - no vitals were provided on this E visit.   - patient recently had a Z-Jae prescription at the end of January for similar symptoms.   - pollen levels in community have been extremely high over the last 6 weeks with thick coatings of polyps covering cars inside walks as well as collections debris in gutters.  Suspect allergic sinusitis to be most likely cause of patient's symptoms.   - due to recent use of antibiotics would not recommend 2nd course of antibiotic again without being evaluated in clinic.   - at this time would recommend patient use over-the-counter antihistamine as well as Flonase to help reduce pressure in the sinuses which may alleviate ear pain.   - additionally would recommend an office evaluation to check for possible ear infection or possible strep throat.  Without report of other more significant symptoms do not feel that empiric therapy would be recommended at this time.    No orders of the defined types were placed in this encounter.           No follow-ups on file.      E-Visit Time Trackin minutes

## 2025-04-14 ENCOUNTER — TELEPHONE (OUTPATIENT)
Dept: ORTHOPEDICS | Facility: CLINIC | Age: 65
End: 2025-04-14
Payer: MEDICARE

## 2025-04-14 NOTE — TELEPHONE ENCOUNTER
----- Message from Sandra sent at 4/14/2025 11:23 AM CDT -----  Pt Requesting Call BackWho called: ptAna Laurao called for pt:Best call back #: [phone] notes: pt said she wants to speak to someone regarding rescheduling her 4/14/25 procedure; pt chose a call back rather than reschedule message being sent

## 2025-04-21 ENCOUNTER — TELEPHONE (OUTPATIENT)
Dept: ORTHOPEDICS | Facility: CLINIC | Age: 65
End: 2025-04-21
Payer: MEDICARE

## 2025-04-21 ENCOUNTER — PATIENT MESSAGE (OUTPATIENT)
Dept: SLEEP MEDICINE | Facility: CLINIC | Age: 65
End: 2025-04-21
Payer: MEDICARE

## 2025-04-22 ENCOUNTER — OFFICE VISIT (OUTPATIENT)
Dept: SLEEP MEDICINE | Facility: CLINIC | Age: 65
End: 2025-04-22
Payer: MEDICARE

## 2025-04-22 DIAGNOSIS — G43.009 MIGRAINE WITHOUT AURA AND WITHOUT STATUS MIGRAINOSUS, NOT INTRACTABLE: ICD-10-CM

## 2025-04-22 DIAGNOSIS — G47.33 OSA (OBSTRUCTIVE SLEEP APNEA): Primary | ICD-10-CM

## 2025-04-22 PROCEDURE — 98006 SYNCH AUDIO-VIDEO EST MOD 30: CPT | Mod: 95,,, | Performed by: NURSE PRACTITIONER

## 2025-04-22 PROCEDURE — 4010F ACE/ARB THERAPY RXD/TAKEN: CPT | Mod: CPTII,95,, | Performed by: NURSE PRACTITIONER

## 2025-04-22 NOTE — PROGRESS NOTES
The patient location is: [place]  The chief complaint leading to consultation is: REN/migraines    Visit type: audiovisual    Face to Face time with patient: 10minutes of total time spent on the encounter, which includes face to face time and non-face to face time preparing to see the patient (eg, review of tests), Obtaining and/or reviewing separately obtained history, Documenting clinical information in the electronic or other health record, Independently interpreting results (not separately reported) and communicating results to the patient/family/caregiver, or Care coordination (not separately reported). Each patient to whom he or she provides medical services by telemedicine is:  (1) informed of the relationship between the physician and patient and the respective role of any other health care provider with respect to management of the patient; and (2) notified that he or she may decline to receive medical services by telemedicine and may withdraw from such care at any time.          She continues to use apap 6-20cm qhs with DS1 machine qhs. Can't sleep w/o it.  F20 mask.   Started     +migraines, only started qulipta ~3 wks ago and has lingering mild fatigue and dec'd appetite. Nausea improved and headache frequency and severity improved also. Didn't have headache since the weekend. Has knee procedure tomorrow.  Same location as previous R retroorbital pain radiates to vertex or back of neck . Stays in dark quiet room .   TRIED (Topamax, Trileptal, Effexor, Elavil, Seroquel, Latuda, Abilify, Prozac, Geodon. Botox)    CAD  Previous Remote 30davg 7:35h/n AHI 4.5, 90% tile 14cm        Requal: 10/09/2019  lb AH 10  and overall RDI was 16. The oxygen mark was 83.2 % and % time < 90% SpO2 was 1.8 %.  04/15/2016 retitration study CPAP 8 cm           Assessment:   REN, she remains adherent with pap, benefiting. AHI<5  Medical co-morbidities: chronic migraine headaches, CAD, HTN    Plan:   Continue cpap 4-20cm,  supplies via  [facility]  Continue 60mg Quipta PO  Ubrelvy 100mg PO q2hprn prodromal symptoms or headache onset continue  TRIPTANS contraindicated.   Rtc 4mos re-evaluation

## 2025-04-23 ENCOUNTER — PROCEDURE VISIT (OUTPATIENT)
Dept: ORTHOPEDICS | Facility: CLINIC | Age: 65
End: 2025-04-23
Payer: MEDICARE

## 2025-04-23 DIAGNOSIS — M17.11 PRIMARY OSTEOARTHRITIS OF RIGHT KNEE: Primary | ICD-10-CM

## 2025-04-23 DIAGNOSIS — M25.561 CHRONIC PAIN OF RIGHT KNEE: ICD-10-CM

## 2025-04-23 DIAGNOSIS — G89.29 CHRONIC PAIN OF RIGHT KNEE: ICD-10-CM

## 2025-04-23 DIAGNOSIS — M17.10 PRIMARY OSTEOARTHRITIS OF KNEE, UNSPECIFIED LATERALITY: ICD-10-CM

## 2025-04-23 PROCEDURE — 64640 INJECTION TREATMENT OF NERVE: CPT | Mod: RT,S$GLB,,

## 2025-04-23 PROCEDURE — 99499 UNLISTED E&M SERVICE: CPT | Mod: S$GLB,,,

## 2025-04-23 NOTE — PROCEDURES
Procedure Note Iovera:    DATE OF PROCEDURE:  04/23/2025    PREOPERATIVE DIAGNOSIS: right knee pain.     POSTOPERATIVE DIAGNOSIS: right knee pain.     PROCEDURE: Iovera treatment of anterior femoral cutaneous nerve, Lateral femoral cut nerve, and both branches of infrapatellar saphenous nerve using at least 4 different punctures to treat all 4 nerves. (cpt 64640 x4)    COMPLICATIONS: None.     IMPLANTS:  None    ESTIMATED BLOOD LOSS:  < 5cc    SPECIMENS REMOVED:  None    ANESTHESIA: Local lidocaine    INDICATIONS FOR PROCEDURE: This is a 64 y.o. female with longstanding knee pain. They have failed non operative management including injections.I discussed a new treatment therapy called Iovera, which is cryotherapy, to provide symptomatic relief along the sensory distribution of the infrapatellar tendon branch of the saphenous nerve and AFCN. The patient elected to move forward with this. We did discuss the fact that this is a fairly novel procedure and there is very limited scientific data around this.  However, it is FDA approved.  The patient was given patient information and literature to review prior to the procedure as well.  Based on this, the patient agreed to move forward with doing the procedure.      PROCEDURE:   After obtaining verbal consent, the patient was placed supine on the exam table and the proximal medial aspect of the right tibia and anterior aspect of distal femur was prepped with sterile Betadine and alcohol.  A line was drawn extending approximately 5 cm medial to inferior pole of the patella distally to a point approximately 5 cm medial to the tibial tubercle.  A second line was drawn in a medial to lateral direction the width of the patella approximately 7 cm proximal to the patella. We then infiltrated the skin with lidocaine along both lines using a 25g needle. We then introduced the Iovera device along these lines and this device penetrated the skin, creating cryotherapy to both branches  of the infrapatellar saphenous nerve, a third treatment to the anterior femoral cutaneous nerve, and fourth LFCN. 7 punctures of the skin were made to treat the 2 branches of the ISN and another 7 punctures were made to treat the AFCN and LFCN. There were a total of 4 nerves treated with iovera. The patient tolerated the procedure well with no problems.    Pain rating before procedure: 8-9/10  Pain rating after procedure: 0/10    Follow up with me in 6-8 weeks virtually      All of the patient's questions were answered and the patient will contact us if they have any questions or concerns in the interim.      Yolanda Krishnan PA-C  [facility]  Orthopedic Surgery

## 2025-05-05 DIAGNOSIS — K21.9 GASTROESOPHAGEAL REFLUX DISEASE, UNSPECIFIED WHETHER ESOPHAGITIS PRESENT: ICD-10-CM

## 2025-05-05 RX ORDER — PANTOPRAZOLE SODIUM 40 MG/1
40 TABLET, DELAYED RELEASE ORAL
Qty: 90 TABLET | Refills: 2 | Status: SHIPPED | OUTPATIENT
Start: 2025-05-05

## 2025-05-05 NOTE — TELEPHONE ENCOUNTER
Refill Decision Note   Jennifer Brady  is requesting a refill authorization.  Brief Assessment and Rationale for Refill:  Approve     Medication Therapy Plan:         Comments:     Note composed:4:08 PM 05/05/2025

## 2025-05-05 NOTE — TELEPHONE ENCOUNTER
No care due was identified.  [facility] Embedded Care Due Messages. Reference number: [id number].   5/05/2025 8:06:53 AM CDT

## 2025-05-14 ENCOUNTER — OFFICE VISIT (OUTPATIENT)
Dept: CARDIOLOGY | Facility: CLINIC | Age: 65
End: 2025-05-14
Payer: COMMERCIAL

## 2025-05-14 VITALS
WEIGHT: 219.38 LBS | BODY MASS INDEX: 33.25 KG/M2 | HEART RATE: 66 BPM | SYSTOLIC BLOOD PRESSURE: 124 MMHG | OXYGEN SATURATION: 97 % | DIASTOLIC BLOOD PRESSURE: 72 MMHG | HEIGHT: 68 IN

## 2025-05-14 DIAGNOSIS — R94.31 ABNORMAL ELECTROCARDIOGRAM (ECG) (EKG): ICD-10-CM

## 2025-05-14 DIAGNOSIS — R91.1 LUNG NODULE: ICD-10-CM

## 2025-05-14 DIAGNOSIS — R55 SYNCOPE AND COLLAPSE: ICD-10-CM

## 2025-05-14 DIAGNOSIS — I73.9 PAD (PERIPHERAL ARTERY DISEASE): ICD-10-CM

## 2025-05-14 DIAGNOSIS — R42 DIZZINESS AND GIDDINESS: ICD-10-CM

## 2025-05-14 DIAGNOSIS — J44.9 CHRONIC OBSTRUCTIVE PULMONARY DISEASE, UNSPECIFIED COPD TYPE: Primary | ICD-10-CM

## 2025-05-14 DIAGNOSIS — E78.00 PURE HYPERCHOLESTEROLEMIA: ICD-10-CM

## 2025-05-14 DIAGNOSIS — I10 HYPERTENSION, UNSPECIFIED TYPE: ICD-10-CM

## 2025-05-14 DIAGNOSIS — F17.201 TOBACCO ABUSE, IN REMISSION: ICD-10-CM

## 2025-05-14 DIAGNOSIS — I49.3 PVCS (PREMATURE VENTRICULAR CONTRACTIONS): ICD-10-CM

## 2025-05-14 DIAGNOSIS — E66.09 OBESITY DUE TO EXCESS CALORIES WITH SERIOUS COMORBIDITY, UNSPECIFIED CLASS: ICD-10-CM

## 2025-05-14 DIAGNOSIS — J45.20 MILD INTERMITTENT ASTHMA WITHOUT COMPLICATION: ICD-10-CM

## 2025-05-14 DIAGNOSIS — G47.33 OSA (OBSTRUCTIVE SLEEP APNEA): ICD-10-CM

## 2025-05-14 DIAGNOSIS — E78.2 MIXED HYPERLIPIDEMIA: ICD-10-CM

## 2025-05-14 DIAGNOSIS — I25.2 OLD MI (MYOCARDIAL INFARCTION): ICD-10-CM

## 2025-05-14 DIAGNOSIS — I25.119 CORONARY ARTERY DISEASE INVOLVING NATIVE CORONARY ARTERY OF NATIVE HEART WITH ANGINA PECTORIS: ICD-10-CM

## 2025-05-14 DIAGNOSIS — I70.0 AORTIC ATHEROSCLEROSIS: ICD-10-CM

## 2025-05-14 LAB
OHS QRS DURATION: 108 MS
OHS QTC CALCULATION: 455 MS

## 2025-05-14 PROCEDURE — 99999 PR PBB SHADOW E&M-EST. PATIENT-LVL V: CPT | Mod: PBBFAC,,,

## 2025-05-14 RX ORDER — METOPROLOL SUCCINATE 25 MG/1
12.5 TABLET, EXTENDED RELEASE ORAL DAILY
Qty: 45 TABLET | Refills: 3 | Status: SHIPPED | OUTPATIENT
Start: 2025-05-14 | End: 2026-05-14

## 2025-05-14 RX ORDER — NITROGLYCERIN 0.4 MG/1
0.4 TABLET SUBLINGUAL EVERY 5 MIN PRN
Qty: 90 TABLET | Refills: 3 | Status: SHIPPED | OUTPATIENT
Start: 2025-05-14

## 2025-05-14 RX ORDER — ATORVASTATIN CALCIUM 80 MG/1
80 TABLET, FILM COATED ORAL DAILY
Qty: 90 TABLET | Refills: 3 | Status: SHIPPED | OUTPATIENT
Start: 2025-05-14 | End: 2026-05-14

## 2025-05-14 NOTE — PROGRESS NOTES
[facility] - Cardiology [id number]  Cardiology Clinic Note      5/14/2025  2:58 PM    Problem list  Problem List[1]    History of Present Illness    CHIEF COMPLAINT:  Patient presents today with chest pain    HISTORY OF PRESENT ILLNESS:  She reports sharp, pricking chest pain on the left side for the past 5 days with associated LUE dullness and heaviness. She takes nitroglycerin for symptom relief. She resumed smoking 5 days ago but quit again yesterday.    CARDIOVASCULAR HISTORY:  She has a history of MI with stent placement in 2017. She wore a cardiac monitor for 2 days in 2020. She has a history of syncopal episodes with unknown etiology, previously diagnosed as syncopal seizures. She experiences warning sensations prior to episodes which typically last 2 minutes, allowing her to alert others before LOC.    MEDICAL HISTORY:  She has migraine headaches previously treated with Botox injections. She uses a CPAP for sleep management. She has a lung nodule found on imaging.    MEDICATIONS:  She takes nitroglycerin as needed and Ubrelvy for migraines. She was previously on Ozempic.      ROS:  General: -fever, -chills, +fatigue, -weight gain, -weight loss, +decreased energy levels  Eyes: -vision changes, -redness, -discharge  ENT: -ear pain, -nasal congestion, -sore throat  Cardiovascular: +chest pain, -palpitations, -lower extremity edema  Respiratory: -cough, -shortness of breath  Gastrointestinal: -abdominal pain, -nausea, -vomiting, -diarrhea, -constipation, -blood in stool  Genitourinary: -dysuria, -hematuria, -frequency  Musculoskeletal: +joint pain, -muscle pain, +limb pain  Skin: -rash, -lesion  Neurological: -headache, +dizziness, -numbness, -tingling, +syncope, +migraines  Psychiatric: -anxiety, -depression, -sleep difficulty           Medications  Current Medications[2]   Prior to Admission medications    Medication Sig Start Date End Date Taking? Authorizing Provider   aspirin (ECOTRIN) 81 MG EC tablet Take  1 tablet (81 mg total) by mouth once daily. 11/7/24 11/7/25 Yes Zain Villegas MD   atogepant (QULIPTA) 60 mg Tab Take 1 tablet (60 mg total) by mouth once daily. 3/28/25  Yes Taya Mcelroy NP   atorvastatin (LIPITOR) 40 MG tablet Take 1 tablet (40 mg total) by mouth once daily. 11/7/24  Yes Abdirahman Luu MD   chlorthalidone (HYGROTEN) 25 MG Tab Take 1 tablet (25 mg total) by mouth once daily. 11/7/24  Yes Zain Villegas MD   HYDROcodone-acetaminophen (NORCO)  mg per tablet Take by mouth daily as needed. 12/15/21  Yes Provider, Historical   hydrOXYzine pamoate (VISTARIL) 25 MG Cap TAKE 1 CAPSULE(25 MG) BY MOUTH THREE TIMES DAILY AS NEEDED FOR ANXIETY 3/24/25  Yes Parish Ayala III, NP   losartan (COZAAR) 100 MG tablet TAKE 1 TABLET(100 MG) BY MOUTH DAILY 4/6/25  Yes Abdirahman Luu MD   pantoprazole (PROTONIX) 40 MG tablet TAKE 1 TABLET(40 MG) BY MOUTH DAILY 5/5/25  Yes Abdirahman Luu MD   traZODone (DESYREL) 150 MG tablet Take 1 -2 tablets by mouth before bed as needed for insomnia 9/30/24  Yes Parish Ayala III, NP   ubrogepant (UBRELVY) 100 mg tablet Take 1 tablet (100 mg total) by mouth every 2 (two) hours as needed for Migraine. Triptans contraindicated. Maximum: 200 mg per 24 hours 12/4/24  Yes Taya Mcelroy NP   venlafaxine (EFFEXOR-XR) 150 MG Cp24 TAKE 1 CAPSULE(150 MG) BY MOUTH DAILY 3/26/25  Yes Abdirahman Luu MD   nitroGLYCERIN (NITROSTAT) 0.4 MG SL tablet Place 0.4 mg under the tongue. 9/9/22 5/14/25 Yes Provider, Historical   albuterol (PROVENTIL/VENTOLIN HFA) 90 mcg/actuation inhaler Inhale 2 puffs into the lungs every 4 (four) hours as needed for Wheezing. 10/16/23   Provider, Historical   azelastine (ASTELIN) 137 mcg (0.1 %) nasal spray 2 sprays (274 mcg total) by Nasal route 2 (two) times daily. 6/28/24   Nina Rutherford MD   budesonide-formoterol 80-4.5 mcg (SYMBICORT) 80-4.5 mcg/actuation HFAA Inhale 2 puffs into the lungs 2 (two) times  a day. Controller 11/7/24 11/7/25  Abdirahman Luu MD   estradioL (ESTRACE) 0.01 % (0.1 mg/gram) vaginal cream PLACE 1 GRAM VAGINALLY 2 TIMES A WEEK 3/5/25   Erin Louis MD   fluticasone propionate (FLONASE) 50 mcg/actuation nasal spray  9/30/24   Provider, Historical   galcanezumab-gnlm (EMGALITY PEN) 120 mg/mL PnIj Inject 1 mL (120 mg total) into the skin every 28 days. 12/4/24   Taya Mcelroy NP   ipratropium (ATROVENT) 42 mcg (0.06 %) nasal spray 2 sprays by Each Nostril route 3 (three) times daily as needed for Rhinitis. 3/28/25   Nina Rutherford MD   lactulose (CHRONULAC) 10 gram/15 mL solution Take 15 mLs (10 g total) by mouth every morning. 1/6/25   Abdirahman Luu MD   levocetirizine (XYZAL) 5 MG tablet Take 5 mg by mouth once daily. 5/4/24   Provider, Historical   LIDOcaine (LIDODERM) 5 % Place 2 patches onto the skin once daily. Remove & Discard patch within 12 hours or as directed by MD 2/26/25   Lottie Katz PA-C   methocarbamoL (ROBAXIN) 500 MG Tab Take 500 mg by mouth 3 (three) times daily. 12/19/24   Provider, Historical   metoprolol succinate (TOPROL-XL) 25 MG 24 hr tablet Take 0.5 tablets (12.5 mg total) by mouth once daily. 5/14/25 5/14/26  Sirisha Pretty FNP-C   mupirocin (BACTROBAN) 2 % ointment Apply topically once daily. 1/17/25   Tomas Marshall MD   nitroGLYCERIN (NITROSTAT) 0.4 MG SL tablet Place 1 tablet (0.4 mg total) under the tongue every 5 (five) minutes as needed for Chest pain. 5/14/25   Sirisha Pretty FNP-C   citalopram (CELEXA) 10 MG tablet Take 10 mg by mouth once daily.  7/30/18  Provider, Historical   lurasidone (LATUDA) 40 mg Tab tablet Take 1 tablet (40 mg total) by mouth once daily. 10/11/18 4/1/19  Parish Ayala III, NP         History  Past Medical History:   Diagnosis Date    Anxiety     Arthritis     Asthma     Behavioral problem     Borderline personality disorder     Depression     Depression     Fatigue     Hx of  psychiatric care     Hyperlipidemia     Psychiatric problem     S/P hysterectomy with oophorectomy     Sleep apnea     pt uses CPAP    Syncope and collapse     Therapy      Past Surgical History:   Procedure Laterality Date    CORONARY ANGIOPLASTY      HIP REPLACEMENT ARTHROPLASTY Right 12/21/2022    HYSTERECTOMY  1999    INJECTION OF ANESTHETIC AGENT AROUND NERVE Right 11/20/2019    Procedure: MEDIAL BRANCH BLOCK RIGHT L3, L4, L5;  Surgeon: Petros Reich MD;  Location: [facility] PAIN MGT;  Service: Pain Management;  Laterality: Right;  NEEDS CONSENT, PT NO LONGER TAKES PLAVIX    INJECTION OF ANESTHETIC AGENT AROUND NERVE Right 06/17/2020    Procedure: BLOCK, NERVE RIGHT L3, 4, 5 MEDIAL BRANCH;  Surgeon: Petros Reich MD;  Location: [facility] PAIN MGT;  Service: Pain Management;  Laterality: Right;  NEEDS CONSENT?    RADIOFREQUENCY ABLATION Right 05/12/2021    Procedure: RADIOFREQUENCY ABLATION RIGHT L3, 4, 5;  Surgeon: Petros Reich MD;  Location: [facility] PAIN T;  Service: Pain Management;  Laterality: Right;  NEEDS CONSENT    TILT TABLE TEST N/A 03/22/2019    Procedure: TILT TABLE TEST;  Surgeon: Roman Lala MD;  Location: [facility] EP LAB;  Service: Cardiology;  Laterality: N/A;  seizure/migraine, HUT referred by Dr Perea Neuro, EEG confirmed    TRANSFORAMINAL EPIDURAL INJECTION OF STEROID Right 10/14/2020    Procedure: LUMBAR TRANSFORAMINAL RIGHT L4/L5;  Surgeon: Petros Reich MD;  [facility];  Service: Pain Management;  Laterality: Right;  NEEDS CONSENT     Social History[3]      Allergies  Review of patient's allergies indicates:  No Known Allergies      Review of Systems   Review of Systems   Constitutional: Negative for chills, decreased appetite, diaphoresis, fever, malaise/fatigue, weight gain and weight loss.   Eyes:  Negative for blurred vision.   Cardiovascular:  Positive for chest pain. Negative for claudication, dyspnea on exertion, irregular heartbeat, leg swelling,  near-syncope, orthopnea, palpitations, paroxysmal nocturnal dyspnea and syncope.   Respiratory:  Negative for cough, shortness of breath, snoring, sputum production and wheezing.    Endocrine: Negative for cold intolerance, heat intolerance, polydipsia, polyphagia and polyuria.   Skin:  Negative for color change, dry skin, itching, nail changes and poor wound healing.   Musculoskeletal:  Negative for back pain, gout, joint pain and joint swelling.   Gastrointestinal:  Negative for bloating, abdominal pain, constipation, diarrhea, hematemesis, hematochezia, melena, nausea and vomiting.   Genitourinary:  Negative for dysuria and hematuria.   Neurological:  Negative for dizziness, headaches, light-headedness, numbness, paresthesias and weakness.   Psychiatric/Behavioral:  Negative for altered mental status, depression and memory loss.          Physical Exam  Wt Readings from Last 1 Encounters:   05/14/25 99.5 kg (219 lb 5.7 oz)     BP Readings from Last 3 Encounters:   05/14/25 124/72   02/26/25 121/78   01/17/25 (!) 140/62     Pulse Readings from Last 1 Encounters:   05/14/25 66     Body mass index is 33.85 kg/m².    Physical Exam  Constitutional:       General: She is not in acute distress.     Appearance: She is obese.   HENT:      Head: Normocephalic and atraumatic.      Mouth/Throat:      Mouth: Mucous membranes are moist.   Eyes:      Extraocular Movements: Extraocular movements intact.      Pupils: Pupils are equal, round, and reactive to light.   Neck:      Vascular: No carotid bruit or JVD.   Cardiovascular:      Rate and Rhythm: Normal rate and regular rhythm.      Heart sounds: No murmur heard.     No friction rub. No gallop.   Pulmonary:      Effort: Pulmonary effort is normal.      Breath sounds: Normal breath sounds.   Abdominal:      General: Abdomen is flat.      Palpations: Abdomen is soft.   Musculoskeletal:      Right lower leg: No edema.      Left lower leg: No edema.   Skin:     General: Skin is  warm.      Capillary Refill: Capillary refill takes less than 2 seconds.   Neurological:      General: No focal deficit present.   Psychiatric:         Mood and Affect: Mood normal.           Assessment & Plan    J44.9 Chronic obstructive pulmonary disease, unspecified COPD type  J45.20 Mild intermittent asthma without complication  R91.1 Lung nodule  I10 Hypertension, unspecified type  I25.2 Old MI (myocardial infarction)  I25.119 Coronary artery disease involving native coronary artery of native heart with angina pectoris  I73.9 PAD (peripheral artery disease)  I49.3 PVCs (premature ventricular contractions)  I70.0 Aortic atherosclerosis  E78.2 Mixed hyperlipidemia  E66.09 Obesity due to excess calories with serious comorbidity, unspecified class  G47.33 REN (obstructive sleep apnea)  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  - Discussed angiogram with interventional cardiologist Dr. Blanchard to further investigate coronary arteries.  - Started beta blocker. Instructed to take in the evening to minimize daytime drowsiness.  - Evaluated syncopal episodes; hasn't had an episode in over a year.  - Considered referral to electrophysiology if syncopal episodes recur.    CORONARY ARTERY DISEASE INVOLVING NATIVE CORONARY ARTERY OF NATIVE HEART WITH ANGINA PECTORIS:  - Continued nitroglycerin as needed for chest pain.  - Plans for angiogram, given current symptoms requiring nitroglycerin for relief and history of stents.  - Discussed angiogram with interventional cardiologist Dr. Blanchard to further investigate coronary arteries.  - Continue statin aspirin and adding beta-blocker.  - LDL goal less than 70; we will increase atorvastatin to 80 mg daily    TOBACCO ABUSE, IN REMISSION:  - Educated on importance of smoking cessation  for cardiovascular health.  - Patient to stop smoking completely.    DIZZINESS AND GIDDINESS:  - Ultrasound bilateral carotid arteries ordered.    SYNCOPE AND COLLAPSE:  - Considered referral to electrophysiology if syncopal episodes recur.    LIFESTYLE CHANGES:  - Increase physical activity as tolerated such as walking, adopt a heart-healthy diet with more fruits and vegetables and less fried foods, implement stress reduction techniques.  - Follow up to review test results and overall progress.            Brandi R. Carter, FNP-C [facility] - Cardiology.      Total professional time spent for the encounter: 40 minutes  Time was spent preparing to see the patient, reviewing results of prior testing, obtaining and/or reviewing separately obtained history, performing a medically appropriate examination and interview, counseling and educating the patient/family, ordering medications/tests/procedures, referring and communicating with other health care professionals, documenting clinical information in the electronic health record, and independently interpreting results.    This note was generated with the assistance of ambient listening technology. Verbal consent was obtained by the patient and accompanying visitor(s) for the recording of patient appointment to facilitate this note. I attest to having reviewed and edited the generated note for accuracy, though some syntax or spelling errors may persist. Please contact the author of this note for any clarification.         [1]   Patient Active Problem List  Diagnosis    Adjustment disorder with mixed anxiety and depressed mood    Depression    Episodic mood disorder    Cluster B personality disorder    HTN (hypertension)    Anxiety    REN (obstructive sleep apnea)    Intractable chronic migraine without aura and without status migrainosus    Old MI (myocardial infarction)    Coronary artery disease involving native coronary artery of native heart with angina pectoris     Ex-smoker    Smoker    Obesity    Lumbar spondylosis    PAD (peripheral artery disease)    Mild intermittent asthma without complication    Lumbar degenerative disc disease    Syncope and collapse    BMI 31.0-31.9,adult    PVCs (premature ventricular contractions)    Hip pain, chronic, right    Lower extremity weakness    Impaired range of motion of right hip    Pre-operative cardiovascular examination    Lung nodule    Tobacco abuse, in remission    Chronic obstructive pulmonary disease, unspecified COPD type    Aortic atherosclerosis    Hyperlipidemia    Dizziness and giddiness    Abnormal electrocardiogram (ECG) (EKG)   [2]   Current Outpatient Medications   Medication Sig Dispense Refill    aspirin (ECOTRIN) 81 MG EC tablet Take 1 tablet (81 mg total) by mouth once daily. 90 tablet 3    atogepant (QULIPTA) 60 mg Tab Take 1 tablet (60 mg total) by mouth once daily. 30 tablet 5    atorvastatin (LIPITOR) 40 MG tablet Take 1 tablet (40 mg total) by mouth once daily. 90 tablet 3    chlorthalidone (HYGROTEN) 25 MG Tab Take 1 tablet (25 mg total) by mouth once daily. 90 tablet 4    HYDROcodone-acetaminophen (NORCO)  mg per tablet Take by mouth daily as needed.      hydrOXYzine pamoate (VISTARIL) 25 MG Cap TAKE 1 CAPSULE(25 MG) BY MOUTH THREE TIMES DAILY AS NEEDED FOR ANXIETY 270 capsule 3    losartan (COZAAR) 100 MG tablet TAKE 1 TABLET(100 MG) BY MOUTH DAILY 90 tablet 3    pantoprazole (PROTONIX) 40 MG tablet TAKE 1 TABLET(40 MG) BY MOUTH DAILY 90 tablet 2    traZODone (DESYREL) 150 MG tablet Take 1 -2 tablets by mouth before bed as needed for insomnia 60 tablet 11    ubrogepant (UBRELVY) 100 mg tablet Take 1 tablet (100 mg total) by mouth every 2 (two) hours as needed for Migraine. Triptans contraindicated. Maximum: 200 mg per 24 hours 16 tablet 3    venlafaxine (EFFEXOR-XR) 150 MG Cp24 TAKE 1 CAPSULE(150 MG) BY MOUTH DAILY 90 capsule 2    albuterol (PROVENTIL/VENTOLIN HFA) 90 mcg/actuation inhaler Inhale 2  puffs into the lungs every 4 (four) hours as needed for Wheezing.      azelastine (ASTELIN) 137 mcg (0.1 %) nasal spray 2 sprays (274 mcg total) by Nasal route 2 (two) times daily. 90 mL 4    budesonide-formoterol 80-4.5 mcg (SYMBICORT) 80-4.5 mcg/actuation HFAA Inhale 2 puffs into the lungs 2 (two) times a day. Controller 10.2 g 11    estradioL (ESTRACE) 0.01 % (0.1 mg/gram) vaginal cream PLACE 1 GRAM VAGINALLY 2 TIMES A WEEK 42.5 g 0    fluticasone propionate (FLONASE) 50 mcg/actuation nasal spray       galcanezumab-gnlm (EMGALITY PEN) 120 mg/mL PnIj Inject 1 mL (120 mg total) into the skin every 28 days. 1 mL 11    ipratropium (ATROVENT) 42 mcg (0.06 %) nasal spray 2 sprays by Each Nostril route 3 (three) times daily as needed for Rhinitis. 15 mL 11    lactulose (CHRONULAC) 10 gram/15 mL solution Take 15 mLs (10 g total) by mouth every morning. 450 mL 5    levocetirizine (XYZAL) 5 MG tablet Take 5 mg by mouth once daily.      LIDOcaine (LIDODERM) 5 % Place 2 patches onto the skin once daily. Remove & Discard patch within 12 hours or as directed by MD 60 patch 1    methocarbamoL (ROBAXIN) 500 MG Tab Take 500 mg by mouth 3 (three) times daily.      metoprolol succinate (TOPROL-XL) 25 MG 24 hr tablet Take 0.5 tablets (12.5 mg total) by mouth once daily. 45 tablet 3    mupirocin (BACTROBAN) 2 % ointment Apply topically once daily. 44 g 0    nitroGLYCERIN (NITROSTAT) 0.4 MG SL tablet Place 1 tablet (0.4 mg total) under the tongue every 5 (five) minutes as needed for Chest pain. 90 tablet 3     No current facility-administered medications for this visit.   [3]   Social History  Socioeconomic History    Marital status:    Occupational History     Employer: [email or web address]     Comment: [employer]   Tobacco Use    Smoking status: Some Days     Current packs/day: 0.00     Average packs/day: 0.3 packs/day for 35.8 years (9.0 ttl pk-yrs)     Types: Cigarettes     Start date: 9/14/1986      Last attempt to quit: 2022     Years since quittin.8     Passive exposure: Past    Smokeless tobacco: Never    Tobacco comments:     quit actual cigarettes a few mo ago, switched to e cigarette   Substance and Sexual Activity    Alcohol use: Yes     Comment: Socially    Drug use: No    Sexual activity: Not Currently     Partners: Male   Other Topics Concern    Financial Status: Employed Yes    Caffeine Use: Moderate Yes    Spirituality: Organized Buddhism Yes    Patient feels they ought to cut down on drinking/drug use No    Patient annoyed by others criticizing their drinking/drug use No    Patient has felt bad or guilty about drinking/drug use No    Patient has had a drink/used drugs as an eye opener in the AM No   Social History Narrative    Patient discusses recent decisions she has made that she is pleased about; has clarified for self and others her values and standards.  Though she is aware of difficulty of  struggle, feels new confidence about  results.  She expresses sense of wholeness and independence at this point. Her previously explored painful symptoms are improved (i.e, .diminished - 2or3/10), thought processes are logical and outlook is optimistic, as well as realistic.    Discusses trip she's planned.  intent to reschedule after return in mid-August. She expresses feelings of appreciation     Social Drivers of Health     Financial Resource Strain: Low Risk  (2025)    Overall Financial Resource Strain (CARDIA)     Difficulty of Paying Living Expenses: Not hard at all   Recent Concern: Financial Resource Strain - High Risk (2025)    Received from [facility] SDOH Screening     In the past year, have you been unable to get any of the following when you really needed them? choose all that apply.: Internet   Food Insecurity: No Food Insecurity (2025)    Hunger Vital Sign     Worried About Running Out of Food in the Last Year: Never true     Ran Out of Food in the  Last Year: Never true   Transportation Needs: No Transportation Needs (2/19/2025)    PRAPARE - Transportation     Lack of Transportation (Medical): No     Lack of Transportation (Non-Medical): No   Physical Activity: Insufficiently Active (2/19/2025)    Exercise Vital Sign     Days of Exercise per Week: 3 days     Minutes of Exercise per Session: 10 min   Stress: No Stress Concern Present (2/19/2025)    [employer] - Occupational Stress Questionnaire     Feeling of Stress : Not at all   Recent Concern: Stress - Stress Concern Present (1/14/2025)    [employer] - Occupational Stress Questionnaire     Feeling of Stress : To some extent   Housing Stability: Low Risk  (2/19/2025)    Housing Stability Vital Sign     Unable to Pay for Housing in the Last Year: No     Homeless in the Last Year: No   Recent Concern: Housing Stability - High Risk (1/9/2025)    Received from [facility] SDOH Screening     In the past year, have you been unable to get any of the following when you really needed them? choose all that apply.: Utilities (electric, gas, and water)

## (undated) DEVICE — BANDAGE ADHESIVE

## (undated) DEVICE — DRESSING LEUKOPLAST FLEX 1X3IN